# Patient Record
Sex: MALE | Race: WHITE | Employment: OTHER | URBAN - NONMETROPOLITAN AREA
[De-identification: names, ages, dates, MRNs, and addresses within clinical notes are randomized per-mention and may not be internally consistent; named-entity substitution may affect disease eponyms.]

---

## 2017-01-24 RX ORDER — MONTELUKAST SODIUM 10 MG/1
TABLET ORAL
Qty: 30 TABLET | Refills: 4 | Status: SHIPPED | OUTPATIENT
Start: 2017-01-24 | End: 2017-07-18 | Stop reason: SDUPTHER

## 2017-03-06 RX ORDER — HYDROCHLOROTHIAZIDE 12.5 MG/1
CAPSULE, GELATIN COATED ORAL
Qty: 30 CAPSULE | Refills: 6 | Status: SHIPPED | OUTPATIENT
Start: 2017-03-06 | End: 2017-08-09

## 2017-03-29 RX ORDER — ATORVASTATIN CALCIUM 20 MG/1
TABLET, FILM COATED ORAL
Qty: 30 TABLET | Refills: 6 | Status: SHIPPED | OUTPATIENT
Start: 2017-03-29 | End: 2017-10-25 | Stop reason: SDUPTHER

## 2017-06-08 ENCOUNTER — OFFICE VISIT (OUTPATIENT)
Dept: FAMILY MEDICINE CLINIC | Age: 82
End: 2017-06-08

## 2017-06-08 VITALS
SYSTOLIC BLOOD PRESSURE: 118 MMHG | WEIGHT: 190 LBS | HEIGHT: 66 IN | HEART RATE: 84 BPM | TEMPERATURE: 98.3 F | BODY MASS INDEX: 30.53 KG/M2 | DIASTOLIC BLOOD PRESSURE: 70 MMHG

## 2017-06-08 DIAGNOSIS — I48.20 CHRONIC ATRIAL FIBRILLATION (HCC): ICD-10-CM

## 2017-06-08 DIAGNOSIS — J39.8 TRACHEOBRONCHOMALACIA: ICD-10-CM

## 2017-06-08 DIAGNOSIS — R19.7 DIARRHEA, UNSPECIFIED TYPE: Primary | ICD-10-CM

## 2017-06-08 PROCEDURE — 1036F TOBACCO NON-USER: CPT | Performed by: FAMILY MEDICINE

## 2017-06-08 PROCEDURE — G8427 DOCREV CUR MEDS BY ELIG CLIN: HCPCS | Performed by: FAMILY MEDICINE

## 2017-06-08 PROCEDURE — 1123F ACP DISCUSS/DSCN MKR DOCD: CPT | Performed by: FAMILY MEDICINE

## 2017-06-08 PROCEDURE — G8417 CALC BMI ABV UP PARAM F/U: HCPCS | Performed by: FAMILY MEDICINE

## 2017-06-08 PROCEDURE — 4040F PNEUMOC VAC/ADMIN/RCVD: CPT | Performed by: FAMILY MEDICINE

## 2017-06-08 PROCEDURE — 99214 OFFICE O/P EST MOD 30 MIN: CPT | Performed by: FAMILY MEDICINE

## 2017-06-08 RX ORDER — BISOPROLOL FUMARATE 10 MG/1
10 TABLET ORAL DAILY
COMMUNITY
End: 2019-01-17 | Stop reason: SDUPTHER

## 2017-06-08 RX ORDER — FLUTICASONE FUROATE AND VILANTEROL 100; 25 UG/1; UG/1
1 POWDER RESPIRATORY (INHALATION) DAILY
Qty: 1 EACH | Refills: 3 | Status: SHIPPED | OUTPATIENT
Start: 2017-06-08 | End: 2017-11-03

## 2017-06-08 RX ORDER — AMOXICILLIN AND CLAVULANATE POTASSIUM 875; 125 MG/1; MG/1
1 TABLET, FILM COATED ORAL EVERY 12 HOURS
Qty: 20 TABLET | Refills: 0 | Status: SHIPPED | OUTPATIENT
Start: 2017-06-08 | End: 2019-05-14 | Stop reason: SDUPTHER

## 2017-06-08 RX ORDER — TAMSULOSIN HYDROCHLORIDE 0.4 MG/1
0.4 CAPSULE ORAL DAILY
Status: ON HOLD | COMMUNITY
End: 2022-08-26 | Stop reason: HOSPADM

## 2017-06-08 RX ORDER — SACCHAROMYCES BOULARDII 250 MG
250 CAPSULE ORAL 2 TIMES DAILY
Qty: 28 CAPSULE | Refills: 0 | Status: SHIPPED | OUTPATIENT
Start: 2017-06-08 | End: 2017-06-22

## 2017-06-08 ASSESSMENT — PATIENT HEALTH QUESTIONNAIRE - PHQ9
SUM OF ALL RESPONSES TO PHQ QUESTIONS 1-9: 0
1. LITTLE INTEREST OR PLEASURE IN DOING THINGS: 0
SUM OF ALL RESPONSES TO PHQ9 QUESTIONS 1 & 2: 0
2. FEELING DOWN, DEPRESSED OR HOPELESS: 0

## 2017-06-08 ASSESSMENT — ENCOUNTER SYMPTOMS
DIARRHEA: 1
ABDOMINAL PAIN: 1
VOMITING: 0
SHORTNESS OF BREATH: 0
SINUS PRESSURE: 1

## 2017-07-18 RX ORDER — MONTELUKAST SODIUM 10 MG/1
TABLET ORAL
Qty: 30 TABLET | Refills: 5 | Status: SHIPPED | OUTPATIENT
Start: 2017-07-18 | End: 2018-01-13 | Stop reason: SDUPTHER

## 2017-08-09 ENCOUNTER — OFFICE VISIT (OUTPATIENT)
Dept: FAMILY MEDICINE CLINIC | Age: 82
End: 2017-08-09

## 2017-08-09 VITALS
HEART RATE: 79 BPM | BODY MASS INDEX: 31.34 KG/M2 | SYSTOLIC BLOOD PRESSURE: 118 MMHG | OXYGEN SATURATION: 96 % | DIASTOLIC BLOOD PRESSURE: 70 MMHG | HEIGHT: 66 IN | WEIGHT: 195 LBS

## 2017-08-09 DIAGNOSIS — I48.20 CHRONIC ATRIAL FIBRILLATION (HCC): ICD-10-CM

## 2017-08-09 DIAGNOSIS — Z01.818 PREOP EXAMINATION: Primary | ICD-10-CM

## 2017-08-09 DIAGNOSIS — R55 SYNCOPE, UNSPECIFIED SYNCOPE TYPE: ICD-10-CM

## 2017-08-09 DIAGNOSIS — H26.9 CATARACT OF LEFT EYE, UNSPECIFIED CATARACT TYPE: ICD-10-CM

## 2017-08-09 DIAGNOSIS — I10 ESSENTIAL HYPERTENSION: ICD-10-CM

## 2017-08-09 PROCEDURE — 99214 OFFICE O/P EST MOD 30 MIN: CPT | Performed by: FAMILY MEDICINE

## 2017-08-09 PROCEDURE — 1036F TOBACCO NON-USER: CPT | Performed by: FAMILY MEDICINE

## 2017-08-09 PROCEDURE — 1123F ACP DISCUSS/DSCN MKR DOCD: CPT | Performed by: FAMILY MEDICINE

## 2017-08-09 PROCEDURE — G8427 DOCREV CUR MEDS BY ELIG CLIN: HCPCS | Performed by: FAMILY MEDICINE

## 2017-08-09 PROCEDURE — G8417 CALC BMI ABV UP PARAM F/U: HCPCS | Performed by: FAMILY MEDICINE

## 2017-08-09 PROCEDURE — 4040F PNEUMOC VAC/ADMIN/RCVD: CPT | Performed by: FAMILY MEDICINE

## 2017-08-09 ASSESSMENT — ENCOUNTER SYMPTOMS: SHORTNESS OF BREATH: 1

## 2017-10-25 RX ORDER — ATORVASTATIN CALCIUM 20 MG/1
TABLET, FILM COATED ORAL
Qty: 30 TABLET | Refills: 6 | Status: SHIPPED | OUTPATIENT
Start: 2017-10-25 | End: 2018-06-05 | Stop reason: SDUPTHER

## 2017-11-03 ENCOUNTER — OFFICE VISIT (OUTPATIENT)
Dept: FAMILY MEDICINE CLINIC | Age: 82
End: 2017-11-03

## 2017-11-03 VITALS
DIASTOLIC BLOOD PRESSURE: 74 MMHG | OXYGEN SATURATION: 98 % | WEIGHT: 196.2 LBS | SYSTOLIC BLOOD PRESSURE: 102 MMHG | HEART RATE: 86 BPM | BODY MASS INDEX: 31.53 KG/M2 | HEIGHT: 66 IN

## 2017-11-03 DIAGNOSIS — Z95.0 HISTORY OF CARDIAC PACEMAKER: ICD-10-CM

## 2017-11-03 DIAGNOSIS — N18.9 CHRONIC KIDNEY DISEASE, UNSPECIFIED CKD STAGE: ICD-10-CM

## 2017-11-03 DIAGNOSIS — I10 ESSENTIAL HYPERTENSION: ICD-10-CM

## 2017-11-03 DIAGNOSIS — E78.5 HYPERLIPIDEMIA, UNSPECIFIED HYPERLIPIDEMIA TYPE: Primary | ICD-10-CM

## 2017-11-03 PROCEDURE — 4040F PNEUMOC VAC/ADMIN/RCVD: CPT | Performed by: FAMILY MEDICINE

## 2017-11-03 PROCEDURE — 99214 OFFICE O/P EST MOD 30 MIN: CPT | Performed by: FAMILY MEDICINE

## 2017-11-03 PROCEDURE — 1123F ACP DISCUSS/DSCN MKR DOCD: CPT | Performed by: FAMILY MEDICINE

## 2017-11-03 PROCEDURE — 1036F TOBACCO NON-USER: CPT | Performed by: FAMILY MEDICINE

## 2017-11-03 PROCEDURE — G8427 DOCREV CUR MEDS BY ELIG CLIN: HCPCS | Performed by: FAMILY MEDICINE

## 2017-11-03 PROCEDURE — G8484 FLU IMMUNIZE NO ADMIN: HCPCS | Performed by: FAMILY MEDICINE

## 2017-11-03 PROCEDURE — G8417 CALC BMI ABV UP PARAM F/U: HCPCS | Performed by: FAMILY MEDICINE

## 2017-11-03 ASSESSMENT — ENCOUNTER SYMPTOMS
DIARRHEA: 0
CONSTIPATION: 0
SHORTNESS OF BREATH: 0
BLOOD IN STOOL: 0

## 2017-11-03 NOTE — PROGRESS NOTES
Hearing normal.   Left Ear: Hearing normal.   Eyes: Conjunctivae and EOM are normal.   Neck: No tracheal deviation present. Cardiovascular: Normal rate. An irregularly irregular rhythm present. Exam reveals no gallop and no friction rub. No murmur heard. Pulmonary/Chest: Effort normal and breath sounds normal. No respiratory distress. Abdominal: Soft. There is no tenderness. Neurological: He is alert and oriented to person, place, and time. Skin: Skin is warm and dry. No rash noted. Psychiatric: He has a normal mood and affect. /74   Pulse 86   Ht 5' 6\" (1.676 m)   Wt 196 lb 3.2 oz (89 kg)   SpO2 98%   BMI 31.67 kg/m²    Assessment/Plan   1. Hyperlipidemia, unspecified hyperlipidemia type  Rpt labs w/ next draw for nephro  - Lipid Panel; Future  - Comprehensive Metabolic Panel; Future    2. Essential hypertension  Pt to call and confirm meds. May be able to dec amlodipine dose    3. Chronic kidney disease, unspecified CKD stage  F/u w/ nephro as sched    4. History of cardiac pacemaker  Likely hematoma at site.   rec call cardio or f/u if worsening sxs or inc pain/swelling

## 2018-01-15 RX ORDER — MONTELUKAST SODIUM 10 MG/1
TABLET ORAL
Qty: 30 TABLET | Refills: 5 | Status: SHIPPED | OUTPATIENT
Start: 2018-01-15 | End: 2018-06-30 | Stop reason: SDUPTHER

## 2018-02-21 ENCOUNTER — TELEPHONE (OUTPATIENT)
Dept: FAMILY MEDICINE CLINIC | Age: 83
End: 2018-02-21

## 2018-02-22 NOTE — TELEPHONE ENCOUNTER
Dr. Madhav Brown: This patient has an upcoming appointment with you for Hyperlipidemia. In planning for that visit I have completed the following pre-visit planning:     Pre-Visit Planning Checklist:  Patient contacted: yes  Verified patient by name and date of birth: yes    Health Maintenance items reviewed:    No pre-visit planning health maintenance topics to review at this time    Labs and procedures pended:     Labs and procedures discussed with patient: yes  Reminded patient to check with their insurance company about coverage for lab tests and lab location: yes    Preliminary Medication Reconciliation: was performed. Reminded patient to bring medications to appointment: no    Reminded patient to arrive early: yes    Other notes:     Please complete the med-reconciliation and sign the appropriate labs as soon as possible.       Arian Cedeno  Pre-Services Specialist

## 2018-03-02 ENCOUNTER — OFFICE VISIT (OUTPATIENT)
Dept: FAMILY MEDICINE CLINIC | Age: 83
End: 2018-03-02

## 2018-03-02 VITALS
DIASTOLIC BLOOD PRESSURE: 78 MMHG | OXYGEN SATURATION: 94 % | BODY MASS INDEX: 31.6 KG/M2 | HEART RATE: 68 BPM | HEIGHT: 66 IN | SYSTOLIC BLOOD PRESSURE: 122 MMHG | WEIGHT: 196.6 LBS

## 2018-03-02 DIAGNOSIS — I10 ESSENTIAL HYPERTENSION: Primary | ICD-10-CM

## 2018-03-02 DIAGNOSIS — I48.20 CHRONIC ATRIAL FIBRILLATION (HCC): ICD-10-CM

## 2018-03-02 DIAGNOSIS — R06.09 DYSPNEA ON EXERTION: ICD-10-CM

## 2018-03-02 DIAGNOSIS — E78.5 HYPERLIPIDEMIA, UNSPECIFIED HYPERLIPIDEMIA TYPE: ICD-10-CM

## 2018-03-02 DIAGNOSIS — N18.9 CHRONIC KIDNEY DISEASE, UNSPECIFIED CKD STAGE: ICD-10-CM

## 2018-03-02 PROCEDURE — G8427 DOCREV CUR MEDS BY ELIG CLIN: HCPCS | Performed by: FAMILY MEDICINE

## 2018-03-02 PROCEDURE — 1123F ACP DISCUSS/DSCN MKR DOCD: CPT | Performed by: FAMILY MEDICINE

## 2018-03-02 PROCEDURE — G8417 CALC BMI ABV UP PARAM F/U: HCPCS | Performed by: FAMILY MEDICINE

## 2018-03-02 PROCEDURE — G8484 FLU IMMUNIZE NO ADMIN: HCPCS | Performed by: FAMILY MEDICINE

## 2018-03-02 PROCEDURE — 99214 OFFICE O/P EST MOD 30 MIN: CPT | Performed by: FAMILY MEDICINE

## 2018-03-02 PROCEDURE — 4040F PNEUMOC VAC/ADMIN/RCVD: CPT | Performed by: FAMILY MEDICINE

## 2018-03-02 PROCEDURE — 1036F TOBACCO NON-USER: CPT | Performed by: FAMILY MEDICINE

## 2018-03-02 RX ORDER — FUROSEMIDE 40 MG/1
40 TABLET ORAL 2 TIMES DAILY
COMMUNITY
End: 2018-06-21 | Stop reason: ALTCHOICE

## 2018-03-02 RX ORDER — OMEPRAZOLE 20 MG/1
20 CAPSULE, DELAYED RELEASE ORAL DAILY
COMMUNITY
End: 2018-06-21 | Stop reason: ALTCHOICE

## 2018-03-02 ASSESSMENT — ENCOUNTER SYMPTOMS
BLURRED VISION: 0
CONSTIPATION: 0
DIARRHEA: 0
SHORTNESS OF BREATH: 1
BLOOD IN STOOL: 0

## 2018-06-05 RX ORDER — ATORVASTATIN CALCIUM 20 MG/1
TABLET, FILM COATED ORAL
Qty: 30 TABLET | Refills: 5 | Status: SHIPPED | OUTPATIENT
Start: 2018-06-05 | End: 2018-10-31 | Stop reason: SDUPTHER

## 2018-06-19 ENCOUNTER — TELEPHONE (OUTPATIENT)
Dept: FAMILY MEDICINE CLINIC | Age: 83
End: 2018-06-19

## 2018-06-21 RX ORDER — CHOLECALCIFEROL (VITAMIN D3) 1250 MCG
1 CAPSULE ORAL WEEKLY
COMMUNITY
End: 2018-10-26

## 2018-06-25 ENCOUNTER — NURSE ONLY (OUTPATIENT)
Dept: FAMILY MEDICINE CLINIC | Age: 83
End: 2018-06-25

## 2018-06-25 DIAGNOSIS — E78.5 HYPERLIPIDEMIA, UNSPECIFIED HYPERLIPIDEMIA TYPE: ICD-10-CM

## 2018-06-25 LAB
A/G RATIO: 1.7 (ref 1.1–2.2)
ALBUMIN SERPL-MCNC: 4.3 G/DL (ref 3.4–5)
ALP BLD-CCNC: 75 U/L (ref 40–129)
ALT SERPL-CCNC: 20 U/L (ref 10–40)
ANION GAP SERPL CALCULATED.3IONS-SCNC: 17 MMOL/L (ref 3–16)
AST SERPL-CCNC: 21 U/L (ref 15–37)
BILIRUB SERPL-MCNC: 0.9 MG/DL (ref 0–1)
BUN BLDV-MCNC: 14 MG/DL (ref 7–20)
CALCIUM SERPL-MCNC: 9.3 MG/DL (ref 8.3–10.6)
CHLORIDE BLD-SCNC: 104 MMOL/L (ref 99–110)
CHOLESTEROL, TOTAL: 125 MG/DL (ref 0–199)
CO2: 26 MMOL/L (ref 21–32)
CREAT SERPL-MCNC: 1.4 MG/DL (ref 0.8–1.3)
GFR AFRICAN AMERICAN: 59
GFR NON-AFRICAN AMERICAN: 49
GLOBULIN: 2.5 G/DL
GLUCOSE BLD-MCNC: 96 MG/DL (ref 70–99)
HDLC SERPL-MCNC: 35 MG/DL (ref 40–60)
LDL CHOLESTEROL CALCULATED: 65 MG/DL
POTASSIUM SERPL-SCNC: 4.5 MMOL/L (ref 3.5–5.1)
SODIUM BLD-SCNC: 147 MMOL/L (ref 136–145)
TOTAL PROTEIN: 6.8 G/DL (ref 6.4–8.2)
TRIGL SERPL-MCNC: 126 MG/DL (ref 0–150)
VLDLC SERPL CALC-MCNC: 25 MG/DL

## 2018-06-25 PROCEDURE — 36415 COLL VENOUS BLD VENIPUNCTURE: CPT | Performed by: FAMILY MEDICINE

## 2018-07-02 ENCOUNTER — OFFICE VISIT (OUTPATIENT)
Dept: FAMILY MEDICINE CLINIC | Age: 83
End: 2018-07-02

## 2018-07-02 VITALS
HEIGHT: 66 IN | WEIGHT: 192.4 LBS | BODY MASS INDEX: 30.92 KG/M2 | SYSTOLIC BLOOD PRESSURE: 118 MMHG | HEART RATE: 74 BPM | OXYGEN SATURATION: 97 % | DIASTOLIC BLOOD PRESSURE: 68 MMHG

## 2018-07-02 DIAGNOSIS — I48.20 CHRONIC ATRIAL FIBRILLATION (HCC): ICD-10-CM

## 2018-07-02 DIAGNOSIS — R42 DIZZINESS: ICD-10-CM

## 2018-07-02 DIAGNOSIS — I10 ESSENTIAL HYPERTENSION: Primary | ICD-10-CM

## 2018-07-02 DIAGNOSIS — N18.9 CHRONIC KIDNEY DISEASE, UNSPECIFIED CKD STAGE: ICD-10-CM

## 2018-07-02 DIAGNOSIS — E78.5 HYPERLIPIDEMIA, UNSPECIFIED HYPERLIPIDEMIA TYPE: ICD-10-CM

## 2018-07-02 PROCEDURE — 99214 OFFICE O/P EST MOD 30 MIN: CPT | Performed by: FAMILY MEDICINE

## 2018-07-02 PROCEDURE — 3288F FALL RISK ASSESSMENT DOCD: CPT | Performed by: FAMILY MEDICINE

## 2018-07-02 PROCEDURE — 1036F TOBACCO NON-USER: CPT | Performed by: FAMILY MEDICINE

## 2018-07-02 PROCEDURE — 1123F ACP DISCUSS/DSCN MKR DOCD: CPT | Performed by: FAMILY MEDICINE

## 2018-07-02 PROCEDURE — G8417 CALC BMI ABV UP PARAM F/U: HCPCS | Performed by: FAMILY MEDICINE

## 2018-07-02 PROCEDURE — G8427 DOCREV CUR MEDS BY ELIG CLIN: HCPCS | Performed by: FAMILY MEDICINE

## 2018-07-02 PROCEDURE — G8510 SCR DEP NEG, NO PLAN REQD: HCPCS | Performed by: FAMILY MEDICINE

## 2018-07-02 PROCEDURE — 4040F PNEUMOC VAC/ADMIN/RCVD: CPT | Performed by: FAMILY MEDICINE

## 2018-07-02 RX ORDER — FUROSEMIDE 20 MG/1
20 TABLET ORAL 2 TIMES DAILY
COMMUNITY
End: 2018-10-26 | Stop reason: ALTCHOICE

## 2018-07-02 RX ORDER — OMEPRAZOLE 20 MG/1
20 CAPSULE, DELAYED RELEASE ORAL DAILY
COMMUNITY
End: 2018-11-19 | Stop reason: SDUPTHER

## 2018-07-02 RX ORDER — MONTELUKAST SODIUM 10 MG/1
TABLET ORAL
Qty: 30 TABLET | Refills: 5 | Status: SHIPPED | OUTPATIENT
Start: 2018-07-02 | End: 2018-10-31 | Stop reason: SDUPTHER

## 2018-07-02 ASSESSMENT — PATIENT HEALTH QUESTIONNAIRE - PHQ9
SUM OF ALL RESPONSES TO PHQ QUESTIONS 1-9: 0
2. FEELING DOWN, DEPRESSED OR HOPELESS: 0
SUM OF ALL RESPONSES TO PHQ9 QUESTIONS 1 & 2: 0
1. LITTLE INTEREST OR PLEASURE IN DOING THINGS: 0

## 2018-07-02 ASSESSMENT — ENCOUNTER SYMPTOMS
CONSTIPATION: 0
DIARRHEA: 0
BLURRED VISION: 0
CHEST TIGHTNESS: 0
SHORTNESS OF BREATH: 1
BLOOD IN STOOL: 0

## 2018-07-02 NOTE — PROGRESS NOTES
Chief Complaint   Patient presents with    Hypertension    Hyperlipidemia       HPI:  Fady Hawkins is a [de-identified] y.o. (: 1935) here today   for   Hypertension   This is a chronic problem. The current episode started more than 1 year ago. The problem has been gradually improving since onset. The problem is controlled. Associated symptoms include chest pain and shortness of breath. Pertinent negatives include no blurred vision, headaches or palpitations. Agents associated with hypertension include thyroid hormones. Risk factors for coronary artery disease include dyslipidemia and male gender. Past treatments include diuretics and beta blockers. The current treatment provides mild improvement. There are no compliance problems. Hyperlipidemia   This is a chronic problem. The current episode started more than 1 year ago. The problem is controlled. Recent lipid tests were reviewed and are normal. Associated symptoms include chest pain and shortness of breath. Current antihyperlipidemic treatment includes statins. The current treatment provides moderate improvement of lipids. There are no compliance problems. Risk factors for coronary artery disease include dyslipidemia, male sex and hypertension. Overall doing well. No complaints. Feeling well some sob and chest pain with exertion. Has cardio appt next mo. Flomax and Proscar doing well. Kidney function improved. Also taking Vitamin D for 8 weeks. Still w/ some dizziness, has been an ongoing issue. When he gets up suddenly or does anything physical.    Patient's medications, allergies, past medical, surgical, social and family histories were reviewed and updated as appropriate. ROS:  Review of Systems   Constitutional: Negative for chills and fever. Eyes: Negative for blurred vision. Respiratory: Positive for shortness of breath. Negative for chest tightness. Cardiovascular: Positive for chest pain. Negative for palpitations.

## 2018-10-17 ENCOUNTER — TELEPHONE (OUTPATIENT)
Dept: INTERNAL MEDICINE CLINIC | Age: 83
End: 2018-10-17

## 2018-10-17 NOTE — TELEPHONE ENCOUNTER
Attempted to contact patient on 10/17/2018. Result: left message on the patient's voicemail asking patient to return my call. Pre-Visit planning not completed.            Obdulio Levin  Patient Services Specialist  [phone]

## 2018-10-26 NOTE — TELEPHONE ENCOUNTER
Attempted to contact patient on 10/26/2018. Result: left message with patient's wife asking patient to return my call. Pre-Visit planning not completed.            Jody Hickman  Patient Services Specialist  [phone]

## 2018-10-31 ENCOUNTER — OFFICE VISIT (OUTPATIENT)
Dept: FAMILY MEDICINE CLINIC | Age: 83
End: 2018-10-31
Payer: MEDICARE

## 2018-10-31 VITALS
BODY MASS INDEX: 31.82 KG/M2 | SYSTOLIC BLOOD PRESSURE: 132 MMHG | DIASTOLIC BLOOD PRESSURE: 70 MMHG | HEIGHT: 66 IN | WEIGHT: 198 LBS | OXYGEN SATURATION: 97 % | HEART RATE: 79 BPM

## 2018-10-31 DIAGNOSIS — N18.30 CHRONIC KIDNEY DISEASE, STAGE III (MODERATE) (HCC): ICD-10-CM

## 2018-10-31 DIAGNOSIS — I10 ESSENTIAL HYPERTENSION: Primary | ICD-10-CM

## 2018-10-31 DIAGNOSIS — R42 DIZZINESS: ICD-10-CM

## 2018-10-31 DIAGNOSIS — E78.5 HYPERLIPIDEMIA, UNSPECIFIED HYPERLIPIDEMIA TYPE: ICD-10-CM

## 2018-10-31 DIAGNOSIS — N18.9 CHRONIC KIDNEY DISEASE, UNSPECIFIED CKD STAGE: ICD-10-CM

## 2018-10-31 DIAGNOSIS — I48.20 CHRONIC ATRIAL FIBRILLATION (HCC): ICD-10-CM

## 2018-10-31 PROCEDURE — 4040F PNEUMOC VAC/ADMIN/RCVD: CPT | Performed by: FAMILY MEDICINE

## 2018-10-31 PROCEDURE — 1101F PT FALLS ASSESS-DOCD LE1/YR: CPT | Performed by: FAMILY MEDICINE

## 2018-10-31 PROCEDURE — G8484 FLU IMMUNIZE NO ADMIN: HCPCS | Performed by: FAMILY MEDICINE

## 2018-10-31 PROCEDURE — 1123F ACP DISCUSS/DSCN MKR DOCD: CPT | Performed by: FAMILY MEDICINE

## 2018-10-31 PROCEDURE — 1036F TOBACCO NON-USER: CPT | Performed by: FAMILY MEDICINE

## 2018-10-31 PROCEDURE — 99214 OFFICE O/P EST MOD 30 MIN: CPT | Performed by: FAMILY MEDICINE

## 2018-10-31 PROCEDURE — G8427 DOCREV CUR MEDS BY ELIG CLIN: HCPCS | Performed by: FAMILY MEDICINE

## 2018-10-31 PROCEDURE — G8417 CALC BMI ABV UP PARAM F/U: HCPCS | Performed by: FAMILY MEDICINE

## 2018-10-31 RX ORDER — MONTELUKAST SODIUM 10 MG/1
TABLET ORAL
Qty: 90 TABLET | Refills: 4 | Status: SHIPPED | OUTPATIENT
Start: 2018-10-31 | End: 2019-12-11 | Stop reason: SDUPTHER

## 2018-10-31 RX ORDER — ATORVASTATIN CALCIUM 20 MG/1
TABLET, FILM COATED ORAL
Qty: 90 TABLET | Refills: 4 | Status: SHIPPED | OUTPATIENT
Start: 2018-10-31 | End: 2019-11-11 | Stop reason: SDUPTHER

## 2018-10-31 ASSESSMENT — ENCOUNTER SYMPTOMS
BLOOD IN STOOL: 0
DIARRHEA: 0
CHEST TIGHTNESS: 0
CONSTIPATION: 0
BLURRED VISION: 0
SHORTNESS OF BREATH: 1

## 2018-10-31 NOTE — PROGRESS NOTES
palpitations. Gastrointestinal: Negative for blood in stool, constipation and diarrhea. Neurological: Positive for dizziness. Negative for light-headedness and headaches. LDL Calculated (mg/dL)   Date Value   06/25/2018 65       Past Medical History:   Diagnosis Date    Atrial fibrillation (HCC)     Blind right eye     Chronic kidney disease     Hyperlipidemia     Hypertension     Pneumonia        Family History   Problem Relation Age of Onset    Heart Disease Mother     Other Mother     Heart Disease Father     Asthma Other        Social History     Social History    Marital status:      Spouse name: N/A    Number of children: N/A    Years of education: N/A     Occupational History    Not on file. Social History Main Topics    Smoking status: Former Smoker     Packs/day: 1.00     Years: 10.00     Quit date: 2/27/1975    Smokeless tobacco: Never Used      Comment: Quit 40 years ago    Alcohol use No    Drug use: No    Sexual activity: Yes     Partners: Female     Other Topics Concern    Not on file     Social History Narrative    No narrative on file       Prior to Visit Medications    Medication Sig Taking? Authorizing Provider   montelukast (SINGULAIR) 10 MG tablet TAKE 1 TABLET BY MOUTH EVERY DAY Yes Veena Mae MD   omeprazole (PRILOSEC) 20 MG delayed release capsule Take 20 mg by mouth daily Yes Historical Provider, MD   atorvastatin (LIPITOR) 20 MG tablet TAKE 1 TABLET BY MOUTH EVERY DAY Yes Veena Mae MD   bisoprolol (ZEBETA) 10 MG tablet Take 10 mg by mouth daily Yes Historical Provider, MD   tamsulosin (FLOMAX) 0.4 MG capsule Take 0.4 mg by mouth daily Yes Historical Provider, MD   apixaban (ELIQUIS) 5 MG TABS tablet Take 1 tablet by mouth 2 times daily Yes Veena Mae MD   finasteride (PROSCAR) 5 MG tablet Take 5 mg by mouth daily. Yes Historical Provider, MD   aspirin 81 MG tablet Take 81 mg by mouth daily.    Yes Historical Provider, MD

## 2018-11-05 ENCOUNTER — TELEPHONE (OUTPATIENT)
Dept: FAMILY MEDICINE CLINIC | Age: 83
End: 2018-11-05

## 2018-11-19 RX ORDER — OMEPRAZOLE 20 MG/1
20 CAPSULE, DELAYED RELEASE ORAL 2 TIMES DAILY
Qty: 60 CAPSULE | Refills: 3 | Status: SHIPPED | OUTPATIENT
Start: 2018-11-19 | End: 2019-02-27 | Stop reason: ALTCHOICE

## 2018-12-11 ENCOUNTER — OFFICE VISIT (OUTPATIENT)
Dept: FAMILY MEDICINE CLINIC | Age: 83
End: 2018-12-11
Payer: MEDICARE

## 2018-12-11 VITALS
BODY MASS INDEX: 30.67 KG/M2 | SYSTOLIC BLOOD PRESSURE: 122 MMHG | TEMPERATURE: 97.5 F | DIASTOLIC BLOOD PRESSURE: 88 MMHG | WEIGHT: 195.4 LBS | OXYGEN SATURATION: 99 % | HEIGHT: 67 IN | HEART RATE: 71 BPM

## 2018-12-11 DIAGNOSIS — Z00.00 ROUTINE GENERAL MEDICAL EXAMINATION AT A HEALTH CARE FACILITY: Primary | ICD-10-CM

## 2018-12-11 PROCEDURE — G0439 PPPS, SUBSEQ VISIT: HCPCS | Performed by: FAMILY MEDICINE

## 2018-12-11 PROCEDURE — G8484 FLU IMMUNIZE NO ADMIN: HCPCS | Performed by: FAMILY MEDICINE

## 2018-12-11 PROCEDURE — 4040F PNEUMOC VAC/ADMIN/RCVD: CPT | Performed by: FAMILY MEDICINE

## 2018-12-11 RX ORDER — FUROSEMIDE 40 MG/1
40 TABLET ORAL DAILY
COMMUNITY
End: 2019-05-30 | Stop reason: SDUPTHER

## 2018-12-11 ASSESSMENT — ANXIETY QUESTIONNAIRES: GAD7 TOTAL SCORE: 0

## 2018-12-11 ASSESSMENT — LIFESTYLE VARIABLES: HOW OFTEN DO YOU HAVE A DRINK CONTAINING ALCOHOL: 0

## 2018-12-11 ASSESSMENT — PATIENT HEALTH QUESTIONNAIRE - PHQ9
SUM OF ALL RESPONSES TO PHQ QUESTIONS 1-9: 2
SUM OF ALL RESPONSES TO PHQ QUESTIONS 1-9: 2

## 2018-12-11 NOTE — PATIENT INSTRUCTIONS
you die. Is a living will a legal document? A living will is a legal document. Each state has its own laws about living alvarado. If you move to another state, make sure that your living will is legal in the state where you now live. Or you might use a universal form that has been approved by many states. This kind of form can sometimes be completed and stored online. Your electronic copy will then be available wherever you have a connection to the Internet. In most cases, doctors will respect your wishes even if you have a form from a different state. You don't need an  to complete a living will. But legal advice can be helpful if your state's laws are unclear, your health history is complicated, or your family can't agree on what should be in your living will. You can change your living will at any time. Some people find that their wishes about end-of-life care change as their health changes. In addition to making a living will, think about completing a medical power of  form. This form lets you name the person you want to make end-of-life treatment decisions for you (your \"health care agent\") if you're not able to. Many hospitals and nursing homes will give you the forms you need to complete a living will and a medical power of . Your living will is used only if you can't make or communicate decisions for yourself anymore. If you become able to make decisions again, you can accept or refuse any treatment, no matter what you wrote in your living will. Your state may offer an online registry. This is a place where you can store your living will online so the doctors and nurses who need to treat you can find it right away. What should you think about when creating a living will? Talk about your end-of-life wishes with your family members and your doctor. Let them know what you want. That way the people making decisions for you won't be surprised by your choices.   Think about these

## 2019-01-17 RX ORDER — BISOPROLOL FUMARATE 10 MG/1
TABLET ORAL
Qty: 30 TABLET | Refills: 5 | Status: SHIPPED | OUTPATIENT
Start: 2019-01-17 | End: 2019-07-05 | Stop reason: SDUPTHER

## 2019-02-21 ENCOUNTER — TELEPHONE (OUTPATIENT)
Dept: FAMILY MEDICINE CLINIC | Age: 84
End: 2019-02-21

## 2019-02-27 ENCOUNTER — OFFICE VISIT (OUTPATIENT)
Dept: FAMILY MEDICINE CLINIC | Age: 84
End: 2019-02-27
Payer: MEDICARE

## 2019-02-27 VITALS
SYSTOLIC BLOOD PRESSURE: 128 MMHG | HEART RATE: 82 BPM | BODY MASS INDEX: 31.08 KG/M2 | OXYGEN SATURATION: 98 % | WEIGHT: 193.4 LBS | HEIGHT: 66 IN | DIASTOLIC BLOOD PRESSURE: 80 MMHG

## 2019-02-27 DIAGNOSIS — N18.30 CHRONIC KIDNEY DISEASE, STAGE III (MODERATE) (HCC): ICD-10-CM

## 2019-02-27 DIAGNOSIS — N18.9 CHRONIC KIDNEY DISEASE, UNSPECIFIED CKD STAGE: ICD-10-CM

## 2019-02-27 DIAGNOSIS — I10 ESSENTIAL HYPERTENSION: Primary | ICD-10-CM

## 2019-02-27 DIAGNOSIS — I48.20 CHRONIC ATRIAL FIBRILLATION (HCC): ICD-10-CM

## 2019-02-27 DIAGNOSIS — E78.5 HYPERLIPIDEMIA, UNSPECIFIED HYPERLIPIDEMIA TYPE: ICD-10-CM

## 2019-02-27 DIAGNOSIS — R42 DIZZINESS: ICD-10-CM

## 2019-02-27 PROCEDURE — G8417 CALC BMI ABV UP PARAM F/U: HCPCS | Performed by: FAMILY MEDICINE

## 2019-02-27 PROCEDURE — G8427 DOCREV CUR MEDS BY ELIG CLIN: HCPCS | Performed by: FAMILY MEDICINE

## 2019-02-27 PROCEDURE — G8484 FLU IMMUNIZE NO ADMIN: HCPCS | Performed by: FAMILY MEDICINE

## 2019-02-27 PROCEDURE — 1036F TOBACCO NON-USER: CPT | Performed by: FAMILY MEDICINE

## 2019-02-27 PROCEDURE — 99214 OFFICE O/P EST MOD 30 MIN: CPT | Performed by: FAMILY MEDICINE

## 2019-02-27 PROCEDURE — 1123F ACP DISCUSS/DSCN MKR DOCD: CPT | Performed by: FAMILY MEDICINE

## 2019-02-27 PROCEDURE — 1101F PT FALLS ASSESS-DOCD LE1/YR: CPT | Performed by: FAMILY MEDICINE

## 2019-02-27 PROCEDURE — 4040F PNEUMOC VAC/ADMIN/RCVD: CPT | Performed by: FAMILY MEDICINE

## 2019-02-27 RX ORDER — OMEPRAZOLE 20 MG/1
20 CAPSULE, DELAYED RELEASE ORAL DAILY
COMMUNITY
End: 2019-03-26 | Stop reason: SDUPTHER

## 2019-02-27 ASSESSMENT — PATIENT HEALTH QUESTIONNAIRE - PHQ9
SUM OF ALL RESPONSES TO PHQ QUESTIONS 1-9: 0
2. FEELING DOWN, DEPRESSED OR HOPELESS: 0
SUM OF ALL RESPONSES TO PHQ9 QUESTIONS 1 & 2: 0
SUM OF ALL RESPONSES TO PHQ QUESTIONS 1-9: 0
1. LITTLE INTEREST OR PLEASURE IN DOING THINGS: 0

## 2019-02-27 ASSESSMENT — ENCOUNTER SYMPTOMS
CHEST TIGHTNESS: 0
BLURRED VISION: 0
SHORTNESS OF BREATH: 1
DIARRHEA: 0
CONSTIPATION: 0
BLOOD IN STOOL: 0

## 2019-03-21 ENCOUNTER — HOSPITAL ENCOUNTER (OUTPATIENT)
Dept: NON INVASIVE DIAGNOSTICS | Age: 84
Discharge: HOME | End: 2019-03-21
Payer: MEDICARE

## 2019-03-21 ENCOUNTER — HOSPITAL ENCOUNTER (OUTPATIENT)
Dept: VASCULAR LAB | Age: 84
Discharge: HOME | End: 2019-03-21
Payer: MEDICARE

## 2019-03-21 DIAGNOSIS — I48.20 CHRONIC ATRIAL FIBRILLATION (HCC): ICD-10-CM

## 2019-03-21 DIAGNOSIS — R42 DIZZINESS: ICD-10-CM

## 2019-03-21 LAB
LV EF: 58 %
LVEF MODALITY: NORMAL

## 2019-03-21 PROCEDURE — 93880 EXTRACRANIAL BILAT STUDY: CPT

## 2019-03-21 PROCEDURE — 93306 TTE W/DOPPLER COMPLETE: CPT

## 2019-03-26 DIAGNOSIS — I48.20 CHRONIC ATRIAL FIBRILLATION (HCC): ICD-10-CM

## 2019-03-26 RX ORDER — OMEPRAZOLE 20 MG/1
20 CAPSULE, DELAYED RELEASE ORAL 2 TIMES DAILY
Qty: 60 CAPSULE | Refills: 5 | Status: SHIPPED | OUTPATIENT
Start: 2019-03-26 | End: 2019-09-13 | Stop reason: SDUPTHER

## 2019-03-26 NOTE — TELEPHONE ENCOUNTER
Last office visit 02/27/2019, advised to follow-up 4 months, next appointment 06/26/2019.
What Is The Reason For Today's Visit?: Full Body Skin Examination
What Is The Reason For Today's Visit? (Being Monitored For X): the development of a new lesion
How Severe Are Your Spot(S)?: moderate

## 2019-04-02 ENCOUNTER — OFFICE VISIT (OUTPATIENT)
Dept: CARDIOLOGY CLINIC | Age: 84
End: 2019-04-02
Payer: MEDICARE

## 2019-04-02 VITALS
BODY MASS INDEX: 30.53 KG/M2 | SYSTOLIC BLOOD PRESSURE: 110 MMHG | HEART RATE: 81 BPM | DIASTOLIC BLOOD PRESSURE: 82 MMHG | WEIGHT: 190 LBS | OXYGEN SATURATION: 98 % | HEIGHT: 66 IN

## 2019-04-02 DIAGNOSIS — I10 ESSENTIAL HYPERTENSION: ICD-10-CM

## 2019-04-02 DIAGNOSIS — I48.20 CHRONIC ATRIAL FIBRILLATION (HCC): ICD-10-CM

## 2019-04-02 DIAGNOSIS — R93.1 ABNORMAL ECHOCARDIOGRAM: ICD-10-CM

## 2019-04-02 DIAGNOSIS — R42 ORTHOSTATIC DIZZINESS: ICD-10-CM

## 2019-04-02 DIAGNOSIS — E78.5 HYPERLIPIDEMIA, UNSPECIFIED HYPERLIPIDEMIA TYPE: Primary | ICD-10-CM

## 2019-04-02 DIAGNOSIS — Z95.0 PACEMAKER: ICD-10-CM

## 2019-04-02 PROCEDURE — G8417 CALC BMI ABV UP PARAM F/U: HCPCS | Performed by: INTERNAL MEDICINE

## 2019-04-02 PROCEDURE — 99214 OFFICE O/P EST MOD 30 MIN: CPT | Performed by: INTERNAL MEDICINE

## 2019-04-02 PROCEDURE — G8427 DOCREV CUR MEDS BY ELIG CLIN: HCPCS | Performed by: INTERNAL MEDICINE

## 2019-04-02 NOTE — PATIENT INSTRUCTIONS
Plan:  1. Encourage you to follow up with Dr. Rosana Griffith regarding your prostate treatment. These medications are likely the cause of the symptoms of lightheadedness you experience at night. If you wish, we can refer you to a Urologist in [facility]. 2. I recommend you stop Finasteride (Proscar)   3. Neck CTA to further evaluate the patency of your carotid arteries. 4. Referral to Electrophysiology for management of your pacemaker. 5. Follow up with me in  [place] at the end of this month.

## 2019-04-02 NOTE — LETTER
[address] Cardiology - [address] [place] [address]  Phone: [phone]  Fax: [phone]    Karen Pearce MD        2019     Luidn Encinas, [address] [place] [address]    Patient: Norville Dakin  MR Number: [id number]  YOB: 1935  Date of Visit: 2019    Dear Dr. Ludin Encinas:    I recently saw our mutual patient, listed above. Below are the relevant portions of my assessment and plan of care. [address] [facility]   Cardiovascular Evaluation    PATIENT: Norville Dakin  DATE: 2019  MRN: [id number]  CSN: [id number]  : 1935    Primary Care Doctor/Referring provider: Ludin Encinas MD    Reason for evaluation/Chief complaint:   New Patient; Shortness of Breath (sitting still and on exertion); Loss of Consciousness (twice in one week); and Dizziness (comes and goes, light headed)      Subjective:    History of present illness on initial date of evaluation:   Norville Dakin is a 82 y.o. patient who presents to [facility] cardiology care. He was previously followed by a cardiologist in [place] He has a history of hypertension, hyperlipidemia and chronic atrial fibrillation for which he has been on Eliquis. His echocardiogram from 3/21/19 showed an ejection fraction of 55-60% with mild valve abnormality. His carotid doppler showed <50% bilaterally. He states that he underwent pacemaker placement roughly 2 years ago in [facility]. He also reportedly underwent a cardiac cath prior to his pacemaker placement that was apparently normal. Unfortunately these records are not available. Today he reports that he has had 2 syncopal episodes in a weeks times. They have both occurred at night when he wakes up with urinate. He suddenly feels weak and lightheaded after standing. He has been taking his medications as prescribed.          Assessment:  82 y.o. patient with:  Problem List Items Addressed This Visit Dizziness    Chronic atrial fibrillation (HCC)    Abnormal echocardiogram    Hypertension    Hyperlipidemia - Primary    Pacemaker, [employer]. placed in [address]:  1. Encourage you to follow up with Dr. Khadra Aguirre regarding your prostate treatment. These medications are likely the cause of the symptoms of lightheadedness you experience at night. If you wish, we can refer you to a Urologist in [facility]. 2. I recommend you stop Finasteride (Proscar) for now. 3. Neck CTA to further evaluate the patency of your carotid arteries. 4. Referral to Electrophysiology for management of your pacemaker. 5. Follow up with me in [facility] at the end of this month. This note was scribed in the presence of Dr. Jess Becerra MD by Sammie Anand RN.      I, Dr. Jess Becerra, personally performed the services described in this documentation, as scribed by the above signed scribe in my presence. It is both accurate and complete to my knowledge. I agree with the details independently gathered by the clinical support staff, while the remaining scribed note accurately describes my personal service to the patient. All questions and concerns were addressed to the patient/family. Alternatives to my treatment were discussed. The note was completed using EMR. Every effort was made to ensure accuracy; however, inadvertent computerized transcription errors may be present. Jess Becerra MD, [address], [facility], [place]  [phone] [facility] office  [phone] [facility]  4/2/2019  1:10 PM        If you have questions, please do not hesitate to call me. I look forward to following Leroy Begum along with you.     Sincerely,        Jess Becerra MD

## 2019-04-02 NOTE — PROGRESS NOTES
[address] [facility]   Cardiovascular Evaluation    PATIENT: Mello Cast  DATE: 2019  MRN: [id number]  CSN: [id number]  : 1935    Primary Care Doctor/Referring provider: Junior Duvall MD    Reason for evaluation/Chief complaint:   New Patient; Shortness of Breath (sitting still and on exertion); Loss of Consciousness (twice in one week); and Dizziness (comes and goes, light headed)      Subjective:    History of present illness on initial date of evaluation:   Mello Cast is a 82 y.o. patient who presents to [facility] cardiology care. He was previously followed by a cardiologist in [place] He has a history of hypertension, hyperlipidemia and chronic atrial fibrillation for which he has been on Eliquis. His echocardiogram from 3/21/19 showed an ejection fraction of 55-60% with mild valve abnormality. His carotid doppler showed <50% bilaterally. He states that he underwent pacemaker placement roughly 2 years ago in [facility]. He also reportedly underwent a cardiac cath prior to his pacemaker placement that was apparently normal. Unfortunately these records are not available. Today he reports that he has had 2 syncopal episodes in a weeks times. They have both occurred at night when he wakes up with urinate. He suddenly feels weak and lightheaded after standing. He has been taking his medications as prescribed. Patient Active Problem List   Diagnosis    Pneumonia    Abnormal chest CT    Cough syncope    Tracheobronchomalacia    Hyperlipidemia    Hypertension    Chronic kidney disease (HCC)    Chronic atrial fibrillation (HCC)    Pacemaker, [employer]. placed in [place]     Abnormal echocardiogram    Dizziness         Cardiac Testing: I have reviewed the findings below.   EKG:  ECHO:   STRESS TEST:  CATH:  BYPASS:  VASCULAR:    Past Medical History:   has a past medical history of Atrial fibrillation ([address] Blind right eye, Chronic kidney disease, Hyperlipidemia, Hypertension, and Pneumonia. Surgical History:   has a past surgical history that includes Tonsillectomy; Appendectomy; Colonoscopy; eye surgery; Upper gastrointestinal endoscopy; joint replacement (2013); hernia repair; and pacemaker placement (10/13/2017). Social History:   reports that he quit smoking about 44 years ago. He has a 10.00 pack-year smoking history. He has never used smokeless tobacco. He reports that he does not drink alcohol or use drugs. Family History:  No evidence for sudden cardiac death or premature CAD    Medications:  Reviewed and are listed in nursing record. and/or listed below  Outpatient Medications:  Prior to Admission medications    Medication Sig Start Date End Date Taking? Authorizing Provider   omeprazole (PRILOSEC) 20 MG delayed release capsule Take 1 capsule by mouth 2 times daily 3/26/19 4/25/19 Yes Ethel Hernandez MD   apixaban [place]) 5 MG TABS tablet Take 1 tablet by mouth 2 times daily 3/26/19 4/25/19 Yes Ethel Hernandez MD   vitamin D (CHOLECALCIFEROL) 1000 UNIT TABS tablet Take 1,000 Units by mouth daily   Yes Historical Provider, MD   bisoprolol (ZEBETA) 10 MG tablet TAKE 1 TABLET BY MOUTH DAILY 1/17/19  Yes GUS Montes - CNP   furosemide (LASIX) 40 MG tablet Take 40 mg by mouth daily   Yes Historical Provider, MD   montelukast (SINGULAIR) 10 MG tablet TAKE 1 TABLET BY MOUTH EVERY DAY 10/31/18  Yes Ethel Hernandez MD   atorvastatin (LIPITOR) 20 MG tablet TAKE 1 TABLET BY MOUTH EVERY DAY 10/31/18  Yes Ethel Hernandez MD   tamsulosin (FLOMAX) 0.4 MG capsule Take 0.4 mg by mouth daily   Yes Historical Provider, MD   finasteride (PROSCAR) 5 MG tablet Take 5 mg by mouth daily. 3/4/15  Yes Historical Provider, MD   aspirin 81 MG tablet Take 81 mg by mouth daily. Yes Historical Provider, MD       In-patient schedule medications:        Infusion Medications:         Allergies:  Sulfa antibiotics     Review of Systems:   All 14 point review of symptoms completed. Pertinent positives identified in the HPI, all other review of symptoms findings as below.      Review of Systems - History obtained from the patient  General ROS: negative for - chills, fever or night sweats  Psychological ROS: negative for - disorientation or hallucinations  Ophthalmic ROS: negative for - dry eyes, eye pain or loss of vision  ENT ROS: negative for - nasal discharge or sore throat  Allergy and Immunology ROS: negative for - hives or itchy/watery eyes  Hematological and Lymphatic ROS: negative for - jaundice or night sweats  Endocrine ROS: negative for - mood swings or temperature intolerance  Breast ROS: deferred  Respiratory ROS: negative for - hemoptysis or stridor  Cardiovascular ROS: negative for - chest pain, dyspnea on exertion or palpitations  Gastrointestinal ROS: no abdominal pain, change in bowel habits, or black or bloody stools  Genito-Urinary ROS: no dysuria, trouble voiding, or hematuria  Musculoskeletal ROS: negative for - gait disturbance, joint pain or joint stiffness  Neurological ROS: negative for - seizures or speech problems  Dermatological ROS: negative for - rash or skin lesion changes      Physical Examination:    /82   Pulse 81   Ht 5' 6\" (1.676 m)   Wt 190 lb (86.2 kg)   SpO2 98%   BMI 30.67 kg/m²   /82   Pulse 81   Ht 5' 6\" (1.676 m)   Wt 190 lb (86.2 kg)   SpO2 98%   BMI 30.67 kg/m²    Weight: 190 lb (86.2 kg)     Wt Readings from Last 3 Encounters:   04/02/19 190 lb (86.2 kg)   02/27/19 193 lb 6.4 oz (87.7 kg)   12/11/18 195 lb 6.4 oz (88.6 kg)     No intake or output data in the 24 hours ending 04/02/19 1310    General Appearance:  Alert, cooperative, no distress, appears stated age   Head:  Normocephalic, without obvious abnormality, atraumatic   Eyes:  PERRL, conjunctiva/corneas clear       Nose: Nares normal, no drainage or sinus tenderness   Throat: Lips, mucosa, and tongue normal   Neck: Supple, symmetrical, trachea midline, no adenopathy, thyroid: not enlarged, symmetric, no tenderness/mass/nodules, no carotid bruit or JVD       Lungs:   Clear to auscultation bilaterally, respirations unlabored   Chest Wall:  No tenderness or deformity   Heart:  Regular rhythm and normal rate; S1, S2 are normal; no murmur noted; no rub or gallop   Abdomen:   Soft, non-tender, bowel sounds active all four quadrants,  no masses, no organomegaly           Extremities: Extremities normal, atraumatic, no cyanosis or edema   Pulses: 2+ and symmetric   Skin: Skin color, texture, turgor normal, no rashes or lesions   Pysch: Normal mood and affect   Neurologic: Normal gross motor and sensory exam.         Labs  No results for input(s): WBC, HGB, HCT, MCV, PLT in the last 72 hours. No results for input(s): CREATININE, BUN, NA, K, CL, CO2 in the last 72 hours. No results for input(s): INR, PROTIME in the last 72 hours. No results for input(s): TROPONINI in the last 72 hours. Invalid input(s): PRO-BNP  No results for input(s): CHOL, HDL in the last 72 hours. Invalid input(s): LDL, TG      Imaging:  I have reviewed the below testing personally and my interpretation is below. EKG:  CXR:      Assessment:  82 y.o. patient with:  Problem List Items Addressed This Visit     Dizziness    Chronic atrial fibrillation (HCC)    Abnormal echocardiogram    Hypertension    Hyperlipidemia - Primary    Pacemaker, [employer]. placed in [facility]:  1. Encourage you to follow up with Dr. Xi Moscoso regarding your prostate treatment. These medications are likely the cause of the symptoms of lightheadedness you experience at night. If you wish, we can refer you to a Urologist in [facility]. 2. I recommend you stop Finasteride (Proscar) for now. 3. Neck CTA to further evaluate the patency of your carotid arteries. 4. Referral to Electrophysiology for management of your pacemaker. 5. Follow up with me in [place] at the end of this month.      This note was scribed in the presence of Dr. Dante Mccollum MD by Jorge L Spencer RN.      I, Dr. Dante Mccollum, personally performed the services described in this documentation, as scribed by the above signed scribe in my presence. It is both accurate and complete to my knowledge. I agree with the details independently gathered by the clinical support staff, while the remaining scribed note accurately describes my personal service to the patient. All questions and concerns were addressed to the patient/family. Alternatives to my treatment were discussed. The note was completed using EMR. Every effort was made to ensure accuracy; however, inadvertent computerized transcription errors may be present.     Dante Mccollum MD, [address], [place], [place]  [phone] [facility] office  [phone] [facility]  4/2/2019  1:10 PM

## 2019-04-03 ENCOUNTER — TELEPHONE (OUTPATIENT)
Dept: CARDIOLOGY CLINIC | Age: 84
End: 2019-04-03

## 2019-04-03 DIAGNOSIS — Z45.09 ENCOUNTER FOR LOOP RECORDER CHECK: Primary | ICD-10-CM

## 2019-04-03 NOTE — TELEPHONE ENCOUNTER
Pt was seen by Dr. Giovanni Livingston as a new patient. He reported that he had a device placed 2 years ago in [facility]. He is transferring his care here. : can you please call him and schedule him to see EP next available to establish care for device management per [facility]? [employer]: can you please get his recorders from there? Janey Warren: can you please enroll him in our device clinic once we have his device info?

## 2019-04-03 NOTE — TELEPHONE ENCOUNTER
Halie Evans Serial Number: [id number] Clinic: [facility] [address]   Contact: Kylah Jonesy Phone Number: [phone]      Pt has a loop recorder, implanted 9-1-2017. I requested transfer in from [employer]. Sofiya Benito is the NEW point person at [facility] . [phone]. If not released call Stuart Cummings.

## 2019-04-03 NOTE — TELEPHONE ENCOUNTER
Ines Alaniz Serial Number: [id number] Clinic: [facility] [address]   Contact: Noel Flaherty Phone Number: [phone]      Pt has a loop recorder, implanted 9-1-2017. I requested transfer in from [employer].

## 2019-04-10 NOTE — TELEPHONE ENCOUNTER
Number changed for [facility] to 21 . Called clinic and spoke w/  Daogberto ambrose.

## 2019-04-18 ENCOUNTER — TELEPHONE (OUTPATIENT)
Dept: CARDIOLOGY CLINIC | Age: 84
End: 2019-04-18

## 2019-04-18 DIAGNOSIS — I10 ESSENTIAL HYPERTENSION: Primary | ICD-10-CM

## 2019-04-19 ENCOUNTER — TELEPHONE (OUTPATIENT)
Dept: CARDIOLOGY CLINIC | Age: 84
End: 2019-04-19

## 2019-04-19 ENCOUNTER — HOSPITAL ENCOUNTER (OUTPATIENT)
Age: 84
Discharge: HOME | End: 2019-04-19
Payer: MEDICARE

## 2019-04-19 ENCOUNTER — HOSPITAL ENCOUNTER (OUTPATIENT)
Dept: CT IMAGING | Age: 84
Discharge: HOME | End: 2019-04-19
Payer: MEDICARE

## 2019-04-19 DIAGNOSIS — I10 ESSENTIAL HYPERTENSION: ICD-10-CM

## 2019-04-19 DIAGNOSIS — R42 ORTHOSTATIC DIZZINESS: ICD-10-CM

## 2019-04-19 DIAGNOSIS — I77.9 VERTEBRAL ARTERY DISEASE (HCC): Primary | ICD-10-CM

## 2019-04-19 LAB
BUN BLDV-MCNC: 16 MG/DL (ref 7–20)
CREAT SERPL-MCNC: 1.7 MG/DL (ref 0.8–1.3)
GFR AFRICAN AMERICAN: 47
GFR NON-AFRICAN AMERICAN: 39

## 2019-04-19 PROCEDURE — 70498 CT ANGIOGRAPHY NECK: CPT

## 2019-04-19 PROCEDURE — 82565 ASSAY OF CREATININE: CPT

## 2019-04-19 PROCEDURE — 36415 COLL VENOUS BLD VENIPUNCTURE: CPT

## 2019-04-19 PROCEDURE — 84520 ASSAY OF UREA NITROGEN: CPT

## 2019-04-19 PROCEDURE — 6360000004 HC RX CONTRAST MEDICATION: Performed by: INTERNAL MEDICINE

## 2019-04-19 RX ADMIN — IOPAMIDOL 75 ML: 755 INJECTION, SOLUTION INTRAVENOUS at 10:03

## 2019-04-19 NOTE — TELEPHONE ENCOUNTER
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

## 2019-04-24 NOTE — PROGRESS NOTES
[address] [facility]   Cardiovascular Evaluation    PATIENT: Olena Mayo  DATE: 2019  MRN: [id number]  CSN: [id number]  : 1935    Primary Care Doctor/Referring provider: Nataliia Bauman MD    Reason for evaluation/Chief complaint:   No chief complaint on file. Subjective:    History of present illness on initial date of evaluation:   Olena Mayo is a 82 y.o. patient who presents for follow up. His neck CTA from 19 showed blockage in his bilateral vertebral arteries. He was referred to Dr. Juan Alberto Fernández. Patient Active Problem List   Diagnosis    Pneumonia    Abnormal chest CT    Cough syncope    Tracheobronchomalacia    Hyperlipidemia    Hypertension    Chronic kidney disease (HCC)    Chronic atrial fibrillation (HCC)    Pacemaker, [facility] placed in [facility] Abnormal echocardiogram    Dizziness    Encounter for loop recorder check         Cardiac Testing: I have reviewed the findings below. EKG:  ECHO:   STRESS TEST:  CATH:  BYPASS:  VASCULAR:    Past Medical History:   has a past medical history of Atrial fibrillation ([address] Blind right eye, Chronic kidney disease, Hyperlipidemia, Hypertension, and Pneumonia. Surgical History:   has a past surgical history that includes Tonsillectomy; Appendectomy; Colonoscopy; eye surgery; Upper gastrointestinal endoscopy; joint replacement (); hernia repair; and pacemaker placement (10/13/2017). Social History:   reports that he quit smoking about 44 years ago. He has a 10.00 pack-year smoking history. He has never used smokeless tobacco. He reports that he does not drink alcohol or use drugs. Family History:  No evidence for sudden cardiac death or premature CAD    Medications:  Reviewed and are listed in nursing record. and/or listed below  Outpatient Medications:  Prior to Admission medications    Medication Sig Start Date End Date Taking?  Authorizing Provider   omeprazole (PRILOSEC) 20 MG delayed abdominal pain, change in bowel habits, or black or bloody stools  Genito-Urinary ROS: no dysuria, trouble voiding, or hematuria  Musculoskeletal ROS: negative for - gait disturbance, joint pain or joint stiffness  Neurological ROS: negative for - seizures or speech problems  Dermatological ROS: negative for - rash or skin lesion changes      Physical Examination:    There were no vitals taken for this visit. There were no vitals taken for this visit. Wt Readings from Last 3 Encounters:   04/02/19 190 lb (86.2 kg)   02/27/19 193 lb 6.4 oz (87.7 kg)   12/11/18 195 lb 6.4 oz (88.6 kg)     No intake or output data in the 24 hours ending 04/24/19 1520    General Appearance:  Alert, cooperative, no distress, appears stated age   Head:  Normocephalic, without obvious abnormality, atraumatic   Eyes:  PERRL, conjunctiva/corneas clear       Nose: Nares normal, no drainage or sinus tenderness   Throat: Lips, mucosa, and tongue normal   Neck: Supple, symmetrical, trachea midline, no adenopathy, thyroid: not enlarged, symmetric, no tenderness/mass/nodules, no carotid bruit or JVD       Lungs:   Clear to auscultation bilaterally, respirations unlabored   Chest Wall:  No tenderness or deformity   Heart:  Regular rhythm and normal rate; S1, S2 are normal; no murmur noted; no rub or gallop   Abdomen:   Soft, non-tender, bowel sounds active all four quadrants,  no masses, no organomegaly           Extremities: Extremities normal, atraumatic, no cyanosis or edema   Pulses: 2+ and symmetric   Skin: Skin color, texture, turgor normal, no rashes or lesions   Pysch: Normal mood and affect   Neurologic: Normal gross motor and sensory exam.         Labs  No results for input(s): WBC, HGB, HCT, MCV, PLT in the last 72 hours. No results for input(s): CREATININE, BUN, NA, K, CL, CO2 in the last 72 hours. No results for input(s): INR, PROTIME in the last 72 hours. No results for input(s): TROPONINI in the last 72 hours.   Invalid

## 2019-04-25 ENCOUNTER — OFFICE VISIT (OUTPATIENT)
Dept: CARDIOLOGY CLINIC | Age: 84
End: 2019-04-25
Payer: MEDICARE

## 2019-04-25 VITALS
DIASTOLIC BLOOD PRESSURE: 88 MMHG | WEIGHT: 194 LBS | BODY MASS INDEX: 31.18 KG/M2 | OXYGEN SATURATION: 98 % | HEART RATE: 78 BPM | HEIGHT: 66 IN | SYSTOLIC BLOOD PRESSURE: 154 MMHG

## 2019-04-25 DIAGNOSIS — I48.91 ATRIAL FIBRILLATION, UNSPECIFIED TYPE (HCC): ICD-10-CM

## 2019-04-25 DIAGNOSIS — I10 ESSENTIAL HYPERTENSION: ICD-10-CM

## 2019-04-25 DIAGNOSIS — E78.5 HYPERLIPIDEMIA, UNSPECIFIED HYPERLIPIDEMIA TYPE: ICD-10-CM

## 2019-04-25 DIAGNOSIS — R42 DIZZINESS: Primary | ICD-10-CM

## 2019-04-25 PROCEDURE — 1036F TOBACCO NON-USER: CPT | Performed by: INTERNAL MEDICINE

## 2019-04-25 PROCEDURE — G8417 CALC BMI ABV UP PARAM F/U: HCPCS | Performed by: INTERNAL MEDICINE

## 2019-04-25 PROCEDURE — 99214 OFFICE O/P EST MOD 30 MIN: CPT | Performed by: INTERNAL MEDICINE

## 2019-04-25 PROCEDURE — 4040F PNEUMOC VAC/ADMIN/RCVD: CPT | Performed by: INTERNAL MEDICINE

## 2019-04-25 PROCEDURE — 1123F ACP DISCUSS/DSCN MKR DOCD: CPT | Performed by: INTERNAL MEDICINE

## 2019-04-25 PROCEDURE — G8427 DOCREV CUR MEDS BY ELIG CLIN: HCPCS | Performed by: INTERNAL MEDICINE

## 2019-04-25 NOTE — LETTER
No evidence for sudden cardiac death or premature CAD    Medications:  Reviewed and are listed in nursing record. and/or listed below  Outpatient Medications:  Prior to Admission medications    Medication Sig Start Date End Date Taking? Authorizing Provider   omeprazole (PRILOSEC) 20 MG delayed release capsule Take 1 capsule by mouth 2 times daily 3/26/19 4/25/19 Yes Bernard Sainz MD   apixaban Franciekenn Blantonan) 5 MG TABS tablet Take 1 tablet by mouth 2 times daily 3/26/19 4/25/19 Yes Bernard Sainz MD   vitamin D (CHOLECALCIFEROL) 1000 UNIT TABS tablet Take 1,000 Units by mouth daily   Yes Historical Provider, MD   bisoprolol (ZEBETA) 10 MG tablet TAKE 1 TABLET BY MOUTH DAILY 1/17/19  Yes GUS Valle CNP   furosemide (LASIX) 40 MG tablet Take 40 mg by mouth daily   Yes Historical Provider, MD   montelukast (SINGULAIR) 10 MG tablet TAKE 1 TABLET BY MOUTH EVERY DAY 10/31/18  Yes Bernard Sainz MD   atorvastatin (LIPITOR) 20 MG tablet TAKE 1 TABLET BY MOUTH EVERY DAY 10/31/18  Yes Bernard Sainz MD   tamsulosin (FLOMAX) 0.4 MG capsule Take 0.4 mg by mouth daily   Yes Historical Provider, MD   finasteride (PROSCAR) 5 MG tablet Take 5 mg by mouth daily. 3/4/15  Yes Historical Provider, MD   aspirin 81 MG tablet Take 81 mg by mouth daily. Yes Historical Provider, MD       In-patient schedule medications:        Infusion Medications: Allergies:  Sulfa antibiotics     Review of Systems:   All 14 point review of symptoms completed. Pertinent positives identified in the HPI, all other review of symptoms findings as below.      Review of Systems - History obtained from the patient  General ROS: negative for - chills, fever or night sweats  Psychological ROS: negative for - disorientation or hallucinations  Ophthalmic ROS: negative for - dry eyes, eye pain or loss of vision  ENT ROS: negative for - nasal discharge or sore throat  Allergy and Immunology ROS: negative for - hives or itchy/watery eyes Pulses: 2+ and symmetric   Skin: Skin color, texture, turgor normal, no rashes or lesions   Pysch: Normal mood and affect   Neurologic: Normal gross motor and sensory exam.         Labs  No results for input(s): WBC, HGB, HCT, MCV, PLT in the last 72 hours. No results for input(s): CREATININE, BUN, NA, K, CL, CO2 in the last 72 hours. No results for input(s): INR, PROTIME in the last 72 hours. No results for input(s): TROPONINI in the last 72 hours. Invalid input(s): PRO-BNP  No results for input(s): CHOL, HDL in the last 72 hours. Invalid input(s): LDL, TG      Imaging:  I have reviewed the below testing personally and my interpretation is below. EKG:  CXR:      Assessment:  82 y.o. patient with:  Problem List Items Addressed This Visit     Dizziness - Primary    A-fib ([address]    Hypertension    Hyperlipidemia          Plan:  1. Please follow up with Dr. Concepción Kaplan as scheduled 5/10/19 to review abnormal neck CTA findings on verterbal arteries. Normal velocity on doppler, so likely a false positive. 2. Continue current medications for now with continued holding of his prostate medications. 3. Please follow up with me in 6 months. This note was scribed in the presence of Dr. Priya Hope MD by Veronica Guo RN.      I, Dr. Ashley Vences, personally performed the services described in this documentation, as scribed by the above signed scribe in my presence. It is both accurate and complete to my knowledge. I agree with the details independently gathered by the clinical support staff, while the remaining scribed note accurately describes my personal service to the patient. All questions and concerns were addressed to the patient/family. Alternatives to my treatment were discussed. The note was completed using EMR. Every effort was made to ensure accuracy; however, inadvertent computerized transcription errors may be present.           Ashley Vences MD, [address], [facility], [address] [facility] [phone] [facility]  4/25/2019  9:14 AM

## 2019-04-25 NOTE — PROGRESS NOTES
evidence for sudden cardiac death or premature CAD    Medications:  Reviewed and are listed in nursing record. and/or listed below  Outpatient Medications:  Prior to Admission medications    Medication Sig Start Date End Date Taking? Authorizing Provider   omeprazole (PRILOSEC) 20 MG delayed release capsule Take 1 capsule by mouth 2 times daily 3/26/19 4/25/19 Yes Brannon Dailey MD   apixaban Geraldean Vic) 5 MG TABS tablet Take 1 tablet by mouth 2 times daily 3/26/19 4/25/19 Yes Brannon Dailey MD   vitamin D (CHOLECALCIFEROL) 1000 UNIT TABS tablet Take 1,000 Units by mouth daily   Yes Historical Provider, MD   bisoprolol (ZEBETA) 10 MG tablet TAKE 1 TABLET BY MOUTH DAILY 1/17/19  Yes GUS Ferreira - CNP   furosemide (LASIX) 40 MG tablet Take 40 mg by mouth daily   Yes Historical Provider, MD   montelukast (SINGULAIR) 10 MG tablet TAKE 1 TABLET BY MOUTH EVERY DAY 10/31/18  Yes Brannon Dailey MD   atorvastatin (LIPITOR) 20 MG tablet TAKE 1 TABLET BY MOUTH EVERY DAY 10/31/18  Yes Brannon Dailey MD   tamsulosin (FLOMAX) 0.4 MG capsule Take 0.4 mg by mouth daily   Yes Historical Provider, MD   finasteride (PROSCAR) 5 MG tablet Take 5 mg by mouth daily. 3/4/15  Yes Historical Provider, MD   aspirin 81 MG tablet Take 81 mg by mouth daily. Yes Historical Provider, MD       In-patient schedule medications:        Infusion Medications: Allergies:  Sulfa antibiotics     Review of Systems:   All 14 point review of symptoms completed. Pertinent positives identified in the HPI, all other review of symptoms findings as below.      Review of Systems - History obtained from the patient  General ROS: negative for - chills, fever or night sweats  Psychological ROS: negative for - disorientation or hallucinations  Ophthalmic ROS: negative for - dry eyes, eye pain or loss of vision  ENT ROS: negative for - nasal discharge or sore throat  Allergy and Immunology ROS: negative for - hives or itchy/watery eyes  Hematological and Lymphatic ROS: negative for - jaundice or night sweats  Endocrine ROS: negative for - mood swings or temperature intolerance  Breast ROS: deferred  Respiratory ROS: negative for - hemoptysis or stridor  Cardiovascular ROS: negative for - chest pain, dyspnea on exertion or palpitations  Gastrointestinal ROS: no abdominal pain, change in bowel habits, or black or bloody stools  Genito-Urinary ROS: no dysuria, trouble voiding, or hematuria  Musculoskeletal ROS: negative for - gait disturbance, joint pain or joint stiffness  Neurological ROS: negative for - seizures or speech problems  Dermatological ROS: negative for - rash or skin lesion changes      Physical Examination:    BP (!) 154/88   Pulse 78   Ht 5' 6\" (1.676 m)   Wt 194 lb (88 kg)   SpO2 98%   BMI 31.31 kg/m²   BP (!) 154/88   Pulse 78   Ht 5' 6\" (1.676 m)   Wt 194 lb (88 kg)   SpO2 98%   BMI 31.31 kg/m²    Weight: 194 lb (88 kg)     Wt Readings from Last 3 Encounters:   04/25/19 194 lb (88 kg)   04/02/19 190 lb (86.2 kg)   02/27/19 193 lb 6.4 oz (87.7 kg)     No intake or output data in the 24 hours ending 04/25/19 0914    General Appearance:  Alert, cooperative, no distress, appears stated age   Head:  Normocephalic, without obvious abnormality, atraumatic   Eyes:  PERRL, conjunctiva/corneas clear       Nose: Nares normal, no drainage or sinus tenderness   Throat: Lips, mucosa, and tongue normal   Neck: Supple, symmetrical, trachea midline, no adenopathy, thyroid: not enlarged, symmetric, no tenderness/mass/nodules, no carotid bruit or JVD       Lungs:   Clear to auscultation bilaterally, respirations unlabored   Chest Wall:  No tenderness or deformity   Heart:  Regular rhythm and normal rate; S1, S2 are normal; no murmur noted; no rub or gallop   Abdomen:   Soft, non-tender, bowel sounds active all four quadrants,  no masses, no organomegaly           Extremities: Extremities normal, atraumatic, no cyanosis or edema Pulses: 2+ and symmetric   Skin: Skin color, texture, turgor normal, no rashes or lesions   Pysch: Normal mood and affect   Neurologic: Normal gross motor and sensory exam.         Labs  No results for input(s): WBC, HGB, HCT, MCV, PLT in the last 72 hours. No results for input(s): CREATININE, BUN, NA, K, CL, CO2 in the last 72 hours. No results for input(s): INR, PROTIME in the last 72 hours. No results for input(s): TROPONINI in the last 72 hours. Invalid input(s): PRO-BNP  No results for input(s): CHOL, HDL in the last 72 hours. Invalid input(s): LDL, TG      Imaging:  I have reviewed the below testing personally and my interpretation is below. EKG:  CXR:      Assessment:  82 y.o. patient with:  Problem List Items Addressed This Visit     Dizziness - Primary    A-fib ([address]    Hypertension    Hyperlipidemia          Plan:  1. Please follow up with Dr. Tad Malik as scheduled 5/10/19 to review abnormal neck CTA findings on verterbal arteries. Normal velocity on doppler, so likely a false positive. 2. Continue current medications for now with continued holding of his prostate medications. 3. Please follow up with me in 6 months. This note was scribed in the presence of Dr. Brando Aguilar MD by Inga Darnell RN.      I, Dr. Augustin Mattson, personally performed the services described in this documentation, as scribed by the above signed scribe in my presence. It is both accurate and complete to my knowledge. I agree with the details independently gathered by the clinical support staff, while the remaining scribed note accurately describes my personal service to the patient. All questions and concerns were addressed to the patient/family. Alternatives to my treatment were discussed. The note was completed using EMR. Every effort was made to ensure accuracy; however, inadvertent computerized transcription errors may be present.           Augustin Mattson MD, [address], [address] [facility], [address] [place] office  [phone] [facility]  4/25/2019  9:14 AM

## 2019-04-25 NOTE — PATIENT INSTRUCTIONS
Plan:  1. Please follow up with Dr. Jamaal Garcia as scheduled 5/10/19  2. Continue current medications  3. Please follow up with me in 6 months.

## 2019-04-26 ENCOUNTER — TELEPHONE (OUTPATIENT)
Dept: CARDIOLOGY CLINIC | Age: 84
End: 2019-04-26

## 2019-05-10 ENCOUNTER — OFFICE VISIT (OUTPATIENT)
Dept: VASCULAR SURGERY | Age: 84
End: 2019-05-10
Payer: MEDICARE

## 2019-05-10 VITALS
HEIGHT: 67 IN | DIASTOLIC BLOOD PRESSURE: 82 MMHG | SYSTOLIC BLOOD PRESSURE: 118 MMHG | WEIGHT: 190 LBS | BODY MASS INDEX: 29.82 KG/M2

## 2019-05-10 DIAGNOSIS — I65.03 STENOSIS OF BOTH VERTEBRAL ARTERIES: Primary | ICD-10-CM

## 2019-05-10 PROCEDURE — 99203 OFFICE O/P NEW LOW 30 MIN: CPT | Performed by: SURGERY

## 2019-05-10 PROCEDURE — G8427 DOCREV CUR MEDS BY ELIG CLIN: HCPCS | Performed by: SURGERY

## 2019-05-10 PROCEDURE — G8417 CALC BMI ABV UP PARAM F/U: HCPCS | Performed by: SURGERY

## 2019-05-11 NOTE — PROGRESS NOTES
Outpatient Consultation / H&P    Date of Consultation:  5/10/2019    PCP:  Kota Mcleod MD     Referring Provider:  Dr. Nathaly Scott     Chief Complaint:   Chief Complaint   Patient presents with    Other     patient ref by DR Markell Orosco for vertebral artery disease. pamlr        History of Present Illness: We are asked to see this patient in consultation by Dr. Nathaly Scott regarding Vertebral artery disease. Chuck Alexandra is a 82 y.o. male who reports dizziness and near syncope. Symptoms have always occurred after getting up from sitting and starting to walk. No symptoms at rest while sitting. No visual changes. No prior history of CVA. History of afib on anticoagulation. Past Medical History:  Past Medical History:   Diagnosis Date    Atrial fibrillation ([address]     Blind right eye     Chronic kidney disease     Hyperlipidemia     Hypertension     Pneumonia        Past Surgical History:  Past Surgical History:   Procedure Laterality Date    APPENDECTOMY      COLONOSCOPY      EYE SURGERY      HERNIA REPAIR      JOINT REPLACEMENT  2013    left shoulder    PACEMAKER PLACEMENT  10/13/2017    Dr Kathya Erwin at [address] [facility] Medications:   Prior to Admission medications    Medication Sig Start Date End Date Taking?  Authorizing Provider   apixaban (ELIQUIS) 5 MG TABS tablet Take by mouth 2 times daily   Yes Historical Provider, MD   vitamin D (CHOLECALCIFEROL) 1000 UNIT TABS tablet Take 1,000 Units by mouth daily   Yes Historical Provider, MD   furosemide (LASIX) 40 MG tablet Take 40 mg by mouth daily   Yes Historical Provider, MD   montelukast (SINGULAIR) 10 MG tablet TAKE 1 TABLET BY MOUTH EVERY DAY 10/31/18  Yes Kota Mcleod MD   atorvastatin (LIPITOR) 20 MG tablet TAKE 1 TABLET BY MOUTH EVERY DAY 10/31/18  Yes Kota Mcleod MD   tamsulosin (FLOMAX) 0.4 MG capsule Take 0.4 mg by mouth daily   Yes Historical Provider, MD Concern    Not on file   Social History Narrative    Not on file       Family History:        Problem Relation Age of Onset    Heart Disease Mother     Other Mother     Heart Disease Father     Asthma Other        Review of Systems:  A 14 point review of systems was completed. Pertinent positives identified in the HPI, all other review of systems negative. Physical Examination:    /82 (Site: Left Upper Arm)   Ht 5' 7\" (1.702 m)   Wt 190 lb (86.2 kg)   BMI 29.76 kg/m²     Weight: 190 lb (86.2 kg)       General appearance: NAD  Eyes: PERRLA  Neck: no JVD, no lymphadenopathy. Respiratory: effort is unlabored, no crackles, wheezes or rubs. Cardiovascular: regular, no murmur. No carotid bruits. No edema or varicosities. Pulses:    carotid brachial radial   RIGHT 2 2 2   LEFT 2 2 2   GI: abdomen soft, nondistended, no organomegaly. Musculoskeletal: strength and tone normal.  Extremities: warm and pink. Skin: no dermatitis or ulceration. Neuro/psychiatric: grossly intact. MEDICAL DECISION MAKING/TESTING        Carotid Duplex:  No signficant Carotid stenosis. Vertebral arteries patent with antegrade flow and normal velocities. CTA: Moderate vertebral artery origin disease. Assessment:      Diagnosis Orders   1. Stenosis of both vertebral arteries       Symptoms do not appear to be consistent with symptomatic vertebral artery disease. Symptoms are sporadic and positional- more likely Vertigo. My assessment of CTA is that there is mild bilateral Vertebral stenosis but no high grade bilateral disease which would be necessary for Vertebral arteries to be cause. Recommendations/Plan:  Neurology evaluation.        Mohamud Valencia MD, FACS

## 2019-05-14 ENCOUNTER — OFFICE VISIT (OUTPATIENT)
Dept: FAMILY MEDICINE CLINIC | Age: 84
End: 2019-05-14
Payer: MEDICARE

## 2019-05-14 VITALS
WEIGHT: 186 LBS | TEMPERATURE: 98.7 F | OXYGEN SATURATION: 98 % | BODY MASS INDEX: 29.13 KG/M2 | DIASTOLIC BLOOD PRESSURE: 76 MMHG | SYSTOLIC BLOOD PRESSURE: 118 MMHG | HEART RATE: 86 BPM

## 2019-05-14 DIAGNOSIS — R05.9 COUGH: ICD-10-CM

## 2019-05-14 DIAGNOSIS — J01.10 ACUTE NON-RECURRENT FRONTAL SINUSITIS: Primary | ICD-10-CM

## 2019-05-14 PROCEDURE — G8427 DOCREV CUR MEDS BY ELIG CLIN: HCPCS | Performed by: NURSE PRACTITIONER

## 2019-05-14 PROCEDURE — 4040F PNEUMOC VAC/ADMIN/RCVD: CPT | Performed by: NURSE PRACTITIONER

## 2019-05-14 PROCEDURE — G8598 ASA/ANTIPLAT THER USED: HCPCS | Performed by: NURSE PRACTITIONER

## 2019-05-14 PROCEDURE — 1036F TOBACCO NON-USER: CPT | Performed by: NURSE PRACTITIONER

## 2019-05-14 PROCEDURE — G8417 CALC BMI ABV UP PARAM F/U: HCPCS | Performed by: NURSE PRACTITIONER

## 2019-05-14 PROCEDURE — 99213 OFFICE O/P EST LOW 20 MIN: CPT | Performed by: NURSE PRACTITIONER

## 2019-05-14 PROCEDURE — 1123F ACP DISCUSS/DSCN MKR DOCD: CPT | Performed by: NURSE PRACTITIONER

## 2019-05-14 RX ORDER — AMOXICILLIN AND CLAVULANATE POTASSIUM 875; 125 MG/1; MG/1
1 TABLET, FILM COATED ORAL EVERY 12 HOURS
Qty: 20 TABLET | Refills: 0 | Status: SHIPPED | OUTPATIENT
Start: 2019-05-14 | End: 2019-05-24

## 2019-05-14 RX ORDER — BENZONATATE 100 MG/1
100 CAPSULE ORAL 3 TIMES DAILY PRN
Qty: 42 CAPSULE | Refills: 0 | Status: SHIPPED | OUTPATIENT
Start: 2019-05-14 | End: 2019-05-28

## 2019-05-14 ASSESSMENT — ENCOUNTER SYMPTOMS
EYE DISCHARGE: 0
ABDOMINAL PAIN: 0
CONSTIPATION: 0
EYE ITCHING: 0
SINUS PRESSURE: 1
VOICE CHANGE: 0
PHOTOPHOBIA: 0
SHORTNESS OF BREATH: 1
SORE THROAT: 0
NAUSEA: 0
COUGH: 1
BLOOD IN STOOL: 0
RHINORRHEA: 0
CHEST TIGHTNESS: 1
SINUS PAIN: 1
TROUBLE SWALLOWING: 0
EYE PAIN: 0
VOMITING: 0
COLOR CHANGE: 0
WHEEZING: 1
CHOKING: 0
EYE REDNESS: 0
BACK PAIN: 0
STRIDOR: 0
DIARRHEA: 0

## 2019-05-14 NOTE — PROGRESS NOTES
Chief Complaint   Patient presents with    Cough     productive cough, chest congestion, headache, sinus congestion for 4 days        /76   Pulse 86   Temp 98.7 °F (37.1 °C)   Wt 186 lb (84.4 kg)   SpO2 98%   BMI 29.13 kg/m²     HPI:  Valentino Abate is a 82 y.o. (: 1935) here today   for   Sinusitis   This is a new problem. The current episode started in the past 7 days. The problem has been gradually worsening since onset. There has been no fever. Associated symptoms include congestion, coughing, headaches, shortness of breath and sinus pressure. Pertinent negatives include no chills, diaphoresis, ear pain, neck pain, sneezing or sore throat. (Body aches) Past treatments include nothing. The treatment provided no relief. Patient's medications, allergies, past medical, surgical, social and family histories were reviewed and updated asappropriate. ROS:  Review of Systems   Constitutional: Positive for activity change, appetite change and fatigue. Negative for chills, diaphoresis, fever and unexpected weight change. HENT: Positive for congestion, sinus pressure and sinus pain. Negative for ear discharge, ear pain, hearing loss, nosebleeds, postnasal drip, rhinorrhea, sneezing, sore throat, tinnitus, trouble swallowing and voice change. Eyes: Negative for photophobia, pain, discharge, redness and itching. Respiratory: Positive for cough, chest tightness, shortness of breath and wheezing. Negative for choking and stridor. Cardiovascular: Negative for chest pain, palpitations and leg swelling. Gastrointestinal: Negative for abdominal pain, blood in stool, constipation, diarrhea, nausea and vomiting. Endocrine: Negative for cold intolerance, heat intolerance, polydipsia and polyuria. Genitourinary: Negative for difficulty urinating, dysuria, enuresis, flank pain, frequency, hematuria and urgency. Musculoskeletal: Positive for arthralgias and myalgias.  Negative for back pain, gait problem, joint swelling, neck pain and neck stiffness. Skin: Negative for color change, pallor, rash and wound. Allergic/Immunologic: Negative for environmental allergies and food allergies. Neurological: Positive for headaches. Negative for dizziness, tremors, syncope, speech difficulty, weakness, light-headedness and numbness. Hematological: Negative for adenopathy. Does not bruise/bleed easily. Psychiatric/Behavioral: Negative for agitation, behavioral problems, confusion, decreased concentration, dysphoric mood, hallucinations, self-injury, sleep disturbance and suicidal ideas. The patient is not nervous/anxious and is not hyperactive. Prior to Visit Medications    Medication Sig Taking? Authorizing Provider   amoxicillin-clavulanate (AUGMENTIN) 875-125 MG per tablet Take 1 tablet by mouth every 12 hours for 10 days Yes GUS Swain CNP   benzonatate (TESSALON) 100 MG capsule Take 1 capsule by mouth 3 times daily as needed for Cough Yes GUS Swain CNP   apixaban (ELIQUIS) 5 MG TABS tablet Take by mouth 2 times daily Yes Historical Provider, MD   vitamin D (CHOLECALCIFEROL) 1000 UNIT TABS tablet Take 1,000 Units by mouth daily Yes Historical Provider, MD   bisoprolol (ZEBETA) 10 MG tablet TAKE 1 TABLET BY MOUTH DAILY Yes GUS Swain CNP   furosemide (LASIX) 40 MG tablet Take 40 mg by mouth daily Yes Historical Provider, MD   montelukast (SINGULAIR) 10 MG tablet TAKE 1 TABLET BY MOUTH EVERY DAY Yes Keila Espinosa MD   atorvastatin (LIPITOR) 20 MG tablet TAKE 1 TABLET BY MOUTH EVERY DAY Yes Keila Espinosa MD   tamsulosin (FLOMAX) 0.4 MG capsule Take 0.4 mg by mouth daily Yes Historical Provider, MD   finasteride (PROSCAR) 5 MG tablet Take 5 mg by mouth daily. Yes Historical Provider, MD   aspirin 81 MG tablet Take 81 mg by mouth daily.    Yes Historical Provider, MD   omeprazole (PRILOSEC) 20 MG delayed release capsule Take 1 capsule by mouth 2 times daily  Mathew Weems MD       Allergies   Allergen Reactions    Sulfa Antibiotics        OBJECTIVE:      BP Readings from Last 2 Encounters:   05/14/19 118/76   05/10/19 118/82       Wt Readings from Last 3 Encounters:   05/14/19 186 lb (84.4 kg)   05/10/19 190 lb (86.2 kg)   04/25/19 194 lb (88 kg)       Physical Exam   Constitutional: He is oriented to person, place, and time. He appears well-developed and well-nourished. No distress. HENT:   Head: Normocephalic and atraumatic. Right Ear: Hearing, external ear and ear canal normal. Tympanic membrane is scarred and bulging. Left Ear: Hearing, tympanic membrane, external ear and ear canal normal.   Nose: Right sinus exhibits maxillary sinus tenderness and frontal sinus tenderness. Left sinus exhibits maxillary sinus tenderness and frontal sinus tenderness. Mouth/Throat: Uvula is midline and mucous membranes are normal. Posterior oropharyngeal erythema present. No oropharyngeal exudate. Tonsils are 0 on the right. Tonsils are 0 on the left. Eyes: Pupils are equal, round, and reactive to light. Conjunctivae are normal. Right eye exhibits no discharge. Left eye exhibits no discharge. Neck: Normal range of motion. No JVD present. No tracheal deviation present. No thyromegaly present. Cardiovascular: Normal rate, regular rhythm, normal heart sounds and intact distal pulses. Exam reveals no friction rub. No murmur heard. Pulmonary/Chest: Effort normal and breath sounds normal. No stridor. No respiratory distress. He has no decreased breath sounds. He has no wheezes. He has no rhonchi. He has no rales. Abdominal: Soft. Bowel sounds are normal. He exhibits no distension and no mass. There is no tenderness. There is no rebound and no guarding. Musculoskeletal: Normal range of motion. He exhibits no edema or tenderness. Lymphadenopathy:     He has no cervical adenopathy. Neurological: He is alert and oriented to person, place, and time.  He has normal strength. Coordination normal.   Skin: Skin is warm and dry. Capillary refill takes less than 2 seconds. No rash noted. Psychiatric: He has a normal mood and affect. His speech is normal and behavior is normal. Judgment and thought content normal. Cognition and memory are normal.         ASSESSMENT/PLAN:    1. Acute non-recurrent frontal sinusitis  - amoxicillin-clavulanate (AUGMENTIN) 875-125 MG per tablet; Take 1 tablet by mouth every 12 hours for 10 days  Dispense: 20 tablet; Refill: 0    2. Cough    - benzonatate (TESSALON) 100 MG capsule; Take 1 capsule by mouth 3 times daily as needed for Cough  Dispense: 42 capsule; Refill: 0      Follow up if symptoms do not improve or worsen. If the patient becomes short of breath go straight to the ER or call 911.

## 2019-05-30 RX ORDER — FUROSEMIDE 40 MG/1
TABLET ORAL
Qty: 30 TABLET | Refills: 5 | Status: SHIPPED | OUTPATIENT
Start: 2019-05-30 | End: 2019-12-18 | Stop reason: ALTCHOICE

## 2019-06-10 LAB
ANION GAP SERPL CALCULATED.3IONS-SCNC: 9.9 MMOL/L (ref 8–16)
BUN BLDV-MCNC: 15 MG/DL (ref 5–19)
CHLORIDE BLD-SCNC: 109 MMOL/L (ref 99–111)
CO2: 30 MMOL/L (ref 21–33)
CREAT SERPL-MCNC: 1.5 MG/DL (ref 0.6–1.3)
ERYTHROCYTE DISTRIBUTION WIDTH RBC RATIO: 15 % (ref 11.6–14.8)
GFR CALCULATED: 45
HCT VFR BLD CALC: 50 % (ref 37.4–53.8)
HEMOGLOBIN: 16 G/DL (ref 13.2–16.5)
MAGNESIUM: 1.8 MG/DL (ref 1.5–2.5)
MCH RBC QN AUTO: 28.8 PG (ref 27.7–33.3)
MCHC RBC AUTO-ENTMCNC: 32 G/DL (ref 32.8–35)
MCV RBC AUTO: 90.1 FL (ref 80.5–96.9)
PHOSPHORUS: 3.5 MG/DL (ref 2.5–4.9)
PLATELETS: 229 X1000 (ref 129–332)
POTASSIUM SERPL-SCNC: 4.4 MMOL/L (ref 3.4–5)
RBC # BLD: 5.55 X1000000 (ref 4.16–5.62)
SODIUM BLD-SCNC: 144 MMOL/L (ref 136–146)
VITAMIN D 25-HYDROXY: 31 NG/ML (ref 30–100)
WBC # BLD: 8.4 X1000 (ref 5.4–9.9)

## 2019-06-26 ENCOUNTER — OFFICE VISIT (OUTPATIENT)
Dept: FAMILY MEDICINE CLINIC | Age: 84
End: 2019-06-26
Payer: MEDICARE

## 2019-06-26 VITALS
WEIGHT: 188.4 LBS | DIASTOLIC BLOOD PRESSURE: 92 MMHG | SYSTOLIC BLOOD PRESSURE: 130 MMHG | HEIGHT: 66 IN | HEART RATE: 82 BPM | OXYGEN SATURATION: 94 % | BODY MASS INDEX: 30.28 KG/M2

## 2019-06-26 DIAGNOSIS — I10 ESSENTIAL HYPERTENSION: Primary | ICD-10-CM

## 2019-06-26 DIAGNOSIS — M54.50 CHRONIC LOW BACK PAIN WITHOUT SCIATICA, UNSPECIFIED BACK PAIN LATERALITY: ICD-10-CM

## 2019-06-26 DIAGNOSIS — G89.29 CHRONIC LOW BACK PAIN WITHOUT SCIATICA, UNSPECIFIED BACK PAIN LATERALITY: ICD-10-CM

## 2019-06-26 DIAGNOSIS — Z23 NEED FOR VACCINATION FOR PNEUMOCOCCUS: ICD-10-CM

## 2019-06-26 DIAGNOSIS — I48.91 ATRIAL FIBRILLATION, UNSPECIFIED TYPE (HCC): ICD-10-CM

## 2019-06-26 DIAGNOSIS — E78.5 HYPERLIPIDEMIA, UNSPECIFIED HYPERLIPIDEMIA TYPE: ICD-10-CM

## 2019-06-26 PROCEDURE — G8598 ASA/ANTIPLAT THER USED: HCPCS | Performed by: FAMILY MEDICINE

## 2019-06-26 PROCEDURE — 90732 PPSV23 VACC 2 YRS+ SUBQ/IM: CPT | Performed by: FAMILY MEDICINE

## 2019-06-26 PROCEDURE — G0009 ADMIN PNEUMOCOCCAL VACCINE: HCPCS | Performed by: FAMILY MEDICINE

## 2019-06-26 PROCEDURE — 4040F PNEUMOC VAC/ADMIN/RCVD: CPT | Performed by: FAMILY MEDICINE

## 2019-06-26 PROCEDURE — G8417 CALC BMI ABV UP PARAM F/U: HCPCS | Performed by: FAMILY MEDICINE

## 2019-06-26 PROCEDURE — 99214 OFFICE O/P EST MOD 30 MIN: CPT | Performed by: FAMILY MEDICINE

## 2019-06-26 PROCEDURE — G8427 DOCREV CUR MEDS BY ELIG CLIN: HCPCS | Performed by: FAMILY MEDICINE

## 2019-06-26 PROCEDURE — 1036F TOBACCO NON-USER: CPT | Performed by: FAMILY MEDICINE

## 2019-06-26 PROCEDURE — 1123F ACP DISCUSS/DSCN MKR DOCD: CPT | Performed by: FAMILY MEDICINE

## 2019-06-26 ASSESSMENT — ENCOUNTER SYMPTOMS
SHORTNESS OF BREATH: 1
BLOOD IN STOOL: 0
DIARRHEA: 0
ABDOMINAL PAIN: 0
CHEST TIGHTNESS: 0
CONSTIPATION: 0
COLOR CHANGE: 0

## 2019-06-26 NOTE — PROGRESS NOTES
Chief Complaint   Patient presents with    Hypertension    Hyperlipidemia       HPI:  Virginia Soria is a 82 y.o. (: 1935) here today   for   Hypertension   This is a chronic problem. The current episode started more than 1 year ago. Associated symptoms include shortness of breath (at times). Pertinent negatives include no chest pain, headaches or palpitations. Risk factors for coronary artery disease include dyslipidemia and male gender. Past treatments include diuretics and beta blockers. There are no compliance problems. Hyperlipidemia   This is a chronic problem. The current episode started more than 1 year ago. Associated symptoms include shortness of breath (at times). Pertinent negatives include no chest pain. Current antihyperlipidemic treatment includes statins. There are no compliance problems. Risk factors for coronary artery disease include dyslipidemia, hypertension and male sex. Pt does have shortness of breath at times. Pt has not recently followed up with Dr. Cosme Prater. Pt is having issues with low back pain. This has been going on for a while. Pt has been doing home exercises for symptomatic relief. No radiation. No incontinence or fevers. Pt did see Dr. Barbara Ya for dizziness and near syncope. He didn't feel like it was caused by vertebral stenosis. He recommended that pt see a neurologist.  No recent issues. Patient's medications, allergies, past medical, surgical, social and family histories were reviewed and updated as appropriate. ROS:  Review of Systems   Constitutional: Negative for activity change, appetite change, fatigue and unexpected weight change. HENT: Negative for congestion and hearing loss. Eyes: Negative for visual disturbance. Respiratory: Positive for shortness of breath (at times). Negative for chest tightness. Cardiovascular: Negative for chest pain and palpitations.    Gastrointestinal: Negative for abdominal pain, blood in stool, irregular rhythm present. Exam reveals no gallop and no friction rub. No murmur heard. Pulmonary/Chest: Effort normal and breath sounds normal.   Abdominal: Soft. There is no tenderness. Musculoskeletal: He exhibits no edema. Lumbar back: He exhibits tenderness. Mild tenderness near SI joints bilat   Neurological: He is alert and oriented to person, place, and time. Skin: Skin is warm and dry. Psychiatric: He has a normal mood and affect. ASSESSMENT/PLAN:    1. Essential hypertension  Mild elevation today. Monitor for now. Worry that increasing bisoprolol may aggravate mild sob w/ exertion    2. Hyperlipidemia, unspecified hyperlipidemia type  Order for repeat labs given   - Lipid Panel; Future    3. Atrial fibrillation, unspecified type ([address]  F/u w/ cardio as sched. Cont eliquis. Likely contrib to sob    4. Chronic low back pain without sciatica, unspecified back pain laterality  Mild sxs. Consider imaging if worsening. No alarm sxs. 5. Need for vaccination for pneumococcus    - PNEUMOVAX 23 subcutaneous/IM (Pneumococcal polysaccharide vaccine 23-valent >= 1yo)          Scribe attestation: MARTITA GUEVARA CMA, am scribing for and in the presence of Flavia Emery MD. Electronically signed by Erik Fisher on 6/26/2019 at 8:28 AM      Provider attestation: Arin Moraes MD, personally performed the services scribed by the user listed above in my presence, and it is both accurate and complete. I agree with the ROS and Past Histories independently gathered by the clinical support staff and the remaining scribed note accurately describes my personal service to the patient.     [facility]    6/26/2019  8:29 AM

## 2019-07-05 RX ORDER — BISOPROLOL FUMARATE 10 MG/1
TABLET ORAL
Qty: 90 TABLET | Refills: 3 | Status: SHIPPED | OUTPATIENT
Start: 2019-07-05 | End: 2019-10-24 | Stop reason: SDUPTHER

## 2019-09-13 RX ORDER — OMEPRAZOLE 20 MG/1
20 CAPSULE, DELAYED RELEASE ORAL 2 TIMES DAILY
Qty: 60 CAPSULE | Refills: 11 | Status: SHIPPED | OUTPATIENT
Start: 2019-09-13 | End: 2020-09-11

## 2019-10-24 ENCOUNTER — OFFICE VISIT (OUTPATIENT)
Dept: CARDIOLOGY CLINIC | Age: 84
End: 2019-10-24
Payer: MEDICARE

## 2019-10-24 VITALS
SYSTOLIC BLOOD PRESSURE: 183 MMHG | HEART RATE: 83 BPM | WEIGHT: 185.2 LBS | OXYGEN SATURATION: 96 % | DIASTOLIC BLOOD PRESSURE: 119 MMHG | HEIGHT: 67 IN | BODY MASS INDEX: 29.07 KG/M2

## 2019-10-24 DIAGNOSIS — I48.91 ATRIAL FIBRILLATION, UNSPECIFIED TYPE (HCC): Primary | ICD-10-CM

## 2019-10-24 DIAGNOSIS — E78.5 HYPERLIPIDEMIA, UNSPECIFIED HYPERLIPIDEMIA TYPE: ICD-10-CM

## 2019-10-24 DIAGNOSIS — I10 ESSENTIAL HYPERTENSION: ICD-10-CM

## 2019-10-24 PROCEDURE — G8427 DOCREV CUR MEDS BY ELIG CLIN: HCPCS | Performed by: INTERNAL MEDICINE

## 2019-10-24 PROCEDURE — 4040F PNEUMOC VAC/ADMIN/RCVD: CPT | Performed by: INTERNAL MEDICINE

## 2019-10-24 PROCEDURE — 1123F ACP DISCUSS/DSCN MKR DOCD: CPT | Performed by: INTERNAL MEDICINE

## 2019-10-24 PROCEDURE — 99214 OFFICE O/P EST MOD 30 MIN: CPT | Performed by: INTERNAL MEDICINE

## 2019-10-24 PROCEDURE — G8417 CALC BMI ABV UP PARAM F/U: HCPCS | Performed by: INTERNAL MEDICINE

## 2019-10-24 PROCEDURE — 1036F TOBACCO NON-USER: CPT | Performed by: INTERNAL MEDICINE

## 2019-10-24 PROCEDURE — G8598 ASA/ANTIPLAT THER USED: HCPCS | Performed by: INTERNAL MEDICINE

## 2019-10-24 PROCEDURE — G8484 FLU IMMUNIZE NO ADMIN: HCPCS | Performed by: INTERNAL MEDICINE

## 2019-10-24 RX ORDER — BISOPROLOL FUMARATE 10 MG/1
20 TABLET ORAL DAILY
Qty: 90 TABLET | Refills: 3 | Status: ON HOLD | OUTPATIENT
Start: 2019-10-24 | End: 2020-05-22 | Stop reason: HOSPADM

## 2019-10-24 RX ORDER — METOPROLOL SUCCINATE 50 MG/1
50 TABLET, EXTENDED RELEASE ORAL DAILY
Qty: 30 TABLET | Refills: 3 | Status: SHIPPED | OUTPATIENT
Start: 2019-10-24 | End: 2020-02-25

## 2019-10-24 RX ORDER — AMLODIPINE BESYLATE 5 MG/1
5 TABLET ORAL DAILY
Qty: 90 TABLET | Refills: 1 | Status: SHIPPED | OUTPATIENT
Start: 2019-10-24 | End: 2019-10-25 | Stop reason: DRUGHIGH

## 2019-10-25 ENCOUNTER — TELEPHONE (OUTPATIENT)
Dept: CARDIOLOGY CLINIC | Age: 84
End: 2019-10-25

## 2019-10-25 RX ORDER — AMLODIPINE BESYLATE 10 MG/1
10 TABLET ORAL DAILY
Qty: 90 TABLET | Refills: 1 | Status: SHIPPED | OUTPATIENT
Start: 2019-10-25 | End: 2020-05-04 | Stop reason: ALTCHOICE

## 2019-11-01 ENCOUNTER — HOSPITAL ENCOUNTER (OUTPATIENT)
Dept: VASCULAR LAB | Age: 84
Discharge: HOME | End: 2019-11-01
Payer: MEDICARE

## 2019-11-01 DIAGNOSIS — I48.91 ATRIAL FIBRILLATION, UNSPECIFIED TYPE (HCC): ICD-10-CM

## 2019-11-01 DIAGNOSIS — E78.5 HYPERLIPIDEMIA, UNSPECIFIED HYPERLIPIDEMIA TYPE: ICD-10-CM

## 2019-11-01 DIAGNOSIS — I10 ESSENTIAL HYPERTENSION: ICD-10-CM

## 2019-11-01 PROCEDURE — 93975 VASCULAR STUDY: CPT

## 2019-11-04 ENCOUNTER — TELEPHONE (OUTPATIENT)
Dept: CARDIOLOGY CLINIC | Age: 84
End: 2019-11-04

## 2019-11-11 DIAGNOSIS — E78.5 HYPERLIPIDEMIA, UNSPECIFIED HYPERLIPIDEMIA TYPE: ICD-10-CM

## 2019-11-11 RX ORDER — ATORVASTATIN CALCIUM 20 MG/1
TABLET, FILM COATED ORAL
Qty: 90 TABLET | Refills: 3 | Status: SHIPPED | OUTPATIENT
Start: 2019-11-11 | End: 2020-11-02

## 2019-12-05 LAB
ANION GAP SERPL CALCULATED.3IONS-SCNC: 12.1 MMOL/L (ref 8–16)
BUN BLDV-MCNC: 20 MG/DL (ref 5–19)
CALCIUM SERPL-MCNC: 8.9 MG/DL (ref 8.4–10.2)
CHLORIDE BLD-SCNC: 108 MMOL/L (ref 99–111)
CO2: 30 MMOL/L (ref 21–33)
CREAT SERPL-MCNC: 1.6 MG/DL (ref 0.6–1.3)
ERYTHROCYTE DISTRIBUTION WIDTH RBC RATIO: 14.7 % (ref 11.6–14.8)
GFR CALCULATED: 42
HCT VFR BLD CALC: 48 % (ref 37.4–53.8)
HEMOGLOBIN: 15.3 G/DL (ref 13.2–16.5)
MAGNESIUM: 1.7 MG/DL (ref 1.5–2.5)
MCH RBC QN AUTO: 28.5 PG (ref 27.7–33.3)
MCHC RBC AUTO-ENTMCNC: 31.9 G/DL (ref 32.8–35)
MCV RBC AUTO: 89.6 FL (ref 80.5–96.9)
PARATHYROID HORMONE INTACT: 108.5 PG/ML (ref 8.2–83.5)
PHOSPHORUS: 4 MG/DL (ref 2.5–4.9)
PLATELETS: 250 X1000 (ref 129–332)
POTASSIUM SERPL-SCNC: 4.3 MMOL/L (ref 3.4–5)
RBC # BLD: 5.36 X1000000 (ref 4.16–5.62)
SODIUM BLD-SCNC: 146 MMOL/L (ref 136–146)
VITAMIN D 25-HYDROXY: 31 NG/ML (ref 30–100)
WBC # BLD: 7.6 X1000 (ref 5.4–9.9)

## 2019-12-11 RX ORDER — MONTELUKAST SODIUM 10 MG/1
TABLET ORAL
Qty: 90 TABLET | Refills: 4 | Status: SHIPPED | OUTPATIENT
Start: 2019-12-11 | End: 2021-02-01

## 2019-12-18 ENCOUNTER — OFFICE VISIT (OUTPATIENT)
Dept: FAMILY MEDICINE CLINIC | Age: 84
End: 2019-12-18
Payer: MEDICARE

## 2019-12-18 VITALS
SYSTOLIC BLOOD PRESSURE: 128 MMHG | HEART RATE: 68 BPM | DIASTOLIC BLOOD PRESSURE: 72 MMHG | OXYGEN SATURATION: 99 % | WEIGHT: 183.8 LBS | HEIGHT: 67 IN | BODY MASS INDEX: 28.85 KG/M2

## 2019-12-18 DIAGNOSIS — R06.02 SHORT OF BREATH ON EXERTION: ICD-10-CM

## 2019-12-18 DIAGNOSIS — I48.20 CHRONIC ATRIAL FIBRILLATION (HCC): ICD-10-CM

## 2019-12-18 DIAGNOSIS — I10 ESSENTIAL HYPERTENSION: Primary | ICD-10-CM

## 2019-12-18 DIAGNOSIS — E78.5 HYPERLIPIDEMIA, UNSPECIFIED HYPERLIPIDEMIA TYPE: ICD-10-CM

## 2019-12-18 DIAGNOSIS — M79.641 RIGHT HAND PAIN: ICD-10-CM

## 2019-12-18 DIAGNOSIS — N18.30 CHRONIC KIDNEY DISEASE, STAGE III (MODERATE) (HCC): ICD-10-CM

## 2019-12-18 PROCEDURE — 99214 OFFICE O/P EST MOD 30 MIN: CPT | Performed by: FAMILY MEDICINE

## 2019-12-18 PROCEDURE — G8598 ASA/ANTIPLAT THER USED: HCPCS | Performed by: FAMILY MEDICINE

## 2019-12-18 PROCEDURE — G8417 CALC BMI ABV UP PARAM F/U: HCPCS | Performed by: FAMILY MEDICINE

## 2019-12-18 PROCEDURE — 1036F TOBACCO NON-USER: CPT | Performed by: FAMILY MEDICINE

## 2019-12-18 PROCEDURE — G8484 FLU IMMUNIZE NO ADMIN: HCPCS | Performed by: FAMILY MEDICINE

## 2019-12-18 PROCEDURE — 1123F ACP DISCUSS/DSCN MKR DOCD: CPT | Performed by: FAMILY MEDICINE

## 2019-12-18 PROCEDURE — G8427 DOCREV CUR MEDS BY ELIG CLIN: HCPCS | Performed by: FAMILY MEDICINE

## 2019-12-18 PROCEDURE — 4040F PNEUMOC VAC/ADMIN/RCVD: CPT | Performed by: FAMILY MEDICINE

## 2019-12-18 ASSESSMENT — ENCOUNTER SYMPTOMS
BLOOD IN STOOL: 0
BLURRED VISION: 0
CHEST TIGHTNESS: 0
DIARRHEA: 0
SHORTNESS OF BREATH: 1
CONSTIPATION: 0

## 2020-02-05 ENCOUNTER — OFFICE VISIT (OUTPATIENT)
Dept: FAMILY MEDICINE CLINIC | Age: 85
End: 2020-02-05
Payer: MEDICARE

## 2020-02-05 VITALS
OXYGEN SATURATION: 96 % | SYSTOLIC BLOOD PRESSURE: 100 MMHG | WEIGHT: 184 LBS | DIASTOLIC BLOOD PRESSURE: 68 MMHG | BODY MASS INDEX: 28.82 KG/M2 | HEART RATE: 77 BPM

## 2020-02-05 LAB
BILIRUBIN, POC: NORMAL
BLOOD URINE, POC: NORMAL
CLARITY, POC: NORMAL
COLOR, POC: NORMAL
GLUCOSE URINE, POC: 100
KETONES, POC: 15
LEUKOCYTE EST, POC: NORMAL
NITRITE, POC: POSITIVE
PH, POC: 9
PROTEIN, POC: 300
SPECIFIC GRAVITY, POC: 1.01
UROBILINOGEN, POC: 4

## 2020-02-05 PROCEDURE — 99213 OFFICE O/P EST LOW 20 MIN: CPT | Performed by: NURSE PRACTITIONER

## 2020-02-05 PROCEDURE — G8510 SCR DEP NEG, NO PLAN REQD: HCPCS | Performed by: NURSE PRACTITIONER

## 2020-02-05 PROCEDURE — G8427 DOCREV CUR MEDS BY ELIG CLIN: HCPCS | Performed by: NURSE PRACTITIONER

## 2020-02-05 PROCEDURE — G8484 FLU IMMUNIZE NO ADMIN: HCPCS | Performed by: NURSE PRACTITIONER

## 2020-02-05 PROCEDURE — 1123F ACP DISCUSS/DSCN MKR DOCD: CPT | Performed by: NURSE PRACTITIONER

## 2020-02-05 PROCEDURE — 1036F TOBACCO NON-USER: CPT | Performed by: NURSE PRACTITIONER

## 2020-02-05 PROCEDURE — 4040F PNEUMOC VAC/ADMIN/RCVD: CPT | Performed by: NURSE PRACTITIONER

## 2020-02-05 PROCEDURE — 81002 URINALYSIS NONAUTO W/O SCOPE: CPT | Performed by: NURSE PRACTITIONER

## 2020-02-05 PROCEDURE — G8417 CALC BMI ABV UP PARAM F/U: HCPCS | Performed by: NURSE PRACTITIONER

## 2020-02-05 RX ORDER — CIPROFLOXACIN 250 MG/1
250 TABLET, FILM COATED ORAL 2 TIMES DAILY
Qty: 20 TABLET | Refills: 0 | Status: SHIPPED | OUTPATIENT
Start: 2020-02-05 | End: 2020-02-15

## 2020-02-05 ASSESSMENT — PATIENT HEALTH QUESTIONNAIRE - PHQ9
1. LITTLE INTEREST OR PLEASURE IN DOING THINGS: 0
SUM OF ALL RESPONSES TO PHQ9 QUESTIONS 1 & 2: 0
SUM OF ALL RESPONSES TO PHQ QUESTIONS 1-9: 0
SUM OF ALL RESPONSES TO PHQ QUESTIONS 1-9: 0
2. FEELING DOWN, DEPRESSED OR HOPELESS: 0

## 2020-02-05 NOTE — PROGRESS NOTES
González Valladares is a 82 y.o. male who presents with complaints of dysuria, frequency, urgency. These symptoms have been present for 1   days. Accompanying symptoms include:  urinary frequency and hematuria. There is a history of previous UTI and the last one was unknown . The patient (unknown) sexually active. PHYSICAL EXAM:  Vitals:    02/05/20 1021   BP: 100/68   Pulse: 77   SpO2: 96%   Weight: 184 lb (83.5 kg)     General:  Alert, cooperative in no distress. CV:  Regular rate and rhythm without murmur. Lungs:  Clear to auscultation bilaterally. Back:  inspection of back is normal.  Abdomen:  Abdomen soft, non-tender. BS normal. No masses,  No organomegaly. Extremities:  No edema. Urinalysis results significant for:No results found for this visit on 02/05/20. ASSESSMENT and PLAN  1. Hematuria, unspecified type    - POCT Urinalysis no Micro  - ciprofloxacin (CIPRO) 250 MG tablet; Take 1 tablet by mouth 2 times daily for 10 days; Refill: 0    2. Acute cystitis with hematuria    - POCT Urinalysis no Micro  - ciprofloxacin (CIPRO) 250 MG tablet; Take 1 tablet by mouth 2 times daily for 10 days; Refill: 0    Patient counseled on the following today:  Maintain good hydration. Women with frequent  Or intercourse related UTI should empty bladder immediately before and after intercourse and consider postcoital antibiotic treatment. Take showers instead of tub baths. Avoid feminine hygiene sprays and scented douches. Wipe urethra from to back. Urine cup given in office. Asked him to bring back a sample to send for culture.

## 2020-02-07 LAB
ORGANISM: ABNORMAL
URINE CULTURE, ROUTINE: ABNORMAL

## 2020-02-24 NOTE — TELEPHONE ENCOUNTER
[phone] [place], [place] contacted the office and is wondering if we can sign off on the Toprol XL 50mg? Pt is currently out of medication.  The pharmacy was able to give the pt a few pills to last through the weekend, please advise, thank you

## 2020-02-25 RX ORDER — METOPROLOL SUCCINATE 50 MG/1
50 TABLET, EXTENDED RELEASE ORAL DAILY
Qty: 90 TABLET | Refills: 2 | Status: SHIPPED | OUTPATIENT
Start: 2020-02-25 | End: 2020-06-10 | Stop reason: SDUPTHER

## 2020-02-25 NOTE — TELEPHONE ENCOUNTER
10/24/2019 vsp  Plan:  1. Start taking amlodipine 10 mg daily and Toprol XL 50 mg daily In view of elevated  Blood pressure   2. Increase Zebeta to 20 mg daily   3. Will check renal artery doppler in view of newly worsening hypertension  4.  RTC in 1 month for BP check

## 2020-03-09 ENCOUNTER — TELEPHONE (OUTPATIENT)
Dept: CARDIOLOGY CLINIC | Age: 85
End: 2020-03-09

## 2020-03-09 NOTE — TELEPHONE ENCOUNTER
Pt is having Cystoscopy on Thursday at [facility] and needs to hold Eliquis and aspirin, if possible, starting today. Please send letter along with last ov note to fax# [phone].

## 2020-03-10 NOTE — TELEPHONE ENCOUNTER
The patient's cardiac condition is not prohibitory to undergo surgery. The patient's cardiac risk is moderate based upon my evaluation and testing. Stable to proceed.      Can hold meds for 48 hours

## 2020-04-30 ENCOUNTER — TELEPHONE (OUTPATIENT)
Dept: CARDIOLOGY CLINIC | Age: 85
End: 2020-04-30

## 2020-04-30 RX ORDER — CIPROFLOXACIN 250 MG/1
250 TABLET, FILM COATED ORAL 2 TIMES DAILY
COMMUNITY
End: 2020-06-10 | Stop reason: ALTCHOICE

## 2020-05-04 ENCOUNTER — OFFICE VISIT (OUTPATIENT)
Dept: CARDIOLOGY CLINIC | Age: 85
End: 2020-05-04
Payer: MEDICARE

## 2020-05-04 VITALS
HEIGHT: 67 IN | OXYGEN SATURATION: 98 % | BODY MASS INDEX: 27.31 KG/M2 | SYSTOLIC BLOOD PRESSURE: 102 MMHG | WEIGHT: 174 LBS | DIASTOLIC BLOOD PRESSURE: 62 MMHG | HEART RATE: 78 BPM

## 2020-05-04 PROBLEM — R06.02 SOB (SHORTNESS OF BREATH): Status: ACTIVE | Noted: 2020-05-04

## 2020-05-04 PROCEDURE — 99214 OFFICE O/P EST MOD 30 MIN: CPT | Performed by: INTERNAL MEDICINE

## 2020-05-04 PROCEDURE — G8417 CALC BMI ABV UP PARAM F/U: HCPCS | Performed by: INTERNAL MEDICINE

## 2020-05-04 PROCEDURE — G8427 DOCREV CUR MEDS BY ELIG CLIN: HCPCS | Performed by: INTERNAL MEDICINE

## 2020-05-04 PROCEDURE — 1036F TOBACCO NON-USER: CPT | Performed by: INTERNAL MEDICINE

## 2020-05-04 PROCEDURE — 1123F ACP DISCUSS/DSCN MKR DOCD: CPT | Performed by: INTERNAL MEDICINE

## 2020-05-04 PROCEDURE — 4040F PNEUMOC VAC/ADMIN/RCVD: CPT | Performed by: INTERNAL MEDICINE

## 2020-05-04 NOTE — PATIENT INSTRUCTIONS
Plan:  1. Recommend stopping the amlodipine (Norvasc) 10 mg   2. We will contact [facility] to get a current medication list  3. Referral to EP regarding pacemaker management and setting up pacemaker transmission  4. Lexiscan Myoview test to risk stratify   5. We will call you with the results  6.  Follow up after testing

## 2020-05-04 NOTE — PROGRESS NOTES
[address]  [facility]   Cardiovascular Evaluation    PATIENT: Yee Damon  DATE: 2020  MRN: <[id number]>  CSN: [id number]  : 1935    Primary Care Doctor/Referring provider: Earla Lesches, MD    Reason for evaluation/Chief complaint:   6 Month Follow-Up; Shortness of Breath (sitting still, and on exertion); and Other (left sided neck pain)      Subjective:    History of present illness on initial date of evaluation:   Yee Damon is a 82 y.o. patient who presents for follow up. At last office visit (10/24/19) a Renal Artery Doppler was ordered and demonstrated no evidence to suggest renal artery stenosis bilaterally. Incidental finding of a renal cyst measuring 1.3 x 1.3 involving right kidney. He had a cystoscopy at [facility] but unfortunately was unable to get the report prior to his visit. Today he reports he has good days and bad days. He states sometimes he has a pain down the left side of his neck that occurs off and on. He states some days he can be dizzy and short of breath. He states out of all these symptoms he feels the shortness of breath is the worse. He states sometimes he has to stop walking and catch his breath. He states he sometimes feels like he is going to pass out when this occurs. A CT of the abdomen/pelvis was completed on 3/13/20 which demonstrated chronic urinary outlet obstruction, umbilical hernia, hiatal hernia and moderate cardiomegaly. Patient Active Problem List   Diagnosis    Pneumonia    Abnormal chest CT    Cough syncope    Tracheobronchomalacia    Hyperlipidemia    Hypertension    Chronic kidney disease, stage III (moderate) (HCC)    A-fib ([address]    Pacemaker, [facility] placed in [facility] Abnormal echocardiogram    Dizziness    Encounter for loop recorder check    SOB (shortness of breath)         Cardiac Testing: I have reviewed the findings below.   EKG:  ECHO:   STRESS

## 2020-05-04 NOTE — LETTER
BYPASS:  VASCULAR:    Past Medical History:   has a past medical history of Atrial fibrillation ([address] Blind right eye, Chronic kidney disease, Hyperlipidemia, Hypertension, and Pneumonia. Surgical History:   has a past surgical history that includes Tonsillectomy; Appendectomy; Colonoscopy; eye surgery; Upper gastrointestinal endoscopy; joint replacement (2013); hernia repair; and pacemaker placement (10/13/2017). Social History:   reports that he quit smoking about 45 years ago. He has a 10.00 pack-year smoking history. He has never used smokeless tobacco. He reports that he does not drink alcohol or use drugs. Family History:  No evidence for sudden cardiac death or premature CAD    Medications:  Reviewed and are listed in nursing record. and/or listed below  Outpatient Medications:  Prior to Admission medications    Medication Sig Start Date End Date Taking? Authorizing Provider   ciprofloxacin (CIPRO) 250 MG tablet Take 250 mg by mouth 2 times daily   Yes Historical Provider, MD   metoprolol succinate (TOPROL XL) 50 MG extended release tablet TAKE 1 TABLET BY MOUTH DAILY 2/25/20  Yes James Trujillo MD   atorvastatin (LIPITOR) 20 MG tablet TAKE 1 TABLET BY MOUTH EVERY DAY 11/11/19  Yes Janny Pepper MD   amLODIPine (NORVASC) 10 MG tablet Take 1 tablet by mouth daily 10/25/19  Yes James Trujillo MD   bisoprolol (ZEBETA) 10 MG tablet Take 2 tablets by mouth daily 10/24/19  Yes James Trujillo MD   apixaban (ELIQUIS) 5 MG TABS tablet Take by mouth 2 times daily   Yes Historical Provider, MD   tamsulosin (FLOMAX) 0.4 MG capsule Take 0.4 mg by mouth daily   Yes Historical Provider, MD   finasteride (PROSCAR) 5 MG tablet Take 5 mg by mouth daily. 3/4/15  Yes Historical Provider, MD   aspirin 81 MG tablet Take 81 mg by mouth daily.      Yes Historical Provider, MD   montelukast (SINGULAIR) 10 MG tablet TAKE 1 TABLET BY MOUTH EVERY DAY 12/11/19   GUS Goldsmith - CNP omeprazole (PRILOSEC) 20 MG delayed release capsule TAKE 1 CAPSULE BY MOUTH 2 TIMES DAILY 9/13/19 4/30/20  Emperatriz North MD       In-patient schedule medications:        Infusion Medications: Allergies:  Sulfa antibiotics     Review of Systems:   All 14 point review of symptoms completed. Pertinent positives identified in the HPI, all other review of symptoms findings as below.      Review of Systems - History obtained from the patient  General ROS: negative for - chills, fever or night sweats  Psychological ROS: negative for - disorientation or hallucinations  Ophthalmic ROS: negative for - dry eyes, eye pain or loss of vision  ENT ROS: negative for - nasal discharge or sore throat  Allergy and Immunology ROS: negative for - hives or itchy/watery eyes  Hematological and Lymphatic ROS: negative for - jaundice or night sweats  Endocrine ROS: negative for - mood swings or temperature intolerance  Breast ROS: deferred  Respiratory ROS: negative for - hemoptysis or stridor  Cardiovascular ROS: negative for - chest pain, dyspnea on exertion or palpitations  Gastrointestinal ROS: no abdominal pain, change in bowel habits, or black or bloody stools  Genito-Urinary ROS: no dysuria, trouble voiding, or hematuria  Musculoskeletal ROS: negative for - gait disturbance, joint pain or joint stiffness  Neurological ROS: negative for - seizures or speech problems  Dermatological ROS: negative for - rash or skin lesion changes      Physical Examination:    /62   Pulse 78   Ht 5' 7\" (1.702 m)   Wt 174 lb (78.9 kg)   SpO2 98%   BMI 27.25 kg/m²    /62   Pulse 78   Ht 5' 7\" (1.702 m)   Wt 174 lb (78.9 kg)   SpO2 98%   BMI 27.25 kg/m²     Weight: 174 lb (78.9 kg)     Wt Readings from Last 3 Encounters:   05/04/20 174 lb (78.9 kg)   02/05/20 184 lb (83.5 kg)   12/18/19 183 lb 12.8 oz (83.4 kg)     No intake or output data in the 24 hours ending 05/04/20 0538 4. Jackquelyn Fruit test to risk stratify    ~SOBOE appears to be a high probability for anginal equivalent. 5. We will call you with the results  6. Follow up after testing         This note was scribed in the presence of Dr.Puri HERNANDEZ by Peggy Magaña, RN.     I, Dr. Lia Monsivais, personally performed the services described in this documentation, as scribed by the above signed scribe in my presence. It is both accurate and complete to my knowledge. I agree with the details independently gathered by the clinical support staff, while the remaining scribed note accurately describes my personal service to the patient.           Lia Monsivais MD, [address], [place], [place]  [phone] [facility]  [phone] [facility]  5/4/2020  2:36 PM

## 2020-05-05 PROBLEM — Z45.09 ENCOUNTER FOR LOOP RECORDER CHECK: Status: RESOLVED | Noted: 2019-04-03 | Resolved: 2020-05-05

## 2020-05-06 ENCOUNTER — TELEPHONE (OUTPATIENT)
Dept: CARDIOLOGY CLINIC | Age: 85
End: 2020-05-06

## 2020-05-12 ENCOUNTER — HOSPITAL ENCOUNTER (OUTPATIENT)
Dept: NUCLEAR MEDICINE | Age: 85
Discharge: HOME | End: 2020-05-12
Payer: MEDICARE

## 2020-05-12 ENCOUNTER — HOSPITAL ENCOUNTER (OUTPATIENT)
Dept: NON INVASIVE DIAGNOSTICS | Age: 85
Discharge: HOME | End: 2020-05-12
Payer: MEDICARE

## 2020-05-12 LAB
LV EF: 64 %
LVEF MODALITY: NORMAL

## 2020-05-12 PROCEDURE — 6360000002 HC RX W HCPCS: Performed by: INTERNAL MEDICINE

## 2020-05-12 PROCEDURE — 3430000000 HC RX DIAGNOSTIC RADIOPHARMACEUTICAL: Performed by: INTERNAL MEDICINE

## 2020-05-12 PROCEDURE — 78452 HT MUSCLE IMAGE SPECT MULT: CPT

## 2020-05-12 PROCEDURE — 93017 CV STRESS TEST TRACING ONLY: CPT

## 2020-05-12 PROCEDURE — A9502 TC99M TETROFOSMIN: HCPCS | Performed by: INTERNAL MEDICINE

## 2020-05-12 RX ADMIN — TETROFOSMIN 28.6 MILLICURIE: 1.38 INJECTION, POWDER, LYOPHILIZED, FOR SOLUTION INTRAVENOUS at 14:05

## 2020-05-12 RX ADMIN — TETROFOSMIN 10.7 MILLICURIE: 1.38 INJECTION, POWDER, LYOPHILIZED, FOR SOLUTION INTRAVENOUS at 12:49

## 2020-05-12 RX ADMIN — REGADENOSON 0.4 MG: 0.08 INJECTION, SOLUTION INTRAVENOUS at 14:05

## 2020-05-13 ENCOUNTER — TELEPHONE (OUTPATIENT)
Dept: FAMILY MEDICINE CLINIC | Age: 85
End: 2020-05-13

## 2020-05-13 ENCOUNTER — TELEPHONE (OUTPATIENT)
Dept: CARDIOLOGY CLINIC | Age: 85
End: 2020-05-13

## 2020-05-13 NOTE — TELEPHONE ENCOUNTER
Spoke with pt and wife, agreeable to angiogram. Please call pt to schedule. He is on Eliquis and will need to allow for time to stop this.

## 2020-05-15 NOTE — TELEPHONE ENCOUNTER
Message left for patient to call back. He can take his ASA, Toprol, Zebeta, and Prilosec the morning of the procedure. Hold Eliquis for 48 hours prior.

## 2020-05-17 LAB — SARS-COV-2: NOT DETECTED

## 2020-05-18 NOTE — RESULT ENCOUNTER NOTE
Please contact patient with their testing results: Your test for COVID-19, also known as novel coronavirus, came back negative. No virus was detected from the sample collected. Until your symptoms are fully resolved, you may still be contagious. We recommend that you remain isolated for 7 days minimum or 72 hours after your symptoms have completely resolved, whichever is longer. Continually monitor symptoms. Contact a medical provider if symptoms are worsening. If you have any additional questions, contact your PCP.     For additional information, please visit the Centers for Disease Control and Prevention ([email or web address].cy

## 2020-05-22 ENCOUNTER — HOSPITAL ENCOUNTER (OUTPATIENT)
Dept: CARDIAC CATH/INVASIVE PROCEDURES | Age: 85
Discharge: HOME | End: 2020-05-22
Attending: INTERNAL MEDICINE | Admitting: INTERNAL MEDICINE
Payer: MEDICARE

## 2020-05-22 VITALS — BODY MASS INDEX: 26.63 KG/M2 | HEIGHT: 67 IN | WEIGHT: 169.7 LBS

## 2020-05-22 PROBLEM — R94.39 ABNORMAL CARDIOVASCULAR STRESS TEST: Status: ACTIVE | Noted: 2020-05-22

## 2020-05-22 LAB
A/G RATIO: 1.3 (ref 1.1–2.2)
ALBUMIN SERPL-MCNC: 4.1 G/DL (ref 3.4–5)
ALP BLD-CCNC: 75 U/L (ref 40–129)
ALT SERPL-CCNC: 17 U/L (ref 10–40)
ANION GAP SERPL CALCULATED.3IONS-SCNC: 10 MMOL/L (ref 3–16)
AST SERPL-CCNC: 21 U/L (ref 15–37)
BILIRUB SERPL-MCNC: 1.1 MG/DL (ref 0–1)
BUN BLDV-MCNC: 21 MG/DL (ref 7–20)
CALCIUM SERPL-MCNC: 9.9 MG/DL (ref 8.3–10.6)
CHLORIDE BLD-SCNC: 104 MMOL/L (ref 99–110)
CHOLESTEROL, TOTAL: 135 MG/DL (ref 0–199)
CO2: 27 MMOL/L (ref 21–32)
CREAT SERPL-MCNC: 1.6 MG/DL (ref 0.8–1.3)
EKG ATRIAL RATE: 122 BPM
EKG DIAGNOSIS: NORMAL
EKG Q-T INTERVAL: 366 MS
EKG QRS DURATION: 92 MS
EKG QTC CALCULATION (BAZETT): 462 MS
EKG R AXIS: 100 DEGREES
EKG T AXIS: -75 DEGREES
EKG VENTRICULAR RATE: 96 BPM
GFR AFRICAN AMERICAN: 50
GFR NON-AFRICAN AMERICAN: 42
GLOBULIN: 3.2 G/DL
GLUCOSE BLD-MCNC: 101 MG/DL (ref 70–99)
HCT VFR BLD CALC: 48.3 % (ref 40.5–52.5)
HDLC SERPL-MCNC: 42 MG/DL (ref 40–60)
HEMOGLOBIN: 16.6 G/DL (ref 13.5–17.5)
INR BLD: 1.18 (ref 0.86–1.14)
LDL CHOLESTEROL CALCULATED: 73 MG/DL
LV EF: 58 %
LVEF MODALITY: NORMAL
MCH RBC QN AUTO: 29.9 PG (ref 26–34)
MCHC RBC AUTO-ENTMCNC: 34.3 G/DL (ref 31–36)
MCV RBC AUTO: 87.1 FL (ref 80–100)
PDW BLD-RTO: 16 % (ref 12.4–15.4)
PLATELET # BLD: 193 K/UL (ref 135–450)
PMV BLD AUTO: 8 FL (ref 5–10.5)
POTASSIUM SERPL-SCNC: 4.7 MMOL/L (ref 3.5–5.1)
PROTHROMBIN TIME: 13.7 SEC (ref 10–13.2)
RBC # BLD: 5.55 M/UL (ref 4.2–5.9)
SODIUM BLD-SCNC: 141 MMOL/L (ref 136–145)
TOTAL PROTEIN: 7.3 G/DL (ref 6.4–8.2)
TRIGL SERPL-MCNC: 100 MG/DL (ref 0–150)
VLDLC SERPL CALC-MCNC: 20 MG/DL
WBC # BLD: 7.5 K/UL (ref 4–11)

## 2020-05-22 PROCEDURE — 80061 LIPID PANEL: CPT

## 2020-05-22 PROCEDURE — 2720000010 HC SURG SUPPLY STERILE

## 2020-05-22 PROCEDURE — C1769 GUIDE WIRE: HCPCS

## 2020-05-22 PROCEDURE — 85027 COMPLETE CBC AUTOMATED: CPT

## 2020-05-22 PROCEDURE — 93458 L HRT ARTERY/VENTRICLE ANGIO: CPT

## 2020-05-22 PROCEDURE — C1887 CATHETER, GUIDING: HCPCS

## 2020-05-22 PROCEDURE — 99152 MOD SED SAME PHYS/QHP 5/>YRS: CPT | Performed by: INTERNAL MEDICINE

## 2020-05-22 PROCEDURE — C1894 INTRO/SHEATH, NON-LASER: HCPCS

## 2020-05-22 PROCEDURE — 6360000002 HC RX W HCPCS

## 2020-05-22 PROCEDURE — 6370000000 HC RX 637 (ALT 250 FOR IP)

## 2020-05-22 PROCEDURE — 93571 IV DOP VEL&/PRESS C FLO 1ST: CPT

## 2020-05-22 PROCEDURE — 2580000003 HC RX 258

## 2020-05-22 PROCEDURE — 2500000003 HC RX 250 WO HCPCS

## 2020-05-22 PROCEDURE — 93005 ELECTROCARDIOGRAM TRACING: CPT | Performed by: INTERNAL MEDICINE

## 2020-05-22 PROCEDURE — 93010 ELECTROCARDIOGRAM REPORT: CPT | Performed by: INTERNAL MEDICINE

## 2020-05-22 PROCEDURE — 85610 PROTHROMBIN TIME: CPT

## 2020-05-22 PROCEDURE — 93458 L HRT ARTERY/VENTRICLE ANGIO: CPT | Performed by: INTERNAL MEDICINE

## 2020-05-22 PROCEDURE — 99153 MOD SED SAME PHYS/QHP EA: CPT

## 2020-05-22 PROCEDURE — 80053 COMPREHEN METABOLIC PANEL: CPT

## 2020-05-22 PROCEDURE — 93571 IV DOP VEL&/PRESS C FLO 1ST: CPT | Performed by: INTERNAL MEDICINE

## 2020-05-22 PROCEDURE — 99152 MOD SED SAME PHYS/QHP 5/>YRS: CPT

## 2020-05-22 RX ORDER — SODIUM CHLORIDE 9 MG/ML
INJECTION, SOLUTION INTRAVENOUS CONTINUOUS
Status: CANCELLED | OUTPATIENT
Start: 2020-05-22 | End: 2020-05-28

## 2020-05-22 RX ORDER — SODIUM CHLORIDE 9 MG/ML
INJECTION, SOLUTION INTRAVENOUS ONCE
Status: COMPLETED | OUTPATIENT
Start: 2020-05-22 | End: 2020-05-22

## 2020-05-22 RX ORDER — MIDAZOLAM HYDROCHLORIDE 1 MG/ML
INJECTION INTRAMUSCULAR; INTRAVENOUS
Status: COMPLETED | OUTPATIENT
Start: 2020-05-22 | End: 2020-05-22

## 2020-05-22 RX ORDER — ISOSORBIDE MONONITRATE 30 MG/1
30 TABLET, EXTENDED RELEASE ORAL DAILY
Qty: 30 TABLET | Refills: 3 | Status: SHIPPED | OUTPATIENT
Start: 2020-05-22 | End: 2020-06-10 | Stop reason: SDUPTHER

## 2020-05-22 RX ORDER — HYDRALAZINE HYDROCHLORIDE 20 MG/ML
10 INJECTION INTRAMUSCULAR; INTRAVENOUS EVERY 10 MIN PRN
Status: CANCELLED | OUTPATIENT
Start: 2020-05-22

## 2020-05-22 RX ORDER — ASPIRIN 81 MG/1
81 TABLET, CHEWABLE ORAL ONCE
Status: COMPLETED | OUTPATIENT
Start: 2020-05-22 | End: 2020-05-22

## 2020-05-22 RX ORDER — FENTANYL CITRATE 50 UG/ML
INJECTION, SOLUTION INTRAMUSCULAR; INTRAVENOUS
Status: COMPLETED | OUTPATIENT
Start: 2020-05-22 | End: 2020-05-22

## 2020-05-22 RX ADMIN — SODIUM CHLORIDE: 9 INJECTION, SOLUTION INTRAVENOUS at 10:49

## 2020-05-22 RX ADMIN — FENTANYL CITRATE 25 MCG: 50 INJECTION, SOLUTION INTRAMUSCULAR; INTRAVENOUS at 13:15

## 2020-05-22 RX ADMIN — ASPIRIN 81 MG: 81 TABLET, CHEWABLE ORAL at 10:49

## 2020-05-22 RX ADMIN — MIDAZOLAM HYDROCHLORIDE 2 MG: 1 INJECTION INTRAMUSCULAR; INTRAVENOUS at 13:16

## 2020-05-22 NOTE — H&P
[address] [facility]   Cardiovascular Evaluation    PATIENT: Abril Batch  DATE: 2020  MRN: [id number]  CSN: [id number]  : 1935    Primary Care Doctor/Referring provider: Vera Rajan MD    Reason for evaluation/Chief complaint:   No chief complaint on file. Abnormal stress test    Subjective:    History of present illness on initial date of evaluation:   Abril Roberson is a 82 y.o. patient who presents for follow up. At last office visit (10/24/19) a Renal Artery Doppler was ordered and demonstrated no evidence to suggest renal artery stenosis bilaterally. Incidental finding of a renal cyst measuring 1.3 x 1.3 involving right kidney. He had a cystoscopy at [facility] but unfortunately was unable to get the report prior to his visit. Today he reports he has good days and bad days. He states sometimes he has a pain down the left side of his neck that occurs off and on. He states some days he can be dizzy and short of breath. He states out of all these symptoms he feels the shortness of breath is the worse. He states sometimes he has to stop walking and catch his breath. He states he sometimes feels like he is going to pass out when this occurs. A CT of the abdomen/pelvis was completed on 3/13/20 which demonstrated chronic urinary outlet obstruction, umbilical hernia, hiatal hernia and moderate cardiomegaly. Patient Active Problem List   Diagnosis    Pneumonia    Abnormal chest CT    Cough syncope    Tracheobronchomalacia    Hyperlipidemia    Hypertension    Chronic kidney disease, stage III (moderate) (HCC)    A-fib ([address]    Pacemaker, [facility] placed in [place]     Abnormal echocardiogram    Dizziness    SOB (shortness of breath)    Abnormal cardiovascular stress test         Cardiac Testing: I have reviewed the findings below.   EKG:  ECHO:   STRESS TEST:  CATH:  BYPASS:  VASCULAR:    Past Medical History:   has a past medical history of Atrial fibrillation ([address] Blind right eye, Chronic kidney disease, Hyperlipidemia, Hypertension, and Pneumonia. Surgical History:   has a past surgical history that includes Tonsillectomy; Appendectomy; Colonoscopy; eye surgery; Upper gastrointestinal endoscopy; joint replacement (2013); hernia repair; and pacemaker placement (10/13/2017). Social History:   reports that he quit smoking about 45 years ago. He has a 10.00 pack-year smoking history. He has never used smokeless tobacco. He reports that he does not drink alcohol or use drugs. Family History:  No evidence for sudden cardiac death or premature CAD    Medications:  Reviewed and are listed in nursing record. and/or listed below  Outpatient Medications:  Prior to Admission medications    Medication Sig Start Date End Date Taking? Authorizing Provider   ciprofloxacin (CIPRO) 250 MG tablet Take 250 mg by mouth 2 times daily   Yes Historical Provider, MD   metoprolol succinate (TOPROL XL) 50 MG extended release tablet TAKE 1 TABLET BY MOUTH DAILY 2/25/20  Yes Nhan Bess MD   montelukast (SINGULAIR) 10 MG tablet TAKE 1 TABLET BY MOUTH EVERY DAY 12/11/19  Yes GUS Yuan CNP   atorvastatin (LIPITOR) 20 MG tablet TAKE 1 TABLET BY MOUTH EVERY DAY 11/11/19  Yes Sarath Coates MD   bisoprolol (ZEBETA) 10 MG tablet Take 2 tablets by mouth daily 10/24/19  Yes Nhan Bess MD   tamsulosin (FLOMAX) 0.4 MG capsule Take 0.4 mg by mouth daily   Yes Historical Provider, MD   finasteride (PROSCAR) 5 MG tablet Take 5 mg by mouth daily. 3/4/15  Yes Historical Provider, MD   aspirin 81 MG tablet Take 81 mg by mouth daily.      Yes Historical Provider, MD   omeprazole (PRILOSEC) 20 MG delayed release capsule TAKE 1 CAPSULE BY MOUTH 2 TIMES DAILY 9/13/19 4/30/20  Sarath Coates MD   apixaban (ELIQUIS) 5 MG TABS tablet Take by mouth 2 times daily    Historical Provider, MD       In-patient schedule medications:        Infusion

## 2020-05-22 NOTE — PRE SEDATION
Brief Pre-Op Note/Sedation Assessment      Elisa Haines  1935  Cath Pool Rm/NONE      [id number]  1:07 PM    Planned Procedure: Cardiac Catheterization Procedure    Post Procedure Plan: Return to same level of care    Consent: I have discussed with the patient and/or the patient representative the indication, alternatives, and the possible risks and/or complications of the planned procedure and the anesthesia methods. The patient and/or patient representative appear to understand and agree to proceed. Chief Complaint: Chest Pain/Pressure      Indications for Cath Procedure: Worsening Angina and Suspected CAD  Anginal Classification within 2 weeks:  CCS III - Symptoms with everyday living activities, i.e., moderate limitation  NYHA Heart Failure Class within 2 weeks: Class III - Symptoms of HF on less-than-ordinary exertion, Newly Diagnosed? Yes, Heart Failure Type: Diastolic  Is Cath Lab Visit Valve-related?: No  Surgical Risk: N/A  Functional Type: < 4 METS    Anti- Anginal Meds within 2 weeks:   Yes: Beta Blockers, Ca Channel Blockers, Aspirin and Statin (Any)    Stress or Imaging Studies Performed:  Stress Test with SPECT Result: Positive:  apical Risk/Extent of Ischemia:  Intermediate     Vital Signs:  Ht 5' 7\" (1.702 m)   Wt 169 lb 11.2 oz (77 kg)   BMI 26.58 kg/m²     Allergies:   Allergies   Allergen Reactions    Sulfa Antibiotics        Past Medical History:  Past Medical History:   Diagnosis Date    Atrial fibrillation ([address]     Blind right eye     Chronic kidney disease     Hyperlipidemia     Hypertension     Pneumonia          Surgical History:  Past Surgical History:   Procedure Laterality Date    APPENDECTOMY      COLONOSCOPY      EYE SURGERY      HERNIA REPAIR      JOINT REPLACEMENT  2013    left shoulder    PACEMAKER PLACEMENT  10/13/2017    Dr Marika Neri at [facility] ENDOSCOPY           Medications:  No current

## 2020-05-22 NOTE — PROCEDURES
blood pressure, and direct observation. CONCLUSIONS:    1.  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 ASSESSMENT/RECOMMENDATIONS:    1. Aggressive secondary risk factor modification.       Melida Mullins MD, [place], [place] [address] Cardiology  [facility]  [phone] Main central office  [phone] [facility] office  5/22/2020  1:53 PM

## 2020-05-22 NOTE — POST SEDATION
Patient:  Candy Cardenas   :   1935    A pre-sedation re-evaluation was performed immediately at the end of the procedure. Procedure:  Cardiac cath  Medications: Procedural sedation with minimal conscious sedation  Complications: None  Estimated Blood Loss: none  Specimens: Were not obtained        [place] Medication and Procedural Reconciliation:  I agree that the documented medications and procedures performed are true. The medications were given under my order. The procedures were performed under my direct supervision.

## 2020-06-05 ENCOUNTER — NURSE ONLY (OUTPATIENT)
Dept: CARDIOLOGY CLINIC | Age: 85
End: 2020-06-05

## 2020-06-05 PROCEDURE — 93294 REM INTERROG EVL PM/LDLS PM: CPT | Performed by: INTERNAL MEDICINE

## 2020-06-05 PROCEDURE — 93296 REM INTERROG EVL PM/IDS: CPT | Performed by: INTERNAL MEDICINE

## 2020-06-08 ENCOUNTER — NURSE ONLY (OUTPATIENT)
Dept: CARDIOLOGY CLINIC | Age: 85
End: 2020-06-08
Payer: MEDICARE

## 2020-06-10 ENCOUNTER — VIRTUAL VISIT (OUTPATIENT)
Dept: CARDIOLOGY CLINIC | Age: 85
End: 2020-06-10
Payer: MEDICARE

## 2020-06-10 PROBLEM — I25.10 CORONARY ARTERY DISEASE INVOLVING NATIVE CORONARY ARTERY OF NATIVE HEART WITHOUT ANGINA PECTORIS: Status: ACTIVE | Noted: 2020-06-10

## 2020-06-10 PROCEDURE — 99213 OFFICE O/P EST LOW 20 MIN: CPT | Performed by: INTERNAL MEDICINE

## 2020-06-10 RX ORDER — METOPROLOL SUCCINATE 50 MG/1
50 TABLET, EXTENDED RELEASE ORAL DAILY
Qty: 90 TABLET | Refills: 3 | Status: SHIPPED | OUTPATIENT
Start: 2020-06-10 | End: 2020-07-09

## 2020-06-10 RX ORDER — ISOSORBIDE MONONITRATE 30 MG/1
30 TABLET, EXTENDED RELEASE ORAL DAILY
Qty: 90 TABLET | Refills: 3 | Status: SHIPPED | OUTPATIENT
Start: 2020-06-10 | End: 2021-08-11

## 2020-06-10 NOTE — PATIENT INSTRUCTIONS
PLAN:  1. I recommend that the patient continue their currently prescribed medications. Their drug modifiable risk factors appear to be well controlled. I will continue to address the need/dosing of medications in future visits. 2. The patient was seen for >25 minutes. >50% of the time was devoted to giving the patient detailed instructions instructions on addressing diet, regular exercise, weight control, smoking abstention, medication compliance, and stress minimization. The patient was provided written and verbal instructions regarding risk factor modification. 3. Follow up with me in in 6 months in the office or virtual visit.

## 2020-06-10 NOTE — PROGRESS NOTES
Former Smoker     Packs/day: 1.00     Years: 10.00     Pack years: 10.00     Last attempt to quit: 1975     Years since quittin.3    Smokeless tobacco: Never Used    Tobacco comment: Quit 40 years ago   Substance Use Topics    Alcohol use: No     Alcohol/week: 0.0 standard drinks    Drug use: No        Allergies   Allergen Reactions    Sulfa Antibiotics    ,   Past Medical History:   Diagnosis Date    Atrial fibrillation (HCC)     Blind right eye     Chronic kidney disease     Hyperlipidemia     Hypertension     Pneumonia    ,   Past Surgical History:   Procedure Laterality Date    APPENDECTOMY      COLONOSCOPY      EYE SURGERY      HERNIA REPAIR      JOINT REPLACEMENT  2013    left shoulder    PACEMAKER PLACEMENT  10/13/2017    Dr Art Brizuela at [address] [place] [address]     ,   Social History     Tobacco Use    Smoking status: Former Smoker     Packs/day: 1.00     Years: 10.00     Pack years: 10.00     Last attempt to quit: 1975     Years since quittin.3    Smokeless tobacco: Never Used    Tobacco comment: Quit 40 years ago   Substance Use Topics    Alcohol use: No     Alcohol/week: 0.0 standard drinks    Drug use: No   ,   Family History   Problem Relation Age of Onset    Heart Disease Mother     Other Mother     Heart Disease Father     Asthma Other        PHYSICAL EXAMINATION:  [ INSTRUCTIONS:  \"[x]\" Indicates a positive item  \"[]\" Indicates a negative item  -- DELETE ALL ITEMS NOT EXAMINED]  Vital Signs: (As obtained by patient/caregiver or practitioner observation)    Blood pressure-  Heart rate-    Respiratory rate-    Temperature-  Pulse oximetry-     Constitutional: [x] Appears well-developed and well-nourished [x] No apparent distress      [] Abnormal-   Mental status  [x] Alert and awake  [x] Oriented to person/place/time [x]Able to follow commands      Eyes:  EOM    [x]  Normal  [] Abnormal-  Sclera  [x] appropriate. Due to this being a TeleHealth encounter (During LQOXJ-37 public health emergency), evaluation of the following organ systems was limited: Vitals/Constitutional/EENT/Resp/CV/GI//MS/Neuro/Skin/Heme-Lymph-Imm. Pursuant to the emergency declaration under the [address] [place] [address], [address] [place] [address] and the [employer] Act, this Virtual Visit was conducted with patient's (and/or legal guardian's) consent, to reduce the patient's risk of exposure to COVID-19 and provide necessary medical care. The patient (and/or legal guardian) has also been advised to contact this office for worsening conditions or problems, and seek emergency medical treatment and/or call 911 if deemed necessary. Patient identification was verified at the start of the visit: Yes    Total time spent on this encounter: 30    This note was scribed in the presence of Dr. Rober Rivera MD by Ketty Gates RN. Services were provided through a video synchronous discussion virtually to substitute for in-person clinic visit. Patient and provider were located at their individual homes. --Anselmo Galvin RN on 6/10/2020 at 12:41 PM    An electronic signature was used to authenticate this note. I, Dr. Rober Rivera, personally performed the services described in this documentation, as scribed by the above signed scribe in my presence. It is both accurate and complete to my knowledge. I agree with the details independently gathered by the clinical support staff, while the remaining scribed note accurately describes my personal service to the patient.

## 2020-06-16 ENCOUNTER — TELEPHONE (OUTPATIENT)
Dept: CARDIOLOGY CLINIC | Age: 85
End: 2020-06-16

## 2020-06-17 NOTE — TELEPHONE ENCOUNTER
Spoke with wife. The med list WE HAVE:  Does not list amlodipine or bisoprolol. However, Dr Kamar Conley from 5/4/20 does state to stop the amlodipine. Wife states she didn't remember that, but does remember after his CATH on 5/22/20 that Dr. Felecia Clement specifically said to stop the bisoprolol because his BP was dropping too much. I reviewed the med list WE HAVE and he is also taking the metoprolol which is on his list.  They do not check BP at home. She is going to have her granddaughter call back as she is a pharmacy tech where he gets his med. They also made an appt with Asuncion Sainz on 6/22/20 to have BP checked.

## 2020-06-17 NOTE — TELEPHONE ENCOUNTER
Spoke to granddaughter Demetrius Solomon. I confirmed that pt should not be taking Amlodipine or Bisoprolol medication per [facility]. Pt also discontinued Flomax on his own due to dizziness symptoms. Pt has a f/u with [facility] on 6/22/20.  V/U

## 2020-06-19 ENCOUNTER — OFFICE VISIT (OUTPATIENT)
Dept: FAMILY MEDICINE CLINIC | Age: 85
End: 2020-06-19
Payer: MEDICARE

## 2020-06-19 VITALS
OXYGEN SATURATION: 96 % | TEMPERATURE: 97.8 F | BODY MASS INDEX: 26.87 KG/M2 | DIASTOLIC BLOOD PRESSURE: 82 MMHG | HEART RATE: 68 BPM | HEIGHT: 67 IN | WEIGHT: 171.2 LBS | SYSTOLIC BLOOD PRESSURE: 120 MMHG

## 2020-06-19 PROCEDURE — 4040F PNEUMOC VAC/ADMIN/RCVD: CPT | Performed by: FAMILY MEDICINE

## 2020-06-19 PROCEDURE — 1123F ACP DISCUSS/DSCN MKR DOCD: CPT | Performed by: FAMILY MEDICINE

## 2020-06-19 PROCEDURE — 99214 OFFICE O/P EST MOD 30 MIN: CPT | Performed by: FAMILY MEDICINE

## 2020-06-19 PROCEDURE — G8417 CALC BMI ABV UP PARAM F/U: HCPCS | Performed by: FAMILY MEDICINE

## 2020-06-19 PROCEDURE — 1036F TOBACCO NON-USER: CPT | Performed by: FAMILY MEDICINE

## 2020-06-19 PROCEDURE — G8427 DOCREV CUR MEDS BY ELIG CLIN: HCPCS | Performed by: FAMILY MEDICINE

## 2020-06-19 ASSESSMENT — ENCOUNTER SYMPTOMS: SHORTNESS OF BREATH: 0

## 2020-06-19 NOTE — PROGRESS NOTES
education: Not on file    Highest education level: Not on file   Occupational History    Not on file   Social Needs    Financial resource strain: Not on file    Food insecurity     Worry: Not on file     Inability: Not on file    Transportation needs     Medical: Not on file     Non-medical: Not on file   Tobacco Use    Smoking status: Former Smoker     Packs/day: 1.00     Years: 10.00     Pack years: 10.00     Last attempt to quit: 1975     Years since quittin.3    Smokeless tobacco: Never Used    Tobacco comment: Quit 40 years ago   Substance and Sexual Activity    Alcohol use: No     Alcohol/week: 0.0 standard drinks    Drug use: No    Sexual activity: Yes     Partners: Female   Lifestyle    Physical activity     Days per week: Not on file     Minutes per session: Not on file    Stress: Not on file   Relationships    Social connections     Talks on phone: Not on file     Gets together: Not on file     Attends Yazidism service: Not on file     Active member of club or organization: Not on file     Attends meetings of clubs or organizations: Not on file     Relationship status: Not on file    Intimate partner violence     Fear of current or ex partner: Not on file     Emotionally abused: Not on file     Physically abused: Not on file     Forced sexual activity: Not on file   Other Topics Concern    Not on file   Social History Narrative    Not on file       Prior to Visit Medications    Medication Sig Taking?  Authorizing Provider   Cholecalciferol (VITAMIN D3 PO) Take 1,000 Units by mouth daily Yes Historical Provider, MD   isosorbide mononitrate (IMDUR) 30 MG extended release tablet Take 1 tablet by mouth daily Yes Essie Tilley MD   metoprolol succinate (TOPROL XL) 50 MG extended release tablet Take 1 tablet by mouth daily Yes Essie Tilley MD   montelukast (SINGULAIR) 10 MG tablet TAKE 1 TABLET BY MOUTH EVERY DAY Yes GUS Bailon - CNP   atorvastatin (LIPITOR) 20 MG

## 2020-06-22 ENCOUNTER — OFFICE VISIT (OUTPATIENT)
Dept: CARDIOLOGY CLINIC | Age: 85
End: 2020-06-22
Payer: MEDICARE

## 2020-06-22 VITALS
SYSTOLIC BLOOD PRESSURE: 110 MMHG | HEART RATE: 78 BPM | TEMPERATURE: 98.4 F | DIASTOLIC BLOOD PRESSURE: 74 MMHG | OXYGEN SATURATION: 99 % | WEIGHT: 168 LBS | HEIGHT: 67 IN | BODY MASS INDEX: 26.37 KG/M2

## 2020-06-22 PROCEDURE — 1036F TOBACCO NON-USER: CPT | Performed by: NURSE PRACTITIONER

## 2020-06-22 PROCEDURE — G8427 DOCREV CUR MEDS BY ELIG CLIN: HCPCS | Performed by: NURSE PRACTITIONER

## 2020-06-22 PROCEDURE — 99214 OFFICE O/P EST MOD 30 MIN: CPT | Performed by: NURSE PRACTITIONER

## 2020-06-22 PROCEDURE — 4040F PNEUMOC VAC/ADMIN/RCVD: CPT | Performed by: NURSE PRACTITIONER

## 2020-06-22 PROCEDURE — G8417 CALC BMI ABV UP PARAM F/U: HCPCS | Performed by: NURSE PRACTITIONER

## 2020-06-22 PROCEDURE — 1123F ACP DISCUSS/DSCN MKR DOCD: CPT | Performed by: NURSE PRACTITIONER

## 2020-06-22 ASSESSMENT — ENCOUNTER SYMPTOMS
WHEEZING: 0
CHEST TIGHTNESS: 0
SHORTNESS OF BREATH: 0

## 2020-06-22 NOTE — PROGRESS NOTES
OBJECTIVE:    Vital signs:    /74   Pulse 78   Temp 98.4 °F (36.9 °C)   Ht 5' 7\" (1.702 m)   Wt 168 lb (76.2 kg)   SpO2 99%   BMI 26.31 kg/m²      Physical Exam:  Constitutional:  Comfortable and alert, NAD, appears stated age  Eyes: PERRL, sclera nonicteric  Neck:  Supple, no masses, no thyroidmegaly, no JVD  Skin:  Warm and dry; no rash or lesions  Heart:  Regular, normal apex, S1 and S2 normal, no M/G/R  Lungs:  Normal respiratory effort; clear; no wheezing/rhonchi/rales  Abdomen: soft, non tender, + bowel sounds  Extremities:  No edema or cyanosis; no clubbing  Neuro: alert and oriented, moves legs and arms equally, normal mood and affect      Data Reviewed:    Echo 3/21/2019 :   Normal left ventricular systolic function with an estimated ejection   fraction of 55-60%. No regional wall motion abnormalities are seen. Normal left ventricular diastolic filling pressure. The left atrium is mildly dilated. The right atrium is moderately dilated. Right ventricular systolic function is mildly reduced . The right ventricle is mildly enlarged. The ascending aorta is mildly dilated. 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 Mild mitral regurgitation. Moderate tricuspid regurgitation. Systolic pulmonary artery pressure (SPAP) estimated at 47 mmHg (RA pressure   15 mmHg), consistent with mild pulmonary hypertension. Pacemaker lead in right ventricle. Coronary angiogram 5/22/2020:  Procedures Performed:   1. Left heart catheterization  2. Selective left and right coronary angiogram  3. Left ventriculography   4. Fractional flow reserve of the left anterior descending artery at 0.87     Procedure Findings:  1. Mild to moderate diffuse epicardial coronary artery disease with approximately 50% stenosis of the mid left anterior descending artery. .  2. Normal left ventricular function with EF estimated at 55-60%  3.  Normal left heart hemodynamics

## 2020-06-22 NOTE — LETTER
Systolic pulmonary artery pressure (SPAP) estimated at 47 mmHg (RA pressure   15 mmHg), consistent with mild pulmonary hypertension. Pacemaker lead in right ventricle. Coronary angiogram 5/22/2020:  Procedures Performed:   1. Left heart catheterization  2. Selective left and right coronary angiogram  3. Left ventriculography   4. Fractional flow reserve of the left anterior descending artery at 0.87     Procedure Findings:  1. Mild to moderate diffuse epicardial coronary artery disease with approximately 50% stenosis of the mid left anterior descending artery. .  2. Normal left ventricular function with EF estimated at 55-60%  3. Normal left heart hemodynamics     Indications:       Patient Active Problem List   Diagnosis    Pneumonia    Abnormal chest CT    Cough syncope    Tracheobronchomalacia    Hyperlipidemia    Hypertension    Chronic kidney disease, stage III (moderate) (HCC)    A-fib ([address]    Pacemaker, [facility] placed in [place]     Abnormal echocardiogram    Dizziness    SOB (shortness of breath)    Abnormal cardiovascular stress test         Details:              Stacey Pepe was brought to the cardiac catheterization lab in a fasting state after informed consent was obtained. If the patient was able to provide written consent, it was obtained. The patient's vitals were monitored through out the procedure. The patient was sedated using the appropriate levels of sedation and ASA guidelines. The appropriate access site area was prepped and drapped in a sterile fashion. The area was anesthetized with 2% lidocaine. Using the modified Seldinger technique, an arterial sheath was introduced into the arterial access site using a single anterior wall puncture. The sheath was flushed and prepped in usual fashion. Catheters used during the procedure included 5 [place] TIG 4.0 catheter.  The catheters were advanced and removed over a .035\" wire, into MCV 89.3 03/13/2020    MCV 89.6 12/05/2019    RDW 16.0 05/22/2020    RDW 15.5 01/25/2016    RDW 15.4 01/24/2016     05/22/2020     03/13/2020     12/05/2019     06/10/2019     01/25/2016     01/24/2016     BMP:  Lab Results   Component Value Date     05/22/2020     03/13/2020     12/05/2019    K 4.7 05/22/2020    K 4.6 03/13/2020    K 4.3 12/05/2019     05/22/2020     03/13/2020     12/05/2019    CO2 27 05/22/2020    CO2 25 03/13/2020    CO2 30 12/05/2019    PHOS 4.0 12/05/2019    PHOS 3.5 06/10/2019    PHOS 4.0 02/25/2019    BUN 21 05/22/2020    BUN 27 03/13/2020    BUN 20 12/05/2019    CREATININE 1.6 05/22/2020    CREATININE 2.3 03/13/2020    CREATININE 1.6 12/05/2019     BNP:   Lab Results   Component Value Date    PROBNP 1,002 01/24/2016    PROBNP 107 02/27/2015     FASTING LIPID PANEL:  Lab Results   Component Value Date    HDL 42 05/22/2020    LDLDIRECT 74.6 11/06/2017    LDLCALC 73 05/22/2020    TRIG 100 05/22/2020     LIVER PROFILE:No results for input(s): AST, ALT, ALB in the last 72 hours. Reviewed all labs and imaging today    Assessment:   1. CAD: denies any chest pain   -s/p LHC- 50% stenosis LAD- Imdur added with good results 5/22/2020  2. HTN: controlled- continue current regimen  3. HLD: LDL 73 on statin   4. Atrial fibrillation: chronic controlled    [id number] Score for Atrial Fibrillation Stroke Risk 3    -on eliquis   5. PPM: 4-5 years at [address] [place] [address]- plan is to establish care with Dr. Adilene Farris:   1. No change in medication at this time  2. Monitor blood pressure at home daily, if consistently above 140/90, or it is running low and you are symptomatic please call office   3. Follow up with Dr. Sofia Mathews in 6 months VV or in office   4.  Appointment with EP to establish care, Dr. Elsa Bullock in July as scheduled       Porter Galo, [address]  [phone]      QUALITY MEASURES

## 2020-07-08 NOTE — PROGRESS NOTES
Patient presents to the device clinic today for a programming evaluation for his pacemaker. Patient has a history of AF. Takes Toprol XL and Eliquis. Last device interrogation was on 6/5. Since then, 1 HVR event recorded on 7/6 x 2 seconds which appears to be AF w/ RVR with average V rate of 199 bpm for the event.  40%   All sensing and pacing parameters are within normal range. No changes need to be made at this time. Patient education was provided about device functionality, in home monitoring, and any other patient questions and/or concerns were addressed. Patient voices understanding. Please see interrogation for more detail. Patient will see Dr. Maryland Sever today in office. Patient will follow up in 3 months in office or remotely. See Paceart report under the Cardiology tab.

## 2020-07-08 NOTE — PROGRESS NOTES
[facility]   Electrophysiology Consult Note              Date:  July 9, 2020  Patient name: Antolin Do  YOB: 1935    Reason for Consult: Follow-up (no complaints) and Atrial Fibrillation    Primary Care Physician:Jose De Jesus Pimentel MD    HISTORY OF PRESENT ILLNESS: Antolin Do is a 82 y.o. male who presents for evaluation to establish care for atrial Fibrillation/pacemaker management and transmission. He is followed by my colleague Dr. Marlo Ramachandran for interventional cardiology. On 5/4/20 he was seen by Dr Marlo Ramachandran for his shortness of breath. A Lexiscan Stress Test was ordered, which he underwent on 5/12/20 which demonstrated an EF of 64% and a small mild defect at the apex at stress that reverses at rest which is consistent with ischemia. He had a Left Heart Cath on 5/22/20 which demonstrated mild/mod diffuse epicardial CAD, 50% stenosis of mid LAD and normal LVEF of 55-60%. His EKG on 5/22/20 demonstrated Atrial Fibrillation, HR of 96 BPM. He has a PMH of [place] single chamber PPM placed on 10/13/2017 by Dr Judith Hinojosa ([facility]), CAD, HTN, HLD and chronic AFIB. His device check today 7/9/20 demonstrates predominantly AFIB with 1 event of AFIB RVR on 7/6 that lasted 2 seconds,  40%. His EKG today 7/9/20 demonstrates AFIB HR 91 BPM. Today he reports he is feeling well from a cardiac standpoint. He reports he has a PMH of  HTN, CKD, and AFIB. He denies strokes, heart stents, denies DM. 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 He reports he tolerates his activity well. He reports he does get some fatigue more than he used to. He reports he is taking his medications as prescribed. Patient denies chest pain, sob, palpitations, dizziness or syncope. Past Medical History:   has a past medical history of Atrial fibrillation ([address] Blind right eye, Chronic kidney disease, Hyperlipidemia, Hypertension, and Pneumonia.     Past Surgical History:   has a past surgical history that includes Tonsillectomy; Appendectomy; Colonoscopy; eye surgery; Upper gastrointestinal endoscopy; joint replacement (2013); hernia repair; and pacemaker placement (10/13/2017). Allergies:  Sulfa antibiotics    Social History:   reports that he quit smoking about 45 years ago. He has a 10.00 pack-year smoking history. He has never used smokeless tobacco. He reports that he does not drink alcohol or use drugs. Family History: family history includes Asthma in an other family member; Heart Disease in his father and mother; Other in his mother. Home Medications:    Prior to Admission medications    Medication Sig Start Date End Date Taking? Authorizing Provider   Cholecalciferol (VITAMIN D3 PO) Take 1,000 Units by mouth daily   Yes Historical Provider, MD   isosorbide mononitrate (IMDUR) 30 MG extended release tablet Take 1 tablet by mouth daily 6/10/20  Yes David Luciano MD   metoprolol succinate (TOPROL XL) 50 MG extended release tablet Take 1 tablet by mouth daily 6/10/20  Yes David Luciano MD   montelukast (SINGULAIR) 10 MG tablet TAKE 1 TABLET BY MOUTH EVERY DAY 12/11/19  Yes GUS Prado - CNP   atorvastatin (LIPITOR) 20 MG tablet TAKE 1 TABLET BY MOUTH EVERY DAY 11/11/19  Yes Jared Gaines MD   omeprazole (PRILOSEC) 20 MG delayed release capsule TAKE 1 CAPSULE BY MOUTH 2 TIMES DAILY 9/13/19 7/9/20 Yes Jared Gaines MD   apixaban (ELIQUIS) 5 MG TABS tablet Take by mouth 2 times daily   Yes Historical Provider, MD   tamsulosin (FLOMAX) 0.4 MG capsule Take 0.4 mg by mouth daily   Yes Historical Provider, MD   finasteride (PROSCAR) 5 MG tablet Take 5 mg by mouth daily. 3/4/15  Yes Historical Provider, MD   aspirin 81 MG tablet Take 81 mg by mouth daily. Yes Historical Provider, MD       REVIEW OF SYSTEMS:    All 14-point review of systems are completed and  pertinent positives are mentioned in the history of present illness.   Other  systems are reviewed and are negative. Physical Examination:    /84   Pulse 84   Ht 5' 7\" (1.702 m)   Wt 167 lb (75.8 kg)   SpO2 98%   BMI 26.16 kg/m²      Constitutional and General Appearance:    alert, cooperative, no distress and appears stated age  [de-identified]:    PERRLA, no cervical lymphadenopathy. No masses palpable. Normal oral  mucosa  Respiratory:  · Normal excursion and expansion without use of accessory muscles  · Resp Auscultation: Normal breath sounds without dullness or wheezing  Cardiovascular:  · The apical impulse is not displaced  · Irregular rate and rhythm without M/G/R  Abdomen:  · No masses or tenderness  · Bowel sounds present  Extremities:  ·  No Cyanosis or Clubbing  ·  Lower extremity edema: No  · Skin: Warm and dry  Neurological:  · Alert and oriented. · Moves all extremities well  · No abnormalities of mood, affect, memory, mentation, or behavior are noted    DATA:    EC20: AFIB HR 91 BPM  EC20: Atrial fibrillation, controlled ventricular rate, HR of 96 BPM,  possible Right ventricular hypertrophy, ST & T wave abnormality, consider inferior ischemia, ST & T wave abnormality, consider anterolateral ischemia, Prolonged QT  ECHO: 3/21/2019:  Summary   Normal left ventricular systolic function with an estimated ejection   fraction of 55-60%. No regional wall motion abnormalities are seen. Normal left ventricular diastolic filling pressure. The left atrium is mildly dilated. The right atrium is moderately dilated. Right ventricular systolic function is mildly reduced . The right ventricle is mildly enlarged. The ascending aorta is mildly dilated. 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 Mild mitral regurgitation. Moderate tricuspid regurgitation. Systolic pulmonary artery pressure (SPAP) estimated at 47 mmHg (RA pressure   15 mmHg), consistent with mild pulmonary hypertension. Pacemaker lead in right ventricle. IMPRESSION:    1.  Permanent atrial fibrillation. Mr. 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 Patient doing well. 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 Heart rates mostly well controlled during atrial fibrillation however still not as controlled as I'd like to see (averge up to 130 rarely). We discussed anticoagulation (NOAC and warfarin), rate control, and rhythm control, AVN + pacemaker. We reviewed risks and benefits of each approach. We discussed side effects of class IC, class II, class III, and class IV antiarrhythmics. All questions were answered. Patient considering cardioversion to see how NSR makes him feel. If makes him feel better we will consider antiarrhythmic therapy. Patient and wife will reach out with decision.  - Increase metoprolol to 50 mg BID.  - Decrease apixaban to 2.5 mg BID given renal function and age. - Follow up with me in 3 months. 2. Hypertension. Stable. 3. 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 Abbott device being paced approximately 40% of the time. No heart failure symptoms. Continue to monitor for heart failure symptoms given elevated pacing percentage. - Increase metoprolol. Pacing rate may increase so will need to monitor for heart failure symptoms and decline of ejection fraction (LVEF normal currently). RECOMMENDATIONS:  1. Discussed at length the pathology of Atrial Fibrillation (AFIB). Discussed increased risk of blood clots, stroke, heart failure. 2. Discussed options to manage AFIB. (Cardioversion to attempt to shock heart into rhythm, Pace and Ablate to stop AFIB, would be dependant upon pacemaker, Antiarrhythmics to help control AFIB) Discussed risks and benefits of all of these procedures/options.  Decided to defer Cardioversion at this time. Will call the office to schedule if he decides to proceed. 3. INCREASE Metoprolol to 50 mg two times a day to slow your heart rate down due to chronic AFIB. 4. DECREASE your dose of Eliquis to 2.5mg two times a day due to your age. 5. Information about AFIB given. 6. Follow up with me in 3 months. QUALITY MEASURES  1. Tobacco Cessation Counseling: NA  2. Retake of BP if >140/90:   NA  3. Documentation to PCP/referring for new patient:  Sent to PCP at close of office visit  4. CAD patient on anti-platelet: Yes  5. CAD patient on STATIN therapy:  Yes  6. Patient with CHF and aFib on anticoagulation:  Yes     All questions and concerns were addressed to the patient/family. Alternatives to my treatment were discussed. This note was scribed in the presence of Ezekiel Veras MD by Jose Leary. Irais Miller RN. Dr. Rebecca Agee MD  Electrophysiology  [place] [address]. [address] [place] [address]. [address]. [facility] [address]  Phone: [phone]  Fax: [phone]     NOTE: This report was transcribed using voice recognition software. Every effort was made to ensure accuracy, however, inadvertent computerized transcription errors may be present. The scribe's documentation has been prepared under my direction and personally reviewed by me in its entirety. I confirm that the note above accurately reflects all work, physical examination, the discussion of treatments and procedures, and medical decision making performed by me. Carmela Gonzales MD personally performed the services described in this documentation as scribed by nurse in my presence, and is both accurate and complete.     Electronically signed by Myrlene Riedel, MD on 7/10/2020 at 10:02 AM

## 2020-07-09 ENCOUNTER — NURSE ONLY (OUTPATIENT)
Dept: CARDIOLOGY CLINIC | Age: 85
End: 2020-07-09
Payer: MEDICARE

## 2020-07-09 ENCOUNTER — OFFICE VISIT (OUTPATIENT)
Dept: CARDIOLOGY CLINIC | Age: 85
End: 2020-07-09
Payer: MEDICARE

## 2020-07-09 VITALS
HEART RATE: 84 BPM | OXYGEN SATURATION: 98 % | SYSTOLIC BLOOD PRESSURE: 132 MMHG | HEIGHT: 67 IN | WEIGHT: 167 LBS | BODY MASS INDEX: 26.21 KG/M2 | DIASTOLIC BLOOD PRESSURE: 84 MMHG

## 2020-07-09 PROCEDURE — 99214 OFFICE O/P EST MOD 30 MIN: CPT | Performed by: INTERNAL MEDICINE

## 2020-07-09 PROCEDURE — G8427 DOCREV CUR MEDS BY ELIG CLIN: HCPCS | Performed by: INTERNAL MEDICINE

## 2020-07-09 PROCEDURE — 93000 ELECTROCARDIOGRAM COMPLETE: CPT | Performed by: INTERNAL MEDICINE

## 2020-07-09 PROCEDURE — G8417 CALC BMI ABV UP PARAM F/U: HCPCS | Performed by: INTERNAL MEDICINE

## 2020-07-09 PROCEDURE — 93279 PRGRMG DEV EVAL PM/LDLS PM: CPT | Performed by: INTERNAL MEDICINE

## 2020-07-09 RX ORDER — METOPROLOL SUCCINATE 50 MG/1
50 TABLET, EXTENDED RELEASE ORAL 2 TIMES DAILY
Qty: 180 TABLET | Refills: 3 | Status: SHIPPED | OUTPATIENT
Start: 2020-07-09 | End: 2021-08-11

## 2020-07-09 NOTE — PATIENT INSTRUCTIONS
RECOMMENDATIONS:  1. Discussed at length the pathology of Atrial Fibrillation (AFIB). Discussed increased risk of blood clots, stroke, heart failure. 2. Discussed options to manage AFIB. (Cardioversion to attempt to shock heart into rhythm, Pace and Ablate to stop AFIB, would be dependant upon pacemaker, Antiarrhythmics to help control AFIB) Discussed risks and benefits of all of these procedures/options. Decided to defer Cardioversion at this time. Will call the office to schedule if he decides to proceed. 3. INCREASE Metoprolol to 50 mg two times a day to slow your heart rate down due to chronic AFIB. 4. DECREASE your dose of Eliquis to 2.5mg two times a day due to your age. 5. Information about AFIB given. 6. Follow up with me in 3 months.

## 2020-08-13 ENCOUNTER — TELEPHONE (OUTPATIENT)
Dept: FAMILY MEDICINE CLINIC | Age: 85
End: 2020-08-13

## 2020-08-13 NOTE — TELEPHONE ENCOUNTER
Leslie from [place] came over to request that we send in an RX for Eliquis 5's, take 1/2 bid due to cost.  2 months worth to [facility].

## 2020-09-11 RX ORDER — OMEPRAZOLE 20 MG/1
20 CAPSULE, DELAYED RELEASE ORAL 2 TIMES DAILY
Qty: 60 CAPSULE | Refills: 10 | Status: SHIPPED | OUTPATIENT
Start: 2020-09-11 | End: 2021-08-03

## 2020-10-09 NOTE — PROGRESS NOTES
[facility]   Electrophysiology Consult Note              Date:  October 13, 2020  Patient name: Rafaela Bennett  YOB: 1935    Chief Complaint:  3 Month Follow-Up; Atrial Fibrillation; and Other (device manangement)    Primary Care Physician:Jose De Jesus Morales MD    HISTORY OF PRESENT ILLNESS: Rafaela Bennett is a 82 y.o. male who presents for evaluation to establish care for atrial Fibrillation/pacemaker management and transmission. He is followed by my colleague Dr. Roberta Barker for interventional cardiology. On 5/4/20 he was seen by Dr Roberta Barker for his shortness of breath. A Lexiscan Stress Test was ordered, which he underwent on 5/12/20 which demonstrated an EF of 64% and a small mild defect at the apex at stress that reverses at rest which is consistent with ischemia. He had a Left Heart Cath on 5/22/20 which demonstrated mild/mod diffuse epicardial CAD, 50% stenosis of mid LAD and normal LVEF of 55-60%. His EKG on 5/22/20 demonstrated Atrial Fibrillation, HR of 96 BPM. He has a PMH of [place]. Jas single chamber PPM placed on 10/13/2017 by Dr No Street ([facility]), CAD, HTN, HLD and chronic AF. His device check 7/9/20 demonstrated predominantly AF with 1 event of AFIB RVR on 7/6/20 that lasted 2 seconds,  40%. His EKG 7/9/20 demonstrated AF HR 91 BPM. During this office visit his metoprolol was increased to 50 mg BID and his eliquis was decreased to 2.5mg BID due to age and kidney function. Interval history since last office visit:   His device check today 10/13/20 demonstrates  43%, 2 new AT/AF events on 8/3/20 with V rates 190-201 bpm. His EKG today 10/13/20 demonstrates AF at 95 bpm. Today 10/13/20 he reports he has occasion chest pain near his device. It only lasts a few minutes. He reports it does not radiate. Denies other associated symptoms. He denies having any kind of cardiac associated symptoms on August 3rd, 2020 to correlate with fast rhythms on device check.   He reports he is active and tolerates activity well. He reports he takes his prescriptions in the evening. He denies abnormal bleeding or bruising. Patient denies current edema, sob, palpitations, dizziness or syncope. Past Medical History:   has a past medical history of Atrial fibrillation ([address] Blind right eye, Chronic kidney disease, Hyperlipidemia, Hypertension, and Pneumonia. Past Surgical History:   has a past surgical history that includes Tonsillectomy; Appendectomy; Colonoscopy; eye surgery; Upper gastrointestinal endoscopy; joint replacement (2013); hernia repair; and pacemaker placement (10/13/2017). Allergies:  Sulfa antibiotics    Social History:   reports that he quit smoking about 45 years ago. He has a 10.00 pack-year smoking history. He has never used smokeless tobacco. He reports that he does not drink alcohol or use drugs. Family History: family history includes Asthma in an other family member; Heart Disease in his father and mother; Other in his mother. Home Medications:    Prior to Admission medications    Medication Sig Start Date End Date Taking?  Authorizing Provider   apixaban (ELIQUIS) 5 MG TABS tablet Take 0.5 tablets by mouth 2 times daily 8/13/20 11/11/20 Yes Rima March MD   metoprolol succinate (TOPROL XL) 50 MG extended release tablet Take 1 tablet by mouth 2 times daily 7/9/20  Yes Nabil Grider MD   apixaban [place]osa) 2.5 MG TABS tablet Take 1 tablet by mouth 2 times daily 7/9/20  Yes Nabil Grider MD   Cholecalciferol (VITAMIN D3 PO) Take 1,000 Units by mouth daily   Yes Historical Provider, MD   isosorbide mononitrate (IMDUR) 30 MG extended release tablet Take 1 tablet by mouth daily 6/10/20  Yes Honorio Waters MD   montelukast (SINGULAIR) 10 MG tablet TAKE 1 TABLET BY MOUTH EVERY DAY 12/11/19  Yes GSU Chu CNP   atorvastatin (LIPITOR) 20 MG tablet TAKE 1 TABLET BY MOUTH EVERY DAY 11/11/19  Yes Rima March MD   tamsulosin (FLOMAX) 0.4 MG capsule Take 0.4 mg by mouth daily   Yes Historical Provider, MD   finasteride (PROSCAR) 5 MG tablet Take 5 mg by mouth daily. 3/4/15  Yes Historical Provider, MD   aspirin 81 MG tablet Take 81 mg by mouth daily. Yes Historical Provider, MD   omeprazole (PRILOSEC) 20 MG delayed release capsule TAKE 1 CAPSULE BY MOUTH 2 TIMES DAILY 9/11/20 10/11/20  Ben Hanson, APRN - CNP       REVIEW OF SYSTEMS:    All 14-point review of systems are completed and  pertinent positives are mentioned in the history of present illness. Other  systems are reviewed and are negative. Physical Examination:    /70   Pulse 80   Ht 5' 7\" (1.702 m)   Wt 171 lb 8 oz (77.8 kg)   SpO2 98%   BMI 26.86 kg/m²      Constitutional and General Appearance:    alert, cooperative, no distress and appears stated age  [de-identified]:    PERRLA, no cervical lymphadenopathy. No masses palpable. Normal oral  mucosa  Respiratory:  · Normal excursion and expansion without use of accessory muscles  · Resp Auscultation: Normal breath sounds without dullness or wheezing  Cardiovascular:  · The apical impulse is not displaced  · Irregular rate and rhythm without M/G/R  Abdomen:  · No masses or tenderness  · Bowel sounds present  Extremities:  ·  No Cyanosis or Clubbing  ·  Lower extremity edema: No  · Skin: Warm and dry  Neurological:  · Alert and oriented. · Moves all extremities well  · No abnormalities of mood, affect, memory, mentation, or behavior are noted    DATA:    ECG:  10/13/20: AF at 95 bpm    EC20: AFIB HR 91 BPM    EC20: AFIB HR 96 BPM    ECHO: 3/21/2019:  Summary   Normal left ventricular systolic function with an estimated ejection   fraction of 55-60%. No regional wall motion abnormalities are seen. Normal left ventricular diastolic filling pressure. The left atrium is mildly dilated. The right atrium is moderately dilated. Right ventricular systolic function is mildly reduced .    The right ventricle is mildly enlarged. The ascending aorta is mildly dilated. 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 Mild mitral regurgitation. Moderate tricuspid regurgitation. Systolic pulmonary artery pressure (SPAP) estimated at 47 mmHg (RA pressure   15 mmHg), consistent with mild pulmonary hypertension. Pacemaker lead in right ventricle. IMPRESSION:    1. Permanent atrial fibrillation. 07/09/2020  Mr. Lashanda Miranda is a pleasant 82year old male with a medical history significant for atrial fibrillation, tachy-bruce syndrome status post single chamber (Schwarz), and chronic renal insufficiency stage III who presents from home to establish care. Patient doing well. 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 Heart rates mostly well controlled during atrial fibrillation however still not as controlled as I'd like to see (averge up to 130 rarely). We discussed anticoagulation (NOAC and warfarin), rate control, and rhythm control, AVN + pacemaker. We reviewed risks and benefits of each approach. We discussed side effects of class IC, class II, class III, and class IV antiarrhythmics. All questions were answered. Patient considering cardioversion to see how NSR makes him feel. If makes him feel better we will consider antiarrhythmic therapy. Patient and wife will reach out with decision.  - Increase metoprolol to 50 mg BID.  - Decrease apixaban to 2.5 mg BID given renal function and age. - Follow up with me in 3 months. 10/13/20  Patient presents for follow-up. He has no symptoms with his atrial fibrillation. He is tolerating his anticoagulation. Heart rates are largely well controlled. He does have some RVR however this is very rare. His average heart rates appear to be around 80 beats a minute. He is paced more than 40% of the time. No changes recommended at this time. Plan to follow-up patient and monitor clinically for signs of heart failure.   If any heart failure will consider BiV upgrade of patient. - Continue apixaban at 2.5 mg BID.  - Continue metoprolol 50 mg BID.  - Monitor for heart failure symptoms at which case we will order echocardiogram for LVEF and consider BiV upgrade of his device. - Follow up with EP NP in 1 year unless/until procedure/discussion required or PRN. 2. Hypertension. Stable. 3. 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 Abbott device being paced approximately 40% of the time. No heart failure symptoms. Continue to monitor for heart failure symptoms given elevated pacing percentage. - Increase metoprolol. Pacing rate may increase so will need to monitor for heart failure symptoms and decline of ejection fraction (LVEF normal currently). 10/13/20  - Plan as per above. RECOMMENDATIONS:  1. If you develop increase shortness of breath, fatigue, difficulty sleeping, difficulty breathing at night, or increased swelling to your lower extremities, call the office. 2. Continue remote device checks every 3 months. 3. Continue current medications as prescribed. 4. Follow up with EP NP in 1 year. QUALITY MEASURES  1. Tobacco Cessation Counseling: NA  2. Retake of BP if >140/90:   NA  3. Documentation to PCP/referring for new patient:  Sent to PCP at close of office visit  4. CAD patient on anti-platelet: Yes  5. CAD patient on STATIN therapy:  Yes  6. Patient with CHF and aFib on anticoagulation:  Yes     All questions and concerns were addressed to the patient/family. Alternatives to my treatment were discussed. This note was scribed in the presence of Armida Rodas MD by Rico Peters. Alicia Saravia RN. Dr. Gris Pearl MD  Electrophysiology  [facility]. [address] [facility]. [address]. [place]  Phone: [phone]  Fax: [phone]     NOTE: This report was transcribed using voice recognition software.  Every effort was made to ensure accuracy, however, inadvertent computerized transcription errors may be present. The scribe's documentation has been prepared under my direction and personally reviewed by me in its entirety. I confirm that the note above accurately reflects all work, physical examination, the discussion of treatments and procedures, and medical decision making performed by me. Susan August MD personally performed the services described in this documentation as scribed by nurse in my presence, and is both accurate and complete.     Electronically signed by Yoandy De Anda MD on 10/13/2020 at 10:09 PM

## 2020-10-13 ENCOUNTER — OFFICE VISIT (OUTPATIENT)
Dept: CARDIOLOGY CLINIC | Age: 85
End: 2020-10-13
Payer: MEDICARE

## 2020-10-13 ENCOUNTER — NURSE ONLY (OUTPATIENT)
Dept: CARDIOLOGY CLINIC | Age: 85
End: 2020-10-13
Payer: MEDICARE

## 2020-10-13 VITALS
DIASTOLIC BLOOD PRESSURE: 70 MMHG | SYSTOLIC BLOOD PRESSURE: 110 MMHG | HEIGHT: 67 IN | BODY MASS INDEX: 26.92 KG/M2 | HEART RATE: 80 BPM | OXYGEN SATURATION: 98 % | WEIGHT: 171.5 LBS

## 2020-10-13 PROCEDURE — 1036F TOBACCO NON-USER: CPT | Performed by: INTERNAL MEDICINE

## 2020-10-13 PROCEDURE — 1123F ACP DISCUSS/DSCN MKR DOCD: CPT | Performed by: INTERNAL MEDICINE

## 2020-10-13 PROCEDURE — 99214 OFFICE O/P EST MOD 30 MIN: CPT | Performed by: INTERNAL MEDICINE

## 2020-10-13 PROCEDURE — G8427 DOCREV CUR MEDS BY ELIG CLIN: HCPCS | Performed by: INTERNAL MEDICINE

## 2020-10-13 PROCEDURE — 93000 ELECTROCARDIOGRAM COMPLETE: CPT | Performed by: INTERNAL MEDICINE

## 2020-10-13 PROCEDURE — 4040F PNEUMOC VAC/ADMIN/RCVD: CPT | Performed by: INTERNAL MEDICINE

## 2020-10-13 PROCEDURE — 93279 PRGRMG DEV EVAL PM/LDLS PM: CPT | Performed by: INTERNAL MEDICINE

## 2020-10-13 PROCEDURE — G8417 CALC BMI ABV UP PARAM F/U: HCPCS | Performed by: INTERNAL MEDICINE

## 2020-10-13 PROCEDURE — G8484 FLU IMMUNIZE NO ADMIN: HCPCS | Performed by: INTERNAL MEDICINE

## 2020-10-13 NOTE — LETTER
[address]   Electrophysiology Consult Note              Date:  October 13, 2020  Patient name: Georgia Garcia  YOB: 1935    Chief Complaint:  3 Month Follow-Up; Atrial Fibrillation; and Other (device manangement)    Primary Care Physician:Jose De Jesus Wagner MD    HISTORY OF PRESENT ILLNESS: Georgia Garcia is a 82 y.o. male who presents for evaluation to establish care for atrial Fibrillation/pacemaker management and transmission. He is followed by my colleague Dr. Donald Sousa for interventional cardiology. On 5/4/20 he was seen by Dr Donald Sousa for his shortness of breath. A Lexiscan Stress Test was ordered, which he underwent on 5/12/20 which demonstrated an EF of 64% and a small mild defect at the apex at stress that reverses at rest which is consistent with ischemia. He had a Left Heart Cath on 5/22/20 which demonstrated mild/mod diffuse epicardial CAD, 50% stenosis of mid LAD and normal LVEF of 55-60%. His EKG on 5/22/20 demonstrated Atrial Fibrillation, HR of 96 BPM. He has a PMH of [place] single chamber PPM placed on 10/13/2017 by Dr Noreen Turner (Kelechi Coates), CAD, HTN, HLD and chronic AF. His device check 7/9/20 demonstrated predominantly AF with 1 event of AFIB RVR on 7/6/20 that lasted 2 seconds,  40%. His EKG 7/9/20 demonstrated AF HR 91 BPM. During this office visit his metoprolol was increased to 50 mg BID and his eliquis was decreased to 2.5mg BID due to age and kidney function. Interval history since last office visit:   His device check today 10/13/20 demonstrates  43%, 2 new AT/AF events on 8/3/20 with V rates 190-201 bpm. His EKG today 10/13/20 demonstrates AF at 95 bpm. Today 10/13/20 he reports he has occasion chest pain near his device. It only lasts a few minutes. He reports it does not radiate. Denies other associated symptoms.  He denies having any kind of cardiac associated symptoms on August 3rd, 2020 to correlate with fast rhythms on device check. He reports he is active and tolerates activity well. He reports he takes his prescriptions in the evening. He denies abnormal bleeding or bruising. Patient denies current edema, sob, palpitations, dizziness or syncope. Past Medical History:   has a past medical history of Atrial fibrillation ([address] Blind right eye, Chronic kidney disease, Hyperlipidemia, Hypertension, and Pneumonia. Past Surgical History:   has a past surgical history that includes Tonsillectomy; Appendectomy; Colonoscopy; eye surgery; Upper gastrointestinal endoscopy; joint replacement (2013); hernia repair; and pacemaker placement (10/13/2017). Allergies:  Sulfa antibiotics    Social History:   reports that he quit smoking about 45 years ago. He has a 10.00 pack-year smoking history. He has never used smokeless tobacco. He reports that he does not drink alcohol or use drugs. Family History: family history includes Asthma in an other family member; Heart Disease in his father and mother; Other in his mother. Home Medications:    Prior to Admission medications    Medication Sig Start Date End Date Taking?  Authorizing Provider   apixaban (ELIQUIS) 5 MG TABS tablet Take 0.5 tablets by mouth 2 times daily 8/13/20 11/11/20 Yes Pato Person MD   metoprolol succinate (TOPROL XL) 50 MG extended release tablet Take 1 tablet by mouth 2 times daily 7/9/20  Yes Yoandy Luz MD   apixaban [facility]) 2.5 MG TABS tablet Take 1 tablet by mouth 2 times daily 7/9/20  Yes Yoandy Luz MD   Cholecalciferol (VITAMIN D3 PO) Take 1,000 Units by mouth daily   Yes Historical Provider, MD   isosorbide mononitrate (IMDUR) 30 MG extended release tablet Take 1 tablet by mouth daily 6/10/20  Yes Serina Sanders MD   montelukast (SINGULAIR) 10 MG tablet TAKE 1 TABLET BY MOUTH EVERY DAY 12/11/19  Yes GUS Haskins - CNP   atorvastatin (LIPITOR) 20 MG tablet TAKE 1 TABLET BY MOUTH EVERY DAY 19  Yes Jacob Ta MD   tamsulosin (FLOMAX) 0.4 MG capsule Take 0.4 mg by mouth daily   Yes Historical Provider, MD   finasteride (PROSCAR) 5 MG tablet Take 5 mg by mouth daily. 3/4/15  Yes Historical Provider, MD   aspirin 81 MG tablet Take 81 mg by mouth daily. Yes Historical Provider, MD   omeprazole (PRILOSEC) 20 MG delayed release capsule TAKE 1 CAPSULE BY MOUTH 2 TIMES DAILY 9/11/20 10/11/20  Michael Sky APRN - CNP       REVIEW OF SYSTEMS:    All 14-point review of systems are completed and  pertinent positives are mentioned in the history of present illness. Other  systems are reviewed and are negative. Physical Examination:    /70   Pulse 80   Ht 5' 7\" (1.702 m)   Wt 171 lb 8 oz (77.8 kg)   SpO2 98%   BMI 26.86 kg/m²      Constitutional and General Appearance:    alert, cooperative, no distress and appears stated age  [de-identified]:    PERRLA, no cervical lymphadenopathy. No masses palpable. Normal oral  mucosa  Respiratory:  · Normal excursion and expansion without use of accessory muscles  · Resp Auscultation: Normal breath sounds without dullness or wheezing  Cardiovascular:  · The apical impulse is not displaced  · Irregular rate and rhythm without M/G/R  Abdomen:  · No masses or tenderness  · Bowel sounds present  Extremities:  ·  No Cyanosis or Clubbing  ·  Lower extremity edema: No  · Skin: Warm and dry  Neurological:  · Alert and oriented. · Moves all extremities well  · No abnormalities of mood, affect, memory, mentation, or behavior are noted    DATA:    ECG:  10/13/20: AF at 95 bpm    EC20: AFIB HR 91 BPM    EC20: AFIB HR 96 BPM    ECHO: 3/21/2019:  Summary   Normal left ventricular systolic function with an estimated ejection   fraction of 55-60%. No regional wall motion abnormalities are seen. Normal left ventricular diastolic filling pressure. The left atrium is mildly dilated. The right atrium is moderately dilated. Right ventricular systolic function is mildly reduced . The right ventricle is mildly enlarged. The ascending aorta is mildly dilated. 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 Mild mitral regurgitation. Moderate tricuspid regurgitation. Systolic pulmonary artery pressure (SPAP) estimated at 47 mmHg (RA pressure   15 mmHg), consistent with mild pulmonary hypertension. Pacemaker lead in right ventricle. IMPRESSION:    1. Permanent atrial fibrillation. 07/09/2020  Mr. Veronica Reardon is a pleasant 82year old male with a medical history significant for atrial fibrillation, tachy-bruce syndrome status post single chamber ([place]), and chronic renal insufficiency stage III who presents from home to establish care. Patient doing well. 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 Heart rates mostly well controlled during atrial fibrillation however still not as controlled as I'd like to see (averge up to 130 rarely). We discussed anticoagulation (NOAC and warfarin), rate control, and rhythm control, AVN + pacemaker. We reviewed risks and benefits of each approach. We discussed side effects of class IC, class II, class III, and class IV antiarrhythmics. All questions were answered. Patient considering cardioversion to see how NSR makes him feel. If makes him feel better we will consider antiarrhythmic therapy. Patient and wife will reach out with decision.  - Increase metoprolol to 50 mg BID.  - Decrease apixaban to 2.5 mg BID given renal function and age. - Follow up with me in 3 months. 10/13/20  Patient presents for follow-up. He has no symptoms with his atrial fibrillation. He is tolerating his anticoagulation. Heart rates are largely well controlled. He does have some RVR however this is very rare. His average heart rates appear to be around 80 beats a minute. He is paced more than 40% of the time. No changes recommended at this time. NOTE: This report was transcribed using voice recognition software. Every effort was made to ensure accuracy, however, inadvertent computerized transcription errors may be present. The scribe's documentation has been prepared under my direction and personally reviewed by me in its entirety. I confirm that the note above accurately reflects all work, physical examination, the discussion of treatments and procedures, and medical decision making performed by me. Lul Stubbs MD personally performed the services described in this documentation as scribed by nurse in my presence, and is both accurate and complete.     Electronically signed by Janes Good MD on 10/13/2020 at 10:09 PM

## 2020-10-20 ENCOUNTER — TELEPHONE (OUTPATIENT)
Dept: FAMILY MEDICINE CLINIC | Age: 85
End: 2020-10-20

## 2020-11-03 ENCOUNTER — VIRTUAL VISIT (OUTPATIENT)
Dept: FAMILY MEDICINE CLINIC | Age: 85
End: 2020-11-03
Payer: MEDICARE

## 2020-11-03 VITALS
HEIGHT: 67 IN | BODY MASS INDEX: 25.74 KG/M2 | WEIGHT: 164 LBS | DIASTOLIC BLOOD PRESSURE: 70 MMHG | SYSTOLIC BLOOD PRESSURE: 110 MMHG

## 2020-11-03 PROCEDURE — 4040F PNEUMOC VAC/ADMIN/RCVD: CPT | Performed by: FAMILY MEDICINE

## 2020-11-03 PROCEDURE — G0439 PPPS, SUBSEQ VISIT: HCPCS | Performed by: FAMILY MEDICINE

## 2020-11-03 PROCEDURE — 1123F ACP DISCUSS/DSCN MKR DOCD: CPT | Performed by: FAMILY MEDICINE

## 2020-11-03 ASSESSMENT — LIFESTYLE VARIABLES: HOW OFTEN DO YOU HAVE A DRINK CONTAINING ALCOHOL: 0

## 2020-11-03 ASSESSMENT — PATIENT HEALTH QUESTIONNAIRE - PHQ9
SUM OF ALL RESPONSES TO PHQ QUESTIONS 1-9: 0
2. FEELING DOWN, DEPRESSED OR HOPELESS: 0
1. LITTLE INTEREST OR PLEASURE IN DOING THINGS: 0
SUM OF ALL RESPONSES TO PHQ QUESTIONS 1-9: 0
SUM OF ALL RESPONSES TO PHQ9 QUESTIONS 1 & 2: 0
SUM OF ALL RESPONSES TO PHQ QUESTIONS 1-9: 0

## 2020-11-03 NOTE — PROGRESS NOTES
Medicare Annual Wellness Visit  Name: Abiel Albright Date: 11/3/2020   MRN: [id number] Sex: Male   Age: 82 y.o. Ethnicity: /   : 1935 Race: Jocelynn Villasenor is here for Medicare AWV    Screenings for behavioral, psychosocial and functional/safety risks, and cognitive dysfunction are all negative except as indicated below. These results, as well as other patient data from the [address] form, are documented in Flowsheets linked to this Encounter. Allergies   Allergen Reactions    Sulfa Antibiotics        Prior to Visit Medications    Medication Sig Taking? Authorizing Provider   atorvastatin (LIPITOR) 20 MG tablet TAKE 1 TABLET BY MOUTH EVERY DAY  Ayaan Cornejo MD   omeprazole (PRILOSEC) 20 MG delayed release capsule TAKE 1 CAPSULE BY MOUTH 2 TIMES DAILY  GUS Farley CNP   apixaban (ELIQUIS) 5 MG TABS tablet Take 0.5 tablets by mouth 2 times daily  Ayaan Cornejo MD   metoprolol succinate (TOPROL XL) 50 MG extended release tablet Take 1 tablet by mouth 2 times daily  Estrellita Padilla MD   Cholecalciferol (VITAMIN D3 PO) Take 1,000 Units by mouth daily  Historical Provider, MD   isosorbide mononitrate (IMDUR) 30 MG extended release tablet Take 1 tablet by mouth daily  Amina Nugent MD   montelukast (SINGULAIR) 10 MG tablet TAKE 1 TABLET BY MOUTH EVERY DAY  GUS Farley CNP   tamsulosin (FLOMAX) 0.4 MG capsule Take 0.4 mg by mouth daily  Historical Provider, MD   finasteride (PROSCAR) 5 MG tablet Take 5 mg by mouth daily. Historical Provider, MD   aspirin 81 MG tablet Take 81 mg by mouth daily.     Historical Provider, MD       Past Medical History:   Diagnosis Date    Atrial fibrillation ([address]     Blind right eye     Chronic kidney disease     Hyperlipidemia     Hypertension     Pneumonia        Past Surgical History:   Procedure Laterality Date    APPENDECTOMY      COLONOSCOPY      EYE SURGERY      HERNIA REPAIR Habits/Nutrition:  Health Habits/Nutrition  Do you exercise for at least 20 minutes 2-3 times per week?: (!) No  Have you lost any weight without trying in the past 3 months?: No  Do you eat fewer than 2 meals per day?: No  Have you seen a dentist within the past year?: (!) No(dentures)  Body mass index: (!) 25.68  Health Habits/Nutrition Interventions:  · Inadequate physical activity:  patient is not ready to increase his/her physical activity level at this time    Hearing/Vision:  No exam data present  Hearing/Vision  Do you or your family notice any trouble with your hearing?: No  Do you have difficulty driving, watching TV, or doing any of your daily activities because of your eyesight?: (!) Yes  Have you had an eye exam within the past year?: (!) No  Hearing/Vision Interventions:  · Hearing concerns:  patient declines any further evaluation/treatment for hearing issues  · Vision concerns:  patient encouraged to make appointment with his/her eye specialist    Personalized Preventive Plan   Current Health Maintenance Status  Immunization History   Administered Date(s) Administered    Influenza A (P4U8-16) Vaccine PF IM 11/19/2009    Pneumococcal Conjugate 13-valent ([id number]) 08/25/2015    Pneumococcal Conjugate 7-valent (Prevnar7) 02/27/2013    Pneumococcal Polysaccharide (Ftshnbrfg24) 03/09/2010, 02/27/2013, 06/26/2019        Health Maintenance   Topic Date Due    DTaP/Tdap/Td vaccine (1 - Tdap) 10/19/1956    Shingles Vaccine (1 of 2) 10/19/1987    Annual Wellness Visit (AWV)  05/29/2019    Flu vaccine (1) 09/01/2020    Lipid screen  05/22/2021    Potassium monitoring  05/22/2021    Creatinine monitoring  05/22/2021    Pneumococcal 65+ years Vaccine  Completed    Hepatitis A vaccine  Aged Out    Hepatitis B vaccine  Aged Out    Hib vaccine  Aged Out    Meningococcal (ACWY) vaccine  Aged Out     Recommendations for [employer] Due: see orders and patient instructions/AVS.  .   Recommended screening schedule for the next 5-10 years is provided to the patient in written form: see Patient Instructions/AVS.    IEdith LPN, 61/5/1911, performed the documented evaluation under the direct supervision of the attending physician. Muriel Braxton is a 82 y.o. male being evaluated by a Virtual Visit (video visit) encounter to address concerns as mentioned above. A caregiver was present when appropriate. Due to this being a TeleHealth encounter (During [place] [address] public health emergency), evaluation of the following organ systems was limited: Vitals/Constitutional/EENT/Resp/CV/GI//MS/Neuro/Skin/Heme-Lymph-Imm. Pursuant to the emergency declaration under the [address] [place] [address] authority and the [employer] Act, this Virtual Visit was conducted with patient's (and/or legal guardian's) consent, to reduce the patient's risk of exposure to COVID-19 and provide necessary medical care. The patient (and/or legal guardian) has also been advised to contact this office for worsening conditions or problems, and seek emergency medical treatment and/or call 911 if deemed necessary. Patient identification was verified at the start of the visit: Yes    Total time spent for this encounter: 10 minutes    Services were provided through a video synchronous discussion virtually to substitute for in-person clinic visit. Patient and provider were located at their individual homes. --Edith Davila LPN on 29/5/8797 at 75:11 AM    An electronic signature was used to authenticate this note. This encounter was performed under Shamika alejandre MDs, direct supervision, 11/3/2020.

## 2020-11-03 NOTE — PATIENT INSTRUCTIONS
Personalized Preventive Plan for Peng Landa - 11/3/2020  Medicare offers a range of preventive health benefits. Some of the tests and screenings are paid in full while other may be subject to a deductible, co-insurance, and/or copay. Some of these benefits include a comprehensive review of your medical history including lifestyle, illnesses that may run in your family, and various assessments and screenings as appropriate. After reviewing your medical record and screening and assessments performed today your provider may have ordered immunizations, labs, imaging, and/or referrals for you. A list of these orders (if applicable) as well as your Preventive Care list are included within your After Visit Summary for your review. Other Preventive Recommendations:    · A preventive eye exam performed by an eye specialist is recommended every 1-2 years to screen for glaucoma; cataracts, macular degeneration, and other eye disorders. · A preventive dental visit is recommended every 6 months. · Try to get at least 150 minutes of exercise per week or 10,000 steps per day on a pedometer . · Order or download the FREE \"Exercise & Physical Activity: Your Everyday Guide\" from The [employer]. Call 6-992.624.3162 or search [employer] online. · You need 2309-7953 mg of calcium and 4503-1295 IU of vitamin D per day. It is possible to meet your calcium requirement with diet alone, but a vitamin D supplement is usually necessary to meet this goal.  · When exposed to the sun, use a sunscreen that protects against both UVA and UVB radiation with an SPF of 30 or greater. Reapply every 2 to 3 hours or after sweating, drying off with a towel, or swimming. · Always wear a seat belt when traveling in a car. Always wear a helmet when riding a bicycle or motorcycle.     Keep Your Memory Kayden Keel       Many factors can affect your ability to remembera hectic lifestyle, aging, stress, chronic disease, and certain medicines. But, there are steps you can take to sharpen your mind and help preserve your memory. Challenge Your Brain   Regularly challenging your mind may help keeps it in top shape. Good mental exercises include:   Crossword puzzlesUse a dictionary if you need it; you will learn more that way. Brainteasers Try some! Crafts, such as wood working and sewing   Hobbies, such as gardening and building model airplanes   SocializingVisit old friends or join groups to meet new ones. Reading   Learning a new language   Taking a class, whether it be art history or nancy chi   TravelingExperience the food, history, and culture of your destination   Learning to use a computer   Going to museums, the theater, or thought-provoking movies   Changing things in your daily life, such as reversing your pattern in the grocery store or brushing your teeth using your nondominant hand   Use Memory Aids   There is no need to remember every detail on your own. These memory aids can help:   Calendars and day planners   Electronic organizers to store all sorts of helpful informationThese devices can \"beep\" to remind you of appointments. A book of days to record birthdays, anniversaries, and other occasions that occur on the same date every year   Detailed \"to-do\" lists and strategically placed sticky notes   Quick \"study\" sessionsBefore a gathering, review who will be there so their names will be fresh in your mind. Establish routinesFor example, keep your keys, wallet, and umbrella in the same place all the time or take medicine with your 8:00 AM glass of juice   Live a Healthy Life   Many actions that will keep your body strong will do the same for your mind. For example:   Talk to Your Doctor About Herbs and Supplements    Malnutrition and vitamin deficiencies can impair your mental function. For example, vitamin B12 deficiency can cause a range of symptoms, including confusion.  But, what if your

## 2021-01-05 ENCOUNTER — OFFICE VISIT (OUTPATIENT)
Dept: FAMILY MEDICINE CLINIC | Age: 86
End: 2021-01-05
Payer: MEDICARE

## 2021-01-05 VITALS
WEIGHT: 174.2 LBS | TEMPERATURE: 97 F | DIASTOLIC BLOOD PRESSURE: 74 MMHG | HEIGHT: 67 IN | BODY MASS INDEX: 27.34 KG/M2 | OXYGEN SATURATION: 95 % | SYSTOLIC BLOOD PRESSURE: 112 MMHG | HEART RATE: 74 BPM

## 2021-01-05 DIAGNOSIS — I25.10 CORONARY ARTERY DISEASE INVOLVING NATIVE CORONARY ARTERY OF NATIVE HEART WITHOUT ANGINA PECTORIS: ICD-10-CM

## 2021-01-05 DIAGNOSIS — N18.30 STAGE 3 CHRONIC KIDNEY DISEASE, UNSPECIFIED WHETHER STAGE 3A OR 3B CKD (HCC): ICD-10-CM

## 2021-01-05 DIAGNOSIS — I48.11 LONGSTANDING PERSISTENT ATRIAL FIBRILLATION (HCC): ICD-10-CM

## 2021-01-05 DIAGNOSIS — I10 ESSENTIAL HYPERTENSION: Primary | ICD-10-CM

## 2021-01-05 DIAGNOSIS — E78.5 HYPERLIPIDEMIA, UNSPECIFIED HYPERLIPIDEMIA TYPE: ICD-10-CM

## 2021-01-05 PROCEDURE — G8427 DOCREV CUR MEDS BY ELIG CLIN: HCPCS | Performed by: FAMILY MEDICINE

## 2021-01-05 PROCEDURE — 1036F TOBACCO NON-USER: CPT | Performed by: FAMILY MEDICINE

## 2021-01-05 PROCEDURE — G8417 CALC BMI ABV UP PARAM F/U: HCPCS | Performed by: FAMILY MEDICINE

## 2021-01-05 PROCEDURE — 1123F ACP DISCUSS/DSCN MKR DOCD: CPT | Performed by: FAMILY MEDICINE

## 2021-01-05 PROCEDURE — G8510 SCR DEP NEG, NO PLAN REQD: HCPCS | Performed by: FAMILY MEDICINE

## 2021-01-05 PROCEDURE — 99214 OFFICE O/P EST MOD 30 MIN: CPT | Performed by: FAMILY MEDICINE

## 2021-01-05 PROCEDURE — G8484 FLU IMMUNIZE NO ADMIN: HCPCS | Performed by: FAMILY MEDICINE

## 2021-01-05 PROCEDURE — 4040F PNEUMOC VAC/ADMIN/RCVD: CPT | Performed by: FAMILY MEDICINE

## 2021-01-05 SDOH — ECONOMIC STABILITY: INCOME INSECURITY: HOW HARD IS IT FOR YOU TO PAY FOR THE VERY BASICS LIKE FOOD, HOUSING, MEDICAL CARE, AND HEATING?: NOT HARD AT ALL

## 2021-01-05 SDOH — ECONOMIC STABILITY: TRANSPORTATION INSECURITY
IN THE PAST 12 MONTHS, HAS LACK OF TRANSPORTATION KEPT YOU FROM MEETINGS, WORK, OR FROM GETTING THINGS NEEDED FOR DAILY LIVING?: NO

## 2021-01-05 SDOH — ECONOMIC STABILITY: TRANSPORTATION INSECURITY
IN THE PAST 12 MONTHS, HAS THE LACK OF TRANSPORTATION KEPT YOU FROM MEDICAL APPOINTMENTS OR FROM GETTING MEDICATIONS?: NO

## 2021-01-05 ASSESSMENT — ENCOUNTER SYMPTOMS: SHORTNESS OF BREATH: 0

## 2021-01-05 ASSESSMENT — PATIENT HEALTH QUESTIONNAIRE - PHQ9
SUM OF ALL RESPONSES TO PHQ QUESTIONS 1-9: 0
SUM OF ALL RESPONSES TO PHQ QUESTIONS 1-9: 0

## 2021-01-05 NOTE — PROGRESS NOTES
Chief Complaint   Patient presents with    Hypertension    Hyperlipidemia       HPI:  Betzaida Raines is a 82 y.o. (: 1935) here today   for   Hypertension  This is a chronic problem. The current episode started more than 1 year ago. Pertinent negatives include no chest pain, headaches or shortness of breath. Risk factors for coronary artery disease include dyslipidemia and male gender. Past treatments include beta blockers. There are no compliance problems. Hyperlipidemia  This is a chronic problem. The current episode started more than 1 year ago. Pertinent negatives include no chest pain or shortness of breath. Current antihyperlipidemic treatment includes statins. There are no compliance problems. Risk factors for coronary artery disease include dyslipidemia, hypertension and male sex. No recent lightheadedness or syncope. Legs feel weak at times. No give way weakness. Worse when first getting up. Worse after sitting. No sig back pain.      eliquis dose dec to 2.5mg bid at last cardio appt. Seen last on 10/13/20. Pacer check at that time. Next cardio appt later this mo. Had an angiogram .  50% blockage at that time. No cp noted. Patient's medications, allergies, past medical, surgical, social and family histories were reviewed and updated as appropriate. ROS:  Review of Systems   Respiratory: Negative for shortness of breath. Cardiovascular: Negative for chest pain. Neurological: Negative for headaches.            LDL Calculated (mg/dL)   Date Value   2020 73       Past Medical History:   Diagnosis Date    Atrial fibrillation (HCC)     Blind right eye     Chronic kidney disease     Hyperlipidemia     Hypertension     Pneumonia        Family History   Problem Relation Age of Onset    Heart Disease Mother     Other Mother     Heart Disease Father     Asthma Other        Social History     Socioeconomic History    Marital status:      Spouse name: Not on file    Number of children: Not on file    Years of education: Not on file    Highest education level: Not on file   Occupational History    Not on file   Social Needs    Financial resource strain: Not hard at all    Food insecurity     Worry: Never true     Inability: Never true   [employer] needs     Medical: No     Non-medical: No   Tobacco Use    Smoking status: Former Smoker     Packs/day: 1.00     Years: 10.00     Pack years: 10.00     Quit date: 1975     Years since quittin.8    Smokeless tobacco: Never Used    Tobacco comment: Quit 40 years ago   Substance and Sexual Activity    Alcohol use: No     Alcohol/week: 0.0 standard drinks    Drug use: No    Sexual activity: Yes     Partners: Female   Lifestyle    Physical activity     Days per week: Not on file     Minutes per session: Not on file    Stress: Not on file   Relationships    Social connections     Talks on phone: Not on file     Gets together: Not on file     Attends Gnosticist service: Not on file     Active member of club or organization: Not on file     Attends meetings of clubs or organizations: Not on file     Relationship status: Not on file    Intimate partner violence     Fear of current or ex partner: Not on file     Emotionally abused: Not on file     Physically abused: Not on file     Forced sexual activity: Not on file   Other Topics Concern    Not on file   Social History Narrative    Not on file       Prior to Visit Medications    Medication Sig Taking?  Authorizing Provider   apixaban (ELIQUIS) 5 MG TABS tablet Take 2.5 mg by mouth 2 times daily Yes Historical Provider, MD   atorvastatin (LIPITOR) 20 MG tablet TAKE 1 TABLET BY MOUTH EVERY DAY Yes Kaye Perry MD   omeprazole (PRILOSEC) 20 MG delayed release capsule TAKE 1 CAPSULE BY MOUTH 2 TIMES DAILY Yes GUS Black CNP   metoprolol succinate (TOPROL XL) 50 MG extended release tablet Take 1 tablet by mouth 2 times daily Yes Ankit Rash Lakeshia Sharp MD   Cholecalciferol (VITAMIN D3 PO) Take 1,000 Units by mouth daily Yes Historical Provider, MD   isosorbide mononitrate (IMDUR) 30 MG extended release tablet Take 1 tablet by mouth daily Yes Anna Kim MD   montelukast (SINGULAIR) 10 MG tablet TAKE 1 TABLET BY MOUTH EVERY DAY Yes GUS Barlow - CNP   tamsulosin (FLOMAX) 0.4 MG capsule Take 0.4 mg by mouth daily Yes Historical Provider, MD   finasteride (PROSCAR) 5 MG tablet Take 5 mg by mouth daily. Yes Historical Provider, MD   aspirin 81 MG tablet Take 81 mg by mouth daily. Yes Historical Provider, MD       Allergies   Allergen Reactions    Sulfa Antibiotics        OBJECTIVE:    /74   Pulse 74   Temp 97 °F (36.1 °C)   Ht 5' 7\" (1.702 m)   Wt 174 lb 3.2 oz (79 kg)   SpO2 95%   BMI 27.28 kg/m²     BP Readings from Last 2 Encounters:   01/05/21 112/74   11/03/20 110/70       Wt Readings from Last 3 Encounters:   01/05/21 174 lb 3.2 oz (79 kg)   11/03/20 164 lb (74.4 kg)   10/13/20 171 lb 8 oz (77.8 kg)       Physical Exam  Constitutional:       Appearance: He is well-developed. HENT:      Head: Normocephalic and atraumatic. Right Ear: Hearing normal.      Left Ear: Hearing normal.   Eyes:      Conjunctiva/sclera: Conjunctivae normal.   Neck:      Trachea: No tracheal deviation. Cardiovascular:      Rate and Rhythm: Normal rate. Rhythm irregularly irregular. Heart sounds: No murmur. No friction rub. No gallop. Pulmonary:      Effort: Pulmonary effort is normal.      Breath sounds: Normal breath sounds. Abdominal:      Palpations: Abdomen is soft. Tenderness: There is no abdominal tenderness. Musculoskeletal:      Right lower leg: No edema. Left lower leg: No edema. Skin:     General: Skin is warm and dry. Neurological:      Mental Status: He is alert and oriented to person, place, and time.    Psychiatric:         Mood and Affect: Mood normal.         Behavior: Behavior normal. ASSESSMENT/PLAN:    1. Essential hypertension  The current medical regimen is effective;  continue present plan and medications. 2. Hyperlipidemia, unspecified hyperlipidemia type  Rpt labs w/ next draw. 3. Stage 3 chronic kidney disease, unspecified whether stage 3a or 3b CKD  Plans on labs and f/u w/ nephro soon    4. Longstanding persistent atrial fibrillation ([facility] [address]  Reviewed note from EP. No sig changes. 5.  CAD  F/u w/ cardio as sched. Reviewed prior angiogram note.

## 2021-01-12 ENCOUNTER — NURSE ONLY (OUTPATIENT)
Dept: CARDIOLOGY CLINIC | Age: 86
End: 2021-01-12
Payer: MEDICARE

## 2021-01-12 DIAGNOSIS — I48.11 LONGSTANDING PERSISTENT ATRIAL FIBRILLATION (HCC): ICD-10-CM

## 2021-01-12 DIAGNOSIS — Z95.0 PACEMAKER: ICD-10-CM

## 2021-01-12 PROCEDURE — 93296 REM INTERROG EVL PM/IDS: CPT | Performed by: INTERNAL MEDICINE

## 2021-01-12 PROCEDURE — 93294 REM INTERROG EVL PM/LDLS PM: CPT | Performed by: INTERNAL MEDICINE

## 2021-01-12 NOTE — LETTER
[address] [facility] [phone]  [address] [phone]    Pacemaker/Defibrillator Clinic          01/12/21        [address] [place], [place] [address] [employer] [place]        Dear Sherif Oconnor    This letter is to inform you that we received the transmission from your monitor at home that checks your implanted heart device. The next date your monitor will automatically transmit will be 4/13/2021. If your report needs attention we will notify you. Your device and monitor are wireless and most transmit cellularly, but please periodically check your monitor is still plugged in to the electrical outlet. If you still use the telephone land line to send please ensure the connection to the phone uma is secure. This will help to ensure successful automatic transmissions in the future. Also, the monitor needs to be close to you while sleeping at night. Please be aware that the remote device transmission sites are periodically monitored only during regular business hours during which simultaneous in-office device clinics are being run. If your transmission requires attention, we will contact you as soon as possible. Thank you.             [facility]

## 2021-01-12 NOTE — PROGRESS NOTES
Merlin Remote transmission of pacemaker and/or ICD, or implanted heart monitor shows normal cardiac device function. Hx AF (oac, toprol xl).  48%. No new V arrhythmias. Follow up in 3 months via [address] [place] [address].

## 2021-01-22 ENCOUNTER — OFFICE VISIT (OUTPATIENT)
Dept: CARDIOLOGY CLINIC | Age: 86
End: 2021-01-22
Payer: MEDICARE

## 2021-01-22 VITALS
DIASTOLIC BLOOD PRESSURE: 90 MMHG | HEART RATE: 79 BPM | SYSTOLIC BLOOD PRESSURE: 136 MMHG | BODY MASS INDEX: 27.7 KG/M2 | OXYGEN SATURATION: 98 % | HEIGHT: 67 IN | WEIGHT: 176.5 LBS

## 2021-01-22 DIAGNOSIS — I25.10 CORONARY ARTERY DISEASE INVOLVING NATIVE CORONARY ARTERY OF NATIVE HEART WITHOUT ANGINA PECTORIS: Primary | ICD-10-CM

## 2021-01-22 DIAGNOSIS — E78.5 HYPERLIPIDEMIA, UNSPECIFIED HYPERLIPIDEMIA TYPE: ICD-10-CM

## 2021-01-22 DIAGNOSIS — I10 ESSENTIAL HYPERTENSION: ICD-10-CM

## 2021-01-22 PROCEDURE — G8484 FLU IMMUNIZE NO ADMIN: HCPCS | Performed by: INTERNAL MEDICINE

## 2021-01-22 PROCEDURE — G8427 DOCREV CUR MEDS BY ELIG CLIN: HCPCS | Performed by: INTERNAL MEDICINE

## 2021-01-22 PROCEDURE — 1123F ACP DISCUSS/DSCN MKR DOCD: CPT | Performed by: INTERNAL MEDICINE

## 2021-01-22 PROCEDURE — 99213 OFFICE O/P EST LOW 20 MIN: CPT | Performed by: INTERNAL MEDICINE

## 2021-01-22 PROCEDURE — G8417 CALC BMI ABV UP PARAM F/U: HCPCS | Performed by: INTERNAL MEDICINE

## 2021-01-22 PROCEDURE — 1036F TOBACCO NON-USER: CPT | Performed by: INTERNAL MEDICINE

## 2021-01-22 PROCEDURE — 4040F PNEUMOC VAC/ADMIN/RCVD: CPT | Performed by: INTERNAL MEDICINE

## 2021-01-22 NOTE — PROGRESS NOTES
15[address] [facility]   Cardiovascular Evaluation    PATIENT: Jordy Tran  DATE: 2021  MRN: [id number]  CSN: [id number]  : 1935    Primary Care Doctor/Referring provider: Kannan Bales MD    Reason for evaluation/Chief complaint:   6 Month Follow-Up, Chest Pain, Coronary Artery Disease, Hyperlipidemia, and Hypertension      Subjective:    History of present illness on initial date of evaluation:   Jordy Tran is a 82 y.o. patient who presents for follow up. Today he reports that he has been feeling occasional left sided chest pain. This occurs both at rest and on exertion. It has been ongoing for a few months and has not worsened in severity. Mervat Betts has been taking his medications as prescribed without known side effects. Patient Active Problem List   Diagnosis    Pneumonia    Abnormal chest CT    Cough syncope    Tracheobronchomalacia    Hyperlipidemia    Hypertension    Chronic kidney disease, stage III (moderate)    A-fib (HCC)    Pacemaker, [facility] placed in [facility] Abnormal echocardiogram    Dizziness    SOB (shortness of breath)    Abnormal cardiovascular stress test    Coronary artery disease involving native coronary artery of native heart without angina pectoris, 2020 abnormal stress test. [facility] mild LAD disease          Cardiac Testing: I have reviewed the findings below. EKG:  ECHO:   STRESS TEST:  CATH:  BYPASS:  VASCULAR:    Past Medical History:   has a past medical history of Atrial fibrillation ([address] Blind right eye, Chronic kidney disease, Hyperlipidemia, Hypertension, and Pneumonia. Surgical History:   has a past surgical history that includes Tonsillectomy; Appendectomy; Colonoscopy; eye surgery; Upper gastrointestinal endoscopy; joint replacement (); hernia repair; and pacemaker placement (10/13/2017). Social History:   reports that he quit smoking about 45 years ago. He has a 10.00 pack-year smoking history.  He has never used smokeless tobacco. He reports that he does not drink alcohol or use drugs. Family History:  No evidence for sudden cardiac death or premature CAD    Medications:  Reviewed and are listed in nursing record. and/or listed below  Outpatient Medications:  Prior to Admission medications    Medication Sig Start Date End Date Taking? Authorizing Provider   apixaban (ELIQUIS) 5 MG TABS tablet Take 2.5 mg by mouth 2 times daily   Yes Historical Provider, MD   atorvastatin (LIPITOR) 20 MG tablet TAKE 1 TABLET BY MOUTH EVERY DAY 11/2/20  Yes Wing Bianca MD   metoprolol succinate (TOPROL XL) 50 MG extended release tablet Take 1 tablet by mouth 2 times daily 7/9/20  Yes Tomi Guerrero., MD   Cholecalciferol (VITAMIN D3 PO) Take 1,000 Units by mouth daily   Yes Historical Provider, MD   isosorbide mononitrate (IMDUR) 30 MG extended release tablet Take 1 tablet by mouth daily 6/10/20  Yes Sarath Ramachandran MD   montelukast (SINGULAIR) 10 MG tablet TAKE 1 TABLET BY MOUTH EVERY DAY 12/11/19  Yes GUS Frazier CNP   tamsulosin (FLOMAX) 0.4 MG capsule Take 0.4 mg by mouth daily   Yes Historical Provider, MD   finasteride (PROSCAR) 5 MG tablet Take 5 mg by mouth daily. 3/4/15  Yes Historical Provider, MD   aspirin 81 MG tablet Take 81 mg by mouth daily. Yes Historical Provider, MD   omeprazole (PRILOSEC) 20 MG delayed release capsule TAKE 1 CAPSULE BY MOUTH 2 TIMES DAILY 9/11/20 1/5/21  GUS Frazier CNP       In-patient schedule medications:        Infusion Medications: Allergies:  Sulfa antibiotics     Review of Systems:   All 14 point review of symptoms completed. Pertinent positives identified in the HPI, all other review of symptoms findings as below.      Review of Systems - History obtained from the patient  General ROS: negative for - chills, fever or night sweats  Psychological ROS: negative for - disorientation or hallucinations  Ophthalmic ROS: negative for - dry eyes, eye pain or loss of vision  ENT ROS: negative for - nasal discharge or sore throat  Allergy and Immunology ROS: negative for - hives or itchy/watery eyes  Hematological and Lymphatic ROS: negative for - jaundice or night sweats  Endocrine ROS: negative for - mood swings or temperature intolerance  Breast ROS: deferred  Respiratory ROS: negative for - hemoptysis or stridor  Cardiovascular ROS: negative for - chest pain, dyspnea on exertion or palpitations  Gastrointestinal ROS: no abdominal pain, change in bowel habits, or black or bloody stools  Genito-Urinary ROS: no dysuria, trouble voiding, or hematuria  Musculoskeletal ROS: negative for - gait disturbance, joint pain or joint stiffness  Neurological ROS: negative for - seizures or speech problems  Dermatological ROS: negative for - rash or skin lesion changes      Physical Examination:    BP (!) 136/90   Pulse 79   Ht 5' 7\" (1.702 m)   Wt 176 lb 8 oz (80.1 kg)   SpO2 98%   BMI 27.64 kg/m²   BP (!) 136/90   Pulse 79   Ht 5' 7\" (1.702 m)   Wt 176 lb 8 oz (80.1 kg)   SpO2 98%   BMI 27.64 kg/m²    Weight: 176 lb 8 oz (80.1 kg)     Wt Readings from Last 3 Encounters:   01/22/21 176 lb 8 oz (80.1 kg)   01/05/21 174 lb 3.2 oz (79 kg)   11/03/20 164 lb (74.4 kg)     No intake or output data in the 24 hours ending 01/22/21 1127    General Appearance:  Alert, cooperative, no distress, appears stated age   Head:  Normocephalic, without obvious abnormality, atraumatic   Eyes:  PERRL, conjunctiva/corneas clear       Nose: Nares normal, no drainage or sinus tenderness   Throat: Lips, mucosa, and tongue normal   Neck: Supple, symmetrical, trachea midline, no adenopathy, thyroid: not enlarged, symmetric, no tenderness/mass/nodules, no carotid bruit or JVD       Lungs:   Clear to auscultation bilaterally, respirations unlabored   Chest Wall:  No tenderness or deformity   Heart:  Regular rhythm and normal rate; S1, S2 are normal; no murmur noted; no rub or gallop   Abdomen: Soft, non-tender, bowel sounds active all four quadrants,  no masses, no organomegaly           Extremities: Extremities normal, atraumatic, no cyanosis or edema   Pulses: 2+ and symmetric   Skin: Skin color, texture, turgor normal, no rashes or lesions   Pysch: Normal mood and affect   Neurologic: Normal gross motor and sensory exam.         Labs  No results for input(s): WBC, HGB, HCT, MCV, PLT in the last 72 hours. No results for input(s): CREATININE, BUN, NA, K, CL, CO2 in the last 72 hours. No results for input(s): INR, PROTIME in the last 72 hours. No results for input(s): TROPONINI in the last 72 hours. Invalid input(s): PRO-BNP  No results for input(s): CHOL, HDL in the last 72 hours. Invalid input(s): LDL, TG      Imaging:  I have reviewed the below testing personally and my interpretation is below. EKG:  CXR:      Assessment:  82 y.o. patient with:  Problem List Items Addressed This Visit     Hypertension    Hyperlipidemia    Coronary artery disease involving native coronary artery of native heart without angina pectoris, 5/2020 abnormal stress test. [facility] mild LAD disease  - Primary          Plan:  1. I recommend that the patient continue their currently prescribed medications. Their drug modifiable risk factors appear to be well controlled. I will continue to address the need/dosing of medications in future visits. 2. Monitor your chest pain. If this worsens before your next appointment, call our office to discuss. 3. Follow up with me in 3 months in [facility] ([facility] This note was scribed in the presence of Dr. Dorie Bernheim MD by Cooper Newton RN.     I, Dr. Dorie Bernheim, personally performed the services described in this documentation, as scribed by the above signed scribe in my presence. It is both accurate and complete to my knowledge.  I agree with the details independently gathered by the clinical support staff, while the remaining scribed note accurately describes my personal service to the patient.     Christel Abbott MD, [address], [place], [place]  [phone] [facility] office  [phone] [facility]  1/22/2021  11:27 AM

## 2021-01-22 NOTE — PATIENT INSTRUCTIONS
Plan:  1. I recommend that the patient continue their currently prescribed medications. Their drug modifiable risk factors appear to be well controlled. I will continue to address the need/dosing of medications in future visits. 2. Monitor your chest pain. If this worsens before your next appointment, call our office to discuss. 3. Follow up with me in 3 months in [place].

## 2021-02-01 RX ORDER — MONTELUKAST SODIUM 10 MG/1
TABLET ORAL
Qty: 90 TABLET | Refills: 3 | Status: SHIPPED | OUTPATIENT
Start: 2021-02-01 | End: 2021-07-09 | Stop reason: SINTOL

## 2021-03-02 RX ORDER — BISOPROLOL FUMARATE 10 MG/1
20 TABLET ORAL DAILY
Qty: 90 TABLET | Refills: 2 | Status: SHIPPED | OUTPATIENT
Start: 2021-03-02 | End: 2021-04-19

## 2021-04-13 ENCOUNTER — NURSE ONLY (OUTPATIENT)
Dept: CARDIOLOGY CLINIC | Age: 86
End: 2021-04-13
Payer: MEDICARE

## 2021-04-13 DIAGNOSIS — Z95.0 PACEMAKER: ICD-10-CM

## 2021-04-13 DIAGNOSIS — I48.11 LONGSTANDING PERSISTENT ATRIAL FIBRILLATION (HCC): ICD-10-CM

## 2021-04-13 NOTE — PROGRESS NOTES
[employer] Remote transmission of single chamber pacemaker  shows normal cardiac device function. Hx AF (oac, toprol xl).  46%. No new V arrhythmias. Follow up in 3 months via [address] [place] [address].

## 2021-04-15 PROCEDURE — 93296 REM INTERROG EVL PM/IDS: CPT | Performed by: INTERNAL MEDICINE

## 2021-04-15 PROCEDURE — 93294 REM INTERROG EVL PM/LDLS PM: CPT | Performed by: INTERNAL MEDICINE

## 2021-04-19 ENCOUNTER — OFFICE VISIT (OUTPATIENT)
Dept: FAMILY MEDICINE CLINIC | Age: 86
End: 2021-04-19
Payer: MEDICARE

## 2021-04-19 VITALS
HEIGHT: 67 IN | WEIGHT: 170 LBS | OXYGEN SATURATION: 96 % | HEART RATE: 86 BPM | DIASTOLIC BLOOD PRESSURE: 86 MMHG | BODY MASS INDEX: 26.68 KG/M2 | SYSTOLIC BLOOD PRESSURE: 138 MMHG

## 2021-04-19 DIAGNOSIS — R19.7 DIARRHEA, UNSPECIFIED TYPE: Primary | ICD-10-CM

## 2021-04-19 DIAGNOSIS — J06.9 VIRAL URI: ICD-10-CM

## 2021-04-19 PROCEDURE — 4040F PNEUMOC VAC/ADMIN/RCVD: CPT | Performed by: FAMILY MEDICINE

## 2021-04-19 PROCEDURE — 1123F ACP DISCUSS/DSCN MKR DOCD: CPT | Performed by: FAMILY MEDICINE

## 2021-04-19 PROCEDURE — 99213 OFFICE O/P EST LOW 20 MIN: CPT | Performed by: FAMILY MEDICINE

## 2021-04-19 PROCEDURE — G8427 DOCREV CUR MEDS BY ELIG CLIN: HCPCS | Performed by: FAMILY MEDICINE

## 2021-04-19 PROCEDURE — 1036F TOBACCO NON-USER: CPT | Performed by: FAMILY MEDICINE

## 2021-04-19 PROCEDURE — G8417 CALC BMI ABV UP PARAM F/U: HCPCS | Performed by: FAMILY MEDICINE

## 2021-04-19 RX ORDER — FLUTICASONE PROPIONATE 50 MCG
2 SPRAY, SUSPENSION (ML) NASAL DAILY
Qty: 1 BOTTLE | Refills: 3 | Status: SHIPPED | OUTPATIENT
Start: 2021-04-19 | End: 2022-02-01 | Stop reason: SDUPTHER

## 2021-04-19 RX ORDER — DIPHENOXYLATE HYDROCHLORIDE AND ATROPINE SULFATE 2.5; .025 MG/1; MG/1
1 TABLET ORAL 4 TIMES DAILY PRN
Qty: 20 TABLET | Refills: 0 | Status: SHIPPED | OUTPATIENT
Start: 2021-04-19 | End: 2021-04-29

## 2021-04-19 RX ORDER — DICYCLOMINE HYDROCHLORIDE 10 MG/1
10 CAPSULE ORAL
Qty: 45 CAPSULE | Refills: 0 | Status: SHIPPED | OUTPATIENT
Start: 2021-04-19 | End: 2021-07-09

## 2021-04-19 ASSESSMENT — ENCOUNTER SYMPTOMS
VOMITING: 0
DIARRHEA: 1
COUGH: 1
ABDOMINAL PAIN: 1
SINUS PAIN: 0
SINUS PRESSURE: 0

## 2021-04-19 NOTE — PROGRESS NOTES
DAY Yes Iliana Castellano, APRN - CNP   apixaban (ELIQUIS) 5 MG TABS tablet Take 2.5 mg by mouth 2 times daily Yes Historical Provider, MD   atorvastatin (LIPITOR) 20 MG tablet TAKE 1 TABLET BY MOUTH EVERY DAY Yes Juli Mcmillan MD   omeprazole (PRILOSEC) 20 MG delayed release capsule TAKE 1 CAPSULE BY MOUTH 2 TIMES DAILY Yes Iliana Castellano APRN - CNP   metoprolol succinate (TOPROL XL) 50 MG extended release tablet Take 1 tablet by mouth 2 times daily Yes Tierney Gaston MD   Cholecalciferol (VITAMIN D3 PO) Take 1,000 Units by mouth daily Yes Historical Provider, MD   isosorbide mononitrate (IMDUR) 30 MG extended release tablet Take 1 tablet by mouth daily Yes Eric Kiser MD   tamsulosin (FLOMAX) 0.4 MG capsule Take 0.4 mg by mouth daily Yes Historical Provider, MD   finasteride (PROSCAR) 5 MG tablet Take 5 mg by mouth daily. Yes Historical Provider, MD   aspirin 81 MG tablet Take 81 mg by mouth daily. Yes Historical Provider, MD       Allergies   Allergen Reactions    Sulfa Antibiotics        OBJECTIVE:    /86   Pulse 86   Ht 5' 7\" (1.702 m)   Wt 170 lb (77.1 kg)   SpO2 96%   BMI 26.63 kg/m²     BP Readings from Last 2 Encounters:   04/19/21 138/86   01/22/21 (!) 136/90       Wt Readings from Last 3 Encounters:   04/19/21 170 lb (77.1 kg)   01/22/21 176 lb 8 oz (80.1 kg)   01/05/21 174 lb 3.2 oz (79 kg)       Physical Exam  Constitutional:       Appearance: Normal appearance. HENT:      Head: Normocephalic and atraumatic. 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 Cardiovascular:      Rate and Rhythm: Normal rate. Rhythm irregular. Pulmonary:      Effort: Pulmonary effort is normal.      Breath sounds: Normal breath sounds. Abdominal:      Palpations: Abdomen is soft. Musculoskeletal:      Right lower leg: No edema. Left lower leg: No edema. Skin:     General: Skin is warm and dry. Neurological:      General: No focal deficit present.       Mental Status: He is alert and oriented to person, place, and time. Psychiatric:         Mood and Affect: Mood normal.         Behavior: Behavior normal.           ASSESSMENT/PLAN:     1. Diarrhea, unspecified type  Suspect viral.  No fevers. No n/v.  Prn meds as below. [place] diet. Probiotic. Consider labs and/or stool studies if ongoing issues. - diphenoxylate-atropine (DIPHENATOL) 2.5-0.025 MG per tablet; Take 1 tablet by mouth 4 times daily as needed for Diarrhea for up to 10 days. Dispense: 20 tablet; Refill: 0  - dicyclomine (BENTYL) 10 MG capsule; Take 1 capsule by mouth 3 times daily (before meals)  Dispense: 45 capsule; Refill: 0    2. Viral URI  Would not recommend abx given diarrhea. Trial of flonase. Symptomatic tx.   - fluticasone (FLONASE) 50 MCG/ACT nasal spray; 2 sprays by Nasal route daily  Dispense: 1 Bottle;  Refill: 3

## 2021-04-20 NOTE — PROGRESS NOTES
[address] [facility]   Cardiovascular Evaluation    PATIENT: Juliette Edward  DATE: 2021  MRN: [id number]  CSN: [id number]  : 1935    Primary Care Doctor/Referring provider: Alaina Landry MD    Reason for evaluation/Chief complaint:   Follow-up, Atrial Fibrillation, Coronary Artery Disease, Hyperlipidemia, and Hypertension      Subjective:    History of present illness on initial date of evaluation:   Juliette Edward is a 82 y.o. patient who presents for follow up. Today he reports occasional left chest pain. Pain comes on random and is unchanged since our last visit in January. He has no exertional limitations and does not affect chest pain. No other associated complaints. Patient Active Problem List   Diagnosis    Pneumonia    Abnormal chest CT    Cough syncope    Tracheobronchomalacia    Hyperlipidemia    Hypertension    Chronic kidney disease, stage III (moderate)    A-fib (HCC)    Pac[facility], [facility] in [facility] Abnormal echocardiogram    Dizziness    SOB (shortness of breath)    Abnormal cardiovascular stress test    Coronary artery disease involving native coronary artery of native heart without angina pectoris, 2020 abnormal stress test. [facility] mild LAD disease     Other chest pain         Cardiac Testing: I have reviewed the findings below. EKG:  ECHO:   STRESS TEST:  CATH:  BYPASS:  VASCULAR:    Past Medical History:   has a past medical history of Atrial fibrillation ([address] Blind right eye, Chronic kidney disease, Hyperlipidemia, Hypertension, and Pneumonia. Surgical History:   has a past surgical history that includes Tonsillectomy; Appendectomy; Colonoscopy; eye surgery; Upper gastrointestinal endoscopy; joint replacement (); hernia repair; and pacemaker placement (10/13/2017). Social History:   reports that he quit smoking about 46 years ago. He has a 10.00 pack-year smoking history.  He has never used smokeless tobacco. He reports that he does not drink alcohol or use drugs. Family History:  No evidence for sudden cardiac death or premature CAD    Medications:  Reviewed and are listed in nursing record. and/or listed below  Outpatient Medications:  Prior to Admission medications    Medication Sig Start Date End Date Taking? Authorizing Provider   fluticasone (FLONASE) 50 MCG/ACT nasal spray 2 sprays by Nasal route daily 4/19/21  Yes Mabel Staples MD   diphenoxylate-atropine (DIPHENATOL) 2.5-0.025 MG per tablet Take 1 tablet by mouth 4 times daily as needed for Diarrhea for up to 10 days. 4/19/21 4/29/21 Yes Mabel Staples MD   dicyclomine (BENTYL) 10 MG capsule Take 1 capsule by mouth 3 times daily (before meals) 4/19/21  Yes Mabel Staples MD   montelukast (SINGULAIR) 10 MG tablet TAKE 1 TABLET BY MOUTH EVERY DAY 2/1/21  Yes GUS Lane CNP   apixaban (ELIQUIS) 5 MG TABS tablet Take 2.5 mg by mouth 2 times daily   Yes Historical Provider, MD   atorvastatin (LIPITOR) 20 MG tablet TAKE 1 TABLET BY MOUTH EVERY DAY 11/2/20  Yes Mabel Staples MD   metoprolol succinate (TOPROL XL) 50 MG extended release tablet Take 1 tablet by mouth 2 times daily 7/9/20  Yes Raiza Hickman MD   Cholecalciferol (VITAMIN D3 PO) Take 1,000 Units by mouth daily   Yes Historical Provider, MD   isosorbide mononitrate (IMDUR) 30 MG extended release tablet Take 1 tablet by mouth daily 6/10/20  Yes Abdirahman Moffett MD   tamsulosin (FLOMAX) 0.4 MG capsule Take 0.4 mg by mouth daily   Yes Historical Provider, MD   finasteride (PROSCAR) 5 MG tablet Take 5 mg by mouth daily. 3/4/15  Yes Historical Provider, MD   aspirin 81 MG tablet Take 81 mg by mouth daily. Yes Historical Provider, MD   omeprazole (PRILOSEC) 20 MG delayed release capsule TAKE 1 CAPSULE BY MOUTH 2 TIMES DAILY 9/11/20 4/19/21  GUS Lane CNP       In-patient schedule medications:        Infusion Medications:         Allergies:  Sulfa antibiotics     Review of Systems:   All 14 point review of symptoms completed. Pertinent positives identified in the HPI, all other review of symptoms findings as below.      Review of Systems - History obtained from the patient  General ROS: negative for - chills, fever or night sweats  Psychological ROS: negative for - disorientation or hallucinations  Ophthalmic ROS: negative for - dry eyes, eye pain or loss of vision  ENT ROS: negative for - nasal discharge or sore throat  Allergy and Immunology ROS: negative for - hives or itchy/watery eyes  Hematological and Lymphatic ROS: negative for - jaundice or night sweats  Endocrine ROS: negative for - mood swings or temperature intolerance  Breast ROS: deferred  Respiratory ROS: negative for - hemoptysis or stridor  Cardiovascular ROS: negative for - chest pain, dyspnea on exertion or palpitations  Gastrointestinal ROS: no abdominal pain, change in bowel habits, or black or bloody stools  Genito-Urinary ROS: no dysuria, trouble voiding, or hematuria  Musculoskeletal ROS: negative for - gait disturbance, joint pain or joint stiffness  Neurological ROS: negative for - seizures or speech problems  Dermatological ROS: negative for - rash or skin lesion changes      Physical Examination:    /84   Pulse 79   Ht 5' 7\" (1.702 m)   Wt 170 lb (77.1 kg)   SpO2 97%   BMI 26.63 kg/m²   /84   Pulse 79   Ht 5' 7\" (1.702 m)   Wt 170 lb (77.1 kg)   SpO2 97%   BMI 26.63 kg/m²    Weight: 170 lb (77.1 kg)     Wt Readings from Last 3 Encounters:   04/21/21 170 lb (77.1 kg)   04/19/21 170 lb (77.1 kg)   01/22/21 176 lb 8 oz (80.1 kg)     No intake or output data in the 24 hours ending 04/21/21 1102    General Appearance:  Alert, cooperative, no distress, appears stated age   Head:  Normocephalic, without obvious abnormality, atraumatic   Eyes:  PERRL, conjunctiva/corneas clear       Nose: Nares normal, no drainage or sinus tenderness   Throat: Lips, mucosa, and tongue normal   Neck: Supple, symmetrical, trachea midline, no adenopathy, thyroid: not enlarged, symmetric, no tenderness/mass/nodules, no carotid bruit or JVD       Lungs:   Clear to auscultation bilaterally, respirations unlabored   Chest Wall:  No tenderness or deformity   Heart:   irregularly irregular ; S1, S2 are normal; no murmur noted; no rub or gallop   Abdomen:   Soft, non-tender, bowel sounds active all four quadrants,  no masses, no organomegaly           Extremities: Extremities normal, atraumatic, no cyanosis or edema   Pulses: 2+ and symmetric   Skin: Skin color, texture, turgor normal, no rashes or lesions   Pysch: Normal mood and affect   Neurologic: Normal gross motor and sensory exam.         Labs  No results for input(s): WBC, HGB, HCT, MCV, PLT in the last 72 hours. No results for input(s): CREATININE, BUN, NA, K, CL, CO2 in the last 72 hours. No results for input(s): INR, PROTIME in the last 72 hours. No results for input(s): TROPONINI in the last 72 hours. Invalid input(s): PRO-BNP  No results for input(s): CHOL, HDL in the last 72 hours. Invalid input(s): LDL, TG      Imaging:  I have reviewed the below testing personally and my interpretation is below. EKG:  CXR:        Assessment:  82 y.o. patient with:  Problem List Items Addressed This Visit     Coronary artery disease involving native coronary artery of native heart without angina pectoris, 5/2020 abnormal stress test. [facility] mild LAD disease  - Primary    Other chest pain          Plan:  1. No changes today. Continue current medications  2. CP appears atpyical and possible non-cardiac  3. Continue GDMT  4. Follow up in 1 year      This note was scribed in the presence of Dr. Rich Parsons MD by Leonora Das RN.     I, Dr. Flower Ochoa, personally performed the services described in this documentation, as scribed by the above signed scribe in my presence. It is both accurate and complete to my knowledge.  I agree with the details independently gathered by the clinical support staff, while the remaining scribed note accurately describes my personal service to the patient.             Khoa Vazquez MD, [address], [facility]  [phone] [facility]  [phone] [facility]  4/21/2021  11:02 AM

## 2021-04-21 ENCOUNTER — OFFICE VISIT (OUTPATIENT)
Dept: CARDIOLOGY CLINIC | Age: 86
End: 2021-04-21
Payer: MEDICARE

## 2021-04-21 VITALS
HEIGHT: 67 IN | OXYGEN SATURATION: 97 % | DIASTOLIC BLOOD PRESSURE: 84 MMHG | HEART RATE: 79 BPM | BODY MASS INDEX: 26.68 KG/M2 | SYSTOLIC BLOOD PRESSURE: 134 MMHG | WEIGHT: 170 LBS

## 2021-04-21 DIAGNOSIS — R07.89 OTHER CHEST PAIN: ICD-10-CM

## 2021-04-21 DIAGNOSIS — I25.10 CORONARY ARTERY DISEASE INVOLVING NATIVE CORONARY ARTERY OF NATIVE HEART WITHOUT ANGINA PECTORIS: Primary | ICD-10-CM

## 2021-04-21 PROCEDURE — 1036F TOBACCO NON-USER: CPT | Performed by: INTERNAL MEDICINE

## 2021-04-21 PROCEDURE — 1123F ACP DISCUSS/DSCN MKR DOCD: CPT | Performed by: INTERNAL MEDICINE

## 2021-04-21 PROCEDURE — G8427 DOCREV CUR MEDS BY ELIG CLIN: HCPCS | Performed by: INTERNAL MEDICINE

## 2021-04-21 PROCEDURE — 99213 OFFICE O/P EST LOW 20 MIN: CPT | Performed by: INTERNAL MEDICINE

## 2021-04-21 PROCEDURE — 4040F PNEUMOC VAC/ADMIN/RCVD: CPT | Performed by: INTERNAL MEDICINE

## 2021-04-21 PROCEDURE — G8417 CALC BMI ABV UP PARAM F/U: HCPCS | Performed by: INTERNAL MEDICINE

## 2021-04-21 NOTE — LETTER
[address] [facility]   Cardiovascular Evaluation    PATIENT: Azalea Powell  DATE: 2021  MRN: [id number]  CSN: [id number]  : 1935    Primary Care Doctor/Referring provider: Soco Garza MD    Reason for evaluation/Chief complaint:   Follow-up, Atrial Fibrillation, Coronary Artery Disease, Hyperlipidemia, and Hypertension      Subjective:    History of present illness on initial date of evaluation:   Azalea Powell is a 82 y.o. patient who presents for follow up. Today he reports occasional left chest pain. Pain comes on random and is unchanged since our last visit in January. He has no exertional limitations and does not affect chest pain. No other associated complaints. Patient Active Problem List   Diagnosis    Pneumonia    Abnormal chest CT    Cough syncope    Tracheobronchomalacia    Hyperlipidemia    Hypertension    Chronic kidney disease, stage III (moderate)    A-fib (HCC)    Pacemaker, [facility] Abnormal echocardiogram    Dizziness    SOB (shortness of breath)    Abnormal cardiovascular stress test    Coronary artery disease involving native coronary artery of native heart without angina pectoris, 2020 abnormal stress test. [facility] mild LAD disease     Other chest pain         Cardiac Testing: I have reviewed the findings below. EKG:  ECHO:   STRESS TEST:  CATH:  BYPASS:  VASCULAR:    Past Medical History:   has a past medical history of Atrial fibrillation ([address] Blind right eye, Chronic kidney disease, Hyperlipidemia, Hypertension, and Pneumonia. Surgical History:   has a past surgical history that includes Tonsillectomy; Appendectomy; Colonoscopy; eye surgery; Upper gastrointestinal endoscopy; joint replacement (); hernia repair; and pacemaker placement (10/13/2017). Social History:   reports that he quit smoking about 46 years ago. He has a 10.00 pack-year smoking history.  He has never used smokeless tobacco. He symmetrical, trachea midline, no adenopathy, thyroid: not enlarged, symmetric, no tenderness/mass/nodules, no carotid bruit or JVD       Lungs:   Clear to auscultation bilaterally, respirations unlabored   Chest Wall:  No tenderness or deformity   Heart:   irregularly irregular ; S1, S2 are normal; no murmur noted; no rub or gallop   Abdomen:   Soft, non-tender, bowel sounds active all four quadrants,  no masses, no organomegaly           Extremities: Extremities normal, atraumatic, no cyanosis or edema   Pulses: 2+ and symmetric   Skin: Skin color, texture, turgor normal, no rashes or lesions   Pysch: Normal mood and affect   Neurologic: Normal gross motor and sensory exam.         Labs  No results for input(s): WBC, HGB, HCT, MCV, PLT in the last 72 hours. No results for input(s): CREATININE, BUN, NA, K, CL, CO2 in the last 72 hours. No results for input(s): INR, PROTIME in the last 72 hours. No results for input(s): TROPONINI in the last 72 hours. Invalid input(s): PRO-BNP  No results for input(s): CHOL, HDL in the last 72 hours. Invalid input(s): LDL, TG      Imaging:  I have reviewed the below testing personally and my interpretation is below. EKG:  CXR:        Assessment:  82 y.o. patient with:  Problem List Items Addressed This Visit     Coronary artery disease involving native coronary artery of native heart without angina pectoris, 5/2020 abnormal stress test. [facility] mild LAD disease  - Primary    Other chest pain          Plan:  1. No changes today. Continue current medications  2. CP appears atpyical and possible non-cardiac  3. Continue GDMT  4. Follow up in 1 year      This note was scribed in the presence of Dr. Mikhail Cordoba MD by Noe Soler RN.     I, Dr. Eulogio Negro, personally performed the services described in this documentation, as scribed by the above signed scribe in my presence. It is both accurate and complete to my knowledge.  I agree with the details independently gathered by the clinical support staff, while the remaining scribed note accurately describes my personal service to the patient.             Luciano Swanson MD, [address], [address] [facility]  [phone] [place] office  [phone] [facility]  4/21/2021  11:02 AM

## 2021-05-12 RX ORDER — APIXABAN 5 MG/1
TABLET, FILM COATED ORAL
Qty: 90 TABLET | Refills: 1 | Status: SHIPPED | OUTPATIENT
Start: 2021-05-12 | End: 2021-11-15

## 2021-07-09 ENCOUNTER — OFFICE VISIT (OUTPATIENT)
Dept: FAMILY MEDICINE CLINIC | Age: 86
End: 2021-07-09
Payer: MEDICARE

## 2021-07-09 VITALS
BODY MASS INDEX: 26.4 KG/M2 | OXYGEN SATURATION: 96 % | WEIGHT: 168.2 LBS | HEART RATE: 82 BPM | HEIGHT: 67 IN | SYSTOLIC BLOOD PRESSURE: 136 MMHG | DIASTOLIC BLOOD PRESSURE: 82 MMHG

## 2021-07-09 DIAGNOSIS — N40.0 BENIGN PROSTATIC HYPERPLASIA WITHOUT LOWER URINARY TRACT SYMPTOMS: ICD-10-CM

## 2021-07-09 DIAGNOSIS — E78.5 HYPERLIPIDEMIA, UNSPECIFIED HYPERLIPIDEMIA TYPE: ICD-10-CM

## 2021-07-09 DIAGNOSIS — R44.3 HALLUCINATIONS: ICD-10-CM

## 2021-07-09 DIAGNOSIS — N18.30 STAGE 3 CHRONIC KIDNEY DISEASE, UNSPECIFIED WHETHER STAGE 3A OR 3B CKD (HCC): ICD-10-CM

## 2021-07-09 DIAGNOSIS — I48.11 LONGSTANDING PERSISTENT ATRIAL FIBRILLATION (HCC): ICD-10-CM

## 2021-07-09 DIAGNOSIS — I10 ESSENTIAL HYPERTENSION: Primary | ICD-10-CM

## 2021-07-09 PROCEDURE — 99214 OFFICE O/P EST MOD 30 MIN: CPT | Performed by: FAMILY MEDICINE

## 2021-07-09 PROCEDURE — G8417 CALC BMI ABV UP PARAM F/U: HCPCS | Performed by: FAMILY MEDICINE

## 2021-07-09 PROCEDURE — 1036F TOBACCO NON-USER: CPT | Performed by: FAMILY MEDICINE

## 2021-07-09 PROCEDURE — 4040F PNEUMOC VAC/ADMIN/RCVD: CPT | Performed by: FAMILY MEDICINE

## 2021-07-09 PROCEDURE — 1123F ACP DISCUSS/DSCN MKR DOCD: CPT | Performed by: FAMILY MEDICINE

## 2021-07-09 PROCEDURE — G8427 DOCREV CUR MEDS BY ELIG CLIN: HCPCS | Performed by: FAMILY MEDICINE

## 2021-07-09 ASSESSMENT — ENCOUNTER SYMPTOMS: SHORTNESS OF BREATH: 0

## 2021-07-09 NOTE — PROGRESS NOTES
Chief Complaint   Patient presents with    Hypertension    Hyperlipidemia       HPI:  Romana Gil is a 82 y.o. (: 1935) here today   for   Hypertension  This is a chronic problem. The current episode started more than 1 year ago. The problem is unchanged. The problem is controlled. Pertinent negatives include no chest pain, headaches or shortness of breath. Risk factors for coronary artery disease include dyslipidemia and male gender. Past treatments include beta blockers. There are no compliance problems. Hyperlipidemia  This is a chronic problem. The current episode started more than 1 year ago. Pertinent negatives include no chest pain or shortness of breath. Current antihyperlipidemic treatment includes statins. There are no compliance problems. Risk factors for coronary artery disease include dyslipidemia and hypertension. no recent syncope episodes. Next cardio appt next yr. Last seen in April. No changes at that time. Still taking eliquis. Has not had recent nephro appt. No new urinary sxs. Patient does have issues at night time with thinking someone is in his bedroom. Patient states this happens about every night. Only occurs at night. Does not sleep soundly. Has been going on for some time. Breathing near baseline. Patient's medications, allergies, past medical, surgical, social and family histories were reviewed and updated as appropriate. ROS:  Review of Systems   Respiratory: Negative for shortness of breath. Cardiovascular: Negative for chest pain. Neurological: Negative for headaches.            LDL Calculated (mg/dL)   Date Value   2020 73       Past Medical History:   Diagnosis Date    Atrial fibrillation (HCC)     Blind right eye     Chronic kidney disease     Hyperlipidemia     Hypertension     Pneumonia        Family History   Problem Relation Age of Onset    Heart Disease Mother     Other Mother     Heart Disease Father    Marjorie Iverson Asthma Other        Social History     Socioeconomic History    Marital status:      Spouse name: Not on file    Number of children: Not on file    Years of education: Not on file    Highest education level: Not on file   Occupational History    Not on file   Tobacco Use    Smoking status: Former Smoker     Packs/day: 1.00     Years: 10.00     Pack years: 10.00     Quit date: 1975     Years since quittin.3    Smokeless tobacco: Never Used    Tobacco comment: Quit 40 years ago   Vaping Use    Vaping Use: Never used   Substance and Sexual Activity    Alcohol use: No     Alcohol/week: 0.0 standard drinks    Drug use: No    Sexual activity: Yes     Partners: Female   Other Topics Concern    Not on file   Social History Narrative    Not on file     Social Determinants of Health     Financial Resource Strain: Low Risk     Difficulty of Paying Living Expenses: Not hard at all   Food Insecurity: No Food Insecurity    Worried About [address] in the Last Year: Never true    [facility] [employer] in the Last Year: Never true   Transportation Needs: No Transportation Needs    Lack of Transportation (Medical): No    Lack of Transportation (Non-Medical): No   Physical Activity:     Days of Exercise per Week:     Minutes of Exercise per Session:    Stress:     Feeling of Stress :    Social Connections:     Frequency of Communication with Friends and Family:     Frequency of Social Gatherings with Friends and Family:     Attends Jewish Services:     Active Member of Clubs or Organizations:     Attends Club or Organization Meetings:     Marital Status:    Intimate Partner Violence:     Fear of Current or Ex-Partner:     Emotionally Abused:     Physically Abused:     Sexually Abused:        Prior to Visit Medications    Medication Sig Taking?  Authorizing Provider   ELIQUIS 5 MG TABS tablet TAKE 1/2 TABLET BY MOUTH 2 TIMES DAILY--FILL ONLY WHEN REQUESTED!!!!!!!!!-- Yes Omero Randall Cristal Rolon MD   fluticasone [facility]) 50 MCG/ACT nasal spray 2 sprays by Nasal route daily Yes Ayaan Cornejo MD   atorvastatin (LIPITOR) 20 MG tablet TAKE 1 TABLET BY MOUTH EVERY DAY Yes Ayaan Cornejo MD   omeprazole (PRILOSEC) 20 MG delayed release capsule TAKE 1 CAPSULE BY MOUTH 2 TIMES DAILY Yes GUS Farley - CNP   metoprolol succinate (TOPROL XL) 50 MG extended release tablet Take 1 tablet by mouth 2 times daily Yes Estrellita Padilla MD   Cholecalciferol (VITAMIN D3 PO) Take 1,000 Units by mouth daily Yes Historical Provider, MD   isosorbide mononitrate (IMDUR) 30 MG extended release tablet Take 1 tablet by mouth daily Yes Amina Nugent MD   tamsulosin (FLOMAX) 0.4 MG capsule Take 0.4 mg by mouth daily Yes Historical Provider, MD   finasteride (PROSCAR) 5 MG tablet Take 5 mg by mouth daily. Yes Historical Provider, MD   aspirin 81 MG tablet Take 81 mg by mouth daily. Yes Historical Provider, MD       Allergies   Allergen Reactions    Sulfa Antibiotics        OBJECTIVE:    /82   Pulse 82   Ht 5' 7\" (1.702 m)   Wt 168 lb 3.2 oz (76.3 kg)   SpO2 96%   BMI 26.34 kg/m²     BP Readings from Last 2 Encounters:   07/09/21 136/82   04/21/21 134/84       Wt Readings from Last 3 Encounters:   07/09/21 168 lb 3.2 oz (76.3 kg)   04/21/21 170 lb (77.1 kg)   04/19/21 170 lb (77.1 kg)       Physical Exam  Constitutional:       Appearance: He is well-developed. HENT:      Head: Normocephalic and atraumatic. Right Ear: Hearing normal.      Left Ear: Hearing normal.   Eyes:      Conjunctiva/sclera: Conjunctivae normal.   Neck:      Trachea: No tracheal deviation. Cardiovascular:      Rate and Rhythm: Normal rate. Rhythm irregularly irregular. Heart sounds: No murmur heard. No friction rub. No gallop. Pulmonary:      Effort: Pulmonary effort is normal.      Breath sounds: Normal breath sounds. Abdominal:      Palpations: Abdomen is soft. Tenderness:  There is no abdominal tenderness. Musculoskeletal:      Right lower leg: No edema. Left lower leg: No edema. Skin:     General: Skin is warm and dry. Neurological:      Mental Status: He is alert and oriented to person, place, and time. Psychiatric:         Mood and Affect: Mood normal.         Behavior: Behavior normal.           ASSESSMENT/PLAN:    1. Essential hypertension  The current medical regimen is effective;  continue present plan and medications. 2. Hyperlipidemia, unspecified hyperlipidemia type  Rpt labs when has next labs nephro labs. - Lipid Panel; Future    3. Longstanding persistent atrial fibrillation (HCC)  The current medical regimen is effective;  continue present plan and medications. Cont eliquis. F/u w/ cardio as sched. 4. Stage 3 chronic kidney disease, unspecified whether stage 3a or 3b CKD ([facility] [address]  F/u w/ nephro as sched. 5. Benign prostatic hyperplasia without lower urinary tract symptoms  Check labs w/ next draw  - PSA, Prostatic Specific Antigen; Future    6. Hallucinations  Wonder if related to singulair given sxs only at night. Try stopping med.   If fails to improve, may need further labs, evaluation

## 2021-07-13 ENCOUNTER — NURSE ONLY (OUTPATIENT)
Dept: CARDIOLOGY CLINIC | Age: 86
End: 2021-07-13

## 2021-07-13 DIAGNOSIS — Z95.0 PACEMAKER: ICD-10-CM

## 2021-07-13 DIAGNOSIS — I48.11 LONGSTANDING PERSISTENT ATRIAL FIBRILLATION (HCC): ICD-10-CM

## 2021-07-13 PROCEDURE — 93294 REM INTERROG EVL PM/LDLS PM: CPT | Performed by: INTERNAL MEDICINE

## 2021-07-13 PROCEDURE — 93296 REM INTERROG EVL PM/IDS: CPT | Performed by: INTERNAL MEDICINE

## 2021-07-13 NOTE — LETTER
[address] [facility] [phone]  Lu[address] Bl[address] [facility] [phone]    Pacemaker/Defibrillator Clinic    07/13/21      [address] [facility] [address] [place]      Dear Muriel Braxton    This letter is to inform you that we received the transmission from your monitor at home that checks your implanted heart device. The next date your monitor will automatically transmit will be 10/12. If your report needs attention we will notify you. Your device and monitor are wireless and most transmit cellularly, but please periodically check your monitor is still plugged in to the electrical outlet. If you still use the telephone land line to send please ensure the connection to the phone uma is secure. This will help to ensure successful automatic transmissions in the future. Also, the monitor needs to be close to you while sleeping at night. Please be aware that the remote device transmission sites are periodically monitored only during regular business hours during which simultaneous in-office device clinics are being run. If your transmission requires attention, we will contact you as soon as possible. **PLEASE NOTE** that our [facility] policy and processes are changing to ensure a more seamless approach for all parties involved, allowing more time for our nurses to address patient issues and concerns. We will no longer be sending letters for NORMAL remote transmissions. You will be contacted by phone if your transmission requires attention (as previously done), and letters will only be sent regarding monitor disconnections or missed transmissions if you are unable to be reached by phone. Please do not be alarmed by this new process, as we will continue to contact you if your transmission report requires attention. This will be your final \"remote received\" letter.   From this point forward, the [facility] will be utilizing the no news is good news approach. As always, please feel free to contact your nurse with any questions or concerns. Thank you.     Ca

## 2021-07-13 NOTE — PROGRESS NOTES
Remote transmission of pacemaker and/or ICD, or implanted heart monitor shows normal cardiac device function. Patient's last device interrogation was on 4/13. Estimated device longevity is 10.4 years. Patient has a history of AF. Takes Eliquis and Toprol XL.  41%    Since last device interrogation, 1 HVR event recorded on 6/29 x 3 seconds. CLAUS HERNANDEZ to review. Patient is to follow up in 3 months either remotely or in clinic. Please see the Pace[employer] report under the Cardiology tab for more detail.

## 2021-07-19 LAB
CHOLESTEROL, TOTAL: 132.5 MG/DL (ref 0–200)
HDLC SERPL-MCNC: 40 MG/DL (ref 40–59)
LDL CHOLESTEROL DIRECT: 73.3 MG/DL
LDL/HDL RATIO: 1.8
PROSTATE SPECIFIC ANTIGEN: 1.15 NG/ML (ref 0–3.9)
TRIGL SERPL-MCNC: 95.8 MG/DL (ref 0–149)
VLDLC SERPL CALC-MCNC: 19.2 MG/DL (ref 10–50)

## 2021-08-03 RX ORDER — OMEPRAZOLE 20 MG/1
20 CAPSULE, DELAYED RELEASE ORAL 2 TIMES DAILY
Qty: 180 CAPSULE | Refills: 3 | Status: SHIPPED | OUTPATIENT
Start: 2021-08-03 | End: 2022-08-08

## 2021-08-09 DIAGNOSIS — I48.91 ATRIAL FIBRILLATION, UNSPECIFIED TYPE (HCC): ICD-10-CM

## 2021-08-11 RX ORDER — ISOSORBIDE MONONITRATE 30 MG/1
30 TABLET, EXTENDED RELEASE ORAL DAILY
Qty: 90 TABLET | Refills: 2 | Status: SHIPPED | OUTPATIENT
Start: 2021-08-11 | End: 2022-06-01

## 2021-08-11 RX ORDER — METOPROLOL SUCCINATE 50 MG/1
50 TABLET, EXTENDED RELEASE ORAL 2 TIMES DAILY
Qty: 180 TABLET | Refills: 2 | Status: ON HOLD | OUTPATIENT
Start: 2021-08-11 | End: 2022-01-20 | Stop reason: HOSPADM

## 2021-09-21 ENCOUNTER — TELEPHONE (OUTPATIENT)
Dept: FAMILY MEDICINE CLINIC | Age: 86
End: 2021-09-21

## 2021-09-21 NOTE — TELEPHONE ENCOUNTER
Spoke w/ wife.  has had some confusion noted. Has been sleeping in the spare room and leaving light on. Wrote incorrect amt on check at local business. Called family and seemed confused at times. singulair was to be discontinued last visit. Wife believes not taking.   rec appt for further evaluation

## 2021-10-15 NOTE — PROGRESS NOTES
Remote transmission received for patients single chamber PACEMAKER. Transmission shows normal sensing and pacing function. EP physician will review. See interrogation under the cardiology tab in the [facility] [address] field for more details. Will continue to monitor remotely. Hx AF (oac, toprol xl). No new V arrhythmias.

## 2021-10-19 ENCOUNTER — NURSE ONLY (OUTPATIENT)
Dept: CARDIOLOGY CLINIC | Age: 86
End: 2021-10-19
Payer: MEDICARE

## 2021-10-19 DIAGNOSIS — I49.5 SICK SINUS SYNDROME (HCC): ICD-10-CM

## 2021-10-19 DIAGNOSIS — Z95.0 PACEMAKER: ICD-10-CM

## 2021-10-21 ENCOUNTER — OFFICE VISIT (OUTPATIENT)
Dept: FAMILY MEDICINE CLINIC | Age: 86
End: 2021-10-21
Payer: MEDICARE

## 2021-10-21 VITALS
BODY MASS INDEX: 26.46 KG/M2 | HEIGHT: 67 IN | DIASTOLIC BLOOD PRESSURE: 86 MMHG | WEIGHT: 168.6 LBS | SYSTOLIC BLOOD PRESSURE: 138 MMHG | OXYGEN SATURATION: 97 % | HEART RATE: 86 BPM

## 2021-10-21 DIAGNOSIS — R44.3 HALLUCINATIONS: ICD-10-CM

## 2021-10-21 DIAGNOSIS — N18.30 STAGE 3 CHRONIC KIDNEY DISEASE, UNSPECIFIED WHETHER STAGE 3A OR 3B CKD (HCC): ICD-10-CM

## 2021-10-21 DIAGNOSIS — R41.3 MEMORY LOSS: ICD-10-CM

## 2021-10-21 DIAGNOSIS — I10 PRIMARY HYPERTENSION: Primary | ICD-10-CM

## 2021-10-21 PROCEDURE — 36415 COLL VENOUS BLD VENIPUNCTURE: CPT | Performed by: FAMILY MEDICINE

## 2021-10-21 PROCEDURE — G8417 CALC BMI ABV UP PARAM F/U: HCPCS | Performed by: FAMILY MEDICINE

## 2021-10-21 PROCEDURE — G8427 DOCREV CUR MEDS BY ELIG CLIN: HCPCS | Performed by: FAMILY MEDICINE

## 2021-10-21 PROCEDURE — 1123F ACP DISCUSS/DSCN MKR DOCD: CPT | Performed by: FAMILY MEDICINE

## 2021-10-21 PROCEDURE — 99214 OFFICE O/P EST MOD 30 MIN: CPT | Performed by: FAMILY MEDICINE

## 2021-10-21 PROCEDURE — G8484 FLU IMMUNIZE NO ADMIN: HCPCS | Performed by: FAMILY MEDICINE

## 2021-10-21 PROCEDURE — 4040F PNEUMOC VAC/ADMIN/RCVD: CPT | Performed by: FAMILY MEDICINE

## 2021-10-21 PROCEDURE — 1036F TOBACCO NON-USER: CPT | Performed by: FAMILY MEDICINE

## 2021-10-21 RX ORDER — CITALOPRAM 10 MG/1
10 TABLET ORAL DAILY
Qty: 30 TABLET | Refills: 3 | Status: SHIPPED | OUTPATIENT
Start: 2021-10-21 | End: 2022-03-28

## 2021-10-21 ASSESSMENT — ENCOUNTER SYMPTOMS: SHORTNESS OF BREATH: 0

## 2021-10-21 NOTE — PROGRESS NOTES
Chief Complaint   Patient presents with    Memory Loss       HPI:  Los Jones is a 82 y.o. (: 1935) here today   for issues with memory loss and seeing things at night that are not there. HPI  sxs primarily at night. Sees things at night that are not there. Sees people. Seems real.  Does not sleep well. Often sits in chair to sleep (recliner). Worse if sleeping in the bed. Seems to be people that he doesn't know. Has been going on for some time. We stopped singulair approx 3 mo ago to see if helps. No improvement. No other changes in medications noted. Has had some other issues w/ memory at times. Has \"messed the check book up\" on occas. Some dec in memory noted. bp has been well controlled. No new dizziness. Does have occas chest pains. Brief. Not new. None recently (4-5 mo ago). Plans on cardio f/u early next yr. Patient's medications, allergies, past medical, surgical, social and family histories were reviewed and updated as appropriate. ROS:  Review of Systems   Constitutional: Negative for fever. Respiratory: Negative for shortness of breath. Psychiatric/Behavioral: Positive for hallucinations and sleep disturbance. Prior to Visit Medications    Medication Sig Taking?  Authorizing Provider   isosorbide mononitrate (IMDUR) 30 MG extended release tablet TAKE 1 TABLET BY MOUTH DAILY Yes Danii Quiroz MD   metoprolol succinate (TOPROL XL) 50 MG extended release tablet TAKE 1 TABLET BY MOUTH 2 TIMES DAILY Yes Danii Quiroz MD   omeprazole (PRILOSEC) 20 MG delayed release capsule TAKE 1 CAPSULE BY MOUTH 2 TIMES DAILY Yes GUS Roca - CNP   ELIQUIS 5 MG TABS tablet TAKE 1/2 TABLET BY MOUTH 2 TIMES DAILY--FILL ONLY WHEN REQUESTED!!!!!!!!!-- Yes Ally Bland MD   fluticasone (FLONASE) 50 MCG/ACT nasal spray 2 sprays by Nasal route daily Yes Ally Bland MD   atorvastatin (LIPITOR) 20 MG tablet TAKE 1 TABLET BY MOUTH EVERY DAY Yes Olaf Ramos Darek Ray MD   tamsulosin (FLOMAX) 0.4 MG capsule Take 0.4 mg by mouth daily Yes Historical Provider, MD   finasteride (PROSCAR) 5 MG tablet Take 5 mg by mouth daily. Yes Historical Provider, MD   aspirin 81 MG tablet Take 81 mg by mouth daily. Yes Historical Provider, MD       Allergies   Allergen Reactions    Sulfa Antibiotics        OBJECTIVE:    /86   Pulse 86   Ht 5' 7\" (1.702 m)   Wt 168 lb 9.6 oz (76.5 kg)   SpO2 97%   BMI 26.41 kg/m²     BP Readings from Last 2 Encounters:   10/21/21 138/86   07/09/21 136/82       Wt Readings from Last 3 Encounters:   10/21/21 168 lb 9.6 oz (76.5 kg)   07/09/21 168 lb 3.2 oz (76.3 kg)   04/21/21 170 lb (77.1 kg)       Physical Exam  Constitutional:       Appearance: He is well-developed. HENT:      Head: Normocephalic and atraumatic. Right Ear: Hearing normal.      Left Ear: Hearing normal.   Eyes:      Conjunctiva/sclera: Conjunctivae normal.   Neck:      Trachea: No tracheal deviation. Cardiovascular:      Rate and Rhythm: Normal rate. Rhythm irregularly irregular. Heart sounds: No murmur heard. No friction rub. No gallop. Pulmonary:      Effort: Pulmonary effort is normal.      Breath sounds: Normal breath sounds. Abdominal:      Palpations: Abdomen is soft. Tenderness: There is no abdominal tenderness. Musculoskeletal:      Right lower leg: No edema. Left lower leg: No edema. Skin:     General: Skin is warm and dry. Neurological:      Mental Status: He is alert and oriented to person, place, and time. Psychiatric:         Mood and Affect: Mood normal.         Behavior: Behavior normal.           ASSESSMENT/PLAN:     1. Primary hypertension  The current medical regimen is effective;  continue present plan and medications. - Comprehensive Metabolic Panel    2. Stage 3 chronic kidney disease, unspecified whether stage 3a or 3b CKD (HCC)  Repeat labs  - CBC Auto Differential    3. Hallucinations  Check labs as below.

## 2021-10-21 NOTE — LETTER
[address]  [address] [facility]  Phone: [phone]  Fax: [phone]    Melodie Sanford MD        October 21, 2021     Patient: Samantha Beach   YOB: 1935   Date of Visit: 10/21/2021       To Whom It May Concern: It is my medical opinion that Gris cM is unable to perform jury duty at this time. If you have any questions or concerns, please don't hesitate to call.     Sincerely,        Melodie Sanford MD

## 2021-10-22 LAB
A/G RATIO: 1.9 (ref 1.1–2.2)
ALBUMIN SERPL-MCNC: 3.9 G/DL (ref 3.4–5)
ALP BLD-CCNC: 74 U/L (ref 40–129)
ALT SERPL-CCNC: 16 U/L (ref 10–40)
ANION GAP SERPL CALCULATED.3IONS-SCNC: 11 MMOL/L (ref 3–16)
AST SERPL-CCNC: 19 U/L (ref 15–37)
BASOPHILS ABSOLUTE: 0 K/UL (ref 0–0.2)
BASOPHILS RELATIVE PERCENT: 0.5 %
BILIRUB SERPL-MCNC: 0.6 MG/DL (ref 0–1)
BUN BLDV-MCNC: 18 MG/DL (ref 7–20)
CALCIUM SERPL-MCNC: 9.3 MG/DL (ref 8.3–10.6)
CHLORIDE BLD-SCNC: 108 MMOL/L (ref 99–110)
CO2: 26 MMOL/L (ref 21–32)
CREAT SERPL-MCNC: 1.3 MG/DL (ref 0.8–1.3)
EOSINOPHILS ABSOLUTE: 0.2 K/UL (ref 0–0.6)
EOSINOPHILS RELATIVE PERCENT: 2.8 %
GFR AFRICAN AMERICAN: >60
GFR NON-AFRICAN AMERICAN: 53
GLOBULIN: 2.1 G/DL
GLUCOSE BLD-MCNC: 103 MG/DL (ref 70–99)
HCT VFR BLD CALC: 45.1 % (ref 40.5–52.5)
HEMOGLOBIN: 14.8 G/DL (ref 13.5–17.5)
LYMPHOCYTES ABSOLUTE: 1.3 K/UL (ref 1–5.1)
LYMPHOCYTES RELATIVE PERCENT: 22.6 %
MCH RBC QN AUTO: 29.5 PG (ref 26–34)
MCHC RBC AUTO-ENTMCNC: 32.9 G/DL (ref 31–36)
MCV RBC AUTO: 89.6 FL (ref 80–100)
MONOCYTES ABSOLUTE: 0.6 K/UL (ref 0–1.3)
MONOCYTES RELATIVE PERCENT: 9.4 %
NEUTROPHILS ABSOLUTE: 3.8 K/UL (ref 1.7–7.7)
NEUTROPHILS RELATIVE PERCENT: 64.7 %
PDW BLD-RTO: 15.5 % (ref 12.4–15.4)
PLATELET # BLD: 183 K/UL (ref 135–450)
PMV BLD AUTO: 10.3 FL (ref 5–10.5)
POTASSIUM SERPL-SCNC: 5 MMOL/L (ref 3.5–5.1)
RBC # BLD: 5.03 M/UL (ref 4.2–5.9)
SODIUM BLD-SCNC: 145 MMOL/L (ref 136–145)
T4 FREE: 1.4 NG/DL (ref 0.9–1.8)
TOTAL PROTEIN: 6 G/DL (ref 6.4–8.2)
TSH SERPL DL<=0.05 MIU/L-ACNC: 2.45 UIU/ML (ref 0.27–4.2)
VITAMIN B-12: 259 PG/ML (ref 211–911)
WBC # BLD: 5.9 K/UL (ref 4–11)

## 2021-10-22 NOTE — RESULT ENCOUNTER NOTE
B12 is fairly low. This could contribute to memory issues. Recommend consider B12 shots given the symptoms that he has been having. Thyroid labs normal.  CMP essentially normal.  CBC normal as well.

## 2021-10-25 PROCEDURE — 93294 REM INTERROG EVL PM/LDLS PM: CPT | Performed by: INTERNAL MEDICINE

## 2021-10-25 PROCEDURE — 93296 REM INTERROG EVL PM/IDS: CPT | Performed by: INTERNAL MEDICINE

## 2021-10-26 ENCOUNTER — NURSE ONLY (OUTPATIENT)
Dept: FAMILY MEDICINE CLINIC | Age: 86
End: 2021-10-26
Payer: MEDICARE

## 2021-10-26 DIAGNOSIS — E53.8 VITAMIN B12 DEFICIENCY: Primary | ICD-10-CM

## 2021-10-26 PROCEDURE — 96372 THER/PROPH/DIAG INJ SC/IM: CPT | Performed by: FAMILY MEDICINE

## 2021-10-26 RX ORDER — CYANOCOBALAMIN 1000 UG/ML
1000 INJECTION INTRAMUSCULAR; SUBCUTANEOUS ONCE
Status: COMPLETED | OUTPATIENT
Start: 2021-10-26 | End: 2021-10-26

## 2021-10-26 RX ADMIN — CYANOCOBALAMIN 1000 MCG: 1000 INJECTION INTRAMUSCULAR; SUBCUTANEOUS at 08:05

## 2021-11-02 ENCOUNTER — NURSE ONLY (OUTPATIENT)
Dept: FAMILY MEDICINE CLINIC | Age: 86
End: 2021-11-02
Payer: MEDICARE

## 2021-11-02 DIAGNOSIS — E53.8 VITAMIN B12 DEFICIENCY: Primary | ICD-10-CM

## 2021-11-02 PROCEDURE — 96372 THER/PROPH/DIAG INJ SC/IM: CPT | Performed by: FAMILY MEDICINE

## 2021-11-02 RX ORDER — CYANOCOBALAMIN 1000 UG/ML
1000 INJECTION INTRAMUSCULAR; SUBCUTANEOUS ONCE
Status: COMPLETED | OUTPATIENT
Start: 2021-11-02 | End: 2021-11-02

## 2021-11-02 RX ADMIN — CYANOCOBALAMIN 1000 MCG: 1000 INJECTION INTRAMUSCULAR; SUBCUTANEOUS at 07:50

## 2021-11-09 ENCOUNTER — NURSE ONLY (OUTPATIENT)
Dept: FAMILY MEDICINE CLINIC | Age: 86
End: 2021-11-09
Payer: MEDICARE

## 2021-11-09 DIAGNOSIS — E53.8 VITAMIN B12 DEFICIENCY: Primary | ICD-10-CM

## 2021-11-09 PROCEDURE — 96372 THER/PROPH/DIAG INJ SC/IM: CPT

## 2021-11-09 RX ORDER — UBIDECARENONE 75 MG
100 CAPSULE ORAL DAILY
Qty: 30 TABLET | Refills: 3 | Status: SHIPPED | OUTPATIENT
Start: 2021-11-09 | End: 2021-11-09 | Stop reason: CLARIF

## 2021-11-09 RX ORDER — CYANOCOBALAMIN 1000 UG/ML
1000 INJECTION INTRAMUSCULAR; SUBCUTANEOUS ONCE
Status: COMPLETED | OUTPATIENT
Start: 2021-11-09 | End: 2021-11-09

## 2021-11-09 RX ADMIN — CYANOCOBALAMIN 1000 MCG: 1000 INJECTION INTRAMUSCULAR; SUBCUTANEOUS at 07:53

## 2021-11-15 DIAGNOSIS — E78.5 HYPERLIPIDEMIA, UNSPECIFIED HYPERLIPIDEMIA TYPE: ICD-10-CM

## 2021-11-15 RX ORDER — APIXABAN 5 MG/1
TABLET, FILM COATED ORAL
Qty: 90 TABLET | Refills: 0 | Status: SHIPPED | OUTPATIENT
Start: 2021-11-15 | End: 2022-03-18 | Stop reason: SDUPTHER

## 2021-11-15 RX ORDER — ATORVASTATIN CALCIUM 20 MG/1
TABLET, FILM COATED ORAL
Qty: 90 TABLET | Refills: 3 | Status: ON HOLD | OUTPATIENT
Start: 2021-11-15 | End: 2022-08-26 | Stop reason: SDUPTHER

## 2021-11-16 ENCOUNTER — NURSE ONLY (OUTPATIENT)
Dept: FAMILY MEDICINE CLINIC | Age: 86
End: 2021-11-16
Payer: MEDICARE

## 2021-11-16 DIAGNOSIS — E53.8 VITAMIN B12 DEFICIENCY: Primary | ICD-10-CM

## 2021-11-16 PROCEDURE — 96372 THER/PROPH/DIAG INJ SC/IM: CPT | Performed by: FAMILY MEDICINE

## 2021-11-16 RX ORDER — CYANOCOBALAMIN 1000 UG/ML
1000 INJECTION INTRAMUSCULAR; SUBCUTANEOUS ONCE
Status: COMPLETED | OUTPATIENT
Start: 2021-11-16 | End: 2021-11-16

## 2021-11-16 RX ADMIN — CYANOCOBALAMIN 1000 MCG: 1000 INJECTION INTRAMUSCULAR; SUBCUTANEOUS at 07:52

## 2021-11-30 ENCOUNTER — NURSE ONLY (OUTPATIENT)
Dept: FAMILY MEDICINE CLINIC | Age: 86
End: 2021-11-30
Payer: MEDICARE

## 2021-11-30 DIAGNOSIS — E53.8 VITAMIN B12 DEFICIENCY: Primary | ICD-10-CM

## 2021-11-30 PROCEDURE — 96372 THER/PROPH/DIAG INJ SC/IM: CPT | Performed by: FAMILY MEDICINE

## 2021-11-30 RX ORDER — CYANOCOBALAMIN 1000 UG/ML
1000 INJECTION INTRAMUSCULAR; SUBCUTANEOUS ONCE
Status: COMPLETED | OUTPATIENT
Start: 2021-11-30 | End: 2021-11-30

## 2021-11-30 RX ADMIN — CYANOCOBALAMIN 1000 MCG: 1000 INJECTION INTRAMUSCULAR; SUBCUTANEOUS at 08:05

## 2021-12-14 ENCOUNTER — NURSE ONLY (OUTPATIENT)
Dept: FAMILY MEDICINE CLINIC | Age: 86
End: 2021-12-14
Payer: MEDICARE

## 2021-12-14 DIAGNOSIS — E53.8 VITAMIN B12 DEFICIENCY: Primary | ICD-10-CM

## 2021-12-14 PROCEDURE — 96372 THER/PROPH/DIAG INJ SC/IM: CPT | Performed by: FAMILY MEDICINE

## 2021-12-14 RX ORDER — CYANOCOBALAMIN 1000 UG/ML
1000 INJECTION INTRAMUSCULAR; SUBCUTANEOUS ONCE
Status: COMPLETED | OUTPATIENT
Start: 2021-12-14 | End: 2021-12-14

## 2021-12-14 RX ADMIN — CYANOCOBALAMIN 1000 MCG: 1000 INJECTION INTRAMUSCULAR; SUBCUTANEOUS at 07:57

## 2022-01-10 ENCOUNTER — OFFICE VISIT (OUTPATIENT)
Dept: FAMILY MEDICINE CLINIC | Age: 87
End: 2022-01-10
Payer: MEDICARE

## 2022-01-10 VITALS
HEIGHT: 67 IN | DIASTOLIC BLOOD PRESSURE: 84 MMHG | HEART RATE: 86 BPM | OXYGEN SATURATION: 95 % | BODY MASS INDEX: 26.3 KG/M2 | SYSTOLIC BLOOD PRESSURE: 136 MMHG | WEIGHT: 167.6 LBS

## 2022-01-10 DIAGNOSIS — E78.5 HYPERLIPIDEMIA, UNSPECIFIED HYPERLIPIDEMIA TYPE: ICD-10-CM

## 2022-01-10 DIAGNOSIS — I48.11 LONGSTANDING PERSISTENT ATRIAL FIBRILLATION (HCC): Primary | ICD-10-CM

## 2022-01-10 DIAGNOSIS — R41.3 MEMORY LOSS: ICD-10-CM

## 2022-01-10 DIAGNOSIS — E53.8 B12 DEFICIENCY: ICD-10-CM

## 2022-01-10 DIAGNOSIS — I10 PRIMARY HYPERTENSION: ICD-10-CM

## 2022-01-10 DIAGNOSIS — N18.30 STAGE 3 CHRONIC KIDNEY DISEASE, UNSPECIFIED WHETHER STAGE 3A OR 3B CKD (HCC): ICD-10-CM

## 2022-01-10 LAB
A/G RATIO: 1.6 (ref 1.1–2.2)
ALBUMIN SERPL-MCNC: 3.8 G/DL (ref 3.4–5)
ALP BLD-CCNC: 88 U/L (ref 40–129)
ALT SERPL-CCNC: 11 U/L (ref 10–40)
ANION GAP SERPL CALCULATED.3IONS-SCNC: 11 MMOL/L (ref 3–16)
AST SERPL-CCNC: 16 U/L (ref 15–37)
BASOPHILS ABSOLUTE: 0 K/UL (ref 0–0.2)
BASOPHILS RELATIVE PERCENT: 0.7 %
BILIRUB SERPL-MCNC: 1 MG/DL (ref 0–1)
BUN BLDV-MCNC: 14 MG/DL (ref 7–20)
CALCIUM SERPL-MCNC: 9.1 MG/DL (ref 8.3–10.6)
CHLORIDE BLD-SCNC: 102 MMOL/L (ref 99–110)
CHOLESTEROL, TOTAL: 137 MG/DL (ref 0–199)
CO2: 28 MMOL/L (ref 21–32)
CREAT SERPL-MCNC: 1.2 MG/DL (ref 0.8–1.3)
EOSINOPHILS ABSOLUTE: 0.2 K/UL (ref 0–0.6)
EOSINOPHILS RELATIVE PERCENT: 3.6 %
GFR AFRICAN AMERICAN: >60
GFR NON-AFRICAN AMERICAN: 58
GLUCOSE BLD-MCNC: 86 MG/DL (ref 70–99)
HCT VFR BLD CALC: 49 % (ref 40.5–52.5)
HDLC SERPL-MCNC: 42 MG/DL (ref 40–60)
HEMOGLOBIN: 16.1 G/DL (ref 13.5–17.5)
LDL CHOLESTEROL CALCULATED: 77 MG/DL
LYMPHOCYTES ABSOLUTE: 1.4 K/UL (ref 1–5.1)
LYMPHOCYTES RELATIVE PERCENT: 23.5 %
MCH RBC QN AUTO: 28.7 PG (ref 26–34)
MCHC RBC AUTO-ENTMCNC: 32.9 G/DL (ref 31–36)
MCV RBC AUTO: 87.3 FL (ref 80–100)
MONOCYTES ABSOLUTE: 0.6 K/UL (ref 0–1.3)
MONOCYTES RELATIVE PERCENT: 10.4 %
NEUTROPHILS ABSOLUTE: 3.8 K/UL (ref 1.7–7.7)
NEUTROPHILS RELATIVE PERCENT: 61.8 %
PDW BLD-RTO: 16 % (ref 12.4–15.4)
PLATELET # BLD: 199 K/UL (ref 135–450)
PMV BLD AUTO: 9.3 FL (ref 5–10.5)
POTASSIUM SERPL-SCNC: 4.1 MMOL/L (ref 3.5–5.1)
RBC # BLD: 5.61 M/UL (ref 4.2–5.9)
SODIUM BLD-SCNC: 141 MMOL/L (ref 136–145)
TOTAL PROTEIN: 6.2 G/DL (ref 6.4–8.2)
TRIGL SERPL-MCNC: 89 MG/DL (ref 0–150)
VITAMIN B-12: 780 PG/ML (ref 211–911)
VLDLC SERPL CALC-MCNC: 18 MG/DL
WBC # BLD: 6.1 K/UL (ref 4–11)

## 2022-01-10 PROCEDURE — G8427 DOCREV CUR MEDS BY ELIG CLIN: HCPCS | Performed by: FAMILY MEDICINE

## 2022-01-10 PROCEDURE — 1036F TOBACCO NON-USER: CPT | Performed by: FAMILY MEDICINE

## 2022-01-10 PROCEDURE — 99214 OFFICE O/P EST MOD 30 MIN: CPT | Performed by: FAMILY MEDICINE

## 2022-01-10 PROCEDURE — 1123F ACP DISCUSS/DSCN MKR DOCD: CPT | Performed by: FAMILY MEDICINE

## 2022-01-10 PROCEDURE — 4040F PNEUMOC VAC/ADMIN/RCVD: CPT | Performed by: FAMILY MEDICINE

## 2022-01-10 PROCEDURE — 36415 COLL VENOUS BLD VENIPUNCTURE: CPT | Performed by: FAMILY MEDICINE

## 2022-01-10 PROCEDURE — G8484 FLU IMMUNIZE NO ADMIN: HCPCS | Performed by: FAMILY MEDICINE

## 2022-01-10 PROCEDURE — G8417 CALC BMI ABV UP PARAM F/U: HCPCS | Performed by: FAMILY MEDICINE

## 2022-01-10 PROCEDURE — 96372 THER/PROPH/DIAG INJ SC/IM: CPT | Performed by: FAMILY MEDICINE

## 2022-01-10 RX ORDER — PNV NO.95/FERROUS FUM/FOLIC AC 28MG-0.8MG
TABLET ORAL DAILY
COMMUNITY

## 2022-01-10 RX ORDER — CYANOCOBALAMIN 1000 UG/ML
1000 INJECTION INTRAMUSCULAR; SUBCUTANEOUS ONCE
Status: COMPLETED | OUTPATIENT
Start: 2022-01-10 | End: 2022-01-10

## 2022-01-10 RX ADMIN — CYANOCOBALAMIN 1000 MCG: 1000 INJECTION INTRAMUSCULAR; SUBCUTANEOUS at 08:22

## 2022-01-10 ASSESSMENT — PATIENT HEALTH QUESTIONNAIRE - PHQ9
SUM OF ALL RESPONSES TO PHQ QUESTIONS 1-9: 0
1. LITTLE INTEREST OR PLEASURE IN DOING THINGS: 0
2. FEELING DOWN, DEPRESSED OR HOPELESS: 0
SUM OF ALL RESPONSES TO PHQ9 QUESTIONS 1 & 2: 0
SUM OF ALL RESPONSES TO PHQ QUESTIONS 1-9: 0

## 2022-01-10 ASSESSMENT — ENCOUNTER SYMPTOMS: SHORTNESS OF BREATH: 0

## 2022-01-10 NOTE — PROGRESS NOTES
Chief Complaint   Patient presents with    Hypertension    Hyperlipidemia       HPI:  Lena Cherry is a 82 y.o. (: 1935) here today   for   Hypertension  This is a chronic problem. The current episode started more than 1 year ago. The problem is controlled. Associated symptoms include headaches. Pertinent negatives include no chest pain or shortness of breath. Risk factors for coronary artery disease include dyslipidemia and male gender. Past treatments include beta blockers. The current treatment provides moderate improvement. There are no compliance problems. Hyperlipidemia  This is a chronic problem. The current episode started more than 1 year ago. Pertinent negatives include no chest pain or shortness of breath. Current antihyperlipidemic treatment includes statins. There are no compliance problems. Risk factors for coronary artery disease include dyslipidemia, hypertension and male sex. Plans on cardio f/u soon. occas random chest pain. No ongoing issues. Was seen by neurology. Had CT at [facility]. To [place] for possible eeg. Has not heard results. Overall doing better. Less hallucinations. Sleeping better. Seen by nephro recently. No changes noted. Also seen by urology. No changes noted. Patient's medications, allergies, past medical, surgical, social and family histories were reviewed and updated as appropriate. ROS:  Review of Systems   Respiratory: Negative for shortness of breath. Cardiovascular: Negative for chest pain. Neurological: Positive for headaches.            LDL Calculated (mg/dL)   Date Value   2020 73       Past Medical History:   Diagnosis Date    Atrial fibrillation (HCC)     Blind right eye     Chronic kidney disease     Hyperlipidemia     Hypertension     Pneumonia        Family History   Problem Relation Age of Onset    Heart Disease Mother     Other Mother     Heart Disease Father     Asthma Other        Social History Socioeconomic History    Marital status:      Spouse name: Not on file    Number of children: Not on file    Years of education: Not on file    Highest education level: Not on file   Occupational History    Not on file   Tobacco Use    Smoking status: Former Smoker     Packs/day: 1.00     Years: 10.00     Pack years: 10.00     Quit date: 1975     Years since quittin.9    Smokeless tobacco: Never Used    Tobacco comment: Quit 40 years ago   Vaping Use    Vaping Use: Never used   Substance and Sexual Activity    Alcohol use: No     Alcohol/week: 0.0 standard drinks    Drug use: No    Sexual activity: Yes     Partners: Female   Other Topics Concern    Not on file   Social History Narrative    Not on file     Social Determinants of Health     Financial Resource Strain:     Difficulty of Paying Living Expenses: Not on file   Food Insecurity:     Worried About Running Out of Food in the Last Year: Not on file    [place] of Food in the Last Year: Not on file   Transportation Needs:     Lack of Transportation (Medical): Not on file    Lack of Transportation (Non-Medical):  Not on file   Physical Activity:     Days of Exercise per Week: Not on file    Minutes of Exercise per Session: Not on file   Stress:     Feeling of Stress : Not on file   Social Connections:     Frequency of Communication with Friends and Family: Not on file    Frequency of Social Gatherings with Friends and Family: Not on file    Attends Jainism Services: Not on file    Active Member of Clubs or Organizations: Not on file    Attends Club or Organization Meetings: Not on file    Marital Status: Not on file   Intimate Partner Violence:     Fear of Current or Ex-Partner: Not on file    Emotionally Abused: Not on file    Physically Abused: Not on file    Sexually Abused: Not on file   Housing Stability:     Unable to Pay for Housing in the Last Year: Not on file    Number of [place] in the Last Year: Not on file    Unstable Housing in the Last Year: Not on file       Prior to Visit Medications    Medication Sig Taking? Authorizing Provider   Ferrous Sulfate (IRON) 325 (65 Fe) MG TABS Take by mouth Yes Historical Provider, MD   ELIQUNATALIA 5 MG TABS tablet TAKE 1/2 TABLET BY MOUTH 2 TIMES DAILY--FILL ONLY WHEN REQUESTED!!!!!!!!!-- Yes Alfredo Fagan MD   atorvastatin (LIPITOR) 20 MG tablet TAKE 1 TABLET BY MOUTH EVERY DAY Yes Alfredo Fagan MD   citalopram (CELEXA) 10 MG tablet Take 1 tablet by mouth daily Yes Alfredo Fagan MD   isosorbide mononitrate (IMDUR) 30 MG extended release tablet TAKE 1 TABLET BY MOUTH DAILY Yes Yfn Burgess MD   metoprolol succinate (TOPROL XL) 50 MG extended release tablet TAKE 1 TABLET BY MOUTH 2 TIMES DAILY Yes Yfn Burgess MD   omeprazole (PRILOSEC) 20 MG delayed release capsule TAKE 1 CAPSULE BY MOUTH 2 TIMES DAILY Yes GUS Miranda CNP   fluticasone (FLONASE) 50 MCG/ACT nasal spray 2 sprays by Nasal route daily Yes Alfredo Fagan MD   tamsulosin (FLOMAX) 0.4 MG capsule Take 0.4 mg by mouth daily Yes Historical Provider, MD   finasteride (PROSCAR) 5 MG tablet Take 5 mg by mouth daily. Yes Historical Provider, MD   aspirin 81 MG tablet Take 81 mg by mouth daily. Yes Historical Provider, MD       Allergies   Allergen Reactions    Sulfa Antibiotics        OBJECTIVE:    /84   Pulse 86   Ht 5' 7\" (1.702 m)   Wt 167 lb 9.6 oz (76 kg)   SpO2 95%   BMI 26.25 kg/m²     BP Readings from Last 2 Encounters:   01/10/22 136/84   10/21/21 138/86       Wt Readings from Last 3 Encounters:   01/10/22 167 lb 9.6 oz (76 kg)   10/21/21 168 lb 9.6 oz (76.5 kg)   07/09/21 168 lb 3.2 oz (76.3 kg)       Physical Exam  Constitutional:       Appearance: He is well-developed. HENT:      Head: Normocephalic and atraumatic.       Right Ear: Hearing normal.      Left Ear: Hearing normal.   Eyes:      Conjunctiva/sclera: Conjunctivae normal.   Neck:      Trachea: No tracheal

## 2022-01-11 NOTE — RESULT ENCOUNTER NOTE
Lipids well controlled. No changes in chol med. Cmp ok. B12 improved. Cont supplement.  Cbc essentially normal.

## 2022-01-17 ENCOUNTER — APPOINTMENT (OUTPATIENT)
Dept: CT IMAGING | Age: 87
DRG: 640 | End: 2022-01-17
Payer: MEDICARE

## 2022-01-17 ENCOUNTER — HOSPITAL ENCOUNTER (INPATIENT)
Age: 87
LOS: 3 days | Discharge: HOME | DRG: 640 | End: 2022-01-20
Attending: STUDENT IN AN ORGANIZED HEALTH CARE EDUCATION/TRAINING PROGRAM | Admitting: INTERNAL MEDICINE
Payer: MEDICARE

## 2022-01-17 ENCOUNTER — APPOINTMENT (OUTPATIENT)
Dept: GENERAL RADIOLOGY | Age: 87
DRG: 640 | End: 2022-01-17
Payer: MEDICARE

## 2022-01-17 DIAGNOSIS — N17.9 AKI (ACUTE KIDNEY INJURY) (HCC): ICD-10-CM

## 2022-01-17 DIAGNOSIS — I48.91 ATRIAL FIBRILLATION WITH RVR (HCC): ICD-10-CM

## 2022-01-17 DIAGNOSIS — U07.1 COVID: ICD-10-CM

## 2022-01-17 DIAGNOSIS — E86.0 DEHYDRATION: ICD-10-CM

## 2022-01-17 DIAGNOSIS — R55 SYNCOPE AND COLLAPSE: Primary | ICD-10-CM

## 2022-01-17 LAB
A/G RATIO: 1.2 (ref 1.1–2.2)
ALBUMIN SERPL-MCNC: 3.8 G/DL (ref 3.4–5)
ALP BLD-CCNC: 96 U/L (ref 40–129)
ALT SERPL-CCNC: 18 U/L (ref 10–40)
ANION GAP SERPL CALCULATED.3IONS-SCNC: 14 MMOL/L (ref 3–16)
AST SERPL-CCNC: 35 U/L (ref 15–37)
BASOPHILS ABSOLUTE: 0 K/UL (ref 0–0.2)
BASOPHILS RELATIVE PERCENT: 0.5 %
BILIRUB SERPL-MCNC: 1.2 MG/DL (ref 0–1)
BUN BLDV-MCNC: 23 MG/DL (ref 7–20)
CALCIUM SERPL-MCNC: 9.7 MG/DL (ref 8.3–10.6)
CHLORIDE BLD-SCNC: 98 MMOL/L (ref 99–110)
CHP ED QC CHECK: NORMAL
CO2: 24 MMOL/L (ref 21–32)
CREAT SERPL-MCNC: 1.7 MG/DL (ref 0.8–1.3)
EOSINOPHILS ABSOLUTE: 0 K/UL (ref 0–0.6)
EOSINOPHILS RELATIVE PERCENT: 0.1 %
GFR AFRICAN AMERICAN: 47
GFR NON-AFRICAN AMERICAN: 39
GLUCOSE BLD-MCNC: 108 MG/DL (ref 70–99)
GLUCOSE BLD-MCNC: 118 MG/DL
GLUCOSE BLD-MCNC: 118 MG/DL (ref 70–99)
HCT VFR BLD CALC: 48.6 % (ref 40.5–52.5)
HEMOGLOBIN: 16.6 G/DL (ref 13.5–17.5)
LYMPHOCYTES ABSOLUTE: 0.9 K/UL (ref 1–5.1)
LYMPHOCYTES RELATIVE PERCENT: 16.8 %
MCH RBC QN AUTO: 29.8 PG (ref 26–34)
MCHC RBC AUTO-ENTMCNC: 34.1 G/DL (ref 31–36)
MCV RBC AUTO: 87.2 FL (ref 80–100)
MONOCYTES ABSOLUTE: 0.7 K/UL (ref 0–1.3)
MONOCYTES RELATIVE PERCENT: 12.4 %
NEUTROPHILS ABSOLUTE: 3.8 K/UL (ref 1.7–7.7)
NEUTROPHILS RELATIVE PERCENT: 70.2 %
PDW BLD-RTO: 15.7 % (ref 12.4–15.4)
PERFORMED ON: ABNORMAL
PLATELET # BLD: 170 K/UL (ref 135–450)
PMV BLD AUTO: 7.4 FL (ref 5–10.5)
POTASSIUM REFLEX MAGNESIUM: 4.3 MMOL/L (ref 3.5–5.1)
PRO-BNP: 4479 PG/ML (ref 0–449)
RBC # BLD: 5.58 M/UL (ref 4.2–5.9)
SARS-COV-2, NAAT: DETECTED
SODIUM BLD-SCNC: 136 MMOL/L (ref 136–145)
TOTAL PROTEIN: 7 G/DL (ref 6.4–8.2)
TROPONIN: 0.03 NG/ML
TROPONIN: <0.01 NG/ML
TROPONIN: <0.01 NG/ML
WBC # BLD: 5.5 K/UL (ref 4–11)

## 2022-01-17 PROCEDURE — 83880 ASSAY OF NATRIURETIC PEPTIDE: CPT

## 2022-01-17 PROCEDURE — 36415 COLL VENOUS BLD VENIPUNCTURE: CPT

## 2022-01-17 PROCEDURE — 93005 ELECTROCARDIOGRAM TRACING: CPT | Performed by: STUDENT IN AN ORGANIZED HEALTH CARE EDUCATION/TRAINING PROGRAM

## 2022-01-17 PROCEDURE — 6370000000 HC RX 637 (ALT 250 FOR IP): Performed by: INTERNAL MEDICINE

## 2022-01-17 PROCEDURE — 6370000000 HC RX 637 (ALT 250 FOR IP): Performed by: STUDENT IN AN ORGANIZED HEALTH CARE EDUCATION/TRAINING PROGRAM

## 2022-01-17 PROCEDURE — 87635 SARS-COV-2 COVID-19 AMP PRB: CPT

## 2022-01-17 PROCEDURE — 2060000000 HC ICU INTERMEDIATE R&B

## 2022-01-17 PROCEDURE — 70450 CT HEAD/BRAIN W/O DYE: CPT

## 2022-01-17 PROCEDURE — 96360 HYDRATION IV INFUSION INIT: CPT

## 2022-01-17 PROCEDURE — 85025 COMPLETE CBC W/AUTO DIFF WBC: CPT

## 2022-01-17 PROCEDURE — 84484 ASSAY OF TROPONIN QUANT: CPT

## 2022-01-17 PROCEDURE — 71045 X-RAY EXAM CHEST 1 VIEW: CPT

## 2022-01-17 PROCEDURE — 72125 CT NECK SPINE W/O DYE: CPT

## 2022-01-17 PROCEDURE — 2580000003 HC RX 258: Performed by: INTERNAL MEDICINE

## 2022-01-17 PROCEDURE — 84439 ASSAY OF FREE THYROXINE: CPT

## 2022-01-17 PROCEDURE — 99285 EMERGENCY DEPT VISIT HI MDM: CPT

## 2022-01-17 PROCEDURE — 84443 ASSAY THYROID STIM HORMONE: CPT

## 2022-01-17 PROCEDURE — 2580000003 HC RX 258: Performed by: STUDENT IN AN ORGANIZED HEALTH CARE EDUCATION/TRAINING PROGRAM

## 2022-01-17 PROCEDURE — 80053 COMPREHEN METABOLIC PANEL: CPT

## 2022-01-17 RX ORDER — ACETAMINOPHEN 325 MG/1
650 TABLET ORAL EVERY 6 HOURS PRN
Status: DISCONTINUED | OUTPATIENT
Start: 2022-01-17 | End: 2022-01-20 | Stop reason: HOSPADM

## 2022-01-17 RX ORDER — TAMSULOSIN HYDROCHLORIDE 0.4 MG/1
0.4 CAPSULE ORAL DAILY
Status: DISCONTINUED | OUTPATIENT
Start: 2022-01-18 | End: 2022-01-20 | Stop reason: HOSPADM

## 2022-01-17 RX ORDER — SODIUM CHLORIDE 9 MG/ML
1000 INJECTION, SOLUTION INTRAVENOUS CONTINUOUS
Status: DISCONTINUED | OUTPATIENT
Start: 2022-01-17 | End: 2022-01-20 | Stop reason: HOSPADM

## 2022-01-17 RX ORDER — ASPIRIN 81 MG/1
81 TABLET, CHEWABLE ORAL DAILY
Status: DISCONTINUED | OUTPATIENT
Start: 2022-01-18 | End: 2022-01-20 | Stop reason: HOSPADM

## 2022-01-17 RX ORDER — ATORVASTATIN CALCIUM 10 MG/1
20 TABLET, FILM COATED ORAL DAILY
Status: DISCONTINUED | OUTPATIENT
Start: 2022-01-18 | End: 2022-01-20 | Stop reason: HOSPADM

## 2022-01-17 RX ORDER — FERROUS SULFATE 325(65) MG
325 TABLET ORAL DAILY
Status: DISCONTINUED | OUTPATIENT
Start: 2022-01-18 | End: 2022-01-20 | Stop reason: HOSPADM

## 2022-01-17 RX ORDER — SODIUM CHLORIDE 0.9 % (FLUSH) 0.9 %
5-40 SYRINGE (ML) INJECTION EVERY 12 HOURS SCHEDULED
Status: DISCONTINUED | OUTPATIENT
Start: 2022-01-17 | End: 2022-01-20 | Stop reason: HOSPADM

## 2022-01-17 RX ORDER — POLYETHYLENE GLYCOL 3350 17 G/17G
17 POWDER, FOR SOLUTION ORAL DAILY PRN
Status: DISCONTINUED | OUTPATIENT
Start: 2022-01-17 | End: 2022-01-20 | Stop reason: HOSPADM

## 2022-01-17 RX ORDER — ACETAMINOPHEN 650 MG/1
650 SUPPOSITORY RECTAL EVERY 6 HOURS PRN
Status: DISCONTINUED | OUTPATIENT
Start: 2022-01-17 | End: 2022-01-20 | Stop reason: HOSPADM

## 2022-01-17 RX ORDER — ONDANSETRON 4 MG/1
4 TABLET, ORALLY DISINTEGRATING ORAL EVERY 8 HOURS PRN
Status: DISCONTINUED | OUTPATIENT
Start: 2022-01-17 | End: 2022-01-20 | Stop reason: HOSPADM

## 2022-01-17 RX ORDER — PANTOPRAZOLE SODIUM 20 MG/1
20 TABLET, DELAYED RELEASE ORAL
Status: DISCONTINUED | OUTPATIENT
Start: 2022-01-18 | End: 2022-01-20 | Stop reason: HOSPADM

## 2022-01-17 RX ORDER — CITALOPRAM 20 MG/1
10 TABLET ORAL DAILY
Status: DISCONTINUED | OUTPATIENT
Start: 2022-01-18 | End: 2022-01-20 | Stop reason: HOSPADM

## 2022-01-17 RX ORDER — ONDANSETRON 2 MG/ML
4 INJECTION INTRAMUSCULAR; INTRAVENOUS EVERY 6 HOURS PRN
Status: DISCONTINUED | OUTPATIENT
Start: 2022-01-17 | End: 2022-01-20 | Stop reason: HOSPADM

## 2022-01-17 RX ORDER — ISOSORBIDE MONONITRATE 30 MG/1
30 TABLET, EXTENDED RELEASE ORAL DAILY
Status: DISCONTINUED | OUTPATIENT
Start: 2022-01-18 | End: 2022-01-20 | Stop reason: HOSPADM

## 2022-01-17 RX ORDER — FINASTERIDE 5 MG/1
5 TABLET, FILM COATED ORAL DAILY
Status: DISCONTINUED | OUTPATIENT
Start: 2022-01-18 | End: 2022-01-20 | Stop reason: HOSPADM

## 2022-01-17 RX ORDER — METOPROLOL SUCCINATE 50 MG/1
50 TABLET, EXTENDED RELEASE ORAL 2 TIMES DAILY
Status: DISCONTINUED | OUTPATIENT
Start: 2022-01-17 | End: 2022-01-19

## 2022-01-17 RX ORDER — SODIUM CHLORIDE 9 MG/ML
25 INJECTION, SOLUTION INTRAVENOUS PRN
Status: DISCONTINUED | OUTPATIENT
Start: 2022-01-17 | End: 2022-01-20 | Stop reason: HOSPADM

## 2022-01-17 RX ORDER — SODIUM CHLORIDE 0.9 % (FLUSH) 0.9 %
5-40 SYRINGE (ML) INJECTION PRN
Status: DISCONTINUED | OUTPATIENT
Start: 2022-01-17 | End: 2022-01-20 | Stop reason: HOSPADM

## 2022-01-17 RX ORDER — METOPROLOL TARTRATE 5 MG/5ML
5 INJECTION INTRAVENOUS EVERY 6 HOURS PRN
Status: DISCONTINUED | OUTPATIENT
Start: 2022-01-17 | End: 2022-01-20 | Stop reason: HOSPADM

## 2022-01-17 RX ORDER — DILTIAZEM HYDROCHLORIDE 5 MG/ML
10 INJECTION INTRAVENOUS ONCE
Status: DISCONTINUED | OUTPATIENT
Start: 2022-01-17 | End: 2022-01-20 | Stop reason: HOSPADM

## 2022-01-17 RX ORDER — ASPIRIN 81 MG/1
324 TABLET, CHEWABLE ORAL ONCE
Status: COMPLETED | OUTPATIENT
Start: 2022-01-17 | End: 2022-01-17

## 2022-01-17 RX ORDER — 0.9 % SODIUM CHLORIDE 0.9 %
1000 INTRAVENOUS SOLUTION INTRAVENOUS ONCE
Status: COMPLETED | OUTPATIENT
Start: 2022-01-17 | End: 2022-01-17

## 2022-01-17 RX ADMIN — Medication 10 ML: at 21:52

## 2022-01-17 RX ADMIN — SODIUM CHLORIDE 1000 ML: 9 INJECTION, SOLUTION INTRAVENOUS at 20:29

## 2022-01-17 RX ADMIN — METOPROLOL SUCCINATE 50 MG: 50 TABLET, EXTENDED RELEASE ORAL at 21:50

## 2022-01-17 RX ADMIN — ASPIRIN 324 MG: 81 TABLET, CHEWABLE ORAL at 19:18

## 2022-01-17 RX ADMIN — SODIUM CHLORIDE 1000 ML: 9 INJECTION, SOLUTION INTRAVENOUS at 18:13

## 2022-01-17 ASSESSMENT — PAIN SCALES - GENERAL
PAINLEVEL_OUTOF10: 8
PAINLEVEL_OUTOF10: 0
PAINLEVEL_OUTOF10: 3

## 2022-01-17 ASSESSMENT — PAIN DESCRIPTION - PAIN TYPE: TYPE: ACUTE PAIN

## 2022-01-17 ASSESSMENT — ENCOUNTER SYMPTOMS
BACK PAIN: 0
DIARRHEA: 1
SHORTNESS OF BREATH: 0
ABDOMINAL PAIN: 0
NAUSEA: 0
VOMITING: 0
PHOTOPHOBIA: 0
SORE THROAT: 1
RHINORRHEA: 0
COUGH: 1

## 2022-01-17 ASSESSMENT — PAIN DESCRIPTION - LOCATION: LOCATION: HEAD;NECK

## 2022-01-17 NOTE — ED TRIAGE NOTES
Pt presents to the ED from home c/o syncope and collapse today and Saturday. Pt c/o head pain and neck pain x4 weeks, pt had covid exposure on Thursday, pt reports diarrhea x1 week.

## 2022-01-17 NOTE — ED PROVIDER NOTES
[address] ENCOUNTER      Pt Name: Trinidad Huitron  MRN: [id number]  [place] 1935  Date of evaluation: 1/17/2022  Provider: Eh Vyas, [address] [place] [address]       Chief Complaint   Patient presents with    Loss of Consciousness         HISTORY OF PRESENT ILLNESS   (Location/Symptom, Timing/Onset, Context/Setting, Quality, Duration, Modifying Factors, Severity)  Note limiting factors. Trinidad Huitron is a 82 y.o. male with a past medical history of CKD, A. fib on Eliquis, hypertension hyperlipidemia who presents to the emergency department complaining of syncopal event. Patient has had 2 single events since over the weekend, most recent today where he was found the ground. States that he had episode of lightheadedness and presyncopal symptoms prior to waking up on the floor. Patient is complaining of mild headache and neck pain however this predates his fall today. Is on Eliquis for A. fib history. Patient is afebrile however has cough congestion body aches, patient is COVID vaccinated however has a close COVID exposure. Family is concerned due to patient not eating in the last few days as well as having episodes of diarrhea. Patient was found to be A. fib with rapid ventricular response on arrival.        Nursing Notes were reviewed.     PAST MEDICAL HISTORY     Past Medical History:   Diagnosis Date    Atrial fibrillation ([address]     Blind right eye     Chronic kidney disease     Hyperlipidemia     Hypertension     Pneumonia          SURGICAL HISTORY       Past Surgical History:   Procedure Laterality Date    APPENDECTOMY      COLONOSCOPY      EYE SURGERY      HERNIA REPAIR      JOINT REPLACEMENT  2013    left shoulder    PACEMAKER PLACEMENT  10/13/2017    Dr Katie House at [facility] ENDOSCOPY           CURRENT MEDICATIONS       Previous Medications    ASPIRIN 81 MG TABLET    Take 81 mg by mouth daily. ATORVASTATIN (LIPITOR) 20 MG TABLET    TAKE 1 TABLET BY MOUTH EVERY DAY    CITALOPRAM (CELEXA) 10 MG TABLET    Take 1 tablet by mouth daily    ELIQUIS 5 MG TABS TABLET    TAKE 1/2 TABLET BY MOUTH 2 TIMES DAILY--FILL ONLY WHEN REQUESTED!!!!!!!!!--    FERROUS SULFATE (IRON) 325 (65 FE) MG TABS    Take by mouth    FINASTERIDE (PROSCAR) 5 MG TABLET    Take 5 mg by mouth daily.     FLUTICASONE (FLONASE) 50 MCG/ACT NASAL SPRAY    2 sprays by Nasal route daily    ISOSORBIDE MONONITRATE (IMDUR) 30 MG EXTENDED RELEASE TABLET    TAKE 1 TABLET BY MOUTH DAILY    METOPROLOL SUCCINATE (TOPROL XL) 50 MG EXTENDED RELEASE TABLET    TAKE 1 TABLET BY MOUTH 2 TIMES DAILY    OMEPRAZOLE (PRILOSEC) 20 MG DELAYED RELEASE CAPSULE    TAKE 1 CAPSULE BY MOUTH 2 TIMES DAILY    TAMSULOSIN (FLOMAX) 0.4 MG CAPSULE    Take 0.4 mg by mouth daily       ALLERGIES     Sulfa antibiotics    FAMILY HISTORY       Family History   Problem Relation Age of Onset    Heart Disease Mother     Other Mother     Heart Disease Father     Asthma Other           SOCIAL HISTORY       Social History     Socioeconomic History    Marital status:      Spouse name: None    Number of children: None    Years of education: None    Highest education level: None   Occupational History    None   Tobacco Use    Smoking status: Former Smoker     Packs/day: 1.00     Years: 10.00     Pack years: 10.00     Quit date: 1975     Years since quittin.9    Smokeless tobacco: Never Used    Tobacco comment: Quit 40 years ago   Vaping Use    Vaping Use: Never used   Substance and Sexual Activity    Alcohol use: No     Alcohol/week: 0.0 standard drinks    Drug use: No    Sexual activity: Yes     Partners: Female   Other Topics Concern    None   Social History Narrative    None     Social Determinants of Health     Financial Resource Strain:     Difficulty of Paying Living Expenses: Not on file   Food Insecurity:     Worried About Running Out of Food in the Last Year: Not on file    Ran Out of Food in the Last Year: Not on file   Transportation Needs:     Lack of Transportation (Medical): Not on file    Lack of Transportation (Non-Medical): Not on file   Physical Activity:     Days of Exercise per Week: Not on file    Minutes of Exercise per Session: Not on file   Stress:     Feeling of Stress : Not on file   Social Connections:     Frequency of Communication with Friends and Family: Not on file    Frequency of Social Gatherings with Friends and Family: Not on file    Attends Latter-day Services: Not on file    Active Member of [address] [place] [address] [employer] or Organizations: Not on file    Attends Club or Organization Meetings: Not on file    Marital Status: Not on file   Intimate Partner Violence:     Fear of Current or Ex-Partner: Not on file    Emotionally Abused: Not on file    Physically Abused: Not on file    Sexually Abused: Not on file   Housing Stability:     Unable to Pay for Housing in the Last Year: Not on file    Number of [place] in the Last Year: Not on file    Unstable Housing in the Last Year: Not on file       SCREENINGS                            REVIEW OF SYSTEMS    (2-9 systems for level 4, 10 or more for level 5)   Review of Systems   Constitutional: Positive for fatigue. Negative for chills and fever. HENT: Positive for congestion and sore throat. Negative for rhinorrhea. Eyes: Negative for photophobia and visual disturbance. Respiratory: Positive for cough. Negative for shortness of breath. Cardiovascular: Negative for chest pain and palpitations. Gastrointestinal: Positive for diarrhea. Negative for abdominal pain, nausea and vomiting. Genitourinary: Negative for decreased urine volume. Musculoskeletal: Positive for myalgias and neck pain. Negative for back pain and neck stiffness. Skin: Negative for rash. Neurological: Positive for headaches. Negative for weakness and numbness.    Hematological: Bruises/bleeds easily. Psychiatric/Behavioral: Negative for confusion. PHYSICAL EXAM    (up to 7 for level 4, 8 or more for level 5)   RECENT VITALS:     Temp: 97.7 °F (36.5 °C),  Pulse: 131, Resp: 17, BP: (!) 125/90, SpO2: 99 %    Physical Exam  Constitutional:       General: He is not in acute distress. Appearance: He is not diaphoretic. HENT:      Head: Normocephalic and atraumatic. Eyes:      Pupils: Pupils are equal, round, and reactive to light. Neck:      Trachea: No tracheal deviation. Cardiovascular:      Rate and Rhythm: Tachycardia present. Rhythm irregular. Pulmonary:      Effort: Pulmonary effort is normal. No respiratory distress. Breath sounds: No stridor. No wheezing. Abdominal:      General: There is no distension. Palpations: Abdomen is soft. Tenderness: There is no abdominal tenderness. Musculoskeletal:         General: No deformity. Normal range of motion. Cervical back: Normal range of motion and neck supple. Skin:     General: Skin is warm and dry. Neurological:      General: No focal deficit present. Mental Status: He is alert. DIAGNOSTIC RESULTS     EKG: All EKG's are interpreted by the Emergency Department Physician who either signs or Co-signs this chart in the absence of a cardiologist.      The Ekg interpreted by me shows  atrial fibrillation with a rate of 119  Axis is   Right axis deviation  QTc is  normal  Intervals and Durations are unremarkable. ST Segments: no acute change      RADIOLOGY:   Non-plain film images such as CT, Ultrasound and MRI are read by the radiologist. Plain radiographic images are visualized and preliminarily interpreted by the emergency physician. Interpretation per the Radiologist below, if available at the time of this note:    XR CHEST PORTABLE   Final Result   1. No acute cardiopulmonary disease. 2.  Large hiatal hernia.          CT CERVICAL SPINE WO CONTRAST   Final Result   No acute abnormality of the cervical spine. Severe osteopenia and degenerative changes in the cervical spine. RECOMMENDATIONS:   Unavailable         CT HEAD WO CONTRAST   Final Result   No acute intracranial abnormality. Moderate senescent changes with parenchymal volume loss and chronic small   vessel ischemic changes. RECOMMENDATIONS:   Unavailable               LABS:  Labs Reviewed   COVID-19, RAPID - Abnormal; Notable for the following components:       Result Value    SARS-CoV-2, NAAT DETECTED (*)     All other components within normal limits    Narrative:     Hua Paniagua  [facility] tel. [id number],  Microbiology results called to and read back by Marline Potts, 01/17/2022  18:22, by Juli Kong  Performed at:  [facility]  [address] [place], [place] [address]. [place]   Phone [phone]   CBC WITH AUTO DIFFERENTIAL - Abnormal; Notable for the following components:    RDW 15.7 (*)     Lymphocytes Absolute 0.9 (*)     All other components within normal limits    Narrative:     Performed at:  [facility]  [address] [place], [place] [address]. [address]   Phone [phone]   COMPREHENSIVE METABOLIC PANEL W/ REFLEX TO MG FOR LOW K - Abnormal; Notable for the following components:    Chloride 98 (*)     Glucose 108 (*)     BUN 23 (*)     CREATININE 1.7 (*)     GFR Non- 39 (*)     GFR  47 (*)     Total Bilirubin 1.2 (*)     All other components within normal limits    Narrative:     Performed at:  [address] [facility]  [address] [place], [place] [address]. [facility] [address] [place] [address]   Phone [phone] - Abnormal; Notable for the following components:    Pro-BNP 4,479 (*)     All other components within normal limits    Narrative:     Performed at:  [address] [facility]  [address] [place], [place] [address]  [employer].  [address] Phone [phone]   TROPONIN - Abnormal; Notable for the following components:    Troponin 0.03 (*)     All other components within normal limits    Narrative:     Performed at:  [facility]  [facility] [place], [place]. [place], [facility] [address]   Phone [phone]   POCT GLUCOSE - Abnormal; Notable for the following components:    POC Glucose 118 (*)     All other components within normal limits    Narrative:     Performed at:  [facility]  [address] [place], [place]. [place], [address]   Phone [phone]   POCT GLUCOSE - Normal   TROPONIN       All other labs were within normal range or not returned as of this dictation. EMERGENCY DEPARTMENT COURSE and DIFFERENTIAL DIAGNOSIS/MDM:   Scott Bermudez is a 82 y.o. male who presents to the emergency department with the complaint of arise due to multiple syncopal events over the weekend including today. Patient was found to be A. fib with rapid ventricular response, we will initially attempt to rehydrate patient he clinically looks dehydrated, has had decreased p.o. intake as well as diarrhea. If this does not adequately control patient's heart rate we will start him on Cardizem for treatment. Concern for possible COVID exposure with COVID-like symptoms including congestion cough body aches will rapid COVID test now anticipation for admission. Will check basic labs including cardiac studies. Due to syncopal event with significant comorbidities anticipate admission. Patient's troponin is mildly elevated 0.03, no chest pain, EKG does not show signs of STEMI, troponin likely elevated due to demand from A. fib with rapid ventricular response, and acute NATANAEL with a creatinine today 1.7 up from a 1.2 earlier this year. Receiving IV fluids, there is some improvement of overall heart rate however due to persistent tachycardia will give bolus dose of Cardizem now.   CT head and C-spine are negative for acute traumatic injury. Based on multiple syncopal events, NATANAEL, and A. fib with rapid ventricular spots I do feel patient warrants admission to hospital service for management of these conditions. Will trend troponin, doubt ACS, will give full dose aspirin now. Patient is on Eliquis at baseline. CRITICAL CARE TIME   Total Critical Care time was 35 minutes, excluding separately reportable procedures. There was a high probability of clinically significant/life threatening deterioration in the patient's condition which required my urgent intervention. Clinical concern A. fib with RVR  Intervention history, physical exam, chart review, medication management, lab interpretation, charting    CONSULTS:  IP CONSULT TO HOSPITALIST    PROCEDURES:  Unless otherwise noted below, none     Procedures        FINAL IMPRESSION      1. Syncope and collapse    2. Dehydration    3. NATANAEL (acute kidney injury) ([facility]    4. COVID    5. Atrial fibrillation with RVR ([facility]          DISPOSITION/PLAN   DISPOSITION Decision To Admit 01/17/2022 06:47:34 PM      PATIENT REFERRED TO:  No follow-up provider specified. DISCHARGE MEDICATIONS:  New Prescriptions    No medications on file     Controlled Substances Monitoring:     No flowsheet data found.     (Please note that portions of this note were completed with a voice recognition program.  Efforts were made to edit the dictations but occasionally words are mis-transcribed.)    Dunia Hilton DO (electronically signed)  Attending Emergency Physician            Dunia Hilton DO  01/17/22 1911

## 2022-01-18 ENCOUNTER — TELEPHONE (OUTPATIENT)
Dept: CARDIOLOGY CLINIC | Age: 87
End: 2022-01-18

## 2022-01-18 ENCOUNTER — NURSE ONLY (OUTPATIENT)
Dept: CARDIOLOGY CLINIC | Age: 87
End: 2022-01-18
Payer: MEDICARE

## 2022-01-18 DIAGNOSIS — I49.5 SICK SINUS SYNDROME (HCC): ICD-10-CM

## 2022-01-18 DIAGNOSIS — Z95.0 PACEMAKER: ICD-10-CM

## 2022-01-18 LAB
A/G RATIO: 1.1 (ref 1.1–2.2)
ALBUMIN SERPL-MCNC: 3.1 G/DL (ref 3.4–5)
ALP BLD-CCNC: 78 U/L (ref 40–129)
ALT SERPL-CCNC: 11 U/L (ref 10–40)
ANION GAP SERPL CALCULATED.3IONS-SCNC: 12 MMOL/L (ref 3–16)
AST SERPL-CCNC: 27 U/L (ref 15–37)
BASOPHILS ABSOLUTE: 0 K/UL (ref 0–0.2)
BASOPHILS RELATIVE PERCENT: 0.5 %
BILIRUB SERPL-MCNC: 0.9 MG/DL (ref 0–1)
BUN BLDV-MCNC: 26 MG/DL (ref 7–20)
CALCIUM SERPL-MCNC: 8.7 MG/DL (ref 8.3–10.6)
CHLORIDE BLD-SCNC: 104 MMOL/L (ref 99–110)
CO2: 23 MMOL/L (ref 21–32)
CREAT SERPL-MCNC: 1.5 MG/DL (ref 0.8–1.3)
EKG ATRIAL RATE: 138 BPM
EKG DIAGNOSIS: NORMAL
EKG Q-T INTERVAL: 346 MS
EKG QRS DURATION: 76 MS
EKG QTC CALCULATION (BAZETT): 486 MS
EKG R AXIS: 100 DEGREES
EKG T AXIS: -56 DEGREES
EKG VENTRICULAR RATE: 119 BPM
EOSINOPHILS ABSOLUTE: 0 K/UL (ref 0–0.6)
EOSINOPHILS RELATIVE PERCENT: 0.2 %
GFR AFRICAN AMERICAN: 54
GFR NON-AFRICAN AMERICAN: 45
GLUCOSE BLD-MCNC: 71 MG/DL (ref 70–99)
HCT VFR BLD CALC: 44.6 % (ref 40.5–52.5)
HEMOGLOBIN: 15.2 G/DL (ref 13.5–17.5)
INR BLD: 1.26 (ref 0.88–1.12)
LYMPHOCYTES ABSOLUTE: 1.6 K/UL (ref 1–5.1)
LYMPHOCYTES RELATIVE PERCENT: 34.4 %
MCH RBC QN AUTO: 29.8 PG (ref 26–34)
MCHC RBC AUTO-ENTMCNC: 34.2 G/DL (ref 31–36)
MCV RBC AUTO: 87.2 FL (ref 80–100)
MONOCYTES ABSOLUTE: 0.7 K/UL (ref 0–1.3)
MONOCYTES RELATIVE PERCENT: 14.3 %
NEUTROPHILS ABSOLUTE: 2.3 K/UL (ref 1.7–7.7)
NEUTROPHILS RELATIVE PERCENT: 50.6 %
PDW BLD-RTO: 15.5 % (ref 12.4–15.4)
PLATELET # BLD: 148 K/UL (ref 135–450)
PMV BLD AUTO: 7.4 FL (ref 5–10.5)
POTASSIUM SERPL-SCNC: 4 MMOL/L (ref 3.5–5.1)
PROTHROMBIN TIME: 14.4 SEC (ref 9.9–12.7)
RBC # BLD: 5.11 M/UL (ref 4.2–5.9)
SODIUM BLD-SCNC: 139 MMOL/L (ref 136–145)
T4 FREE: 1.6 NG/DL (ref 0.9–1.8)
TOTAL PROTEIN: 5.8 G/DL (ref 6.4–8.2)
TROPONIN: <0.01 NG/ML
TSH REFLEX: 5.15 UIU/ML (ref 0.27–4.2)
WBC # BLD: 4.6 K/UL (ref 4–11)

## 2022-01-18 PROCEDURE — 80053 COMPREHEN METABOLIC PANEL: CPT

## 2022-01-18 PROCEDURE — 2580000003 HC RX 258: Performed by: INTERNAL MEDICINE

## 2022-01-18 PROCEDURE — 99221 1ST HOSP IP/OBS SF/LOW 40: CPT | Performed by: NURSE PRACTITIONER

## 2022-01-18 PROCEDURE — 6370000000 HC RX 637 (ALT 250 FOR IP): Performed by: INTERNAL MEDICINE

## 2022-01-18 PROCEDURE — 6370000000 HC RX 637 (ALT 250 FOR IP): Performed by: NURSE PRACTITIONER

## 2022-01-18 PROCEDURE — 2060000000 HC ICU INTERMEDIATE R&B

## 2022-01-18 PROCEDURE — 85025 COMPLETE CBC W/AUTO DIFF WBC: CPT

## 2022-01-18 PROCEDURE — 85610 PROTHROMBIN TIME: CPT

## 2022-01-18 PROCEDURE — 99223 1ST HOSP IP/OBS HIGH 75: CPT | Performed by: INTERNAL MEDICINE

## 2022-01-18 PROCEDURE — 84484 ASSAY OF TROPONIN QUANT: CPT

## 2022-01-18 PROCEDURE — 93010 ELECTROCARDIOGRAM REPORT: CPT | Performed by: INTERNAL MEDICINE

## 2022-01-18 PROCEDURE — 36415 COLL VENOUS BLD VENIPUNCTURE: CPT

## 2022-01-18 RX ORDER — LOPERAMIDE HYDROCHLORIDE 2 MG/1
2 CAPSULE ORAL 4 TIMES DAILY PRN
Status: DISCONTINUED | OUTPATIENT
Start: 2022-01-18 | End: 2022-01-20 | Stop reason: HOSPADM

## 2022-01-18 RX ADMIN — ASPIRIN 81 MG: 81 TABLET, CHEWABLE ORAL at 07:48

## 2022-01-18 RX ADMIN — PANTOPRAZOLE SODIUM 20 MG: 20 TABLET, DELAYED RELEASE ORAL at 17:47

## 2022-01-18 RX ADMIN — APIXABAN 2.5 MG: 5 TABLET, FILM COATED ORAL at 07:49

## 2022-01-18 RX ADMIN — TAMSULOSIN HYDROCHLORIDE 0.4 MG: 0.4 CAPSULE ORAL at 07:49

## 2022-01-18 RX ADMIN — METOPROLOL SUCCINATE 50 MG: 50 TABLET, EXTENDED RELEASE ORAL at 20:26

## 2022-01-18 RX ADMIN — PANTOPRAZOLE SODIUM 20 MG: 20 TABLET, DELAYED RELEASE ORAL at 09:50

## 2022-01-18 RX ADMIN — CITALOPRAM HYDROBROMIDE 10 MG: 20 TABLET ORAL at 09:50

## 2022-01-18 RX ADMIN — FERROUS SULFATE TAB 325 MG (65 MG ELEMENTAL FE) 325 MG: 325 (65 FE) TAB at 09:50

## 2022-01-18 RX ADMIN — ISOSORBIDE MONONITRATE 30 MG: 30 TABLET ORAL at 09:50

## 2022-01-18 RX ADMIN — LOPERAMIDE HYDROCHLORIDE 2 MG: 2 CAPSULE ORAL at 20:26

## 2022-01-18 RX ADMIN — METOPROLOL SUCCINATE 50 MG: 50 TABLET, EXTENDED RELEASE ORAL at 07:46

## 2022-01-18 RX ADMIN — Medication 10 ML: at 09:50

## 2022-01-18 RX ADMIN — ATORVASTATIN CALCIUM 20 MG: 10 TABLET, FILM COATED ORAL at 07:47

## 2022-01-18 RX ADMIN — APIXABAN 2.5 MG: 5 TABLET, FILM COATED ORAL at 20:26

## 2022-01-18 RX ADMIN — FINASTERIDE 5 MG: 5 TABLET, FILM COATED ORAL at 09:50

## 2022-01-18 ASSESSMENT — PAIN SCALES - GENERAL
PAINLEVEL_OUTOF10: 0

## 2022-01-18 NOTE — CONSULTS
[address] [facility]  [phone]        Reason for Consultation/Chief Complaint: \"I have been having lightheadedness and feeling sick. \"  Consulted for Afib and syncope x2  Current patient of Dr. Isabel Bullock. Saw Dr. Rivas Wakefield in 10/20    History of Present Illness:  Therese Gray is a 82 y.o. patient who presented to [place] [address] ER 1/17/2022 with c/o passing out twice over weekend, diarrhea, and head and neck pain. 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  10/17, HTN, HLD, and CRI. Most recent Echo 3/21/2019 noted EF=55-60%; left atrium is mildly dilated; right atrium is mod dilated; right ventricular systolic function is mildly reduced; right ventricle is mildly enlarged;ascending aorta is mildly dilated; prolapse of the anterior and posterior MV leaflets; mod TR. Most recent Lexiscan stress test 5/12/2020 noted EF=64%; small, mild defect in the apex at stress that reverses at rest consistent with ischemia. Most recent [facility] 5/22/2020 noted mild to mod diffuse epicardial CAD with approximately 50% stenosis of the mid-LAD; negative FFR; EF=55-60%. Now presents with 2 week history of weakness, diarrhea, anorexia, and lightheadedness. Reports 2 passing out spells where he felt weak and dizzy beforehand. EKG afib with RVR 119bpm; demand PM; ST change inferolateral and anterior leads consider ischemia (no sig change 7/20). COVID positive. Kayla initially 0.03, repeat <0.01 x 3; BUN 26 Cr 1.5. He received IV fluids and tachycardia persisted and then bolus dose of Cardizem was given. C-Spine and CT head negative for acute issues; +DJD spine. Device was interrogated today:  38% and SVT-AF with RVR longest 4 minutes 18-19 events on 1/17/2022. TIMBO 10.5 years. Patient with no complaints of chest pain, SOB, palpitations, dizziness, edema, or orthopnea/PND.  I have been asked to provide consultation regarding further management and testing. Past Medical History:   has a past medical history of Atrial fibrillation ([address] Blind right eye, Chronic kidney disease, Hyperlipidemia, Hypertension, and Pneumonia. Surgical History:   has a past surgical history that includes Tonsillectomy; Appendectomy; Colonoscopy; eye surgery; Upper gastrointestinal endoscopy; joint replacement (2013); hernia repair; and pacemaker placement (10/13/2017). Social History:   reports that he quit smoking about 46 years ago. He has a 10.00 pack-year smoking history. He has never used smokeless tobacco. He reports that he does not drink alcohol and does not use drugs. Family History:  family history includes Asthma in an other family member; Heart Disease in his father and mother; Other in his mother. Home Medications:  Were reviewed and are listed in nursing record. and/or listed below  Prior to Admission medications    Medication Sig Start Date End Date Taking?  Authorizing Provider   Ferrous Sulfate (IRON) 325 (65 Fe) MG TABS Take by mouth    Historical Provider, MD   ELIQUNATALIA 5 MG TABS tablet TAKE 1/2 TABLET BY MOUTH 2 TIMES DAILY--FILL ONLY WHEN REQUESTED!!!!!!!!!-- 11/15/21   Alva Xie MD   atorvastatin (LIPITOR) 20 MG tablet TAKE 1 TABLET BY MOUTH EVERY DAY 11/15/21   Alva Xie MD   citalopram (CELEXA) 10 MG tablet Take 1 tablet by mouth daily 10/21/21   Alva Xie MD   isosorbide mononitrate (IMDUR) 30 MG extended release tablet TAKE 1 TABLET BY MOUTH DAILY 8/11/21   Costa Kaba MD   metoprolol succinate (TOPROL XL) 50 MG extended release tablet TAKE 1 TABLET BY MOUTH 2 TIMES DAILY 8/11/21   Costa Kaba MD   omeprazole (PRILOSEC) 20 MG delayed release capsule TAKE 1 CAPSULE BY MOUTH 2 TIMES DAILY 8/3/21 1/10/22  GUS Mcbride - SOLO   fluticasone Creasie Sarbjit) 50 MCG/ACT nasal spray 2 sprays by Nasal route daily 4/19/21   Alva Xie MD   tamsulosin (FLOMAX) 0.4 MG capsule Take 0.4 mg by mouth daily Historical Provider, MD   finasteride (PROSCAR) 5 MG tablet Take 5 mg by mouth daily. 3/4/15   Historical Provider, MD   aspirin 81 MG tablet Take 81 mg by mouth daily.       Historical Provider, MD        Allergies:  Sulfa antibiotics     Review of Systems:   12 point ROS negative in all areas as listed below except as in [place]  Constitutional, EENT, Cardiovascular, pulmonary, GI, , Musculoskeletal, skin, neurological, hematological, endocrine, Psychiatric    Physical Examination:    Vitals:    01/18/22 1530   BP: 120/79   Pulse: 93   Resp: 16   Temp:    SpO2: 96%    Weight: 157 lb 9 oz (71.5 kg)         General Appearance:  Alert, cooperative, no distress, appears stated age   Head:  Normocephalic, without obvious abnormality, atraumatic   Eyes:  PERRL, conjunctiva/corneas clear       Nose: Nares normal, no drainage or sinus tenderness   Throat: Lips, mucosa, and tongue normal   Neck: Supple, symmetrical, trachea midline, no adenopathy, thyroid: not enlarged, symmetric, no tenderness/mass/nodules, no carotid bruit or JVD       Lungs:   Clear to auscultation bilaterally, respirations unlabored   Chest Wall:  No tenderness or deformity   Heart:  Regular rate and rhythm, S1, S2 normal, no murmur, rub or gallop   Abdomen:   Soft, non-tender, bowel sounds active all four quadrants,  no masses, no organomegaly           Extremities: Extremities normal, atraumatic, no cyanosis or edema   Pulses: 2+ and symmetric   Skin: Skin color, texture, turgor normal, no rashes or lesions   Pysch: Normal mood and affect   Neurologic: Normal gross motor and sensory exam.         Labs  CBC:   Lab Results   Component Value Date    WBC 4.6 01/18/2022    RBC 5.11 01/18/2022    HGB 15.2 01/18/2022    HCT 44.6 01/18/2022    MCV 87.2 01/18/2022    RDW 15.5 01/18/2022     01/18/2022     CMP:    Lab Results   Component Value Date     01/18/2022    K 4.0 01/18/2022    K 4.3 01/17/2022     01/18/2022    CO2 23 01/18/2022 BUN 26 01/18/2022    CREATININE 1.5 01/18/2022    GFRAA 54 01/18/2022    GFRAA >60 01/09/2013    AGRATIO 1.1 01/18/2022    LABGLOM 45 01/18/2022    GLUCOSE 71 01/18/2022    PROT 5.8 01/18/2022    PROT 7.6 01/09/2013    CALCIUM 8.7 01/18/2022    BILITOT 0.9 01/18/2022    ALKPHOS 78 01/18/2022    AST 27 01/18/2022    ALT 11 01/18/2022     PT/INR:  No results found for: PTINR  Lab Results   Component Value Date    TROPONINI <0.01 01/18/2022       EKG:  I have reviewed EKG with the following interpretation:  Impression: See HPI    1/17/2022  Demand pacemaker; interpretation is based on intrinsic rhythmAtrial fibrillation with rapid ventricular response Rightward axis ST abnormality consider inferolateral and anterior ischemia Abnormal ECGWhen compared with ECG of 22-MAY-2020 10:04, Electronic demand pacing is now     [place] Host Stress Test 512/2020   Summary  ABNORMAL LOW RISK STRESS TEST  Normal LV size and systolic function. Left ventricular ejection fraction of  64%. Normal wall motion. There is a small, mild defect in the apex at stress  that reverses at rest consistent with ischemia. Cardiac Cath 5/22/2020  Findings:     1. Left main coronary artery was normal. It gave off the left anterior descending artery and left circumflex. 2. Left anterior descending artery has moderate disease with about 50% stenosis after the takeoff of the first diagonal branch. It was moderate in size. It gave off septal perforators and a moderate sized diagonal branch. The LAD covered the entire apex of the left ventricle. 3. Left circumflex has mild atherosclerotic disease. It was moderate in size. There was a moderate sized obtuse marginal branch. 4. Right coronary artery has mild atherosclerotic disease. It was moderate in size and was the dominant artery. 5. Left ventriculogram showed normal LVEF at 55-60%. Wall motion was normal . There was no significant mitral valve or aortic valve disease noted.  LVEDP was normal. There was no gradient noted across the aortic valve during pullback of the catheter. Echo 3/21/2019   Summary   Normal left ventricular systolic function with an estimated ejection   fraction of 55-60%. No regional wall motion abnormalities are seen. Normal left ventricular diastolic filling pressure. The left atrium is mildly dilated. The right atrium is moderately dilated. Right ventricular systolic function is mildly reduced . The right ventricle is mildly enlarged. The ascending aorta is mildly dilated. 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 Mild mitral regurgitation. Moderate tricuspid regurgitation. Systolic pulmonary artery pressure (SPAP) estimated at 47 mmHg (RA pressure   15 mmHg), consistent with mild pulmonary hypertension. Pacemaker lead in right ventricle. Head CT 1/17/2022  No acute intracranial abnormality. Moderate senescent changes with parenchymal volume loss and chronic small vessel ischemic changes. C-Spine X-Ray 1/17/2022  No acute abnormality of the cervical spine. Severe osteopenia and degenerative changes in the cervical spine. Assessment: Connie Jerome is a 82 y.o. patient who presented to [place] [address] ER 1/17/2022 with c/o passing out twice over weekend, diarrhea, and head and neck pain. 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  10/17, HTN, HLD, and CRI. Most recent Echo 3/21/2019 noted EF=55-60%; left atrium is mildly dilated; right atrium is mod dilated; right ventricular systolic function is mildly reduced; right ventricle is mildly enlarged;ascending aorta is mildly dilated; prolapse of the anterior and posterior MV leaflets; mod TR. Most recent Lexiscan stress test 5/12/2020 noted EF=64%; small, mild defect in the apex at stress that reverses at rest consistent with ischemia.   Most recent [facility] 5/22/2020 noted mild to mod diffuse epicardial CAD with approximately 50% stenosis of the mid-LAD; negative FFR; EF=55-60%. Now presents with 2 week history of weakness, diarrhea, anorexia, and lightheadedness. Reports 2 passing out spells where he felt weak and dizzy beforehand. EKG afib with RVR 119bpm; demand PM; ST change inferolateral and anterior leads consider ischemia (no sig change 7/20). COVID positive. Kayla initially 0.03, repeat <0.01 x 3; BUN 26 Cr 1.5. He received IVF and tachycardia persisted and then bolus dose of Cardizem was given. C-Spine and CT head negative for acute issues; +DJD spine. Device was interrogated today:  38% and SVT-AF with RVR longest 4 minutes 18-19 events on 1/17/2022. TIMBO 10.5 years. Diagnosis of syncope and rapid afib likely related to Covid infection and dehydration in elderly male with hx NOCAD, chronic afib, and s/p . Recs:  1. Hydrate with IVF. 2. Continue baby asa qd, eliquis 2.5mg BID, lipitor 20mg qd, imdur 30mg qd, toprol XL 50mg BID. Hold BP lowering meds if cannot tolerate. 3. Covid treatment per IM team.  4. No need for cardiac testing at this time. See how he responds clinically. 5. Device rep will need to make adjustments due to undersensing found on interrogation. Nurse to call [facility] rep to make sure this happens. Patient Active Problem List   Diagnosis    Pneumonia    Abnormal chest CT    Cough syncope    Tracheobronchomalacia    Hyperlipidemia    Hypertension    Chronic kidney disease, stage III (moderate) (HCC)    A-fib ([address]    Pacemaker, [facility]  placed in [facility] Abnormal echocardiogram    Dizziness    SOB (shortness of breath)    Abnormal cardiovascular stress test    Coronary artery disease involving native coronary artery of native heart without angina pectoris, 5/2020 abnormal stress test. [facility] mild LAD disease     Other chest pain    Atrial fibrillation with rapid ventricular response ([address]       Thank you for allowing to us to participate in the care or [place]. Further evaluation will be based upon the patient's clinical course and testing results.

## 2022-01-18 NOTE — TELEPHONE ENCOUNTER
Occasional ventricular under sensing. I would reprogram ventricular sensitivity from auto to a finite setting. This would not provide an explanation for the syncope.

## 2022-01-18 NOTE — ED NOTES
Pacer interrogated with [employer] device. Report received and placed in PT file.      Rocio Lucas, EMT-P  01/18/22 7437

## 2022-01-18 NOTE — ED NOTES
Woke pt at this time and notified him that I needed to collect repeat blood work. Pt proceeded to go back to sleep afterwards. Notified him that I would be back in the am if he is still here to get morning labs. Made sure call light is within reach and notified pt to hit red button if he needed anything.      Malu Gardner RN  01/17/22 8637

## 2022-01-18 NOTE — ED NOTES
Pt assisted to restroom at this time and placed back on monitor after returning. Daily weight completed as well.      Harpreet Catalan, ENIO  01/18/22 1498

## 2022-01-18 NOTE — PROGRESS NOTES
4 Eyes Skin Assessment     The patient is being assess for   Admission    I agree that 2 RN's have performed a thorough Head to Toe Skin Assessment on the patient. ALL assessment sites listed below have been assessed. Areas assessed for pressure by both nurses:   [x]   Head, Face, and Ears   [x]   Shoulders, Back, and Chest, Abdomen  [x]   Arms, Elbows, and Hands   [x]   Coccyx, Sacrum, and Ischium  [x]   Legs, Feet, and Heels        Skin Assessed Under all Medical Devices by both nurses:  Bandaid on L foot from where Pt cut his toenails too short. All Mepilex Borders were peeled back and area peeked at by both nurses:  No: N/A  Please list where Mepilex Borders are located:  N/a             **SHARE this note so that the co-signing nurse is able to place an eSignature**    Co-signer eSignature: Electronically signed by Richard Loco RN on 1/18/22 at 7:01 PM EST    Does the Patient have Skin Breakdown related to pressure?   No              Dionisio Prevention initiated:  NA   Wound Care Orders initiated:  NA      [facility] nurse consulted for Pressure Injury (Stage 3,4, Unstageable, DTI, NWPT, Complex wounds)and New or Established Ostomies:  NA      Primary Nurse eSignature: Electronically signed by Lucila Alvarez RN on 1/18/22 at 6:51 PM EST

## 2022-01-18 NOTE — ED NOTES
Received call from [address] mendez rep, states that PT had A-fib with RVR and undersense on 1-17-22 from 1320 - 1607. States pacer needs adjusted and will call when someone is out on the [facility] to meet with the PT.      Reji Arellano, EMT-P  01/18/22 4321

## 2022-01-18 NOTE — TELEPHONE ENCOUNTER
Spoke w/ [facility] Rep Leland Garrison. V undersensing noted-will increase sensitivity- dynamic sensitivity. Pt needs f/u OV w/ AJK (last ov 10/2020) and device check for programming changes. Call [employer] rep to assist with programming changes. 1/17/2022 - present (1 day)  [facility]      Loss of Consciousness    Chief complaint     Remote transmission received for patient's single chamber PACEMAKER. Transmission shows normal sensing and pacing function. EP physician will review. See interrogation under the cardiology tab in the [address] [place] [address] [address] field for more details. Will continue to monitor remotely. Hx AF (oac, toprol xl).  38%  HVR recordings-show SVT, AF-RVR, longest 4 min. 18/19 events occurred on 1/17/22.

## 2022-01-18 NOTE — PROGRESS NOTES
Called [place] ER to determine when Pt's device will be checked. RN at [facility] said that [employer] is aware and should be coming out later to check on the device.     Marlyn Bonilla RN

## 2022-01-18 NOTE — ED NOTES
Pt report given to [facility] transport team. Pt remains alert and oriented, no apparent distress, vitals WNL. Transport team denies further questions. Pt care transferred at this time. Report called to [facility]. Spoke to [facility]. RN denies further questions. Pt care transferred at this time.      Star Favre, RN  01/18/22 9587

## 2022-01-18 NOTE — PLAN OF CARE
82yoM presented to [facility] w/ c/o syncope and collapse x2 (Sat and today), diarrhea x1 wk, head and neck pain x 1 month, exposed to COVID on Thurs. Found to be COVID + on 1/17/21. He is not requiring O2. Pt on Eliquis, reduced his dose 2/2 age, weight and Cr. Pharmacy to monitor and increase dose when he meets normal dosing parameters. Pt not expected to arrive during this shift.       A fib w/ RVR  NATANAEL on probable CKD  Syncope and collapse x2  COVID +  Indeterminate troponin  Diarrhea  Probable dehydration

## 2022-01-18 NOTE — PROGRESS NOTES
1/17/2022 - present (1 day)  [facility]      Loss of Consciousness    Chief complaint     Remote transmission received for patient's single chamber PACEMAKER. Transmission shows normal sensing and pacing function. EP physician will review. See interrogation under the cardiology tab in the [address] [place] [address] [address] field for more details. Will continue to monitor remotely. Hx AF (oac, toprol xl).  38%  HVR recordings-show SVT, AF-RVR, longest 4 min. 18/19 events occurred on 1/17/22. Spoke w/ [facility] Rep Kori Jarquin. V undersensing noted-will increase sensitivity dynamic sensitivity. Pt needs f/u OV w/ AJK and device check for programming changes. Call [employer] rep to assist with programming changes.

## 2022-01-18 NOTE — ED NOTES
[facility] at [facility] and transport eta     Anna Yogesh  01/18/22 0319

## 2022-01-18 NOTE — ED NOTES
Pt given some of his morning medications that we had in stock at this time due to bp going up.      Martita Guzman RN  01/18/22 2161

## 2022-01-18 NOTE — ED NOTES
Pt ambulated to restroom with steady gait at this time. Assisted pt back in bed and given pillow for comfort. Adjusted bed at this time so pt will stop sliding in bed. Stated he is comfortable now. Call light within reach, notified pt to hit call light if he needs anything.      Marcia Bess RN  01/18/22 9499

## 2022-01-18 NOTE — FLOWSHEET NOTE
01/18/22 1630   Vital Signs   Temp 98.7 °F (37.1 °C)   Temp Source Axillary   Pulse 85   Heart Rate Source Monitor   Resp 18   BP (!) 158/80   BP Location Right upper arm   Level of Consciousness Alert (0)   MEWS Score 1   Oxygen Therapy   SpO2 92 %   Pulse Oximeter Device Mode Continuous   O2 Device None (Room air)   O2 Flow Rate (L/min) 0 L/min       Pt admitted to PCU at this time from [place] [address]. [place]. Oriented to room and call light system. Pt denies any other care needs at this time. No orders to be released. Pt stable. Patient is not able to demonstrated the ability to move from a reclining position to an upright position within the recliner.  however patient is alert, oriented and able to provide informed consent     Ligia Rushing RN

## 2022-01-18 NOTE — PLAN OF CARE
Problem: Airway Clearance - Ineffective  Goal: Achieve or maintain patent airway  Outcome: Ongoing     Problem: Gas Exchange - Impaired  Goal: Absence of hypoxia  Outcome: Ongoing  Goal: Promote optimal lung function  Outcome: Ongoing     Problem: Isolation Precautions - Risk of Spread of Infection  Goal: Prevent transmission of infection  Outcome: Ongoing     Problem: Nutrition Deficits  Goal: Optimize nutritional status  Outcome: Ongoing     Problem: Risk for Fluid Volume Deficit  Goal: Maintain normal heart rhythm  Outcome: Ongoing  Goal: Maintain absence of muscle cramping  Outcome: Ongoing  Goal: Maintain normal serum potassium, sodium, calcium, phosphorus, and pH  Outcome: Ongoing     Problem: Loneliness or Risk for Loneliness  Goal: Demonstrate positive use of time alone when socialization is not possible  Outcome: Ongoing     Problem: Fatigue  Goal: Verbalize increase energy and improved vitality  Outcome: Ongoing     Problem: Falls - Risk of:  Goal: Will remain free from falls  Description: Will remain free from falls  Outcome: Ongoing  Goal: Absence of physical injury  Description: Absence of physical injury  Outcome: Ongoing

## 2022-01-19 PROCEDURE — 99232 SBSQ HOSP IP/OBS MODERATE 35: CPT | Performed by: NURSE PRACTITIONER

## 2022-01-19 PROCEDURE — 2060000000 HC ICU INTERMEDIATE R&B

## 2022-01-19 PROCEDURE — 6370000000 HC RX 637 (ALT 250 FOR IP): Performed by: INTERNAL MEDICINE

## 2022-01-19 PROCEDURE — 93288 INTERROG EVL PM/LDLS PM IP: CPT | Performed by: INTERNAL MEDICINE

## 2022-01-19 PROCEDURE — 6370000000 HC RX 637 (ALT 250 FOR IP): Performed by: NURSE PRACTITIONER

## 2022-01-19 PROCEDURE — 99233 SBSQ HOSP IP/OBS HIGH 50: CPT | Performed by: INTERNAL MEDICINE

## 2022-01-19 PROCEDURE — 2580000003 HC RX 258: Performed by: INTERNAL MEDICINE

## 2022-01-19 RX ORDER — METOPROLOL SUCCINATE 25 MG/1
25 TABLET, EXTENDED RELEASE ORAL ONCE
Status: COMPLETED | OUTPATIENT
Start: 2022-01-19 | End: 2022-01-19

## 2022-01-19 RX ORDER — MECOBALAMIN 5000 MCG
5 TABLET,DISINTEGRATING ORAL NIGHTLY
Status: DISCONTINUED | OUTPATIENT
Start: 2022-01-19 | End: 2022-01-20 | Stop reason: HOSPADM

## 2022-01-19 RX ADMIN — METOPROLOL SUCCINATE 25 MG: 25 TABLET, FILM COATED, EXTENDED RELEASE ORAL at 10:51

## 2022-01-19 RX ADMIN — Medication 10 ML: at 09:06

## 2022-01-19 RX ADMIN — FINASTERIDE 5 MG: 5 TABLET, FILM COATED ORAL at 09:05

## 2022-01-19 RX ADMIN — APIXABAN 2.5 MG: 5 TABLET, FILM COATED ORAL at 21:25

## 2022-01-19 RX ADMIN — APIXABAN 2.5 MG: 5 TABLET, FILM COATED ORAL at 09:05

## 2022-01-19 RX ADMIN — Medication 10 ML: at 21:27

## 2022-01-19 RX ADMIN — TAMSULOSIN HYDROCHLORIDE 0.4 MG: 0.4 CAPSULE ORAL at 09:06

## 2022-01-19 RX ADMIN — FERROUS SULFATE TAB 325 MG (65 MG ELEMENTAL FE) 325 MG: 325 (65 FE) TAB at 09:05

## 2022-01-19 RX ADMIN — PANTOPRAZOLE SODIUM 20 MG: 20 TABLET, DELAYED RELEASE ORAL at 16:26

## 2022-01-19 RX ADMIN — METOPROLOL SUCCINATE 50 MG: 50 TABLET, EXTENDED RELEASE ORAL at 09:05

## 2022-01-19 RX ADMIN — ISOSORBIDE MONONITRATE 30 MG: 30 TABLET ORAL at 09:05

## 2022-01-19 RX ADMIN — ASPIRIN 81 MG: 81 TABLET, CHEWABLE ORAL at 09:05

## 2022-01-19 RX ADMIN — Medication 5 MG: at 21:25

## 2022-01-19 RX ADMIN — ATORVASTATIN CALCIUM 20 MG: 10 TABLET, FILM COATED ORAL at 09:05

## 2022-01-19 RX ADMIN — SODIUM CHLORIDE 1000 ML: 9 INJECTION, SOLUTION INTRAVENOUS at 03:12

## 2022-01-19 RX ADMIN — PANTOPRAZOLE SODIUM 20 MG: 20 TABLET, DELAYED RELEASE ORAL at 05:59

## 2022-01-19 RX ADMIN — CITALOPRAM HYDROBROMIDE 10 MG: 20 TABLET ORAL at 09:05

## 2022-01-19 RX ADMIN — METOPROLOL SUCCINATE 75 MG: 25 TABLET, FILM COATED, EXTENDED RELEASE ORAL at 21:25

## 2022-01-19 ASSESSMENT — PAIN SCALES - GENERAL: PAINLEVEL_OUTOF10: 0

## 2022-01-19 NOTE — PROGRESS NOTES
Bedside report and transfer of care given to [facility], [address] [place] [address]. Pt currently resting in bed with the call light within reach. Pt denies any other care needs at this time. Pt stable at this time.     Rudy Nelson RN

## 2022-01-19 NOTE — PROGRESS NOTES
Oral Daily    apixaban  2.5 mg Oral BID    finasteride  5 mg Oral Daily    ferrous sulfate  325 mg Oral Daily    isosorbide mononitrate  30 mg Oral Daily    pantoprazole  20 mg Oral BID AC    tamsulosin  0.4 mg Oral Daily    sodium chloride flush  5-40 mL IntraVENous 2 times per day       Continuous Infusions:   sodium chloride 1,000 mL (01/19/22 0312)    sodium chloride         Data:  CBC:   Recent Labs     01/17/22  1809 01/18/22  0605   WBC 5.5 4.6   RBC 5.58 5.11   HGB 16.6 15.2   HCT 48.6 44.6   MCV 87.2 87.2   RDW 15.7* 15.5*    148     BMP:   Recent Labs     01/17/22  1809 01/18/22  0605    139   K 4.3 4.0   CL 98* 104   CO2 24 23   BUN 23* 26*   CREATININE 1.7* 1.5*     BNP: No results for input(s): BNP in the last 72 hours. PT/INR:   Recent Labs     01/18/22  0605   PROTIME 14.4*   INR 1.26*     APTT: No results for input(s): APTT in the last 72 hours. CARDIAC ENZYMES:   Recent Labs     01/17/22 2037 01/17/22  2300 01/18/22  0605   TROPONINI <0.01 <0.01 <0.01     FASTING LIPID PANEL:  Lab Results   Component Value Date    CHOL 137 01/10/2022    HDL 42 01/10/2022    TRIG 89 01/10/2022     LIVER PROFILE:   Recent Labs     01/17/22  1809 01/18/22  0605   AST 35 27   ALT 18 11   BILITOT 1.2* 0.9   ALKPHOS 96 78         CULTURES    SARS-CoV-2, NAAT DETECTED          EKG:    Demand pacemaker; interpretation is based on intrinsic rhythmAtrial fibrillation with rapid ventricular responseRightward axisST abnormality consider inferolateral and anterior ischemiaAbnormal ECGWhen compared with ECG of 22-MAY-2020 10:04,Electronic demand pacing is now PresentConfirmed by Wendel Cushing MD, STEPHEN ([id number]) on 1/18/2022 7:45:20 AM             RADIOLOGY  XR CHEST PORTABLE   Final Result   1. No acute cardiopulmonary disease. 2.  Large hiatal hernia. CT CERVICAL SPINE WO CONTRAST   Final Result   No acute abnormality of the cervical spine.       Severe osteopenia and degenerative changes in the cervical spine. RECOMMENDATIONS:   Unavailable         CT HEAD WO CONTRAST   Final Result   No acute intracranial abnormality. Moderate senescent changes with parenchymal volume loss and chronic small   vessel ischemic changes. RECOMMENDATIONS:   Unavailable           Pacemaker Interrogation 1/17     Remote transmission received for patient's single chamber PACEMAKER.  Transmission shows normal sensing and pacing function.  EP physician will review.  See interrogation under the cardiology tab in the [address] [place] [address] [address] field for more details.  Will continue to monitor remotely. Hx AF (oac, toprol xl).  38%  HVR recordings-show SVT, AF-RVR, longest 4 min. 18/19 events occurred on 1/17/22.       Assessment:  Active Problems:    S/P placement of cardiac pacemaker    Atrial fibrillation with rapid ventricular response (HCC)    Syncope and collapse    COVID    NATANAEL (acute kidney injury) ([facility] [address]    Dehydration  Resolved Problems:    * No resolved hospital problems. *      Plan:     COVID +  - dx on admission  - vaccinated, received 2 vaccines, but not booster. per patient, not under vaccination tab   - on room air  - doesn't meet criteria for treatment at this time  - admit to PCU with continuous pulse ox and telemetry   -Droplet plus precautions  Continue to monitor, oxygen saturations stable tonight.      Long standing persistent Atrial Fibrillation   - initially with RVR  [employer] single chamber PPM (10/13/2017 placed)  - known history  - follows with Dr. Criss Mendoza   - device interrogation today as below  Hx AF (oac, toprol xl).     38%  HVR recordings-show SVT, AF-RVR, longest 4 min. 18/19 events occurred on 1/17/22. Device rep spoke w/ [employer] Rep Evaristo Reyes. V undersensing noted-will increase sensitivity dynamic sensitivity. Pt needs f/u OV w/ [facility] and device check for programming changes. Call [facility] rep to assist with programming changes.   - continue Eliquis, renal dosed  - cardiology consulted, given x1 dose of Cardizem in the ED, now controlled.  -Heart rate stable now. Toprol dose has been increased. Continue to monitor in telemetry.      Syncope and collapse x2  Dehydration  - CT of the head showed no acute intracranial abnormality, moderate senescent changes with parenchymal volume loss and chronic small vessel ischemic changes   - cardiology consulted  - pacer interrogated see above  - Continue IVF's  - continue aspirin, Eliquis, Atorvastatin, Imdur, Toprol-XL. Toprol dose increased for rapid A. Fib.   -Heart rate stable now  - no need for further cardiac testing at this time  - also patient reports he had an EEG at [facility]. May need to have RN try to obtain these records tomorrow. Continue IV hydration today. Continue to monitor in telemetry overnight . CAD  Elevated troponin  - on 5/22/20 [facility] showed mild LAD disease   - troponin 0.03, <0.01, <0.01, <0.01  - Denies chest pain  - continue Imdur, ASA, Toprol, and statin      CKD stage 3a  - baseline 1.3-1.9  - creatinine on admission 1.7---down to 1.5 today   - IVF ordered   Monitor BMP     Diarrhea   - ongoing x1 week prior to admission  - Imodium prn  Patient has not had any diarrhea since admission. However he has not eaten anything as he is worried that he will have diarrhea. .  Encourage p.o. intake and monitor for any recurrent diarrhea     HTN  - BP elevated: Continue Toprol-XL, Imdur     HLD  - Continue statin      GERD  - on PPI     Iron deficiency  - on Ferrous sulfate    Restlessness and agitation.   -This is mainly because patient is enclosed in droplet plus precautions.   -His exam is nonfocal.  CT head was negative this admission. Afebrile . Not hypoxic   Currently sitter at bedside. Family asked to comment. We will give him melatonin tonight     DVT Prophylaxis: Eliquis  Diet: ADULT DIET; Regular  Code Status: Full Code     Likely plans for discharge in a.m. Total time today 34 minutes.  > 50 % time dominated by coordination of care .  Plan of care discussed with patient's nurse and charge nurse         Devante Mirza MD, MD 1/19/2022 3:48 PM

## 2022-01-19 NOTE — FLOWSHEET NOTE
01/19/22 0224   Vital Signs   Temp 98.1 °F (36.7 °C)   Temp Source Oral   Pulse 68   Heart Rate Source Monitor   Resp 18   BP (!) 153/94   BP Location Right upper arm   Patient Position Semi fowlers   Level of Consciousness Alert (0)   MEWS Score 1     Pt awake in bed. VS as shown above. Pt denies any further needs at this time. Call light in reach and bed in lowest position.

## 2022-01-19 NOTE — PLAN OF CARE
cramping  1/19/2022 1329 by Americo Sigala RN  Outcome: Ongoing  1/19/2022 0537 by Johnathan Goodpasture, RN  Outcome: Ongoing  Goal: Maintain normal serum potassium, sodium, calcium, phosphorus, and pH  1/19/2022 1329 by Americo Sigala RN  Outcome: Ongoing  1/19/2022 0537 by Johnathan Goodpasture, RN  Outcome: Ongoing     Problem: Loneliness or Risk for Loneliness  Goal: Demonstrate positive use of time alone when socialization is not possible  1/19/2022 1329 by Americo Sigala RN  Outcome: Ongoing  1/19/2022 0537 by Johnathan Goodpasture, RN  Outcome: Ongoing     Problem: Fatigue  Goal: Verbalize increase energy and improved vitality  1/19/2022 1329 by Americo Sigala RN  Outcome: Ongoing  1/19/2022 0537 by Johnathan Goodpasture, RN  Outcome: Ongoing     Problem: Patient Education: Go to Patient Education Activity  Goal: Patient/Family Education  1/19/2022 1329 by Americo Sigala RN  Outcome: Ongoing  1/19/2022 0537 by Johnathan Goodpasture, RN  Outcome: Ongoing     Problem: Falls - Risk of:  Goal: Will remain free from falls  Description: Will remain free from falls  1/19/2022 1329 by Americo Sigala RN  Outcome: Ongoing  1/19/2022 0537 by Johnathan Goodpasture, RN  Outcome: Ongoing  Goal: Absence of physical injury  Description: Absence of physical injury  1/19/2022 1329 by Americo Sigala RN  Outcome: Ongoing  1/19/2022 0537 by Johnathan Goodpasture, RN  Outcome: Ongoing     Problem: Non-Violent Restraints  Goal: Removal from restraints as soon as assessed to be safe  Outcome: Ongoing  Goal: No harm/injury to patient while restraints in use  Outcome: Ongoing  Goal: Patient's dignity will be maintained  Outcome: Ongoing

## 2022-01-19 NOTE — PLAN OF CARE
Problem: Airway Clearance - Ineffective  Goal: Achieve or maintain patent airway  1/19/2022 0537 by Princess Spencer RN  Outcome: Ongoing  1/18/2022 1807 by Dana Solomon RN  Outcome: Ongoing     Problem: Gas Exchange - Impaired  Goal: Absence of hypoxia  1/19/2022 0537 by Princess Spencer RN  Outcome: Ongoing  1/18/2022 1807 by Dana Solomon RN  Outcome: Ongoing  Goal: Promote optimal lung function  1/19/2022 0537 by Princess Spencer RN  Outcome: Ongoing  1/18/2022 1807 by Dana Solomon RN  Outcome: Ongoing     Problem: Breathing Pattern - Ineffective  Goal: Ability to achieve and maintain a regular respiratory rate  1/19/2022 0537 by Princess Spencer RN  Outcome: Ongoing  1/18/2022 1807 by Dana Solomon RN  Outcome: Ongoing     Problem:  Body Temperature -  Risk of, Imbalanced  Goal: Ability to maintain a body temperature within defined limits  1/19/2022 0537 by Princess Spencer RN  Outcome: Ongoing  1/18/2022 1807 by Dana Solomon RN  Outcome: Ongoing  Goal: Will regain or maintain usual level of consciousness  1/19/2022 0537 by Princess Spencer RN  Outcome: Ongoing  1/18/2022 1807 by Dana Solomon RN  Outcome: Ongoing  Goal: Complications related to the disease process, condition or treatment will be avoided or minimized  1/19/2022 0537 by Princess Spencer RN  Outcome: Ongoing  1/18/2022 1807 by Dana Solomon RN  Outcome: Ongoing     Problem: Isolation Precautions - Risk of Spread of Infection  Goal: Prevent transmission of infection  1/19/2022 0537 by Princess Spencer RN  Outcome: Ongoing  1/18/2022 1807 by Dana Solomon RN  Outcome: Ongoing     Problem: Nutrition Deficits  Goal: Optimize nutritional status  1/19/2022 0537 by Princess Spencer RN  Outcome: Ongoing  1/18/2022 1807 by Dana Solomon RN  Outcome: Ongoing     Problem: Risk for Fluid Volume Deficit  Goal: Maintain normal heart rhythm  1/19/2022 0537 by Princess Spencer RN  Outcome: Ongoing  1/18/2022 1807 by Dana Solomon RN  Outcome: Ongoing  Goal: Maintain absence of muscle cramping  1/19/2022 0537 by Gogo Trejo RN  Outcome: Ongoing  1/18/2022 1807 by Aydee Malone RN  Outcome: Ongoing  Goal: Maintain normal serum potassium, sodium, calcium, phosphorus, and pH  1/19/2022 0537 by Gogo Trejo RN  Outcome: Ongoing  1/18/2022 1807 by Aydee Malone RN  Outcome: Ongoing     Problem: Loneliness or Risk for Loneliness  Goal: Demonstrate positive use of time alone when socialization is not possible  1/19/2022 0537 by Gogo Trejo RN  Outcome: Ongoing  1/18/2022 1807 by Aydee Malone RN  Outcome: Ongoing     Problem: Fatigue  Goal: Verbalize increase energy and improved vitality  1/19/2022 0537 by Gogo Trejo RN  Outcome: Ongoing  1/18/2022 1807 by Aydee Malone RN  Outcome: Ongoing     Problem: Patient Education: Go to Patient Education Activity  Goal: Patient/Family Education  1/19/2022 0537 by Gogo Trejo RN  Outcome: Ongoing  1/18/2022 1807 by Aydee Malone RN  Outcome: Ongoing     Problem: Falls - Risk of:  Goal: Will remain free from falls  Description: Will remain free from falls  1/19/2022 0537 by Gogo Trejo RN  Outcome: Ongoing  1/18/2022 1807 by Aydee Malone RN  Outcome: Ongoing  Goal: Absence of physical injury  Description: Absence of physical injury  1/19/2022 0537 by Gogo Trejo RN  Outcome: Ongoing  1/18/2022 1807 by Aydee Malone RN  Outcome: Ongoing

## 2022-01-19 NOTE — PROGRESS NOTES
Patient sliding down in bed attempting to get up unassisted unable to redirect at this time assist called to help settle patient down sitter placed with patient to assist in calming patient called and spoke with wife Giulia Nicely she and patients child will come and sit with him for a while

## 2022-01-19 NOTE — PROGRESS NOTES
[address]  Cardiology  Progress Note    Admission date:  2022    Reason for follow up visit: AF and syncope    HPI/CC: Sada Gaines is a 82 y.o. male who was admitted 2022 for syncope and diarrhea. COVID+. EKG showed AF RVR. Rhythm has been AF 110s, occasional 130s. Vitals:  Blood pressure (!) 161/95, pulse 103, temperature 98.2 °F (36.8 °C), temperature source Oral, resp. rate 18, height 5' 7\" (1.702 m), weight 160 lb 5 oz (72.7 kg), SpO2 94 %.   Temp  Av °F (36.7 °C)  Min: 97 °F (36.1 °C)  Max: 98.7 °F (37.1 °C)  Pulse  Av.1  Min: 68  Max: 103  BP  Min: 101/79  Max: 161/95  SpO2  Av.5 %  Min: 92 %  Max: 99 %    24 hour I/O    Intake/Output Summary (Last 24 hours) at 2022 0910  Last data filed at 2022 0316  Gross per 24 hour   Intake --   Output 150 ml   Net -150 ml     Current Facility-Administered Medications   Medication Dose Route Frequency Provider Last Rate Last Admin    loperamide (IMODIUM) capsule 2 mg  2 mg Oral 4x Daily PRN GUS Hernandez - CNP   2 mg at 22    dilTIAZem injection 10 mg  10 mg IntraVENous Once Elfredia Erma,         0.9 % sodium chloride infusion  1,000 mL IntraVENous Continuous Nimisha Jenkins  mL/hr at 22 0312 1,000 mL at 22 8055    aspirin chewable tablet 81 mg  81 mg Oral Daily Nimisha Jenkins MD   81 mg at 22 09    atorvastatin (LIPITOR) tablet 20 mg  20 mg Oral Daily Nimisha Jenkins MD   20 mg at 22 09    citalopram (CELEXA) tablet 10 mg  10 mg Oral Daily Nimisha Jenkins MD   10 mg at 22 2092    apixaban (ELIQUIS) tablet 2.5 mg  2.5 mg Oral BID Nimisha Jenkins MD   2.5 mg at 22 0668    finasteride (PROSCAR) tablet 5 mg  5 mg Oral Daily Nimisha Jenkins MD   5 mg at 22    ferrous sulfate (IRON 325) tablet 325 mg  325 mg Oral Daily Nimisha Jenkins MD   325 mg at 22    isosorbide mononitrate (IMDUR) extended release tablet 30 mg  30 mg Oral Daily Marsha Blackwell MD   30 mg at 01/19/22 0905    pantoprazole (PROTONIX) tablet 20 mg  20 mg Oral BID AC Marsha Blackwell MD   20 mg at 01/19/22 0559    tamsulosin (FLOMAX) capsule 0.4 mg  0.4 mg Oral Daily Marsha Blackwell MD   0.4 mg at 01/19/22 7656    sodium chloride flush 0.9 % injection 5-40 mL  5-40 mL IntraVENous 2 times per day Marsha Blackwell MD   10 mL at 01/19/22 0906    sodium chloride flush 0.9 % injection 5-40 mL  5-40 mL IntraVENous PRN Marsha Blackwell MD        0.9 % sodium chloride infusion  25 mL IntraVENous PRN Marsha Blackwell MD        ondansetron (ZOFRAN-ODT) disintegrating tablet 4 mg  4 mg Oral Q8H PRN Marsha Blackwell MD        Or    ondansetron [facility]) injection 4 mg  4 mg IntraVENous Q6H PRN Marsha Blackwell MD        polyethylene glycol [facility]) packet 17 g  17 g Oral Daily PRN Marsha Blackwell MD        acetaminophen (TYLENOL) tablet 650 mg  650 mg Oral Q6H PRN Marsha Blackwell MD        Or    acetaminophen (TYLENOL) suppository 650 mg  650 mg Rectal Q6H PRN Marsha Blackwell MD        metoprolol (LOPRESSOR) injection 5 mg  5 mg IntraVENous Q6H PRN Marsha Blackwell MD        metoprolol succinate (TOPROL XL) extended release tablet 50 mg  50 mg Oral BID Marsha Blackwell MD   50 mg at 01/19/22 5066     Objective:     Telemetry monitor: AF 110s-130s    Physical Exam:  Deferred due to COVID status    Data Reviewed:    Coronary angiogram 5/2020:  1. Left heart catheterization  2. Selective left and right coronary angiogram  3. Left ventriculography   4. Fractional flow reserve of the left anterior descending artery at 0.87  Procedure Findings:  1. Mild to moderate diffuse epicardial coronary artery disease with approximately 50% stenosis of the mid left anterior descending artery. .  2. Normal left ventricular function with EF estimated at 55-60%  3.  Normal left heart hemodynamics  Details:              Gatha Denver was brought to the significant mitral valve or aortic valve disease noted. LVEDP was normal. There was no gradient noted across the aortic valve during pullback of the catheter. CONCLUSIONS:  1.  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 ASSESSMENT/RECOMMENDATIONS:  1. Aggressive secondary risk factor modification. Echo 2019:  Normal left ventricular systolic function with an estimated ejection   fraction of 55-60%. No regional wall motion abnormalities are seen. Normal left ventricular diastolic filling pressure. The left atrium is mildly dilated. The right atrium is moderately dilated. Right ventricular systolic function is mildly reduced . The right ventricle is mildly enlarged. The ascending aorta is mildly dilated. 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 Mild mitral regurgitation. Moderate tricuspid regurgitation. Systolic pulmonary artery pressure (SPAP) estimated at 47 mmHg (RA pressure   15 mmHg), consistent with mild pulmonary hypertension. Pacemaker lead in right ventricle.     Lab Reviewed:     Renal Profile:  Lab Results   Component Value Date    CREATININE 1.5 01/18/2022    BUN 26 01/18/2022     01/18/2022    K 4.0 01/18/2022    K 4.3 01/17/2022     01/18/2022    CO2 23 01/18/2022     CBC:    Lab Results   Component Value Date    WBC 4.6 01/18/2022    RBC 5.11 01/18/2022    HGB 15.2 01/18/2022    HCT 44.6 01/18/2022    MCV 87.2 01/18/2022    RDW 15.5 01/18/2022     01/18/2022     BNP:    Lab Results   Component Value Date    PROBNP 4,479 01/17/2022    PROBNP 1,002 01/24/2016    PROBNP 107 02/27/2015     Fasting Lipid Panel:    Lab Results   Component Value Date    CHOL 137 01/10/2022    HDL 42 01/10/2022    TRIG 89 01/10/2022     Cardiac Enzymes:  CK/MbTroponin  Lab Results   Component Value Date    TROPONINI <0.01 01/18/2022     PT/ INR   Lab Results   Component Value Date    INR 1.26 01/18/2022    INR 1.18

## 2022-01-19 NOTE — FLOWSHEET NOTE
01/18/22 2015   Vital Signs   Temp 97 °F (36.1 °C)   Temp Source Oral   Pulse 80   Heart Rate Source Monitor   Resp 17   /78   BP Location Right upper arm   Patient Position Semi fowlers   Level of Consciousness Alert (0)   MEWS Score 1   Patient Currently in Pain Denies   Oxygen Therapy   SpO2 96 %   O2 Device None (Room air)   O2 Flow Rate (L/min) 0 L/min     Pt assessment complete; see flow sheets. Vitals completed. Meds given per MAR. Imodium given for diarrhea. Pt repositioned in bed. Bed alarm on and call light within reach. Pt denies any other care needs at this time. Pt stable. At this time.     Arin Desai RN

## 2022-01-19 NOTE — PROGRESS NOTES
Patient agitated and attempting to leave room/unit to go outside to get his son, thinks visitor in parking lot is his family patient redirected and now laying down in bed continues with IV fluids per order IV site to left forearm unremarkable offers no c/o pain/discomfort at this time

## 2022-01-19 NOTE — TELEPHONE ENCOUNTER
will watch for discharge and contact pt to schedule followup with [facility] + device ck for sometime after 2/1/22.

## 2022-01-20 VITALS
RESPIRATION RATE: 16 BRPM | HEIGHT: 67 IN | DIASTOLIC BLOOD PRESSURE: 103 MMHG | OXYGEN SATURATION: 92 % | SYSTOLIC BLOOD PRESSURE: 144 MMHG | HEART RATE: 104 BPM | WEIGHT: 162 LBS | BODY MASS INDEX: 25.43 KG/M2 | TEMPERATURE: 97.5 F

## 2022-01-20 PROCEDURE — 2580000003 HC RX 258: Performed by: INTERNAL MEDICINE

## 2022-01-20 PROCEDURE — 99232 SBSQ HOSP IP/OBS MODERATE 35: CPT | Performed by: NURSE PRACTITIONER

## 2022-01-20 PROCEDURE — 6370000000 HC RX 637 (ALT 250 FOR IP): Performed by: INTERNAL MEDICINE

## 2022-01-20 PROCEDURE — 99238 HOSP IP/OBS DSCHRG MGMT 30/<: CPT | Performed by: INTERNAL MEDICINE

## 2022-01-20 PROCEDURE — 2500000003 HC RX 250 WO HCPCS: Performed by: INTERNAL MEDICINE

## 2022-01-20 PROCEDURE — 6370000000 HC RX 637 (ALT 250 FOR IP): Performed by: NURSE PRACTITIONER

## 2022-01-20 RX ORDER — METOPROLOL SUCCINATE 25 MG/1
75 TABLET, EXTENDED RELEASE ORAL 2 TIMES DAILY
Qty: 180 TABLET | Refills: 3 | Status: ON HOLD | OUTPATIENT
Start: 2022-01-20 | End: 2022-04-23 | Stop reason: SDUPTHER

## 2022-01-20 RX ORDER — CLONIDINE HYDROCHLORIDE 0.1 MG/1
0.1 TABLET ORAL EVERY 4 HOURS PRN
Status: DISCONTINUED | OUTPATIENT
Start: 2022-01-20 | End: 2022-01-20 | Stop reason: HOSPADM

## 2022-01-20 RX ADMIN — ASPIRIN 81 MG: 81 TABLET, CHEWABLE ORAL at 09:05

## 2022-01-20 RX ADMIN — METOPROLOL TARTRATE 5 MG: 5 INJECTION INTRAVENOUS at 04:42

## 2022-01-20 RX ADMIN — APIXABAN 2.5 MG: 5 TABLET, FILM COATED ORAL at 09:04

## 2022-01-20 RX ADMIN — PANTOPRAZOLE SODIUM 20 MG: 20 TABLET, DELAYED RELEASE ORAL at 04:42

## 2022-01-20 RX ADMIN — CITALOPRAM HYDROBROMIDE 10 MG: 20 TABLET ORAL at 09:04

## 2022-01-20 RX ADMIN — ISOSORBIDE MONONITRATE 30 MG: 30 TABLET ORAL at 09:05

## 2022-01-20 RX ADMIN — FERROUS SULFATE TAB 325 MG (65 MG ELEMENTAL FE) 325 MG: 325 (65 FE) TAB at 09:05

## 2022-01-20 RX ADMIN — TAMSULOSIN HYDROCHLORIDE 0.4 MG: 0.4 CAPSULE ORAL at 09:05

## 2022-01-20 RX ADMIN — ATORVASTATIN CALCIUM 20 MG: 10 TABLET, FILM COATED ORAL at 09:04

## 2022-01-20 RX ADMIN — METOPROLOL SUCCINATE 75 MG: 25 TABLET, FILM COATED, EXTENDED RELEASE ORAL at 09:04

## 2022-01-20 RX ADMIN — SODIUM CHLORIDE 1000 ML: 9 INJECTION, SOLUTION INTRAVENOUS at 11:49

## 2022-01-20 RX ADMIN — FINASTERIDE 5 MG: 5 TABLET, FILM COATED ORAL at 09:05

## 2022-01-20 ASSESSMENT — PAIN SCALES - GENERAL: PAINLEVEL_OUTOF10: 0

## 2022-01-20 NOTE — CARE COORDINATION
Case Management Assessment  Initial Evaluation and Discharge      Patient Name: David Wan  YOB: 1935  Diagnosis: Syncope and collapse [R55]  Dehydration [E86.0]  NATANAEL (acute kidney injury) ([address] [N17.9]  Atrial fibrillation with rapid ventricular response ([address] [I48.91]  Atrial fibrillation with RVR ([address] [I48.91]  COVID [U07.1]  Date / Time: 1/17/2022  5:03 PM    Admission status/Date:1/17/2022  Chart Reviewed: Yes      Patient Interviewed: No   Family Interviewed:  Yes - Roberto Guzman (daughter)      Hospitalization in the last 30 days:  No      Health Care Decision Maker :   Primary Decision Maker: Conor Pierson 12 - [phone]    Secondary Decision Maker: Ailyn Squires Child - [phone]    (CM - must 1st enter selection under Navigator - emergency contact- [place] Relationship and pick relationship)   Who do you trust or have selected to make healthcare decisions for you      Met with: Roberto Guzman (daughter)  Elvia Klein conducted  (bedside/phone): phone    Current PCP: Carmen Fisher MD      Financial  Medicare  Precert required for SNF : Y, N          3 night stay required - Y, N    ADLS  Support Systems/Care Needs: Spouse/Significant Other,Children,Family Members  Transportation: self    Meal Preparation: self    Housing  Living Arrangements: ranch with mother  Steps: 3  Intent for return to present living arrangements: Yes  Identified Issues: [id number] [address] [facility] with Home Health Care : No Agency:(Services)  Type of Home Care Services: None  Passport/Waiver : No  :                      Phone Number:    Passport/Waiver Services: n/a          Durable Medical Equiptment   DME Provider: n/a  Equipment: n/a  Walker___Cane___RTS___ BSC___Shower Chair___Hospital Bed___W/C____Other________  02 at ____Liter(s)---wears(frequency)_______ HHN ___ CPAP___ BiPap___   N/A____      Home O2 Use :  No    If No for home O2---if presently on O2 during hospitalization:  No  if yes CM to follow for potential DC O2 need  Informed of need for care provider to bring portable home O2 tank on day of discharge for nursing to connect prior to leaving:   Not Indicated  Verbalized agreement/Understanding:   Not Indicated    Community Service Affiliation  Dialysis:  No    · Agency:  · Location:  · Dialysis Schedule:  · Phone:   · Fax: Other Community Services: (ex:PT/OT,Mental Health,Wound Clinic, Cardio/Pul [address])    DISCHARGE PLAN: Explained Case Management role/services. Reviewed chart. Role of discharge planner explained and patient's daughter, Andre Tomlin, verbalized understanding. Per Andre Tomlin, pt is from a ranch home with spouse, and plans to return. Pt is covid pos as of 1/17/2022. Covid is vaccinated x 2, but not a booster. Discharge order is noted. Pt is being d/c'd to home today. Pt's O2 sats are 92% on RA. Radha Almazan RN gave pt a pulse ox from the Pyxis d/t pt having covid. Radha Almazan RN is walking pt and if O2 sats are 88% or less with ambulation, Ingrid Brown will let CM know. CM offered HC and, per Andre Tomlin, declines HC. No further discharge needs needed or noted. Discharge timeout done with Ingrid Brown. All discharge needs and concerns addressed.

## 2022-01-20 NOTE — PROGRESS NOTES
Pt resting inbed, wife at bedside at this time, ,eds given per [facility], pt denying any needs at this time. Bed in lowest, call light within reach.  Chnace Garcia RN

## 2022-01-20 NOTE — PROGRESS NOTES
[address]  Cardiology  Progress Note    Admission date:  2022    Reason for follow up visit: AF and syncope    HPI/CC: Viraj Kiser is a 82 y.o. male who was admitted 2022 for syncope and diarrhea. COVID+. EKG showed AF RVR. Rhythm has been AF 70s. Vitals:  Blood pressure (!) 144/103, pulse 104, temperature 97.5 °F (36.4 °C), temperature source Oral, resp. rate 16, height 5' 7\" (1.702 m), weight 162 lb (73.5 kg), SpO2 92 %.   Temp  Av.8 °F (36.6 °C)  Min: 97.5 °F (36.4 °C)  Max: 98.1 °F (36.7 °C)  Pulse  Av.6  Min: 89  Max: 108  BP  Min: 142/89  Max: 170/122  SpO2  Av %  Min: 92 %  Max: 98 %    24 hour I/O    Intake/Output Summary (Last 24 hours) at 2022 1326  Last data filed at 2022 0750  Gross per 24 hour   Intake 80 ml   Output 575 ml   Net -495 ml     Current Facility-Administered Medications   Medication Dose Route Frequency Provider Last Rate Last Admin    cloNIDine (CATAPRES) tablet 0.1 mg  0.1 mg Oral Q4H PRN Aureliano Mg MD        metoprolol succinate (TOPROL XL) extended release tablet 75 mg  75 mg Oral BID Neftaligabriel López, APRN - CNP   75 mg at 22 1482    melatonin disintegrating tablet 5 mg  5 mg Oral Nightly Genie Mccrary MD   5 mg at 22    loperamide (IMODIUM) capsule 2 mg  2 mg Oral 4x Daily PRN Cardell Severance, APRN - CNP   2 mg at 22    dilTIAZem injection 10 mg  10 mg IntraVENous Once Max Wills DO        0.9 % sodium chloride infusion  1,000 mL IntraVENous Continuous Aureliano Mg  mL/hr at 22 1149 1,000 mL at 22 1149    aspirin chewable tablet 81 mg  81 mg Oral Daily Aureliano Mg MD   81 mg at 22 2399    atorvastatin (LIPITOR) tablet 20 mg  20 mg Oral Daily Aureliano Mg MD   20 mg at 22 0904    citalopram (CELEXA) tablet 10 mg  10 mg Oral Daily Aureliano Mg MD   10 mg at 22 5229    apixaban (ELIQUIS) tablet 2.5 mg  2.5 mg Oral BID Janki Palma Marco Kim MD   2.5 mg at 01/20/22 9457    finasteride (PROSCAR) tablet 5 mg  5 mg Oral Daily Juan Casey MD   5 mg at 01/20/22 0362    ferrous sulfate (IRON 325) tablet 325 mg  325 mg Oral Daily Juan Casey MD   325 mg at 01/20/22 9230    isosorbide mononitrate (IMDUR) extended release tablet 30 mg  30 mg Oral Daily Juan Casey MD   30 mg at 01/20/22 0905    pantoprazole (PROTONIX) tablet 20 mg  20 mg Oral BID AC Juan Casey MD   20 mg at 01/20/22 0442    tamsulosin (FLOMAX) capsule 0.4 mg  0.4 mg Oral Daily Juan Casey MD   0.4 mg at 01/20/22 0908    sodium chloride flush 0.9 % injection 5-40 mL  5-40 mL IntraVENous 2 times per day Juan Casey MD   10 mL at 01/19/22 2127    sodium chloride flush 0.9 % injection 5-40 mL  5-40 mL IntraVENous PRN Juan Casey MD        0.9 % sodium chloride infusion  25 mL IntraVENous PRN Juan Casey MD        ondansetron (ZOFRAN-ODT) disintegrating tablet 4 mg  4 mg Oral Q8H PRN Juan Casey MD        Or    [facility]dansetron [facility]) injection 4 mg  4 mg IntraVENous Q6H PRN Juan Casey MD        polyethylene glycol [facility]) packet 17 g  17 g Oral Daily PRN Juan Casey MD        acetaminophen (TYLENOL) tablet 650 mg  650 mg Oral Q6H PRN Juan Casey MD        Or    acetaminophen (TYLENOL) suppository 650 mg  650 mg Rectal Q6H PRN Juan Casey MD        metoprolol (LOPRESSOR) injection 5 mg  5 mg IntraVENous Q6H PRN Juan Casey MD   5 mg at 01/20/22 0576     Objective:     Telemetry monitor: AF 110s-130s    Physical Exam:  Deferred due to COVID status    Data Reviewed:    Coronary angiogram 5/2020:  1. Left heart catheterization  2. Selective left and right coronary angiogram  3. Left ventriculography   4. Fractional flow reserve of the left anterior descending artery at 0.87  Procedure Findings:  1.   Mild to moderate diffuse epicardial coronary artery disease with approximately 50% stenosis of the mid left anterior descending artery. .  2. Normal left ventricular function with EF estimated at 55-60%  3. Normal left heart hemodynamics  Details:              Jennifer Nguyen was brought to the cardiac catheterization lab in a fasting state after informed consent was obtained. If the patient was able to provide written consent, it was obtained. The patient's vitals were monitored through out the procedure. The patient was sedated using the appropriate levels of sedation and ASA guidelines. The appropriate access site area was prepped and drapped in a sterile fashion. The area was anesthetized with 2% lidocaine. Using the modified Seldinger technique, an arterial sheath was introduced into the arterial access site using a single anterior wall puncture. The sheath was flushed and prepped in usual fashion. Catheters used during the procedure included 5 Filipino TIG 4.0 catheter. The catheters were advanced and removed over a .035\" wire, into the appropriate positions. Multiple angiographic views were obtained. An LV gram was obtained. Fractional flow reserve was calculated using XB 3.0 catheter. Adjunctive arm therapy was aspirin and Angiomax. Fractional flow reserve was advanced into the distal LAD. Resting fractional flow reserve was 0.93 and after peak adenosine hyperemia at high dose the fractional flow reserve was 0.87. Findings:  1. Left main coronary artery was normal. It gave off the left anterior descending artery and left circumflex. 2. Left anterior descending artery has moderate disease with about 50% stenosis after the takeoff of the first diagonal branch. It was moderate in size. It gave off septal perforators and a moderate sized diagonal branch. The LAD covered the entire apex of the left ventricle. 3. Left circumflex has mild atherosclerotic disease. It was moderate in size. There was a moderate sized obtuse marginal branch.   4. Right coronary artery has mild atherosclerotic disease. It was moderate in size and was the dominant artery. 5. Left ventriculogram showed normal LVEF at 55-60%. Wall motion was normal . There was no significant mitral valve or aortic valve disease noted. LVEDP was normal. There was no gradient noted across the aortic valve during pullback of the catheter. CONCLUSIONS:  1.  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 ASSESSMENT/RECOMMENDATIONS:  1. Aggressive secondary risk factor modification. Echo 2019:  Normal left ventricular systolic function with an estimated ejection   fraction of 55-60%. No regional wall motion abnormalities are seen. Normal left ventricular diastolic filling pressure. The left atrium is mildly dilated. The right atrium is moderately dilated. Right ventricular systolic function is mildly reduced . The right ventricle is mildly enlarged. The ascending aorta is mildly dilated. 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 Mild mitral regurgitation. Moderate tricuspid regurgitation. Systolic pulmonary artery pressure (SPAP) estimated at 47 mmHg (RA pressure   15 mmHg), consistent with mild pulmonary hypertension. Pacemaker lead in right ventricle.     Lab Reviewed:     Renal Profile:  Lab Results   Component Value Date    CREATININE 1.5 01/18/2022    BUN 26 01/18/2022     01/18/2022    K 4.0 01/18/2022    K 4.3 01/17/2022     01/18/2022    CO2 23 01/18/2022     CBC:    Lab Results   Component Value Date    WBC 4.6 01/18/2022    RBC 5.11 01/18/2022    HGB 15.2 01/18/2022    HCT 44.6 01/18/2022    MCV 87.2 01/18/2022    RDW 15.5 01/18/2022     01/18/2022     BNP:    Lab Results   Component Value Date    PROBNP 4,479 01/17/2022    PROBNP 1,002 01/24/2016    PROBNP 107 02/27/2015     Fasting Lipid Panel:    Lab Results   Component Value Date    CHOL 137 01/10/2022    HDL 42 01/10/2022    TRIG 89 01/10/2022     Cardiac Enzymes:  CK/MbTroponin  Lab Results   Component Value Date    TROPONINI <0.01 01/18/2022     PT/ INR   Lab Results   Component Value Date    INR 1.26 01/18/2022    INR 1.18 05/22/2020    INR 1.02 01/24/2016    PROTIME 14.4 01/18/2022    PROTIME 13.7 05/22/2020    PROTIME 11.6 01/24/2016     PTT No results found for: PTT   Lab Results   Component Value Date    MG 1.6 12/28/2021      Lab Results   Component Value Date    TSH 2.45 10/21/2021     All labs and imaging reviewed today    Assessment:  Permanent atrial fibrillation: rate controlled   - [id number] score >2 on eliquis  SND: s/p single chamber ppm 10/2017  Syncope: noted prior to admission, possibly related to dehydration  CAD: moderate on angiogram 5/2020  HTN: uncontrolled  HLD  CKD  COVID+    Plan:   1. Continue toprol 75 mg BID (increased 1/19/2022 due to RVR)  2. Continue eliquis 2.5 mg BID (reduced dose due to age and crt)  3. Continue aspirin, statin, imdur  4. Ok to discharge from cardiology perspective, please call with any questions. Office arranging follow up and device check to make device adjustments.     GUS Bruce-CNP  [address]  [phone]

## 2022-01-20 NOTE — ACP (ADVANCE CARE PLANNING)
Advance Care Planning   Healthcare Decision Maker:    Primary Decision Maker: Blayne Yadi - Spouse - [phone]    Secondary Decision Maker: Natasha Catherine Child - [phone]    Click here to complete Healthcare Decision Makers including selection of the Healthcare Decision Maker Relationship (ie \"Primary\").

## 2022-01-20 NOTE — FLOWSHEET NOTE
01/20/22 0400   Vital Signs   Pulse 107   Heart Rate Source Monitor   Resp 16   BP (!) 170/122   BP Location Left lower arm   perfectserve sent at this time regarding bp

## 2022-01-20 NOTE — TELEPHONE ENCOUNTER
Patient will be discharged today and can be contacted tomorrow for appointment with Dr. Oscar Fitzgerald and device check. Thanks!

## 2022-01-20 NOTE — PLAN OF CARE
Problem: Skin Integrity:  Goal: Absence of new skin breakdown  Description: Absence of new skin breakdown  Outcome: Ongoing     Problem: Skin Integrity:  Goal: Will show no infection signs and symptoms  Description: Will show no infection signs and symptoms  Outcome: Ongoing     Problem: Non-Violent Restraints  Goal: Removal from restraints as soon as assessed to be safe  Outcome: Ongoing     Problem: Non-Violent Restraints  Goal: No harm/injury to patient while restraints in use  Outcome: Ongoing     Problem: Non-Violent Restraints  Goal: Patient's dignity will be maintained  Outcome: Ongoing     Problem: Falls - Risk of:  Goal: Will remain free from falls  Description: Will remain free from falls  Outcome: Ongoing     Problem: Loneliness or Risk for Loneliness  Goal: Demonstrate positive use of time alone when socialization is not possible  Outcome: Ongoing

## 2022-01-20 NOTE — PROGRESS NOTES
O2 Sat at rest on room air is 96%. O2 Sat with activity on room air is95%. Home pulse ox given to patients daughter. Discharge instructions reviewed with patients daughter.  She states understanding

## 2022-01-20 NOTE — PROGRESS NOTES
Patient attempted to get out of bed. Pulled patient up in bed, helped use urinal, and set up breakfast tray.      Electronically signed by Serafin Reynoso RN on 1/20/22 at 8:03 AM EST

## 2022-01-20 NOTE — FLOWSHEET NOTE
01/20/22 0742   Vital Signs   Temp 97.5 °F (36.4 °C)   Temp Source Oral   Pulse 104   Heart Rate Source Monitor   Resp 16   BP (!) 144/103   BP Location Left upper arm   Patient Position Supine   Level of Consciousness Alert (0)   MEWS Score 2   Patient Currently in Pain Denies   Pain Assessment   Pain Assessment 0-10   Pain Level 0   Oxygen Therapy   SpO2 92 %   O2 Device None (Room air)   Assessment complete and morning medications administered

## 2022-01-21 ENCOUNTER — CARE COORDINATION (OUTPATIENT)
Dept: CASE MANAGEMENT | Age: 87
End: 2022-01-21
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
## 2022-01-21 NOTE — CARE COORDINATION
Transitions of Care Call  Call within 2 business days of discharge: Yes    Patient: Elsie Finley Patient : 1935   MRN: [id number]  Reason for Admission: COVID-19 Detected on 2022, long standing persistent A Fib s/p pacer 10/2017 (on Eliquis), syncope & collapse x 2, dehydration, CKD3a, HTN, diarrhea, HLD, GERD, iron deficiency, restlessness and agitation. Discharge Date: 22 RARS: Readmission Risk Score: 14.6 ( )      Last Discharge [facility]       Complaint Diagnosis Description Type Department Provider    22 Loss of Consciousness Syncope and collapse . .. ED to Hosp-Admission (Discharged) (ADMITTED) Pierce Gutierrez MD; Cary Spatz. .. Was this an external facility discharge? No Discharge Facility: NA    Challenges to be reviewed by the provider   Additional needs identified to be addressed with provider: No             Encounter was not routed to provider for escalation. Method of communication with provider: none. Discussed COVID-19 related testing which was: available at this time. Test results were: positive. Spouse informed of results, if available? Yes. Current Symptoms: no new symptoms and no worsening symptoms    Reviewed New or Changed Meds: yes    Do you have what you need at home?  Durable Medical Equipment ordered at discharge: pulse oximeter   Home Health/Outpatient orders at discharge: none   Was patient discharged with a pulse oximeter? Yes Discussed and confirmed pulse oximeter discharge instructions and when to notify provider or seek emergency care. Patient education provided: Reviewed appropriate site of care based on symptoms and resources available to patient including: PCP and Specialist.     Follow up appointment scheduled within 7 days of discharge: no. If no appointment scheduled, scheduling offered: Spouse calling [facility] for appt.   Future Appointments   Date Time Provider Yulissa Landon   2022  1:00 PM Rossy Campbell [place] [facility] AWV LPN [facility] [facility]   2/7/2022  8:00 AM SCHEDULE, [facility] [place] [facility]   4/19/2022  7:30 AM SCHEDULE, [facility] REMOTE [facility]   7/8/2022  7:40 AM Monica Adler MD [facility]   7/19/2022  7:30 AM SCHEDULE, Savannah Trent REMOTE TRANSMISSION [place] [facility]     Interventions: Obtained and reviewed discharge summary and/or continuity of care documents  Education of patient/family/caregiver/guardian to support self-management-when to seek ED eval vs call provider to report symptoms; pulse ox education; follow up recommendations  Assessment and support for treatment adherence and medication management-metoprolol dose change and ed - he continues with all other meds as ordered - confirmed  Reviewed discharge instructions, medical action plan and red flags with spouse who verbalized understanding. Plan for follow-up call in 3-5 days based on severity of symptoms and risk factors. Plan for next call: follow up appointment-cardiology? Spoke with wife Kickit With who states he is confused but she is not concerned about that. She is concerned that his defibrillator has not been addressed. She is aware of COVID test result. States heart rate still elevated but she did not have numbers when asked as she had not checked. Reviewed that he was discharged with a pulse oximeter and one of the readings on that device is heart rate. She had not used it yet, but got it out and CTN educated her on readings and normal parameters for pulse and oxygen readings on device. She checked both during call and SaO2 89% RA and P 86. Instructed in deep breathing and SaO2 rebounded to 98% and P increased to 99. Encouraged her to have him deep breathe often and if unable to maintain >89% that he would need to be evaluated in ED for hypoxia as possible COVID symptom requiring tx. She voices understanding.  CTN reviewed chart notes that he needs f/up OV with Dr Leticia Toledo for device interrogation and programming changes. She states his cardiologist is Dr Marlee Arellano and she will contact his office. Reviewed new metoprolol dose and educated her on this med change. She states she is doing well and without COVID symptoms. She denies needs/concerns going into the weekend. Provided contact information for future needs.     Catalina Bailey RN

## 2022-01-25 ENCOUNTER — CARE COORDINATION (OUTPATIENT)
Dept: CASE MANAGEMENT | Age: 87
End: 2022-01-25

## 2022-01-25 NOTE — CARE COORDINATION
[address] Transitions Follow Up Call    2022    Patient: Lopez June  Patient : 1935   MRN: [id number]  Reason for Admission: COVID-19 Detected on 2022, long standing persistent A Fib s/p pacer 10/2017 (on Eliquis), syncope & collapse x 2, dehydration, CKD3a, HTN, diarrhea, HLD, GERD, iron deficiency, restlessness and agitation. Discharge Date: 22 RARS: Readmission Risk Score: 14.6 ( )    Unable to reach patient by phone at this time. Message left including CTN contact info for return call.      Sonny Escobar, RN  Care Transition Nurse  [phone] mobile    Future Appointments   Date Time Provider Yulissa Landon   2022  1:00 PM SCHEDULE, [facility] AWV LPN STALIN [facility]   2022  8:00 AM SCHEDULE, [facility] G[facility] GANT [facility]   3/8/2022  1:30 PM SCHEDULE, [facility]   3/8/2022  1:30 PM MARIAN Cook MD [employer] [facility]   2022  7:30 AM SCHEDULE, Severa Lips REMOTE Avonne Glow [facility]   2022  7:40 AM MD STALIN Cheney [facility]   2022  7:30 AM SCHEDULE, Severa Lips REMOTE TRANSMISSION [employer] [facility]

## 2022-01-28 ENCOUNTER — CARE COORDINATION (OUTPATIENT)
Dept: CASE MANAGEMENT | Age: 87
End: 2022-01-28

## 2022-01-28 NOTE — CARE COORDINATION
[address] Transitions Follow Up Call    2022    Patient: Gatha Denver  Patient : 1935   MRN: [id number]  Reason for Admission: COVID-19 Detected on 2022, long standing persistent A Fib s/p pacer 10/2017 (on Eliquis), syncope & collapse x 2, dehydration, CKD3a, HTN, diarrhea, HLD, GERD, iron deficiency, restlessness and agitation. Discharge Date: 22 RARS: Readmission Risk Score: 14.6 ( )      Challenges to be reviewed by the provider   Additional needs identified to be addressed with provider: No           Encounter was not routed to provider for escalation. Method of communication with provider:  none. Contacted the spouse/caregiver by telephone to follow up after hospital visit. Status: not changed  Interventions to address identified needs: [facility] follow up appointment(s):   Future Appointments   Date Time Provider Yulissa Landon   2022  1:00 PM SCHEDULE, [facility] [place] [place]  AWV LPN [facility]   2022  8:00 AM SCHEDULE, [facility] [place] [place]  [facility] [place] [facility]   3/8/2022  1:30 PM SCHEDULE, [facility]   3/8/2022  1:30 PM MARIAN Rice MD [facility]   2022  7:30 AM SCHEDULE, Jarek Morales [facility] Ethe[facility]   2022  7:40 AM MD STALIN Dangelo [facility]   2022  7:30 AM SCHEDULE, [facility] Allenman [facility]     Non-[facility] follow up appointment(s): NA   Follow up appointment completed? no.    Wife not happy about his pacer and feels it was not adjusted correctly or at all during inpatient stay. Wife states pulse ox continuously reads HR >100bpm (about 101bpm at rest - states he doesn't do any exertion). She states the cardiologist called them and scheduled soonest appointment which isn't until .  States he is taking new dose metoprolol 75mg BID but then states she also has the 50mg BID dose at home and she thinks he is taking the 75mg BID but not absolutely certain

## 2022-02-01 ENCOUNTER — OFFICE VISIT (OUTPATIENT)
Dept: FAMILY MEDICINE CLINIC | Age: 87
End: 2022-02-01
Payer: MEDICARE

## 2022-02-01 VITALS
HEIGHT: 67 IN | OXYGEN SATURATION: 98 % | SYSTOLIC BLOOD PRESSURE: 132 MMHG | WEIGHT: 162.2 LBS | DIASTOLIC BLOOD PRESSURE: 74 MMHG | HEART RATE: 76 BPM | BODY MASS INDEX: 25.46 KG/M2

## 2022-02-01 DIAGNOSIS — R41.3 MEMORY LOSS: Primary | ICD-10-CM

## 2022-02-01 DIAGNOSIS — I65.03 STENOSIS OF BOTH VERTEBRAL ARTERIES: ICD-10-CM

## 2022-02-01 DIAGNOSIS — N18.30 STAGE 3 CHRONIC KIDNEY DISEASE, UNSPECIFIED WHETHER STAGE 3A OR 3B CKD (HCC): ICD-10-CM

## 2022-02-01 DIAGNOSIS — I48.11 LONGSTANDING PERSISTENT ATRIAL FIBRILLATION (HCC): ICD-10-CM

## 2022-02-01 DIAGNOSIS — I25.10 CORONARY ARTERY DISEASE INVOLVING NATIVE CORONARY ARTERY OF NATIVE HEART WITHOUT ANGINA PECTORIS: ICD-10-CM

## 2022-02-01 DIAGNOSIS — R44.3 HALLUCINATIONS: ICD-10-CM

## 2022-02-01 DIAGNOSIS — I10 ESSENTIAL HYPERTENSION: ICD-10-CM

## 2022-02-01 DIAGNOSIS — R09.82 POST-NASAL DRAINAGE: ICD-10-CM

## 2022-02-01 DIAGNOSIS — R42 DIZZINESS: ICD-10-CM

## 2022-02-01 PROCEDURE — 1036F TOBACCO NON-USER: CPT | Performed by: FAMILY MEDICINE

## 2022-02-01 PROCEDURE — G8484 FLU IMMUNIZE NO ADMIN: HCPCS | Performed by: FAMILY MEDICINE

## 2022-02-01 PROCEDURE — 1111F DSCHRG MED/CURRENT MED MERGE: CPT | Performed by: FAMILY MEDICINE

## 2022-02-01 PROCEDURE — G8427 DOCREV CUR MEDS BY ELIG CLIN: HCPCS | Performed by: FAMILY MEDICINE

## 2022-02-01 PROCEDURE — 99214 OFFICE O/P EST MOD 30 MIN: CPT | Performed by: FAMILY MEDICINE

## 2022-02-01 PROCEDURE — G8417 CALC BMI ABV UP PARAM F/U: HCPCS | Performed by: FAMILY MEDICINE

## 2022-02-01 PROCEDURE — 4040F PNEUMOC VAC/ADMIN/RCVD: CPT | Performed by: FAMILY MEDICINE

## 2022-02-01 PROCEDURE — 1123F ACP DISCUSS/DSCN MKR DOCD: CPT | Performed by: FAMILY MEDICINE

## 2022-02-01 RX ORDER — FLUTICASONE PROPIONATE 50 MCG
2 SPRAY, SUSPENSION (ML) NASAL DAILY
Qty: 1 EACH | Refills: 3 | Status: SHIPPED | OUTPATIENT
Start: 2022-02-01

## 2022-02-01 ASSESSMENT — ENCOUNTER SYMPTOMS
COUGH: 1
SINUS PRESSURE: 1
ABDOMINAL PAIN: 0
GASTROINTESTINAL NEGATIVE: 1
SHORTNESS OF BREATH: 0
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
## 2022-02-04 ENCOUNTER — CARE COORDINATION (OUTPATIENT)
Dept: CASE MANAGEMENT | Age: 87
End: 2022-02-04

## 2022-02-04 NOTE — CARE COORDINATION
Follow Up Call    Challenges to be reviewed by the provider   Additional needs identified to be addressed with provider: No  none                 Encounter was not routed to provider for escalation. Method of communication with provider:  none. Contacted the family by telephone to follow up after hospital visit. Status: not changed  Interventions to address identified needs: Education of patient/family/caregiver/guardian to support self-management-. [facility] follow up appointment(s):   Future Appointments   Date Time Provider Yulissa Landon   2/7/2022  8:00 AM SCHEDULE, [facility]  [facility]   3/8/2022  1:30 PM SCHEDULE, [facility] Rosemarie Bueeno [facility]   3/8/2022  1:30 PM MD Rosemarie Spencer [facility]   4/19/2022  7:30 AM SCHEDULE, Rosie Das [facility]   7/8/2022  7:40 AM MD STALIN Courtney [facility]   7/19/2022  7:30 AM SCHEDULE, Rosie Wilson REMOTE [employer]SMISSION Rosemarie [employer]     Non-[facility] follow up appointment(s):    Follow up appointment completed? Yes. Provided contact information for future needs. Plan for follow-up call in 5-7 days based on severity of symptoms and risk factors. Plan for next call: Device check/Apt/Symptoms     Spoke with patient's spouse Fredrick Begum who reported patient was not doing to well and she had patient back at Dr. Braxton You office and he reported patient was still having palpitations. Yahairagemma Begum reported at discharge from [facility] she was told that someone would be contacted them to come to their home and perform a device check on patient's pacemaker however no one has been in contact. CTN encouraged spouse to reach out to [facility] cardiology [phone] to discuss and also informed spouse patient is scheduled for a device check on 3/8. CTN advised patient of use of urgent care or physicians 24 hr access line if assistance is needed after hours.         Nona MOORE, RN, [facility]  Care Transition Nurse  [phone] mobile

## 2022-02-10 ENCOUNTER — CARE COORDINATION (OUTPATIENT)
Dept: CASE MANAGEMENT | Age: 87
End: 2022-02-10

## 2022-02-10 NOTE — CARE COORDINATION
[address] Transitions Follow Up Call    2/10/2022    Patient: Emery Rios  Patient : 1935   MRN: [id number]  Reason for Admission: COVID-19 Detected on 2022, long standing persistent A Fib s/p pacer 10/2017 (on Eliquis), syncope & collapse x 2, dehydration, CKD3a, HTN, diarrhea, HLD, GERD, iron deficiency, restlessness and agitation. Discharge Date: 22 RARS: Readmission Risk Score: 14.6 ( )      CTN attempted follow-up outreach to patient, but was unable to reach. Message left including CTN contact information for return call.        Follow Up  Future Appointments   Date Time Provider Yulissa Landon   3/8/2022  1:30 PM SCHEDULE, [facility] Froilan Edwards [facility]   3/8/2022  1:30 PM MD Froilan Bearden [facility]   2022  7:30 AM SCHEDULE, [facility] REMOTE Phoenix Minus [facility]   2022  7:40 AM Savannah Martinez MD [facility] [place] [facility]   2022  7:30 AM SCHEDULE, [facility] REMOTE TRANSMISSION Froilan Edwards [facility]       Ruben Jorgensen RN

## 2022-02-16 ENCOUNTER — CARE COORDINATION (OUTPATIENT)
Dept: CASE MANAGEMENT | Age: 87
End: 2022-02-16

## 2022-02-16 NOTE — CARE COORDINATION
[address] Transitions Follow Up Call    2022    Patient: Tanja Joiner  Patient : 1935   MRN: [id number]  Reason for Admission: COVID-19 Detected on 2022, long standing persistent A Fib s/p pacer 10/2017 (on Eliquis), syncope & collapse x 2, dehydration, CKD3a, HTN, diarrhea, HLD, GERD, iron deficiency, restlessness and agitation. Discharge Date: 22 RARS: Readmission Risk Score: 14.6 ( )    Unable to reach or leave message at this time. CTN will attempt again in 1-2 days.     Follow Up  Future Appointments   Date Time Provider Yulissa Landon   3/8/2022  1:30 PM SCHEDULE, [facility] Christine Sharp [facility]   3/8/2022  1:30 PM MD Christine Cooper [facility]   2022  7:30 AM SCHEDULE, [facility] Misael Wong [facility]   2022  7:40 AM MD STALIN Cadet [facility]   2022  7:30 AM SCHEDULE, [facility] REMOTE TRANSMISSION Christine [facility]       Eren Vazquez, ENIO

## 2022-02-18 ENCOUNTER — CARE COORDINATION (OUTPATIENT)
Dept: CASE MANAGEMENT | Age: 87
End: 2022-02-18

## 2022-02-18 NOTE — CARE COORDINATION
[address] Transitions Follow Up Call    2022    Patient: Rosa M Angie  Patient : 1935   MRN: [id number]  Reason for Admission: COVID-19 Detected on 2022, long standing persistent A Fib s/p pacer 10/2017 (on Eliquis), syncope & collapse x 2, dehydration, CKD3a, HTN, diarrhea, HLD, GERD, iron deficiency, restlessness and agitation. Discharge Date: 22 RARS: Readmission Risk Score: 14.6 ( )       3rd/final CTN outreach. Message left including CTN contact number for future needs.      Follow Up  Future Appointments   Date Time Provider Yulissa Landon   3/8/2022  1:30 PM SCHEDULE, [facility]   3/8/2022  1:30 PM MARIAN Winslow MD [facility]   2022  7:30 AM SCHEDULE, [facility]   2022  7:40 AM MD STALIN Hubbard [facility]   2022  7:30 AM SCHEDULE, [facility] REMOTE TRANSMISSION [facility]       Margarito Post RN

## 2022-03-08 ENCOUNTER — OFFICE VISIT (OUTPATIENT)
Dept: CARDIOLOGY CLINIC | Age: 87
End: 2022-03-08
Payer: MEDICARE

## 2022-03-08 ENCOUNTER — NURSE ONLY (OUTPATIENT)
Dept: CARDIOLOGY CLINIC | Age: 87
End: 2022-03-08
Payer: MEDICARE

## 2022-03-08 VITALS
BODY MASS INDEX: 25.74 KG/M2 | HEIGHT: 67 IN | OXYGEN SATURATION: 96 % | HEART RATE: 80 BPM | DIASTOLIC BLOOD PRESSURE: 62 MMHG | WEIGHT: 164 LBS | SYSTOLIC BLOOD PRESSURE: 104 MMHG

## 2022-03-08 DIAGNOSIS — Z95.0 S/P PLACEMENT OF CARDIAC PACEMAKER: ICD-10-CM

## 2022-03-08 DIAGNOSIS — I48.11 LONGSTANDING PERSISTENT ATRIAL FIBRILLATION (HCC): Primary | ICD-10-CM

## 2022-03-08 DIAGNOSIS — Z95.0 PACEMAKER: ICD-10-CM

## 2022-03-08 DIAGNOSIS — R06.02 SOB (SHORTNESS OF BREATH): ICD-10-CM

## 2022-03-08 PROCEDURE — G8427 DOCREV CUR MEDS BY ELIG CLIN: HCPCS | Performed by: INTERNAL MEDICINE

## 2022-03-08 PROCEDURE — 1123F ACP DISCUSS/DSCN MKR DOCD: CPT | Performed by: INTERNAL MEDICINE

## 2022-03-08 PROCEDURE — 93279 PRGRMG DEV EVAL PM/LDLS PM: CPT | Performed by: INTERNAL MEDICINE

## 2022-03-08 PROCEDURE — 4040F PNEUMOC VAC/ADMIN/RCVD: CPT | Performed by: INTERNAL MEDICINE

## 2022-03-08 PROCEDURE — 99214 OFFICE O/P EST MOD 30 MIN: CPT | Performed by: INTERNAL MEDICINE

## 2022-03-08 PROCEDURE — 1036F TOBACCO NON-USER: CPT | Performed by: INTERNAL MEDICINE

## 2022-03-08 PROCEDURE — 93000 ELECTROCARDIOGRAM COMPLETE: CPT | Performed by: INTERNAL MEDICINE

## 2022-03-08 PROCEDURE — G8417 CALC BMI ABV UP PARAM F/U: HCPCS | Performed by: INTERNAL MEDICINE

## 2022-03-08 PROCEDURE — G8484 FLU IMMUNIZE NO ADMIN: HCPCS | Performed by: INTERNAL MEDICINE

## 2022-03-08 NOTE — PROGRESS NOTES
[address]   Electrophysiology Consult Note              Date:  March 8, 2022  Patient name: Katy Sarmiento  YOB: 1935    Chief Complaint:  Follow-up, Chest Pain (once in a while ), Shortness of Breath, Dizziness, Coronary Artery Disease, Atrial Fibrillation, and Other (device interrogation)    Primary Care Physician:Jose De Jesus Cabezas MD    HISTORY OF PRESENT ILLNESS: Katy Sarmiento is a 82 y.o. male who presents for evaluation to establish care for atrial Fibrillation/pacemaker management and transmission. He is followed by my colleague Dr. Lara Albarran for interventional cardiology. On 5/4/20 he was seen by Dr Lara Albarran for his shortness of breath. A Lexiscan Stress Test was ordered, which he underwent on 5/12/20 which demonstrated an EF of 64% and a small mild defect at the apex at stress that reverses at rest which is consistent with ischemia. He had a Left Heart Cath on 5/22/20 which demonstrated mild/mod diffuse epicardial CAD, 50% stenosis of mid LAD and normal LVEF of 55-60%. His EKG on 5/22/20 demonstrated Atrial Fibrillation, HR of 96 BPM. He has a PMH of [employer] J[employer] single chamber PPM placed on 10/13/2017 by Dr Hillary Weaver ([facility]), CAD, HTN, HLD and chronic AF. His device check 7/9/20 demonstrated predominantly AF with 1 event of AFIB RVR on 7/6/20 that lasted 2 seconds,  40%. His EKG 7/9/20 demonstrated AF HR 91 BPM. During this office visit his metoprolol was increased to 50 mg BID and his eliquis was decreased to 2.5mg BID due to age and kidney function. Interval history since last office visit: Today, patient's device interrogation demonstrates  46%, no new events, 10 years until RRT. ECG demonstrates AF 94 BPM. He reports that he has been feeling fatigued since having COVID January 2022. He reports he gets lightheaded/dizy all the time and has fell several times. He reports this occurs with both sitting and standing. He uses a cane for balance.  He reports the last time he fell was about 1-2 weeks ago. He was working outside at the time and felt dizzy and then fell. He reports he has been having intermittent mild chest pain in the left side of his chest. He reports the pain is hard to describe and lasts a few minutes at a time. He continues to have problems with memory and recently had an EEG in [place], although he has not gotten the results yet. Denies edema in abdomen or BLE. Patient's wife reports patient is not talking medications as prescribed; sometimes he takes them and sometimes he doesn't. Denies recent issues with bleeding or bruising. Patient denies current edema, palpitations, or syncope. Past Medical History:   has a past medical history of Atrial fibrillation ([facility] [address] Blind right eye, Chronic kidney disease, Hyperlipidemia, Hypertension, and Pneumonia. Past Surgical History:   has a past surgical history that includes Tonsillectomy; Appendectomy; Colonoscopy; eye surgery; Upper gastrointestinal endoscopy; joint replacement (2013); hernia repair; and pacemaker placement (10/13/2017). Allergies:  Sulfa antibiotics    Social History:   reports that he quit smoking about 47 years ago. He has a 10.00 pack-year smoking history. He has never used smokeless tobacco. He reports that he does not drink alcohol and does not use drugs. Family History: family history includes Asthma in an other family member; Heart Disease in his father and mother; Other in his mother. Home Medications:    Prior to Admission medications    Medication Sig Start Date End Date Taking?  Authorizing Provider   fluticasone (FLONASE) 50 MCG/ACT nasal spray 2 sprays by Nasal route daily 2/1/22  Yes Radha Bassett MD   metoprolol succinate (TOPROL XL) 25 MG extended release tablet Take 3 tablets by mouth 2 times daily 1/20/22  Yes Genie Mccrary MD   Ferrous Sulfate (IRON) 325 (65 Fe) MG TABS Take by mouth   Yes Historical Provider, MD DOBBS 5 MG TABS tablet TAKE 1/2 TABLET BY MOUTH 2 TIMES DAILY--FILL ONLY WHEN REQUESTED!!!!!!!!!-- 11/15/21  Yes Reyna Hall MD   atorvastatin (LIPITOR) 20 MG tablet TAKE 1 TABLET BY MOUTH EVERY DAY 11/15/21  Yes Reyna Hall MD   citalopram (CELEXA) 10 MG tablet Take 1 tablet by mouth daily 10/21/21  Yes Reyna Hall MD   isosorbide mononitrate (IMDUR) 30 MG extended release tablet TAKE 1 TABLET BY MOUTH DAILY 8/11/21  Yes Bonifacio Zuñiga MD   omeprazole (PRILOSEC) 20 MG delayed release capsule TAKE 1 CAPSULE BY MOUTH 2 TIMES DAILY 8/3/21 3/8/22 Yes GUS Kellogg - CNP   tamsulosin (FLOMAX) 0.4 MG capsule Take 0.4 mg by mouth daily   Yes Historical Provider, MD   finasteride (PROSCAR) 5 MG tablet Take 5 mg by mouth daily. 3/4/15  Yes Historical Provider, MD   aspirin 81 MG tablet Take 81 mg by mouth daily. Yes Historical Provider, MD       REVIEW OF SYSTEMS:    All 14-point review of systems are completed and  pertinent positives are mentioned in the history of present illness. Other  systems are reviewed and are negative. Physical Examination:    /62   Pulse 80   Ht 5' 7\" (1.702 m)   Wt 164 lb (74.4 kg)   SpO2 96%   BMI 25.69 kg/m²      Constitutional and General Appearance:    alert, cooperative, no distress and appears stated age  [de-identified]:    PERRLA, no cervical lymphadenopathy. No masses palpable. Normal oral  mucosa  Respiratory:  · Normal excursion and expansion without use of accessory muscles  · Resp Auscultation: Normal breath sounds without dullness or wheezing  Cardiovascular:  · The apical impulse is not displaced  · Irregular rate and rhythm without M/G/R  Abdomen:  · No masses or tenderness  · Bowel sounds present  Extremities:  ·  No Cyanosis or Clubbing  ·  Lower extremity edema: No  · Skin: Warm and dry  Neurological:  · Alert and oriented. · Moves all extremities well  · No abnormalities of mood, affect, memory, mentation, or behavior are noted    DATA:    ECG 3/8/22: Personally reviewed.      ECHO: 3/21/2019: Summary   Normal left ventricular systolic function with an estimated ejection   fraction of 55-60%. No regional wall motion abnormalities are seen. Normal left ventricular diastolic filling pressure. The left atrium is mildly dilated. The right atrium is moderately dilated. Right ventricular systolic function is mildly reduced . The right ventricle is mildly enlarged. The ascending aorta is mildly dilated. 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 Mild mitral regurgitation. Moderate tricuspid regurgitation. Systolic pulmonary artery pressure (SPAP) estimated at 47 mmHg (RA pressure   15 mmHg), consistent with mild pulmonary hypertension. Pacemaker lead in right ventricle. IMPRESSION:    1. Permanent atrial fibrillation. 07/09/2020  Mr. 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 Patient doing well. 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 Heart rates mostly well controlled during atrial fibrillation however still not as controlled as I'd like to see (averge up to 130 rarely). We discussed anticoagulation (NOAC and warfarin), rate control, and rhythm control, AVN + pacemaker. We reviewed risks and benefits of each approach. We discussed side effects of class IC, class II, class III, and class IV antiarrhythmics. All questions were answered. Patient considering cardioversion to see how NSR makes him feel. If makes him feel better we will consider antiarrhythmic therapy. Patient and wife will reach out with decision.  - Increase metoprolol to 50 mg BID.  - Decrease apixaban to 2.5 mg BID given renal function and age. - Follow up with me in 3 months. 10/13/20  Patient presents for follow-up. He has no symptoms with his atrial fibrillation.   He is tolerating his anticoagulation. Heart rates are largely well controlled. He does have some RVR however this is very rare. His average heart rates appear to be around 80 beats a minute. He is paced more than 40% of the time. No changes recommended at this time. Plan to follow-up patient and monitor clinically for signs of heart failure. If any heart failure will consider BiV upgrade of patient. - Continue apixaban at 2.5 mg BID.  - Continue metoprolol 50 mg BID.  - Monitor for heart failure symptoms at which case we will order echocardiogram for LVEF and consider BiV upgrade of his device. - Follow up with EP NP in 1 year unless/until procedure/discussion required or PRN. 03/08/2022  Patient presents for follow up. He has been having some chest pain, shortness of breath, and dizziness with falls. No significant arrhythmias on his device. We discussed Watchman given falls (recovering from COVID-19). - Consider Watchman. - Limited echocardiogram.  - Continue apixaban 2.5 mg BID.  - Continue metoprolol 50 mg BID.  - Follow up in 6 months. 2. Hypertension. Stable. 3. 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 Abbott device being paced approximately 40% of the time. No heart failure symptoms. Continue to monitor for heart failure symptoms given elevated pacing percentage. - Increase metoprolol. Pacing rate may increase so will need to monitor for heart failure symptoms and decline of ejection fraction (LVEF normal currently). 10/13/2020-3/8/2022  - Plan as per above. 4. Chest pain. Etiology unclear. - Refer to Dr. Daniel Reeves. RECOMMENDATIONS:  1. Discussed options due to falls in the presence of AF: Patient would like to think about his options at this time.    -Continue current course: increased risks of bleeding due to falls.    -Watchman device: Schedule with Watchman team due bleeding risk due to falls.    2. Discussed chest pain:    -Follow up with Dr aDniel Reeves as soon as possible. 3. Limited echo due to SOB. 4. Strongly recommend compliance with medications. This could be contributing to your dizziness. 5. Follow up with EP NP in 6 months. QUALITY MEASURES  1. Tobacco Cessation Counseling: NA  2. Retake of BP if >140/90:   NA  3. Documentation to PCP/referring for new patient:  Sent to PCP at close of office visit  4. CAD patient on anti-platelet: Yes  5. CAD patient on STATIN therapy:  Yes  6. Patient with CHF and aFib on anticoagulation:  Yes     All questions and concerns were addressed to the patient/family. Alternatives to my treatment were discussed. This note has been scribed in the presence of Tricia Mandujano MD, by Alecia Swann RN. The scribe's documentation has been prepared under my direction and personally reviewed by me in its entirety. I confirm that the note above accurately reflects all work, physical examination, the discussion of treatments and procedures, and medical decision making performed by me. Marko De Jesus MD personally performed the services described in this documentation as scribed by nurse in my presence, and is both accurate and complete. Electronically signed by Jhonathan Parra MD on 3/8/2022 at 2:14 PM     Dr. Abdullahi Martinez MD  Electrophysiology  [facility] [address]. [address] [place] [address]. [address]. [place] [address]  Phone: [phone]  Fax: [phone]     NOTE: This report was transcribed using voice recognition software. Every effort was made to ensure accuracy, however, inadvertent computerized transcription errors may be present.

## 2022-03-08 NOTE — LETTER
Marco Sun  1935    [facility]   Electrophysiology Consult Note              Date:  March 8, 2022  Patient name: Marco Sun  YOB: 1935    Chief Complaint:  Follow-up, Chest Pain (once in a while ), Shortness of Breath, Dizziness, Coronary Artery Disease, Atrial Fibrillation, and Other (device interrogation)    Primary Care Physician:Jose De Jesus Dixon MD    HISTORY OF PRESENT ILLNESS: Marco Sun is a 82 y.o. male who presents for evaluation to establish care for atrial Fibrillation/pacemaker management and transmission. He is followed by my colleague Dr. Hussein Arroyo for interventional cardiology. On 5/4/20 he was seen by Dr Hussein Arroyo for his shortness of breath. A Lexiscan Stress Test was ordered, which he underwent on 5/12/20 which demonstrated an EF of 64% and a small mild defect at the apex at stress that reverses at rest which is consistent with ischemia. He had a Left Heart Cath on 5/22/20 which demonstrated mild/mod diffuse epicardial CAD, 50% stenosis of mid LAD and normal LVEF of 55-60%. His EKG on 5/22/20 demonstrated Atrial Fibrillation, HR of 96 BPM. He has a PMH of [place] Jas single chamber PPM placed on 10/13/2017 by Dr Gilmar Fontenot ([facility]), CAD, HTN, HLD and chronic AF. His device check 7/9/20 demonstrated predominantly AF with 1 event of AFIB RVR on 7/6/20 that lasted 2 seconds,  40%. His EKG 7/9/20 demonstrated AF HR 91 BPM. During this office visit his metoprolol was increased to 50 mg BID and his eliquis was decreased to 2.5mg BID due to age and kidney function. Interval history since last office visit: Today, patient's device interrogation demonstrates  46%, no new events, 10 years until RRT. ECG demonstrates AF 94 BPM. He reports that he has been feeling fatigued since having COVID January 2022. He reports he gets lightheaded/dizy all the time and has fell several times. He reports this occurs with both sitting and standing.  He uses a cane for balance. He reports the last time he fell was about 1-2 weeks ago. He was working outside at the time and felt dizzy and then fell. He reports he has been having intermittent mild chest pain in the left side of his chest. He reports the pain is hard to describe and lasts a few minutes at a time. He continues to have problems with memory and recently had an EEG in [place], although he has not gotten the results yet. Denies edema in abdomen or BLE. Patient's wife reports patient is not talking medications as prescribed; sometimes he takes them and sometimes he doesn't. Denies recent issues with bleeding or bruising. Patient denies current edema, palpitations, or syncope. Past Medical History:   has a past medical history of Atrial fibrillation ([address] Blind right eye, Chronic kidney disease, Hyperlipidemia, Hypertension, and Pneumonia. Past Surgical History:   has a past surgical history that includes Tonsillectomy; Appendectomy; Colonoscopy; eye surgery; Upper gastrointestinal endoscopy; joint replacement (2013); hernia repair; and pacemaker placement (10/13/2017). Allergies:  Sulfa antibiotics    Social History:   reports that he quit smoking about 47 years ago. He has a 10.00 pack-year smoking history. He has never used smokeless tobacco. He reports that he does not drink alcohol and does not use drugs. Family History: family history includes Asthma in an other family member; Heart Disease in his father and mother; Other in his mother. Home Medications:    Prior to Admission medications    Medication Sig Start Date End Date Taking?  Authorizing Provider   fluticasone (FLONASE) 50 MCG/ACT nasal spray 2 sprays by Nasal route daily 2/1/22  Yes Chloe Sauer MD   metoprolol succinate (TOPROL XL) 25 MG extended release tablet Take 3 tablets by mouth 2 times daily 1/20/22  Yes Cody Prieto MD   Ferrous Sulfate (IRON) 325 (65 Fe) MG TABS Take by mouth   Yes Historical Provider, MD DOBBS 5 MG TABS tablet TAKE 1/2 TABLET BY MOUTH 2 TIMES DAILY--FILL ONLY WHEN REQUESTED!!!!!!!!!-- 11/15/21  Yes Alyson Luis MD   atorvastatin (LIPITOR) 20 MG tablet TAKE 1 TABLET BY MOUTH EVERY DAY 11/15/21  Yes Alyson Luis MD   citalopram (CELEXA) 10 MG tablet Take 1 tablet by mouth daily 10/21/21  Yes Alyson Luis MD   isosorbide mononitrate (IMDUR) 30 MG extended release tablet TAKE 1 TABLET BY MOUTH DAILY 8/11/21  Yes Yuli Crisostomo MD   omeprazole (PRILOSEC) 20 MG delayed release capsule TAKE 1 CAPSULE BY MOUTH 2 TIMES DAILY 8/3/21 3/8/22 Yes GUS Harrison CNP   tamsulosin (FLOMAX) 0.4 MG capsule Take 0.4 mg by mouth daily   Yes Historical Provider, MD   finasteride (PROSCAR) 5 MG tablet Take 5 mg by mouth daily. 3/4/15  Yes Historical Provider, MD   aspirin 81 MG tablet Take 81 mg by mouth daily. Yes Historical Provider, MD       REVIEW OF SYSTEMS:    All 14-point review of systems are completed and  pertinent positives are mentioned in the history of present illness. Other  systems are reviewed and are negative. Physical Examination:    /62   Pulse 80   Ht 5' 7\" (1.702 m)   Wt 164 lb (74.4 kg)   SpO2 96%   BMI 25.69 kg/m²      Constitutional and General Appearance:    alert, cooperative, no distress and appears stated age  [de-identified]:    PERRLA, no cervical lymphadenopathy. No masses palpable. Normal oral  mucosa  Respiratory:  · Normal excursion and expansion without use of accessory muscles  · Resp Auscultation: Normal breath sounds without dullness or wheezing  Cardiovascular:  · The apical impulse is not displaced  · Irregular rate and rhythm without M/G/R  Abdomen:  · No masses or tenderness  · Bowel sounds present  Extremities:  ·  No Cyanosis or Clubbing  ·  Lower extremity edema: No  · Skin: Warm and dry  Neurological:  · Alert and oriented.   · Moves all extremities well  · No abnormalities of mood, affect, memory, mentation, or behavior are noted    DATA:    ECG 3/8/22: Personally reviewed. ECHO: 3/21/2019:  Summary   Normal left ventricular systolic function with an estimated ejection   fraction of 55-60%. No regional wall motion abnormalities are seen. Normal left ventricular diastolic filling pressure. The left atrium is mildly dilated. The right atrium is moderately dilated. Right ventricular systolic function is mildly reduced . The right ventricle is mildly enlarged. The ascending aorta is mildly dilated. 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 Mild mitral regurgitation. Moderate tricuspid regurgitation. Systolic pulmonary artery pressure (SPAP) estimated at 47 mmHg (RA pressure   15 mmHg), consistent with mild pulmonary hypertension. Pacemaker lead in right ventricle. IMPRESSION:    1. Permanent atrial fibrillation. 07/09/2020  Mr. 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 Patient doing well. 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 Heart rates mostly well controlled during atrial fibrillation however still not as controlled as I'd like to see (averge up to 130 rarely). We discussed anticoagulation (NOAC and warfarin), rate control, and rhythm control, AVN + pacemaker. We reviewed risks and benefits of each approach. We discussed side effects of class IC, class II, class III, and class IV antiarrhythmics. All questions were answered. Patient considering cardioversion to see how NSR makes him feel. If makes him feel better we will consider antiarrhythmic therapy. Patient and wife will reach out with decision.  - Increase metoprolol to 50 mg BID.  - Decrease apixaban to 2.5 mg BID given renal function and age. - Follow up with me in 3 months. 10/13/20  Patient presents for follow-up.   He has no symptoms with his atrial fibrillation. He is tolerating his anticoagulation. Heart rates are largely well controlled. He does have some RVR however this is very rare. His average heart rates appear to be around 80 beats a minute. He is paced more than 40% of the time. No changes recommended at this time. Plan to follow-up patient and monitor clinically for signs of heart failure. If any heart failure will consider BiV upgrade of patient. - Continue apixaban at 2.5 mg BID.  - Continue metoprolol 50 mg BID.  - Monitor for heart failure symptoms at which case we will order echocardiogram for LVEF and consider BiV upgrade of his device. - Follow up with EP NP in 1 year unless/until procedure/discussion required or PRN. 03/08/2022  Patient presents for follow up. He has been having some chest pain, shortness of breath, and dizziness with falls. No significant arrhythmias on his device. We discussed Watchman given falls (recovering from COVID-19). - Consider Watchman. - Limited echocardiogram.  - Continue apixaban 2.5 mg BID.  - Continue metoprolol 50 mg BID.  - Follow up in 6 months. 2. Hypertension. Stable. 3. 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 Abbott device being paced approximately 40% of the time. No heart failure symptoms. Continue to monitor for heart failure symptoms given elevated pacing percentage. - Increase metoprolol. Pacing rate may increase so will need to monitor for heart failure symptoms and decline of ejection fraction (LVEF normal currently). 10/13/2020-3/8/2022  - Plan as per above. 4. Chest pain. Etiology unclear. - Refer to Dr. Lara Albarran. RECOMMENDATIONS:  1. Discussed options due to falls in the presence of AF: Patient would like to think about his options at this time.    -Continue current course: increased risks of bleeding due to falls.    -Watchman device: Schedule with Watchman team due bleeding risk due to falls.    2. Discussed chest pain:    -Follow up with Dr Willis Ambrosio as soon as possible. 3. Limited echo due to SOB. 4. Strongly recommend compliance with medications. This could be contributing to your dizziness. 5. Follow up with EP NP in 6 months. QUALITY MEASURES  1. Tobacco Cessation Counseling: NA  2. Retake of BP if >140/90:   NA  3. Documentation to PCP/referring for new patient:  Sent to PCP at close of office visit  4. CAD patient on anti-platelet: Yes  5. CAD patient on STATIN therapy:  Yes  6. Patient with CHF and aFib on anticoagulation:  Yes     All questions and concerns were addressed to the patient/family. Alternatives to my treatment were discussed. This note has been scribed in the presence of Kiana Bach MD, by Romayne Gaba, RN. The scribe's documentation has been prepared under my direction and personally reviewed by me in its entirety. I confirm that the note above accurately reflects all work, physical examination, the discussion of treatments and procedures, and medical decision making performed by me. Lou Catalan MD personally performed the services described in this documentation as scribed by nurse in my presence, and is both accurate and complete. Electronically signed by Troy Goss MD on 3/8/2022 at 2:14 PM     Dr. Nichole Aguilar MD  [facility] [address]. [address] [place] [address]. [address]. [facility] [address]  Phone: [phone]  Fax: [phone]     NOTE: This report was transcribed using voice recognition software. Every effort was made to ensure accuracy, however, inadvertent computerized transcription errors may be present.

## 2022-03-08 NOTE — PATIENT INSTRUCTIONS
RECOMMENDATIONS:  1. Discussed options due to falls in the presence of AF: Patient would like to think about his options at this time.    -Continue current course: increased risks of bleeding due to falls.    -Watchman device: Schedule with Watchman team due bleeding risk due to falls. 2. Discussed chest pain:    -Follow up with Dr Silvia Fontenot as soon as possible. 3. Limited echo due to SOB. 4. Strongly recommend compliance with medications. This could be contributing to your dizziness. 5. Follow up with EP NP in 6 months. Your provider has ordered testing for further evaluation. An order/prescription has been included in your paper work.  To schedule outpatient testing, contact Central Scheduling by calling [address] [place] [address] ([phone]).

## 2022-03-16 NOTE — PROGRESS NOTES
[facility]   Cardiovascular Evaluation    PATIENT: Bonifacio Gamble  DATE: 3/18/2022  MRN: [id number]  CSN: [id number]  : 1935    Primary Care Doctor/Referring provider: Angel Scott MD    Reason for evaluation/Chief complaint:   Follow-up, Hyperlipidemia, Hypertension, Atrial Fibrillation, Coronary Artery Disease, Dizziness (Constant ), Fatigue (Frequent falls ), and Other (Nightmares almost every night )      Subjective:    History of present illness on initial date of evaluation:   Bonifacio Gamble is a 82 y.o. patient who presents for follow up. Since last office visit he was hospitalized from 22-22 for syncope and collapse. On arrival to hospital he was found to have atrial fibrillation with RVR. Device interrogation performed showed SVT, AF-RVR longest episode 4 minutes. He was also noted to be COVID positive. He was stabilized and discharged home on increased dose of Toprol. His last device check on 22 showed no arrhythmias and normal pacing and sensing. Today he states he is having light-headedness and dizziness with falls daily. He is not getting much sleep and having nightmares. He is scheduled to follow up with neurology next week. Patient currently denies any weight gain, edema, palpitations, chest pain, shortness of breath. He is taking all medications as prescribed, tolerating well.      Patient Active Problem List   Diagnosis    Pneumonia    Abnormal chest CT    Cough syncope    Tracheobronchomalacia    Hyperlipidemia    Hypertension    Chronic kidney disease, stage III (moderate) ([facility])    A-fib ([facility])    S/P placement of cardiac pacemaker    Abnormal echocardiogram    Dizziness    SOB (shortness of breath)    Abnormal cardiovascular stress test    Coronary artery disease involving native coronary artery of native heart without angina pectoris, 2020 abnormal stress test. [facility] mild LAD disease     Other chest pain    Atrial fibrillation with rapid ventricular response (HCC)    Syncope and collapse    COVID    NATANAEL (acute kidney injury) ([facility]    Atrial fibrillation with RVR (HCC)         Cardiac Testing: I have reviewed the findings below. EKG:  ECHO:   STRESS TEST:  CATH:  BYPASS:  VASCULAR:    Past Medical History:   has a past medical history of Atrial fibrillation ([facility] [facility] Blind right eye, Chronic kidney disease, Hyperlipidemia, Hypertension, and Pneumonia. Surgical History:   has a past surgical history that includes Tonsillectomy; Appendectomy; Colonoscopy; eye surgery; Upper gastrointestinal endoscopy; joint replacement (2013); hernia repair; and pacemaker placement (10/13/2017). Social History:   reports that he quit smoking about 47 years ago. He has a 10.00 pack-year smoking history. He has never used smokeless tobacco. He reports that he does not drink alcohol and does not use drugs. Family History:  No evidence for sudden cardiac death or premature CAD    Medications:  Reviewed and are listed in nursing record. and/or listed below  Outpatient Medications:  Prior to Admission medications    Medication Sig Start Date End Date Taking?  Authorizing Provider   apixaban (ELIQUIS) 5 MG TABS tablet TAKE 1/2 TABLET BY MOUTH 2 TIMES DAILY 3/18/22  Yes Gregory Whitehead MD   fluticasone [facility]) 50 MCG/ACT nasal spray 2 sprays by Nasal route daily 2/1/22  Yes Basil Chapman MD   metoprolol succinate (TOPROL XL) 25 MG extended release tablet Take 3 tablets by mouth 2 times daily  Patient taking differently: Take 50 mg by mouth 2 times daily  1/20/22  Yes Jennifer Flood MD   Ferrous Sulfate (IRON) 325 (65 Fe) MG TABS Take by mouth   Yes Historical Provider, MD   atorvastatin (LIPITOR) 20 MG tablet TAKE 1 TABLET BY MOUTH EVERY DAY 11/15/21  Yes Basil Chapman MD   citalopram (CELEXA) 10 MG tablet Take 1 tablet by mouth daily 10/21/21  Yes Basil Chapman MD   isosorbide mononitrate (IMDUR) 30 MG extended release tablet TAKE 1 TABLET BY MOUTH DAILY 8/11/21  Yes Nickolas Fields MD   omeprazole (PRILOSEC) 20 MG delayed release capsule TAKE 1 CAPSULE BY MOUTH 2 TIMES DAILY 8/3/21 3/18/22 Yes GUS Arias - CNP   tamsulosin (FLOMAX) 0.4 MG capsule Take 0.4 mg by mouth daily   Yes Historical Provider, MD   finasteride (PROSCAR) 5 MG tablet Take 5 mg by mouth daily. 3/4/15  Yes Historical Provider, MD   aspirin 81 MG tablet Take 81 mg by mouth daily. Yes Historical Provider, MD   apixaban (ELIQUIS) 5 MG TABS tablet Take 0.5 tablets by mouth 2 times daily 8/13/20 3/18/23  Carmen Fisher MD       In-patient schedule medications:        Infusion Medications: Allergies:  Sulfa antibiotics     Review of Systems:   All 14 point review of symptoms completed. Pertinent positives identified in the HPI, all other review of symptoms findings as below.      Review of Systems - History obtained from the patient  General ROS: negative for - chills, fever or night sweats  Psychological ROS: negative for - disorientation or hallucinations  Ophthalmic ROS: negative for - dry eyes, eye pain or loss of vision  ENT ROS: negative for - nasal discharge or sore throat  Allergy and Immunology ROS: negative for - hives or itchy/watery eyes  Hematological and Lymphatic ROS: negative for - jaundice or night sweats  Endocrine ROS: negative for - mood swings or temperature intolerance  Breast ROS: deferred  Respiratory ROS: negative for - hemoptysis or stridor  Cardiovascular ROS: negative for - chest pain, dyspnea on exertion or palpitations  Gastrointestinal ROS: no abdominal pain, change in bowel habits, or black or bloody stools  Genito-Urinary ROS: no dysuria, trouble voiding, or hematuria  Musculoskeletal ROS: negative for - gait disturbance, joint pain or joint stiffness  Neurological ROS: negative for - seizures or speech problems  Dermatological ROS: negative for - rash or skin lesion changes      Physical Examination:    /62   Pulse 84   Ht 5' 7\" (1.702 m)   Wt 166 lb (75.3 kg)   SpO2 94%   BMI 26.00 kg/m²   /62   Pulse 84   Ht 5' 7\" (1.702 m)   Wt 166 lb (75.3 kg)   SpO2 94%   BMI 26.00 kg/m²    Weight: 166 lb (75.3 kg)     Wt Readings from Last 3 Encounters:   03/18/22 166 lb (75.3 kg)   03/08/22 164 lb (74.4 kg)   02/01/22 162 lb 3.2 oz (73.6 kg)     No intake or output data in the 24 hours ending 03/18/22 1558    General Appearance:  Alert, cooperative, no distress, appears stated age   Head:  Normocephalic, without obvious abnormality, atraumatic   Eyes:  PERRL, conjunctiva/corneas clear       Nose: Nares normal, no drainage or sinus tenderness   Throat: Lips, mucosa, and tongue normal   Neck: Supple, symmetrical, trachea midline, no adenopathy, thyroid: not enlarged, symmetric, no tenderness/mass/nodules, no carotid bruit or JVD       Lungs:   Clear to auscultation bilaterally, respirations unlabored   Chest Wall:  No tenderness or deformity   Heart:   irregularly irregular ; S1, S2 are normal; no murmur noted; no rub or gallop   Abdomen:   Soft, non-tender, bowel sounds active all four quadrants,  no masses, no organomegaly           Extremities: Extremities normal, atraumatic, no cyanosis or edema   Pulses: 2+ and symmetric   Skin: Skin color, texture, turgor normal, no rashes or lesions   Pysch: Normal mood and affect   Neurologic: Normal gross motor and sensory exam.         Labs  No results for input(s): WBC, HGB, HCT, MCV, PLT in the last 72 hours. No results for input(s): CREATININE, BUN, NA, K, CL, CO2 in the last 72 hours. No results for input(s): INR, PROTIME in the last 72 hours. No results for input(s): TROPONINI in the last 72 hours. Invalid input(s): PRO-BNP  No results for input(s): CHOL, LDL, HDL in the last 72 hours. Invalid input(s): TG      Imaging:  I have reviewed the below testing personally and my interpretation is below.   EKG:  CXR:        Assessment:  82 y.o. patient with:  Problem List Items Addressed This Visit     Dizziness    Relevant Orders    VL DUP CAROTID BILATERAL    A-fib ([address]    Relevant Medications    apixaban (ELIQUIS) 5 MG TABS tablet    Coronary artery disease involving native coronary artery of native heart without angina pectoris, 5/2020 abnormal stress test. [facility] mild LAD disease  - Primary    Relevant Medications    apixaban (ELIQUIS) 5 MG TABS tablet          Plan:  1. Order carotid doppler ~ evaluate for any carotid stenosis (narrowing) with symptoms of dizziness and falls    ~High risk for potential cerebrovascular insufficiency based on prior carotid Doppler and symptoms. We will plan for stat carotid Doppler evaluation. 2. Continue as scheduled for Limited Echocardiogram   3. I recommend that the patient continue their currently prescribed medications. Their drug modifiable risk factors appear to be well controlled. I will continue to address the need/dosing of medications in future visits. 4. Will call you with results. Scribe's attestation: This note was scribed in the presence of Ginny Jacobsen by Ulisses Harmon RN         I, Dr. Morena Cee, personally performed the services described in this documentation, as scribed by the above signed scribe in my presence. It is both accurate and complete to my knowledge. I agree with the details independently gathered by the clinical support staff, while the remaining scribed note accurately describes my personal service to the patient. Medical Decision Making: The following items were considered in medical decision making:  Independent review of images  Review / order clinical lab tests  Review / order radiology tests  Decision to obtain old records  Review and summation of old records as accessed through [facility] (a summary of my findings in these old records)      Time Based Itemization  A total of 45 minutes was spent on today's patient encounter.   If applicable, non-patient-facing activities:  (X)Preparing to see the patient and reviewing records  (X) Individual interpretation of results  ( ) Discussion or coordination of care with other health care professionals  () Ordering of unique tests, medications, or procedures  (X) Documentation within the MD Sergei, [address], [facility], [facility] office  [phone] [facility]  3/18/2022  3:58 PM

## 2022-03-18 ENCOUNTER — OFFICE VISIT (OUTPATIENT)
Dept: CARDIOLOGY CLINIC | Age: 87
End: 2022-03-18
Payer: MEDICARE

## 2022-03-18 VITALS
WEIGHT: 166 LBS | HEART RATE: 84 BPM | SYSTOLIC BLOOD PRESSURE: 100 MMHG | DIASTOLIC BLOOD PRESSURE: 62 MMHG | HEIGHT: 67 IN | OXYGEN SATURATION: 94 % | BODY MASS INDEX: 26.06 KG/M2

## 2022-03-18 DIAGNOSIS — I48.11 LONGSTANDING PERSISTENT ATRIAL FIBRILLATION (HCC): ICD-10-CM

## 2022-03-18 DIAGNOSIS — R42 DIZZINESS: ICD-10-CM

## 2022-03-18 DIAGNOSIS — I25.10 CORONARY ARTERY DISEASE INVOLVING NATIVE CORONARY ARTERY OF NATIVE HEART WITHOUT ANGINA PECTORIS: Primary | ICD-10-CM

## 2022-03-18 PROCEDURE — G8427 DOCREV CUR MEDS BY ELIG CLIN: HCPCS | Performed by: INTERNAL MEDICINE

## 2022-03-18 PROCEDURE — 4040F PNEUMOC VAC/ADMIN/RCVD: CPT | Performed by: INTERNAL MEDICINE

## 2022-03-18 PROCEDURE — 1036F TOBACCO NON-USER: CPT | Performed by: INTERNAL MEDICINE

## 2022-03-18 PROCEDURE — G8417 CALC BMI ABV UP PARAM F/U: HCPCS | Performed by: INTERNAL MEDICINE

## 2022-03-18 PROCEDURE — 1123F ACP DISCUSS/DSCN MKR DOCD: CPT | Performed by: INTERNAL MEDICINE

## 2022-03-18 PROCEDURE — 99215 OFFICE O/P EST HI 40 MIN: CPT | Performed by: INTERNAL MEDICINE

## 2022-03-18 PROCEDURE — G8484 FLU IMMUNIZE NO ADMIN: HCPCS | Performed by: INTERNAL MEDICINE

## 2022-03-18 NOTE — PATIENT INSTRUCTIONS
Your provider has ordered testing for further evaluation. An order/prescription has been included in your paper work.  To schedule outpatient testing, contact Central Scheduling by calling [address] [place], [place] [address]Y ([phone]). Plan:  1. Order carotid doppler ~ evaluate for any carotid stenosis (narrowing) with symptoms of dizziness and falls   2. Continue as scheduled for Limited Echocardiogram   3. I recommend that the patient continue their currently prescribed medications. Their drug modifiable risk factors appear to be well controlled. I will continue to address the need/dosing of medications in future visits. 4. Will call you with results.

## 2022-03-28 DIAGNOSIS — R44.3 HALLUCINATIONS: ICD-10-CM

## 2022-03-28 RX ORDER — CITALOPRAM 10 MG/1
10 TABLET ORAL DAILY
Qty: 90 TABLET | Refills: 3 | Status: SHIPPED | OUTPATIENT
Start: 2022-03-28

## 2022-03-29 ENCOUNTER — OFFICE VISIT (OUTPATIENT)
Dept: FAMILY MEDICINE CLINIC | Age: 87
End: 2022-03-29
Payer: MEDICARE

## 2022-03-29 VITALS
DIASTOLIC BLOOD PRESSURE: 110 MMHG | HEIGHT: 67 IN | SYSTOLIC BLOOD PRESSURE: 155 MMHG | OXYGEN SATURATION: 95 % | WEIGHT: 165 LBS | HEART RATE: 82 BPM | BODY MASS INDEX: 25.9 KG/M2

## 2022-03-29 DIAGNOSIS — R42 DIZZINESS: ICD-10-CM

## 2022-03-29 DIAGNOSIS — H93.8X3 EAR FULLNESS, BILATERAL: Primary | ICD-10-CM

## 2022-03-29 DIAGNOSIS — I48.11 LONGSTANDING PERSISTENT ATRIAL FIBRILLATION (HCC): ICD-10-CM

## 2022-03-29 DIAGNOSIS — R35.0 URINARY FREQUENCY: ICD-10-CM

## 2022-03-29 DIAGNOSIS — I10 ESSENTIAL HYPERTENSION: ICD-10-CM

## 2022-03-29 LAB
BILIRUBIN, POC: NEGATIVE
BLOOD URINE, POC: NORMAL
CLARITY, POC: NORMAL
COLOR, POC: YELLOW
GLUCOSE URINE, POC: NEGATIVE
KETONES, POC: NEGATIVE
LEUKOCYTE EST, POC: NEGATIVE
NITRITE, POC: NEGATIVE
PH, POC: 5.5
PROTEIN, POC: NORMAL
SPECIFIC GRAVITY, POC: 1.02
UROBILINOGEN, POC: 0.2

## 2022-03-29 PROCEDURE — 81002 URINALYSIS NONAUTO W/O SCOPE: CPT | Performed by: FAMILY MEDICINE

## 2022-03-29 PROCEDURE — 99214 OFFICE O/P EST MOD 30 MIN: CPT | Performed by: FAMILY MEDICINE

## 2022-03-29 PROCEDURE — G8427 DOCREV CUR MEDS BY ELIG CLIN: HCPCS | Performed by: FAMILY MEDICINE

## 2022-03-29 PROCEDURE — 4040F PNEUMOC VAC/ADMIN/RCVD: CPT | Performed by: FAMILY MEDICINE

## 2022-03-29 PROCEDURE — 1123F ACP DISCUSS/DSCN MKR DOCD: CPT | Performed by: FAMILY MEDICINE

## 2022-03-29 PROCEDURE — G8417 CALC BMI ABV UP PARAM F/U: HCPCS | Performed by: FAMILY MEDICINE

## 2022-03-29 PROCEDURE — G8484 FLU IMMUNIZE NO ADMIN: HCPCS | Performed by: FAMILY MEDICINE

## 2022-03-29 PROCEDURE — 1036F TOBACCO NON-USER: CPT | Performed by: FAMILY MEDICINE

## 2022-03-29 RX ORDER — OFLOXACIN 3 MG/ML
5 SOLUTION AURICULAR (OTIC) 2 TIMES DAILY
Qty: 10 ML | Refills: 0 | Status: SHIPPED | OUTPATIENT
Start: 2022-03-29 | End: 2022-06-13

## 2022-03-29 RX ORDER — QUETIAPINE FUMARATE 25 MG/1
12.5 TABLET, FILM COATED ORAL NIGHTLY
Status: ON HOLD | COMMUNITY
Start: 2022-03-24 | End: 2022-04-23 | Stop reason: SDUPTHER

## 2022-03-29 RX ORDER — DONEPEZIL HYDROCHLORIDE 10 MG/1
1 TABLET, FILM COATED ORAL DAILY
Status: ON HOLD | COMMUNITY
Start: 2022-03-24 | End: 2022-08-26 | Stop reason: HOSPADM

## 2022-03-29 RX ORDER — AMOXICILLIN 875 MG/1
875 TABLET, COATED ORAL 2 TIMES DAILY
Qty: 20 TABLET | Refills: 0 | Status: SHIPPED | OUTPATIENT
Start: 2022-03-29 | End: 2022-04-08

## 2022-03-29 ASSESSMENT — ENCOUNTER SYMPTOMS
SINUS PAIN: 0
SINUS PRESSURE: 0
ABDOMINAL PAIN: 0
CHEST TIGHTNESS: 0
RHINORRHEA: 1
ABDOMINAL DISTENTION: 0
DIARRHEA: 0
CONSTIPATION: 0

## 2022-03-29 NOTE — PROGRESS NOTES
Subjective:      Patient ID: Bonifacio Gamble is a 82 y.o. male. HPI  New sleeping pill make sleepy the next day as well. Has stopped taking it. Constantly lightheaded and dizziness. Also having headaches in the back of the head. Had covid in the past and hasn't seemed top fully recover. Constantly falling. Can't keep balance and lightheaded. Worse if standing to quickly. Also happens when he walking. Has remained conscious the whole time. 1 mo ago. Ears seemed to be getting plugged up in both ears. Having diff hearing. Nose is constantly running. Flonase help all. Sinus symptoms. Occ bloody nose. Has tried using q tips and removed no wax. Still having the hallucination and memory loss. Feels like no change since last visit. Freq urination w/ reduced volume. Color seems to be darker than normal. Feels the color changed around the time of recent medication switch 10 days ago. 15th apr. carotid US and f/u with cardio. Review of Systems   HENT: Positive for congestion, hearing loss, postnasal drip and rhinorrhea. Negative for sinus pressure and sinus pain. Respiratory: Negative for chest tightness. Cardiovascular: Negative for chest pain and leg swelling. Gastrointestinal: Negative for abdominal distention, abdominal pain, constipation and diarrhea. Genitourinary: Positive for decreased urine volume and frequency. Negative for hematuria. Neurological: Positive for dizziness and light-headedness. Psychiatric/Behavioral: Positive for confusion and hallucinations. Objective:   Physical Exam  Constitutional:       Appearance: Normal appearance. HENT:      Head: Normocephalic and atraumatic. Right Ear: Decreased hearing noted. Tympanic membrane is scarred and retracted. Left Ear: Decreased hearing noted. Tympanic membrane is retracted. Cardiovascular:      Rate and Rhythm: Rhythm irregular. Heart sounds: No murmur heard.       Pulmonary:      Effort: Pulmonary effort is normal.      Breath sounds: Normal breath sounds. No wheezing. Abdominal:      General: Abdomen is flat. There is no distension. Palpations: Abdomen is soft. Tenderness: There is no abdominal tenderness. There is no guarding. Neurological:      Mental Status: He is alert.          Assessment:            Plan:              Delacruz Avalon

## 2022-03-29 NOTE — PROGRESS NOTES
Chief Complaint   Patient presents with    Ear Problem       HPI:  Марина Boyd is a 82 y.o. (: 1935) here today   for hearing loss and fullness feeling in both ears. Runny nose, sig mucus production. occas specks of blood. Has tried to remove wax from ears. sxs worse over past mo. Did have covid approx 2 mo ago. Diff getting back to baseline. Nasal spray helps, but runny nose noted. HPI  Has had inc urinary freq w/ reduced volume. Changed around 10 days ago. No pain or blood in urine noted. Worse recently. Seen by cardio recently. Still w/ lightheadedness/dizziness. Does fall. Has been seen by cardio. Plans on echo and carotid u/s to eval further. Stopped sleeping pill, made him \"groggy. \"  More lightheaded/dizzy. Started by neuro. Felt it made him worse. Seen by neuro again last week. Patient's medications, allergies, past medical, surgical, social and family histories were reviewed and updated as appropriate. ROS:  Review of Systems   Constitutional: Negative for fever. HENT: Positive for congestion, postnasal drip and rhinorrhea. Negative for sinus pressure. Cardiovascular: Positive for palpitations. Genitourinary: Positive for frequency. Negative for dysuria. Prior to Visit Medications    Medication Sig Taking?  Authorizing Provider   amoxicillin (AMOXIL) 875 MG tablet Take 1 tablet by mouth 2 times daily for 10 days Yes Shravan Duran MD   ofloxacin (FLOXIN OTIC) 0.3 % otic solution Place 5 drops into the right ear 2 times daily for 7 days Yes Shravan Duran MD   apixaban (ELIQUIS) 5 MG TABS tablet TAKE 1/2 TABLET BY MOUTH 2 TIMES DAILY Yes Roman Preston MD   fluticasone (FLONASE) 50 MCG/ACT nasal spray 2 sprays by Nasal route daily Yes Shravan Duran MD   metoprolol succinate (TOPROL XL) 25 MG extended release tablet Take 3 tablets by mouth 2 times daily  Patient taking differently: Take 50 mg by mouth 2 times daily  Yes Kristian Woodall Rah Santacruz MD   Ferrous Sulfate (IRON) 325 (65 Fe) MG TABS Take by mouth Yes Historical Provider, MD   atorvastatin (LIPITOR) 20 MG tablet TAKE 1 TABLET BY MOUTH EVERY DAY Yes Alek Weathers MD   isosorbide mononitrate (IMDUR) 30 MG extended release tablet TAKE 1 TABLET BY MOUTH DAILY Yes Peng Mcknight MD   omeprazole (PRILOSEC) 20 MG delayed release capsule TAKE 1 CAPSULE BY MOUTH 2 TIMES DAILY Yes Imogene Pb, APRN - CNP   tamsulosin (FLOMAX) 0.4 MG capsule Take 0.4 mg by mouth daily Yes Historical Provider, MD   finasteride (PROSCAR) 5 MG tablet Take 5 mg by mouth daily. Yes Historical Provider, MD   aspirin 81 MG tablet Take 81 mg by mouth daily. Yes Historical Provider, MD   QUEtiapine (SEROQUEL) 25 MG tablet Take 25 mg by mouth nightly  Historical Provider, MD   donepezil (ARICEPT) 10 MG tablet Take 1 tablet by mouth daily  Historical Provider, MD   citalopram (CELEXA) 10 MG tablet TAKE 1 TABLET BY MOUTH DAILY  Alek Weathers MD       Allergies   Allergen Reactions    Sulfa Antibiotics        OBJECTIVE:    BP (!) 155/110 (Site: Right Upper Arm)   Pulse 82   Ht 5' 7\" (1.702 m)   Wt 165 lb (74.8 kg)   SpO2 95%   BMI 25.84 kg/m²     BP Readings from Last 2 Encounters:   03/29/22 (!) 155/110   03/18/22 100/62       Wt Readings from Last 3 Encounters:   03/29/22 165 lb (74.8 kg)   03/18/22 166 lb (75.3 kg)   03/08/22 164 lb (74.4 kg)       Physical Exam  Constitutional:       Appearance: Normal appearance. HENT:      Right Ear: Tympanic membrane is scarred and perforated. Left Ear: A middle ear effusion is present. Tympanic membrane is scarred. Eyes:      Extraocular Movements: Extraocular movements intact. Cardiovascular:      Rate and Rhythm: Tachycardia present. Rhythm irregularly irregular. Pulmonary:      Effort: Pulmonary effort is normal.      Breath sounds: Normal breath sounds. Skin:     General: Skin is warm and dry. Neurological:      General: No focal deficit present. Mental Status: He is alert. Mental status is at baseline. Psychiatric:         Mood and Affect: Mood normal.           ASSESSMENT/PLAN:     1. Ear fullness, bilateral  Significant symptoms as listed above. Trial of antibiotics as below given symptoms. Continue Flonase. If dizziness fails to improve, may need additional labs, especially given that patient is currently on anticoagulation. See below regarding other issues. Call if fails to improve  - amoxicillin (AMOXIL) 875 MG tablet; Take 1 tablet by mouth 2 times daily for 10 days  Dispense: 20 tablet; Refill: 0  - ofloxacin (FLOXIN OTIC) 0.3 % otic solution; Place 5 drops into the right ear 2 times daily for 7 days  Dispense: 10 mL; Refill: 0    2. Essential hypertension  Blood pressure is quite elevated today. It is typically been fairly well controlled. Patient denies missing any medication. Blood pressure was normal to low at his cardiology office visit about 10 days ago. Monitor for now    3. Longstanding persistent atrial fibrillation (HCC)  Ongoing significant symptoms. Also with some dizziness. He has been seen by the electrophysiologist and cardiologist.  Plans on echocardiogram and carotids soon. Likely contributing to his dizziness    4. Urinary frequency  Urinary frequency today. Urinalysis with trace blood, otherwise negative. Send urine for culture. - POCT Urinalysis no Micro  - Culture, Urine    5. Dizziness  Suspect multifactorial.  Some may be related to medicines that were added by neurology due to his memory issues. He did stop the Seroquel due to concerns of side effects. Cardiac issues almost certainly contributing as well. Upper respiratory and ear symptoms may also be contributing. If fails to improve with treating your symptoms as above, consider checking CBC and electrolytes due to his other medical issues and medications    Still with issues with sleep and had prior issues with hallucinations.   Some ongoing symptoms noted.  Will again try to obtain neurology note    This document was prepared by a combination of typing and transcription through a voice recognition software.

## 2022-03-30 LAB — URINE CULTURE, ROUTINE: NORMAL

## 2022-03-31 NOTE — RESULT ENCOUNTER NOTE
No growth on urine culture. He did have a small amount of blood on urinalysis. Recommend repeat UA in 2 weeks to make sure blood clears.

## 2022-04-14 ENCOUNTER — NURSE ONLY (OUTPATIENT)
Dept: FAMILY MEDICINE CLINIC | Age: 87
End: 2022-04-14
Payer: MEDICARE

## 2022-04-14 DIAGNOSIS — R35.0 URINARY FREQUENCY: Primary | ICD-10-CM

## 2022-04-14 LAB
BILIRUBIN, POC: NEGATIVE
BLOOD URINE, POC: NORMAL
CLARITY, POC: CLEAR
COLOR, POC: YELLOW
GLUCOSE URINE, POC: NEGATIVE
KETONES, POC: NEGATIVE
LEUKOCYTE EST, POC: NEGATIVE
NITRITE, POC: NEGATIVE
PH, POC: 6
PROTEIN, POC: 30
SPECIFIC GRAVITY, POC: 1.02
UROBILINOGEN, POC: 1

## 2022-04-14 PROCEDURE — 81002 URINALYSIS NONAUTO W/O SCOPE: CPT | Performed by: FAMILY MEDICINE

## 2022-04-15 ENCOUNTER — HOSPITAL ENCOUNTER (OUTPATIENT)
Dept: NON INVASIVE DIAGNOSTICS | Age: 87
Discharge: HOME | End: 2022-04-15
Payer: MEDICARE

## 2022-04-15 DIAGNOSIS — R06.02 SOB (SHORTNESS OF BREATH): ICD-10-CM

## 2022-04-15 PROCEDURE — 93308 TTE F-UP OR LMTD: CPT

## 2022-04-15 PROCEDURE — 6360000004 HC RX CONTRAST MEDICATION: Performed by: INTERNAL MEDICINE

## 2022-04-15 RX ADMIN — PERFLUTREN 1.3 ML: 6.52 INJECTION, SUSPENSION INTRAVENOUS at 12:13

## 2022-04-15 NOTE — PROGRESS NOTES
Definity 1.3ml dilute with 8.7ml of 0.9% NS. Total of 2.5 ml given. PIV removed post injection. Pressure drsg applied.

## 2022-04-19 ENCOUNTER — NURSE ONLY (OUTPATIENT)
Dept: CARDIOLOGY CLINIC | Age: 87
End: 2022-04-19
Payer: MEDICARE

## 2022-04-19 DIAGNOSIS — Z95.0 PACEMAKER: ICD-10-CM

## 2022-04-19 DIAGNOSIS — I49.5 SICK SINUS SYNDROME (HCC): ICD-10-CM

## 2022-04-19 PROCEDURE — 93294 REM INTERROG EVL PM/LDLS PM: CPT | Performed by: INTERNAL MEDICINE

## 2022-04-19 PROCEDURE — 93296 REM INTERROG EVL PM/IDS: CPT | Performed by: INTERNAL MEDICINE

## 2022-04-21 ENCOUNTER — APPOINTMENT (OUTPATIENT)
Dept: GENERAL RADIOLOGY | Age: 87
DRG: 057 | End: 2022-04-21
Payer: MEDICARE

## 2022-04-21 ENCOUNTER — HOSPITAL ENCOUNTER (INPATIENT)
Age: 87
LOS: 2 days | Discharge: HOME HEALTH | DRG: 057 | End: 2022-04-23
Attending: EMERGENCY MEDICINE | Admitting: INTERNAL MEDICINE
Payer: MEDICARE

## 2022-04-21 ENCOUNTER — APPOINTMENT (OUTPATIENT)
Dept: CT IMAGING | Age: 87
DRG: 057 | End: 2022-04-21
Payer: MEDICARE

## 2022-04-21 DIAGNOSIS — N30.01 ACUTE CYSTITIS WITH HEMATURIA: Primary | ICD-10-CM

## 2022-04-21 PROBLEM — N39.0 UTI (URINARY TRACT INFECTION): Status: ACTIVE | Noted: 2022-04-21

## 2022-04-21 LAB
A/G RATIO: 1.8 (ref 1.1–2.2)
ACETAMINOPHEN LEVEL: <5 UG/ML (ref 10–30)
ALBUMIN SERPL-MCNC: 4.3 G/DL (ref 3.4–5)
ALP BLD-CCNC: 84 U/L (ref 40–129)
ALT SERPL-CCNC: 11 U/L (ref 10–40)
AMPHETAMINE SCREEN, URINE: NORMAL
ANION GAP SERPL CALCULATED.3IONS-SCNC: 11 MMOL/L (ref 3–16)
AST SERPL-CCNC: 24 U/L (ref 15–37)
BACTERIA: ABNORMAL /HPF
BARBITURATE SCREEN URINE: NORMAL
BASOPHILS ABSOLUTE: 0.1 K/UL (ref 0–0.2)
BASOPHILS RELATIVE PERCENT: 0.9 %
BENZODIAZEPINE SCREEN, URINE: NORMAL
BILIRUB SERPL-MCNC: 1.3 MG/DL (ref 0–1)
BILIRUBIN URINE: ABNORMAL
BLOOD, URINE: ABNORMAL
BUN BLDV-MCNC: 24 MG/DL (ref 7–20)
CALCIUM SERPL-MCNC: 9.5 MG/DL (ref 8.3–10.6)
CANNABINOID SCREEN URINE: NORMAL
CHLORIDE BLD-SCNC: 102 MMOL/L (ref 99–110)
CLARITY: CLEAR
CO2: 27 MMOL/L (ref 21–32)
COCAINE METABOLITE SCREEN URINE: NORMAL
COLOR: YELLOW
CREAT SERPL-MCNC: 1.6 MG/DL (ref 0.8–1.3)
EKG ATRIAL RATE: 92 BPM
EKG DIAGNOSIS: NORMAL
EKG Q-T INTERVAL: 348 MS
EKG QRS DURATION: 80 MS
EKG QTC CALCULATION (BAZETT): 453 MS
EKG R AXIS: 60 DEGREES
EKG T AXIS: -63 DEGREES
EKG VENTRICULAR RATE: 102 BPM
EOSINOPHILS ABSOLUTE: 0.1 K/UL (ref 0–0.6)
EOSINOPHILS RELATIVE PERCENT: 1.6 %
EPITHELIAL CELLS, UA: ABNORMAL /HPF (ref 0–5)
ETHANOL: NORMAL MG/DL (ref 0–0.08)
GFR AFRICAN AMERICAN: 50
GFR NON-AFRICAN AMERICAN: 41
GLUCOSE BLD-MCNC: 87 MG/DL (ref 70–99)
GLUCOSE URINE: NEGATIVE MG/DL
HCT VFR BLD CALC: 45.9 % (ref 40.5–52.5)
HEMOGLOBIN: 15.3 G/DL (ref 13.5–17.5)
INFLUENZA A: NOT DETECTED
INFLUENZA B: NOT DETECTED
KETONES, URINE: ABNORMAL MG/DL
LEUKOCYTE ESTERASE, URINE: ABNORMAL
LYMPHOCYTES ABSOLUTE: 1.3 K/UL (ref 1–5.1)
LYMPHOCYTES RELATIVE PERCENT: 17.8 %
Lab: NORMAL
MCH RBC QN AUTO: 29.5 PG (ref 26–34)
MCHC RBC AUTO-ENTMCNC: 33.3 G/DL (ref 31–36)
MCV RBC AUTO: 88.7 FL (ref 80–100)
METHADONE SCREEN, URINE: NORMAL
MICROSCOPIC EXAMINATION: YES
MONOCYTES ABSOLUTE: 0.7 K/UL (ref 0–1.3)
MONOCYTES RELATIVE PERCENT: 10.2 %
MUCUS: ABNORMAL /LPF
NEUTROPHILS ABSOLUTE: 4.9 K/UL (ref 1.7–7.7)
NEUTROPHILS RELATIVE PERCENT: 69.5 %
NITRITE, URINE: NEGATIVE
OPIATE SCREEN URINE: NORMAL
OXYCODONE URINE: NORMAL
PDW BLD-RTO: 15.6 % (ref 12.4–15.4)
PH UA: 6
PH UA: 6 (ref 5–8)
PHENCYCLIDINE SCREEN URINE: NORMAL
PLATELET # BLD: 219 K/UL (ref 135–450)
PMV BLD AUTO: 7.8 FL (ref 5–10.5)
POTASSIUM REFLEX MAGNESIUM: 4.4 MMOL/L (ref 3.5–5.1)
PRO-BNP: 4264 PG/ML (ref 0–449)
PROPOXYPHENE SCREEN: NORMAL
PROTEIN UA: ABNORMAL MG/DL
RBC # BLD: 5.17 M/UL (ref 4.2–5.9)
RBC UA: ABNORMAL /HPF (ref 0–4)
SALICYLATE, SERUM: <0.3 MG/DL (ref 15–30)
SARS-COV-2 RNA, RT PCR: NOT DETECTED
SODIUM BLD-SCNC: 140 MMOL/L (ref 136–145)
SPECIFIC GRAVITY UA: >=1.03 (ref 1–1.03)
TOTAL PROTEIN: 6.7 G/DL (ref 6.4–8.2)
TROPONIN: <0.01 NG/ML
URINE REFLEX TO CULTURE: YES
URINE TYPE: ABNORMAL
UROBILINOGEN, URINE: 1 E.U./DL
WBC # BLD: 7.1 K/UL (ref 4–11)
WBC UA: ABNORMAL /HPF (ref 0–5)

## 2022-04-21 PROCEDURE — 81001 URINALYSIS AUTO W/SCOPE: CPT

## 2022-04-21 PROCEDURE — 87186 SC STD MICRODIL/AGAR DIL: CPT

## 2022-04-21 PROCEDURE — 85025 COMPLETE CBC W/AUTO DIFF WBC: CPT

## 2022-04-21 PROCEDURE — 80179 DRUG ASSAY SALICYLATE: CPT

## 2022-04-21 PROCEDURE — 99285 EMERGENCY DEPT VISIT HI MDM: CPT

## 2022-04-21 PROCEDURE — 1200000000 HC SEMI PRIVATE

## 2022-04-21 PROCEDURE — 80143 DRUG ASSAY ACETAMINOPHEN: CPT

## 2022-04-21 PROCEDURE — 93005 ELECTROCARDIOGRAM TRACING: CPT | Performed by: PHYSICIAN ASSISTANT

## 2022-04-21 PROCEDURE — 82077 ASSAY SPEC XCP UR&BREATH IA: CPT

## 2022-04-21 PROCEDURE — 71045 X-RAY EXAM CHEST 1 VIEW: CPT

## 2022-04-21 PROCEDURE — 6370000000 HC RX 637 (ALT 250 FOR IP)

## 2022-04-21 PROCEDURE — 87086 URINE CULTURE/COLONY COUNT: CPT

## 2022-04-21 PROCEDURE — 84484 ASSAY OF TROPONIN QUANT: CPT

## 2022-04-21 PROCEDURE — 83880 ASSAY OF NATRIURETIC PEPTIDE: CPT

## 2022-04-21 PROCEDURE — 96365 THER/PROPH/DIAG IV INF INIT: CPT

## 2022-04-21 PROCEDURE — 87636 SARSCOV2 & INF A&B AMP PRB: CPT

## 2022-04-21 PROCEDURE — 2580000003 HC RX 258

## 2022-04-21 PROCEDURE — 70450 CT HEAD/BRAIN W/O DYE: CPT

## 2022-04-21 PROCEDURE — 80307 DRUG TEST PRSMV CHEM ANLYZR: CPT

## 2022-04-21 PROCEDURE — 80053 COMPREHEN METABOLIC PANEL: CPT

## 2022-04-21 PROCEDURE — 93010 ELECTROCARDIOGRAM REPORT: CPT | Performed by: INTERNAL MEDICINE

## 2022-04-21 PROCEDURE — 87077 CULTURE AEROBIC IDENTIFY: CPT

## 2022-04-21 PROCEDURE — 6360000002 HC RX W HCPCS: Performed by: PHYSICIAN ASSISTANT

## 2022-04-21 PROCEDURE — 2580000003 HC RX 258: Performed by: PHYSICIAN ASSISTANT

## 2022-04-21 RX ORDER — METOPROLOL SUCCINATE 50 MG/1
50 TABLET, EXTENDED RELEASE ORAL 2 TIMES DAILY
Status: DISCONTINUED | OUTPATIENT
Start: 2022-04-21 | End: 2022-04-23 | Stop reason: HOSPADM

## 2022-04-21 RX ORDER — ASPIRIN 81 MG/1
81 TABLET, CHEWABLE ORAL DAILY
Status: DISCONTINUED | OUTPATIENT
Start: 2022-04-22 | End: 2022-04-23 | Stop reason: HOSPADM

## 2022-04-21 RX ORDER — ONDANSETRON 4 MG/1
4 TABLET, ORALLY DISINTEGRATING ORAL EVERY 8 HOURS PRN
Status: DISCONTINUED | OUTPATIENT
Start: 2022-04-21 | End: 2022-04-23 | Stop reason: HOSPADM

## 2022-04-21 RX ORDER — FERROUS SULFATE 325(65) MG
325 TABLET ORAL DAILY
Status: DISCONTINUED | OUTPATIENT
Start: 2022-04-22 | End: 2022-04-23 | Stop reason: HOSPADM

## 2022-04-21 RX ORDER — CITALOPRAM 20 MG/1
10 TABLET ORAL DAILY
Status: DISCONTINUED | OUTPATIENT
Start: 2022-04-21 | End: 2022-04-23 | Stop reason: HOSPADM

## 2022-04-21 RX ORDER — ATORVASTATIN CALCIUM 10 MG/1
20 TABLET, FILM COATED ORAL DAILY
Status: DISCONTINUED | OUTPATIENT
Start: 2022-04-21 | End: 2022-04-23 | Stop reason: HOSPADM

## 2022-04-21 RX ORDER — SODIUM CHLORIDE 9 MG/ML
INJECTION, SOLUTION INTRAVENOUS PRN
Status: DISCONTINUED | OUTPATIENT
Start: 2022-04-21 | End: 2022-04-23 | Stop reason: HOSPADM

## 2022-04-21 RX ORDER — MAGNESIUM SULFATE 1 G/100ML
1000 INJECTION INTRAVENOUS PRN
Status: DISCONTINUED | OUTPATIENT
Start: 2022-04-21 | End: 2022-04-23 | Stop reason: HOSPADM

## 2022-04-21 RX ORDER — DONEPEZIL HYDROCHLORIDE 5 MG/1
10 TABLET, FILM COATED ORAL DAILY
Status: DISCONTINUED | OUTPATIENT
Start: 2022-04-21 | End: 2022-04-23 | Stop reason: HOSPADM

## 2022-04-21 RX ORDER — QUETIAPINE FUMARATE 25 MG/1
25 TABLET, FILM COATED ORAL NIGHTLY
Status: DISCONTINUED | OUTPATIENT
Start: 2022-04-21 | End: 2022-04-23 | Stop reason: HOSPADM

## 2022-04-21 RX ORDER — POTASSIUM CHLORIDE 20 MEQ/1
40 TABLET, EXTENDED RELEASE ORAL PRN
Status: DISCONTINUED | OUTPATIENT
Start: 2022-04-21 | End: 2022-04-23 | Stop reason: HOSPADM

## 2022-04-21 RX ORDER — SODIUM CHLORIDE 0.9 % (FLUSH) 0.9 %
10 SYRINGE (ML) INJECTION PRN
Status: DISCONTINUED | OUTPATIENT
Start: 2022-04-21 | End: 2022-04-23 | Stop reason: HOSPADM

## 2022-04-21 RX ORDER — POTASSIUM CHLORIDE 7.45 MG/ML
10 INJECTION INTRAVENOUS PRN
Status: DISCONTINUED | OUTPATIENT
Start: 2022-04-21 | End: 2022-04-23 | Stop reason: HOSPADM

## 2022-04-21 RX ORDER — 0.9 % SODIUM CHLORIDE 0.9 %
1000 INTRAVENOUS SOLUTION INTRAVENOUS ONCE
Status: COMPLETED | OUTPATIENT
Start: 2022-04-21 | End: 2022-04-21

## 2022-04-21 RX ORDER — TAMSULOSIN HYDROCHLORIDE 0.4 MG/1
0.4 CAPSULE ORAL DAILY
Status: DISCONTINUED | OUTPATIENT
Start: 2022-04-21 | End: 2022-04-23 | Stop reason: HOSPADM

## 2022-04-21 RX ORDER — ACETAMINOPHEN 650 MG/1
650 SUPPOSITORY RECTAL EVERY 6 HOURS PRN
Status: DISCONTINUED | OUTPATIENT
Start: 2022-04-21 | End: 2022-04-23 | Stop reason: HOSPADM

## 2022-04-21 RX ORDER — ACETAMINOPHEN 325 MG/1
650 TABLET ORAL EVERY 6 HOURS PRN
Status: DISCONTINUED | OUTPATIENT
Start: 2022-04-21 | End: 2022-04-23 | Stop reason: HOSPADM

## 2022-04-21 RX ORDER — POLYETHYLENE GLYCOL 3350 17 G/17G
17 POWDER, FOR SOLUTION ORAL DAILY PRN
Status: DISCONTINUED | OUTPATIENT
Start: 2022-04-21 | End: 2022-04-23 | Stop reason: HOSPADM

## 2022-04-21 RX ORDER — ISOSORBIDE MONONITRATE 30 MG/1
30 TABLET, EXTENDED RELEASE ORAL DAILY
Status: DISCONTINUED | OUTPATIENT
Start: 2022-04-21 | End: 2022-04-23 | Stop reason: HOSPADM

## 2022-04-21 RX ORDER — ONDANSETRON 2 MG/ML
4 INJECTION INTRAMUSCULAR; INTRAVENOUS EVERY 6 HOURS PRN
Status: DISCONTINUED | OUTPATIENT
Start: 2022-04-21 | End: 2022-04-23 | Stop reason: HOSPADM

## 2022-04-21 RX ORDER — SODIUM CHLORIDE 0.9 % (FLUSH) 0.9 %
5-40 SYRINGE (ML) INJECTION EVERY 12 HOURS SCHEDULED
Status: DISCONTINUED | OUTPATIENT
Start: 2022-04-21 | End: 2022-04-23 | Stop reason: HOSPADM

## 2022-04-21 RX ORDER — FINASTERIDE 5 MG/1
5 TABLET, FILM COATED ORAL DAILY
Status: DISCONTINUED | OUTPATIENT
Start: 2022-04-21 | End: 2022-04-23 | Stop reason: HOSPADM

## 2022-04-21 RX ADMIN — METOPROLOL SUCCINATE 50 MG: 50 TABLET, EXTENDED RELEASE ORAL at 21:46

## 2022-04-21 RX ADMIN — QUETIAPINE FUMARATE 25 MG: 25 TABLET ORAL at 21:47

## 2022-04-21 RX ADMIN — DONEPEZIL HYDROCHLORIDE 10 MG: 5 TABLET, FILM COATED ORAL at 21:46

## 2022-04-21 RX ADMIN — SODIUM CHLORIDE 1000 ML: 9 INJECTION, SOLUTION INTRAVENOUS at 15:17

## 2022-04-21 RX ADMIN — CITALOPRAM 10 MG: 20 TABLET, FILM COATED ORAL at 21:47

## 2022-04-21 RX ADMIN — Medication 10 ML: at 21:48

## 2022-04-21 RX ADMIN — FINASTERIDE 5 MG: 5 TABLET, FILM COATED ORAL at 21:47

## 2022-04-21 RX ADMIN — TAMSULOSIN HYDROCHLORIDE 0.4 MG: 0.4 CAPSULE ORAL at 21:46

## 2022-04-21 RX ADMIN — ISOSORBIDE MONONITRATE 30 MG: 30 TABLET ORAL at 21:47

## 2022-04-21 RX ADMIN — CEFTRIAXONE SODIUM 1000 MG: 1 INJECTION, POWDER, FOR SOLUTION INTRAMUSCULAR; INTRAVENOUS at 15:17

## 2022-04-21 RX ADMIN — ATORVASTATIN CALCIUM 20 MG: 10 TABLET, FILM COATED ORAL at 21:47

## 2022-04-21 ASSESSMENT — PAIN - FUNCTIONAL ASSESSMENT: PAIN_FUNCTIONAL_ASSESSMENT: NONE - DENIES PAIN

## 2022-04-21 NOTE — ED PROVIDER NOTES
[address] ED  EMERGENCY DEPARTMENT ENCOUNTER        Pt Name: Lori Huynh  MRN: [id number]  [place] 1935  Date of evaluation: 4/21/2022  Provider: Juana Angulo PA-C  PCP: Danita Fisher MD  Note Started: 12:15 PM EDT        I have seen and evaluated this patient with my supervising physician Belle Kim MD.    [address] [place] [address]       Chief Complaint   Patient presents with    Dizziness     pt states he is here for dizziness for months, has been worked up multiple times. pt daughter states they are here because pt has dementia and its getting worse, thinks pt took multiple days worth of meds. pt was found parked on side of road today confused and did not know how to get home. HISTORY OF PRESENT ILLNESS   (Location, Timing/Onset, Context/Setting, Quality, Duration, Modifying Factors, Severity, Associated Signs and Symptoms)  Note limiting factors. Chief Complaint: dementia    Lori Huynh is a 82 y.o. male with a past medical history of hypertension, hyperlipidemia, chronic kidney disease, A. fib, history of cardiac pacemaker, CAD, A. Fib on eliquis brought in by private care daughter for concern for worsening dementia. History was obtained by daughter at bedside. Daughter states today he was found parked on the side of the road confused. Daughter states over the past several days he has been up all night keeping his wife up and also they are not sure if he took more medication than he should have because his pillbox was empty. They are not sure if he took the pills, how much or when he took these pills. He also has been hallucinating and has been telling people that there is a dog in the house that he needs to shoot. They took the guns out of the house. Patient currently denies any complaints. He states he has right ear fullness which causes him to become lightheaded and fall. He states he has been falling more at home. He does use a cane.   When he falls he states he does not hit his head he has had no loss of consciousness. His most recent fall was sometime yesterday. Family did not witness the fall. He denies chest pain or shortness of breath. Denies nausea vomiting diarrhea. Denies numbness or tingling. No aggravating symptoms. No bleeding symptoms. Otherwise denies any other complaints. Nursing Notes were all reviewed and agreed with or any disagreements were addressed in the HPI. REVIEW OF SYSTEMS    (2-9 systems for level 4, 10 or more for level 5)     Review of Systems   Unable to perform ROS: Dementia       Positives and Pertinent negatives as per HPI. Except as noted above in the ROS, all other systems were reviewed and negative. PAST MEDICAL HISTORY     Past Medical History:   Diagnosis Date    [facility] fibrillation [facility]     Blind right eye     Chronic kidney disease     Dementia ([facility]     Hyperlipidemia     Hypertension     Pneumonia          SURGICAL HISTORY     Past Surgical History:   Procedure Laterality Date    APPENDECTOMY      COLONOSCOPY      EYE SURGERY      HERNIA REPAIR      JOINT REPLACEMENT  2013    left shoulder    PACEMAKER PLACEMENT  10/13/2017    Dr [facility] Spicer at [facility]       Previous Medications    APIXABAN (ELIQUIS) 5 MG TABS TABLET    TAKE 1/2 TABLET BY MOUTH 2 TIMES DAILY    ASPIRIN 81 MG TABLET    Take 81 mg by mouth daily. ATORVASTATIN (LIPITOR) 20 MG TABLET    TAKE 1 TABLET BY MOUTH EVERY DAY    CITALOPRAM (CELEXA) 10 MG TABLET    TAKE 1 TABLET BY MOUTH DAILY    DONEPEZIL (ARICEPT) 10 MG TABLET    Take 1 tablet by mouth daily    FERROUS SULFATE (IRON) 325 (65 FE) MG TABS    Take by mouth    FINASTERIDE (PROSCAR) 5 MG TABLET    Take 5 mg by mouth daily.     FLUTICASONE (FLONASE) 50 MCG/ACT NASAL SPRAY    2 sprays by Nasal route daily    ISOSORBIDE MONONITRATE (IMDUR) 30 MG EXTENDED RELEASE TABLET TAKE 1 TABLET BY MOUTH DAILY    METOPROLOL SUCCINATE (TOPROL XL) 25 MG EXTENDED RELEASE TABLET    Take 3 tablets by mouth 2 times daily    OMEPRAZOLE (PRILOSEC) 20 MG DELAYED RELEASE CAPSULE    TAKE 1 CAPSULE BY MOUTH 2 TIMES DAILY    QUETIAPINE (SEROQUEL) 25 MG TABLET    Take 25 mg by mouth nightly    TAMSULOSIN (FLOMAX) 0.4 MG CAPSULE    Take 0.4 mg by mouth daily         ALLERGIES     Sulfa antibiotics    FAMILYHISTORY       Family History   Problem Relation Age of Onset    Heart Disease Mother     Other Mother     Heart Disease Father     Asthma Other           SOCIAL HISTORY       Social History     Tobacco Use    Smoking status: Former Smoker     Packs/day: 1.00     Years: 10.00     Pack years: 10.00     Quit date: 1975     Years since quittin.1    Smokeless tobacco: Never Used    Tobacco comment: Quit 40 years ago   Vaping Use    Vaping Use: Never used   Substance Use Topics    Alcohol use: No     Alcohol/week: 0.0 standard drinks    Drug use: No       SCREENINGS             PHYSICAL EXAM    (up to 7 for level 4, 8 or more for level 5)     ED Triage Vitals   BP Temp Temp src Pulse Resp SpO2 Height Weight   -- -- -- -- -- -- -- --       Physical Exam  Vitals and nursing note reviewed. Constitutional:       General: He is awake. He is not in acute distress. Appearance: Normal appearance. He is well-developed and overweight. He is not ill-appearing, toxic-appearing or diaphoretic. HENT:      Head: Normocephalic and atraumatic. Right Ear: External ear normal. No drainage, swelling or tenderness. A middle ear effusion is present. There is no impacted cerumen. No foreign body. No mastoid tenderness. No PE tube. No hemotympanum. Tympanic membrane is not injected, scarred, perforated, erythematous, retracted or bulging. Left Ear: Tympanic membrane and external ear normal. No drainage, swelling or tenderness. No middle ear effusion. There is no impacted cerumen.  No foreign body. No mastoid tenderness. No PE tube. No hemotympanum. Tympanic membrane is not injected, scarred, perforated, erythematous, retracted or bulging. Nose: Nose normal.   Eyes:      General: Lids are normal.         Right eye: No discharge. Left eye: No discharge. Extraocular Movements: Extraocular movements intact. Right eye: Normal extraocular motion and no nystagmus. Left eye: Normal extraocular motion and no nystagmus. Conjunctiva/sclera: Conjunctivae normal.      Pupils: Pupils are equal, round, and reactive to light. Pupils are equal.      Right eye: Pupil is round, reactive and not sluggish. Left eye: Pupil is round, reactive and not sluggish. Cardiovascular:      Rate and Rhythm: Normal rate and regular rhythm. Pulses:           Radial pulses are 2+ on the right side and 2+ on the left side. Heart sounds: Normal heart sounds. No murmur heard. No gallop. Pulmonary:      Effort: Pulmonary effort is normal. No respiratory distress. Breath sounds: Normal breath sounds. No decreased breath sounds, wheezing, rhonchi or rales. Chest:      Chest wall: No tenderness. Musculoskeletal:         General: No deformity. Normal range of motion. Cervical back: Normal range of motion and neck supple. Right lower leg: No edema. Left lower leg: No edema. Skin:     General: Skin is warm and dry. Neurological:      General: No focal deficit present. Mental Status: He is alert and oriented to person, place, and time. Mental status is at baseline. GCS: GCS eye subscore is 4. GCS verbal subscore is 5. GCS motor subscore is 6. Cranial Nerves: Cranial nerves are intact. Sensory: Sensation is intact. Motor: Motor function is intact. Coordination: Coordination is intact. Gait: Gait is intact. Psychiatric:         Behavior: Behavior normal. Behavior is cooperative.          DIAGNOSTIC RESULTS   LABS:    Labs Reviewed URINALYSIS WITH REFLEX TO CULTURE - Abnormal; Notable for the following components:       Result Value    Bilirubin Urine SMALL (*)     Ketones, Urine TRACE (*)     Blood, Urine LARGE (*)     Protein, UA TRACE (*)     Leukocyte Esterase, Urine SMALL (*)     All other components within normal limits   CBC WITH AUTO DIFFERENTIAL - Abnormal; Notable for the following components:    RDW 15.6 (*)     All other components within normal limits   COMPREHENSIVE METABOLIC PANEL W/ REFLEX TO MG FOR LOW K - Abnormal; Notable for the following components:    BUN 24 (*)     CREATININE 1.6 (*)     GFR Non- 41 (*)     GFR  50 (*)     Total Bilirubin 1.3 (*)     All other components within normal limits   BRAIN NATRIURETIC PEPTIDE - Abnormal; Notable for the following components:    Pro-BNP 4,264 (*)     All other components within normal limits   SALICYLATE LEVEL - Abnormal; Notable for the following components:    Salicylate, Serum <2.1 (*)     All other components within normal limits   ACETAMINOPHEN LEVEL - Abnormal; Notable for the following components:    Acetaminophen Level <5 (*)     All other components within normal limits   MICROSCOPIC URINALYSIS - Abnormal; Notable for the following components:    Mucus, UA Rare (*)     WBC, UA 21-50 (*)     Bacteria, UA 1+ (*)     All other components within normal limits   CULTURE, URINE   TROPONIN   ETHANOL   URINE DRUG SCREEN       When ordered only abnormal lab results are displayed. All other labs were within normal range or not returned as of this dictation. EKG: When ordered, EKG's are interpreted by the Emergency Department Physician in the absence of a cardiologist.  Please see their note for interpretation of EKG.     RADIOLOGY:   Non-plain film images such as CT, Ultrasound and MRI are read by the radiologist. Plain radiographic images are visualized and preliminarily interpreted by the ED Provider with the below findings:        Interpretation per the Radiologist below, if available at the time of this note:    CT HEAD WO CONTRAST   Preliminary Result   Atrophy and chronic changes seen within the brain with no acute intracranial   abnormality. XR CHEST PORTABLE   Final Result   No acute cardiopulmonary disease. Stable cardiomegaly without overt failure. Echo limited    Result Date: 4/15/2022  Transthoracic Echocardiography Report (TTE)  Demographics   Patient Name       Jesus BARBOSA   Date of Study      04/15/2022         Gender              Male   Patient Number     [id number]         Date of Birth       1935   Visit Number       [id number]          Age                 82 year(s)   Accession Number   [id number]         Room Number         OP   Corporate ID       [id number]           Sonographer         Ky Netac   Ordering Physician Becky Mak.                     MD                 Physician           Kamala Kaur MD, [facility]  Procedure Type of Study   TTE procedure:ECHOCARDIOGRAM LIMITED. Procedure Date Date: 04/15/2022 Start: 11:16 AM Study Location: [address] [place] [address] - Echo Lab Technical Quality: Limited visualization due to poor acoustical window. Indications:Dyspnea/SOB. Patient Status: Routine Contrast Medium: Definity. Amount - 2.5 ml Height: 67 inches Weight: 165 pounds BSA: 1.86 m2 BMI: 25.84 kg/m2 BP: 155/110 mmHg  Conclusions   Summary  Limited echo for LV function with limited doppler/color. LV function is mildly decreased with EF estimated from 45-50%. Mild global hypokinesis. Mild concentric left ventricular hypertrophy. Diastolic dysfunction grade and filling pressure are indeterminate. Severe biatrial enlargement. Aortic valve appears sclerotic but opens adequately. 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   Note no color flow doppler visualized to assess for mitral regurgitation. Mild to moderate tricuspid valve regurgitation. Systolic pulmonary artery pressure (sPAP) is normal and estimated at 26 mmHg  (RAP 3 mmHg)  Irregular heart rate throughout study. Definity® used for myocardial border enhancement. Signature   ------------------------------------------------------------------  Electronically signed by Adwoa Parekh MD, [facility]  (Interpreting physician) on 04/15/2022 at 01:59 PM  ------------------------------------------------------------------   Findings   Left Ventricle  LV function is mildly decreased with EF estimated from 45-50%. EF by Olivares's method estimated at 45%. Normal left ventricle size with mild concentric left ventricular  hypertrophy. Mild global hypokinesis. Diastolic dysfunction grade and filling pressure are indeterminate. Irregular heart rate throughout study. Definity® used for myocardial border enhancement. Mitral Valve  There is prolapse of posterior mitral valve leaflets. Note no color flow doppler visualized to assess for mitral regurgitation. Left Atrium  The left atrium is severely dilated. Aortic Valve  Aortic valve appears sclerotic but opens adequately. Aorta  The aortic root is normal in size. Right Ventricle  The right ventricle appears normal in size with preserved function. TAPSE is measured at 19 mm. S\" Prime Velocity is measured at 10.4 cm/s. Tricuspid Valve  The tricuspid valve is normal in structure  Mild to moderate tricuspid valve regurgitation. Systolic pulmonary artery pressure (sPAP) is normal and estimated at 26 mmHg  (right atrial pressure 3 mmHg)   Right Atrium  The right atrium is severely dilated. Right atrial area is 27.4 cm2. Pulmonic Valve  The pulmonic valve is normal in structure   Pericardial Effusion  No pericardial effusion noted. Pleural Effusion  No pleural effusion. Miscellaneous  No obvious masses, thrombi or vegetations are noted.   IVC is normal in size (< 2.1 cm) and collapses > 50% with respiration  consistent with normal right atrial pressure (3 mmHg). M-Mode/2D Measurements (cm)   LV Diastolic Dimension: 9.68 cm LV Systolic Dimension: 5.49 cm  LV Septum Diastolic: 6.30 cm  LV PW Diastolic: 7.33 cm        AO Root Dimension: 3.3 cm                                  LA Dimension: 4.5 cm                                  LA Area: 27.8 cm2                                  LA volume/Index: 112.25 ml /60 ml/m2  Doppler Measurements                                 MV Peak E-Wave: 59.7 cm/s   TR Velocity:239 cm/s  TR Gradient:22.85 mmHg  Estimated RAP:3 mmHg  Estimated RVSP: 26 mmHg  E' Septal Velocity: 8.58 cm/s  Aorta   Aortic Root: 3.3 cm  Ascending Aorta: 3.4 cm            PROCEDURES   Unless otherwise noted below, none     Procedures    CRITICAL CARE TIME       CONSULTS:  IP CONSULT TO HOSPITALIST      EMERGENCY DEPARTMENT COURSE and DIFFERENTIAL DIAGNOSIS/MDM:   Vitals:    Vitals:    04/21/22 1214 04/21/22 1302 04/21/22 1401   BP: (!) 146/95 (!) 135/93 (!) 130/98   Pulse: 101 86    Resp: 16 12    Temp: 97.8 °F (36.6 °C)     TempSrc: Oral     SpO2: 98%  98%   Weight: 158 lb (71.7 kg)     Height: 5' 7\" (1.702 m)         Patient was given the following medications:  Medications   cefTRIAXone (ROCEPHIN) 1000 mg IVPB in 50 mL D5W minibag (1,000 mg IntraVENous New Bag 4/21/22 1517)   0.9 % sodium chloride bolus (1,000 mLs IntraVENous New Bag 4/21/22 1517)           Patient brought in today by daughter by private vehicle with complaints of worsening dementia. On exam patient is pleasant he is afebrile breathing on room air satting at 98%. He is at his baseline with no focal neurological deficits. Old labs and records reviewed. Patient seen and evaluated by myself as well as my attending. EKG reviewed by my attending please see note. CXR shows No acute cardiopulmonary disease. Stable cardiomegaly without overt failure. CBC shows no white count. Hemoglobin of 15.3. No acute electrolyte abnormalities. BUN of 24 creatinine of 1.6. Liver function unremarkable. Troponin less than 0.01. Ethanol is negative. Salicylate and acetaminophen levels are negative. BNP of 4264. CT head shows atrophy and chronic changes with no acute intracranial abnormality. Urine drug screen is negative. Urine shows small leukocytes with 21-50 WBCs +1 bacteria. Urine drug screen is negative. Patient was given Rocephin here as well as fluids. Plan at this time will be to admit for UTI and then eventually potentially a [facility] psych admission. We will consult the hospitalist at this time to arrange for admission. FINAL IMPRESSION      1. Acute cystitis with hematuria          DISPOSITION/PLAN   DISPOSITION        PATIENT REFERRED TO:  No follow-up provider specified.     DISCHARGE MEDICATIONS:  New Prescriptions    No medications on file       DISCONTINUED MEDICATIONS:  Discontinued Medications    No medications on file              (Please note that portions of this note were completed with a voice recognition program.  Efforts were made to edit the dictations but occasionally words are mis-transcribed.)    Ese Rich PA-C (electronically signed)            Ese Rich PA-C  04/21/22 8841

## 2022-04-21 NOTE — ED NOTES
Report to César Bailey, for transfer to [address] [place] [address], ENIO  04/21/22 8977

## 2022-04-21 NOTE — ED NOTES
1533- Apollo served the hospitalist for a consult. Lorence Crews  04/21/22 [id number]- Hospitalist called back and spoke with Abena BUCHANAN.      Lorence Crews  04/21/22 3832

## 2022-04-21 NOTE — PROGRESS NOTES
Pt admitted from ED to room 209. Transported with tele monitor on via wheelchair. Wife at bedside. Pt admitted to unit in stable condition. Oriented pt and wife to room and call light. Orders released. Pt in bed in lowest position with call light in reach and bed alarm on.

## 2022-04-21 NOTE — PLAN OF CARE
81 y/o male with hx of dementia, presenting with worsening confusion and hallucinations x 1 week per family     -hx of atrial fibrillation, systolic HF, CKD, and HTN     -UA +, rest of labs unremarkable, CXR and CT head with no acute findings   -started on rocephin and given IVF boluses in ED   -urine culture pending   -[facility] psych consulted     -admitted to [facility] on tele.      Senia Matthews [place]  04/21/22  4:06 PM

## 2022-04-21 NOTE — ED PROVIDER NOTES
I independently performed a history and physical on [employer]. All diagnostic, treatment, and disposition decisions were made by myself in conjunction with the advanced practice provider/resident. For further details of [facility] emergency department encounter, please see the KELIN/resident's documentation. I personally saw the patient and performed a substantive portion of the visit including all aspects of the medical decision making. Briefly, this is a 82-year-old male with history of dementia presents for evaluation of altered mental status, bizarre behavior. He apparently has had dizziness for months and has had multiple unremarkable work-ups. According to family, he has taken multiple days worth of medications, was found parked on the side of the road today was confused. He also currently thought there is a dog in the house and went to grab his gun to shoot it. Family is concerned about his safety and the safety of others. No reported fevers, sick contacts. Patient arrives with stable vital signs, mild tachycardia, afebrile. No acute distress on exam no focal deficits. We will plan for toxic metabolic work-up, infectious work-up, likely admission for [facility] psych evaluation. EKG  The Ekg interpreted by myself in the emergency department in the absence of a cardiologist.  atrial fibrillation with a rate of 102  Axis is   Normal  QTc is  within an acceptable range  Intervals and Durations are unremarkable. No specific ST-T wave changes appreciated. No evidence of acute ischemia. No significant change from prior EKG dated 3/8/2022      Comment: Please note this report has been produced using speech recognition software and may contain errors related to that system including errors in grammar, punctuation, and spelling, as well as words and phrases that may be inappropriate.  If there are any questions or concerns please feel free to contact the dictating provider for clarification.      Dior Fleming MD  04/21/22 3898

## 2022-04-22 LAB
ANION GAP SERPL CALCULATED.3IONS-SCNC: 15 MMOL/L (ref 3–16)
BASOPHILS ABSOLUTE: 0.1 K/UL (ref 0–0.2)
BASOPHILS RELATIVE PERCENT: 0.7 %
BUN BLDV-MCNC: 22 MG/DL (ref 7–20)
CALCIUM SERPL-MCNC: 9.1 MG/DL (ref 8.3–10.6)
CHLORIDE BLD-SCNC: 105 MMOL/L (ref 99–110)
CO2: 21 MMOL/L (ref 21–32)
CREAT SERPL-MCNC: 1.3 MG/DL (ref 0.8–1.3)
EOSINOPHILS ABSOLUTE: 0.2 K/UL (ref 0–0.6)
EOSINOPHILS RELATIVE PERCENT: 2.1 %
GFR AFRICAN AMERICAN: >60
GFR NON-AFRICAN AMERICAN: 53
GLUCOSE BLD-MCNC: 50 MG/DL (ref 70–99)
HCT VFR BLD CALC: 44.5 % (ref 40.5–52.5)
HEMOGLOBIN: 14.9 G/DL (ref 13.5–17.5)
LYMPHOCYTES ABSOLUTE: 1.5 K/UL (ref 1–5.1)
LYMPHOCYTES RELATIVE PERCENT: 19.2 %
MCH RBC QN AUTO: 29.3 PG (ref 26–34)
MCHC RBC AUTO-ENTMCNC: 33.4 G/DL (ref 31–36)
MCV RBC AUTO: 87.8 FL (ref 80–100)
MONOCYTES ABSOLUTE: 0.8 K/UL (ref 0–1.3)
MONOCYTES RELATIVE PERCENT: 10.3 %
NEUTROPHILS ABSOLUTE: 5.4 K/UL (ref 1.7–7.7)
NEUTROPHILS RELATIVE PERCENT: 67.7 %
PDW BLD-RTO: 15.4 % (ref 12.4–15.4)
PLATELET # BLD: 216 K/UL (ref 135–450)
PMV BLD AUTO: 7.3 FL (ref 5–10.5)
POTASSIUM REFLEX MAGNESIUM: 4.4 MMOL/L (ref 3.5–5.1)
RBC # BLD: 5.07 M/UL (ref 4.2–5.9)
SODIUM BLD-SCNC: 141 MMOL/L (ref 136–145)
WBC # BLD: 7.9 K/UL (ref 4–11)

## 2022-04-22 PROCEDURE — 1200000000 HC SEMI PRIVATE

## 2022-04-22 PROCEDURE — 99223 1ST HOSP IP/OBS HIGH 75: CPT | Performed by: INTERNAL MEDICINE

## 2022-04-22 PROCEDURE — 6370000000 HC RX 637 (ALT 250 FOR IP)

## 2022-04-22 PROCEDURE — 99223 1ST HOSP IP/OBS HIGH 75: CPT | Performed by: PSYCHIATRY & NEUROLOGY

## 2022-04-22 PROCEDURE — 6360000002 HC RX W HCPCS

## 2022-04-22 PROCEDURE — 6370000000 HC RX 637 (ALT 250 FOR IP): Performed by: INTERNAL MEDICINE

## 2022-04-22 PROCEDURE — 2580000003 HC RX 258

## 2022-04-22 PROCEDURE — 36415 COLL VENOUS BLD VENIPUNCTURE: CPT

## 2022-04-22 PROCEDURE — 85025 COMPLETE CBC W/AUTO DIFF WBC: CPT

## 2022-04-22 PROCEDURE — 80048 BASIC METABOLIC PNL TOTAL CA: CPT

## 2022-04-22 RX ORDER — ENOXAPARIN SODIUM 100 MG/ML
40 INJECTION SUBCUTANEOUS DAILY
Status: DISCONTINUED | OUTPATIENT
Start: 2022-04-22 | End: 2022-04-22

## 2022-04-22 RX ADMIN — Medication 10 ML: at 09:06

## 2022-04-22 RX ADMIN — FERROUS SULFATE TAB 325 MG (65 MG ELEMENTAL FE) 325 MG: 325 (65 FE) TAB at 09:07

## 2022-04-22 RX ADMIN — FINASTERIDE 5 MG: 5 TABLET, FILM COATED ORAL at 09:07

## 2022-04-22 RX ADMIN — METOPROLOL SUCCINATE 50 MG: 50 TABLET, EXTENDED RELEASE ORAL at 20:35

## 2022-04-22 RX ADMIN — QUETIAPINE FUMARATE 25 MG: 25 TABLET ORAL at 20:35

## 2022-04-22 RX ADMIN — METOPROLOL SUCCINATE 50 MG: 50 TABLET, EXTENDED RELEASE ORAL at 09:07

## 2022-04-22 RX ADMIN — ISOSORBIDE MONONITRATE 30 MG: 30 TABLET ORAL at 09:07

## 2022-04-22 RX ADMIN — ASPIRIN 81 MG 81 MG: 81 TABLET ORAL at 09:08

## 2022-04-22 RX ADMIN — DONEPEZIL HYDROCHLORIDE 10 MG: 5 TABLET, FILM COATED ORAL at 09:08

## 2022-04-22 RX ADMIN — CEFTRIAXONE SODIUM 1000 MG: 1 INJECTION, POWDER, FOR SOLUTION INTRAMUSCULAR; INTRAVENOUS at 16:01

## 2022-04-22 RX ADMIN — TAMSULOSIN HYDROCHLORIDE 0.4 MG: 0.4 CAPSULE ORAL at 09:08

## 2022-04-22 RX ADMIN — APIXABAN 2.5 MG: 5 TABLET, FILM COATED ORAL at 13:44

## 2022-04-22 RX ADMIN — Medication 10 ML: at 20:35

## 2022-04-22 RX ADMIN — ATORVASTATIN CALCIUM 20 MG: 10 TABLET, FILM COATED ORAL at 20:35

## 2022-04-22 RX ADMIN — APIXABAN 2.5 MG: 5 TABLET, FILM COATED ORAL at 20:35

## 2022-04-22 RX ADMIN — CITALOPRAM 10 MG: 20 TABLET, FILM COATED ORAL at 09:08

## 2022-04-22 NOTE — CONSULTS
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 CC: \"I need all the help I can get. \"    HPI:   Per ED note:  Rodolfo Spaulding is a 82 y.o. male with a past medical history of hypertension, hyperlipidemia, chronic kidney disease, A. fib, history of cardiac pacemaker, CAD, A. Fib on eliquis brought in by private care daughter for concern for worsening dementia. History was obtained by daughter at bedside. Daughter states today he was found parked on the side of the road confused. Daughter states over the past several days he has been up all night keeping his wife up and also they are not sure if he took more medication than he should have because his pillbox was empty. They are not sure if he took the pills, how much or when he took these pills. He also has been hallucinating and has been telling people that there is a dog in the house that he needs to shoot. They took the guns out of the house. Patient currently denies any complaints. He states he has right ear fullness which causes him to become lightheaded and fall. He states he has been falling more at home. He does use a cane. When he falls he states he does not hit his head he has had no loss of consciousness. His most recent fall was sometime yesterday. Family did not witness the fall. He denies chest pain or shortness of breath. Denies nausea vomiting diarrhea. Denies numbness or tingling. No aggravating symptoms. No bleeding symptoms. Otherwise denies any other complaints. Met with patient this afternoon. He is pleasant and behaviorally stable. He reports struggling with poorer health since testing positive for COVID earlier this years.  Says he's been falling and fatigued. Also endorses memory problems and spontaneously reports symptoms of sundowning including hallucinations and increased disorientation at night. He knows they are hallucinations. Says \"sometimes I get up and walk around, one time I drove somewhere. \"  He laughs about it. He scored 14/30 on MMSE today: with impairment in orientation, attention and calculation, and recall. He does not present with, endorse, or voice symptoms suggestive of a mood or psychotic disorder. Past Psychiatric History:    None    Past Medical History:  Past Medical History:   Diagnosis Date    Atrial fibrillation ([facility] [address]     Blind right eye     Chronic kidney disease     Dementia ([facility] [address]     Hyperlipidemia     Hypertension     Pneumonia      No known TBIs or seizures     Home Medications:  Prior to Admission medications    Medication Sig Start Date End Date Taking?  Authorizing Provider   QUEtiapine (SEROQUEL) 25 MG tablet Take 12.5 mg by mouth nightly  3/24/22   Historical Provider, MD   donepezil (ARICEPT) 10 MG tablet Take 1 tablet by mouth daily 3/24/22   Historical Provider, MD   citalopram (CELEXA) 10 MG tablet TAKE 1 TABLET BY MOUTH DAILY 3/28/22   César Wooten MD   apixaban (ELIQUIS) 5 MG TABS tablet TAKE 1/2 TABLET BY MOUTH 2 TIMES DAILY 3/18/22   Nate Donato MD   fluticasone [facility]) 50 MCG/ACT nasal spray 2 sprays by Nasal route daily 2/1/22   César Wooten MD   metoprolol succinate (TOPROL XL) 25 MG extended release tablet Take 3 tablets by mouth 2 times daily  Patient taking differently: Take 50 mg by mouth 2 times daily  1/20/22   Juana Lynne MD   Ferrous Sulfate (IRON) 325 (65 Fe) MG TABS Take by mouth    Historical Provider, MD   atorvastatin (LIPITOR) 20 MG tablet TAKE 1 TABLET BY MOUTH EVERY DAY 11/15/21   César Wooten MD   isosorbide mononitrate (IMDUR) 30 MG extended release tablet TAKE 1 TABLET BY MOUTH DAILY 8/11/21   Yasmine Mittal MD   omeprazole (PRILOSEC) 20 MG delayed release capsule TAKE 1 CAPSULE BY MOUTH 2 TIMES DAILY 8/3/21 3/29/22  Cydney Hutson, APRN - CNP   tamsulosin (FLOMAX) 0.4 MG capsule Take 0.4 mg by mouth daily    Historical Provider, MD   finasteride (PROSCAR) 5 MG tablet Take 5 mg by mouth daily. 3/4/15   Historical Provider, MD   aspirin 81 MG tablet Take 81 mg by mouth daily. Historical Provider, MD     Chemical Dependency History:   none    Family Mental Health Hx:    None known    Social Hx:   [place] in [place]. Retired  Lives in [facility] with his wife. Current Medications Ordered:   apixaban  2.5 mg Oral BID    cefTRIAXone (ROCEPHIN) IV  1,000 mg IntraVENous Q24H    aspirin  81 mg Oral Daily    atorvastatin  20 mg Oral Daily    citalopram  10 mg Oral Daily    donepezil  10 mg Oral Daily    ferrous sulfate  325 mg Oral Daily    finasteride  5 mg Oral Daily    isosorbide mononitrate  30 mg Oral Daily    metoprolol succinate  50 mg Oral BID    QUEtiapine  25 mg Oral Nightly    tamsulosin  0.4 mg Oral Daily    sodium chloride flush  5-40 mL IntraVENous 2 times per day      PRN Meds: sodium chloride flush, sodium chloride, potassium chloride **OR** potassium alternative oral replacement **OR** potassium chloride, magnesium sulfate, ondansetron **OR** ondansetron, polyethylene glycol, acetaminophen **OR** acetaminophen     ROS:  does not describe or endorse recent headaches, shortness of breath, chest pain skin problems, muscle aches, GI problems, or bleeding problems, and was moving his extremities without difficulty.     PE:    /88   Pulse 76   Temp 98.5 °F (36.9 °C) (Oral)   Resp 16   Ht 5' 7\" (1.702 m)   Wt 158 lb (71.7 kg)   SpO2 99%   BMI 24.75 kg/m²       Mental Status Examination:    Appearance: in bed, awake  Behavior/Attitude toward examiner:  Pleasant, good eye contact  Speech:  spontaneous, normal rate, normal volume and well articulated   Mood:  \"fine\"  Affect:  Mood congruent   Thought processes:  Goal directed, linear, no LESLIE or gross disorganization  Thought Content: no SI, no HI, no delusions voiced, no obsessions  Perceptions: VH at night - sundowning   Attention: impaired   Abstraction: impaired  Cognition: impaired  Insight: impaired   Judgment: impaired    LAB: Reviewed all labs obtained during admission to date. Formulation:  Domiciled, , and retired 81yo with dementia, CAD, afib, CKD, HTN, and HLD who was admitted with altered mental status in the setting of recent falls and possible UTI. He scored 14/30 on MMSE today: with impairment in orientation, attention and calculation, and recall. 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 Does not require inpatient psychiatric care. 2. Given impairment, he would benefit from either in-home care or assisted living. 3. Agree with continuing Celexa and Aricept. Also agree with quetiapine nighty given sundowning. Will add Nemanda 5mg BID (moderate to severe dementia) and melatonin Qevening (can help reduce sundowning). 4. Can use low dose quetiapine PRN for agitation    Spent > 110 minutes face to face with patient of which >50% was spent counseling and providing education regarding diagnosis, treatment options, and prognosis. Thank you for the consult.      Pedro Bruno MD  Staff Psychiatrist

## 2022-04-22 NOTE — PROGRESS NOTES
Remote transmission received for patient's single chamber PACEMAKER. Transmission shows normal sensing and pacing function. EP physician will review. See interrogation under the cardiology tab in the [address] [facility] [address] field for more details. Will continue to monitor remotely. Hx AF/SVT (oac, toprol xl).  44%. 1 NSVT x 6 sec. Echo 4/2022-LV function is mildly decreased with EF estimated from 45-50%. Done-3/8/22. [facility]. V undersensing noted-will increase sensitivity- dynamic sensitivity. Pt needs f/u OV w/ [facility]K (last ov 10/2020) and device check for programming changes. Call [facility] rep to assist with programming changes. 1/17/2022 - present (1 day)  [place]  [facility] Emergency Department      Loss of Consciousness    Chief complaint

## 2022-04-22 NOTE — PLAN OF CARE
Problem: Discharge Planning  Goal: Discharge to home or other facility with appropriate resources  Outcome: Progressing     Problem: Safety - Adult  Goal: Free from fall injury  Outcome: Progressing     Problem: ABCDS Injury Assessment  Goal: Absence of physical injury  Outcome: Progressing

## 2022-04-22 NOTE — H&P
Hospital Medicine History & Physical      PCP: Consuelo Garcia MD    Date of Admission: 4/21/2022    Date of Service: Pt seen/examined on 4/22/2022 and Admitted to Inpatient    Chief Complaint:  Hallucinations. History Of Present Illness: The patient is a 82 y.o. male w progressing dementia, who presents from home with family concerned about hallucinations. Pt reports he was seeing different animals - reports he knew those were not there. What he fails to mention if that he apparently was holding real gun trying to aim at an imaginary dog and then possibly aimed it at his wife. He has no complaint today. He is awake and alert and oriented to self and place - very poor historian. Past Medical History:        Diagnosis Date    Atrial fibrillation ([facility] [address]     Blind right eye     Chronic kidney disease     Dementia ([facility] [address]     Hyperlipidemia     Hypertension     Pneumonia        Past Surgical History:        Procedure Laterality Date    APPENDECTOMY      COLONOSCOPY      EYE SURGERY      HERNIA REPAIR      JOINT REPLACEMENT  2013    left shoulder    PACEMAKER PLACEMENT  10/13/2017    Dr Radha Cain at [facility] ENDOSCOPY         Medications Prior to Admission:    Prior to Admission medications    Medication Sig Start Date End Date Taking?  Authorizing Provider   QUEtiapine (SEROQUEL) 25 MG tablet Take 12.5 mg by mouth nightly  3/24/22   Historical Provider, MD   donepezil (ARICEPT) 10 MG tablet Take 1 tablet by mouth daily 3/24/22   Historical Provider, MD   citalopram (CELEXA) 10 MG tablet TAKE 1 TABLET BY MOUTH DAILY 3/28/22   Consuelo Garcia MD   apixaban (ELIQUIS) 5 MG TABS tablet TAKE 1/2 TABLET BY MOUTH 2 TIMES DAILY 3/18/22   Tammy Montes MD   fluticasone (FLONASE) 50 MCG/ACT nasal spray 2 sprays by Nasal route daily 2/1/22   Romero Khan MD   metoprolol succinate (TOPROL XL) 25 MG extended release tablet Take 3 tablets by mouth 2 times daily  Patient taking differently: Take 50 mg by mouth 2 times daily  1/20/22   Dian Almanzar MD   Ferrous Sulfate (IRON) 325 (65 Fe) MG TABS Take by mouth    Historical Provider, MD   atorvastatin (LIPITOR) 20 MG tablet TAKE 1 TABLET BY MOUTH EVERY DAY 11/15/21   Romero Khan MD   isosorbide mononitrate (IMDUR) 30 MG extended release tablet TAKE 1 TABLET BY MOUTH DAILY 8/11/21   Clover Bowens MD   omeprazole (PRILOSEC) 20 MG delayed release capsule TAKE 1 CAPSULE BY MOUTH 2 TIMES DAILY 8/3/21 3/29/22  GUS Strange - CNP   tamsulosin (FLOMAX) 0.4 MG capsule Take 0.4 mg by mouth daily    Historical Provider, MD   finasteride (PROSCAR) 5 MG tablet Take 5 mg by mouth daily. 3/4/15   Historical Provider, MD   aspirin 81 MG tablet Take 81 mg by mouth daily. Historical Provider, MD       Allergies:  Sulfa antibiotics    Social History:  The patient currently lives at home. TOBACCO:   reports that he quit smoking about 47 years ago. He has a 10.00 pack-year smoking history. He has never used smokeless tobacco.  ETOH:   reports no history of alcohol use. Family History:  Reviewed in detail and negative for DM, Early CAD, Cancer, CVA. Positive as follows:        Problem Relation Age of Onset    Heart Disease Mother     Other Mother     Heart Disease Father     Asthma Other        REVIEW OF SYSTEMS:   As noted in the HPI. All other systems reviewed and negative. PHYSICAL EXAM:    /88   Pulse 76   Temp 98.5 °F (36.9 °C) (Oral)   Resp 16   Ht 5' 7\" (1.702 m)   Wt 158 lb (71.7 kg)   SpO2 99%   BMI 24.75 kg/m²     General appearance: No apparent distress appears stated age and cooperative. HEENT Normal cephalic, atraumatic without obvious deformity. Pupils equal, round, and reactive to light.   Extra ocular muscles intact. Conjunctivae/corneas clear. Neck: Supple, No jugular venous distention/bruits. Trachea midline without thyromegaly or adenopathy with full range of motion. Lungs: Clear to auscultation, bilaterally without Rales/Wheezes/Rhonchi with good respiratory effort. Heart: Regular rate and rhythm with Normal S1/S2 without murmurs, rubs or gallops, point of maximum impulse non-displaced  Abdomen: Soft, non-tender or non-distended without rigidity or guarding and positive bowel sounds all four quadrants. Extremities: No clubbing, cyanosis, or edema bilaterally. Full range of motion without deformity and normal gait intact. Skin: Skin color, texture, turgor normal.  No rashes or lesions. Neurologic: Alert and oriented X 3, neurovascularly intact with sensory/motor intact upper extremities/lower extremities, bilaterally. Cranial nerves: II-XII intact, grossly non-focal.  Mental status: Alert, oriented, limited insight. + memory deficits both short and long term on gross assessment. Capillary Refill: Acceptable  < 3 seconds  Peripheral Pulses: +3 Easily felt, not easily obliterated with pressure        CBC   Recent Labs     04/21/22  1240 04/22/22  0648   WBC 7.1 7.9   HGB 15.3 14.9   HCT 45.9 44.5    216      RENAL  Recent Labs     04/21/22  1240 04/22/22  0648    141   K 4.4 4.4    105   CO2 27 21   BUN 24* 22*   CREATININE 1.6* 1.3     LFT'S  Recent Labs     04/21/22  1240   AST 24   ALT 11   BILITOT 1.3*   ALKPHOS 84     COAG  No results for input(s): INR in the last 72 hours.   CARDIAC ENZYMES  Recent Labs     04/21/22  1240   TROPONINI <0.01       U/A:    Lab Results   Component Value Date    NITRITE negative 04/14/2022    COLORU Yellow 04/21/2022    WBCUA 21-50 04/21/2022    RBCUA 3-4 04/21/2022    MUCUS Rare 04/21/2022    BACTERIA 1+ 04/21/2022    CLARITYU Clear 04/21/2022    SPECGRAV >=1.030 04/21/2022    LEUKOCYTESUR SMALL 04/21/2022    BLOODU LARGE 04/21/2022    GLUCOSEU Negative 04/21/2022    AMORPHOUS NEGATIVE 03/13/2020       ABG  No results found for: HKB0HUY, BEART, P9GTTWRA, PHART, THGBART, HNH7IOK, PO2ART, VEK4AFT        Active Hospital Problems    Diagnosis Date Noted    UTI (urinary tract infection) [N39.0] 04/21/2022     Priority: Medium         PHYSICIANS CERTIFICATION:    I certify that Donna Mccartney is expected to be hospitalized for more than 2 midnights based on the following assessment and plan:      ASSESSMENT/PLAN:      UTI  Rocephin. Follow culture. HTN  Cont PTA regimen. Dementia - with hallucinations and behavioral disturbance and bizarre behaviors - would be suggestive of Lewy Body Dementia. Plan   - psychiatry to eval.   - cont nightly seroquel and supportive care. Chronic Afib. Resume eliquis. Cont BB. DVT Prophylaxis: eliquis  Diet: ADULT DIET; Regular  Code Status: Full Code      Dispo - psychiatry eval. Follow urine culture. Kayden Lovell MD    Thank you Jean Claude Meza MD for the opportunity to be involved in this patient's care. If you have any questions or concerns please feel free to contact me at [phone].

## 2022-04-22 NOTE — PLAN OF CARE
Problem: Discharge Planning  Goal: Discharge to home or other facility with appropriate resources  4/22/2022 0107 by Rosie Lopez RN  Outcome: Progressing     Problem: Safety - Adult  Goal: Free from fall injury  4/22/2022 0107 by Rosie Lopez RN  Outcome: Progressing     Problem: ABCDS Injury Assessment  Goal: Absence of physical injury  4/22/2022 0107 by Rosie Lopez RN  Outcome: Progressing

## 2022-04-22 NOTE — PROGRESS NOTES
Tele monitor placed in pt room for safety. Pt has confusion and frequent falling.   Bed alarm in place

## 2022-04-22 NOTE — PROGRESS NOTES
Patient admitted to room 0209 from ER. Patient oriented to room, call light, bed rails, phone, lights and bathroom. Patient instructed about the schedule of the day including: vital sign frequency, lab draws, possible tests, frequency of MD and staff rounds, daily weights, I &O's and prescribed diet. Bed alarm armed. Telemetry box in place, patient aware of placement and reason. Bed locked, in lowest position, side rails up 2/4, call light within reach. Recliner Assessment:     Patient is able to demonstrate the ability to move from a reclining position to an upright position within the recliner. 4 Eyes Skin Assessment     The patient is being assess for   Admission    I agree that 2 RN's have performed a thorough Head to Toe Skin Assessment on the patient. ALL assessment sites listed below have been assessed. Areas assessed for pressure by both nurses:   [x]   Head, Face, and Ears   [x]   Shoulders, Back, and Chest, Abdomen  [x]   Arms, Elbows, and Hands   [x]   Coccyx, Sacrum, and Ischium  [x]   Legs, Feet, and Heels        Skin Assessed Under all Medical Devices by both nurses:  na              All Mepilex Borders were peeled back and area peeked at by both nurses:  No: na  Please list where Mepilex Borders are located: n/a             **SHARE this note so that the co-signing nurse is able to place an eSignature**    Co-signer eSignature: Electronically signed by Yari Henry RN on 4/22/22 at 7:05 AM EDT    Does the Patient have Skin Breakdown related to pressure?   No     (Insert Photo here na)         Dionisio Prevention initiated:  Yes   Wound Care Orders initiated:  NA      [facility] nurse consulted for Pressure Injury (Stage 3,4, Unstageable, DTI, NWPT, Complex wounds)and New or Established Ostomies:  No      Primary Nurse eSignature: Electronically signed by John Paul Brown RN on 4/22/22 at 7:04 AM EDT

## 2022-04-22 NOTE — FLOWSHEET NOTE
04/22/22 0900   Vital Signs   Temp 98.5 °F (36.9 °C)   Temp Source Oral   Pulse 76   Heart Rate Source Monitor   Resp 16   /88   MAP (Calculated) 104.33   Patient Position Semi fowlers   Level of Consciousness Alert (0)   MEWS Score 1   Pain Assessment   Pain Assessment None - Denies Pain   Oxygen Therapy   SpO2 99 %   O2 Device None (Room air)     Pt assessment completed, vss, see flow sheet. Pt alert and oriented to person and place, but not situation. Pt denies any pain or needs at this time.  Zofia Paul, RN

## 2022-04-22 NOTE — CARE COORDINATION
Case Management Assessment  Initial Evaluation      Patient Name: Rodolfo Spaulding  YOB: 1935  Diagnosis: UTI (urinary tract infection) [N39.0]  Acute cystitis with hematuria [N30.01]  Date / Time: 4/21/2022 12:07 PM    Admission status/Date:4/21/2022  Chart Reviewed: Yes      Patient Interviewed: Yes   Family Interviewed:  No  LVM for spouse    Hospitalization in the last 30 days:  No      Health Care Decision Maker :   Primary Decision Maker: Conor Pierson 12 - [phone]    Secondary Decision Maker: Roland Dimas - [phone]    (CM - must 1st enter selection under Navigator - emergency contact- Health Care Decision Maker Relationship and pick relationship)   Who do you trust or have selected to make healthcare decisions for you      Met with: pt  Interview conducted  (bedside/phone):bedside    Current PCP:   Nettie Burgos MD-- pt able to confirm PCP      Financial  Medicare  Precert required for SNF : N          3 night stay required -  N-waived    ADLS  Support Systems/Care Needs:    Transportation: family    Meal Preparation: family    Housing  Living Arrangements: lives in 1 story house with spouse  Steps: 3  Intent for return to present living arrangements: Yes  Identified Issues:     Home Care Information  Active with [address] : No Agency:(Services)     Passport/Waiver : No  :                      Phone Number:    Passport/Waiver Services:           Durable Medical Equiptment   DME Provider: none  Equipment:   Walker___Cane_x__RTS___ BSC___Shower Chair___Hospital Bed___W/C____Other________  02 at ____Liter(s)---wears(frequency)_______ HHN ___ CPAP___ BiPap___   N/A____      Home O2 Use :  No    If No for home O2---if presently on O2 during hospitalization:  No      Community Service Affiliation  Dialysis:  No    · Agency:  · Location:  · Dialysis Schedule:  · Phone:   · Fax:     Other Community Services: none    DISCHARGE PLAN: Explained Case Management role/services. Chart reviewed. Met with pt at bedside. Pt able to answer questions appropriately at this time. Pt hallucinating at home and has psych consult. Pt states he lives in house with spouse and plans to return. Pt seen by psych today and cleared and will not need psych admission, meds were added today. LVM for spouse concerning dcp. 26 Met with spouse at pt bedside and she confirms that pt answers to assessment questions were correct. Spouse states she wants pt to return home at dc and does not need STR. Spouse states that pt does drive at times and she is wondering if MD will tell pt not to drive. Will follow for possible C needs.

## 2022-04-23 VITALS
WEIGHT: 158 LBS | HEIGHT: 67 IN | DIASTOLIC BLOOD PRESSURE: 93 MMHG | TEMPERATURE: 97.1 F | SYSTOLIC BLOOD PRESSURE: 140 MMHG | HEART RATE: 84 BPM | OXYGEN SATURATION: 99 % | RESPIRATION RATE: 16 BRPM | BODY MASS INDEX: 24.8 KG/M2

## 2022-04-23 LAB
ANION GAP SERPL CALCULATED.3IONS-SCNC: 13 MMOL/L (ref 3–16)
BASOPHILS ABSOLUTE: 0.1 K/UL (ref 0–0.2)
BASOPHILS RELATIVE PERCENT: 1 %
BUN BLDV-MCNC: 26 MG/DL (ref 7–20)
CALCIUM SERPL-MCNC: 8.9 MG/DL (ref 8.3–10.6)
CHLORIDE BLD-SCNC: 105 MMOL/L (ref 99–110)
CO2: 23 MMOL/L (ref 21–32)
CREAT SERPL-MCNC: 1.3 MG/DL (ref 0.8–1.3)
EOSINOPHILS ABSOLUTE: 0.5 K/UL (ref 0–0.6)
EOSINOPHILS RELATIVE PERCENT: 6.2 %
GFR AFRICAN AMERICAN: >60
GFR NON-AFRICAN AMERICAN: 53
GLUCOSE BLD-MCNC: 89 MG/DL (ref 70–99)
HCT VFR BLD CALC: 42.2 % (ref 40.5–52.5)
HEMOGLOBIN: 14.1 G/DL (ref 13.5–17.5)
LYMPHOCYTES ABSOLUTE: 2.2 K/UL (ref 1–5.1)
LYMPHOCYTES RELATIVE PERCENT: 28 %
MCH RBC QN AUTO: 29.5 PG (ref 26–34)
MCHC RBC AUTO-ENTMCNC: 33.5 G/DL (ref 31–36)
MCV RBC AUTO: 88 FL (ref 80–100)
MONOCYTES ABSOLUTE: 0.7 K/UL (ref 0–1.3)
MONOCYTES RELATIVE PERCENT: 9.3 %
NEUTROPHILS ABSOLUTE: 4.3 K/UL (ref 1.7–7.7)
NEUTROPHILS RELATIVE PERCENT: 55.5 %
ORGANISM: ABNORMAL
PDW BLD-RTO: 15.8 % (ref 12.4–15.4)
PLATELET # BLD: 237 K/UL (ref 135–450)
PMV BLD AUTO: 8.1 FL (ref 5–10.5)
POTASSIUM REFLEX MAGNESIUM: 4.3 MMOL/L (ref 3.5–5.1)
RBC # BLD: 4.8 M/UL (ref 4.2–5.9)
SODIUM BLD-SCNC: 141 MMOL/L (ref 136–145)
URINE CULTURE, ROUTINE: ABNORMAL
WBC # BLD: 7.7 K/UL (ref 4–11)

## 2022-04-23 PROCEDURE — 97165 OT EVAL LOW COMPLEX 30 MIN: CPT

## 2022-04-23 PROCEDURE — 6370000000 HC RX 637 (ALT 250 FOR IP): Performed by: INTERNAL MEDICINE

## 2022-04-23 PROCEDURE — 6370000000 HC RX 637 (ALT 250 FOR IP)

## 2022-04-23 PROCEDURE — 97530 THERAPEUTIC ACTIVITIES: CPT

## 2022-04-23 PROCEDURE — 99239 HOSP IP/OBS DSCHRG MGMT >30: CPT | Performed by: INTERNAL MEDICINE

## 2022-04-23 PROCEDURE — 97535 SELF CARE MNGMENT TRAINING: CPT

## 2022-04-23 PROCEDURE — 97116 GAIT TRAINING THERAPY: CPT

## 2022-04-23 PROCEDURE — 80048 BASIC METABOLIC PNL TOTAL CA: CPT

## 2022-04-23 PROCEDURE — 36415 COLL VENOUS BLD VENIPUNCTURE: CPT

## 2022-04-23 PROCEDURE — 97161 PT EVAL LOW COMPLEX 20 MIN: CPT

## 2022-04-23 PROCEDURE — 2580000003 HC RX 258

## 2022-04-23 PROCEDURE — 85025 COMPLETE CBC W/AUTO DIFF WBC: CPT

## 2022-04-23 RX ORDER — CEPHALEXIN 250 MG/1
250 CAPSULE ORAL 3 TIMES DAILY
Qty: 15 CAPSULE | Refills: 0 | Status: SHIPPED | OUTPATIENT
Start: 2022-04-23 | End: 2022-07-08

## 2022-04-23 RX ORDER — CEPHALEXIN 250 MG/1
250 CAPSULE ORAL EVERY 8 HOURS SCHEDULED
Status: DISCONTINUED | OUTPATIENT
Start: 2022-04-23 | End: 2022-04-23 | Stop reason: HOSPADM

## 2022-04-23 RX ORDER — QUETIAPINE FUMARATE 25 MG/1
25 TABLET, FILM COATED ORAL NIGHTLY
Qty: 30 TABLET | Refills: 0 | Status: SHIPPED | OUTPATIENT
Start: 2022-04-23 | End: 2022-06-08

## 2022-04-23 RX ORDER — MEMANTINE HYDROCHLORIDE 5 MG/1
5 TABLET ORAL 2 TIMES DAILY
Qty: 60 TABLET | Refills: 0 | Status: SHIPPED | OUTPATIENT
Start: 2022-04-23 | End: 2022-05-20 | Stop reason: SDUPTHER

## 2022-04-23 RX ORDER — METOPROLOL SUCCINATE 25 MG/1
50 TABLET, EXTENDED RELEASE ORAL 2 TIMES DAILY
Qty: 60 TABLET | Refills: 0 | Status: SHIPPED | OUTPATIENT
Start: 2022-04-23 | End: 2022-07-25

## 2022-04-23 RX ADMIN — ASPIRIN 81 MG 81 MG: 81 TABLET ORAL at 08:17

## 2022-04-23 RX ADMIN — TAMSULOSIN HYDROCHLORIDE 0.4 MG: 0.4 CAPSULE ORAL at 08:16

## 2022-04-23 RX ADMIN — Medication 10 ML: at 08:18

## 2022-04-23 RX ADMIN — CEPHALEXIN 250 MG: 250 CAPSULE ORAL at 13:52

## 2022-04-23 RX ADMIN — DONEPEZIL HYDROCHLORIDE 10 MG: 5 TABLET, FILM COATED ORAL at 08:17

## 2022-04-23 RX ADMIN — ISOSORBIDE MONONITRATE 30 MG: 30 TABLET ORAL at 08:16

## 2022-04-23 RX ADMIN — METOPROLOL SUCCINATE 50 MG: 50 TABLET, EXTENDED RELEASE ORAL at 08:16

## 2022-04-23 RX ADMIN — FINASTERIDE 5 MG: 5 TABLET, FILM COATED ORAL at 08:16

## 2022-04-23 RX ADMIN — CITALOPRAM 10 MG: 20 TABLET, FILM COATED ORAL at 08:17

## 2022-04-23 RX ADMIN — FERROUS SULFATE TAB 325 MG (65 MG ELEMENTAL FE) 325 MG: 325 (65 FE) TAB at 08:17

## 2022-04-23 RX ADMIN — APIXABAN 2.5 MG: 5 TABLET, FILM COATED ORAL at 08:17

## 2022-04-23 NOTE — DISCHARGE INSTR - COC
Continuity of Care Form    Patient Name: Kentrell Boland   :  1935  MRN:  [id number]    Admit date:  2022  Discharge date:  22    Code Status Order: Full Code   Advance Directives:      Admitting Physician:  Augusto Manrique MD  PCP: Russ Lyles MD    Discharging Nurse: [facility] Unit/Room#: 0209/0209-02  Discharging Unit Phone Number: ***    Emergency Contact:   Extended Emergency Contact Information  Primary Emergency Contact: João Gunderson  Address: [address], [address], [address] Phone: [phone]  Mobile Phone: [phone]  Relation: Spouse   needed? No  Secondary Emergency Contact: Maddy Coppola [address] Phone: [phone]  Mobile Phone: [phone]  Relation: Child   needed?  No    Past Surgical History:  Past Surgical History:   Procedure Laterality Date    APPENDECTOMY      COLONOSCOPY      [facility]      JOINT REPLACEMENT  2013    left shoulder    PACEMAKER PLACEMENT  10/13/2017    Dr [facility] at [address] [place] [address]         Immunization History:   Immunization History   Administered Date(s) Administered    Influenza A (I5M2-19) Vaccine PF IM 2009    Pneumococcal Conjugate 13-valent (Pwvflju81) 2015    Pneumococcal Conjugate 7-valent (Prevnar7) 2013    Pneumococcal Polysaccharide (Caevzhwiq18) 2010, 2013, 2019       Active Problems:  Patient Active Problem List   Diagnosis Code    Pneumonia J18.9    Abnormal chest CT R93.89    Cough syncope R05.4    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 [facility] mild LAD disease  I25.10    Other chest pain R07.89    Atrial fibrillation with rapid ventricular response (HCC) I48.91    Syncope and collapse R55    COVID U07.1    NATANAEL (acute kidney injury) ([facility] [address] N17.9    Atrial fibrillation with RVR ([facility]) I48.91    UTI (urinary tract infection) N39.0    Dementia with behavioral disturbance ([facility]) F03.91    Hallucinations R44.3       Isolation/Infection:   Isolation            No Isolation          Patient Infection Status       Infection Onset Added Last Indicated Last Indicated By Review Planned Expiration Resolved Resolved By    None active    Resolved    COVID-19 (Rule Out) 22 COVID-19 & Influenza Combo (Ordered)   22 Rule-Out Test Resulted    COVID-19 22 COVID-19, Rapid   22     COVID-19 (Rule Out) 22 COVID-19, Rapid (Ordered)   22 Rule-Out Test Resulted            Nurse Assessment:  Last Vital Signs: BP (!) 140/93   Pulse 84   Temp 97.1 °F (36.2 °C) (Oral)   Resp 16   Ht 5' 7\" (1.702 m)   Wt 158 lb (71.7 kg)   SpO2 99%   BMI 24.75 kg/m²     Last documented pain score (0-10 scale):    Last Weight:   Wt Readings from Last 1 Encounters:   22 158 lb (71.7 kg)     Mental Status:  oriented and alert    IV Access:  - None    Nursing Mobility/ADLs:  Walking   Assisted  Transfer  Assisted  Bathing  Assisted  Dressing  Assisted  Toileting  Assisted  Feeding  Independent  Med Admin  Assisted  Med Delivery   whole    Wound Care Documentation and Therapy:        Elimination:  Continence:    Bowel: Yes  Bladder: Yes  Urinary Catheter: None   Colostomy/Ileostomy/Ileal Conduit: No       Date of Last BM: 22    Intake/Output Summary (Last 24 hours) at 2022 1133  Last data filed at 2022 1010  Gross per 24 hour   Intake 250 ml   Output 300 ml   Net -50 ml     I/O last 3 completed shifts: In: 20 [I.V.:20]  Out: 750 [Urine:750]    Safety Concerns:     Sundowners [place]    Impairments/Disabilities:      None    Nutrition Therapy:  Current Nutrition Therapy:   - Oral Diet:  General    Routes of Feeding: Oral  Liquids: No Restrictions  Daily Fluid Restriction: no  Last Modified Barium Swallow with Video (Video Swallowing Test): not done    Treatments at the Time of Hospital Discharge:   Respiratory Treatments:NA  Oxygen Therapy:  is not on home oxygen therapy. Ventilator:    - No ventilator support    Rehab Therapies: Physical Therapy and Occupational Therapy  Weight Bearing Status/Restrictions: No weight bearing restrictions  Other Medical Equipment (for information only, NOT a DME order):  walker  Other Treatments:NA    Patient's personal belongings (please select all that are sent with patient):  Glasses    RN SIGNATURE:  Electronically signed by Frederick Friedman RN on 4/23/22 at 11:36 AM EDT    CASE MANAGEMENT/SOCIAL WORK SECTION    Inpatient Status Date: 04/21/22    Readmission Risk Assessment Score:  Readmission Risk              Risk of Unplanned Readmission:  18           Discharging to Facility/ Agency   Name: [facility] SN,PT/OT    / signature: Electronically signed by Ap Linda RN on 4/23/22 at 12:55 PM EDT    PHYSICIAN SECTION    Prognosis: {Prognosis:1524563323}    Condition at Discharge: [facility] Patient Condition:935471238}    Rehab Potential (if transferring to Rehab): {Prognosis:1608736962}    Recommended Labs or Other Treatments After Discharge: ***    Physician Certification: I certify the above information and transfer of Hermelinda Shaver  is necessary for the continuing treatment of the diagnosis listed and that he requires {Admit to Appropriate Level of Care:32070} for {GREATER/LESS:144780855} 30 days.      Update Admission H&P: {CHP DME Changes in EDNWE:192968023}    PHYSICIAN SIGNATURE:  Electronically signed by Thais Zendejas RN on 4/23/22 at 12:54 PM EDT

## 2022-04-23 NOTE — FLOWSHEET NOTE
04/23/22 0800   Vital Signs   Temp 97.1 °F (36.2 °C)   Temp Source Oral   Pulse 84   Heart Rate Source Monitor   Resp 16   BP (!) 140/93   BP Location Left upper arm   MAP (Calculated) 108.67   Patient Position Semi fowlers   Level of Consciousness Alert (0)   MEWS Score 1   Oxygen Therapy   SpO2 99 %   O2 Device None (Room air)     Pt assessment completed, vss, see flow sheet. Pt alert and oriented. Pt denies any pain or needs at this time.  Byron Swanson, RN

## 2022-04-23 NOTE — PROGRESS NOTES
Pt and wife given written and verbal discharge instructions with prescriptions. Pt indicated understanding and received prescription and home medication instructions. Pt packed own belongings. Pt taken down to family car via wheelchair.  Bobby Nguyen RN

## 2022-04-23 NOTE — PROGRESS NOTES
Inpatient Physical Therapy Evaluation and Treatment    Unit: [facility]  Date:  4/23/2022  Patient Name:    Lori Huynh  Admitting diagnosis:  UTI (urinary tract infection) [N39.0]  Acute cystitis with hematuria [N30.01]  Admit Date:  4/21/2022  Precautions/Restrictions/WB Status/ Lines/ Wounds/ Oxygen: Fall risk, Bed/chair alarm, Lines -IV and Telemetry    Treatment Time:  10:20-11:10  Treatment Number:  1   Timed Code Treatment Minutes: 40 minutes  Total Treatment Minutes:  50  minutes    Patient Goals for Therapy: \" to go home \"          Discharge Recommendations: Home 24 hr supervision and with home PT   DME needs for discharge: RW, provided this date       Therapy recommendation for EMS Transport: NA    Therapy recommendations for staff:   Assist of 1 with use of rolling walker (RW) and gait belt for all transfers and ambulation within room      History Of Present Illness:     per :  \"The patient is a 82 y.o. male w progressing dementia, who presents from home with family concerned about hallucinations. Pt reports he was seeing different animals - reports he knew those were not there. What he fails to mention if that he apparently was holding real gun trying to aim at an imaginary dog and then possibly aimed it at his wife. He has no complaint today. He is awake and alert and oriented to self and place - very poor historian. \"  Head CT : Atrophy and chronic changes seen within the brain with no acute intracranial abnormality  Home Health S4 Level Recommendation:  Level 2 Social  AM-PAC Mobility Score    AM-PAC Inpatient Mobility Raw Score : 19     Aroldo Anne scored a 19/24 on the AM-PAC short mobility form. Current research shows that an AM-PAC score of 18 or greater is typically associated with a discharge to the patient's home setting. If patient discharges prior to next session this note will serve as a discharge summary. Please see below for the latest assessment towards goals. Preadmission Environment:    Pt. Lives with spouse. Pt has access to  24/7 assistance by spouse. Home environment:  one story home  Steps to enter first floor:  No steps       Steps in home:             1 step into the living room   Bathroom: tub/shower combo and standard toilet  Equipment owned:  cane [facility])     Preadmission Status / PLOF:  History of falls             Yes  Pt. Able to drive          Yes  Pt Fully independent with ADL's         Yes  Pt. Required assistance from family for:  Cleaning and Laundry     Pt sleeps in flat bed. Pt. Fully independent for transfers and gait and walked with: Cane PRN     Pain   Yes  Location: throat when he eats,feels like food wants to come back up   Rating: severe when eating  Pain Medicine Status: RN notified and No request made    Cognition    A&O Person and Place, day of the week,month   Able to follow 1 step commands    Subjective  Patient lying supine in bed with no family present. Pt agreeable to this PT eval & tx. Upper Extremity ROM/Strength  Please see OT evaluation. Lower Extremity ROM / Strength   AROM WFL: Yes  ROM limitations:     WFL to complete bed mobility and transfers. Lower Extremity Sensation    NT    Lower Extremity Proprioception:   NT    Coordination and Tone  WFL    Balance  Sitting:  Normal; Independent  Comments:     Standing: Good ; SBA  Comments: using RW    Bed Mobility   Supine to Sit:    Supervision  Sit to Supine:   Not Tested,in chair  Rolling:   Not Tested  Scooting in sitting: Supervision  Scooting in supine:  Not Tested    Transfer Training     Sit to stand:   CGA from EOB to RW  Initally tried [employer] A for balance.   Stand to sit:   CGA  Bed to Chair:   CGA with use of gait belt and rolling walker (RW)    Gait gait completed as indicated below  Distance:      60 ft  Deviations (firm surface/linoleum):  decreased fred, narrow ROBIN and decreased step length bilaterally  Assistive Device Used:    gait belt and rolling walker (RW)  Level of Assist:    CGA  Comment: no LOB with turning or straight awaway    Stair Training steps not attempted at this time. Activity Tolerance:   Pt completed therapy session with No adverse symptoms noted w/activity    BP (mmHg) HR (bpm) SpO2 (%) Comments   Supine, at rest 111/67 86 96     Seated at /66         Standing 110/62 78   Wobbly, knees weak        Positioning Needs   Pt reclined in chair, alarm set, positioned in proper neutral alignment and pressure relief provided. Call light provided and all needs within reach    Exercises Initiated  Marva deferred secondary to treatment focus on functional mobility    Other  CM given paperwork for RW  Patient provided with RW/adjusted  Patient/Family Education   Pt educated on role of inpatient PT, POC, importance of continued activity, safety awareness, transfer techniques and calling for assist with mobility. Assessment  Pt seen for Physical Therapy evaluation in acute care setting. Pt demonstrated decreased Activity tolerance, Balance and Safety as well as decreased independence with Ambulation and Transfers. Patient will benefit from skilled PT to maximize functional mobility while in acute care. Recommending Home 24 hr supervision and with home PT upon discharge as patient functioning would benefit from continued therapy services    Goals : To be met in 3 visits:  1). Independent with LE Ex x 10 reps  2.) Bed to chair: Independent    To be met in 6 visits:  1). Supine to/from sit: Independent  2). Sit to/from stand: Independent  3). Bed to chair: Independent  4). Gait: Ambulate 125 ft.   with  SBA and use of LRAD (least restrictive assistive device)  5). Tolerate B LE exercises 3 sets of 10-15 reps  6).   Ascend/descend 1 steps with CGA with use of LRAD    Rehabilitation Potential: Good  Strengths for achieving goals include:   Pt motivated, PLOF, Family Support and Pt cooperative   Barriers to achieving goals include:    cognition    Plan    To be seen 3-5 x / week  while in acute care setting for therapeutic exercises, bed mobility, transfers, progressive gait training, balance training, and family/patient education. Signature: Kaylen Taylor, PT #[id number]     If patient discharges from this facility prior to next visit, this note will serve as the Discharge Summary.

## 2022-04-23 NOTE — CARE COORDINATION
DISCHARGE ORDER  Date/Time 2022 12:56 PM  Completed by: Carmel Baxter RN, Case Management    Patient Name: Katina Hernandez      : 1935  Admitting Diagnosis: UTI (urinary tract infection) [N39.0]  Acute cystitis with hematuria [N30.01]      Admit order Date and Status:inpt  (verify MD's last order for status of admission)      Noted discharge order. If applicable PT/OT recommendation at Discharge: home with 24/7 supervision, home PT/OT  DME recommendation by PT/OT:RW provided  Confirmed discharge plan  (pt's spouse,Vale):     Discharge Plan: Reviewed chart. Writer spoke with pt's spouse via TC and she will be here to transport pt home today and she states she is working on providing 24/7 assistance at home. Family chose [facility] for SN,PT/OT and referral called to [facility] with [facility] has epic access. Per Latoya with PT she provided pt with RW for home. No other d/c needs voiced or identified. Reviewed chart. Role of discharge planner explained and patient verbalized understanding. Discharge order is noted. Has Home O2 in place on admit:  No  Informed of need to bring portable home O2 tank on day of discharge for nursing to connect prior to leaving:   Not Indicated  Verbalized agreement/Understanding:   Not Indicated  Pt is being d/c'd to home with spouse today. Pt's O2 sats are 99% on RA. Discharge timeout done with ENIO Wheeler. All discharge needs and concerns addressed.

## 2022-04-23 NOTE — PROGRESS NOTES
Inpatient Occupational Therapy  Evaluation and Treatment    Unit: [facility]  Date:  4/23/2022  Patient Name:    Los Jones  Admitting diagnosis:  UTI (urinary tract infection) [N39.0]  Acute cystitis with hematuria [N30.01]  Admit Date:  4/21/2022  Precautions/Restrictions/WB Status/ Lines/ Wounds/ Oxygen: fall risk, IV, bed/chair alarm, telemetry and code status (Full)    Treatment Time:  8215-2665  Treatment Number: 1     Billable Treatment Time: 40 minutes   Total Treatment Time:   50   minutes    Patient Goals for Therapy:  \" to go home \"      Discharge Recommendations: Home with 24/7 supervision  and [address] [place] [address]  DME needs for discharge: RW - provided this date        Therapy recommendations for staff:  Assist of 1 with use of rolling walker and gait belt for all ambulation within room    History of Present Illness: H&P, 4/22/2022, Monika:   \" 82 y.o. male w progressing dementia, who presents from home with family concerned about hallucinations.      Pt reports he was seeing different animals - reports he knew those were not there.      What he fails to mention if that he apparently was holding real gun trying to aim at an imaginary dog and then possibly aimed it at his wife.      He has no complaint today. He is awake and alert and oriented to self and place - very poor historian. \"     Home Health S4 Level Recommendation:  Level 2 Social    AM-PAC Score: AM-PAC Inpatient Daily Activity Raw Score: 20  Pt scored a 20/24 on the AM-PAC ADL Inpatient form. Current research shows that an AM-PAC score of 18 or greater is associated with a discharge to the patient's home setting. Preadmission Environment:    Pt. Lives with spouse. Pt has access to  24/7 assistance by spouse.    Home environment:  one story home  Steps to enter first floor:  No steps    Steps in home:  1 step into the living room   Bathroom: tub/shower combo and standard toilet  Equipment owned:  [facility])    Preadmission Status / PLOF:  History of falls   Yes  Pt. Able to drive   Yes  Pt Fully independent with ADL's  Yes  Pt. Required assistance from family for:  Cleaning and Laundry     Pt sleeps in flat bed. Pt. Fully independent for transfers and gait and walked with: Cane PRN     Pain:  Yes  Rating:  Severe when eating  Location:  Throat - when eats, feels like food wants to come back up (acid reflux)   Pain Medicine Status:  No request made      Cognition:    A&O Person and Place, day of the week and month. Able to follow 1 step commands, consistently. Subjective:  Pt supine in bed upon therapist arrival. Pt agreeable to work with therapy this date. Upper Extremity ROM:   WFL,  pt able to perform all bed mobility, transfers, and gait without ROM limitation. Upper Extremity Strength:    BUE strength WFL, but not formally assessed w/ MMT    Upper Extremity Sensation:    WNL    Upper Extremity Proprioception:  WNL    Coordination and Tone:  WNL    Balance:  Functional Sitting Balance: WNL   Comments: Good  Functional Standing Balance:WFL   Comments: Good with RW     Bed mobility:    Supine to sit:   supervision  Sit to supine:   Not Tested  Rolling:    supervision  Scooting in sitting:  supervision  Scooting to head of bed:   Not Tested   Bridging:   Not Tested    Transfers:    Initially trailed stand with cane, Min A for balance.    Sit to stand:  CGA  Stand to sit:  CGA  Bed to chair:   CGA and with rolling walker   Standard toilet: Not Tested  Bed to [facility]:  Not Tested    Activities of Daily Living:   UB Dressing:   Not Tested  LB Dressing:    minimal assistance (25%)  UB Bathing:  Not Tested  LB Bathing:  Not Tested  Feeding:  supervision  Grooming:   supervision  Toileting:  Not Tested    Activity Tolerance:   Pt completed therapy session with No adverse symptoms noted w/activity   BP (mmHg) HR (bpm) SpO2 (%) Comments   Supine, at rest 111/67 86 96    Seated at /66      Standing 110/62 78  Wobbly, knees weak Positioning Needs:   Up in chair, call light and needs in reach. Alarm Set    Exercise / Activities Initiated:   N/A    Patient/Family Education:   Role of OT  Recommendations for DC    Assessment of Deficits: Pt seen for Occupational therapy evaluation in acute care setting. Pt demonstrated decreased Activity tolerance, ADLs, IADLs and Transfers. Pt functioning below baseline and will likely benefit from skilled occupational therapy services to maximize safety and independence. Goal(s): To be met in 3 Visits:  1). Bed to toilet: Independent and with rolling walker     To be met in 5 Visits:  1). Supine to/from Sit:  Independent  2). Upper Body Bathing:   Independent  3). Lower Body Bathing:   Independent  4). Upper Body Dressing:  Independent  5). Lower Body Dressing:  Independent  6). Pt to demonstrate UE exs x 15 reps with minimal cues    Rehabilitation Potential:  Good for goals listed above. Strengths for achieving goals include: Pt motivated, PLOF, Family Support and Pt cooperative  Barriers to achieving goals include:  Cognition     Plan: To be seen 3-5 x/wk while in acute care setting for strengthening, bed mobility, transfer training, family/patient education, ADL/IADL retraining and energy conservation.       Indy Reyes, MOT, OTR/L   [id number]           If patient discharges from this facility prior to next visit, this note will serve as the Discharge Summary

## 2022-04-23 NOTE — PLAN OF CARE
Problem: Discharge Planning  Goal: Discharge to home or other facility with appropriate resources  4/23/2022 1420 by Santos Pizarro RN  Outcome: Completed  4/23/2022 1109 by Santos Pizarro RN  Outcome: Progressing     Problem: Safety - Adult  Goal: Free from fall injury  4/23/2022 1420 by Santos Pizarro RN  Outcome: Completed  4/23/2022 1109 by Santos Pizarro RN  Outcome: Progressing     Problem: ABCDS Injury Assessment  Goal: Absence of physical injury  4/23/2022 1420 by Santos Pizarro RN  Outcome: Completed  4/23/2022 1109 by Santos Pizarro RN  Outcome: Progressing

## 2022-04-25 ENCOUNTER — TELEPHONE (OUTPATIENT)
Dept: FAMILY MEDICINE CLINIC | Age: 87
End: 2022-04-25

## 2022-04-25 ENCOUNTER — CARE COORDINATION (OUTPATIENT)
Dept: CASE MANAGEMENT | Age: 87
End: 2022-04-25

## 2022-04-25 ENCOUNTER — HOSPITAL ENCOUNTER (OUTPATIENT)
Dept: VASCULAR LAB | Age: 87
Discharge: HOME | End: 2022-04-15
Payer: MEDICARE

## 2022-04-25 ENCOUNTER — HOSPITAL ENCOUNTER (OUTPATIENT)
Dept: VASCULAR LAB | Age: 87
Discharge: HOME | End: 2022-04-25
Payer: MEDICARE

## 2022-04-25 DIAGNOSIS — R42 DIZZINESS: ICD-10-CM

## 2022-04-25 DIAGNOSIS — N18.30 STAGE 3 CHRONIC KIDNEY DISEASE, UNSPECIFIED WHETHER STAGE 3A OR 3B CKD (HCC): Primary | ICD-10-CM

## 2022-04-25 PROCEDURE — 93880 EXTRACRANIAL BILAT STUDY: CPT

## 2022-04-25 PROCEDURE — 1111F DSCHRG MED/CURRENT MED MERGE: CPT | Performed by: FAMILY MEDICINE

## 2022-04-25 NOTE — TELEPHONE ENCOUNTER
Pt d/c from [facility] and entering into [facility] with [place]. Gave them verbal for [facility], Skilled Nursing and PT/OT.    RHETT

## 2022-04-25 NOTE — CARE COORDINATION
Joo 45 Transitions Initial Follow Up Call    Call within 2 business days of discharge: Yes    Patient: Filipe Thompson Patient : 1935   MRN: [id number]  Reason for Admission: UTI, HTN, dementia with hallucinations and behavioral disturbance, chronic A Fib (on Eliquis), CKD, hx COVID infection 2022 -> home with [facility].   Discharge Date: 22 RARS: Readmission Risk Score: 13 ( )    Last Discharge [facility]       Complaint Diagnosis Description Type Department Provider    22 Dizziness Acute cystitis with hematuria ED to Hosp-Admission (Discharged) (ADMITTED) [facility] 2 Roxane Stevenson MD; Glenny Chamberlain. .. Was this an external facility discharge? No Discharge Facility: NA    Challenges to be reviewed by the provider   Additional needs identified to be addressed with provider Yes  needs TCM visit       Method of communication with provider: staff message    Advance Care Planning:   Does patient have an Advance Directive: reviewed and current. Was this a readmission? No  Patient stated reason for admission: family brought him in for mental instability   Patients top risk factors for readmission:   functional cognitive ability  medical condition-see above    Care Transition Nurse (CTN) contacted the family by telephone to perform post hospital discharge assessment. Provided introduction to self, and explanation of the CTN role. CTN reviewed discharge instructions, medical action plan and red flags with family who verbalized understanding. Family given an opportunity to ask questions and does not have any further questions or concerns at this time. Were discharge instructions available to patient? Yes. Reviewed appropriate site of care based on symptoms and resources available to patient including: PCP  Specialist  Home health. The family agrees to contact the PCP office for questions related to their healthcare.      Medication reconciliation was performed with family, who verbalizes understanding of administration of home medications. Family was given an opportunity to verbalize any questions and concerns and agrees to contact CTN or health care provider for questions related to their healthcare. Message left on home phone. Reached daughter Liana Arita on mobile (okay per hipaa). She staets he is clearing some mentally which is why they brought him to hospital. His granddaughter is a pharmacist and she manages medications. Med review completed. Hasn't heard from home care but CTN notes that liaison said they had left message and awaiting call back. States she can be primary contact and CTN will provide her info to [facility].. Denies needs. Has test later as noted below and granddaughter taking him. CTN attempted to schedule TCM visit with PCP but no hospital follow up time slots available so staff message will be sent. CTN provided contact information for future needs. Plan for follow-up call in 3-5 days based on severity of symptoms and risk factors.   Plan for next call: self management-UTI, [facility].     Care Transitions 24 Hour Call    Do you have a copy of your discharge instructions?: Yes  Do you have all of your prescriptions and are they filled?: Yes  Have you been contacted by a [address]?: No  Have you scheduled your follow up appointment?: No  Do you feel like you have everything you need to keep you well at home?: Yes  Care Transitions Interventions  No Identified Needs         Follow Up  Future Appointments   Date Time Provider Yulissa Landon   4/25/2022  4:00 PM [facility], VASCULAR ROOM [address]   7/8/2022  7:40 AM Mina Denver, MD [place] [facility]   7/19/2022  7:30 AM SCHEDULE, [facility]   9/8/2022  1:00 PM SCHEDULE, [facility]   9/8/2022  1:00 PM GUS Watson - SOLO [facility]       Kymberly Eckert RN

## 2022-04-27 ENCOUNTER — TELEPHONE (OUTPATIENT)
Dept: CARDIOLOGY CLINIC | Age: 87
End: 2022-04-27

## 2022-04-27 ENCOUNTER — OFFICE VISIT (OUTPATIENT)
Dept: FAMILY MEDICINE CLINIC | Age: 87
End: 2022-04-27
Payer: MEDICARE

## 2022-04-27 VITALS
WEIGHT: 162 LBS | OXYGEN SATURATION: 94 % | HEART RATE: 58 BPM | HEIGHT: 67 IN | BODY MASS INDEX: 25.43 KG/M2 | SYSTOLIC BLOOD PRESSURE: 136 MMHG | DIASTOLIC BLOOD PRESSURE: 82 MMHG

## 2022-04-27 DIAGNOSIS — N39.0 URINARY TRACT INFECTION WITHOUT HEMATURIA, SITE UNSPECIFIED: ICD-10-CM

## 2022-04-27 DIAGNOSIS — R42 DIZZINESS: ICD-10-CM

## 2022-04-27 DIAGNOSIS — R44.3 HALLUCINATIONS: Primary | ICD-10-CM

## 2022-04-27 DIAGNOSIS — I73.9 CLAUDICATION (HCC): Primary | ICD-10-CM

## 2022-04-27 DIAGNOSIS — I25.10 CORONARY ARTERY DISEASE INVOLVING NATIVE CORONARY ARTERY OF NATIVE HEART WITHOUT ANGINA PECTORIS: ICD-10-CM

## 2022-04-27 DIAGNOSIS — E78.5 HYPERLIPIDEMIA, UNSPECIFIED HYPERLIPIDEMIA TYPE: ICD-10-CM

## 2022-04-27 DIAGNOSIS — Z09 HOSPITAL DISCHARGE FOLLOW-UP: ICD-10-CM

## 2022-04-27 DIAGNOSIS — R41.3 MEMORY LOSS: ICD-10-CM

## 2022-04-27 PROCEDURE — 99495 TRANSJ CARE MGMT MOD F2F 14D: CPT | Performed by: FAMILY MEDICINE

## 2022-04-27 PROCEDURE — 1111F DSCHRG MED/CURRENT MED MERGE: CPT | Performed by: FAMILY MEDICINE

## 2022-04-27 ASSESSMENT — ENCOUNTER SYMPTOMS
DIARRHEA: 0
SHORTNESS OF BREATH: 0
CONSTIPATION: 0
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
## 2022-04-27 NOTE — PROGRESS NOTES
Chief Complaint   Patient presents with    Follow-Up from Hospital     Patient was in [facility] 22-22 for UTI.       HPI:  Majo Chino is a 82 y.o. (: 1935) here today   for follow up from hospital.  Patient was in [facility] 22-22 for UTI. HPI    Patient's medications, allergies, past medical, surgical, social and family histories were reviewed and updated as appropriate.     ROS:  Review of Systems        Microscopic Examination (no units)   Date Value   2022 YES     LDL Calculated (mg/dL)   Date Value   01/10/2022 77       Past Medical History:   Diagnosis Date    Atrial fibrillation (HCC)     Blind right eye     Chronic kidney disease     Dementia ([facility] [address]     Hyperlipidemia     Hypertension     Pneumonia        Family History   Problem Relation Age of Onset    Heart Disease Mother     Other Mother     Heart Disease Father     Asthma Other        Social History     Socioeconomic History    Marital status:      Spouse name: Not on file    Number of children: Not on file    Years of education: Not on file    Highest education level: Not on file   Occupational History    Not on file   Tobacco Use    Smoking status: Former Smoker     Packs/day: 1.00     Years: 10.00     Pack years: 10.00     Quit date: 1975     Years since quittin.1    Smokeless tobacco: Never Used    Tobacco comment: Quit 40 years ago   Vaping Use    Vaping Use: Never used   Substance and Sexual Activity    Alcohol use: No     Alcohol/week: 0.0 standard drinks    Drug use: No    Sexual activity: Yes     Partners: Female   Other Topics Concern    Not on file   Social History Narrative    Not on file     Social Determinants of Health     Financial Resource Strain:     Difficulty of Paying Living Expenses: Not on file   Food Insecurity:     Worried About Running Out of Food in the Last Year: Not on file    [place] of Food in the Last Year: Not on file Transportation Needs:     Lack of Transportation (Medical): Not on file    Lack of Transportation (Non-Medical): Not on file   Physical Activity:     Days of Exercise per Week: Not on file    Minutes of Exercise per Session: Not on file   Stress:     Feeling of Stress : Not on file   Social Connections:     Frequency of Communication with Friends and Family: Not on file    Frequency of Social Gatherings with Friends and Family: Not on file    Attends Jain Services: Not on file    Active Member of [address] [place] [address] or Organizations: Not on file    Attends Club or Organization Meetings: Not on file    Marital Status: Not on file   Intimate Partner Violence:     Fear of Current or Ex-Partner: Not on file    Emotionally Abused: Not on file    Physically Abused: Not on file    Sexually Abused: Not on file   Housing Stability:     Unable to Pay for Housing in the Last Year: Not on file    Number of [place] in the Last Year: Not on file    Unstable Housing in the Last Year: Not on file       Prior to Visit Medications    Medication Sig Taking?  Authorizing Provider   QUEtiapine (SEROQUEL) 25 MG tablet Take 1 tablet by mouth nightly Yes Cris Cat MD   metoprolol succinate (TOPROL XL) 25 MG extended release tablet Take 2 tablets by mouth 2 times daily Yes Cris Cat MD   cephALEXin (KEFLEX) 250 MG capsule Take 1 capsule by mouth 3 times daily Yes Cris Cat MD   memantine (NAMENDA) 5 MG tablet Take 1 tablet by mouth 2 times daily Yes Cris Cat MD   donepezil (ARICEPT) 10 MG tablet Take 1 tablet by mouth daily Yes Historical Provider, MD   citalopram (CELEXA) 10 MG tablet TAKE 1 TABLET BY MOUTH DAILY Yes Adela Martines MD   apixaban (ELIQUIS) 5 MG TABS tablet TAKE 1/2 TABLET BY MOUTH 2 TIMES DAILY Yes Shira Coelho MD   fluticasone (FLONASE) 50 MCG/ACT nasal spray 2 sprays by Nasal route daily Yes Adela Martines MD   Ferrous Sulfate (IRON) 325 (65 Fe) MG TABS Take by mouth daily  Yes Historical Provider, MD   atorvastatin (LIPITOR) 20 MG tablet TAKE 1 TABLET BY MOUTH EVERY DAY Yes Alpa Baldwin MD   isosorbide mononitrate (IMDUR) 30 MG extended release tablet TAKE 1 TABLET BY MOUTH DAILY Yes Celeste Domingo MD   tamsulosin (FLOMAX) 0.4 MG capsule Take 0.4 mg by mouth daily Yes Historical Provider, MD   finasteride (PROSCAR) 5 MG tablet Take 5 mg by mouth daily. Yes Historical Provider, MD   aspirin 81 MG tablet Take 81 mg by mouth daily. Yes Historical Provider, MD   omeprazole (PRILOSEC) 20 MG delayed release capsule TAKE 1 CAPSULE BY MOUTH 2 TIMES DAILY  Shidler Minors, APRN - CNP       Allergies   Allergen Reactions    Sulfa Antibiotics        OBJECTIVE:    /82   Ht 5' 7\" (1.702 m)   Wt 162 lb (73.5 kg)   BMI 25.37 kg/m²     BP Readings from Last 2 Encounters:   04/27/22 136/82   04/23/22 (!) 140/93       Wt Readings from Last 3 Encounters:   04/27/22 162 lb (73.5 kg)   04/21/22 158 lb (71.7 kg)   03/29/22 165 lb (74.8 kg)       Physical Exam      ASSESSMENT/PLAN:    There are no diagnoses linked to this encounter.

## 2022-04-27 NOTE — TELEPHONE ENCOUNTER
----- Message from Konstantin Carnes MD sent at 4/26/2022  2:31 PM EDT -----  Can we have him recheck in 6 months.     Study does shows some moderate disease

## 2022-04-28 ENCOUNTER — TELEPHONE (OUTPATIENT)
Dept: CARDIOLOGY CLINIC | Age: 87
End: 2022-04-28

## 2022-04-28 ENCOUNTER — CARE COORDINATION (OUTPATIENT)
Dept: CASE MANAGEMENT | Age: 87
End: 2022-04-28

## 2022-04-28 NOTE — CARE COORDINATION
Joo 45 Transitions Follow Up Call    2022    Patient: Aracely Gupta  Patient : 1935   MRN: [id number]  Reason for Admission: UTI, HTN, dementia with hallucinations and behavioral disturbance, chronic A Fib (on Eliquis), CKD, hx COVID infection 2022 -> home with [facility].   Discharge Date: 22 RARS: Readmission Risk Score: 13 ( )      Spoke with: Liana Arita (daughter)    Needs to be reviewed by the provider   Additional needs identified to be addressed with provider: No         Method of communication with provider : PURVI    Care Transition Nurse (CTN) contacted the family by telephone to follow up after recent hospital admission. Addressed changes since last contact: completed TCM visit - note reviewed prior to outreach   Discussed follow-up appointments. If no appointment was previously scheduled, appointment scheduling offered: no.   Non-[facility] follow up appointment(s): NA    Discussed appropriate site of care based on symptoms and resources available to patient including: PCP  Specialist  Home health. The family agrees to contact the PCP office for questions related to their healthcare. Patients top risk factors for readmission: functional cognitive ability  functional physical ability  medical condition-see above  Interventions to address risk factors: reports symptoms and concerns same day - reviewed resources - PCP, specialist, home health, CTN    Spoke with dtr Liana Malaveheather. Reports improvement in sleeping and clarity. Notified her Dr Dhiraj Huddleston office trying to reach them. Provided her with office number and she will contact them. Confirms [facility] started and knows how to reach them. Denies needs at this time. CTN provided contact information for future needs. Plan for follow-up call in 5-7 days based on severity of symptoms and risk factors.   Plan for next call: symptom management-UTI     Care Transitions Subsequent and Final Call    Schedule Follow Up Appointment with PCP:

## 2022-04-28 NOTE — TELEPHONE ENCOUNTER
----- Message from Danielle Perez MD sent at 4/26/2022 11:28 AM EDT -----  Please let patient know that his LVEF is worsening. He qualifies for BiV upgrade if he would like to see if improves his symptoms and cardiac function. If he would like to discuss in clinic, please help me arrange. Thanks.
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
## 2022-05-04 ENCOUNTER — CARE COORDINATION (OUTPATIENT)
Dept: CASE MANAGEMENT | Age: 87
End: 2022-05-04

## 2022-05-04 NOTE — CARE COORDINATION
[facility] Transitions Follow Up Call    2022    Patient: Majo Chino  Patient : 1935   MRN: [id number]  Reason for Admission: UTI, HTN, dementia with hallucinations and behavioral disturbance, chronic A Fib (on Eliquis), CKD, hx COVID infection 2022 -> home with [facility].   Discharge Date: 22 RARS: Readmission Risk Score: 13 ( )    CTN attempted follow-up outreach to patient. Message left including CTN contact information for return call and/or future needs.      Follow Up  Future Appointments   Date Time Provider Yulissa Landon   2022  8:20 AM MD STALIN Luna [facility]   2022  7:40 AM MD STALIN Luna [facility]   2022  7:30 AM SCHEDULE, [facility]   2022  1:00 PM SCHEDULE, [facility] Roshan Carrera [facility]   2022  1:00 PM GUS Rivera - SOLO Islas Ra [facility]       Brooks Klein RN

## 2022-05-06 ENCOUNTER — CARE COORDINATION (OUTPATIENT)
Dept: CASE MANAGEMENT | Age: 87
End: 2022-05-06

## 2022-05-06 NOTE — CARE COORDINATION
[facility] Transitions Follow Up Call    2022    Patient: Rafaela Bennett  Patient : 1935   MRN: [id number]  Reason for Admission: UTI, HTN, dementia with hallucinations and behavioral disturbance, chronic A Fib (on Eliquis), CKD, hx COVID infection 2022 -> home [facility] completed TCM visit   Discharge Date: 22 RARS: Readmission Risk Score: 13 ( )       CTN attempted follow-up outreach to patient. Message left including CTN contact information for return call and/or future needs. No further CTN outreach scheduled.     Follow Up  Future Appointments   Date Time Provider Yulissa Landon   2022  8:20 AM MD STALIN May [facility]   2022  7:40 AM MD STALIN May [facility]   2022  7:30 AM SCHEDULE, Sae Ghosh [facility] Jeremy Verdugo [facility]   2022  1:00 PM SCHEDULE, [facility] Junior Gomez [facility]   2022  1:00 PM Esmer Aguilar APRN - SOLO [facility]       Ciera Moran RN

## 2022-05-19 NOTE — TELEPHONE ENCOUNTER
Pts wife returned call. Message was given. V/U. Pt is scheduled for 07/05/2022 to discuss BiV upgrade.

## 2022-05-20 RX ORDER — MEMANTINE HYDROCHLORIDE 5 MG/1
5 TABLET ORAL 2 TIMES DAILY
Qty: 60 TABLET | Refills: 3 | Status: SHIPPED | OUTPATIENT
Start: 2022-05-20

## 2022-05-20 NOTE — PROGRESS NOTES
LOV 04/27/22  FOV 06/08/22  Pt was started on this at last Hospital visit but no refills were put on script

## 2022-05-21 PROBLEM — N39.0 UTI (URINARY TRACT INFECTION): Status: RESOLVED | Noted: 2022-04-21 | Resolved: 2022-05-21

## 2022-06-01 DIAGNOSIS — I10 HYPERTENSION, UNSPECIFIED TYPE: Primary | ICD-10-CM

## 2022-06-01 RX ORDER — ISOSORBIDE MONONITRATE 30 MG/1
30 TABLET, EXTENDED RELEASE ORAL DAILY
Qty: 90 TABLET | Refills: 1 | Status: ON HOLD | OUTPATIENT
Start: 2022-06-01 | End: 2022-08-26 | Stop reason: HOSPADM

## 2022-06-01 NOTE — TELEPHONE ENCOUNTER
Pt/pharmacy requesting isosorbide mononitrate 30 mg ER tab.  Pending script to [address] [place] [address] 3/18/22  No upcoming OV scheduled at this time

## 2022-06-06 RX ORDER — QUETIAPINE FUMARATE 25 MG/1
TABLET, FILM COATED ORAL
Qty: 30 TABLET | Refills: 0 | OUTPATIENT
Start: 2022-06-06

## 2022-06-08 RX ORDER — QUETIAPINE FUMARATE 25 MG/1
TABLET, FILM COATED ORAL
Qty: 30 TABLET | Refills: 0 | Status: SHIPPED | OUTPATIENT
Start: 2022-06-08 | End: 2022-07-05

## 2022-06-08 NOTE — TELEPHONE ENCOUNTER
Date of last refill of this med was 04/23/2022, # of pills given 30 and # of refills given 0. Their next appointment is 06/21/2022, the last date patient was seen was 04/27/2022. Does patient have medication agreement on file? No  Has drug screen been done in last 12 months if needed?  no

## 2022-06-09 LAB — PROSTATE SPECIFIC ANTIGEN: 2.1 NG/ML (ref 0–3.9)

## 2022-06-11 DIAGNOSIS — H93.8X3 EAR FULLNESS, BILATERAL: ICD-10-CM

## 2022-06-13 RX ORDER — OFLOXACIN 3 MG/ML
5 SOLUTION AURICULAR (OTIC) 2 TIMES DAILY
Qty: 10 ML | Refills: 0 | Status: SHIPPED | OUTPATIENT
Start: 2022-06-13 | End: 2022-06-20

## 2022-07-05 RX ORDER — QUETIAPINE FUMARATE 25 MG/1
TABLET, FILM COATED ORAL
Qty: 30 TABLET | Refills: 0 | Status: SHIPPED | OUTPATIENT
Start: 2022-07-05 | End: 2022-07-08

## 2022-07-08 ENCOUNTER — OFFICE VISIT (OUTPATIENT)
Dept: FAMILY MEDICINE CLINIC | Age: 87
End: 2022-07-08
Payer: MEDICARE

## 2022-07-08 VITALS
WEIGHT: 160 LBS | DIASTOLIC BLOOD PRESSURE: 86 MMHG | OXYGEN SATURATION: 97 % | BODY MASS INDEX: 25.11 KG/M2 | HEART RATE: 74 BPM | SYSTOLIC BLOOD PRESSURE: 134 MMHG | HEIGHT: 67 IN

## 2022-07-08 DIAGNOSIS — R09.89 THROAT CLEARING: ICD-10-CM

## 2022-07-08 DIAGNOSIS — R41.3 MEMORY LOSS: ICD-10-CM

## 2022-07-08 DIAGNOSIS — R44.3 HALLUCINATIONS: ICD-10-CM

## 2022-07-08 DIAGNOSIS — T17.308A CHOKING, INITIAL ENCOUNTER: ICD-10-CM

## 2022-07-08 DIAGNOSIS — R11.10 REGURGITATION OF FOOD: ICD-10-CM

## 2022-07-08 DIAGNOSIS — K21.9 GASTROESOPHAGEAL REFLUX DISEASE, UNSPECIFIED WHETHER ESOPHAGITIS PRESENT: Primary | ICD-10-CM

## 2022-07-08 PROCEDURE — 99214 OFFICE O/P EST MOD 30 MIN: CPT | Performed by: FAMILY MEDICINE

## 2022-07-08 PROCEDURE — 1123F ACP DISCUSS/DSCN MKR DOCD: CPT | Performed by: FAMILY MEDICINE

## 2022-07-08 PROCEDURE — 1036F TOBACCO NON-USER: CPT | Performed by: FAMILY MEDICINE

## 2022-07-08 PROCEDURE — G8427 DOCREV CUR MEDS BY ELIG CLIN: HCPCS | Performed by: FAMILY MEDICINE

## 2022-07-08 PROCEDURE — G8417 CALC BMI ABV UP PARAM F/U: HCPCS | Performed by: FAMILY MEDICINE

## 2022-07-08 RX ORDER — FAMOTIDINE 20 MG/1
20 TABLET, FILM COATED ORAL NIGHTLY
Qty: 30 TABLET | Refills: 3 | Status: ON HOLD | OUTPATIENT
Start: 2022-07-08 | End: 2022-08-26 | Stop reason: HOSPADM

## 2022-07-08 RX ORDER — QUETIAPINE FUMARATE 25 MG/1
50 TABLET, FILM COATED ORAL NIGHTLY
Qty: 30 TABLET | Refills: 0
Start: 2022-07-08 | End: 2022-07-23

## 2022-07-08 ASSESSMENT — ENCOUNTER SYMPTOMS: TROUBLE SWALLOWING: 1

## 2022-07-08 NOTE — PROGRESS NOTES
Chief Complaint   Patient presents with    Hypertension    Hyperlipidemia    Choking       HPI:  Truett Dubin is a 82 y.o. (: 1935) here today   for   Hypertension  This is a chronic problem. The current episode started more than 1 year ago. The problem is unchanged. The problem is controlled. Associated symptoms include malaise/fatigue. Pertinent negatives include no headaches. Risk factors for coronary artery disease include dyslipidemia and male gender. Past treatments include beta blockers. The current treatment provides moderate improvement. There are no compliance problems. Hyperlipidemia  This is a chronic problem. The current episode started more than 1 year ago. The problem is controlled. Current antihyperlipidemic treatment includes statins. There are no compliance problems. Risk factors for coronary artery disease include dyslipidemia, hypertension and male sex. has been having occas chest pain. Ongoing issues w/ lightheadedness and dizziness. No new. Inc fatigue noted. Has still been having hallucinations. seroquel had been added in hospital.  Has not had any neuro f/u appt. Has not been sleeping. Per wife, spends his night \"talking to people\" who aren't there. When not talking to others, sig throat clearing at night. Has flonase. Still seeing things. Was \"chasing camels and horses\" last night in the back field. Has been having issues w/ acid reflux. Choking w/ eating. Food not necessarily getting stuck, but will have sig reflux. Has been on omeprazole. sxs worse over past 3-4 mo. Patient's medications, allergies, past medical, surgical, social and family histories were reviewed and updated as appropriate. ROS:  Review of Systems   Constitutional: Positive for fatigue and malaise/fatigue. HENT: Positive for postnasal drip and trouble swallowing. Throat clearing   Neurological: Negative for headaches.    Psychiatric/Behavioral: Positive for hallucinations and sleep disturbance. Microscopic Examination (no units)   Date Value   2022 YES     LDL Calculated (mg/dL)   Date Value   01/10/2022 77       Past Medical History:   Diagnosis Date    Atrial fibrillation (HCC)     Blind right eye     Chronic kidney disease     Dementia ([facility] [address]     Hyperlipidemia     Hypertension     Pneumonia        Family History   Problem Relation Age of Onset    Heart Disease Mother     Other Mother     Heart Disease Father     Asthma Other        Social History     Socioeconomic History    Marital status:      Spouse name: Not on file    Number of children: Not on file    Years of education: Not on file    Highest education level: Not on file   Occupational History    Not on file   Tobacco Use    Smoking status: Former Smoker     Packs/day: 1.00     Years: 10.00     Pack years: 10.00     Quit date: 1975     Years since quittin.3    Smokeless tobacco: Never Used    Tobacco comment: Quit 40 years ago   Vaping Use    Vaping Use: Never used   Substance and Sexual Activity    Alcohol use: No     Alcohol/week: 0.0 standard drinks    Drug use: No    Sexual activity: Yes     Partners: Female   Other Topics Concern    Not on file   Social History Narrative    Not on file     Social Determinants of Health     Financial Resource Strain:     Difficulty of Paying Living Expenses: Not on file   Food Insecurity:     Worried About Running Out of Food in the Last Year: Not on file    [place] of Food in the Last Year: Not on file   Transportation Needs:     Lack of Transportation (Medical): Not on file    Lack of Transportation (Non-Medical):  Not on file   Physical Activity:     Days of Exercise per Week: Not on file    Minutes of Exercise per Session: Not on file   Stress:     Feeling of Stress : Not on file   Social Connections:     Frequency of Communication with Friends and Family: Not on file    Frequency of Social Gatherings with Friends and Family: Not on file    Attends Shinto Services: Not on file    Active Member of Clubs or Organizations: Not on file    Attends Club or Organization Meetings: Not on file    Marital Status: Not on file   Intimate Partner Violence:     Fear of Current or Ex-Partner: Not on file    Emotionally Abused: Not on file    Physically Abused: Not on file    Sexually Abused: Not on file   Housing Stability:     Unable to Pay for Housing in the Last Year: Not on file    Number of [place] in the Last Year: Not on file    Unstable Housing in the Last Year: Not on file       Prior to Visit Medications    Medication Sig Taking?  Authorizing Provider   famotidine (PEPCID) 20 MG tablet Take 1 tablet by mouth at bedtime Yes Shira Fleming MD   QUEtiapine (SEROQUEL) 25 MG tablet TAKE 1 TABLET BY MOUTH EVERY DAY Yes GUS Mitchell CNP   isosorbide mononitrate (IMDUR) 30 MG extended release tablet TAKE 1 TABLET BY MOUTH DAILY Yes Victor Manuel Gibbs MD   memantine (NAMENDA) 5 MG tablet Take 1 tablet by mouth 2 times daily Yes Shira Fleming MD   metoprolol succinate (TOPROL XL) 25 MG extended release tablet Take 2 tablets by mouth 2 times daily Yes Kaz York MD   donepezil (ARICEPT) 10 MG tablet Take 1 tablet by mouth daily Yes Historical Provider, MD   citalopram (CELEXA) 10 MG tablet TAKE 1 TABLET BY MOUTH DAILY Yes Shira Fleming MD   apixaban (ELIQUIS) 5 MG TABS tablet TAKE 1/2 TABLET BY MOUTH 2 TIMES DAILY Yes Victor Manuel Gibbs MD   Ferrous Sulfate (IRON) 325 (65 Fe) MG TABS Take by mouth daily  Yes Historical Provider, MD   atorvastatin (LIPITOR) 20 MG tablet TAKE 1 TABLET BY MOUTH EVERY DAY Yes Shira Fleming MD   omeprazole (PRILOSEC) 20 MG delayed release capsule TAKE 1 CAPSULE BY MOUTH 2 TIMES DAILY Yes GUS Ng CNP   tamsulosin (FLOMAX) 0.4 MG capsule Take 0.4 mg by mouth daily Yes Historical Provider, MD   finasteride (PROSCAR) 5 MG tablet Take 5 mg by mouth daily. Yes Historical Provider, MD   aspirin 81 MG tablet Take 81 mg by mouth daily. Yes Historical Provider, MD   fluticasone (FLONASE) 50 MCG/ACT nasal spray 2 sprays by Nasal route daily  Romero Khan MD       Allergies   Allergen Reactions    Sulfa Antibiotics        OBJECTIVE:    /86   Pulse 74   Ht 5' 7\" (1.702 m)   Wt 160 lb (72.6 kg)   SpO2 97%   BMI 25.06 kg/m²     BP Readings from Last 2 Encounters:   07/08/22 134/86   04/27/22 136/82       Wt Readings from Last 3 Encounters:   07/08/22 160 lb (72.6 kg)   04/27/22 162 lb (73.5 kg)   04/21/22 158 lb (71.7 kg)       Physical Exam  Constitutional:       Appearance: He is well-developed. HENT:      Head: Normocephalic and atraumatic. Eyes:      Conjunctiva/sclera: Conjunctivae normal.   Neck:      Trachea: No tracheal deviation. Cardiovascular:      Rate and Rhythm: Normal rate. Rhythm irregularly irregular. Heart sounds: No murmur heard. No friction rub. No gallop. Pulmonary:      Effort: Pulmonary effort is normal.      Breath sounds: Normal breath sounds. Abdominal:      Palpations: Abdomen is soft. Tenderness: There is no abdominal tenderness. Musculoskeletal:      Right lower leg: No edema. Left lower leg: No edema. Skin:     General: Skin is warm and dry. Neurological:      Mental Status: He is alert and oriented to person, place, and time. Psychiatric:         Mood and Affect: Mood normal.         Behavior: Behavior normal.           ASSESSMENT/PLAN:    1. Gastroesophageal reflux disease, unspecified whether esophagitis present  Patient with sensation of regurgitation of his food and choking. He describes what seems to be more of an issue with reflux or dysphagia, but cannot exclude the possibility of aspiration. Try adding Pepcid as below. Referral to GI for further evaluation. Could consider a swallowing evaluation as well.   Try medications as listed below first.  Patient does not really complain of significant respiratory issues associated with these episodes. - famotidine (PEPCID) 20 MG tablet; Take 1 tablet by mouth at bedtime  Dispense: 30 tablet; Refill: 3  - LUIS Onofre MD, Gastroenterology (ERCP & EUS), [facility]    2. Regurgitation of food  See above  - LUIS Onofre MD, Gastroenterology (ERCP & EUS), [facility]    3. Choking, initial encounter  See above  - LUIS Onofre MD, Gastroenterology (ERCP & EUS), [facility]    4. Memory loss  Ongoing significant issues with memory, as well as associated hallucinations. Significant issues with sleep noted. Patient is already on Aricept and Namenda. I did recommend close follow-up with neurology. See below for further details as well. 5. Hallucinations  Significant hallucinations, not only at night, but also during the day. These vary widely in what they consist of. He has been placed on Seroquel. We confirmed with the individual who sets up his medication that he had been taking the Seroquel 25 mg at night. We will try increasing the Seroquel to 50 mg for the time being. If he tolerates this well, we will plan on a new prescription. Would also prefer to get the patient into geriatric psychiatry. Unsure if we will have any success in a local area. Follow-up with neurology as scheduled as well. 6. Throat clearing  Significant throat clearing noted at night. It may be related to reflux or the choking as listed above. Could also be related to postnasal drainage. I did recommend him resuming his Flonase in the evenings. If ongoing symptoms, may need further evaluation as listed above      Family is quite frustrated with his lack of sleep and with the hallucinations. See above. We will attempt to get the patient into geriatric psychiatry for more definitive treatment.   Will likely need further medication adjustments

## 2022-07-12 ENCOUNTER — TELEPHONE (OUTPATIENT)
Dept: FAMILY MEDICINE CLINIC | Age: 87
End: 2022-07-12

## 2022-07-12 NOTE — TELEPHONE ENCOUNTER
Pt having surgery on 7/26 and he's on Eliquis. Need a note stating when he needs to stop it and restart it.    Fax to [phone]

## 2022-07-13 ENCOUNTER — TELEPHONE (OUTPATIENT)
Dept: CARDIOLOGY CLINIC | Age: 87
End: 2022-07-13

## 2022-07-13 NOTE — TELEPHONE ENCOUNTER
The patient's cardiac condition is not prohibitory to undergo surgery. The patient's cardiac risk is moderate based upon my evaluation and testing. Stable to proceed. The patient only needs to hold his Eliquis for 48 hours.

## 2022-07-13 NOTE — TELEPHONE ENCOUNTER
CARDIAC CLEARANCE REQUEST    What type of procedure are you having:  cystoscopy  Are you taking any blood thinners:  Eliquis 5mg-hold 3-4 days prior. Type on anesthesia:  sedation  When is your procedure scheduled for:  07/26/2022  What physician is performing your procedure:  Dr. cD Suazo  Phone Number:  [phone]- can call [place] with [facility] if any questions  Fax number to send the letter:    Last ov 03/18/2022 [facility].

## 2022-07-13 NOTE — LETTER
[address] Cardiology - [address] [facility] [address] [facility]  Phone: [phone]  Fax: [phone]    Jr Felix MD        July 13, 2022     [address] [facility] [facility]  [address] [facility]  1935    To whom it may concern,           The patient's cardiac condition is not prohibitory to undergo surgery. The patient's cardiac risk is moderate based upon my evaluation and testing. Stable to proceed. The patient only needs to hold his Eliquis for 48 hours    If you have any questions or concerns, please don't hesitate to call.     Sincerely,        Jr Felix MD

## 2022-07-19 ENCOUNTER — NURSE ONLY (OUTPATIENT)
Dept: CARDIOLOGY CLINIC | Age: 87
End: 2022-07-19
Payer: MEDICARE

## 2022-07-19 DIAGNOSIS — Z95.0 PACEMAKER: ICD-10-CM

## 2022-07-19 DIAGNOSIS — I49.5 SICK SINUS SYNDROME (HCC): Primary | ICD-10-CM

## 2022-07-19 PROCEDURE — 93294 REM INTERROG EVL PM/LDLS PM: CPT | Performed by: INTERNAL MEDICINE

## 2022-07-19 PROCEDURE — 93296 REM INTERROG EVL PM/IDS: CPT | Performed by: INTERNAL MEDICINE

## 2022-07-21 NOTE — PROGRESS NOTES
Remote transmission received for patient's single chamber PACEMAKER. Transmission shows normal sensing and pacing function. EP physician will review. See interrogation under the cardiology tab in the [address] [facility] [address] field for more details. Will continue to monitor remotely. Hx AF/SVT (oac, toprol xl). End of 91-day monitoring period 7/19/22.  45%. No V arrhythmias recorded. Echo 4/2022-LV function is mildly decreased with EF estimated from 45-50%. Done-3/8/22. Spoke w/ [facility] Rep Eusebia Lutz. V undersensing noted-will increase sensitivity- dynamic sensitivity. Pt needs f/u OV w/ AJK (last ov 10/2020) and device check for programming changes. Call M rep to assist with programming changes. 1/17/2022 - present (1 day)  [facility] Emergency Department     Loss of Consciousness    Chief complaint

## 2022-07-23 RX ORDER — QUETIAPINE FUMARATE 50 MG/1
50 TABLET, FILM COATED ORAL NIGHTLY
Qty: 90 TABLET | Refills: 2 | Status: ON HOLD | OUTPATIENT
Start: 2022-07-23 | End: 2022-08-26 | Stop reason: HOSPADM

## 2022-07-25 RX ORDER — METOPROLOL SUCCINATE 25 MG/1
TABLET, EXTENDED RELEASE ORAL
Qty: 180 TABLET | Refills: 3 | Status: ON HOLD | OUTPATIENT
Start: 2022-07-25 | End: 2022-08-26 | Stop reason: HOSPADM

## 2022-08-06 DIAGNOSIS — I48.11 LONGSTANDING PERSISTENT ATRIAL FIBRILLATION (HCC): ICD-10-CM

## 2022-08-08 RX ORDER — OMEPRAZOLE 20 MG/1
20 CAPSULE, DELAYED RELEASE ORAL 2 TIMES DAILY
Qty: 180 CAPSULE | Refills: 3 | Status: ON HOLD | OUTPATIENT
Start: 2022-08-08 | End: 2022-09-11

## 2022-08-09 ENCOUNTER — HOSPITAL ENCOUNTER (INPATIENT)
Age: 87
LOS: 4 days | Discharge: NURSING, REHAB OR LONG-TERM CARE | DRG: 640 | End: 2022-08-14
Attending: INTERNAL MEDICINE | Admitting: INTERNAL MEDICINE
Payer: MEDICARE

## 2022-08-09 LAB
GLUCOSE BLD-MCNC: 82 MG/DL (ref 70–99)
PERFORMED ON: NORMAL
TROPONIN: <0.01 NG/ML

## 2022-08-09 PROCEDURE — 36415 COLL VENOUS BLD VENIPUNCTURE: CPT

## 2022-08-09 PROCEDURE — 84484 ASSAY OF TROPONIN QUANT: CPT

## 2022-08-09 PROCEDURE — 94761 N-INVAS EAR/PLS OXIMETRY MLT: CPT

## 2022-08-09 PROCEDURE — 96361 HYDRATE IV INFUSION ADD-ON: CPT

## 2022-08-09 PROCEDURE — G0379 DIRECT REFER HOSPITAL OBSERV: HCPCS

## 2022-08-09 PROCEDURE — 2700000000 HC OXYGEN THERAPY PER DAY

## 2022-08-09 PROCEDURE — 6370000000 HC RX 637 (ALT 250 FOR IP): Performed by: INTERNAL MEDICINE

## 2022-08-09 PROCEDURE — 2580000003 HC RX 258: Performed by: INTERNAL MEDICINE

## 2022-08-09 PROCEDURE — G0378 HOSPITAL OBSERVATION PER HR: HCPCS

## 2022-08-09 RX ORDER — ATORVASTATIN CALCIUM 10 MG/1
20 TABLET, FILM COATED ORAL DAILY
Status: DISCONTINUED | OUTPATIENT
Start: 2022-08-10 | End: 2022-08-14 | Stop reason: HOSPADM

## 2022-08-09 RX ORDER — SODIUM CHLORIDE 0.9 % (FLUSH) 0.9 %
5-40 SYRINGE (ML) INJECTION PRN
Status: DISCONTINUED | OUTPATIENT
Start: 2022-08-09 | End: 2022-08-14 | Stop reason: HOSPADM

## 2022-08-09 RX ORDER — TROSPIUM CHLORIDE 20 MG/1
20 TABLET, FILM COATED ORAL 2 TIMES DAILY
Status: ON HOLD | COMMUNITY
End: 2022-08-26 | Stop reason: HOSPADM

## 2022-08-09 RX ORDER — ACETAMINOPHEN 650 MG/1
650 SUPPOSITORY RECTAL EVERY 6 HOURS PRN
Status: DISCONTINUED | OUTPATIENT
Start: 2022-08-09 | End: 2022-08-14 | Stop reason: HOSPADM

## 2022-08-09 RX ORDER — METOPROLOL SUCCINATE 25 MG/1
25 TABLET, EXTENDED RELEASE ORAL DAILY
Status: DISCONTINUED | OUTPATIENT
Start: 2022-08-10 | End: 2022-08-14 | Stop reason: HOSPADM

## 2022-08-09 RX ORDER — SODIUM CHLORIDE 0.9 % (FLUSH) 0.9 %
5-40 SYRINGE (ML) INJECTION EVERY 12 HOURS SCHEDULED
Status: DISCONTINUED | OUTPATIENT
Start: 2022-08-09 | End: 2022-08-14 | Stop reason: HOSPADM

## 2022-08-09 RX ORDER — FINASTERIDE 5 MG/1
5 TABLET, FILM COATED ORAL DAILY
Status: DISCONTINUED | OUTPATIENT
Start: 2022-08-10 | End: 2022-08-14 | Stop reason: HOSPADM

## 2022-08-09 RX ORDER — SODIUM CHLORIDE 9 MG/ML
INJECTION, SOLUTION INTRAVENOUS CONTINUOUS
Status: ACTIVE | OUTPATIENT
Start: 2022-08-09 | End: 2022-08-10

## 2022-08-09 RX ORDER — CITALOPRAM 20 MG/1
10 TABLET ORAL DAILY
Status: DISCONTINUED | OUTPATIENT
Start: 2022-08-10 | End: 2022-08-14 | Stop reason: HOSPADM

## 2022-08-09 RX ORDER — ONDANSETRON 2 MG/ML
4 INJECTION INTRAMUSCULAR; INTRAVENOUS EVERY 6 HOURS PRN
Status: DISCONTINUED | OUTPATIENT
Start: 2022-08-09 | End: 2022-08-14 | Stop reason: HOSPADM

## 2022-08-09 RX ORDER — ASPIRIN 81 MG/1
81 TABLET ORAL DAILY
Status: DISCONTINUED | OUTPATIENT
Start: 2022-08-10 | End: 2022-08-14 | Stop reason: HOSPADM

## 2022-08-09 RX ORDER — SODIUM CHLORIDE 9 MG/ML
INJECTION, SOLUTION INTRAVENOUS PRN
Status: DISCONTINUED | OUTPATIENT
Start: 2022-08-09 | End: 2022-08-14 | Stop reason: HOSPADM

## 2022-08-09 RX ORDER — ONDANSETRON 4 MG/1
4 TABLET, ORALLY DISINTEGRATING ORAL EVERY 8 HOURS PRN
Status: DISCONTINUED | OUTPATIENT
Start: 2022-08-09 | End: 2022-08-14 | Stop reason: HOSPADM

## 2022-08-09 RX ORDER — PANTOPRAZOLE SODIUM 40 MG/1
40 TABLET, DELAYED RELEASE ORAL
Refills: 3 | Status: DISCONTINUED | OUTPATIENT
Start: 2022-08-10 | End: 2022-08-14 | Stop reason: HOSPADM

## 2022-08-09 RX ORDER — ACETAMINOPHEN 325 MG/1
650 TABLET ORAL EVERY 6 HOURS PRN
Status: DISCONTINUED | OUTPATIENT
Start: 2022-08-09 | End: 2022-08-14 | Stop reason: HOSPADM

## 2022-08-09 RX ORDER — QUETIAPINE FUMARATE 25 MG/1
50 TABLET, FILM COATED ORAL NIGHTLY
Status: DISCONTINUED | OUTPATIENT
Start: 2022-08-09 | End: 2022-08-14 | Stop reason: HOSPADM

## 2022-08-09 RX ORDER — POLYETHYLENE GLYCOL 3350 17 G/17G
17 POWDER, FOR SOLUTION ORAL DAILY PRN
Status: DISCONTINUED | OUTPATIENT
Start: 2022-08-09 | End: 2022-08-14 | Stop reason: HOSPADM

## 2022-08-09 RX ORDER — FLUTICASONE PROPIONATE 50 MCG
2 SPRAY, SUSPENSION (ML) NASAL DAILY
Status: DISCONTINUED | OUTPATIENT
Start: 2022-08-09 | End: 2022-08-14 | Stop reason: HOSPADM

## 2022-08-09 RX ORDER — MEMANTINE HYDROCHLORIDE 5 MG/1
5 TABLET ORAL 2 TIMES DAILY
Status: DISCONTINUED | OUTPATIENT
Start: 2022-08-09 | End: 2022-08-14 | Stop reason: HOSPADM

## 2022-08-09 RX ORDER — DONEPEZIL HYDROCHLORIDE 5 MG/1
10 TABLET, FILM COATED ORAL DAILY
Status: DISCONTINUED | OUTPATIENT
Start: 2022-08-10 | End: 2022-08-14 | Stop reason: HOSPADM

## 2022-08-09 RX ORDER — FAMOTIDINE 20 MG/1
20 TABLET, FILM COATED ORAL NIGHTLY
Status: DISCONTINUED | OUTPATIENT
Start: 2022-08-09 | End: 2022-08-14 | Stop reason: HOSPADM

## 2022-08-09 RX ORDER — MINOCYCLINE HYDROCHLORIDE 100 MG/1
10 CAPSULE ORAL DAILY
Status: ON HOLD | COMMUNITY
Start: 2022-08-06 | End: 2022-08-26 | Stop reason: HOSPADM

## 2022-08-09 RX ORDER — FERROUS SULFATE 325(65) MG
325 TABLET ORAL DAILY
Status: DISCONTINUED | OUTPATIENT
Start: 2022-08-10 | End: 2022-08-14 | Stop reason: HOSPADM

## 2022-08-09 RX ORDER — ISOSORBIDE MONONITRATE 30 MG/1
30 TABLET, EXTENDED RELEASE ORAL DAILY
Status: DISCONTINUED | OUTPATIENT
Start: 2022-08-10 | End: 2022-08-12

## 2022-08-09 RX ORDER — TAMSULOSIN HYDROCHLORIDE 0.4 MG/1
0.4 CAPSULE ORAL DAILY
Status: DISCONTINUED | OUTPATIENT
Start: 2022-08-10 | End: 2022-08-14 | Stop reason: HOSPADM

## 2022-08-09 RX ADMIN — FLUTICASONE PROPIONATE 2 SPRAY: 50 SPRAY, METERED NASAL at 22:06

## 2022-08-09 RX ADMIN — FAMOTIDINE 20 MG: 20 TABLET ORAL at 21:32

## 2022-08-09 RX ADMIN — MEMANTINE 5 MG: 5 TABLET ORAL at 21:32

## 2022-08-09 RX ADMIN — SODIUM CHLORIDE: 9 INJECTION, SOLUTION INTRAVENOUS at 22:06

## 2022-08-09 RX ADMIN — SODIUM CHLORIDE, PRESERVATIVE FREE 10 ML: 5 INJECTION INTRAVENOUS at 21:32

## 2022-08-09 RX ADMIN — QUETIAPINE FUMARATE 50 MG: 25 TABLET ORAL at 21:32

## 2022-08-09 RX ADMIN — APIXABAN 2.5 MG: 2.5 TABLET, FILM COATED ORAL at 21:32

## 2022-08-09 NOTE — PROGRESS NOTES
MD notified: Direct admit arrived, vitals stable bp elevated 158/106. On 2 liters sating well. Otherwise without complaint.

## 2022-08-10 ENCOUNTER — NURSE ONLY (OUTPATIENT)
Dept: CARDIOLOGY CLINIC | Age: 87
End: 2022-08-10

## 2022-08-10 DIAGNOSIS — Z95.0 PACEMAKER: ICD-10-CM

## 2022-08-10 PROBLEM — R55 SYNCOPE, UNSPECIFIED SYNCOPE TYPE: Status: ACTIVE | Noted: 2022-08-10

## 2022-08-10 PROBLEM — I25.5 ISCHEMIC CARDIOMYOPATHY: Status: ACTIVE | Noted: 2022-08-10

## 2022-08-10 LAB
ANION GAP SERPL CALCULATED.3IONS-SCNC: 8 MMOL/L (ref 3–16)
BUN BLDV-MCNC: 30 MG/DL (ref 7–20)
CALCIUM SERPL-MCNC: 8.4 MG/DL (ref 8.3–10.6)
CHLORIDE BLD-SCNC: 106 MMOL/L (ref 99–110)
CO2: 25 MMOL/L (ref 21–32)
CREAT SERPL-MCNC: 1.3 MG/DL (ref 0.8–1.3)
GFR AFRICAN AMERICAN: >60
GFR NON-AFRICAN AMERICAN: 52
GLUCOSE BLD-MCNC: 106 MG/DL (ref 70–99)
GLUCOSE BLD-MCNC: 134 MG/DL (ref 70–99)
GLUCOSE BLD-MCNC: 60 MG/DL (ref 70–99)
GLUCOSE BLD-MCNC: 64 MG/DL (ref 70–99)
GLUCOSE BLD-MCNC: 71 MG/DL (ref 70–99)
GLUCOSE BLD-MCNC: 84 MG/DL (ref 70–99)
GLUCOSE BLD-MCNC: 93 MG/DL (ref 70–99)
MAGNESIUM: 1.7 MG/DL (ref 1.8–2.4)
PERFORMED ON: ABNORMAL
PERFORMED ON: NORMAL
POTASSIUM REFLEX MAGNESIUM: 3.9 MMOL/L (ref 3.5–5.1)
SODIUM BLD-SCNC: 139 MMOL/L (ref 136–145)
TROPONIN: <0.01 NG/ML

## 2022-08-10 PROCEDURE — 97116 GAIT TRAINING THERAPY: CPT

## 2022-08-10 PROCEDURE — 97530 THERAPEUTIC ACTIVITIES: CPT

## 2022-08-10 PROCEDURE — 36415 COLL VENOUS BLD VENIPUNCTURE: CPT

## 2022-08-10 PROCEDURE — 97162 PT EVAL MOD COMPLEX 30 MIN: CPT

## 2022-08-10 PROCEDURE — G0378 HOSPITAL OBSERVATION PER HR: HCPCS

## 2022-08-10 PROCEDURE — 1200000000 HC SEMI PRIVATE

## 2022-08-10 PROCEDURE — 83735 ASSAY OF MAGNESIUM: CPT

## 2022-08-10 PROCEDURE — 97166 OT EVAL MOD COMPLEX 45 MIN: CPT

## 2022-08-10 PROCEDURE — 80048 BASIC METABOLIC PNL TOTAL CA: CPT

## 2022-08-10 PROCEDURE — 96366 THER/PROPH/DIAG IV INF ADDON: CPT

## 2022-08-10 PROCEDURE — 96361 HYDRATE IV INFUSION ADD-ON: CPT

## 2022-08-10 PROCEDURE — 93308 TTE F-UP OR LMTD: CPT

## 2022-08-10 PROCEDURE — 84484 ASSAY OF TROPONIN QUANT: CPT

## 2022-08-10 PROCEDURE — 6360000002 HC RX W HCPCS: Performed by: NURSE PRACTITIONER

## 2022-08-10 PROCEDURE — 96365 THER/PROPH/DIAG IV INF INIT: CPT

## 2022-08-10 PROCEDURE — 6370000000 HC RX 637 (ALT 250 FOR IP): Performed by: INTERNAL MEDICINE

## 2022-08-10 PROCEDURE — 93005 ELECTROCARDIOGRAM TRACING: CPT | Performed by: NURSE PRACTITIONER

## 2022-08-10 PROCEDURE — 99223 1ST HOSP IP/OBS HIGH 75: CPT | Performed by: INTERNAL MEDICINE

## 2022-08-10 PROCEDURE — 2580000003 HC RX 258: Performed by: INTERNAL MEDICINE

## 2022-08-10 RX ORDER — DEXTROSE MONOHYDRATE 100 MG/ML
INJECTION, SOLUTION INTRAVENOUS CONTINUOUS PRN
Status: DISCONTINUED | OUTPATIENT
Start: 2022-08-10 | End: 2022-08-14 | Stop reason: HOSPADM

## 2022-08-10 RX ORDER — MAGNESIUM SULFATE IN WATER 40 MG/ML
2000 INJECTION, SOLUTION INTRAVENOUS ONCE
Status: COMPLETED | OUTPATIENT
Start: 2022-08-10 | End: 2022-08-10

## 2022-08-10 RX ADMIN — ISOSORBIDE MONONITRATE 30 MG: 30 TABLET, EXTENDED RELEASE ORAL at 10:42

## 2022-08-10 RX ADMIN — MAGNESIUM SULFATE HEPTAHYDRATE 2000 MG: 40 INJECTION, SOLUTION INTRAVENOUS at 11:40

## 2022-08-10 RX ADMIN — DONEPEZIL HYDROCHLORIDE 10 MG: 5 TABLET, FILM COATED ORAL at 10:43

## 2022-08-10 RX ADMIN — SODIUM CHLORIDE, PRESERVATIVE FREE 10 ML: 5 INJECTION INTRAVENOUS at 10:46

## 2022-08-10 RX ADMIN — MEMANTINE 5 MG: 5 TABLET ORAL at 10:43

## 2022-08-10 RX ADMIN — CITALOPRAM HYDROBROMIDE 10 MG: 20 TABLET ORAL at 10:43

## 2022-08-10 RX ADMIN — APIXABAN 2.5 MG: 2.5 TABLET, FILM COATED ORAL at 10:43

## 2022-08-10 RX ADMIN — SODIUM CHLORIDE, PRESERVATIVE FREE 10 ML: 5 INJECTION INTRAVENOUS at 21:04

## 2022-08-10 RX ADMIN — QUETIAPINE FUMARATE 50 MG: 25 TABLET ORAL at 21:03

## 2022-08-10 RX ADMIN — ATORVASTATIN CALCIUM 20 MG: 10 TABLET, FILM COATED ORAL at 10:43

## 2022-08-10 RX ADMIN — ASPIRIN 81 MG: 81 TABLET, COATED ORAL at 10:43

## 2022-08-10 RX ADMIN — MEMANTINE 5 MG: 5 TABLET ORAL at 21:03

## 2022-08-10 RX ADMIN — FAMOTIDINE 20 MG: 20 TABLET ORAL at 21:03

## 2022-08-10 RX ADMIN — TAMSULOSIN HYDROCHLORIDE 0.4 MG: 0.4 CAPSULE ORAL at 10:42

## 2022-08-10 RX ADMIN — METOPROLOL SUCCINATE 25 MG: 25 TABLET, EXTENDED RELEASE ORAL at 10:43

## 2022-08-10 RX ADMIN — PANTOPRAZOLE SODIUM 40 MG: 40 TABLET, DELAYED RELEASE ORAL at 06:16

## 2022-08-10 RX ADMIN — FERROUS SULFATE TAB 325 MG (65 MG ELEMENTAL FE) 325 MG: 325 (65 FE) TAB at 10:43

## 2022-08-10 RX ADMIN — FINASTERIDE 5 MG: 5 TABLET, FILM COATED ORAL at 10:43

## 2022-08-10 RX ADMIN — FLUTICASONE PROPIONATE 2 SPRAY: 50 SPRAY, METERED NASAL at 10:49

## 2022-08-10 NOTE — PROGRESS NOTES
Attempted to contact wife Kimberly Oscar, phone went to [employer]. Contacted daughter Kaley Jorge who said she is able to give consent. Verbal consent obtained via telephone from [facility] for Left Heart catheterization procedure with Charge Nurse Taniya Frank. as witness.

## 2022-08-10 NOTE — CONSULTS
[address] [facility]   Cardiovascular Evaluation    PATIENT: Toro Saldana  DATE: 8/10/2022  MRN: [id number]  CSN: [id number]  : 1935    Primary Care Doctor/Referring provider: Issa Dalal MD, Beltran Cordon MD     Reason for evaluation/Chief complaint:   Syncope    Subjective:    History of present illness on initial date of evaluation:   Toro Saldana is a 82 y.o. patient who presents as a referral from [facility] for the evaluation of recurrent syncope. Patient cannot recall the specifics of his initial symptom complex and presentation. Apparently, patient had a near syncopal event at home. Patient had a similar spell which resulted in a laceration of the skull requiring staples. This prompted the patient to be seen in the emergency room there. Due to his recurrent symptom complex, the patient was referred to [facility] for further cardiac evaluation. Denies any chest pain, shortness of breath, or palpitations. Unfortunately, he has developed progressive memory loss and dementia. The patient is not able to give detailed information about the events of hospitalization due to their underlying medical illness. The majority of the information is taken from the chart, medical staff, and available family. Patient did undergo screening echocardiogram which demonstrates progressive left ventricular dysfunction. Patient did undergo assessment of left ventricular function several years ago and was normal and has had serial evaluations which have now determined modest left ventricular dysfunction with anterior wall motion abnormalities. He did have an angiogram in  at which time he had modest coronary disease of the LAD which was evaluated with a fractional flow reserve. At that time FFR was within normal limits. The patient did undergo pacemaker interrogation today. It showed normal device function.     Patient Active Problem List   Diagnosis    Pneumonia    Abnormal chest CT    Cough syncope    Tracheobronchomalacia    Hyperlipidemia    Hypertension    Chronic kidney disease, stage III (moderate) ([facility])    A-fib (HCC)    S/P placement of cardiac pacemaker    Abnormal echocardiogram    Dizziness    SOB (shortness of breath)    Abnormal cardiovascular stress test    Coronary artery disease involving native coronary artery of native heart without angina pectoris, 5/2020 abnormal stress test. [facility] mild LAD disease     Other chest pain    Atrial fibrillation with rapid ventricular response (HCC)    Syncope and collapse    COVID    NATANAEL (acute kidney injury) ([address]    Atrial fibrillation with RVR ([address]    Dementia with behavioral disturbance (HCC)    Hallucinations    Syncope, unspecified syncope type    Ischemic cardiomyopathy         Cardiac Testing: I have reviewed the findings below. EKG:  ECHO:   STRESS TEST:  CATH:  BYPASS:  VASCULAR:    Past Medical History:   has a past medical history of Atrial fibrillation ([facility] Blind right eye, Chronic kidney disease, Dementia ([facility] Hyperlipidemia, Hypertension, and Pneumonia. Surgical History:   has a past surgical history that includes Tonsillectomy; Appendectomy; Colonoscopy; eye surgery; Upper gastrointestinal endoscopy; joint replacement (2013); hernia repair; and pacemaker placement (10/13/2017). Social History:   reports that he quit smoking about 47 years ago. He has a 10.00 pack-year smoking history. He has never used smokeless tobacco. He reports that he does not drink alcohol and does not use drugs. Family History:  No evidence for sudden cardiac death or premature CAD    Medications:  Reviewed and are listed in nursing record. and/or listed below  Outpatient Medications:  Prior to Admission medications    Medication Sig Start Date End Date Taking? Authorizing Provider   trospium (SANCTURA) 20 MG tablet Take 20 mg by mouth in the morning and 20 mg before bedtime.    Yes Historical Provider, MD   minocycline (MINOCIN;DYNACIN) 100 MG capsule Take 10 mg by mouth in the morning. 8/6/22   Historical Provider, MD   omeprazole (PRILOSEC) 20 MG delayed release capsule TAKE 1 CAPSULE BY MOUTH 2 TIMES DAILY 8/8/22 9/7/22  Lan Harris MD   apixaban (ELIQUIS) 5 MG TABS tablet TAKE 1/2 TABLET BY MOUTH 2 TIMES DAILY 8/8/22   Lan Harris MD   metoprolol succinate (TOPROL XL) 25 MG extended release tablet TAKE 3 TABLETS BY MOUTH TWICE DAILY 7/25/22   Maurita Denver, APRN - CNP   QUEtiapine (SEROQUEL) 50 MG tablet Take 1 tablet by mouth nightly 7/23/22   Lan Harris MD   famotidine (PEPCID) 20 MG tablet Take 1 tablet by mouth at bedtime 7/8/22   Lan Harris MD   isosorbide mononitrate (IMDUR) 30 MG extended release tablet TAKE 1 TABLET BY MOUTH DAILY 6/1/22   Adilene Whyte MD   memantine [facility]) 5 MG tablet Take 1 tablet by mouth 2 times daily 5/20/22   Lan Harris MD   donepezil (ARICEPT) 10 MG tablet Take 1 tablet by mouth daily 3/24/22   Historical Provider, MD   citalopram (CELEXA) 10 MG tablet TAKE 1 TABLET BY MOUTH DAILY 3/28/22   Lan Harris MD   fluticasone [facility]) 50 MCG/ACT nasal spray 2 sprays by Nasal route daily 2/1/22   Lan Harris MD   Ferrous Sulfate (IRON) 325 (65 Fe) MG TABS Take by mouth daily     Historical Provider, MD   atorvastatin (LIPITOR) 20 MG tablet TAKE 1 TABLET BY MOUTH EVERY DAY 11/15/21   Lan Harris MD   tamsulosin (FLOMAX) 0.4 MG capsule Take 0.4 mg by mouth daily    Historical Provider, MD   finasteride (PROSCAR) 5 MG tablet Take 5 mg by mouth daily. 3/4/15   Historical Provider, MD   aspirin 81 MG tablet Take 81 mg by mouth daily.       Historical Provider, MD       In-patient schedule medications:   [Held by provider] apixaban  2.5 mg Oral BID    aspirin  81 mg Oral Daily    atorvastatin  20 mg Oral Daily    citalopram  10 mg Oral Daily    donepezil  10 mg Oral Daily    famotidine  20 mg Oral Nightly    ferrous sulfate 325 mg Oral Daily    finasteride  5 mg Oral Daily    isosorbide mononitrate  30 mg Oral Daily    memantine  5 mg Oral BID    metoprolol succinate  25 mg Oral Daily    QUEtiapine  50 mg Oral Nightly    tamsulosin  0.4 mg Oral Daily    pantoprazole  40 mg Oral QAM AC    fluticasone  2 spray Nasal Daily    sodium chloride flush  5-40 mL IntraVENous 2 times per day         Infusion Medications:   dextrose      sodium chloride           Allergies:  Sulfa antibiotics     Review of Systems:   14 point review of systems unable to be completed due to patient condition/cooperation. All available positives mentioned in history of present illness.        Physical Examination:    [unfilled]  /87   Pulse 72   Temp 97.3 °F (36.3 °C) (Oral)   Resp 16   Ht 5' 7\" (1.702 m)   Wt 156 lb 1.6 oz (70.8 kg)   SpO2 96%   BMI 24.45 kg/m²    Weight: 156 lb 1.6 oz (70.8 kg)     Wt Readings from Last 3 Encounters:   08/10/22 156 lb 1.6 oz (70.8 kg)   07/08/22 160 lb (72.6 kg)   04/27/22 162 lb (73.5 kg)       Intake/Output Summary (Last 24 hours) at 8/10/2022 1410  Last data filed at 8/10/2022 1325  Gross per 24 hour   Intake 1343.24 ml   Output --   Net 1343.24 ml       General Appearance:  Alert, cooperative, no distress, appears stated age   Head:  Normocephalic, without obvious abnormality, atraumatic   Eyes:  PERRL, conjunctiva/corneas clear       Nose: Nares normal, no drainage or sinus tenderness   Throat: Lips, mucosa, and tongue normal   Neck: Supple, symmetrical, trachea midline, no adenopathy, thyroid: not enlarged, symmetric, no tenderness/mass/nodules, no carotid bruit or JVD       Lungs:   Clear to auscultation bilaterally, respirations unlabored   Chest Wall:  No tenderness or deformity   Heart:  Regular rhythm and normal rate; S1, S2 are normal; no murmur noted; no rub or gallop   Abdomen:   Soft, non-tender, bowel sounds active all four quadrants,  no masses, no organomegaly           Extremities: Extremities normal, atraumatic, no cyanosis or edema   Pulses: 2+ and symmetric   Skin: Skin color, texture, turgor normal, no rashes or lesions   Pysch: Normal mood and affect   Neurologic: Normal gross motor and sensory exam. Mild dementia. Labs  Recent Labs     08/09/22  1310   WBC 6.9   HGB 15.1   HCT 45.9   MCV 89.8        Recent Labs     08/09/22  1310 08/10/22  0708   CREATININE 1.6* 1.3   BUN 40* 30*    139   K 4.6 3.9    106   CO2 29 25     No results for input(s): INR, PROTIME in the last 72 hours. Recent Labs     08/09/22  1310 08/09/22  2029 08/10/22  0026   TROPONINI <0.012 <0.01 <0.01     Invalid input(s): PRO-BNP  No results for input(s): CHOL, LDL, HDL in the last 72 hours. Invalid input(s): TG      Imaging:  I have reviewed the below testing personally and my interpretation is below. EKG:  Atrial fibrillation with frequent ventricular-paced complexesRightward axisLow voltage QRSST & T wave abnormality, consider inferior ischemiaAbnormal ECGWhen compared with ECG of 21-APR-2022 12:17,Electronic ventricular pacemaker has replaced Atrial fibrillation    CXR:      Assessment:  82 y.o. patient with:    Principal Problem:    Syncope and collapse    Ischemic cardiomyopathy    Hypertension    Hyperlipidemia    S/P placement of cardiac pacemaker    Coronary artery disease involving native coronary artery of native heart without angina pectoris, 5/2020 abnormal stress test. [facility] mild LAD disease     Plan:  1.I had the opportunity to review the [place] clinical presentation and the available pertinent data. With the patient's clinical risk factors and symptoms, there is a high pretest likelihood for CAD. I have asked Los Jones to undergo cardiac angiography for further diagnostic testing. The patient is high risk due to CMP, syncope, and his risk factors. The procedure was explained in depth. All questions and alternate treatment strategies were discussed.  The patient agrees to proceed. They understand all the risks associated with the procedure, including myocardial infarction, stroke, death, vascular complications, and the possible need for emergent surgery. 2. Serial troponin levels will be ordered and reviewed  3. The patient has been given instructions on addressing diet, regular exercise, weight control, smoking abstention, medication compliance, and stress minimization. 4. The patient should be treated with these therapies unless contraindicated:   ~asa daily   ~beta-blockers   ~statin therapy   ~ACE/ARB on hold due to RF   ~repeat troponin until down trending   ~EKG as clinically needed  5. Keep NPO after midnight  6. Pre-cath orders will be placed. 7. HOLD NOAC   8. Discussed with patient's daughter and nursing. Medical Decision Making: The following items were considered in medical decision making:  Independent review of images  Review / order clinical lab tests  Review / order radiology tests  Decision to obtain old records  Review and summation of [facility] (a summary of my findings in these old records)          All questions and concerns were addressed to the patient/family. Alternatives to my treatment were discussed. The note was completed using EMR. Every effort was made to ensure accuracy; however, inadvertent computerized transcription errors may be present.     Faylene Riedel, MD, [address], [place], [place]  [phone] [facility] [place] office  [phone] [facility]  8/10/2022  2:10 PM

## 2022-08-10 NOTE — H&P
Hospital Medicine History & Physical      PCP: Fracisco Valladares MD    Date of Admission: 8/9/2022    Date of Service: Pt seen/examined on 8/9/22 and  Placed in Observation. Chief Complaint: Dizzy spells, falls      History Of Present Illness:    82 y.o. male who presented to [address] [place] [address] with syncope symptoms from [facility] ED  He has PMH of HTN, HLD, CAD, CKD, A. fib, s/p permanent pacemaker, followed by [address] [place] [address] cardiology, dementia presented to the ED at [facility] earlier today for presyncopal episode that happened earlier today when the patient was at home. It is noted that patient had a fall from a syncopal episode few days back and had a scalp laceration requiring staples. At that time he was seen at [facility] ED. He had another  episode earlier today. The family urged him to come to the hospital today. It is reported patient has had multiple episodes of falls over the past week. He reports oftentimes he tries to stand up and walk and becomes lightheaded and dizzy. Since his fall few days back patient has had 5 more falls including today. Patient denies losing any consciousness during these falls. Denies any chest pain, diaphoresis, nausea or vomiting preceding or after these episodes. Family was concerned about his safety due to these multiple falls at home so they brought him to the ED again today for further evaluation. Patient was worked up in [facility] ED and decision made to transfer to [address] [place] [address] for further work-up and management of his symptoms.     Past Medical History:          Diagnosis Date    Atrial fibrillation ([address]     Blind right eye     Chronic kidney disease     Dementia ([facility]     Hyperlipidemia     Hypertension     Pneumonia        Past Surgical History:          Procedure Laterality Date    APPENDECTOMY      COLONOSCOPY      EYE SURGERY      HERNIA REPAIR      JOINT REPLACEMENT  2013    left shoulder    PACEMAKER PLACEMENT  10/13/2017 Dr Xiomara Griffiths at [address] [place] [address]         Medications Prior to Admission:      Prior to Admission medications    Medication Sig Start Date End Date Taking? Authorizing Provider   omeprazole (PRILOSEC) 20 MG delayed release capsule TAKE 1 CAPSULE BY MOUTH 2 TIMES DAILY 8/8/22 9/7/22  Issa Dalal MD   apixaban (ELIQUIS) 5 MG TABS tablet TAKE 1/2 TABLET BY MOUTH 2 TIMES DAILY 8/8/22   Issa Dalal MD   metoprolol succinate (TOPROL XL) 25 MG extended release tablet TAKE 3 TABLETS BY MOUTH TWICE DAILY 7/25/22   GUS Magana CNP   QUEtiapine (SEROQUEL) 50 MG tablet Take 1 tablet by mouth nightly 7/23/22   Issa Dalal, MD   famotidine (PEPCID) 20 MG tablet Take 1 tablet by mouth at bedtime 7/8/22   Issa Dalal, MD   isosorbide mononitrate (IMDUR) 30 MG extended release tablet TAKE 1 TABLET BY MOUTH DAILY 6/1/22   Nico Mills MD   memantine [facility]) 5 MG tablet Take 1 tablet by mouth 2 times daily 5/20/22   Issa Dalal MD   donepezil (ARICEPT) 10 MG tablet Take 1 tablet by mouth daily 3/24/22   Historical Provider, MD   citalopram (CELEXA) 10 MG tablet TAKE 1 TABLET BY MOUTH DAILY 3/28/22   Issa Dalal MD   fluticasone [facility]) 50 MCG/ACT nasal spray 2 sprays by Nasal route daily 2/1/22   Issa Dalal MD   Ferrous Sulfate (IRON) 325 (65 Fe) MG TABS Take by mouth daily     Historical Provider, MD   atorvastatin (LIPITOR) 20 MG tablet TAKE 1 TABLET BY MOUTH EVERY DAY 11/15/21   Issa Dalal MD   tamsulosin (FLOMAX) 0.4 MG capsule Take 0.4 mg by mouth daily    Historical Provider, MD   finasteride (PROSCAR) 5 MG tablet Take 5 mg by mouth daily. 3/4/15   Historical Provider, MD   aspirin 81 MG tablet Take 81 mg by mouth daily. Historical Provider, MD       Allergies:  Sulfa antibiotics    Social History:      The patient currently lives at home    TOBACCO:   reports that he quit smoking about 47 years ago.  He has a 10.00 pack-year smoking history. He has never used smokeless tobacco.  ETOH:   reports no history of alcohol use. E-cigarette/Vaping       Questions Responses    E-cigarette/Vaping Use Never User    Start Date     Passive Exposure     Quit Date     Counseling Given     Comments               Family History:    Reviewed and negative in regards to presenting illness/complaint. Problem Relation Age of Onset    Heart Disease Mother     Other Mother     Heart Disease Father     Asthma Other        REVIEW OF SYSTEMS COMPLETED:   Pertinent positives as noted in the HPI. All other systems reviewed and negative. PHYSICAL EXAM PERFORMED:    BP (!) 149/100   Pulse 75   Temp 97.7 °F (36.5 °C)   Resp 18   SpO2 98%     General appearance:  No apparent distress, appears stated age and cooperative. HEENT:  Normal cephalic, atraumatic without obvious deformity. Pupils equal, round, and reactive to light. Extra ocular muscles intact. Conjunctivae/corneas clear. Neck: Supple, with full range of motion. No jugular venous distention. Trachea midline. Respiratory:  Normal respiratory effort. Clear to auscultation, bilaterally without Rales/Wheezes/Rhonchi. Cardiovascular:  Regular rate and rhythm with normal S1/S2 without murmurs, rubs or gallops. Abdomen: Soft, non-tender, non-distended with normal bowel sounds. Musculoskeletal:  No clubbing, cyanosis or edema bilaterally. Full range of motion without deformity. Skin: Skin color, texture, turgor normal.  No rashes or lesions. Neurologic:  Neurovascularly intact without any focal sensory/motor deficits.  Cranial nerves: II-XII intact, grossly non-focal.  Psychiatric:  Alert and oriented, thought content appropriate, normal insight  Capillary Refill: Brisk,3 seconds, normal  Peripheral Pulses: +2 palpable, equal bilaterally       Labs:     Recent Labs     08/09/22  1310   WBC 6.9   HGB 15.1   HCT 45.9        Recent Labs     08/09/22  1310      K 4.6    CO2 29   BUN 40*   CREATININE 1.6*   CALCIUM 8.9     Recent Labs     08/09/22  1310   AST 34   ALT 23   BILITOT 0.8   ALKPHOS 72     No results for input(s): INR in the last 72 hours. Recent Labs     08/09/22  1310   TROPONINI <0.012         EKG-ventricular paced rhythm    ASSESSMENT:PLAN:      Orthostatic dizziness  Seems like a chronic issue , reports this been going on since he got COVID earlier this year in January. Based on description of symptoms, it is clear he has orthostatic dizziness. Recent worsening in symptoms is likely due to dehydration/volume depletion, patient admits he does not drink as much water as he is supposed to. Also his labs revealed NATANAEL. Patient was hypotensive in [facility] with SBP in the 80s on arrival.  Cannot rule out cardiac arrhythmia.  -Rechecked orthostatic vital signs, again SBP dropped to more than 20 on standing  -Continue gentle IV fluids (got 1 L IVB in ED)  -Limited 2D echo to assess LVEF/LV function/structure  -Telemetry monitoring to rule out arrhythmias  -Will get pacemaker interrogated  -Consider discontinuing Flomax as it has a high rate of orthostatic hypotension  -Carotid Doppler reviewed from earlier this year in 4/22 showed only <50% stenosis bilaterally  -Consider fludrocortisone /midodrine/pressure stockings, abdominal bindersfor recurrent symptoms      NATANAEL (acute kidney injury) BUN/CR 40/1. 6  With prerenal azotemia. We will give 1 L normal saline [email or web address] cc/h  Recheck renal panel in a.m. Dementia with behavioral disturbance -continue Aricept and Namenda    Paroxysmal A. fib s/p PPM -continue beta-blocker, continue Eliquis    CAD -stable, no anginal symptoms at this time. S/p LHC in 2020 showed mild LAD disease . continue home medication regimen. Followed by Dr. Maude Jeffrey    HTN-currently controlled, resume home medication regimen    BPH-without LUTS, resume finasteride and tamsulosin    DVT Prophylaxis: On Eliquis  Diet: ADULT DIET;  Regular  Code Status: Full Code    PT/OT Eval Status: Ambulatory    Dispo -observation       Jessica Liang MD    Thank you Henry Soto MD for the opportunity to be involved in this patient's care. If you have any questions or concerns please feel free to contact me at [phone].
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
## 2022-08-10 NOTE — PROGRESS NOTES
4 Eyes Skin Assessment     The patient is being assess for   Admission    I agree that 2 RN's have performed a thorough Head to Toe Skin Assessment on the patient. ALL assessment sites listed below have been assessed. Areas assessed for pressure by both nurses:   [x]   Head, Face, and Ears   [x]   Shoulders, Back, and Chest, Abdomen  [x]   Arms, Elbows, and Hands   [x]   Coccyx, Sacrum, and Ischium  [x]   Legs, Feet, and Heels        Skin Assessed Under all Medical Devices by both nurses:  N/A               All Mepilex Borders were peeled back and area peeked at by both nurses:  No: N/A  Please list where Mepilex Borders are located:  N/A             **SHARE this note so that the co-signing nurse is able to place an eSignature**    Co-signer eSignature: Electronically signed by Seyd Alvarenga RN on 8/10/22 at 6:42 AM EDT    Does the Patient have Skin Breakdown related to pressure?   No     (Insert Photo hereN/A)         Dionisio Prevention initiated:  NA   Wound Care Orders initiated:  NA      WOC nurse consulted for Pressure Injury (Stage 3,4, Unstageable, DTI, NWPT, Complex wounds)and New or Established Ostomies:  NA      Primary Nurse eSignature: Electronically signed by Axel Telles RN on 8/10/22 at 6:34 AM EDT

## 2022-08-10 NOTE — CARE COORDINATION
CASE MANAGEMENT INITIAL ASSESSMENT      Reviewed chart and completed assessment with patient:bedside  Family present: none  Explained Case Management role/services. Primary contact information:Vale/wife    Health Care Decision Maker :   Primary Decision Maker: Reynold Baumgarten - Spouse - [phone]    Secondary Decision Maker: Debbie Lazcano Child - [phone]          Can this person be reached and be able to respond quickly, such as within a few minutes or hours? Yes    Admit date/status:8/9/22 inpt  Diagnosis:syncope and collapse   Is this a Readmission?:  No      Insurance:medicare A&B   Precert required for SNF: No       3 night stay required: No    Living arrangements, Adls, care needs, prior to admission:lives in a 1 story house with wife. 2 JOAQUIN. Durable Medical Equipment at home:  Walker_x_Cane__RTS__ BSC__Shower Chair__  02__ HHN__ CPAP__  BiPap__  Hospital Bed__ W/C___ Other_____    Services in the home and/or outpatient, prior to admission:none    Current PCP:Jose De Jesus Landrum MD                                Medications: Prescription coverage? Yes Will pt require financial assistance with medications No     Transportation needs: none/private vehicle         PT/OT recs:needed    Hospital Exemption Notification (HEN):needed for SNF    Barriers to discharge:none    Plan/comments:from home with wife. Per pt IPTA. CM following for dcp needs.  Libia Saunders, RN      ECOC on chart for MD signature

## 2022-08-10 NOTE — PROGRESS NOTES
Device programming evaluation for patient's [place] P[place] by company representative shows normal device function. EP physician will review. See interrogation under cardiology tab in the [address] [place] [address] [address] field for more details. Hx AF/SVT (oac, toprol xl). Please see the attached PDF from the MOD sent from the floor at [facility] last evening. The patient has a single chamber pacemaker from Dr. Teddy Hernandez. It sounded like he was admitted for syncopal episodes, for which we did not have any correlating recorded events. Please note his ventricular pacing percentage is currently at 55% and rising.  Let us know if you have any questions

## 2022-08-10 NOTE — PROGRESS NOTES
Occupational Therapy  Facility/Department: [facility]  Occupational Therapy Initial Assessment    Name: Darrion Pantoja  : 1935  MRN: [id number]  Date of Service: 8/10/2022    Discharge Recommendations:  IP Rehab          Patient Diagnosis(es): There were no encounter diagnoses. Past Medical History:  has a past medical history of Atrial fibrillation ([facility] [address] Blind right eye, Chronic kidney disease, Dementia ([facility] [address] Hyperlipidemia, Hypertension, and Pneumonia. Past Surgical History:  has a past surgical history that includes Tonsillectomy; Appendectomy; Colonoscopy; eye surgery; Upper gastrointestinal endoscopy; joint replacement (); hernia repair; and pacemaker placement (10/13/2017). Assessment   Performance deficits / Impairments: Decreased functional mobility ; Decreased safe awareness;Decreased balance;Decreased ADL status; Decreased endurance;Decreased cognition;Decreased strength;Decreased posture  Patient admitted to [facility] from home w/ dx of syncope and collapse. 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 Today patient very unsteady w/o use of RW for balance, multiple LOB. Pt did report multiple falls at home latest fall resulting in stitches to his head and R shoulder pain. ModA for LE aDLs seated today. After evaluation, pt found to be presenting with the above mentioned occupational performance deficits which are affecting participation in daily living skills. Pt would benefit from continued skilled occupational therapy to address ADLs, functional mobility, and safety while in acute care. Discharge rec to IP rehab setting as pt could tolerate the 3 hours of therapy per day by an multi-disciplinary approach as pt with decreased balance, ADLS and safety awareness.        Prognosis: Good  Decision Making: Medium Complexity  REQUIRES OT FOLLOW-UP: Yes  Activity Tolerance  Activity Tolerance: Patient Tolerated treatment well        Plan   Plan  Times per Week: 3-5x's a week while in acute care     Restrictions  Restrictions/Precautions  Restrictions/Precautions: Fall Risk, General Precautions  Position Activity Restriction  Other position/activity restrictions: Telemetry    Subjective   General  Chart Reviewed: Yes, Orders, Progress Notes, History and Physical  Patient assessed for rehabilitation services?: Yes  Family / Caregiver Present: No  Referring Practitioner: GUS Hunt CNP 8/10/22  Diagnosis: syncope and collapse  Subjective  Subjective: Pt pleasant and agreeable to OT evaluation, 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, has a glass R eye and falls \"a lot\"    Social/Functional History  Social/Functional History  Lives With: Spouse (wife)  Type of Home: House  Home Layout: One level  Home Access: Stairs to enter with rails  Entrance Stairs - Number of Steps: 2 JOAQUIN  Entrance Stairs - Rails: Both  Bathroom Shower/Tub: Walk-in shower  Bathroom Toilet: Standard  Home Equipment: Ky Trevino  Has the patient had two or more falls in the past year or any fall with injury in the past year?: Yes (15-20 falls in the past year (reports falls due to dizziness/lightheadedness), injured scalp recently and had 6 staples put on his head)  ADL Assistance: [address] [place] [address] Avenue: Independent (wife does majority of homemaking, pt does majority of yard work)  Ambulation Assistance: Independent (using 4WW \"when I want to\", occasionally uses cane)  Transfer Assistance: Independent  Active : Yes  Occupation: Retired  Type of Occupation: 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  Vitals: Supine: /76, HR 70 bpm, O2 sat 97% on room air.   Seated EOB: /74, HR 84 bpm.  Standing: /69, HR 69 bpm.  Post-amb: BP 96/69, HR 69 bpm.  Post-amb: /86, HR 68 bpm. Observation/Palpation  Posture: Fair  Safety Devices  Type of Devices: All fall risk precautions in place;Call light within reach; Chair alarm in place;Gait belt;Patient at risk for falls; Left in chair;Nurse notified    Bed Mobility Training  Bed Mobility Training: Yes  Interventions: Verbal cues; Visual cues  Rolling: Supervision  Supine to Sit: Supervision; Additional time  Scooting: Supervision; Additional time    Balance  Sitting: Intact  Standing: Impaired  Standing - Static: Fair;Occasional  Standing - Dynamic: Fair;Occasional (pt able to retrieve object from floor with CGA x 1 for safety without LOB, pt mildly unsteady during task)    Transfer Training  Transfer Training: Yes  Interventions: Verbal cues  Sit to Stand: Contact-guard assistance  Stand to Sit: Contact-guard assistance  Bed to Chair: Minimum assistance (without AD during first attempt, using RW on second attempt (balance improves slightly with use of RW))    Gait Training  Gait Training: Yes  Gait  Overall Level of Assistance: Minimum assistance;Contact-guard assistance (consistent Shawn x 1 needed when amb without device, CGA x 1 needed when using RW)  Interventions: Safety awareness training;Verbal cues; Tactile cues  Speed/Elodia: Fluctuations  Gait Abnormalities: multiple LOB with scissoring noted during functional mobility w/o device, gait improving with use of RW, however still unsteady. AROM: Generally decreased, functional (decreased R shoulder ROM d/t h/o RTS and currently in pain from falls)  Strength: Generally decreased, functional  Coordination: Generally decreased, functional  Tone: Normal  Sensation: Intact      ADLS:  ModA for LE dressing, and UE Dressing d/t R shoulder pain.     Activity Tolerance  Activity Tolerance: Patient tolerated evaluation without incident;Patient tolerated treatment well        Vision  Vision: Impaired  Vision Exceptions:  (wears glasses as needed, sees out of L eye fairly well, has R eye prosthesis (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))  Hearing  Hearing: Within functional limits    Cognition  Overall Cognitive Status: Exceptions  Arousal/Alertness: Appropriate responses to stimuli  Following Commands: Follows multistep commands with repitition; Follows one step commands with repetition  Memory: Decreased short term memory  Safety Judgement: Decreased awareness of need for assistance;Decreased awareness of need for safety  Problem Solving: Decreased awareness of errors  Insights: Decreased awareness of deficits  Initiation: Requires cues for some  Sequencing: Requires cues for some  Orientation  Overall Orientation Status: Within Functional Limits                  Education Given To: Patient  Education Provided: Role of Therapy;Plan of Care;ADL Adaptive Strategies;Transfer Training;Equipment;Precautions;IADL Safety; Fall Prevention Strategies  Education Method: Demonstration;Verbal  Barriers to Learning: Cognition;Vision  Education Outcome: Verbalized understanding;Demonstrated understanding;Continued education needed        AM-PAC Score        AM-[facility] Inpatient Daily Activity Raw Score: 17 (08/10/22 1655)  AM-PAC Inpatient ADL T-Scale Score : 37.26 (08/10/22 1655)  ADL Inpatient CMS 0-100% Score: 50.11 (08/10/22 1655)  ADL Inpatient CMS G-Code Modifier : CK (08/10/22 1655)       Goals  Short Term Goals  Time Frame for Short term goals: 1 wee 8/17  Short Term Goal 1: Pt will complete LE dressing with SBA  Short Term Goal 2: Pt will complete toilet transfers with SBA by 8/15  Short Term Goal 3: Pt will complete standing level ADLs with SBA for balance  Patient Goals   Patient goals : \"to go home\"       Therapy Time   Individual Concurrent Group Co-treatment   Time In 6785         Time Out 1605         Minutes 35         Timed Code Treatment Minutes: 25 Minutes       Edith Loza OTR/L  If pt is unable to be seen after this session, please let this note serve as discharge summary.   Please see case management note for discharge disposition. Thank you. Co-tx collaboration this date to safely meet goals and will have better occupational performance outcomes with in a co-treatment than 1:1 session.

## 2022-08-10 NOTE — PROGRESS NOTES
MD notified: Notifing per orders /108 HR 71 afib paced, pt asymptomatic. Cards on board angiogram planed for Am. RN will give meds and continue to monitor.

## 2022-08-10 NOTE — PROGRESS NOTES
Hospitalist Progress Note      PCP: Shavonne Arvizu MD    Date of Admission: 8/9/2022    Chief Complaint: Dizzy spells, falls    Hospital Course: 82 y.o. male who presented to [facility] with syncope symptoms from [facility] ED. He has PMH of HTN, HLD, CAD, CKD, A. fib, s/p permanent pacemaker, followed by [facility] cardiology, dementia presented to the ED at [facility] earlier today for presyncopal episode that happened earlier today when the patient was at home. It is noted that patient had a fall from a syncopal episode few days back and had a scalp laceration requiring staples. At that time he was seen at [facility] ED. He had another  episode earlier today. The family urged him to come to the hospital today. It is reported patient has had multiple episodes of falls over the past week. He reports oftentimes he tries to stand up and walk and becomes lightheaded and dizzy. Since his fall few days back patient has had 5 more falls including today. Patient denies losing any consciousness during these falls. Denies any chest pain, diaphoresis, nausea or vomiting preceding or after these episodes. Family was concerned about his safety due to these multiple falls at home so they brought him to the ED again today for further evaluation. Patient was worked up in [facility] ED and decision made to transfer to [facility] for further work-up and management of his symptoms.     Subjective: Resting in bed, reports       Medications:  Reviewed    Infusion Medications    sodium chloride       Scheduled Medications    magnesium sulfate  2,000 mg IntraVENous Once    apixaban  2.5 mg Oral BID    aspirin  81 mg Oral Daily    atorvastatin  20 mg Oral Daily    citalopram  10 mg Oral Daily    donepezil  10 mg Oral Daily    famotidine  20 mg Oral Nightly    ferrous sulfate  325 mg Oral Daily    finasteride  5 mg Oral Daily    isosorbide mononitrate  30 mg Oral Daily    memantine  5 mg Oral BID    metoprolol succinate  25 mg Oral Daily    QUEtiapine  50 mg Oral Nightly    tamsulosin  0.4 mg Oral Daily    pantoprazole  40 mg Oral QAM AC    fluticasone  2 spray Nasal Daily    sodium chloride flush  5-40 mL IntraVENous 2 times per day     PRN Meds: sodium chloride flush, sodium chloride, ondansetron **OR** ondansetron, polyethylene glycol, acetaminophen **OR** acetaminophen, perflutren lipid microspheres      Intake/Output Summary (Last 24 hours) at 8/10/2022 1241  Last data filed at 8/10/2022 0756  Gross per 24 hour   Intake 983.24 ml   Output --   Net 983.24 ml       Physical Exam Performed:    /87   Pulse 72   Temp 97.3 °F (36.3 °C) (Oral)   Resp 16   Ht 5' 7\" (1.702 m)   Wt 156 lb 1.6 oz (70.8 kg)   SpO2 96%   BMI 24.45 kg/m²     General appearance: No apparent distress, appears stated age and cooperative. HEENT: Pupils equal, round, and reactive to light. Conjunctivae/corneas clear. Neck: Supple, with full range of motion. No jugular venous distention. Trachea midline. Respiratory:  Normal respiratory effort. Clear to auscultation, bilaterally without Rales/Wheezes/Rhonchi. Cardiovascular: Irregular rate and rhythm with normal S1/S2 without murmurs, rubs or gallops. Abdomen: Soft, non-tender, non-distended with normal bowel sounds. Musculoskeletal: No clubbing, cyanosis or edema bilaterally. Full range of motion without deformity. Skin: Skin color, texture, turgor normal.  No rashes or lesions. Neurologic:  Neurovascularly intact without any focal sensory/motor deficits.  Cranial nerves: II-XII intact, grossly non-focal.  Psychiatric: Alert and oriented, thought content appropriate, normal insight  Capillary Refill: Brisk,3 seconds, normal   Peripheral Pulses: +2 palpable, equal bilaterally       Labs:   Recent Labs     08/09/22  1310   WBC 6.9   HGB 15.1   HCT 45.9        Recent Labs     08/09/22  1310 08/10/22  0708    139   K 4.6 3.9    106   CO2 29 25   BUN 40* 30*   CREATININE 1.6* 1.3   CALCIUM 8.9 8.4     Recent Labs     08/09/22  1310   AST 34   ALT 23   BILITOT 0.8   ALKPHOS 72     No results for input(s): INR in the last 72 hours. Recent Labs     08/09/22  1310 08/09/22  2029 08/10/22  0026   TROPONINI <0.012 <0.01 <0.01       Urinalysis:      Lab Results   Component Value Date/Time    NITRU Negative 04/21/2022 02:20 PM    [address] 04/21/2022 02:20 PM    BACTERIA 1+ 04/21/2022 02:20 PM    RBCUA 3-4 04/21/2022 02:20 PM    BLOODU LARGE 04/21/2022 02:20 PM    SPECGRAV >=1.030 04/21/2022 02:20 PM    GLUCOSEU Negative 04/21/2022 02:20 PM       Radiology:  No orders to display           Assessment/Plan:    Active Hospital Problems    Diagnosis     Dementia with behavioral disturbance ([facility] [F03.91]      Priority: Medium    Syncope and collapse [R55]     NATANAEL (acute kidney injury) ([facility] [N17.9]     Coronary artery disease involving native coronary artery of native heart without angina pectoris, 5/2020 abnormal stress test. [facility] mild LAD disease  [I25.10]     S/P placement of cardiac pacemaker [Z95.0]     A-fib ([facility] [I48.91]     Hyperlipidemia [E78.5]     Hypertension [I10]      Orthostatic dizziness  Seems like a chronic issue , reports this been going on since he got COVID earlier this year in January. Based on description of symptoms, it is clear he has orthostatic dizziness. Recent worsening in symptoms is likely due to dehydration/volume depletion, patient admits he does not drink as much water as he is supposed to. Also his labs revealed NATANAEL. Patient was hypotensive in [facility] with SBP in the 80s on arrival.   -Rechecked orthostatic vital signs, again SBP dropped to more than 20 on standing  -Continue gentle IV fluids (got 1 L IVB in ED)  -Limited 2D echo-EF 35-40%, prev 45-50% on 4/2022  -Telemetry monitoring to rule out arrhythmias  -Pacemaker interrogation,  Cannot rule out cardiac arrhythmia.  Cardiology consulted  -Consider discontinuing Flomax as it has a high rate of orthostatic hypotension  -Carotid Doppler reviewed from earlier this year in 4/22 showed only <50% stenosis bilaterally  -Consider fludrocortisone /midodrine/pressure stockings, abdominal binder for recurrent symptoms     NATANAEL (acute kidney injury) BUN/CR 40/1. 6  With prerenal azotemia. We will give 1 L normal saline Ceciliot@[email or web address] cc/h    Dementia with behavioral disturbance -continue Aricept and Namenda     Paroxysmal A. fib s/p PPM -continue beta-blocker, continue Eliquis     CAD -stable, no anginal symptoms at this time. S/p LHC in 2020 showed mild LAD disease . continue home medication regimen. Followed by Dr. Shaun Holliday     HTN-currently controlled, resume home medication regimen     BPH-without LUTS, resume finasteride and tamsulosin     DVT Prophylaxis: Eliquis  Diet: ADULT DIET;  Regular  Code Status: Full Code    PT/OT Eval Status: Consulted    Dispo - anticipate 8/10-8/11    GUS Rob - CNP

## 2022-08-11 ENCOUNTER — APPOINTMENT (OUTPATIENT)
Dept: CARDIAC CATH/INVASIVE PROCEDURES | Age: 87
DRG: 640 | End: 2022-08-11
Attending: INTERNAL MEDICINE
Payer: MEDICARE

## 2022-08-11 LAB
ANION GAP SERPL CALCULATED.3IONS-SCNC: 6 MMOL/L (ref 3–16)
BUN BLDV-MCNC: 22 MG/DL (ref 7–20)
CALCIUM SERPL-MCNC: 9 MG/DL (ref 8.3–10.6)
CHLORIDE BLD-SCNC: 108 MMOL/L (ref 99–110)
CO2: 28 MMOL/L (ref 21–32)
CREAT SERPL-MCNC: 1.2 MG/DL (ref 0.8–1.3)
EKG ATRIAL RATE: 357 BPM
EKG DIAGNOSIS: NORMAL
EKG Q-T INTERVAL: 412 MS
EKG QRS DURATION: 84 MS
EKG QTC CALCULATION (BAZETT): 447 MS
EKG R AXIS: 107 DEGREES
EKG T AXIS: 136 DEGREES
EKG VENTRICULAR RATE: 71 BPM
GFR AFRICAN AMERICAN: >60
GFR NON-AFRICAN AMERICAN: 58
GLUCOSE BLD-MCNC: 105 MG/DL (ref 70–99)
GLUCOSE BLD-MCNC: 125 MG/DL (ref 70–99)
GLUCOSE BLD-MCNC: 59 MG/DL (ref 70–99)
GLUCOSE BLD-MCNC: 62 MG/DL (ref 70–99)
GLUCOSE BLD-MCNC: 62 MG/DL (ref 70–99)
GLUCOSE BLD-MCNC: 64 MG/DL (ref 70–99)
GLUCOSE BLD-MCNC: 71 MG/DL (ref 70–99)
GLUCOSE BLD-MCNC: 75 MG/DL (ref 70–99)
GLUCOSE BLD-MCNC: 77 MG/DL (ref 70–99)
GLUCOSE BLD-MCNC: 78 MG/DL (ref 70–99)
GLUCOSE BLD-MCNC: 81 MG/DL (ref 70–99)
GLUCOSE BLD-MCNC: 88 MG/DL (ref 70–99)
GLUCOSE BLD-MCNC: 89 MG/DL (ref 70–99)
GLUCOSE BLD-MCNC: 93 MG/DL (ref 70–99)
GLUCOSE BLD-MCNC: 98 MG/DL (ref 70–99)
PERFORMED ON: ABNORMAL
PERFORMED ON: NORMAL
POTASSIUM REFLEX MAGNESIUM: 4.8 MMOL/L (ref 3.5–5.1)
PRO-BNP: 5026 PG/ML (ref 0–449)
SODIUM BLD-SCNC: 142 MMOL/L (ref 136–145)

## 2022-08-11 PROCEDURE — 6360000002 HC RX W HCPCS: Performed by: NURSE PRACTITIONER

## 2022-08-11 PROCEDURE — 93010 ELECTROCARDIOGRAM REPORT: CPT | Performed by: INTERNAL MEDICINE

## 2022-08-11 PROCEDURE — 6370000000 HC RX 637 (ALT 250 FOR IP): Performed by: NURSE PRACTITIONER

## 2022-08-11 PROCEDURE — 1200000000 HC SEMI PRIVATE

## 2022-08-11 PROCEDURE — 83880 ASSAY OF NATRIURETIC PEPTIDE: CPT

## 2022-08-11 PROCEDURE — 80048 BASIC METABOLIC PNL TOTAL CA: CPT

## 2022-08-11 PROCEDURE — 83036 HEMOGLOBIN GLYCOSYLATED A1C: CPT

## 2022-08-11 PROCEDURE — 2580000003 HC RX 258: Performed by: NURSE PRACTITIONER

## 2022-08-11 PROCEDURE — 36415 COLL VENOUS BLD VENIPUNCTURE: CPT

## 2022-08-11 PROCEDURE — 6370000000 HC RX 637 (ALT 250 FOR IP): Performed by: INTERNAL MEDICINE

## 2022-08-11 PROCEDURE — 99233 SBSQ HOSP IP/OBS HIGH 50: CPT | Performed by: INTERNAL MEDICINE

## 2022-08-11 RX ORDER — HALOPERIDOL 5 MG/ML
5 INJECTION INTRAMUSCULAR ONCE
Status: COMPLETED | OUTPATIENT
Start: 2022-08-11 | End: 2022-08-11

## 2022-08-11 RX ORDER — ZIPRASIDONE MESYLATE 20 MG/ML
20 INJECTION, POWDER, LYOPHILIZED, FOR SOLUTION INTRAMUSCULAR ONCE
Status: COMPLETED | OUTPATIENT
Start: 2022-08-11 | End: 2022-08-11

## 2022-08-11 RX ORDER — DEXTROSE AND SODIUM CHLORIDE 5; .45 G/100ML; G/100ML
INJECTION, SOLUTION INTRAVENOUS CONTINUOUS
Status: ACTIVE | OUTPATIENT
Start: 2022-08-11 | End: 2022-08-11

## 2022-08-11 RX ORDER — QUETIAPINE FUMARATE 25 MG/1
50 TABLET, FILM COATED ORAL ONCE
Status: COMPLETED | OUTPATIENT
Start: 2022-08-11 | End: 2022-08-11

## 2022-08-11 RX ADMIN — HALOPERIDOL LACTATE 5 MG: 5 INJECTION, SOLUTION INTRAMUSCULAR at 03:37

## 2022-08-11 RX ADMIN — MEMANTINE 5 MG: 5 TABLET ORAL at 22:23

## 2022-08-11 RX ADMIN — DEXTROSE MONOHYDRATE 125 ML: 100 INJECTION, SOLUTION INTRAVENOUS at 12:20

## 2022-08-11 RX ADMIN — FAMOTIDINE 20 MG: 20 TABLET ORAL at 22:23

## 2022-08-11 RX ADMIN — DEXTROSE MONOHYDRATE 125 ML: 100 INJECTION, SOLUTION INTRAVENOUS at 16:41

## 2022-08-11 RX ADMIN — APIXABAN 5 MG: 5 TABLET, FILM COATED ORAL at 22:23

## 2022-08-11 RX ADMIN — ZIPRASIDONE MESYLATE 20 MG: 20 INJECTION, POWDER, LYOPHILIZED, FOR SOLUTION INTRAMUSCULAR at 04:42

## 2022-08-11 RX ADMIN — DEXTROSE AND SODIUM CHLORIDE: 5; 450 INJECTION, SOLUTION INTRAVENOUS at 16:56

## 2022-08-11 RX ADMIN — QUETIAPINE FUMARATE 50 MG: 25 TABLET ORAL at 22:23

## 2022-08-11 RX ADMIN — DEXTROSE MONOHYDRATE 125 ML: 100 INJECTION, SOLUTION INTRAVENOUS at 14:36

## 2022-08-11 RX ADMIN — QUETIAPINE FUMARATE 50 MG: 25 TABLET ORAL at 02:30

## 2022-08-11 NOTE — CARE COORDINATION
[facility] regarding therapy recs for [facility]. Order for PMR placed.

## 2022-08-11 NOTE — PROGRESS NOTES
pacemaker interrogation today. It showed normal device function. Patient Active Problem List   Diagnosis    Pneumonia    Abnormal chest CT    Cough syncope    Tracheobronchomalacia    Hyperlipidemia    Hypertension    Chronic kidney disease, stage III (moderate) ([facility])    A-fib (HCC)    S/P placement of cardiac pacemaker    Abnormal echocardiogram    Dizziness    SOB (shortness of breath)    Abnormal cardiovascular stress test    Coronary artery disease involving native coronary artery of native heart without angina pectoris, 5/2020 abnormal stress test. [facility] mild LAD disease     Other chest pain    Atrial fibrillation with rapid ventricular response ([facility])    Syncope and collapse    COVID    NATANAEL (acute kidney injury) ([address]    Atrial fibrillation with RVR ([address]    Dementia with behavioral disturbance (HCC)    Hallucinations    Syncope, unspecified syncope type    Ischemic cardiomyopathy         Cardiac Testing: I have reviewed the findings below. EKG:  ECHO:   STRESS TEST:  CATH:  BYPASS:  VASCULAR:    Past Medical History:   has a past medical history of Atrial fibrillation ([address] Blind right eye, Chronic kidney disease, Dementia ([address] Hyperlipidemia, Hypertension, and Pneumonia. Surgical History:   has a past surgical history that includes Tonsillectomy; Appendectomy; Colonoscopy; eye surgery; Upper gastrointestinal endoscopy; joint replacement (2013); hernia repair; and pacemaker placement (10/13/2017). Social History:   reports that he quit smoking about 47 years ago. He has a 10.00 pack-year smoking history. He has never used smokeless tobacco. He reports that he does not drink alcohol and does not use drugs. Family History:  No evidence for sudden cardiac death or premature CAD    Medications:  Reviewed and are listed in nursing record. and/or listed below  Outpatient Medications:  Prior to Admission medications    Medication Sig Start Date End Date Taking?  Authorizing Provider   trospium (SANCTURA) 20 MG tablet Take 20 mg by mouth in the morning and 20 mg before bedtime. Yes Historical Provider, MD   minocycline (MINOCIN;DYNACIN) 100 MG capsule Take 10 mg by mouth in the morning. 8/6/22   Historical Provider, MD   omeprazole (PRILOSEC) 20 MG delayed release capsule TAKE 1 CAPSULE BY MOUTH 2 TIMES DAILY 8/8/22 9/7/22  Qiana Smith MD   apixaban (ELIQUIS) 5 MG TABS tablet TAKE 1/2 TABLET BY MOUTH 2 TIMES DAILY 8/8/22   Qiana Smith MD   metoprolol succinate (TOPROL XL) 25 MG extended release tablet TAKE 3 TABLETS BY MOUTH TWICE DAILY 7/25/22   GUS Samuels - CNP   QUEtiapine (SEROQUEL) 50 MG tablet Take 1 tablet by mouth nightly 7/23/22   Qiana Smith MD   famotidine (PEPCID) 20 MG tablet Take 1 tablet by mouth at bedtime 7/8/22   Qiana Smith MD   isosorbide mononitrate (IMDUR) 30 MG extended release tablet TAKE 1 TABLET BY MOUTH DAILY 6/1/22   Ema Hammonds MD   memantine [facility]) 5 MG tablet Take 1 tablet by mouth 2 times daily 5/20/22   Qiana Smith MD   donepezil (ARICEPT) 10 MG tablet Take 1 tablet by mouth daily 3/24/22   Historical Provider, MD   citalopram (CELEXA) 10 MG tablet TAKE 1 TABLET BY MOUTH DAILY 3/28/22   Qiana Smith MD   fluticasone [facility]) 50 MCG/ACT nasal spray 2 sprays by Nasal route daily 2/1/22   Qiana Smith MD   Ferrous Sulfate (IRON) 325 (65 Fe) MG TABS Take by mouth daily     Historical Provider, MD   atorvastatin (LIPITOR) 20 MG tablet TAKE 1 TABLET BY MOUTH EVERY DAY 11/15/21   Qiana Smith MD   tamsulosin (FLOMAX) 0.4 MG capsule Take 0.4 mg by mouth daily    Historical Provider, MD   finasteride (PROSCAR) 5 MG tablet Take 5 mg by mouth daily. 3/4/15   Historical Provider, MD   aspirin 81 MG tablet Take 81 mg by mouth daily.       Historical Provider, MD       In-patient schedule medications:   [Held by provider] apixaban  2.5 mg Oral BID    aspirin  81 mg Oral Daily    atorvastatin  20 mg Oral Daily    citalopram  10 mg Oral Daily    donepezil  10 mg Oral Daily    famotidine  20 mg Oral Nightly    ferrous sulfate  325 mg Oral Daily    finasteride  5 mg Oral Daily    isosorbide mononitrate  30 mg Oral Daily    memantine  5 mg Oral BID    metoprolol succinate  25 mg Oral Daily    QUEtiapine  50 mg Oral Nightly    tamsulosin  0.4 mg Oral Daily    pantoprazole  40 mg Oral QAM AC    fluticasone  2 spray Nasal Daily    sodium chloride flush  5-40 mL IntraVENous 2 times per day         Infusion Medications:   dextrose      sodium chloride           Allergies:  Sulfa antibiotics     Review of Systems:   14 point review of systems unable to be completed due to patient condition/cooperation. All available positives mentioned in history of present illness. Physical Examination:    [unfilled]  BP (!) 131/100   Pulse 67   Temp 97 °F (36.1 °C) (Oral)   Resp 16   Ht 5' 7\" (1.702 m)   Wt 156 lb 1.6 oz (70.8 kg)   SpO2 100%   BMI 24.45 kg/m²    Weight: 156 lb 1.6 oz (70.8 kg)     Wt Readings from Last 3 Encounters:   08/10/22 156 lb 1.6 oz (70.8 kg)   07/08/22 160 lb (72.6 kg)   04/27/22 162 lb (73.5 kg)       Intake/Output Summary (Last 24 hours) at 8/11/2022 1105  Last data filed at 8/10/2022 1325  Gross per 24 hour   Intake 360 ml   Output --   Net 360 ml         General Appearance:  Very somnolent and minimally responsive.   No distress, appears stated age   Head:  Normocephalic, without obvious abnormality, atraumatic   Eyes:  PERRL, conjunctiva/corneas clear       Nose: Nares normal, no drainage or sinus tenderness   Throat: Lips, mucosa, and tongue normal   Neck: Supple, symmetrical, trachea midline, no adenopathy, thyroid: not enlarged, symmetric, no tenderness/mass/nodules, no carotid bruit or JVD       Lungs:   Clear to auscultation bilaterally, respirations unlabored   Chest Wall:  No tenderness or deformity   Heart:  Regular rhythm and normal rate; S1, S2 are normal; no murmur noted; no rub or gallop   Abdomen:   Soft, non-tender, bowel sounds active all four quadrants,  no masses, no organomegaly           Extremities: Extremities normal, atraumatic, no cyanosis or edema   Pulses: 2+ and symmetric   Skin: Skin color, texture, turgor normal, no rashes or lesions   Pysch: On assessable. Neurologic: Patient is minimally responsive this morning. Only responds to deep sternal rub. Labs  Recent Labs     08/09/22  1310   WBC 6.9   HGB 15.1   HCT 45.9   MCV 89.8          Recent Labs     08/10/22  0708 08/11/22  0749   CREATININE 1.3 1.2   BUN 30* 22*    142   K 3.9 4.8    108   CO2 25 28       No results for input(s): INR, PROTIME in the last 72 hours. Recent Labs     08/09/22  1310 08/09/22  2029 08/10/22  0026   TROPONINI <0.012 <0.01 <0.01       Invalid input(s): PRO-BNP  No results for input(s): CHOL, LDL, HDL in the last 72 hours. Invalid input(s): TG      Imaging:  I have reviewed the below testing personally and my interpretation is below. EKG:  Atrial fibrillation with frequent ventricular-paced complexesRightward axisLow voltage QRSST & T wave abnormality, consider inferior ischemiaAbnormal ECGWhen compared with ECG of 21-APR-2022 12:17,Electronic ventricular pacemaker has replaced Atrial fibrillation    CXR:      Assessment:  82 y.o. patient with:    Principal Problem:    Syncope and collapse    Ischemic cardiomyopathy    Hypertension    Hyperlipidemia    S/P placement of cardiac pacemaker    Coronary artery disease involving native coronary artery of native heart without angina pectoris, 5/2020 abnormal stress test. [facility] mild LAD disease     Plan:  Due to the patient's deterioration in his mental status, we will cancel his tentative cardiac catheterization today. Wife was at bedside and communicated to her these concerns. Will defer to the medical team to coordinate stabilization of the patient's mental status and medical disease.   Cardiac catheterization is not urgent and can be completed electively in the outpatient setting if necessary. Would go ahead and resume his anticoagulation. Medical Decision Making: The following items were considered in medical decision making:  Independent review of images  Review / order clinical lab tests  Review / order radiology tests  Decision to obtain old records  Review and summation of old records as accessed through [facility] (a summary of my findings in these old records)          All questions and concerns were addressed to the patient/family. Alternatives to my treatment were discussed. The note was completed using EMR. Every effort was made to ensure accuracy; however, inadvertent computerized transcription errors may be present.     Ezio aPcker MD, [address], [facility]  [phone] [facility] office  [phone] [facility]  8/11/2022  11:05 AM

## 2022-08-11 NOTE — CONSULTS
Patient: Kentrell Boland  [id number]  Date: 8/11/2022      Chief Complaint: Orthostatic dizziness    History of Present Illness/Hospital Course:  Cornel Hairston is an 82year old male with a past medical history significant for atrial fibrillation, s/p pacer, dementia, CKD, HTN, and HLD who presented as a transfer from [facility] on 8/9/22 after a presyncopal episode. He had a similar episode several days prior that resulted in a fall and scalp laceration that required staples. He is being treated for orthostatic hypotension. Cardiology is consulted. Echo revealed progressive left ventricular function. Patient was set for further workup with cardiac catheterization today, however he had worsening mental status. Today he is seen in his room with nursing present. He is lethargic and requiring sternal rubs to wake up. Unable to obtain HPI from patient. No family present. has a past medical history of Atrial fibrillation ([address] Blind right eye, Chronic kidney disease, Dementia ([address] Hyperlipidemia, Hypertension, and Pneumonia. has a past surgical history that includes Tonsillectomy; Appendectomy; Colonoscopy; eye surgery; Upper gastrointestinal endoscopy; joint replacement (2013); hernia repair; and pacemaker placement (10/13/2017). reports that he quit smoking about 47 years ago. He has a 10.00 pack-year smoking history. He has never used smokeless tobacco. He reports that he does not drink alcohol and does not use drugs. family history includes Asthma in an other family member; Heart Disease in his father and mother; Other in his mother. REVIEW OF SYSTEMS:   Unable to reliably obtain due to mental status.      Physical Examination:  Vitals: Patient Vitals for the past 24 hrs:   BP Temp Temp src Pulse Resp SpO2   08/11/22 1200 (!) 154/104 (!) 96.7 °F (35.9 °C) Axillary 70 16 --   08/11/22 0900 (!) 131/100 -- -- 67 -- --   08/11/22 0815 (!) 147/108 97 °F (36.1 °C) Oral 68 16 -- 08/11/22 0330 (!) 147/116 97.6 °F (36.4 °C) Oral 86 16 100 %   08/10/22 2330 (!) 143/98 97.5 °F (36.4 °C) Oral 73 16 97 %   08/10/22 1945 (!) 147/108 97.6 °F (36.4 °C) Oral 71 16 97 %   08/10/22 1700 (!) 130/95 97.2 °F (36.2 °C) Oral 68 16 97 %     Mood: unable to assess  Const: NAD, somnolent in bed  ENT: Oral mucosa moist  Eyes: No discharge or injection  CV: extremities well perfused  Resp: No respiratory distress, on room air  GI: Soft, nondistended  Neuro: Somnolent, does not answer questions  Skin: No lesions or rashes noted. MSK: No joint abnormalities noted. Lab Results   Component Value Date    WBC 6.9 08/09/2022    HGB 15.1 08/09/2022    HCT 45.9 08/09/2022    MCV 89.8 08/09/2022     08/09/2022     Lab Results   Component Value Date    INR 1.26 (H) 01/18/2022    INR 1.18 (H) 05/22/2020    INR 1.02 01/24/2016    PROTIME 14.4 (H) 01/18/2022    PROTIME 13.7 (H) 05/22/2020    PROTIME 11.6 01/24/2016     Lab Results   Component Value Date    CREATININE 1.2 08/11/2022    BUN 22 (H) 08/11/2022     08/11/2022    K 4.8 08/11/2022     08/11/2022    CO2 28 08/11/2022     Lab Results   Component Value Date    ALT 23 08/09/2022    AST 34 08/09/2022    ALKPHOS 72 08/09/2022    BILITOT 0.8 08/09/2022       Most recent echocardiogram 8/10/22   Limited only f/u for LVEF. The left ventricular systolic function is moderately reduced with an   ejection fraction of 35 - 40 %. Left ventricular cavity size is normal.   Hypokinesis of the anterior, septal, and apical walls. Most recent EKG 8/10/22  Atrial fibrillation with frequent ventricular-paced complexes  Rightward axisLow voltage QRSST & T wave abnormality, consider inferior ischemia  Abnormal ECG  When compared with ECG of 21-APR-2022 12:17,  Electronic ventricular pacemaker has replaced Atrial fibrillation     IMAGING    XR Chest 8/9/22  CARDIOVASCULAR: The cardiomediastinal silhouette is not enlarged.        LUNGS: No focal airspace disease identified. No significant pleural effusion   or pneumothorax identified. OSSEOUS STRUCTURES: Bony structures are intact. TUBES/LINES/IMPLANTABLE DEVICES: Cardiac assist device noted. Assessment:  1. AMS   2. HFrEF   3. Orthostatic hypotension   4. Afib on AC  5. Dementia  6. CKD     Recommendations:  Patient with mental status change today. - Will follow along for progress with therapy. Thank you for this consult. Please contact me with any questions or concerns. Candelario Hammans Genevia Bamberg, MD 8/11/2022, 3:04 PM

## 2022-08-11 NOTE — CARE COORDINATION
Per discussion with nursing at 1100 huddle, pt LHC unable to be done today, d/t patient being \"uncooperative\". ARU team still following, but will need cardiology sign off and/or testing completed prior to being able to make determination if able to accept. CM will continue to follow.      Gio Josue RN

## 2022-08-11 NOTE — PROGRESS NOTES
Hospitalist Progress Note      PCP: Consuelo Garcia MD    Date of Admission: 8/9/2022    Chief Complaint: Dizzy spells, falls    Hospital Course: 82 y.o. male who presented to [facility] with syncope symptoms from [facility] ED. He has PMH of HTN, HLD, CAD, CKD, A. fib, s/p permanent pacemaker, followed by [address] [place] [address] cardiology, dementia presented to the ED at [facility] earlier today for presyncopal episode that happened earlier today when the patient was at home. It is noted that patient had a fall from a syncopal episode few days back and had a scalp laceration requiring staples. At that time he was seen at [facility] ED. He had another  episode earlier today. The family urged him to come to the hospital today. It is reported patient has had multiple episodes of falls over the past week. He reports oftentimes he tries to stand up and walk and becomes lightheaded and dizzy. Since his fall few days back patient has had 5 more falls including today. Patient denies losing any consciousness during these falls. Denies any chest pain, diaphoresis, nausea or vomiting preceding or after these episodes. Family was concerned about his safety due to these multiple falls at home so they brought him to the ED again today for further evaluation. Patient was worked up in [facility] ED and decision made to transfer to [facility] for further work-up and management of his symptoms. Subjective: Patient reportedly agitated overnight and received Haldol, Geodon and Seroquel. Cardiac cath cancelled due to mental status change.        Medications:  Reviewed    Infusion Medications    dextrose      sodium chloride       Scheduled Medications    apixaban  2.5 mg Oral BID    aspirin  81 mg Oral Daily    atorvastatin  20 mg Oral Daily    citalopram  10 mg Oral Daily    donepezil  10 mg Oral Daily    famotidine  20 mg Oral Nightly    ferrous sulfate  325 mg Oral Daily    finasteride  5 mg Oral Daily    isosorbide mononitrate  30 mg Oral Daily    memantine  5 mg Oral BID    metoprolol succinate  25 mg Oral Daily    QUEtiapine  50 mg Oral Nightly    tamsulosin  0.4 mg Oral Daily    pantoprazole  40 mg Oral QAM AC    fluticasone  2 spray Nasal Daily    sodium chloride flush  5-40 mL IntraVENous 2 times per day     PRN Meds: glucose, dextrose bolus **OR** dextrose bolus, glucagon (rDNA), dextrose, sodium chloride flush, sodium chloride, ondansetron **OR** ondansetron, polyethylene glycol, acetaminophen **OR** acetaminophen, perflutren lipid microspheres      Intake/Output Summary (Last 24 hours) at 8/11/2022 1217  Last data filed at 8/10/2022 1325  Gross per 24 hour   Intake 360 ml   Output --   Net 360 ml         Physical Exam Performed:    BP (!) 154/104   Pulse 70   Temp (!) 96.7 °F (35.9 °C) (Axillary)   Resp 16   Ht 5' 7\" (1.702 m)   Wt 156 lb 1.6 oz (70.8 kg)   SpO2 100%   BMI 24.45 kg/m²     General appearance: Somnolent, appears stated age and cooperative. HEENT: Pupils equal, round, and reactive to light. Conjunctivae/corneas clear. Neck: Supple, with full range of motion. No jugular venous distention. Trachea midline. Respiratory:  Normal respiratory effort. Clear to auscultation, bilaterally without Rales/Wheezes/Rhonchi. Cardiovascular: Irregular rate and rhythm with normal S1/S2 without murmurs, rubs or gallops. Abdomen: Soft, non-tender, non-distended with normal bowel sounds. Musculoskeletal: No clubbing, cyanosis or edema bilaterally. Full range of motion without deformity. Skin: Skin color, texture, turgor normal.  No rashes or lesions. Neurologic:  Neurovascularly intact without any focal sensory/motor deficits.  Cranial nerves: II-XII intact, grossly non-focal.  Psychiatric: Somnolent  Capillary Refill: Brisk,3 seconds, normal   Peripheral Pulses: +2 palpable, equal bilaterally       Labs:   Recent Labs     08/09/22  1310   WBC 6.9   HGB 15.1   HCT 45.9          Recent Labs     08/09/22  1310 08/10/22  0708 08/11/22  0749    139 142   K 4.6 3.9 4.8    106 108   CO2 29 25 28   BUN 40* 30* 22*   CREATININE 1.6* 1.3 1.2   CALCIUM 8.9 8.4 9.0       Recent Labs     08/09/22  1310   AST 34   ALT 23   BILITOT 0.8   ALKPHOS 72       No results for input(s): INR in the last 72 hours. Recent Labs     08/09/22  1310 08/09/22  2029 08/10/22  0026   TROPONINI <0.012 <0.01 <0.01         Urinalysis:      Lab Results   Component Value Date/Time    NITRU Negative 04/21/2022 02:20 PM    [address] 04/21/2022 02:20 PM    BACTERIA 1+ 04/21/2022 02:20 PM    RBCUA 3-4 04/21/2022 02:20 PM    BLOODU LARGE 04/21/2022 02:20 PM    SPECGRAV >=1.030 04/21/2022 02:20 PM    GLUCOSEU Negative 04/21/2022 02:20 PM       Radiology:  No orders to display           Assessment/Plan:    Active Hospital Problems    Diagnosis     Syncope, unspecified syncope type [R55]      Priority: Medium    Ischemic cardiomyopathy [I25.5]      Priority: Medium    Dementia with behavioral disturbance ([facility] [F03.91]      Priority: Medium    Syncope and collapse [R55]     NATANAEL (acute kidney injury) ([facility] [N17.9]     Coronary artery disease involving native coronary artery of native heart without angina pectoris, 5/2020 abnormal stress test. [facility] mild LAD disease  [I25.10]     S/P placement of cardiac pacemaker [Z95.0]     A-fib ([facility] [I48.91]     Hyperlipidemia [E78.5]     Hypertension [I10]      Orthostatic dizziness  Seems like a chronic issue , reports this been going on since he got COVID earlier this year in January. Based on description of symptoms, it is clear he has orthostatic dizziness. Recent worsening in symptoms is likely due to dehydration/volume depletion, patient admits he does not drink as much water as he is supposed to. Also his labs revealed NATANAEL.   Patient was hypotensive in [facility] with SBP in the 80s on arrival.   -Rechecked orthostatic vital signs, again SBP dropped to more than 20 on standing  -Continue gentle IV fluids (got 1 L IVB in ED)  -Limited 2D echo-EF 35-40%, prev 45-50% on 4/2022  -Telemetry monitoring to rule out arrhythmias  -Pacemaker interrogation-showed normal device function per cardiology,  Cannot rule out cardiac arrhythmia. Cardiology consulted  -Consider discontinuing Flomax as it has a high rate of orthostatic hypotension  -Carotid Doppler reviewed from earlier this year in 4/22 showed only <50% stenosis bilaterally  -Consider fludrocortisone /midodrine/pressure stockings, abdominal binder for recurrent symptoms     NATANAEL (acute kidney injury) BUN/CR 40/1.6, scr 1.2  With prerenal azotemia. We will give 1 L normal saline [employer] cc/h, now hep locked    Dementia with behavioral disturbance -continue Aricept and Namenda     Paroxysmal A. fib s/p PPM -continue beta-blocker, continue Eliquis     CAD -stable, no anginal symptoms at this time. S/p [facility] in 2020 showed mild LAD disease . continue home medication regimen. Followed by Dr. Ravi Kraft     HTN-currently controlled, resume home medication regimen     BPH-without LUTS, resume finasteride and tamsulosin     DVT Prophylaxis: Eliquis  Diet: Diet NPO  Code Status: Full Code    PT/OT Eval Status: Consulted    Dispo -Patient reportedly agitated overnight and received Haldol, Geodon and Seroquel. Cardiac cath cancelled due to mental status change.  DC pending improvement in mentation, 1-2 days    GUS Chino - CNP

## 2022-08-11 NOTE — PROGRESS NOTES
Occupational Therapy    Pt attempted to be seen this day however per RN pt back from cath lab and unresponsive/ lethargic. Pt not appropriate for therapy this day and will be attempted at a later date as medically appropriate and agreeable.     Kasey Burgess, OTR/L

## 2022-08-11 NOTE — PROGRESS NOTES
MD notified: Can we do an A1C pt has been hypoglycemic and does not have history of diabetes?     Order placed by MD

## 2022-08-11 NOTE — CARE COORDINATION
[facility] - Acute Rehab Unit   After review, this patient is felt to be:       []  Appropriate for Acute Inpatient Rehab    []  Appropriate for Acute Inpatient Rehab Pending Insurance Authorization    []  Not appropriate for Acute Inpatient Rehab    [x]  Referral received and ARU reviewing patient; Evaluation ongoing. Await cardiac workup. Will notify DCP with further updates.  Thank you for the referral. Janis Mcfarland RN

## 2022-08-12 LAB
ANION GAP SERPL CALCULATED.3IONS-SCNC: 9 MMOL/L (ref 3–16)
BUN BLDV-MCNC: 17 MG/DL (ref 7–20)
CALCIUM SERPL-MCNC: 10 MG/DL (ref 8.3–10.6)
CHLORIDE BLD-SCNC: 105 MMOL/L (ref 99–110)
CO2: 27 MMOL/L (ref 21–32)
CREAT SERPL-MCNC: 1.3 MG/DL (ref 0.8–1.3)
ESTIMATED AVERAGE GLUCOSE: 122.6 MG/DL
GFR AFRICAN AMERICAN: >60
GFR NON-AFRICAN AMERICAN: 52
GLUCOSE BLD-MCNC: 105 MG/DL (ref 70–99)
GLUCOSE BLD-MCNC: 109 MG/DL (ref 70–99)
GLUCOSE BLD-MCNC: 112 MG/DL (ref 70–99)
GLUCOSE BLD-MCNC: 120 MG/DL (ref 70–99)
GLUCOSE BLD-MCNC: 54 MG/DL (ref 70–99)
GLUCOSE BLD-MCNC: 55 MG/DL (ref 70–99)
GLUCOSE BLD-MCNC: 62 MG/DL (ref 70–99)
GLUCOSE BLD-MCNC: 63 MG/DL (ref 70–99)
GLUCOSE BLD-MCNC: 65 MG/DL (ref 70–99)
GLUCOSE BLD-MCNC: 86 MG/DL (ref 70–99)
GLUCOSE BLD-MCNC: 94 MG/DL (ref 70–99)
HBA1C MFR BLD: 5.9 %
HCT VFR BLD CALC: 51 % (ref 40.5–52.5)
HEMOGLOBIN: 16.7 G/DL (ref 13.5–17.5)
MCH RBC QN AUTO: 29.2 PG (ref 26–34)
MCHC RBC AUTO-ENTMCNC: 32.8 G/DL (ref 31–36)
MCV RBC AUTO: 88.9 FL (ref 80–100)
PDW BLD-RTO: 16.7 % (ref 12.4–15.4)
PERFORMED ON: ABNORMAL
PERFORMED ON: NORMAL
PERFORMED ON: NORMAL
PLATELET # BLD: 128 K/UL (ref 135–450)
PMV BLD AUTO: 8.5 FL (ref 5–10.5)
POTASSIUM REFLEX MAGNESIUM: 4.5 MMOL/L (ref 3.5–5.1)
RBC # BLD: 5.73 M/UL (ref 4.2–5.9)
SODIUM BLD-SCNC: 141 MMOL/L (ref 136–145)
WBC # BLD: 6.1 K/UL (ref 4–11)

## 2022-08-12 PROCEDURE — 99233 SBSQ HOSP IP/OBS HIGH 50: CPT | Performed by: NURSE PRACTITIONER

## 2022-08-12 PROCEDURE — 97530 THERAPEUTIC ACTIVITIES: CPT

## 2022-08-12 PROCEDURE — 2580000003 HC RX 258: Performed by: INTERNAL MEDICINE

## 2022-08-12 PROCEDURE — 6370000000 HC RX 637 (ALT 250 FOR IP): Performed by: NURSE PRACTITIONER

## 2022-08-12 PROCEDURE — 36415 COLL VENOUS BLD VENIPUNCTURE: CPT

## 2022-08-12 PROCEDURE — 85027 COMPLETE CBC AUTOMATED: CPT

## 2022-08-12 PROCEDURE — 97116 GAIT TRAINING THERAPY: CPT

## 2022-08-12 PROCEDURE — 6370000000 HC RX 637 (ALT 250 FOR IP): Performed by: INTERNAL MEDICINE

## 2022-08-12 PROCEDURE — 80048 BASIC METABOLIC PNL TOTAL CA: CPT

## 2022-08-12 PROCEDURE — 6370000000 HC RX 637 (ALT 250 FOR IP): Performed by: HOSPITALIST

## 2022-08-12 PROCEDURE — 1200000000 HC SEMI PRIVATE

## 2022-08-12 RX ORDER — MIDODRINE HYDROCHLORIDE 5 MG/1
2.5 TABLET ORAL
Status: DISCONTINUED | OUTPATIENT
Start: 2022-08-12 | End: 2022-08-14 | Stop reason: HOSPADM

## 2022-08-12 RX ORDER — DEXTROSE AND SODIUM CHLORIDE 5; .45 G/100ML; G/100ML
INJECTION, SOLUTION INTRAVENOUS CONTINUOUS
Status: ACTIVE | OUTPATIENT
Start: 2022-08-12 | End: 2022-08-12

## 2022-08-12 RX ORDER — MIDODRINE HYDROCHLORIDE 2.5 MG/1
2.5 TABLET ORAL
Qty: 90 TABLET | Refills: 3 | Status: ON HOLD
Start: 2022-08-12 | End: 2022-08-26 | Stop reason: SDUPTHER

## 2022-08-12 RX ADMIN — MIDODRINE HYDROCHLORIDE 2.5 MG: 5 TABLET ORAL at 14:38

## 2022-08-12 RX ADMIN — SODIUM CHLORIDE, PRESERVATIVE FREE 10 ML: 5 INJECTION INTRAVENOUS at 08:40

## 2022-08-12 RX ADMIN — ISOSORBIDE MONONITRATE 30 MG: 30 TABLET, EXTENDED RELEASE ORAL at 10:59

## 2022-08-12 RX ADMIN — QUETIAPINE FUMARATE 50 MG: 25 TABLET ORAL at 20:46

## 2022-08-12 RX ADMIN — FAMOTIDINE 20 MG: 20 TABLET ORAL at 20:46

## 2022-08-12 RX ADMIN — APIXABAN 5 MG: 5 TABLET, FILM COATED ORAL at 20:46

## 2022-08-12 RX ADMIN — PANTOPRAZOLE SODIUM 40 MG: 40 TABLET, DELAYED RELEASE ORAL at 05:59

## 2022-08-12 RX ADMIN — MEMANTINE 5 MG: 5 TABLET ORAL at 20:46

## 2022-08-12 RX ADMIN — DEXTROSE AND SODIUM CHLORIDE: 5; 450 INJECTION, SOLUTION INTRAVENOUS at 07:37

## 2022-08-12 RX ADMIN — APIXABAN 5 MG: 5 TABLET, FILM COATED ORAL at 11:00

## 2022-08-12 RX ADMIN — FERROUS SULFATE TAB 325 MG (65 MG ELEMENTAL FE) 325 MG: 325 (65 FE) TAB at 11:00

## 2022-08-12 RX ADMIN — DONEPEZIL HYDROCHLORIDE 10 MG: 5 TABLET, FILM COATED ORAL at 11:00

## 2022-08-12 RX ADMIN — FINASTERIDE 5 MG: 5 TABLET, FILM COATED ORAL at 10:59

## 2022-08-12 RX ADMIN — TAMSULOSIN HYDROCHLORIDE 0.4 MG: 0.4 CAPSULE ORAL at 11:00

## 2022-08-12 RX ADMIN — CITALOPRAM HYDROBROMIDE 10 MG: 20 TABLET ORAL at 11:00

## 2022-08-12 RX ADMIN — ATORVASTATIN CALCIUM 20 MG: 10 TABLET, FILM COATED ORAL at 10:59

## 2022-08-12 RX ADMIN — METOPROLOL SUCCINATE 25 MG: 25 TABLET, EXTENDED RELEASE ORAL at 10:59

## 2022-08-12 RX ADMIN — ASPIRIN 81 MG: 81 TABLET, COATED ORAL at 10:59

## 2022-08-12 RX ADMIN — SODIUM CHLORIDE, PRESERVATIVE FREE 10 ML: 5 INJECTION INTRAVENOUS at 20:46

## 2022-08-12 RX ADMIN — MEMANTINE 5 MG: 5 TABLET ORAL at 10:59

## 2022-08-12 RX ADMIN — MIDODRINE HYDROCHLORIDE 2.5 MG: 5 TABLET ORAL at 17:30

## 2022-08-12 NOTE — PROGRESS NOTES
Hospitalist Progress Note      PCP: Russ Lyles MD    Date of Admission: 8/9/2022        Subjective:   Feels okay with no complaints. No dizziness or lightheadedness. Medications:  Reviewed    Infusion Medications    dextrose 5 % and 0.45 % NaCl 50 mL/hr at 08/12/22 0737    dextrose      sodium chloride       Scheduled Medications    apixaban  5 mg Oral BID    aspirin  81 mg Oral Daily    atorvastatin  20 mg Oral Daily    citalopram  10 mg Oral Daily    donepezil  10 mg Oral Daily    famotidine  20 mg Oral Nightly    ferrous sulfate  325 mg Oral Daily    finasteride  5 mg Oral Daily    isosorbide mononitrate  30 mg Oral Daily    memantine  5 mg Oral BID    metoprolol succinate  25 mg Oral Daily    QUEtiapine  50 mg Oral Nightly    tamsulosin  0.4 mg Oral Daily    pantoprazole  40 mg Oral QAM AC    fluticasone  2 spray Nasal Daily    sodium chloride flush  5-40 mL IntraVENous 2 times per day     PRN Meds: glucose, dextrose bolus **OR** dextrose bolus, glucagon (rDNA), dextrose, sodium chloride flush, sodium chloride, ondansetron **OR** ondansetron, polyethylene glycol, acetaminophen **OR** acetaminophen, perflutren lipid microspheres      Intake/Output Summary (Last 24 hours) at 8/12/2022 0811  Last data filed at 8/12/2022 8711  Gross per 24 hour   Intake 240 ml   Output 175 ml   Net 65 ml       Physical Exam Performed:    /66   Pulse 73   Temp 97.5 °F (36.4 °C) (Oral)   Resp 16   Ht 5' 7\" (1.702 m)   Wt 156 lb 1.6 oz (70.8 kg)   SpO2 98%   BMI 24.45 kg/m²     General appearance: No apparent distress, appears stated age and cooperative. HEENT: Pupils equal, round, and reactive to light. Conjunctivae/corneas clear. Neck: Supple, with full range of motion. No jugular venous distention. Trachea midline. Respiratory:  Normal respiratory effort. Clear to auscultation, bilaterally without Rales/Wheezes/Rhonchi.   Cardiovascular: Regular rate and rhythm with normal S1/S2 without murmurs, rubs or gallops. Abdomen: Soft, non-tender, non-distended with normal bowel sounds. Musculoskeletal: No clubbing, cyanosis or edema bilaterally. Full range of motion without deformity. Skin: Skin color, texture, turgor normal.  No rashes or lesions. Neurologic:  Neurovascularly intact without any focal sensory/motor deficits. Cranial nerves: II-XII intact, grossly non-focal.  Psychiatric: Alert and oriented, thought content appropriate, normal insight  Capillary Refill: Brisk,3 seconds, normal   Peripheral Pulses: +2 palpable, equal bilaterally       Labs:   Recent Labs     08/09/22  1310 08/12/22  0641   WBC 6.9 6.1   HGB 15.1 16.7   HCT 45.9 51.0    128*     Recent Labs     08/10/22  0708 08/11/22  0749 08/12/22  0641    142 141   K 3.9 4.8 4.5    108 105   CO2 25 28 27   BUN 30* 22* 17   CREATININE 1.3 1.2 1.3   CALCIUM 8.4 9.0 10.0     Recent Labs     08/09/22  1310   AST 34   ALT 23   BILITOT 0.8   ALKPHOS 72     No results for input(s): INR in the last 72 hours. Recent Labs     08/09/22  1310 08/09/22  2029 08/10/22  0026   TROPONINI <0.012 <0.01 <0.01       Urinalysis:      Lab Results   Component Value Date/Time    NITRU Negative 04/21/2022 02:20 PM    [address] 04/21/2022 02:20 PM    BACTERIA 1+ 04/21/2022 02:20 PM    RBCUA 3-4 04/21/2022 02:20 PM    BLOODU LARGE 04/21/2022 02:20 PM    SPECGRAV >=1.030 04/21/2022 02:20 PM    GLUCOSEU Negative 04/21/2022 02:20 PM       Radiology:  No orders to display           Assessment/Plan:      -Orthostatic syncope--persistent despite fluids--- likely multifactorial including hypovolemia, medications, low EF. .  Imdur discontinued by cards. .I have started midodrine, compression stockings    -Chronic systolic heart failure, EF 35 to 40%--cardiology recommended outpatient evaluation    -NATANAEL on CKD stage III--- creatinine decreased from 1.6-1.2--- continue to monitor     -Dementia with behavioral disturbance -continue Aricept and Namenda    -Acute

## 2022-08-12 NOTE — CARE COORDINATION
Case discussed in huddle and rounds. Pt is medically ready for dc today to ARU. [place] B[place] with ARU notified and aware. Awaiting call back with dc time. Follow. Addendum 1320pm: DC order noted. Pt orthostatic up in chair. Assisted back to bed by RN and BP is now WNL. Family also inquiring if Pt can have Angiogram while inpatient instead of OP. Bedside RN sent message to Cardiology and hospitalist. Awaiting decisions. Addendum 1525pm: per nursing, No d.c today. MD addressing orthostatics. Vishal Gilmore in [facility] aware.

## 2022-08-12 NOTE — PROGRESS NOTES
[address]   Daily Progress Note    Admit Date:  8/9/2022  HPI:  No chief complaint on file. Interval history: Truett Dubin is being followed for syncope. Presented from [facility] for recurrent syncope. Hx of CAD s/p [facility] 2020 showed mild-moderate CAD of LAD with normal FFR; hx of SND s/p pacemaker, HTN, HLD,   Work up revealed LVEF reduced. Subjective:  Mr. Harpreet Delgado denies any chest pain. No shortness of breath. He had orthostatic hypotension today; b/p dropped into the 60's asymptomatic.    Improved after fluids     Objective:   BP 92/61   Pulse 73   Temp 97.4 °F (36.3 °C) (Oral)   Resp 16   Ht 5' 7\" (1.702 m)   Wt 156 lb 1.6 oz (70.8 kg)   SpO2 99%   BMI 24.45 kg/m²     Intake/Output Summary (Last 24 hours) at 8/12/2022 1419  Last data filed at 8/12/2022 0910  Gross per 24 hour   Intake 240 ml   Output 175 ml   Net 65 ml       Physical Exam:  General:  Awake, alert, NAD, answering questions appropriately   Skin:  Warm and dry  Neck:  JVD<8  Chest:  Clear to auscultation, no wheezes/rhonchi/rales  Telemetry: affib/paced   Cardiovascular:  RRR S1S2, no m/r/g   Abdomen:  Soft, nontender, +bowel sounds  Extremities:  trace bilateral lower extremity edema    Medications:    midodrine  2.5 mg Oral TID WC    apixaban  5 mg Oral BID    aspirin  81 mg Oral Daily    atorvastatin  20 mg Oral Daily    citalopram  10 mg Oral Daily    donepezil  10 mg Oral Daily    famotidine  20 mg Oral Nightly    ferrous sulfate  325 mg Oral Daily    finasteride  5 mg Oral Daily    isosorbide mononitrate  30 mg Oral Daily    memantine  5 mg Oral BID    metoprolol succinate  25 mg Oral Daily    QUEtiapine  50 mg Oral Nightly    tamsulosin  0.4 mg Oral Daily    pantoprazole  40 mg Oral QAM AC    fluticasone  2 spray Nasal Daily    sodium chloride flush  5-40 mL IntraVENous 2 times per day      dextrose      sodium chloride         Lab Data:  CBC:   Recent Labs     08/12/22  0641   WBC 6.1 PM

## 2022-08-12 NOTE — PROGRESS NOTES
Physical Therapy  Facility/Department: [facility] - MED SURG  Daily Treatment Note  NAME: Abril Manuel  : 1935  MRN: [id number]    Date of Service: 2022    Discharge Recommendations:  IP Rehab   PT Equipment Recommendations  Other: defer    Patient Diagnosis(es): There were no encounter diagnoses. Assessment   Assessment: Patient seen for gait training. Patient cleared by RN for therapy participation this date. Patient agreeable to therapy. Patient completed transfers with CGA and ambulates with CGA-min A and RW. Pt requires increased cueing for maintaining ROBIN within RW when turning and backing up to chairs for safety. Pt demos significant improvement in balance today, would benefit from continued therapy to improve safety. Per pt, his wife still works as a  often. P.T .will continue to follow throughout LOS. Recommending DC to IP Rehab. Activity Tolerance: Patient tolerated treatment well  Other: defer     Plan    Plan  Plan: 3-5 times per week  Specific Instructions for Next Treatment: Progress ther ex and mobility as tolerated, high-level balance & gait training  Current Treatment Recommendations: Strengthening;Balance training;Functional mobility training;Transfer training;Gait training;Stair training;Neuromuscular re-education;Home exercise program;Safety education & training;Patient/Caregiver education & training;Equipment evaluation, education, & procurement; Therapeutic activities     Restrictions  Restrictions/Precautions  Restrictions/Precautions: Fall Risk, General Precautions  Position Activity Restriction  Other position/activity restrictions: Telemetry     Subjective    Subjective  Subjective: Pt in bed, agreeable to therapy  Pain: Pt does not indicate pain. Orientation  Overall Orientation Status: Within Functional Limits  Cognition  Overall Cognitive Status: Exceptions  Arousal/Alertness: Appropriate responses to stimuli  Following Commands:  Follows multistep commands with repitition; Follows one step commands with repetition  Memory: Decreased short term memory  Safety Judgement: Decreased awareness of need for assistance;Decreased awareness of need for safety  Problem Solving: Decreased awareness of errors  Insights: Decreased awareness of deficits  Initiation: Requires cues for some  Sequencing: Requires cues for some     Objective   Vitals 98%, 100 bpm, 112/88 at rest ->139/88 following ambulation     Bed Mobility Training  Supine to Sit: Supervision; Additional time (HOB elevated)  Transfer Training  Sit to Stand: Contact-guard assistance (with RW)  Stand to Sit: Contact-guard assistance  Gait Training  Gait Training: Yes  Gait  Overall Level of Assistance: Minimum assistance;Contact-guard assistance (CGA on straight paths, min A when turning and backing up to align self with chair)  Interventions: Safety awareness training;Verbal cues; Tactile cues  Speed/Elodia: Slow  Step Length: Left shortened;Right shortened  Distance (ft): 150 Feet  Assistive Device: Walker, rolling;Gait belt     PT Exercises  Exercise Treatment: 1x 10 BLE LAQ and seated marches     Pt stood for toileting ~1 min without UE support with supervision. Pt then immediately washed hands at sink additional ~1 min prior to taking seated rest break. Safety Devices  Type of Devices: All fall risk precautions in place;Call light within reach; Chair alarm in place;Gait belt;Patient at risk for falls; Left in chair;Nurse notified; Heels elevated for pressure relief  Restraints  Restraints Initially in Place: No       Goals  Short Term Goals  Time Frame for Short term goals: 1 week, 8/17/22 (unless otherwise specified)  Short term goal 1: Pt will transfer supine <-> sit with modified(I)  Short term goal 2: Pt will transfer sit <-> stand and bed>chair using RW as needed with supervision  Short term goal 3: Pt will ambulate x 200 feet using RW as needed (or least AD) with supervision without LOB  Short term goal 4: By 8/13/22: Pt will tolerate 12-15 reps BLE exercise for strengthening, balance, and endurance  Patient Goals   Patient goals : \"To go home\"    Education  Patient Education  Education Given To: Patient  Education Provided: Role of Therapy;Plan of Care;Precautions;Transfer Training;Equipment; Fall Prevention Strategies  Education Provided Comments: Pt educated on importance of OOB mobility, safe hand placement with transfers, safe use of RW, and safety with backing up to chairs - requires reinforcement  Education Method: Demonstration;Verbal  Barriers to Learning: None  Education Outcome: Verbalized understanding;Demonstrated understanding;Continued education needed    Therapy Time   Individual Concurrent Group Co-treatment   Time In 1012         Time Out 1045         Minutes 33         Timed Code Treatment Minutes: 33 Minutes     If pt is unable to be seen after this session, please let this note serve as discharge summary. Please see case management note for discharge disposition. Thank you.     Denilson Baldwin, PT

## 2022-08-12 NOTE — CARE COORDINATION
[facility] - Acute Rehab Unit   After review, this patient is felt to be:       [x]  Appropriate for Acute Inpatient Rehab pending family in agreement to schedule cardiac cath after rehab. Attempt to call wife to confirm but no answer. Discussed with Chris Bloom CM    []  Appropriate for Acute Inpatient Rehab Pending Insurance Authorization    []  Not appropriate for Acute Inpatient Rehab    []  Referral received and ARU reviewing patient; Evaluation ongoing. Will notify DCP with further updates.  Thank you for the Nickolas Christie RN

## 2022-08-12 NOTE — DISCHARGE INSTR - COC
Continuity of Care Form    Patient Name: Júnior Valles   :  1935  MRN:  [id number]    Admit date:  2022  Discharge date:  ***    Code Status Order: Full Code   Advance Directives:     Admitting Physician:  Libby Connelly MD  PCP: Piotr Etienne MD    Discharging Nurse: Sheree Ward [facility] Unit/Room#: 0599/8903-50  Discharging Unit Phone Number: [phone]    Emergency Contact:   Extended Emergency Contact Information  Primary Emergency Contact: Ashley Douglas  Address: [address], [address], [address] Phone: [phone]  Mobile Phone: [phone]  Relation: Spouse   needed? No  Secondary Emergency Contact: Ashish Nowak [address] Phone: [phone]  Mobile Phone: [phone]  Relation: Child   needed?  No    Past Surgical History:  Past Surgical History:   Procedure Laterality Date    APPENDECTOMY      COLONOSCOPY      [address]      JOINT REPLACEMENT  2013    left shoulder    PACEMAKER PLACEMENT  10/13/2017    Dr Pulido Shock at [facility] 19. 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 [facility] mild LAD disease  I25.10    Other chest pain R07.89    Atrial fibrillation with rapid ventricular response (HCC) I48.91    Syncope and collapse R55    COVID U07.1    NATANAEL (acute kidney injury) ([facility] [address] N17.9    Atrial fibrillation with RVR (HCC) I48.91    Dementia with behavioral disturbance (HCC) F03.91    Hallucinations R44.3    Syncope, unspecified syncope type R55    Ischemic cardiomyopathy I25.5       Isolation/Infection:   Isolation            No Isolation          Patient Infection Status       Infection Onset Added Last Indicated Last Indicated By Review Planned Expiration Resolved Resolved By    None active    Resolved    COVID-19 (Rule Out) 22 COVID-19 & Influenza Combo (Ordered)   22 Rule-Out Test Resulted    COVID-19 22 COVID-19, Rapid   22     COVID-19 (Rule Out) 22 COVID-19, Rapid (Ordered)   22 Rule-Out Test Resulted            Nurse Assessment:  Last Vital Signs: BP 85/67   Pulse 73   Temp 97.4 °F (36.3 °C) (Oral)   Resp 16   Ht 5' 7\" (1.702 m)   Wt 156 lb 1.6 oz (70.8 kg)   SpO2 99%   BMI 24.45 kg/m²     Last documented pain score (0-10 scale):    Last Weight:   Wt Readings from Last 1 Encounters:   08/10/22 156 lb 1.6 oz (70.8 kg)     Mental Status:  disoriented and alert to self, and place    IV Access:  - None    Nursing Mobility/ADLs:  Walking   Assisted  Transfer  Assisted  Bathing  Assisted  Dressing  Assisted  Toileting  Assisted  Feeding  Independent  Med Admin  Independent  Med Delivery   whole    Wound Care Documentation and Therapy:        Elimination:  Continence:    Bowel: yes  Bladder: Yes  Urinary Catheter: None   Colostomy/Ileostomy/Ileal Conduit: No       Date of Last BM: 2022    Intake/Output Summary (Last 24 hours) at 8/12/2022 1240  Last data filed at 8/12/2022 0910  Gross per 24 hour   Intake 240 ml   Output 175 ml   Net 65 ml     I/O last 3 completed shifts: In: 240 [P.O.:240]  Out: 175 [Urine:175]    Safety Concerns: At Risk for Falls    Impairments/Disabilities:      Vision and Hearing    Nutrition Therapy:  Current Nutrition Therapy:   - Oral Diet:  Low Fat, Low Sodium (2gm), and High fiber    Routes of Feeding: Oral  Liquids: Thin Liquids  Daily Fluid Restriction: no  Last Modified Barium Swallow with Video (Video Swallowing Test): not done    Treatments at the Time of Hospital Discharge:   Respiratory Treatments: n/a  Oxygen Therapy:  is not on home oxygen therapy. Ventilator:    - No ventilator support    Rehab Therapies: Physical Therapy and Occupational Therapy  Weight Bearing Status/Restrictions: No weight bearing restrictions  Other Medical Equipment (for information only, NOT a DME order):  walker  Other Treatments: n/a    Patient's personal belongings (please select all that are sent with patient):  kylah Alfredo    RN SIGNATURE:  Electronically signed by Silvia Monge RN on 8/14/22 at 2:57 PM EDT    CASE MANAGEMENT/SOCIAL WORK SECTION    Inpatient Status Date: ***    Readmission Risk Assessment Score:  Readmission Risk              Risk of Unplanned Readmission:  17           Discharging to Facility/ Agency   Name:   Address:  Phone:  Fax:    Dialysis Facility (if applicable)   Name:  Address:  Dialysis Schedule:  Phone:  Fax:    / signature: {Esignature:[id number]}    PHYSICIAN SECTION    Prognosis: Fair    Condition at Discharge: Stable    Rehab Potential (if transferring to Rehab): Fair    Recommended Labs or Other Treatments After Discharge:     Physician Certification: I certify the above information and transfer of Donna Mccartney  is necessary for the continuing treatment of the diagnosis listed and that he requires Acute Rehab for less 30 days. Update Admission H&P: No change in H&P    PHYSICIAN SIGNATURE:  Electronically signed by Se Gann MD on 8/14/22 at 11.17 AM EDT

## 2022-08-13 LAB
ANION GAP SERPL CALCULATED.3IONS-SCNC: 7 MMOL/L (ref 3–16)
BUN BLDV-MCNC: 17 MG/DL (ref 7–20)
CALCIUM SERPL-MCNC: 9.1 MG/DL (ref 8.3–10.6)
CHLORIDE BLD-SCNC: 105 MMOL/L (ref 99–110)
CO2: 28 MMOL/L (ref 21–32)
CREAT SERPL-MCNC: 1.4 MG/DL (ref 0.8–1.3)
GFR AFRICAN AMERICAN: 58
GFR NON-AFRICAN AMERICAN: 48
GLUCOSE BLD-MCNC: 104 MG/DL (ref 70–99)
GLUCOSE BLD-MCNC: 119 MG/DL (ref 70–99)
GLUCOSE BLD-MCNC: 122 MG/DL (ref 70–99)
GLUCOSE BLD-MCNC: 73 MG/DL (ref 70–99)
GLUCOSE BLD-MCNC: 74 MG/DL (ref 70–99)
HCT VFR BLD CALC: 50.6 % (ref 40.5–52.5)
HEMOGLOBIN: 16.8 G/DL (ref 13.5–17.5)
MCH RBC QN AUTO: 29.3 PG (ref 26–34)
MCHC RBC AUTO-ENTMCNC: 33.1 G/DL (ref 31–36)
MCV RBC AUTO: 88.5 FL (ref 80–100)
PDW BLD-RTO: 16.9 % (ref 12.4–15.4)
PERFORMED ON: ABNORMAL
PERFORMED ON: NORMAL
PLATELET # BLD: 140 K/UL (ref 135–450)
PMV BLD AUTO: 8.4 FL (ref 5–10.5)
POTASSIUM SERPL-SCNC: 4.4 MMOL/L (ref 3.5–5.1)
RBC # BLD: 5.71 M/UL (ref 4.2–5.9)
SODIUM BLD-SCNC: 140 MMOL/L (ref 136–145)
WBC # BLD: 7.9 K/UL (ref 4–11)

## 2022-08-13 PROCEDURE — 6370000000 HC RX 637 (ALT 250 FOR IP): Performed by: NURSE PRACTITIONER

## 2022-08-13 PROCEDURE — 80048 BASIC METABOLIC PNL TOTAL CA: CPT

## 2022-08-13 PROCEDURE — 85027 COMPLETE CBC AUTOMATED: CPT

## 2022-08-13 PROCEDURE — 2580000003 HC RX 258: Performed by: INTERNAL MEDICINE

## 2022-08-13 PROCEDURE — 36415 COLL VENOUS BLD VENIPUNCTURE: CPT

## 2022-08-13 PROCEDURE — 6370000000 HC RX 637 (ALT 250 FOR IP): Performed by: HOSPITALIST

## 2022-08-13 PROCEDURE — 6370000000 HC RX 637 (ALT 250 FOR IP): Performed by: INTERNAL MEDICINE

## 2022-08-13 PROCEDURE — 1200000000 HC SEMI PRIVATE

## 2022-08-13 RX ADMIN — APIXABAN 5 MG: 5 TABLET, FILM COATED ORAL at 20:34

## 2022-08-13 RX ADMIN — ACETAMINOPHEN 650 MG: 325 TABLET ORAL at 08:29

## 2022-08-13 RX ADMIN — FAMOTIDINE 20 MG: 20 TABLET ORAL at 20:34

## 2022-08-13 RX ADMIN — METOPROLOL SUCCINATE 25 MG: 25 TABLET, EXTENDED RELEASE ORAL at 08:25

## 2022-08-13 RX ADMIN — FERROUS SULFATE TAB 325 MG (65 MG ELEMENTAL FE) 325 MG: 325 (65 FE) TAB at 08:24

## 2022-08-13 RX ADMIN — ATORVASTATIN CALCIUM 20 MG: 10 TABLET, FILM COATED ORAL at 08:24

## 2022-08-13 RX ADMIN — CITALOPRAM HYDROBROMIDE 10 MG: 20 TABLET ORAL at 08:24

## 2022-08-13 RX ADMIN — MIDODRINE HYDROCHLORIDE 2.5 MG: 5 TABLET ORAL at 17:43

## 2022-08-13 RX ADMIN — SODIUM CHLORIDE, PRESERVATIVE FREE 10 ML: 5 INJECTION INTRAVENOUS at 22:04

## 2022-08-13 RX ADMIN — DONEPEZIL HYDROCHLORIDE 10 MG: 5 TABLET, FILM COATED ORAL at 08:24

## 2022-08-13 RX ADMIN — MIDODRINE HYDROCHLORIDE 2.5 MG: 5 TABLET ORAL at 12:06

## 2022-08-13 RX ADMIN — FINASTERIDE 5 MG: 5 TABLET, FILM COATED ORAL at 08:25

## 2022-08-13 RX ADMIN — PANTOPRAZOLE SODIUM 40 MG: 40 TABLET, DELAYED RELEASE ORAL at 06:42

## 2022-08-13 RX ADMIN — MEMANTINE 5 MG: 5 TABLET ORAL at 20:34

## 2022-08-13 RX ADMIN — MEMANTINE 5 MG: 5 TABLET ORAL at 08:26

## 2022-08-13 RX ADMIN — QUETIAPINE FUMARATE 50 MG: 25 TABLET ORAL at 20:34

## 2022-08-13 RX ADMIN — TAMSULOSIN HYDROCHLORIDE 0.4 MG: 0.4 CAPSULE ORAL at 08:26

## 2022-08-13 RX ADMIN — MIDODRINE HYDROCHLORIDE 2.5 MG: 5 TABLET ORAL at 08:25

## 2022-08-13 RX ADMIN — FLUTICASONE PROPIONATE 2 SPRAY: 50 SPRAY, METERED NASAL at 08:25

## 2022-08-13 RX ADMIN — SODIUM CHLORIDE, PRESERVATIVE FREE 10 ML: 5 INJECTION INTRAVENOUS at 08:32

## 2022-08-13 RX ADMIN — APIXABAN 5 MG: 5 TABLET, FILM COATED ORAL at 08:25

## 2022-08-13 RX ADMIN — ASPIRIN 81 MG: 81 TABLET, COATED ORAL at 08:24

## 2022-08-13 ASSESSMENT — PAIN DESCRIPTION - PAIN TYPE: TYPE: ACUTE PAIN

## 2022-08-13 ASSESSMENT — PAIN DESCRIPTION - LOCATION: LOCATION: KNEE

## 2022-08-13 ASSESSMENT — PAIN DESCRIPTION - DESCRIPTORS: DESCRIPTORS: DISCOMFORT

## 2022-08-13 ASSESSMENT — PAIN SCALES - GENERAL: PAINLEVEL_OUTOF10: 7

## 2022-08-13 ASSESSMENT — PAIN DESCRIPTION - ORIENTATION: ORIENTATION: RIGHT

## 2022-08-13 NOTE — PROGRESS NOTES
Shift assessment completed. Pt A&O x1; dispriented to place/time/situation. VSS. AM medications admin whole per MAR. Pt tolerating diet per order. Denies any needs at this time. Bed locked and in lowest position. Call light within reach. Will continue to monitor.  Electronically signed by Agata Shelley RN on 8/13/2022 at 9:53 AM

## 2022-08-13 NOTE — PROGRESS NOTES
Hospitalist Progress Note      PCP: Consuelo Garcia MD    Date of Admission: 8/9/2022        Subjective:   Feels okay with no complaints. No dizziness or lightheadedness. Medications:  Reviewed    Infusion Medications    dextrose      sodium chloride       Scheduled Medications    midodrine  2.5 mg Oral TID WC    apixaban  5 mg Oral BID    aspirin  81 mg Oral Daily    atorvastatin  20 mg Oral Daily    citalopram  10 mg Oral Daily    donepezil  10 mg Oral Daily    famotidine  20 mg Oral Nightly    ferrous sulfate  325 mg Oral Daily    finasteride  5 mg Oral Daily    memantine  5 mg Oral BID    metoprolol succinate  25 mg Oral Daily    QUEtiapine  50 mg Oral Nightly    tamsulosin  0.4 mg Oral Daily    pantoprazole  40 mg Oral QAM AC    fluticasone  2 spray Nasal Daily    sodium chloride flush  5-40 mL IntraVENous 2 times per day     PRN Meds: glucose, dextrose bolus **OR** dextrose bolus, glucagon (rDNA), dextrose, sodium chloride flush, sodium chloride, ondansetron **OR** ondansetron, polyethylene glycol, acetaminophen **OR** acetaminophen, perflutren lipid microspheres      Intake/Output Summary (Last 24 hours) at 8/13/2022 1220  Last data filed at 8/12/2022 2154  Gross per 24 hour   Intake 240 ml   Output 200 ml   Net 40 ml         Physical Exam Performed:    /78   Pulse 70   Temp 97.6 °F (36.4 °C) (Oral)   Resp 16   Ht 5' 7\" (1.702 m)   Wt 156 lb 1.6 oz (70.8 kg)   SpO2 99%   BMI 24.45 kg/m²     General appearance: No apparent distress, appears stated age and cooperative. HEENT: Pupils equal, round, and reactive to light. Conjunctivae/corneas clear. Neck: Supple, with full range of motion. No jugular venous distention. Trachea midline. Respiratory:  Normal respiratory effort. Clear to auscultation, bilaterally without Rales/Wheezes/Rhonchi. Cardiovascular: Regular rate and rhythm with normal S1/S2 without murmurs, rubs or gallops.   Abdomen: Soft, non-tender, non-distended with normal bowel sounds. Musculoskeletal: No clubbing, cyanosis or edema bilaterally. Full range of motion without deformity. Skin: Skin color, texture, turgor normal.  No rashes or lesions. Neurologic:  Neurovascularly intact without any focal sensory/motor deficits. Cranial nerves: II-XII intact, grossly non-focal.  Psychiatric: Alert and oriented, thought content appropriate, normal insight  Capillary Refill: Brisk,3 seconds, normal   Peripheral Pulses: +2 palpable, equal bilaterally       Labs:   Recent Labs     08/12/22  0641 08/13/22  0651   WBC 6.1 7.9   HGB 16.7 16.8   HCT 51.0 50.6   * 140       Recent Labs     08/11/22  0749 08/12/22  0641 08/13/22  0651    141 140   K 4.8 4.5 4.4    105 105   CO2 28 27 28   BUN 22* 17 17   CREATININE 1.2 1.3 1.4*   CALCIUM 9.0 10.0 9.1       No results for input(s): AST, ALT, BILIDIR, BILITOT, ALKPHOS in the last 72 hours. No results for input(s): INR in the last 72 hours. No results for input(s): Rhae Childes in the last 72 hours. Urinalysis:      Lab Results   Component Value Date/Time    NITRU Negative 04/21/2022 02:20 PM    [address] 04/21/2022 02:20 PM    BACTERIA 1+ 04/21/2022 02:20 PM    RBCUA 3-4 04/21/2022 02:20 PM    BLOODU LARGE 04/21/2022 02:20 PM    SPECGRAV >=1.030 04/21/2022 02:20 PM    GLUCOSEU Negative 04/21/2022 02:20 PM       Radiology:  No orders to display           Assessment/Plan:      -Orthostatic hypotension /syncope--likely due to hypovolemia and medications. s/p IV fluids.   Imdur discontinued and started on midodrine 8/12, compression stockings--- improved    -Chronic systolic heart failure, EF 35 to 40%--utpatient ischemic evaluation per cardiology    -NATANAEL on CKD stage III--- creatinine decreased from 1.6-1.2--- continue to monitor     -Dementia with behavioral disturbance -continue Aricept and Namenda    -Acute metabolic encephalopathy--increased agitation 8/11-sedated with Geodon and Haldol--currently back to baseline     -Paroxysmal A. fib s/p PPM -continue beta-blocker, continue Eliquis     -CAD -stable, no anginal symptoms at this time. S/p LHC in 2020 showed mild LAD disease . continue home medication regimen. Followed by Dr. Juan Wiggins     -HTN-stable but has orthostatic hypotension--continue metoprolol, Imdur discontinued     -BPH-continue finasteride and tamsulosin    -Hypoglycemia--likely related to poor p.o. intake--improved on D5 infusion--encourage p.o. intake    DVT Prophylaxis: Eliquis  Diet: ADULT DIET;  Regular; Low Fat/Low Chol/High Fiber/2 gm Na  Code Status: Full Code  Discharge planning--- ARU plans on admitting the patient Sunday if stable    Jose Posey MD

## 2022-08-13 NOTE — PLAN OF CARE
Problem: Discharge Planning  Goal: Discharge to home or other facility with appropriate resources  Outcome: Progressing     Problem: Safety - Adult  Goal: Free from fall injury  Outcome: Progressing     Problem: Confusion  Goal: Confusion, delirium, dementia, or psychosis is improved or at baseline  Description: INTERVENTIONS:  1. Assess for possible contributors to thought disturbance, including medications, impaired vision or hearing, underlying metabolic abnormalities, dehydration, psychiatric diagnoses, and notify attending LIP  2. [place] high risk fall precautions, as indicated  3. Provide frequent short contacts to provide reality reorientation, refocusing and direction  4. Decrease environmental stimuli, including noise as appropriate  5. Monitor and intervene to maintain adequate nutrition, hydration, elimination, sleep and activity  6. If unable to ensure safety without constant attention obtain sitter and review sitter guidelines with assigned personnel  7. Initiate Psychosocial CNS and Spiritual Care consult, as indicated  Outcome: Progressing     Problem: Pain  Goal: Verbalizes/displays adequate comfort level or baseline comfort level  Outcome: Progressing     Problem: Skin/Tissue Integrity  Goal: Absence of new skin breakdown  Description: 1. Monitor for areas of redness and/or skin breakdown  2. Assess vascular access sites hourly  3. Every 4-6 hours minimum:  Change oxygen saturation probe site  4. Every 4-6 hours:  If on nasal continuous positive airway pressure, respiratory therapy assess nares and determine need for appliance change or resting period.   Outcome: Progressing

## 2022-08-14 ENCOUNTER — HOSPITAL ENCOUNTER (INPATIENT)
Age: 87
LOS: 13 days | Discharge: NURSING, REHAB OR LONG-TERM CARE | DRG: 948 | End: 2022-08-27
Attending: PHYSICAL MEDICINE & REHABILITATION | Admitting: PHYSICAL MEDICINE & REHABILITATION
Payer: MEDICARE

## 2022-08-14 VITALS
TEMPERATURE: 98.1 F | SYSTOLIC BLOOD PRESSURE: 100 MMHG | HEART RATE: 70 BPM | DIASTOLIC BLOOD PRESSURE: 65 MMHG | OXYGEN SATURATION: 97 % | BODY MASS INDEX: 24.5 KG/M2 | HEIGHT: 67 IN | RESPIRATION RATE: 18 BRPM | WEIGHT: 156.1 LBS

## 2022-08-14 DIAGNOSIS — E78.5 HYPERLIPIDEMIA, UNSPECIFIED HYPERLIPIDEMIA TYPE: ICD-10-CM

## 2022-08-14 LAB
GLUCOSE BLD-MCNC: 60 MG/DL (ref 70–99)
GLUCOSE BLD-MCNC: 65 MG/DL (ref 70–99)
GLUCOSE BLD-MCNC: 79 MG/DL (ref 70–99)
GLUCOSE BLD-MCNC: 92 MG/DL (ref 70–99)
PERFORMED ON: ABNORMAL
PERFORMED ON: ABNORMAL
PERFORMED ON: NORMAL
PERFORMED ON: NORMAL
SARS-COV-2, NAAT: NOT DETECTED

## 2022-08-14 PROCEDURE — 6370000000 HC RX 637 (ALT 250 FOR IP): Performed by: NURSE PRACTITIONER

## 2022-08-14 PROCEDURE — 6370000000 HC RX 637 (ALT 250 FOR IP): Performed by: HOSPITALIST

## 2022-08-14 PROCEDURE — 2580000003 HC RX 258: Performed by: INTERNAL MEDICINE

## 2022-08-14 PROCEDURE — 6370000000 HC RX 637 (ALT 250 FOR IP): Performed by: INTERNAL MEDICINE

## 2022-08-14 PROCEDURE — 1280000000 HC REHAB R&B

## 2022-08-14 PROCEDURE — 87635 SARS-COV-2 COVID-19 AMP PRB: CPT

## 2022-08-14 PROCEDURE — 6370000000 HC RX 637 (ALT 250 FOR IP): Performed by: PHYSICAL MEDICINE & REHABILITATION

## 2022-08-14 RX ORDER — FINASTERIDE 5 MG/1
5 TABLET, FILM COATED ORAL DAILY
Status: DISCONTINUED | OUTPATIENT
Start: 2022-08-15 | End: 2022-08-27 | Stop reason: HOSPADM

## 2022-08-14 RX ORDER — METOPROLOL SUCCINATE 25 MG/1
25 TABLET, EXTENDED RELEASE ORAL DAILY
Status: DISCONTINUED | OUTPATIENT
Start: 2022-08-15 | End: 2022-08-15

## 2022-08-14 RX ORDER — QUETIAPINE FUMARATE 25 MG/1
50 TABLET, FILM COATED ORAL NIGHTLY
Status: DISCONTINUED | OUTPATIENT
Start: 2022-08-14 | End: 2022-08-16

## 2022-08-14 RX ORDER — MIDODRINE HYDROCHLORIDE 5 MG/1
2.5 TABLET ORAL
Status: CANCELLED | OUTPATIENT
Start: 2022-08-14

## 2022-08-14 RX ORDER — TRAZODONE HYDROCHLORIDE 50 MG/1
50 TABLET ORAL NIGHTLY PRN
Status: DISCONTINUED | OUTPATIENT
Start: 2022-08-14 | End: 2022-08-27 | Stop reason: HOSPADM

## 2022-08-14 RX ORDER — SODIUM CHLORIDE 0.9 % (FLUSH) 0.9 %
5-40 SYRINGE (ML) INJECTION PRN
Status: CANCELLED | OUTPATIENT
Start: 2022-08-14

## 2022-08-14 RX ORDER — PANTOPRAZOLE SODIUM 40 MG/1
40 TABLET, DELAYED RELEASE ORAL
Status: CANCELLED | OUTPATIENT
Start: 2022-08-15

## 2022-08-14 RX ORDER — DIPHENHYDRAMINE HCL 25 MG
25 TABLET ORAL EVERY 6 HOURS PRN
Status: CANCELLED | OUTPATIENT
Start: 2022-08-14

## 2022-08-14 RX ORDER — CITALOPRAM 20 MG/1
10 TABLET ORAL DAILY
Status: CANCELLED | OUTPATIENT
Start: 2022-08-15

## 2022-08-14 RX ORDER — SODIUM CHLORIDE 0.9 % (FLUSH) 0.9 %
5-40 SYRINGE (ML) INJECTION EVERY 12 HOURS SCHEDULED
Status: DISCONTINUED | OUTPATIENT
Start: 2022-08-14 | End: 2022-08-22 | Stop reason: ALTCHOICE

## 2022-08-14 RX ORDER — TAMSULOSIN HYDROCHLORIDE 0.4 MG/1
0.4 CAPSULE ORAL DAILY
Status: CANCELLED | OUTPATIENT
Start: 2022-08-15

## 2022-08-14 RX ORDER — TRAMADOL HYDROCHLORIDE 50 MG/1
50 TABLET ORAL EVERY 6 HOURS PRN
Status: DISCONTINUED | OUTPATIENT
Start: 2022-08-14 | End: 2022-08-27 | Stop reason: HOSPADM

## 2022-08-14 RX ORDER — FLUTICASONE PROPIONATE 50 MCG
2 SPRAY, SUSPENSION (ML) NASAL DAILY
Status: DISCONTINUED | OUTPATIENT
Start: 2022-08-15 | End: 2022-08-27 | Stop reason: HOSPADM

## 2022-08-14 RX ORDER — SODIUM CHLORIDE 0.9 % (FLUSH) 0.9 %
5-40 SYRINGE (ML) INJECTION EVERY 12 HOURS SCHEDULED
Status: CANCELLED | OUTPATIENT
Start: 2022-08-14

## 2022-08-14 RX ORDER — FAMOTIDINE 20 MG/1
20 TABLET, FILM COATED ORAL NIGHTLY
Status: CANCELLED | OUTPATIENT
Start: 2022-08-14

## 2022-08-14 RX ORDER — SODIUM CHLORIDE 0.9 % (FLUSH) 0.9 %
5-40 SYRINGE (ML) INJECTION PRN
Status: DISCONTINUED | OUTPATIENT
Start: 2022-08-14 | End: 2022-08-27 | Stop reason: HOSPADM

## 2022-08-14 RX ORDER — POLYETHYLENE GLYCOL 3350 17 G/17G
17 POWDER, FOR SOLUTION ORAL DAILY PRN
Status: DISCONTINUED | OUTPATIENT
Start: 2022-08-14 | End: 2022-08-27 | Stop reason: HOSPADM

## 2022-08-14 RX ORDER — ONDANSETRON 4 MG/1
4 TABLET, ORALLY DISINTEGRATING ORAL EVERY 8 HOURS PRN
Status: CANCELLED | OUTPATIENT
Start: 2022-08-14

## 2022-08-14 RX ORDER — QUETIAPINE FUMARATE 25 MG/1
50 TABLET, FILM COATED ORAL NIGHTLY
Status: CANCELLED | OUTPATIENT
Start: 2022-08-14

## 2022-08-14 RX ORDER — DONEPEZIL HYDROCHLORIDE 5 MG/1
10 TABLET, FILM COATED ORAL DAILY
Status: CANCELLED | OUTPATIENT
Start: 2022-08-15

## 2022-08-14 RX ORDER — CITALOPRAM 20 MG/1
10 TABLET ORAL DAILY
Status: DISCONTINUED | OUTPATIENT
Start: 2022-08-15 | End: 2022-08-27 | Stop reason: HOSPADM

## 2022-08-14 RX ORDER — FERROUS SULFATE 325(65) MG
325 TABLET ORAL DAILY
Status: CANCELLED | OUTPATIENT
Start: 2022-08-15

## 2022-08-14 RX ORDER — ACETAMINOPHEN 325 MG/1
650 TABLET ORAL EVERY 4 HOURS PRN
Status: DISCONTINUED | OUTPATIENT
Start: 2022-08-14 | End: 2022-08-27 | Stop reason: HOSPADM

## 2022-08-14 RX ORDER — ASPIRIN 81 MG/1
81 TABLET ORAL DAILY
Status: DISCONTINUED | OUTPATIENT
Start: 2022-08-15 | End: 2022-08-27 | Stop reason: HOSPADM

## 2022-08-14 RX ORDER — DIPHENHYDRAMINE HCL 25 MG
25 TABLET ORAL EVERY 6 HOURS PRN
Status: DISCONTINUED | OUTPATIENT
Start: 2022-08-14 | End: 2022-08-27 | Stop reason: HOSPADM

## 2022-08-14 RX ORDER — MEMANTINE HYDROCHLORIDE 5 MG/1
5 TABLET ORAL 2 TIMES DAILY
Status: DISCONTINUED | OUTPATIENT
Start: 2022-08-14 | End: 2022-08-27 | Stop reason: HOSPADM

## 2022-08-14 RX ORDER — METOPROLOL SUCCINATE 25 MG/1
25 TABLET, EXTENDED RELEASE ORAL DAILY
Status: CANCELLED | OUTPATIENT
Start: 2022-08-15

## 2022-08-14 RX ORDER — FINASTERIDE 5 MG/1
5 TABLET, FILM COATED ORAL DAILY
Status: CANCELLED | OUTPATIENT
Start: 2022-08-15

## 2022-08-14 RX ORDER — FLUTICASONE PROPIONATE 50 MCG
2 SPRAY, SUSPENSION (ML) NASAL DAILY
Status: CANCELLED | OUTPATIENT
Start: 2022-08-15

## 2022-08-14 RX ORDER — FAMOTIDINE 20 MG/1
20 TABLET, FILM COATED ORAL NIGHTLY
Status: DISCONTINUED | OUTPATIENT
Start: 2022-08-14 | End: 2022-08-15

## 2022-08-14 RX ORDER — TRAMADOL HYDROCHLORIDE 50 MG/1
50 TABLET ORAL EVERY 6 HOURS PRN
Status: CANCELLED | OUTPATIENT
Start: 2022-08-14

## 2022-08-14 RX ORDER — MIDODRINE HYDROCHLORIDE 5 MG/1
2.5 TABLET ORAL
Status: DISCONTINUED | OUTPATIENT
Start: 2022-08-14 | End: 2022-08-15

## 2022-08-14 RX ORDER — FERROUS SULFATE 325(65) MG
325 TABLET ORAL DAILY
Status: DISCONTINUED | OUTPATIENT
Start: 2022-08-15 | End: 2022-08-27 | Stop reason: HOSPADM

## 2022-08-14 RX ORDER — TRAZODONE HYDROCHLORIDE 50 MG/1
50 TABLET ORAL NIGHTLY PRN
Status: CANCELLED | OUTPATIENT
Start: 2022-08-14

## 2022-08-14 RX ORDER — PANTOPRAZOLE SODIUM 40 MG/1
40 TABLET, DELAYED RELEASE ORAL
Status: DISCONTINUED | OUTPATIENT
Start: 2022-08-15 | End: 2022-08-27 | Stop reason: HOSPADM

## 2022-08-14 RX ORDER — POLYETHYLENE GLYCOL 3350 17 G/17G
17 POWDER, FOR SOLUTION ORAL DAILY PRN
Status: CANCELLED | OUTPATIENT
Start: 2022-08-14

## 2022-08-14 RX ORDER — ATORVASTATIN CALCIUM 10 MG/1
20 TABLET, FILM COATED ORAL DAILY
Status: CANCELLED | OUTPATIENT
Start: 2022-08-15

## 2022-08-14 RX ORDER — TAMSULOSIN HYDROCHLORIDE 0.4 MG/1
0.4 CAPSULE ORAL DAILY
Status: DISCONTINUED | OUTPATIENT
Start: 2022-08-15 | End: 2022-08-27 | Stop reason: HOSPADM

## 2022-08-14 RX ORDER — ACETAMINOPHEN 325 MG/1
650 TABLET ORAL EVERY 4 HOURS PRN
Status: CANCELLED | OUTPATIENT
Start: 2022-08-14

## 2022-08-14 RX ORDER — MEMANTINE HYDROCHLORIDE 5 MG/1
5 TABLET ORAL 2 TIMES DAILY
Status: CANCELLED | OUTPATIENT
Start: 2022-08-14

## 2022-08-14 RX ORDER — ATORVASTATIN CALCIUM 10 MG/1
20 TABLET, FILM COATED ORAL DAILY
Status: DISCONTINUED | OUTPATIENT
Start: 2022-08-15 | End: 2022-08-15

## 2022-08-14 RX ORDER — DEXTROSE MONOHYDRATE 100 MG/ML
INJECTION, SOLUTION INTRAVENOUS CONTINUOUS PRN
Status: DISCONTINUED | OUTPATIENT
Start: 2022-08-14 | End: 2022-08-27 | Stop reason: HOSPADM

## 2022-08-14 RX ORDER — ONDANSETRON 4 MG/1
4 TABLET, ORALLY DISINTEGRATING ORAL EVERY 8 HOURS PRN
Status: DISCONTINUED | OUTPATIENT
Start: 2022-08-14 | End: 2022-08-27 | Stop reason: HOSPADM

## 2022-08-14 RX ORDER — HYDRALAZINE HYDROCHLORIDE 50 MG/1
50 TABLET, FILM COATED ORAL EVERY 8 HOURS PRN
Status: CANCELLED | OUTPATIENT
Start: 2022-08-14

## 2022-08-14 RX ORDER — ASPIRIN 81 MG/1
81 TABLET ORAL DAILY
Status: CANCELLED | OUTPATIENT
Start: 2022-08-15

## 2022-08-14 RX ORDER — HYDRALAZINE HYDROCHLORIDE 25 MG/1
50 TABLET, FILM COATED ORAL EVERY 8 HOURS PRN
Status: DISCONTINUED | OUTPATIENT
Start: 2022-08-14 | End: 2022-08-27 | Stop reason: HOSPADM

## 2022-08-14 RX ORDER — DEXTROSE MONOHYDRATE 100 MG/ML
INJECTION, SOLUTION INTRAVENOUS CONTINUOUS PRN
Status: CANCELLED | OUTPATIENT
Start: 2022-08-14

## 2022-08-14 RX ORDER — DONEPEZIL HYDROCHLORIDE 5 MG/1
10 TABLET, FILM COATED ORAL DAILY
Status: DISCONTINUED | OUTPATIENT
Start: 2022-08-15 | End: 2022-08-15

## 2022-08-14 RX ADMIN — PANTOPRAZOLE SODIUM 40 MG: 40 TABLET, DELAYED RELEASE ORAL at 09:15

## 2022-08-14 RX ADMIN — SODIUM CHLORIDE, PRESERVATIVE FREE 10 ML: 5 INJECTION INTRAVENOUS at 09:11

## 2022-08-14 RX ADMIN — DONEPEZIL HYDROCHLORIDE 10 MG: 5 TABLET, FILM COATED ORAL at 09:10

## 2022-08-14 RX ADMIN — MEMANTINE 5 MG: 5 TABLET ORAL at 09:10

## 2022-08-14 RX ADMIN — ASPIRIN 81 MG: 81 TABLET, COATED ORAL at 09:10

## 2022-08-14 RX ADMIN — FERROUS SULFATE TAB 325 MG (65 MG ELEMENTAL FE) 325 MG: 325 (65 FE) TAB at 09:10

## 2022-08-14 RX ADMIN — APIXABAN 5 MG: 5 TABLET, FILM COATED ORAL at 19:52

## 2022-08-14 RX ADMIN — QUETIAPINE FUMARATE 50 MG: 25 TABLET ORAL at 19:52

## 2022-08-14 RX ADMIN — FLUTICASONE PROPIONATE 2 SPRAY: 50 SPRAY, METERED NASAL at 09:11

## 2022-08-14 RX ADMIN — FINASTERIDE 5 MG: 5 TABLET, FILM COATED ORAL at 09:10

## 2022-08-14 RX ADMIN — MIDODRINE HYDROCHLORIDE 2.5 MG: 5 TABLET ORAL at 09:09

## 2022-08-14 RX ADMIN — MEMANTINE 5 MG: 5 TABLET ORAL at 19:52

## 2022-08-14 RX ADMIN — MIDODRINE HYDROCHLORIDE 2.5 MG: 5 TABLET ORAL at 12:56

## 2022-08-14 RX ADMIN — TAMSULOSIN HYDROCHLORIDE 0.4 MG: 0.4 CAPSULE ORAL at 09:10

## 2022-08-14 RX ADMIN — FAMOTIDINE 20 MG: 20 TABLET, FILM COATED ORAL at 19:52

## 2022-08-14 RX ADMIN — METOPROLOL SUCCINATE 25 MG: 25 TABLET, EXTENDED RELEASE ORAL at 09:10

## 2022-08-14 RX ADMIN — CITALOPRAM HYDROBROMIDE 10 MG: 20 TABLET ORAL at 09:10

## 2022-08-14 RX ADMIN — APIXABAN 5 MG: 5 TABLET, FILM COATED ORAL at 09:10

## 2022-08-14 RX ADMIN — ATORVASTATIN CALCIUM 20 MG: 10 TABLET, FILM COATED ORAL at 09:10

## 2022-08-14 ASSESSMENT — PAIN SCALES - GENERAL
PAINLEVEL_OUTOF10: 0
PAINLEVEL_OUTOF10: 0

## 2022-08-14 NOTE — PROGRESS NOTES
Report called to Amie-ENIO on ARU who will be assuming care of the patient when he arrives. All questions answered.

## 2022-08-14 NOTE — PROGRESS NOTES
Discharge paperwork provided and reviewed with patient and family. All questions answered. Saline lock and heart monitor removed.

## 2022-08-14 NOTE — PROGRESS NOTES
Cardiology. Case reviewed with nursing and spouse. 26-year-old patient with significant medical issues with worsening left ventricular dysfunction likely related to underlying ischemic disease. Recommend the patient proceed with ARU admission to gain physical and mental strength. Plan to proceed with cardiovascular treatment with coronary angiogram and possible stenting after inpatient rehabilitation and outpatient clinical follow-up. Spouse agrees with plan.

## 2022-08-14 NOTE — PROGRESS NOTES
NURSING ASSESSMENT:  [address]    Patient:Aroldo Anne     Rehab Dx/Hx: Syncope and collapse [R55]   :1935  UVU:[id number]  Date of Admit: 2022  Room #: 2097/1039-99    Subjective:   Patient admitted to room 153@ from 366 via w/c. Alert and oriented x4. Oriented to room and call light system. Oriented to rehab routine and therapy schedules. Informed about care conferences and ordering of meals with PCA. Drug / Medication Review:   Medications were reviewed by RN at time of admission  [x]  No potential or actual clinically significant medication issues were noted. []  Potential or actual clinically significant medication issues were found and MD was notified. (Specified below if applicable)   []  Allergy to medication   []  Drug interactions (drug/drug, drug/food, drug/disease interactions)   []  Duplicate drug   []  Omission (drug missing from prescribed regimen)   []  Non adherence   []  Adverse reaction   []  Wrong patient, drug, dose, route, time error   []  Ineffective drug therapy    Patient Mood Interview    Symptom Presence  0. No (enter 0 in column 2)  1. Yes (enter 0-3 in column 2)  9. No response (leave column 2 blank  Symptom Frequency  0. Never or 1 day  1. 2-6 days (several days)  2. 7-11 days (half or more days)  3. 12-14 days (nearly everyday) 1. Symptom Presence 2. Symptom Frequency    Enter scores in boxes   Little interest or pleasure in doing things   1 1   Feeling down, depressed, or hopeless   1 1   If either A or B is coded 2 or 3, CONTINUE asking the questions below. If not, END the interview.      Trouble falling or staying asleep, or sleeping too much       Feeling tired or having little energy       Poor appetite or overeating       Feeling bad about yourself - or that you are a failure or have let yourself or your family down       Trouble concentrating on things, such as reading the newspaper or watching television Moving or speaking so slowly that other people could have noticed. Or the opposite- being so fidgety or restless that you have been moving around a lot more than usual.       Thoughts that you would be better off dead, or of hurting yourself in some way. Total Severity: Add scores for all frequency responses in column 2       Social isolation (from [employer])  How often do you feel lonely or isolated from those around you? [x] 0. Never  [] 1. Rarely  [] 2. Sometimes  [] 3. Often  [] 4. Always  [] 8. Patient unable to respond. 4 Eyes Skin Assessment   The patient is being assessed for: Admission     I agree that 2 RN's have performed a thorough Head to Toe Skin Assessment on the patient. ALL assessment sites listed below have been assessed. Areas assessed by both nurses:   [x]   Head, Face, and Ears   [x]   Shoulders, Back, and Chest, Abdomen  [x]   Arms, Elbows, and Hands   [x]   Coccyx, Sacrum, and Ischium  [x]   Legs, Feet, and Heel     Does the Patient have Skin Breakdown?   Yes / No         Dionisio Prevention initiated:  Yes / No  Wound Care Orders initiated:  Yes / Not Applicable      [facility] nurse consulted for Pressure Injury (Stage 3,4, Unstageable, DTI, NWPT, Complex wounds)and New or Established Ostomies:  Yes / Not Applicable    Primary Nurse eSignature: Radha Aguayo RN  Co-signer eSignature:

## 2022-08-14 NOTE — PROGRESS NOTES
Latasha Martínez  8/14/2022  [id number]    Rehab Brief:    Patient is able to tolerate 3 hours of therapy 5 days per week and is medically appropriate for rehab at the Acute Rehabilitation Unit. Approved for admission today. Orders placed for transfer. Thank you for the consultation.     Whitney Mariscal MD 8/14/2022 12:41 PM

## 2022-08-14 NOTE — PROGRESS NOTES
Patient's EF (Ejection Fraction) is less than 40%    Heart Failure Medications:  Diuretics[de-identified] None    (One of the following REQUIRED for EF </= 40%/SYSTOLIC FAILURE but MAY be used in EF% >40%/DIASTOLIC FAILURE)        ACE[de-identified] None        ARB[de-identified] None         ARNI[de-identified] None    (Beta Blockers)  NON- Evidenced Based Beta Blocker (for EF% >40%/DIASTOLIC FAILURE): None    Evidenced Based Beta Blocker::(REQUIRED for EF% <40%/SYSTOLIC FAILURE) Metoprolol SUCCinate- Toprol XL  . .................................................................................................................................................. Patient's weights and intake/output reviewed: Yes    atient's Last Weight:  70.8 kg obtained by standing scale. Difference of 0 lbs more than last documented weight. Intake/Output Summary (Last 24 hours) at 8/13/2022 2336  Last data filed at 8/13/2022 2030  Gross per 24 hour   Intake 320 ml   Output --   Net 320 ml       Comorbidities Reviewed Yes    Patient has a past medical history of Atrial fibrillation ([address] Blind right eye, Chronic kidney disease, Dementia ([address] Hyperlipidemia, Hypertension, and Pneumonia. >>For CHF and Comorbidity documentation on Education Time and Topics, please see Education Tab    Progressive Mobility Assessment:  What is this patient's Current Level of Mobility?: Ambulatory- with Assistance  How was this patient Mobilized today?: Edge of Bed, Up to Chair,  Up to Toilet/Shower, and Up in Room, ambulated 10 ft                 With Whom? Nurse, PCA, and PT                 Level of Difficulty/Assistance: 1x Assist     Pt resting in bed at this time on room air. Pt denies shortness of breath. Pt without lower extremity edema.      Patient and/or Family's stated Goal of Care this Admission: reduce shortness of breath, increase activity tolerance, better understand heart failure and disease management, be more comfortable, and reduce lower extremity edema prior to discharge        :

## 2022-08-14 NOTE — DISCHARGE SUMMARY
Hospital Discharge Summary    Patient's PCP: Gerard Brian MD  Admit Date: 8/9/2022   Discharge Date: 8/14/2022    Admitting Physician: Dr. Kalen Mendez MD  Discharge Physician: Dr. Heath Cody MD   Consults: cardiology and rehabilitation medicine    Brief HPI:     82 y.o. male who presented to [facility] with syncope symptoms from [facility] ED  He has PMH of HTN, HLD, CAD, CKD, A. fib, s/p permanent pacemaker, followed by [facility] cardiology, dementia presented to the ED at [facility] earlier today for presyncopal episode that happened earlier today when the patient was at home. It is noted that patient had a fall from a syncopal episode few days back and had a scalp laceration requiring staples. At that time he was seen at [facility] ED. He had another  episode earlier today. The family urged him to come to the hospital today. It is reported patient has had multiple episodes of falls over the past week. He reports oftentimes he tries to stand up and walk and becomes lightheaded and dizzy. Since his fall few days back patient has had 5 more falls including today. Patient denies losing any consciousness during these falls. Denies any chest pain, diaphoresis, nausea or vomiting preceding or after these episodes. Family was concerned about his safety due to these multiple falls at home so they brought him to the ED again today for further evaluation. Patient was worked up in [facility] ED and decision made to transfer to [facility] for further work-up and management of his symptoms. Brief hospital course:     -Orthostatic hypotension /syncope--likely due to hypovolemia and medications. s/p IV fluids.   Imdur discontinued and started on midodrine 8/12, compression stockings--- improved     -Chronic systolic heart failure, EF 35 to 40%--outpatient ischemic evaluation per cardiology     -NATANAEL on CKD stage III--- creatinine decreased from 1.6-1.2--- continue to monitor     -Dementia with behavioral disturbance -continue Aricept and Namenda     -Acute metabolic encephalopathy--increased agitation 8/11-sedated with Geodon and Haldol--currently back to baseline     -Paroxysmal A. fib s/p PPM -continue beta-blocker, continue Eliquis     -CAD -stable, no anginal symptoms at this time. S/p C in 2020 showed mild LAD disease . continue home medication regimen. Followed by Dr. Marta Astudillo     -HTN-stable --continue metoprolol, Imdur discontinued     -BPH-continue finasteride and tamsulosin     -Hypoglycemia--likely related to poor p.o. intake--improved on D5 infusion--encourage p.o. intake    Invasive procedures  none    Discharge Diagnoses:   Principal Problem:    Syncope and collapse  Active Problems:    Dementia with behavioral disturbance (HCC)    Syncope, unspecified syncope type    Ischemic cardiomyopathy    Hyperlipidemia    Hypertension    A-fib (HCC)    S/P placement of cardiac pacemaker    Coronary artery disease involving native coronary artery of native heart without angina pectoris, 5/2020 abnormal stress test. [facility] mild LAD disease     NATANAEL (acute kidney injury) ([facility] [address]  Resolved Problems:    * No resolved hospital problems. *      Physical Exam: /75   Pulse 68   Temp 98.1 °F (36.7 °C) (Oral)   Resp 18   Ht 5' 7\" (1.702 m)   Wt 156 lb 1.6 oz (70.8 kg)   SpO2 98%   BMI 24.45 kg/m²   Gen/overall appearance: Not in acute distress. Alert. Head: Normocephalic, atraumatic  Eyes: EOMI, good acuity  ENT:- Oral mucosa moist  Neck: No JVD, thyromegaly  CVS: Nml S1S2, no MRG, RRR  Pulm: Clear bilaterally. No crackles/wheezes  Gastrointestinal: Soft, NT/ND, +BS  Musculoskeletal: No edema. Warm  Neuro: No focal deficit. Moves extremity spontaneously. Psychiatry: Appropriate affect. Not agitated. Skin: Warm, dry with normal turgor. No rash        Significant Diagnostic Studies:    See above      Treatments: As above.       Discharge Medications:     Medication List No follow-up provider specified. Code status:  Full Code         Total time spent on discharge, finalizing medications, referrals and arranging outpatient follow up was more than 45 minutes      Thank you Dr. Shira Fleming MD for the opportunity to be involved in this patients care.

## 2022-08-14 NOTE — CARE COORDINATION
CASE MANAGEMENT DISCHARGE SUMMARY      Discharge to: [facility] p 1728    Precertification completed: [address] Notification (HENS) completed: N/A    Transportation:    In hospital transport to [facility]    Confirmed discharge plan with:     Patient: yes per RN     Family:  yes  Emiliano Fabry - Spouse - [phone]     Facility/Agency, name: [facility] BINU/AVS faxed   Phone number for report to facility: [phone]     RN, name: Denia STEEN         Note: Discharging nurse to complete BINU, reconcile AVS, and place final copy with patient's discharge packet. RN to ensure that written prescriptions for  Level II medications are sent with patient to the facility as per protocol.

## 2022-08-14 NOTE — PLAN OF CARE
Problem: Confusion  Goal: Confusion, delirium, dementia, or psychosis is improved or at baseline  Description: INTERVENTIONS:  1. Assess for possible contributors to thought disturbance, including medications, impaired vision or hearing, underlying metabolic abnormalities, dehydration, psychiatric diagnoses, and notify attending LIP  2. Korbel high risk fall precautions, as indicated  3. Provide frequent short contacts to provide reality reorientation, refocusing and direction  4. Decrease environmental stimuli, including noise as appropriate  5. Monitor and intervene to maintain adequate nutrition, hydration, elimination, sleep and activity  6. If unable to ensure safety without constant attention obtain sitter and review sitter guidelines with assigned personnel  7.  Initiate Psychosocial CNS and Spiritual Care consult, as indicated  8/13/2022 2242 by Amie Boogie RN  Outcome: Progressing  Flowsheets (Taken 8/13/2022 2242)  Effect of thought disturbance (confusion, delirium, dementia, or psychosis) are managed with adequate functional status:   Assess for contributors to thought disturbance, including medications, impaired vision or hearing, underlying metabolic abnormalities, dehydration, psychiatric diagnoses, notify Himanshu Dickens high risk fall precautions, as indicated   Provide frequent short contacts to provide reality reorientation, refocusing and direction   Decrease environmental stimuli, including noise as appropriate

## 2022-08-14 NOTE — PROGRESS NOTES
[facility] SADI Admission Assessment  [facility] Acute Rehab Unit    Patient:Aroldo Anne     Rehab Dx/Hx: Syncope and collapse [R55]   :1935  BDT:[id number]  Date of Admit: 2022     Pain Effect on Sleep:  Over the past 5 days, how much of the time has pain made it hard for you to sleep at night?  []  0. Does not apply - I have not had any pain or hurting in the past 5 days  [x]  1. Rarely or not at all  []  2. Occasionally  []  3. Frequently  []  4. Almost constantly  []  8. Unable to answer    Over the past 5 days, how often have you limited your participation in rehabilitation therapy sessions due to pain?  []  0. Does not apply - I have not received rehabilitation therapy in the past 5 days  [x]  1. Rarely or not at all  []  2. Occasionally  []  3. Frequently  []  4. Almost constantly  []  8. Unable to answer    Pain Interference with Day-to-Day Activities:  Over the past 5 days, how often have you limited your day-to-day activities (excluding rehabilitation therapy session)? [x]  1. Rarely or not at all  []  2. Occasionally  []  3. Frequently  []  4. Almost constantly  []  8. Unable to answer      High-Risk Drug Classes: Use and Indication   Is taking: Check if the pt is taking any medications by pharmacological classification  Indication noted: If column 1 is checked, check if there is an indication noted for all meds in the drug class Is taking  (check all that apply) Indication noted (check all that apply)   Antipsychotic [] []   Anticoagulant [x] [x]   Antibiotic [] []   Opioid [] []   Antiplatelet [] []   Hypoglycemic (including insulin) [] []   None of the above [] []     Special Treatments, Procedures, and Programs   Check all of the following treatments, procedures, and programs that apply on admission. On admission (check all that apply)   Cancer Treatments    A1. Chemotherapy []   A2. IV []   A3. Oral []   A10. Other []   B1. Radiation []   Respiratory Therapies    C1.  Oxygen Therapy []   C2. Continuous []   C3. Intermittent []   C4. High-concentration []   D1. Suctioning []   D2. Scheduled []   D3. As needed []   E1. Tracheostomy Care []   F1. Invasive Mechanical Ventilator (ventilator or respirator) []   G1. Non-invasive Mechanical Ventilator []   G2. BiPAP []   G3. CPAP []   Other    H1. IV Medications []   H2. Vasoactive medications []   H3. Antibiotics []   H4. Anticoagulation []   H10. Other []   I1. Transfusions []   J1. Dialysis []   J2. Hemodialysis []   J3. Peritoneal dialysis []   O1. IV access []   O2. Peripheral []   O3. Midline []   O4.  Central (PICC, tunneled, port) []   None of the above [x]     Signature: Radha Aguayo RN

## 2022-08-14 NOTE — CARE COORDINATION
Received call from Mynor Hdez, who was reviewing pt status to dc to AND ARU today. Spouse of pt requesting further discussion with cards to understand why pt cath is not happening prior to rehab entry. Advised hospitalist and RN. Remains possible pt could admit to ARU this day if this conversation occurs.

## 2022-08-14 NOTE — PLAN OF CARE
Problem: Safety - Adult  Goal: Free from fall injury  8/14/2022 1923 by Sharifa Velazco  Outcome: Progressing  8/14/2022 1843 by Sharifa Velazco  Outcome: Progressing

## 2022-08-14 NOTE — PLAN OF CARE
Problem: Discharge Planning  Goal: Discharge to home or other facility with appropriate resources  Outcome: Completed  Flowsheets (Taken 8/14/2022 0902)  Discharge to home or other facility with appropriate resources:   Identify barriers to discharge with patient and caregiver   Arrange for needed discharge resources and transportation as appropriate     Problem: Safety - Adult  Goal: Free from fall injury  Outcome: Completed  Flowsheets (Taken 8/14/2022 0908)  Free From Fall Injury: Instruct family/caregiver on patient safety     Problem: Confusion  Goal: Confusion, delirium, dementia, or psychosis is improved or at baseline  Description: INTERVENTIONS:  1. Assess for possible contributors to thought disturbance, including medications, impaired vision or hearing, underlying metabolic abnormalities, dehydration, psychiatric diagnoses, and notify attending LIP  2. Penelope high risk fall precautions, as indicated  3. Provide frequent short contacts to provide reality reorientation, refocusing and direction  4. Decrease environmental stimuli, including noise as appropriate  5. Monitor and intervene to maintain adequate nutrition, hydration, elimination, sleep and activity  6. If unable to ensure safety without constant attention obtain sitter and review sitter guidelines with assigned personnel  7. Initiate Psychosocial CNS and Spiritual Care consult, as indicated  Outcome: Completed  Flowsheets (Taken 8/14/2022 0902)  Effect of thought disturbance (confusion, delirium, dementia, or psychosis) are managed with adequate functional status: Assess for contributors to thought disturbance, including medications, impaired vision or hearing, underlying metabolic abnormalities, dehydration, psychiatric diagnoses, notify LIP     Problem: Pain  Goal: Verbalizes/displays adequate comfort level or baseline comfort level  Outcome: Completed     Problem: Skin/Tissue Integrity  Goal: Absence of new skin breakdown  Description: 1. Monitor for areas of redness and/or skin breakdown  2. Assess vascular access sites hourly  3. Every 4-6 hours minimum:  Change oxygen saturation probe site  4. Every 4-6 hours:  If on nasal continuous positive airway pressure, respiratory therapy assess nares and determine need for appliance change or resting period.   Outcome: Completed     Problem: Neurosensory - Adult  Goal: Achieves stable or improved neurological status  Outcome: Completed  Flowsheets (Taken 8/14/2022 0902)  Achieves stable or improved neurological status: Assess for and report changes in neurological status     Problem: Respiratory - Adult  Goal: Achieves optimal ventilation and oxygenation  Outcome: Completed     Problem: Cardiovascular - Adult  Goal: Maintains optimal cardiac output and hemodynamic stability  Outcome: Completed  Goal: Absence of cardiac dysrhythmias or at baseline  Outcome: Completed     Problem: Skin/Tissue Integrity - Adult  Goal: Skin integrity remains intact  Outcome: Completed  Flowsheets (Taken 8/14/2022 0902)  Skin Integrity Remains Intact: Monitor for areas of redness and/or skin breakdown     Problem: Metabolic/Fluid and Electrolytes - Adult  Goal: Electrolytes maintained within normal limits  Outcome: Completed  Flowsheets (Taken 8/14/2022 0902)  Electrolytes maintained within normal limits:   Monitor labs and assess patient for signs and symptoms of electrolyte imbalances   Administer electrolyte replacement as ordered  Goal: Hemodynamic stability and optimal renal function maintained  Outcome: Completed  Flowsheets (Taken 8/14/2022 0902)  Hemodynamic stability and optimal renal function maintained:   Monitor labs and assess for signs and symptoms of volume excess or deficit   Monitor intake, output and patient weight     Problem: Musculoskeletal - Adult  Goal: Return mobility to safest level of function  Outcome: Completed  Flowsheets (Taken 8/14/2022 0902)  Return Mobility to Safest Level of Function: Assess patient stability and activity tolerance for standing, transferring and ambulating with or without assistive devices   Assist with transfers and ambulation using safe patient handling equipment as needed     Problem: ABCDS Injury Assessment  Goal: Absence of physical injury  Outcome: Completed

## 2022-08-14 NOTE — CARE COORDINATION
Care Coordination, Acute Rehab     After review, this patient is felt to be:      [x]  Appropriate for Acute Inpatient Rehab: pending family in agreement to schedule cardiac cath after rehab. Liaison spoke to wife on the phone and she wants patient to have cardiac cath before coming to rehab. Patient not officially accepted to IPR d/t this. Discussed with OWEN Norman.    []  Appropriate for Acute Inpatient Rehab Pending Insurance Authorization    []  Not appropriate for Acute Inpatient Rehab    []  Not appropriate at this time, however evaluation ongoing      Liaison spoke with wife on the phone and she wants her /patient to have cardiac cath prior to coming to rehab. Wife was upset that patient was given \"anti psychotics\" on the night of 8/11 because cardiology could not wake him the next morning (8/12) to potentially do a cardiac cath that day. Informed case management of those concerns. Called wife to give updates, but there was no answer. Will continue to follow and please call with any questions or concerns. Electronically signed by Jareth Telles RN on 8/14/2022 at 10:41 AM      Cardiology plant to do cardiac cath after IPR. Wife is in agreement with this plan per notes. Weekend covering MD for ARU aware and accepted. Plan to admit patient after patient at/after 3pm pending covid test. OWEN aware.     Thank you for the referral. Electronically signed by Jareth Telles RN on 8/14/2022 at 11:11 AM

## 2022-08-15 ENCOUNTER — APPOINTMENT (OUTPATIENT)
Dept: GENERAL RADIOLOGY | Age: 87
DRG: 948 | End: 2022-08-15
Attending: PHYSICAL MEDICINE & REHABILITATION
Payer: MEDICARE

## 2022-08-15 LAB
ANION GAP SERPL CALCULATED.3IONS-SCNC: 9 MMOL/L (ref 3–16)
BUN BLDV-MCNC: 26 MG/DL (ref 7–20)
CALCIUM SERPL-MCNC: 9 MG/DL (ref 8.3–10.6)
CHLORIDE BLD-SCNC: 100 MMOL/L (ref 99–110)
CO2: 29 MMOL/L (ref 21–32)
CREAT SERPL-MCNC: 1.3 MG/DL (ref 0.8–1.3)
GFR AFRICAN AMERICAN: >60
GFR NON-AFRICAN AMERICAN: 52
GLUCOSE BLD-MCNC: 59 MG/DL (ref 70–99)
HCT VFR BLD CALC: 49.2 % (ref 40.5–52.5)
HEMOGLOBIN: 16.2 G/DL (ref 13.5–17.5)
MCH RBC QN AUTO: 29.3 PG (ref 26–34)
MCHC RBC AUTO-ENTMCNC: 33 G/DL (ref 31–36)
MCV RBC AUTO: 88.8 FL (ref 80–100)
PDW BLD-RTO: 16.5 % (ref 12.4–15.4)
PLATELET # BLD: 137 K/UL (ref 135–450)
PMV BLD AUTO: 9.4 FL (ref 5–10.5)
POTASSIUM SERPL-SCNC: 4.8 MMOL/L (ref 3.5–5.1)
RBC # BLD: 5.54 M/UL (ref 4.2–5.9)
SODIUM BLD-SCNC: 138 MMOL/L (ref 136–145)
WBC # BLD: 7.7 K/UL (ref 4–11)

## 2022-08-15 PROCEDURE — 84439 ASSAY OF FREE THYROXINE: CPT

## 2022-08-15 PROCEDURE — 97530 THERAPEUTIC ACTIVITIES: CPT

## 2022-08-15 PROCEDURE — 97535 SELF CARE MNGMENT TRAINING: CPT

## 2022-08-15 PROCEDURE — 97166 OT EVAL MOD COMPLEX 45 MIN: CPT

## 2022-08-15 PROCEDURE — 36415 COLL VENOUS BLD VENIPUNCTURE: CPT

## 2022-08-15 PROCEDURE — 80048 BASIC METABOLIC PNL TOTAL CA: CPT

## 2022-08-15 PROCEDURE — 92526 ORAL FUNCTION THERAPY: CPT

## 2022-08-15 PROCEDURE — 92610 EVALUATE SWALLOWING FUNCTION: CPT

## 2022-08-15 PROCEDURE — 2580000003 HC RX 258: Performed by: STUDENT IN AN ORGANIZED HEALTH CARE EDUCATION/TRAINING PROGRAM

## 2022-08-15 PROCEDURE — 97162 PT EVAL MOD COMPLEX 30 MIN: CPT

## 2022-08-15 PROCEDURE — 2580000003 HC RX 258: Performed by: PHYSICAL MEDICINE & REHABILITATION

## 2022-08-15 PROCEDURE — 6370000000 HC RX 637 (ALT 250 FOR IP): Performed by: STUDENT IN AN ORGANIZED HEALTH CARE EDUCATION/TRAINING PROGRAM

## 2022-08-15 PROCEDURE — 84443 ASSAY THYROID STIM HORMONE: CPT

## 2022-08-15 PROCEDURE — 92523 SPEECH SOUND LANG COMPREHEN: CPT

## 2022-08-15 PROCEDURE — 85027 COMPLETE CBC AUTOMATED: CPT

## 2022-08-15 PROCEDURE — 1280000000 HC REHAB R&B

## 2022-08-15 PROCEDURE — 6370000000 HC RX 637 (ALT 250 FOR IP): Performed by: PHYSICAL MEDICINE & REHABILITATION

## 2022-08-15 PROCEDURE — 71045 X-RAY EXAM CHEST 1 VIEW: CPT

## 2022-08-15 PROCEDURE — 97110 THERAPEUTIC EXERCISES: CPT

## 2022-08-15 RX ORDER — CALCIUM CARBONATE 200(500)MG
500 TABLET,CHEWABLE ORAL 3 TIMES DAILY PRN
Status: DISCONTINUED | OUTPATIENT
Start: 2022-08-15 | End: 2022-08-27 | Stop reason: HOSPADM

## 2022-08-15 RX ORDER — SODIUM CHLORIDE 9 MG/ML
INJECTION, SOLUTION INTRAVENOUS CONTINUOUS
Status: ACTIVE | OUTPATIENT
Start: 2022-08-15 | End: 2022-08-15

## 2022-08-15 RX ORDER — MIDODRINE HYDROCHLORIDE 5 MG/1
5 TABLET ORAL
Status: DISCONTINUED | OUTPATIENT
Start: 2022-08-15 | End: 2022-08-16

## 2022-08-15 RX ORDER — ATORVASTATIN CALCIUM 40 MG/1
40 TABLET, FILM COATED ORAL DAILY
Status: DISCONTINUED | OUTPATIENT
Start: 2022-08-16 | End: 2022-08-27 | Stop reason: HOSPADM

## 2022-08-15 RX ORDER — ENOXAPARIN SODIUM 100 MG/ML
40 INJECTION SUBCUTANEOUS DAILY
Status: DISCONTINUED | OUTPATIENT
Start: 2022-08-16 | End: 2022-08-27 | Stop reason: HOSPADM

## 2022-08-15 RX ADMIN — FINASTERIDE 5 MG: 5 TABLET, FILM COATED ORAL at 09:36

## 2022-08-15 RX ADMIN — ONDANSETRON 4 MG: 4 TABLET, ORALLY DISINTEGRATING ORAL at 09:52

## 2022-08-15 RX ADMIN — MIDODRINE HYDROCHLORIDE 5 MG: 5 TABLET ORAL at 16:53

## 2022-08-15 RX ADMIN — MEMANTINE 5 MG: 5 TABLET ORAL at 22:10

## 2022-08-15 RX ADMIN — ASPIRIN 81 MG: 81 TABLET, COATED ORAL at 09:37

## 2022-08-15 RX ADMIN — APIXABAN 5 MG: 5 TABLET, FILM COATED ORAL at 09:37

## 2022-08-15 RX ADMIN — DONEPEZIL HYDROCHLORIDE 10 MG: 5 TABLET, FILM COATED ORAL at 09:37

## 2022-08-15 RX ADMIN — FLUTICASONE PROPIONATE 2 SPRAY: 50 SPRAY, METERED NASAL at 09:38

## 2022-08-15 RX ADMIN — MIDODRINE HYDROCHLORIDE 2.5 MG: 5 TABLET ORAL at 11:52

## 2022-08-15 RX ADMIN — SODIUM CHLORIDE, PRESERVATIVE FREE 10 ML: 5 INJECTION INTRAVENOUS at 22:14

## 2022-08-15 RX ADMIN — ATORVASTATIN CALCIUM 20 MG: 10 TABLET, FILM COATED ORAL at 09:36

## 2022-08-15 RX ADMIN — PANTOPRAZOLE SODIUM 40 MG: 40 TABLET, DELAYED RELEASE ORAL at 06:10

## 2022-08-15 RX ADMIN — CITALOPRAM HYDROBROMIDE 10 MG: 20 TABLET ORAL at 09:37

## 2022-08-15 RX ADMIN — FERROUS SULFATE TAB 325 MG (65 MG ELEMENTAL FE) 325 MG: 325 (65 FE) TAB at 09:37

## 2022-08-15 RX ADMIN — QUETIAPINE FUMARATE 50 MG: 25 TABLET ORAL at 22:10

## 2022-08-15 RX ADMIN — MEMANTINE 5 MG: 5 TABLET ORAL at 09:49

## 2022-08-15 RX ADMIN — SODIUM CHLORIDE: 9 INJECTION, SOLUTION INTRAVENOUS at 14:27

## 2022-08-15 RX ADMIN — MIDODRINE HYDROCHLORIDE 2.5 MG: 5 TABLET ORAL at 09:36

## 2022-08-15 ASSESSMENT — PAIN SCALES - GENERAL
PAINLEVEL_OUTOF10: 0

## 2022-08-15 NOTE — CARE COORDINATION
Case Management ARU Admission [address] [place]    Patient:Aroldo Anne     Rehab Dx/Hx: Syncope and collapse [R55]   :1935  APT:[id number]  Date of Admit: 2022  Room #: 0153/0153-01       Objective:  completed initial assessment and review the role of Case Management while on the ARU. Patient educated on team conferences. Discussed family training with the patient/family on how it is encouraged on the unit. Order for \"discharge planning\" has been addressed. Family Present: No   Primary : [facility] Decision Maker:   Primary Decision Maker: Shad Ni - Spouse - [phone]    Secondary Decision Maker: Alfred Mendoza Child - [phone]   Current PCP:  Gail Hunt MD       Admit date:  2022   Insurance: P:MEDICARE PART A AND B  S:[facility]/[employer] MEDICARE SUPP   Precert required for SNF:  [x] No       []   3 Night stay required: [x] No       []   Admitting diagnosis: Syncope and collapse [R55]       Current home situation:  Independent PLOF has can and rollator. Lives with spouse in a one level home with a 2 step entry into home. DME at home: [x] Walker>rollator     [x] Cane       [] RTS        [] BSC      [] Shower Chair        [] O2       [] HHN     [] CPAP       [] BiPap      [] Hospital Bed       [] W/C        [] Other:    Medical concerns requiring training: no   Medication concerns:  Require financial assistance with meds? [] No       [] If yes, please explain:   [x] No       []       Services prior to admission: none   Preference for [facility] or  Therapy: [] Home health       [] Outpatient       [x] No preference   Patient's goal(s): Return home   Working or volunteering PTA?  retired       Transportation needs: Spouse can provide   Has lack of transportation kept you from medical appointments, meetings, work, or ADL's? [] Yes, it has kept me from medical appointments or from getting my meds  [] Yes, It has kept me from non-medical meetings, appointments, work, or getting things I need  [x] No  [] Pt unable to respond  [] Pt declines to respond     How often do you need to have someone help you when you read instructions, pamphlets, or other written material from your doctor or pharmacy? [] Never                             [] Always  [] Rarely                            [] Patient unable to respond  [] Sometimes                     [] Pt declines to respond  [x] Often   Active with: [] Senior services        [] [place] [employer] [place] on aging         [] Other:         Dialysis Facility (if applicable) Name:  Address:  Dialysis Schedule:  Phone:  Fax:       Support system at home and in the community: family   Caregiver physical ability: [x] Cue patient for safety  [] Physical lift/lower: [] Light [] Moderate [] Heavy  [] Pinky Newer / Clean  [x] Transport   Who will be the one to contact for family training: Ang Valdez (spouse)   24 hour care on discharge?: TBD   What level does the patient need to be at to return home:  PLOF   Discharge plan:  Pending therapy recommendations   Barriers to discharge: Patient progress       Ethnicity: Are you of , /a, or Malawian origin?   [x] No, not of , /a, or Malawian origin  [] Yes, [place], [address], Chicano/a  [] Yes, [address] [facility]  [] Yes, [place]  [] Yes, another , , or Malawian origin  [] Pt unable to respond  [] Pt declines to respond     Race: [x] White                                                [] [place]              [] [address] [facility]  [] [place] or [place] American                [] [place]          [] [place] or [place]  [] American [facility] or [place] Native     [] [place]             [] Mosotho  []  [facility]                                      [] [place]      [] Other [place]  [] [place]                                             [] Other        [] Pt unable to respond                      [] Pt declines to respond                  [] None of the above   Preferred Language: English     [facility] on chart for MD Signature.      Signature:  Robb Iglesias RN

## 2022-08-15 NOTE — PROGRESS NOTES
CKD, HTN, and HLD who presented as a transfer from [facility] on 8/9/22 after a presyncopal episode. He had a similar episode several days prior that resulted in a fall and scalp laceration that required staples. He is being treated for orthostatic hypotension. Cardiology is consulted. Echo revealed progressive left ventricular function. Patient was set for further workup with cardiac catheterization 8/12, however he had worsening mental status and procedure was canceled. Plan for outpatient cardiac catheterization. He was admitted to [facility] on 8/14/22 due to functional deficits below his baseline. Today he is seen in his room. He reports feeling dizzy with therapies. He also reports reflux and nausea. Nursing notes that he has been coughing up sputum. Patient's blood pressure is dropping to systolic of 58B when up with therapy. \"    Pt alert and cooperative, agreeable to eval. Pt upright in bed. Per chart review, pt with baseline dementia. Thus, questionable historian. Pt reports he is independent with med management and yard work. Pt report he and his wife share responsibility for finances. He also reports wife manages cooking, cleaning, laundry, and grocery shopping. Pt reports driving, although stated \"I'm not really supposed to. I don't take long trips. 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 The pt was administered the [address] ([address] [facility]) Examination this date to assess cognition. The pt scored a 10/30 with a score of 27 or greater considered WNL per the SLUMS. Thus, this is judged to be a severe impairment when considering prior level of independence and level of function. See pt's scores on each subtest below:    [address] [facility] Status ([facility]) Examination   Section / subtest   Score Comments   Orientation   2/3 Pt oriented to [facility], city, state, purpose, and place.     Pt not oriented to month, year, date. Short-term recall   0/5 +4 given semantic cues   Calculations   3/3 Accurate addition and subtraction      Naming   2/3 Named 10 animals in 1 min     Attention: number repetition   0/2 Correct numbers; incorrect order   Visuospatial tasks: Clock drawing and shape identification   1/6 Difficulty with ID largest shape, numbers on clock, hands on clock    Auditory comprehension 2/8     Total score:  10/30       The pt demonstrated difficulty with orientation, recall, visuospatial, executive function, thought organization,and attention. The pt's strengths included naming, word finding, following commands. PT will benefit from continued speech therapy to promote improvement in these areas and achieve safe and independent return home if safe and able. Discussed areas of deficit, goals, POC for ST with pt who is in agreement for participation in therapy. Recommendations:  Recommendations  Requires SLP Intervention: Yes  Patient Education: SLP edu pt re: role of SLP, rationale of cog eval, results of cog eval, goals, POC  Patient Education Response: Verbalizes understanding;Needs reinforcement  Duration of Treatment: 60 min; 5x/week       Plan:   Speech Therapy Prognosis  Prognosis: Fair  Prognosis Considerations: Other (Comment); Previous Level of Function  Additional Comments: baseline dementia  Individuals consulted  Consulted and agree with results and recommendations: Patient;RN  Safety Devices  Safety Devices in place: Yes  Type of devices: All fall risk precautions in place; Bed alarm in place;Call light within reach; Left in bed    Goals:  Long-term Goals  Timeframe for Long-term Goals: 14 days (08/28/22)  Goal 1: Pt will improve overall cognitive-linguistic abilities to promote safe and independent return home PLOF  Short-term Goals  Timeframe for Short-term Goals: 12 days (08/26/2022)  Goal 1: Pt will use visual aids / strategies to state orientation to time, place, situation with 80% acc given mod cues  Goal 2: Pt will complete graded recall tasks with 50% acc given mod cues  Goal 3: Pt will complete executive function tasks (e.g. meds, time, money, etc) with 70% acc given mod cues  Goal 4: Pt will complete problem solving and thought organization tasks with 70% acc given mod cues  Goal 5: Pt will complete viusal and verbal reasoning tasks with 70% acc given mod cues   Patient/family involved in developing goals and treatment plan: yes    Subjective:   Previous level of function and limitations: Lives with wife. Baseline dementia. Social/Functional History  Lives With: Spouse  Type of Home: House  ADL Assistance: Independent  Homemaking Assistance: Independent  Homemaking Responsibilities: Yes  Meal Prep Responsibility: Secondary (reports basic cooking; wife and dtr do [de-identified] of cooking)  Laundry Responsibility: No  Cleaning Responsibility: No  Bill Paying/Finance Responsibility: Secondary (share swith wife)  Shopping Responsibility: No  Health Care Management: Primary  Ambulation Assistance: Independent  Transfer Assistance: Independent  Active : Yes (\"I'm not really supposed to.  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, raises cattle  Vision  Vision: Impaired  Vision Exceptions: Wears glasses at all times (blind in right eye)  Hearing  Hearing: Exceptions to [facility]  Hearing Exceptions: Hard of hearing/hearing concerns       Objective:  Oral Motor   Labial: No impairment  Dentition: Upper dentures  Oral Hygiene: Clean  Lingual: No impairment  Velum: No Impairment  Mandible: No impairment    Motor Speech  Intelligibility: No impairment  Overall Impairment Severity: None    Auditory Comprehension  Comprehension: Within Functional Limits    Expression  Primary Mode of Expression: Verbal    Verbal Expression  Verbal Expression: Within functional limits    Written Expression  Dominant Hand: Right  Written Expression:  (did not assess)    Pragmatics/Social Functioning  Pragmatics: Within functional limits    Cognition:   Orientation  Overall Orientation Status: Impaired  Orientation Level: Oriented to person;Oriented to place;Oriented to situation  Attention  Attention: Exceptions to [facility]  Memory  Memory: Exceptions to [facility]  Short-term Memory: Severe  Working Memory: Mild  Problem Solving  Problem Solving: Exceptions to [facility]  Simple Functional Tasks: Mild  Verbal Reasoning Skills: Mild  Sequencing: Moderate  Executive Function Skills: Moderate  Managing Finances:  (to be assessed in therapy)  Managing Medications:  (to be assessed in therapy)  Numeric Reasoning  Numeric Reasoning: Exceptions to [facility]   Calculations:  (to be assessed in therapy)  Money Management:  (to be assessed in therapy)  Time:  (to be assessed in therapy)  Abstract Reasoning  Abstract Reasoning: Exceptions to [facility]  Convergent Thinking: Mild  Divergent Thinking: Mild  Safety/Judgment  Safety/Judgment: Exceptions to [facility]  Insight: Mild    Impulsive: Mild      Prognosis:  Speech Therapy Prognosis  Prognosis: Fair  Prognosis Considerations: Other (Comment); Previous Level of Function  Additional Comments: baseline dementia  Individuals consulted  Consulted and agree with results and recommendations: Patient;RN    Education:  Patient Education: SLP edu pt re: role of SLP, rationale of cog eval, results of cog eval, goals, POC  Patient Education Response: Verbalizes understanding;Needs reinforcement  Safety Devices in place: Yes  Type of devices: All fall risk precautions in place; Bed alarm in place;Call light within reach; Left in bed    Therapy Time:   Individual   Time In 0800   Time Out 0830   Minutes [address] [facility]  Speech Language Pathologist
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
## 2022-08-15 NOTE — PROGRESS NOTES
Speech Language Pathology  Clinical Bedside Swallow Assessment  Facility/Department: [facility]        Recommendations:  Diet recommendation: IDDSI 7 Regular Solids; IDDSI 0 Thin Liquids; Meds whole with thin liquids  Instrumentation: Not indicated at this time; will continue to monitor   Risk management: upright for all intake, stay upright for at least 30 mins after intake, small bites/sips, alternate bites/sips, slow rate of intake, general GERD precautions, and general aspiration precautions      NAME:Aroldo Anne  : 1935 (82 y.o.)   MRN: [id number]  ROOM: [address] [place] [address]  ADMISSION DATE: 2022  PATIENT DIAGNOSIS(ES): Syncope and collapse [R55]  No chief complaint on file.     Patient Active Problem List    Diagnosis Date Noted    Pneumonia 2015    Syncope, unspecified syncope type 08/10/2022    Ischemic cardiomyopathy 08/10/2022    Dementia with behavioral disturbance (HCC)     Hallucinations     Atrial fibrillation with RVR ([facility])     Syncope and collapse     COVID     NATANAEL (acute kidney injury) ([address]     Atrial fibrillation with rapid ventricular response ([address] 2022    Other chest pain 2021    Coronary artery disease involving native coronary artery of native heart without angina pectoris, 2020 abnormal stress test. [facility] mild LAD disease  06/10/2020    Abnormal cardiovascular stress test 2020    SOB (shortness of breath) 2020    S/P placement of cardiac pacemaker 2019    Abnormal echocardiogram 2019    Dizziness 2019    A-fib ([address] 2016    Hyperlipidemia     Hypertension     Chronic kidney disease, stage III (moderate) (HCC)     Tracheobronchomalacia     Abnormal chest CT     Cough syncope      Past Medical History:   Diagnosis Date    Atrial fibrillation ([address]     Blind right eye     Chronic kidney disease     Dementia ([address]     Hyperlipidemia     Hypertension     Pneumonia      Past Surgical History:   Procedure Laterality Date APPENDECTOMY      COLONOSCOPY      EYE SURGERY      HERNIA REPAIR      JOINT REPLACEMENT  2013    left shoulder    PACEMAKER PLACEMENT  10/13/2017    Dr Jono Cramer at [facility] 19. single chamber PPM    TONSILLECTOMY      UPPER GASTROINTESTINAL ENDOSCOPY       Allergies   Allergen Reactions    Sulfa Antibiotics        DATE ONSET: Pt admitted to [facility] 08/09/22; admitted to ARU 08/14/22    Date of Evaluation: 8/15/2022   Evaluating Therapist: Paulo Thorne SLP    Chart Reviewed: : [x] Yes [] No    Current Diet: ADULT DIET; Regular; Low Fat/Low Chol/High Fiber/2 gm Na    Recent Chest Radiography: [x] Chest XR   [] CT of Chest  Date: 08/09/22     IMPRESSION:       No radiographic evidence of acute pulmonary disease identified. Pain: Denies; reports feeling lightheaded - RN made aware     Reason for Referral  Hector Callaway was referred for a bedside swallow evaluation to assess the efficiency of their swallow function, identify signs and symptoms of aspiration and make recommendations regarding safe dietary consistencies, effective compensatory strategies, and safe eating environment. Assessment    Medical record review/interview: Dakota HERNANDEZ H&P: Emil Jamil is an 82year old male with a past medical history significant for atrial fibrillation, s/p pacer, dementia, CKD, HTN, and HLD who presented as a transfer from [facility] on 8/9/22 after a presyncopal episode. He had a similar episode several days prior that resulted in a fall and scalp laceration that required staples. He is being treated for orthostatic hypotension. Cardiology is consulted. Echo revealed progressive left ventricular function. Patient was set for further workup with cardiac catheterization 8/12, however he had worsening mental status and procedure was canceled. Plan for outpatient cardiac catheterization. He was admitted to [facility] on 8/14/22 due to functional deficits below his baseline.  Today he is seen in his room. He reports feeling dizzy with therapies. He also reports reflux and nausea. Nursing notes that he has been coughing up sputum. Patient's blood pressure is dropping to systolic of 31F when up with therapy. \"    Predisposing dysphagia risk factors: Covid-19, Cognitive Deficit, Age, Hx of PNA  Clinical signs of possible chronic dysphagia: N/A  Precipitating dysphagia risk factors: reduced physical mobility    Patient Complaints:  Odynophagia: [] Yes [x] No  Globus Sensation: [x] Yes [] No  SOB with PO intake: [] Yes [x] No  Increased WOB with PO intake: [] Yes [x] No  Reflux Sx's: [] Yes [x] No  Weight loss: [] Yes [x] No  Coughing/Choking with PO intake: [] Yes [x] No  Reduced Appetite: [] Yes [x] No    Additional Reported Symptoms/Complaints/Hospital Course:   Pt reports occ feeling like \"food gets stuck\" in his throat. Pt denies coughing/choking, pain when swallowing, difficulty masticating solids. Pt's goals: \"to get better\"    Vitals/labs:   Temp: n/a  SpO2: 94%  RR: 16  BP: 89/62 (RN made aware)  HR: 72  O2 device: none, room air     CBC:   Recent Labs     08/15/22  0624   WBC 7.7   HGB 16.2         BMP:  Recent Labs     08/13/22  0651      K 4.4      CO2 28   BUN 17   CREATININE 1.4*   GLUCOSE 73          Cranial nerve exam:   CN V (trigeminal): ophthalmic, maxillary, and mandibular facial sensation- WNL  CN VII (facial): WNL  CN IX/X (glossopharyngeal/vagus): MPT: DNT; pitch range: DNT; vocal quality: hoarse; cough: Strong-perceptually  CN XII (hypoglossal): WNL    Laryngeal function exam:   Secretions: WFL  Vocal quality: See CN exam above  MPT: See CN exam above  S/Z ratio: DNT  Pitch range: See CN exam above  Cough: See CN exam above    Oral Care Status:    [] Oral Care [facility]  [] Poor oral care status  [] Edentulous  [x] Upper Dentures  [] Lower Dentures  [] Missing/Broken Teeth  [] Evidence of dental cavities/carries    PO trials:   IDDSI 0 (thin): via straw.  Suspect timely swallow; adequate laryngeal elevation. No overt s/s of aspiration. IDDSI 4 (puree): Adequate A-P transit and oral clearance. No coughing/choking. When eating hayley cracker / applesauce, pt with \"gurgling. \" Vocal quality mildly wet; pt coughed and re-swallowed - cleared. Did not consistently happen; continue to monitor. IDDSI 7 (regular): Adequate mastication, A-P transitm and oral clearance. No coughing/choking. When eating hayley cracker / applesauce, pt with \"gurgling. \" Vocal quality mildly wet; pt coughed and re-swallowed - cleared. Did not consistently happen; continue to monitor. Impressions:  RN ok'd entry of SLP; RN had not seen pt yet, but got in report pt tolerating pills whole with water with no concerns. Pt alert and cooperative, agreeable to eval. Pt upright in bed. Pt with complaints of occ globus sensation; denies all other symptoms of dysphagia / aspiration. Pt with upper denture; no lower denture. Oral mech exam grossly WFL. Thin liquid, puree, and regular solid PO trials presented. Adequate mastication, A-P transitm and oral clearance. When eating hayley cracker / applesauce, pt with \"gurgling. \" Vocal quality mildly wet; pt coughed and re-swallowed - cleared. Did not consistently happen; continue to monitor. Pt reported food felt somewhat \"stuclk,\" benefited from cue from SLP for liquid wash. Suspect timely swallow; adequate laryngeal elevation. No overt s/s of aspiration. Edu re: safe swallow strategies - small bites/sips, slow rate, alternate liquids and solids, fully upright. Pt will benefit form ongoing edu. SLP recommends regular solids, thin liquids, meds PO. RN made aware of recommendations. SLP to continue to follow for diet tolerance monitoring. Recommendations:  Diet recommendation: IDDSI 7 Regular Solids; IDDSI 0 Thin Liquids;  Meds whole with thin liquids  Instrumentation: Not indicated at this time; will continue to monitor   Risk management: upright for all intake, stay upright for at least 30 mins after intake, small bites/sips, alternate bites/sips, slow rate of intake, general GERD precautions, and general aspiration precautions    Prognosis: Fair    Recommended Intervention:   [x] Dysphagia tx  [] Videostroboscopy                      [] NPO   [] MBS       [] Speech/Cog Eval   [] Therapeutic PO Trials     [] Ice Chips   [] Other:  [] FEES                                                 Dysphagia Therapeutic Intervention:   []  Bolus control Exercises  []  Oral Motor Exercises  []  Rollins Water Protocol  []  Thermal Stimulation  []  Oral Care    []  Vital Stim/NMES  []  Laryngeal Exercises  [x]  Patient/Family Education  []  Pharyngeal Exercises  []  Therapeutic PO trials with SLP  [x]  Diet tolerance monitoring  []  Other:     Referrals:  N/A    Goals:  Short Term Goals:  Timeframe for Short Term Goals: (10 days; 08/24/2022)  Goal 1: The patient will tolerate recommended diet with no clinical s/s of aspiration 5/5  Goal 2: The patient/caregiver will demonstrate understanding of compensatory swallow strategies, for improved swallow safety    Long Term Goals:   Timeframe for Long Term Goals: (14 days; 08/28/2022)  Goal 1: The patient will tolerate least restrictive diet with no clinical s/s of aspiration or worsening respiratory/pulmonary status    Treatment:  Skilled instruction completed with patient re: evidenced based practice regarding recommendations and POC, importance of oral care to reduce adverse affects in the event of aspiration, and instruction of recommended compensatory strategies developed based upon clinical exam. Pt able to recall/demonstrate compensatory strategies with mod cues.       Pt Education: SLP educated the patient re: Role of SLP, rationale for completion of assessment, results of assessment, recommendations, and POC  Pt Education Response: verbalized understanding and would benefit from ongoing education    Duration/Frequency of Tx: 5x/week    Individuals Consulted:   [x]  Patient     []  NP         [x]  RN   []  RD                   []  MD      []  Family Member                        []  PA    []  Other:      Safety Devices / Report:  [x]  All fall risk precautions in place []  Safety handoff completed with RN  [x]  Bed alarm in place  [x]  Left in bed     []  Chair alarm in place  []  Left in chair   [x]  Call light in reach   [x]  Other: AVASYS        Total Treatment Time / Charges       Time in Time out Total Time / units   Swallow Eval/Tx Time  0830 0900 30 min / 2 units (DE/DT)     Signature:  Ryan Stage MA [id number] [facility]  Speech Language Pathologist

## 2022-08-15 NOTE — PLAN OF CARE
SLP completed evaluation. Please refer to notes in EMR.       Emil Berry MA [id number] [facility]  Speech Language Pathologist

## 2022-08-15 NOTE — H&P
Patient: Bruno Laws  [id number]  Date: 8/15/2022      Chief Complaint: Orthostatic hypotension     History of Present Illness/Hospital Course:  Alessia Brito is an 82year old male with a past medical history significant for atrial fibrillation, s/p pacer, dementia, CKD, HTN, and HLD who presented as a transfer from [facility] on 8/9/22 after a presyncopal episode. He had a similar episode several days prior that resulted in a fall and scalp laceration that required staples. He is being treated for orthostatic hypotension. Cardiology is consulted. Echo revealed progressive left ventricular function. Patient was set for further workup with cardiac catheterization 8/12, however he had worsening mental status and procedure was canceled. Plan for outpatient cardiac catheterization. He was admitted to [facility] on 8/14/22 due to functional deficits below his baseline. Today he is seen in his room. He reports feeling dizzy with therapies. He also reports reflux and nausea. Nursing notes that he has been coughing up sputum. Patient's blood pressure is dropping to systolic of 71V when up with therapy. Prior Level of Function:  Independent in self care, stairs, functional cognition, indoor mobility    Current Level of Function:  Supervision for lying to sitting on side of bed, sit to stand  Mod assist for chair/bed transfers, walk 10 feet, walk 150 feet     has a past medical history of Atrial fibrillation ([address] Blind right eye, Chronic kidney disease, Dementia ([address] Hyperlipidemia, Hypertension, and Pneumonia. has a past surgical history that includes Tonsillectomy; Appendectomy; Colonoscopy; eye surgery; Upper gastrointestinal endoscopy; joint replacement (2013); hernia repair; and pacemaker placement (10/13/2017). reports that he quit smoking about 47 years ago. He has a 10.00 pack-year smoking history.  He has never used smokeless tobacco. He reports that he does not drink alcohol and does IntraVENous PRN Shashank Joyce MD        glucagon (rDNA) injection 1 mg  1 mg SubCUTAneous PRN Shashank Joyce MD        dextrose 10 % infusion   IntraVENous Continuous PRN Shashank Joyce MD        apixaban Rafita Grounds) tablet 5 mg  5 mg Oral BID Shashank Joyce MD   5 mg at 08/15/22 0937    midodrine (PROAMATINE) tablet 2.5 mg  2.5 mg Oral TID WC Shashank Joyce MD   2.5 mg at 08/15/22 0936    sodium chloride flush 0.9 % injection 5-40 mL  5-40 mL IntraVENous 2 times per day Shashank Joyce MD        acetaminophen (TYLENOL) tablet 650 mg  650 mg Oral Q4H PRN Shashank Joyce MD        diphenhydrAMINE (BENADRYL) tablet 25 mg  25 mg Oral Q6H PRN Shashank Joyce MD        hydrALAZINE (APRESOLINE) tablet 50 mg  50 mg Oral Q8H PRN Shashank Joyce MD        ondansetron (ZOFRAN-ODT) disintegrating tablet 4 mg  4 mg Oral Q8H PRN Shashank Joyce MD   4 mg at 08/15/22 1335    traZODone (DESYREL) tablet 50 mg  50 mg Oral Nightly PRN Shashank Joyce MD        traMADol Toña Heading) tablet 50 mg  50 mg Oral Q6H PRN Shashank Joyce MD           Allergies   Allergen Reactions    Sulfa Antibiotics        Current Facility-Administered Medications   Medication Dose Route Frequency Provider Last Rate Last Admin    aspirin EC tablet 81 mg  81 mg Oral Daily Shashank Joyce MD   81 mg at 08/15/22 0937    atorvastatin (LIPITOR) tablet 20 mg  20 mg Oral Daily Shashank Joyce MD   20 mg at 08/15/22 0936    citalopram (CELEXA) tablet 10 mg  10 mg Oral Daily Shashank Joyce MD   10 mg at 08/15/22 0937    donepezil (ARICEPT) tablet 10 mg  10 mg Oral Daily Shashank Joyce MD   10 mg at 08/15/22 0937    famotidine (PEPCID) tablet 20 mg  20 mg Oral Nightly Shashank Joyce MD   20 mg at 08/14/22 1952    ferrous sulfate (IRON 325) tablet 325 mg  325 mg Oral Daily Shashank Joyce MD   325 mg at 08/15/22 0937    finasteride (PROSCAR) tablet 5 mg  5 mg Oral Daily Shashank Joyce MD   5 mg at 08/15/22 0936    memantine (NAMENDA) tablet 5 mg  5 mg Oral BID Kim Brady MD   5 mg at 08/15/22 0949    metoprolol succinate (TOPROL XL) extended release tablet 25 mg  25 mg Oral Daily Kim Brady MD        QUEtiapine (SEROQUEL) tablet 50 mg  50 mg Oral Nightly Kim Brady MD   50 mg at 08/14/22 1952    tamsulosin (FLOMAX) capsule 0.4 mg  0.4 mg Oral Daily Kim Brady MD        pantoprazole (PROTONIX) tablet 40 mg  40 mg Oral QAM AC Kim Brady MD   40 mg at 08/15/22 0610    fluticasone (FLONASE) 50 MCG/ACT nasal spray 2 spray  2 spray Nasal Daily Kim Brady MD   2 spray at 08/15/22 8450    sodium chloride flush 0.9 % injection 5-40 mL  5-40 mL IntraVENous PRN Kim Brady MD        polyethylene glycol (GLYCOLAX) packet 17 g  17 g Oral Daily PRN Kim Brady MD        glucose chewable tablet 16 g  4 tablet Oral PRN Kim Brady MD        dextrose bolus 10% 125 mL  125 mL IntraVENous PRN Kim Brady MD        Or    dextrose bolus 10% 250 mL  250 mL IntraVENous PRN Kim Brady MD        glucagon (rDNA) injection 1 mg  1 mg SubCUTAneous PRN Kim Brady MD        dextrose 10 % infusion   IntraVENous Continuous PRN Kim Brady MD        apixaban Paolo Anaisen) tablet 5 mg  5 mg Oral BID Kim Brady MD   5 mg at 08/15/22 0937    midodrine (PROAMATINE) tablet 2.5 mg  2.5 mg Oral TID WC Kim Brady MD   2.5 mg at 08/15/22 0936    sodium chloride flush 0.9 % injection 5-40 mL  5-40 mL IntraVENous 2 times per day Kim Brady MD        acetaminophen (TYLENOL) tablet 650 mg  650 mg Oral Q4H PRN Kim Brady MD        diphenhydrAMINE (BENADRYL) tablet 25 mg  25 mg Oral Q6H PRN Kim Brady MD        hydrALAZINE (APRESOLINE) tablet 50 mg  50 mg Oral Q8H PRN Kim Brady MD        ondansetron (ZOFRAN-ODT) disintegrating tablet 4 mg  4 mg Oral Q8H PRN Kim Brady MD   4 mg at 08/15/22 7702    traZODone (DESYREL) tablet 50 mg  50 mg Oral Nightly PRN Kim Brady MD        traMADol Ledy Welsh) tablet 50 mg  50 mg Oral Q6H PRN Alexander Luciano MD             REVIEW OF SYSTEMS:   CONSTITUTIONAL: negative for fevers, chills, diaphoresis, activity change, appetite change, fatigue, night sweats and unexpected weight change. EYES: negative for blurred vision, eye discharge, visual disturbance and icterus. HEENT: negative for hearing loss, tinnitus, ear drainage, sinus pressure, nasal congestion, epistaxis and snoring. RESPIRATORY: Negative for hemoptysis, cough, sputum production. CARDIOVASCULAR: negative for chest pain, palpitations, exertional chest pressure/discomfort, edema, syncope. GASTROINTESTINAL: negative for nausea, vomiting, diarrhea, constipation, blood in stool and abdominal pain. GENITOURINARY: negative for frequency, dysuria, urinary incontinence, decreased urine volume, and hematuria. HEMATOLOGIC/LYMPHATIC: negative for easy bruising, bleeding and lymphadenopathy. ALLERGIC/IMMUNOLOGIC: negative for recurrent infections, angioedema, anaphylaxis and drug reactions. ENDOCRINE: negative for weight changes and diabetic symptoms including polyuria, polydipsia and polyphagia. MUSCULOSKELETAL: negative for pain, joint swelling, decreased range of motion and muscle weakness. NEUROLOGICAL: negative for headaches, slurred speech, unilateral weakness. PSYCHIATRIC/BEHAVIORAL: negative for hallucinations, behavioral problems, confusion and agitation. All pertinent positives are noted in the HPI.     Physical Examination:  Vitals: Patient Vitals for the past 24 hrs:   BP Temp Temp src Pulse Resp SpO2 Height Weight   08/15/22 0915 98/66 97.5 °F (36.4 °C) Oral 70 17 98 % -- --   08/14/22 1930 (!) 141/87 97.3 °F (36.3 °C) Oral 83 18 98 % -- --   08/14/22 1842 (!) 140/88 97.2 °F (36.2 °C) Oral 70 18 -- -- --   08/14/22 1738 -- -- Oral -- -- -- -- --   08/14/22 1737 -- 97.2 °F (36.2 °C) Oral -- -- -- -- --   08/14/22 1731 -- -- -- -- -- 98 % 5' 7\" (1.702 m) 155 lb 4 oz (70.4 kg) 08/14/22 1713 (!) 140/88 -- -- 70 -- -- -- --     Psych: Stable mood, normal judgement, normal affect   Const: No distress  Eyes: Conjunctiva noninjected, no icterus noted; pupils equal, round, and reactive to light. HENT: Atraumatic, normocephalic; Oral mucosa moist  Neck: Trachea midline, neck supple. No thyromegaly noted. CV: Regular rate and rhythm, no murmur rub or gallop noted  Resp: Lungs clear to auscultation bilaterally, no rales wheezes or ronchi, no retractions. Respirations unlabored. GI: Soft, nontender, nondistended. Normal bowel sounds. No palpable masses. Neuro: Alert, oriented, appropriate. No cranial nerve deficits appreciated. Sensation intact to light touch. Motor examination reveals normal strength in all four limbs diffusely. Skin: Normal temperature and turgor  MSK: No joint abnormalities noted. Ext: No significant edema appreciated. No varicosities. Lab Results   Component Value Date    WBC 7.7 08/15/2022    HGB 16.2 08/15/2022    HCT 49.2 08/15/2022    MCV 88.8 08/15/2022     08/15/2022     Lab Results   Component Value Date    INR 1.26 (H) 01/18/2022    INR 1.18 (H) 05/22/2020    INR 1.02 01/24/2016    PROTIME 14.4 (H) 01/18/2022    PROTIME 13.7 (H) 05/22/2020    PROTIME 11.6 01/24/2016     Lab Results   Component Value Date    CREATININE 1.3 08/15/2022    BUN 26 (H) 08/15/2022     08/15/2022    K 4.8 08/15/2022     08/15/2022    CO2 29 08/15/2022     Lab Results   Component Value Date    ALT 23 08/09/2022    AST 34 08/09/2022    ALKPHOS 72 08/09/2022    BILITOT 0.8 08/09/2022     Most recent echocardiogram 8/10/22   Limited only f/u for LVEF. The left ventricular systolic function is moderately reduced with an   ejection fraction of 35 - 40 %. Left ventricular cavity size is normal.   Hypokinesis of the anterior, septal, and apical walls.      Most recent EKG 8/10/22  Atrial fibrillation with frequent ventricular-paced complexes  Rightward axisLow voltage QRSST & T wave abnormality, consider inferior ischemia  Abnormal ECG  When compared with ECG of 21-APR-2022 12:17,  Electronic ventricular pacemaker has replaced Atrial fibrillation      IMAGING     XR Chest 8/9/22  CARDIOVASCULAR: The cardiomediastinal silhouette is not enlarged. LUNGS: No focal airspace disease identified. No significant pleural effusion   or pneumothorax identified. OSSEOUS STRUCTURES: Bony structures are intact. TUBES/LINES/IMPLANTABLE DEVICES: Cardiac assist device noted. The above laboratory data have been reviewed. The above imaging data have been reviewed. The above medical testing have been reviewed. Body mass index is 24.32 kg/m². Barriers to Discharge: orthostatic hypotension, endurance, comorbidities    POST ADMISSION PHYSICIAN EVALUATION  The patient has agreed to being admitted to our comprehensive inpatient rehabilitation facility consisting of at least 180 minutes of therapy a day, 5 out of 7 days a week. The patient/family has a good understanding of our discharge process. The patient has potential to make improvement and is in need of at least two of the following multidisciplinary therapies including but not limited to physical, occupational, respiratory, and speech, nutritional services, wound care, and prosthetics and orthotics. Given the patients complex condition and risk of further medical complications, rehabilitation services cannot be safely provided at a lower level of care such as a skilled nursing facility. I have compared the patients medical and functional status at the time of the preadmission screening and the same on this date, and there are no significant changes.      By signing this document, I acknowledge that I have personally performed a full physical examination on this patient within 24 hours of admission to this inpatient rehabilitation facility and have determined the patient to be able to tolerate the above course of treatment at an intensive level for a reasonable period of time. I will be completing a detailed individualized Plan of Care for this patient by day four of the patients stay based upon the Preadmission Screen, this Post-Admission Evaluation, and the therapy evaluations. Rehabilitation Diagnosis:  16.0, Debility (non-cardiac, non-pulmonary    Assessment and Plan:  Rodney Nascimento is an 82year old male with a past medical history significant for atrial fibrillation, s/p pacer, dementia, CKD, HTN, and HLD who presented as a transfer from [facility] on 8/9/22 after a presyncopal episode, treated for orthostatic hypotension. He was admitted to [facility] on 8/14/22 due to functional deficits below his baseline. Debility   - due to below  - PT, OT, ST    Orthostatic hypotension  - SBP dipping to 70s-80s when up with therapy  - give 500 cc NS, monitor respiratory status due to HF  - increase midodrine to 5 mg   - hold flomax  - discontinue toprol XL and start metoprolol BID to be able to give lower dose with hold parametrs  - consult Medicine    Productive Cough  - obtain XR chest    Paroxysmal Atrial Fibrillation  - low dose BB  - Eliquis    HFrEF  - daily weights, I/Os  - OP ischemic eval per Cardiology    CKD  - baseline Cr ~1.2  - NATANAEL during acute stay  - monitor    Dementia  - aricept, namenda    CAD  - statin  - follow up with Cards OP    BPH  - finasteride  - hold tamsulosin due to BP    Resolved hospital problems    AMS  - suspected to be due to medications     Bowels: Schedule stool softener. Follow bowel movements. Enema or suppository if needed. Bladder: Check PVR x 3. [address] if PVR > 200ml or if any volume is > 500 ml. Sleep: Trazodone provided prn. Follow up appointments: PCP, Cardiology    Krysten Valdez.  Judith Long MD 8/15/2022, 10:03 AM

## 2022-08-15 NOTE — PROGRESS NOTES
Occupational Therapy  Facility/Department: [facility]  Rehabilitation Occupational Therapy Evaluation       Date: 8/15/22  Patient Name: Lori Huynh       Room: 7093/6230-56  MRN: [id number]  Account: [de-identified]   : 1935  (82 y.o.) Gender: male     Referring Practitioner: Ora Schulz MD  Diagnosis: syncope and collapse  Additional Pertinent Hx: \"82 y.o. male who presented to [facility] with syncope symptoms from [facility] ED  He has PMH of HTN, HLD, CAD, CKD, A. fib, s/p permanent pacemaker, followed by [id number] [place] [address] cardiology, dementia presented to the ED at [facility] earlier today for presyncopal episode that happened earlier today when the patient was at home. It is noted that patient had a fall from a syncopal episode few days back and had a scalp laceration requiring staples. At that time he was seen at [facility] ED. He had another  episode earlier today. The family urged him to come to the hospital today. It is reported patient has had multiple episodes of falls over the past week. He reports oftentimes he tries to stand up and walk and becomes lightheaded and dizzy. Since his fall few days back patient has had 5 more falls including today. Patient denies losing any consciousness during these falls. \"    Restrictions  Restrictions/Precautions: Fall Risk;General Precautions  Other position/activity restrictions: pacemaker; staples in R side of head; orthostatic (low BP). 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 Vitals  Temp: 97.5 °F (36.4 °C)  Heart Rate: 65  Resp: 17  BP: 100/70  Oxygen Therapy  SpO2: 96 %  Pulse Oximetry Type:  Intermittent  Pulse Oximeter Device Mode: Intermittent  Pulse Oximeter Device Location: Left;Finger  O2 Device: None (Room air)  Blood Pressure Lyin/58  Pulse Lyin PER MINUTE  Blood Pressure Sitting: 70/42  Pulse Sittin PER MINUTE  Blood Pressure Standin/44  Pulse Standin PER MINUTE  Level of Consciousness: Alert (0)    Social/Functional History  Lives With: Spouse  Type of Home: House  Home Layout: One level  Home Access: Stairs to enter with rails  Entrance Stairs - Number of Steps: 2 JOAQUIN  Entrance Stairs - Rails: Both  Bathroom Shower/Tub: Walk-in shower  Bathroom Toilet: Standard  Bathroom Equipment: Grab bars in shower  Home Equipment: Cane;Rollator (dont use them all the time; but has them)  Has the patient had two or more falls in the past year or any fall with injury in the past year?: Yes (15-20 falls in the past year (reports falls due to dizziness/lightheadedness), injured scalp recently and had 6 staples put on his head. \"just go down, don't even know its happening\")  ADL Assistance: [address]: Needs assistance  Homemaking Responsibilities: Yes (responbile for outside work; granddaughter/wife does medication management; wife does billing.)  Meal Prep Responsibility: No  Laundry Responsibility: No  Cleaning Responsibility: No  Bill Paying/Finance Responsibility: No  Shopping Responsibility: No  Health Care Management: No  Ambulation Assistance: Independent  Transfer Assistance: Independent  Active : Yes (2 miles short distances)  Occupation: Retired  Type of Occupation: Worked for a [employer]: Working outside, [facility] [employer], raises cattle, 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 (oldest son helps with)  Additional Comments: pt reports he feels like he normally falls to the right    Objective     Cognition  Overall Cognitive Status: Exceptions  Arousal/Alertness: Appropriate responses to stimuli  Following Commands: Follows multistep commands with repitition; Follows one step commands with increased time; Follows one step commands consistently  Attention Span: Appears intact  Memory: Decreased short term memory;Decreased recall of precautions;Decreased recall of recent events  Safety Judgement: Decreased awareness of need for safety;Decreased awareness of need for assistance  Problem Solving: Decreased awareness of errors;Assistance required to identify errors made;Assistance required to generate solutions  Insights: Decreased awareness of deficits  Initiation: Requires cues for some  Sequencing: Requires cues for some  Cognition Comment: pt reports at home he \"would get mixed up and not know where I'm at, be gone for an hour at time and not know what I was doing. \"  Orientation  Overall Orientation Status: Impaired  Orientation Level: Disoriented to time;Oriented to person;Oriented to situation;Oriented to place (unable to recall year \"1998\")          Functional Mobility  Balance  Sitting Balance: Stand by assistance  Standing Balance: Minimal assistance  Standing Balance  Time: x2 minutes, decraesed activity tolerance and standing endurance. increased knee flexion/loss of posture with dynamic UE movement. Transfers  Sit to stand: Contact guard assistance  Stand to sit: Contact guard assistance  Toilet Transfers  Toilet - Technique: Ambulating  Equipment Used: Grab bars  Toilet Transfer: Contact guard assistance  Shower Transfers  Shower - Transfer From: Dhaval Taylor - Transfer Type: To and From  Shower - Transfer To: Transfer tub bench  Shower - Technique: Ambulating  Shower Transfers: Contact Guard  Shower Transfers Comments: secondary to BP, completed sponge bath EOB, but completed transfer. ADL  Feeding: Setup  Grooming: Contact guard assistance  Grooming Skilled Clinical Factors: oral hygiene standing at sink [id number]  UE Bathing: Setup;Stand by assistance  UE Bathing Skilled Clinical Factors: secondary to BP and safety, completed sponge bath EOB  LE Bathing: Minimal assistance  LE Bathing Skilled Clinical Factors: for standing balance safety for keiry-hygiene.  pt alternated standing and sitting postures, secondary to BP and safety, completed sponge bath EOB  UE Dressing: Setup  LE Dressing: Minimal assistance  LE Dressing Skilled Clinical Factors: to thread RLE through pants/brief and standing assist during pants management. Toileting: Minimal assistance  Toileting Skilled Clinical Factors: for standing assist during keiry-care. Goals  Patient Goals   Patient goals : \"to go home\"  Short Term Goals  Time Frame for Short term goals: 1 week (by 8/21 /22)  Short Term Goal 1: Pt will complete LBD with SPV  Short Term Goal 2: Pt will tolerate x 5 mintues of standing for improved activity azeem  Short Term Goal 3: Pt will complete toileting with SPV  Short Term Goal 4: Pt will complete in room mobility and transfers with LRAD with SPV  Long Term Goals  Time Frame for Long term goals : 14 days by 8/28/22  Long Term Goal 1: Pt will complete total body dressing with mod I  Long Term Goal 2: Pt will complete total body showering with mod I  Long Term Goal 3: Pt will complete dynamic balance task reachign to high/low levels with SBA and LRAD. Assessment  Performance deficits / Impairments: Decreased functional mobility ; Decreased safe awareness;Decreased balance;Decreased ADL status; Decreased endurance;Decreased cognition;Decreased strength;Decreased posture;Decreased fine motor control  Assessment: 81 y/o male with past medical hx of HTN, HLD, CAD, CKD, A. fib, permanent pacemaker, and dementia presenting with low BP and episodes of syncope and collapse. PLOF ind in ADLs living at home with wife in 1 story home. Pt does assist with farming and outside landscaping; [place], wife and family help with all other IADLs (cooking, cleaning, med management, and finances). Pt reports multiples falls and LOB over past year and reports he does not use AD to wlak at home but does have a cane and 4wrw. Currently pt requiring CGA for ambulation and transfers with RW, min A for showering and dressing.  Pt demo's fair activity azeem standing x 2 minutes with decreasing posture quality with UE activity. Pt BP low throughout session with sitting and standing activities, RN and  MD notified. requrested completing sponge bath 2/2 safety. Pt would benefit from skilled OT to address deficits listed above. Recommend d/c to home with [facility] and DME: shower chair. Treatment Diagnosis: decreased ADLs  Prognosis: Good  Decision Making: Medium Complexity  Discharge Recommendations: Home with Home health OT;24 hour supervision or assist;Home with assist PRN  Plan  Times per Week: 5 of 7 days  Current Treatment Recommendations: Strengthening;Balance training;Functional mobility training; Endurance training;Patient/Caregiver education & training;Equipment evaluation, education, & procurement;Self-Care / ADL; Safety education & training       Therapy Time   Individual Concurrent Group Co-treatment   Time In 1000         Time Out 1125         Minutes 85         Timed Code Treatment Minutes: 70 Minutes (15 minute evaluation)       Becca Baltazar, OT

## 2022-08-15 NOTE — CONSULTS
Hospital Medicine History & Physical      PCP: Adriano Sanchez MD    Date of Admission: 8/14/2022    Date of Service: Pt seen/examined on 08/15/22 and Admitted to Inpatient with expected LOS greater than two midnights due to medical therapy. Chief Complaint:  orthostasis    History Of Present Illness: The patient is a pleasant 82 Y M with a h/o HTN, HLD, non-obstructive CAD, PAF, SSS s/p pacer, CKD3, and dementia. Records show that he weighed 211 lbs in 2016. Since then he has gradually and unintentionally lost 56 lbs, and 13 of these pounds were lost since his COVID hospitalization in 1/2022. The patient says he hasn't had much of an appetite. He says that he has felt lightheaded upon standing ever since about 2/2022. Sometimes he gets so lightheaded that he falls and/or passes out. This has been occurring with increasing frequency and severity over the last few months. Finally he fell 5x in one week, and on one of those occassions he required stapling of a scalp laceration in the [facility] ED. His family sent him back to the [facility] ED after another such spell of orthostatic presyncope, and [facility] decided that he needed to be transferred here on 8/9. He was given IVFs and his medications were adjusted in an attempt to minimize his orthostasis, with variable success. An echo showed a worsened EF, and there were new RWMAs in the LAD distribution. Since in 2020 he had a LHC which showed 50% stenosis of the mid LAD, cardiology considered performing another LHC on this admission, but they ultimately decided that he was too weak and confused to proceed with invasive testing. Cardiology is planning to reconsider as outpatient after his rehab stay. The patient was transferred to the ARU on 8/14. Here he has continued to be orthostatic, which prompted a hospital medicine consult. With PT/OT he dropped from 100/70 to 76/46. With nursing he dropped 91/55 to 70/42, with HR increasing from 70 to 90. PMR gave him 500 ml of IVFs, and currently he is feeling much better, sitting up and eating dinner, SBP in the 130's. He has no complaints, is fully oriented, and has insight into his condition. Past Medical History:          Diagnosis Date    Atrial fibrillation ([facility]     Blind right eye     Chronic kidney disease     Dementia ([facility] [address]     Hyperlipidemia     Hypertension     Pneumonia        Past Surgical History:          Procedure Laterality Date    APPENDECTOMY      COLONOSCOPY      [address]      JOINT REPLACEMENT  2013    left shoulder    PACEMAKER PLACEMENT  10/13/2017    Dr Cem Nixon at [facility] 19. single chamber PPM    TONSILLECTOMY      UPPER GASTROINTESTINAL ENDOSCOPY         Medications Prior to Admission:      Prior to Admission medications    Medication Sig Start Date End Date Taking? Authorizing Provider   midodrine (PROAMATINE) 2.5 MG tablet Take 1 tablet by mouth in the morning and 1 tablet at noon and 1 tablet in the evening. Take with meals. 8/12/22   Mian Carpio MD   minocycline (MINOCIN;DYNACIN) 100 MG capsule Take 10 mg by mouth in the morning. 8/6/22   Historical Provider, MD   trospium (SANCTURA) 20 MG tablet Take 20 mg by mouth in the morning and 20 mg before bedtime.     Historical Provider, MD   omeprazole (PRILOSEC) 20 MG delayed release capsule TAKE 1 CAPSULE BY MOUTH 2 TIMES DAILY 8/8/22 9/7/22  Gail Hunt MD   apixaban (ELIQUIS) 5 MG TABS tablet TAKE 1/2 TABLET BY MOUTH 2 TIMES DAILY 8/8/22   Gail Hunt MD   metoprolol succinate (TOPROL XL) 25 MG extended release tablet TAKE 3 TABLETS BY MOUTH TWICE DAILY 7/25/22   GUS Harris - CNP   QUEtiapine (SEROQUEL) 50 MG tablet Take 1 tablet by mouth nightly 7/23/22   Gail Hunt MD   famotidine (PEPCID) 20 MG tablet Take 1 tablet by mouth at bedtime 7/8/22   Gail Hunt MD   isosorbide mononitrate (IMDUR) 30 MG extended release tablet TAKE 1 TABLET BY MOUTH DAILY 6/1/22   Liseth Kelly MD   memantine (NAMENDA) 5 MG tablet Take 1 tablet by mouth 2 times daily 5/20/22   Danita Fisher MD   donepezil (ARICEPT) 10 MG tablet Take 1 tablet by mouth daily 3/24/22   Historical Provider, MD   citalopram (CELEXA) 10 MG tablet TAKE 1 TABLET BY MOUTH DAILY 3/28/22   Danita Fisher MD   fluticasone Lossie Snowman) 50 MCG/ACT nasal spray 2 sprays by Nasal route daily 2/1/22   Danita Fisher MD   Ferrous Sulfate (IRON) 325 (65 Fe) MG TABS Take by mouth daily     Historical Provider, MD   atorvastatin (LIPITOR) 20 MG tablet TAKE 1 TABLET BY MOUTH EVERY DAY 11/15/21   Danita Fisher MD   tamsulosin (FLOMAX) 0.4 MG capsule Take 0.4 mg by mouth daily    Historical Provider, MD   finasteride (PROSCAR) 5 MG tablet Take 5 mg by mouth daily. 3/4/15   Historical Provider, MD   aspirin 81 MG tablet Take 81 mg by mouth daily. Historical Provider, MD       Allergies:  Sulfa antibiotics    Social History:      The patient currently lives at home    TOBACCO:   reports that he quit smoking about 47 years ago. He has a 10.00 pack-year smoking history. He has never used smokeless tobacco.  ETOH:   reports no history of alcohol use. E-cigarette/Vaping       Questions Responses    E-cigarette/Vaping Use Never User    Start Date     Passive Exposure     Quit Date     Counseling Given     Comments               Family History:      Reviewed and negative in regards to presenting illness/complaint. Problem Relation Age of Onset    Heart Disease Mother     Other Mother     Heart Disease Father     Asthma Other        REVIEW OF SYSTEMS COMPLETED:   Pertinent positives as noted in the HPI. All other systems reviewed and negative. PHYSICAL EXAM PERFORMED:    /70   Pulse 65   Temp 97.5 °F (36.4 °C) (Oral)   Resp 17   Ht 5' 7\" (1.702 m)   Wt 155 lb 4 oz (70.4 kg)   SpO2 96%   BMI 24.32 kg/m²     General appearance:  No apparent distress, appears stated age and cooperative.   HEENT: Normal cephalic, atraumatic without obvious deformity. Pupil round, and reactive to light. R eye prosthesis. Extra ocular muscles intact. Conjunctiva/cornea clear. Neck: Supple, with full range of motion. No jugular venous distention. Trachea midline. Respiratory:  Normal respiratory effort. Clear to auscultation, bilaterally without Rales/Wheezes/Rhonchi. Cardiovascular:  Regular rate and rhythm with normal S1/S2 without murmurs, rubs or gallops. Abdomen: Soft, non-tender, non-distended with normal bowel sounds. Musculoskeletal:  No clubbing, cyanosis or edema bilaterally. Full range of motion without deformity. Skin: Skin color, texture, turgor normal.  Staples on occiput, laceration healing well. Neurologic:  Neurovascularly intact without any focal sensory/motor deficits. Cranial nerves: II-XII intact, grossly non-focal.  Psychiatric:  Alert and fully oriented, thought content appropriate, normal insight  Capillary Refill: Brisk,3 seconds, normal  Peripheral Pulses: +2 palpable, equal bilaterally       Labs:     Recent Labs     08/13/22  0651 08/15/22  0624   WBC 7.9 7.7   HGB 16.8 16.2   HCT 50.6 49.2    137     Recent Labs     08/13/22  0651 08/15/22  0625    138   K 4.4 4.8    100   CO2 28 29   BUN 17 26*   CREATININE 1.4* 1.3   CALCIUM 9.1 9.0     No results for input(s): AST, ALT, BILIDIR, BILITOT, ALKPHOS in the last 72 hours. No results for input(s): INR in the last 72 hours. No results for input(s): Rhae Childes in the last 72 hours. Urinalysis:      Lab Results   Component Value Date/Time    NITRU Negative 04/21/2022 02:20 PM    [address] 04/21/2022 02:20 PM    BACTERIA 1+ 04/21/2022 02:20 PM    RBCUA 3-4 04/21/2022 02:20 PM    BLOODU LARGE 04/21/2022 02:20 PM    SPECGRAV >=1.030 04/21/2022 02:20 PM    GLUCOSEU Negative 04/21/2022 02:20 PM       Radiology:       XR CHEST 1 VIEW   Final Result   No acute abnormality identified. Large hiatal hernia. Consults:    IP CONSULT TO DIETITIAN  IP CONSULT TO SOCIAL WORK  IP CONSULT TO INTERNAL MEDICINE    ASSESSMENT:    Active Hospital Problems    Diagnosis Date Noted    Syncope and collapse [R55]          PLAN:      Accelerating rate of unintentional weight loss over the last few years  - likely due to poor appetite of advanced age. Encourage better PO intake, started supplements with each meal.    - not a good candidate for mirtazapine since this med is associated with orthostatic hypotension. Not a great candidate for megestrol since he is at increased risk of emboli and I am taking him off his apixaban. He is high risk for cognitive side effects of dronabinol.    - f/u TSH. Consider outpatient colonoscopy referral via PCP. Cardiomyopathy, probably ischemic, with known CAD. Aspirin. Increased statin. F/u with cardiology as outpatient. Chronic orthostatic hypotension, with frequent falls and/or syncope. Due to above issues, especially the weight loss. Better PO intake will be key. - agree with course of IVFs, stopping tamsulosin, and increasing midodrine. Also stopped metoprolol and added ADITYA hose. F/u UA. Stand up slowly. Will need to permit supine HTN in order to avoid upright hypotension. PAF. Risk of apixaban outweighs benefit. Stopped this med. Monitor rates without metoprolol. Dementia. Stopped donepezil to avoid polypharmacy, can contribute to urinary retention. Ok to continue memantine. He is on quetiapine qhs, try to taper off in the future if he is not agitated overnight. Chronic obstructive uropathy. Finasteride. Nursing can remove the scalp staples on 8/16. DVT Prophylaxis: I ordered enoxaparin, otherwise defer to primary team  Diet: ADULT DIET; Regular; Low Fat/Low Chol/High Fiber/2 gm Na  Code Status: Full Code    PT/OT Eval Status: ongoing    Dispo - I see no barriers to discharge once his orthostasis is about as good as it is going to get.   I will

## 2022-08-15 NOTE — PLAN OF CARE
Due to patient's cognitive status the call light is not always used appropriately. Staff continues to provide frequent reminders and education regarding falls and safety. Call light and belongings are in reach. Fall precautions including an Avasys monitor for increased visualization remain in place.

## 2022-08-16 LAB
BILIRUBIN URINE: NEGATIVE
BLOOD, URINE: NEGATIVE
CLARITY: CLEAR
COLOR: YELLOW
GLUCOSE URINE: NEGATIVE MG/DL
KETONES, URINE: NEGATIVE MG/DL
LEUKOCYTE ESTERASE, URINE: NEGATIVE
MICROSCOPIC EXAMINATION: ABNORMAL
NITRITE, URINE: NEGATIVE
PH UA: 6.5 (ref 5–8)
PROTEIN UA: NEGATIVE MG/DL
SPECIFIC GRAVITY UA: 1.01 (ref 1–1.03)
T4 FREE: 1 NG/DL (ref 0.9–1.8)
TSH REFLEX: 4.28 UIU/ML (ref 0.27–4.2)
URINE REFLEX TO CULTURE: ABNORMAL
URINE TYPE: ABNORMAL
UROBILINOGEN, URINE: 2 E.U./DL

## 2022-08-16 PROCEDURE — 1280000000 HC REHAB R&B

## 2022-08-16 PROCEDURE — 2580000003 HC RX 258: Performed by: INTERNAL MEDICINE

## 2022-08-16 PROCEDURE — 97110 THERAPEUTIC EXERCISES: CPT

## 2022-08-16 PROCEDURE — 97530 THERAPEUTIC ACTIVITIES: CPT

## 2022-08-16 PROCEDURE — 6370000000 HC RX 637 (ALT 250 FOR IP): Performed by: PHYSICAL MEDICINE & REHABILITATION

## 2022-08-16 PROCEDURE — 6360000002 HC RX W HCPCS: Performed by: INTERNAL MEDICINE

## 2022-08-16 PROCEDURE — 97129 THER IVNTJ 1ST 15 MIN: CPT

## 2022-08-16 PROCEDURE — 97535 SELF CARE MNGMENT TRAINING: CPT

## 2022-08-16 PROCEDURE — 97130 THER IVNTJ EA ADDL 15 MIN: CPT

## 2022-08-16 PROCEDURE — 2580000003 HC RX 258: Performed by: PHYSICAL MEDICINE & REHABILITATION

## 2022-08-16 PROCEDURE — 6370000000 HC RX 637 (ALT 250 FOR IP): Performed by: STUDENT IN AN ORGANIZED HEALTH CARE EDUCATION/TRAINING PROGRAM

## 2022-08-16 PROCEDURE — 81003 URINALYSIS AUTO W/O SCOPE: CPT

## 2022-08-16 PROCEDURE — 6370000000 HC RX 637 (ALT 250 FOR IP): Performed by: INTERNAL MEDICINE

## 2022-08-16 RX ORDER — MIDODRINE HYDROCHLORIDE 5 MG/1
10 TABLET ORAL
Status: DISCONTINUED | OUTPATIENT
Start: 2022-08-16 | End: 2022-08-27 | Stop reason: HOSPADM

## 2022-08-16 RX ORDER — QUETIAPINE FUMARATE 25 MG/1
25 TABLET, FILM COATED ORAL NIGHTLY
Status: DISCONTINUED | OUTPATIENT
Start: 2022-08-16 | End: 2022-08-25

## 2022-08-16 RX ORDER — 0.9 % SODIUM CHLORIDE 0.9 %
500 INTRAVENOUS SOLUTION INTRAVENOUS ONCE
Status: COMPLETED | OUTPATIENT
Start: 2022-08-16 | End: 2022-08-16

## 2022-08-16 RX ORDER — MIDODRINE HYDROCHLORIDE 5 MG/1
5 TABLET ORAL ONCE
Status: COMPLETED | OUTPATIENT
Start: 2022-08-16 | End: 2022-08-16

## 2022-08-16 RX ADMIN — MEMANTINE 5 MG: 5 TABLET ORAL at 20:46

## 2022-08-16 RX ADMIN — MEMANTINE 5 MG: 5 TABLET ORAL at 08:51

## 2022-08-16 RX ADMIN — SODIUM CHLORIDE, PRESERVATIVE FREE 10 ML: 5 INJECTION INTRAVENOUS at 20:46

## 2022-08-16 RX ADMIN — TRAMADOL HYDROCHLORIDE 50 MG: 50 TABLET ORAL at 20:45

## 2022-08-16 RX ADMIN — QUETIAPINE FUMARATE 25 MG: 25 TABLET ORAL at 20:46

## 2022-08-16 RX ADMIN — ENOXAPARIN SODIUM 40 MG: 100 INJECTION SUBCUTANEOUS at 08:51

## 2022-08-16 RX ADMIN — MIDODRINE HYDROCHLORIDE 10 MG: 5 TABLET ORAL at 17:11

## 2022-08-16 RX ADMIN — SODIUM CHLORIDE 500 ML: 9 INJECTION, SOLUTION INTRAVENOUS at 13:20

## 2022-08-16 RX ADMIN — ASPIRIN 81 MG: 81 TABLET, COATED ORAL at 08:51

## 2022-08-16 RX ADMIN — CITALOPRAM HYDROBROMIDE 10 MG: 20 TABLET ORAL at 08:51

## 2022-08-16 RX ADMIN — SODIUM CHLORIDE, PRESERVATIVE FREE 5 ML: 5 INJECTION INTRAVENOUS at 09:49

## 2022-08-16 RX ADMIN — FERROUS SULFATE TAB 325 MG (65 MG ELEMENTAL FE) 325 MG: 325 (65 FE) TAB at 08:51

## 2022-08-16 RX ADMIN — MIDODRINE HYDROCHLORIDE 5 MG: 5 TABLET ORAL at 13:20

## 2022-08-16 RX ADMIN — PANTOPRAZOLE SODIUM 40 MG: 40 TABLET, DELAYED RELEASE ORAL at 06:33

## 2022-08-16 RX ADMIN — FLUTICASONE PROPIONATE 2 SPRAY: 50 SPRAY, METERED NASAL at 08:52

## 2022-08-16 RX ADMIN — FINASTERIDE 5 MG: 5 TABLET, FILM COATED ORAL at 08:51

## 2022-08-16 RX ADMIN — MIDODRINE HYDROCHLORIDE 5 MG: 5 TABLET ORAL at 12:12

## 2022-08-16 RX ADMIN — MIDODRINE HYDROCHLORIDE 5 MG: 5 TABLET ORAL at 08:51

## 2022-08-16 RX ADMIN — ATORVASTATIN CALCIUM 40 MG: 40 TABLET, FILM COATED ORAL at 08:51

## 2022-08-16 ASSESSMENT — PAIN SCALES - GENERAL
PAINLEVEL_OUTOF10: 7
PAINLEVEL_OUTOF10: 0

## 2022-08-16 NOTE — PROGRESS NOTES
[facility]: ACUTE REHAB UNIT  SPEECH-LANGUAGE PATHOLOGY      [x] Daily  [] Weekly Care Conference Note  [] Discharge    Patient:Aroldo Patel      :1935  [facility]:[id number]  Rehab Dx/Hx: Syncope and collapse [R55]    Precautions: falls  Home situation: Lives with wife. Questionable historian - reports independence with med management (granddaughter assists?) and yard work. Pt reports he and his wife share responsibility for finances. He also reports wife manages cooking, cleaning, laundry, and grocery shopping. ST Dx: [] Aphasia  [] Dysarthria  [] Apraxia   [x] Oropharyngeal dysphagia [x] Cognitive Impairment  [] Other:   Date of Admit: 2022  Room #: 0153/0153-01    Current functional status (updated daily):         Pt being seen for : [] Speech/Language Treatment  [x] Dysphagia Treatment [x] Cognitive Treatment  [] Other:  Communication: [x]WFL  [] Aphasia  [] Dysarthria  [] Apraxia  [] Pragmatic Impairment [] Non-verbal  [] Hearing Loss  [] Other:   Cognition: [] WFL  [] Mild  [] Moderate  [x] Severe [] Unable to Assess  [] Other:  Memory: [] WFL  [] Mild  [] Moderate  [x] Severe [] Unable to Assess  [] Other:  Behavior: [x] Alert  [x] Cooperative  [x]  Pleasant  [] Confused  [] Agitated  [] Uncooperative  [] Distractible [] Motivated  [] Self-Limiting [] Anxious  [] Other:  Endurance:  [] Adequate for participation in SLP sessions  [x] Reduced overall  [] Lethargic  [] Other:  Safety: [] No concerns at this time  [x] Reduced insight into deficits  [x]  Reduced safety awareness [] Not following call light procedures  [] Unable to Assess  [] Other:    Current Diet Order:ADULT DIET;  Regular; Low Fat/Low Chol/High Fiber/2 gm Na  ADULT ORAL NUTRITION SUPPLEMENT; Breakfast, Lunch, Dinner; Standard High Calorie/High Protein Oral Supplement  Swallowing Precautions: upright for all intake, stay upright for at least 30 mins after intake, small bites/sips, alternate bites/sips, slow rate of intake, general GERD precautions, and general aspiration precautions        Date: 8/16/2022      Tx session 1  0900 - 1000 Tx session 2  All tx needs met in session 1   Total Timed Code Min 60 0   Total Treatment Minutes 60 0   Individual Treatment Minutes 60 0   Group Treatment Minutes 0 0   Co-Treat Minutes 0 0   Variance/Reason:  N/a    Pain Denies     Pain Intervention [] RN notified  [] Repositioned  [] Intervention offered and patient declined  [x] N/A  [] Other: [] RN notified  [] Repositioned  [] Intervention offered and patient declined  [] N/A  [] Other:   Subjective     Pt alert and cooperative, agreeable to tx. Pt upright in bed for session. Objective:  Goals     Dysphagia Goals:  Short-term Goals  Timeframe for Short-term Goals: 10 days; 08/24/2022    Goal 1: The patient will tolerate recommended diet with no clinical s/s of aspiration 5/5    Goal not directly targeted - pt denies difficulty with breakfast meal. Breakfast tray present; pt ate ~75% of meal.     Goal 2: The patient/caregiver will demonstrate understanding of compensatory swallow strategies, for improved swallow safety     Goal not directly targeted. Cognitive-linguistic Goals:  Short-term Goals  Timeframe for Short-term Goals: 12 days (08/26/2022)    Goal 1: Pt will use visual aids / strategies to state orientation to time, place, situation with 80% acc given mod cues   Month: pt indep used calendar in room    Year: pt attempted to use calendar; stated \"2002. \" Pt reported difficulty seeing calendar. When SLP pointed ot year, pt then able to state \"2022. Date and ADITI: min cues for use of calendar    Place: pt is aware he is in hospital; stated incorrect name.  Visual aid with name of this hospital provided in pt's room      Goal 2: Pt will complete graded recall tasks with 50% acc given mod cues   Mental Manipulation: Word Progression  -pt given 3 items; asked to rank in the order they occur  -e.g. Sep, May, Nov --> May, Sep, Nov  -pt used recall abilities to complete task with 73% acc given min cues, improved to 80% acc given mod-max cues      Goal 3: Pt will complete executive function tasks (e.g. meds, time, money, etc) with 70% acc given mod cues   Money General Questions  -e.g. are 9 nickels equal to 2 quarters?  -pt completed task with 70% acc given min cues, improved to 90% acc given mod cues    Med Management  -SLP asked pt again what he does for med management  -during eval, pt had reported he is indep with med management  -today, pt reports his granddaughter comes over and assists daily  -SLP to continue to assess / gather info from family due to pt being a questionable historian       Goal 4: Pt will complete problem solving and thought organization tasks with 70% acc given mod cues   Mental Manipulation: Word Progression  -pt given 3 items; asked to rank in the order they occur  -e.g. Sep, May, Nov --> May, Sep, Nov  -pt used thought org abilities to complete task with 63% acc given min cues, improved to 80% acc given mod-max cues     Meal Planning Task   -pt provided appropriate items to complete a meal (e.g. entree, side, drink, etc) with 50% acc given MAX cues       Goal 5: Pt will complete viusal and verbal reasoning tasks with 70% acc given mod cues   Word Deduction Task   -pt given 3 clues that describe an item; asked to name the item being described  -e.g. time, hands, wristband = watch   -pt completed task with 27% acc indep; improved to 80% acc given MAX cues       Other areas targeted: N/a    Education:   SLP edu pt re: role of SLP, rationale of tx tasks, orientation concepts, recall strategies     Safety Devices: [x] Call light within reach  [] Chair alarm activated  [x] Bed alarm activated  [x] Other: AVASYS [] Call light within reach  [] Chair alarm activated  [] Bed alarm activated  [] Other:    Assessment: Pt alert and cooperative, agreeable to tx.  Pt continues to be a questionable historian - yesterday, pt stated he was indep with med management. Today, he stated his granddaughter helps - SLP to continue to assess / discuss with family. Pt overall benefited from mod cues to answer simple money questions. Pt benefited from use of visual aids for orientation concepts; pt did indep attempt to utilize, but benefited from min-mod cues to read accurately. Visual aid provided for name of place. Pt benefited from mod-max cues to increase acc using recall and thought org abilities with a mental manipulation; word progression task. Pt overall with slow thought processing. Max cues required for meal planning task, word deduction. Suspect pt will need 24 hr supervision at d/c. Pt fatiguing towards end of session. Continue goals above. Plan: Continue as per plan of care. Additional Information:     Barriers toward progress: Cognitive deficit, Limited safety awareness, and Limited insight into deficits  Discharge recommendations:  [] Home independently  [] Home with assistance [x]  24 hour supervision  [] ECF [x] Other: See PT/OT recs  Continued Tx Upon Discharge: ? [] Yes [] No [x] TBD based on progress while on ARU [] Vital Stim indicated [] Other:   Estimated discharge date: TBD    Interventions used this date:  [] Speech/Language Treatment  [] Instruction in HEP [] Group [] Dysphagia Treatment [] Cognitive Treatment   [] Other:       Total Time Breakdown / Charges    Time in Time out Total Time / units   Cognitive Tx 0900 1000 60 min / 4 units    Speech Tx -- -- --   Dysphagia Tx          Electronically Signed by     Kim Villarreal MA CCC-SLP #[id number]  Speech Language Pathologist

## 2022-08-16 NOTE — PROGRESS NOTES
Azael Doss  8/16/2022  [id number]    Chief Complaint: Syncope and collapse    Subjective:   No acute events overnight. Today Surekha Coats is seen in his room working with therapy. This morning his blood pressures were dropping in therapy and he was having significant dizziness. He is now seated on the edge of the bed and denies current dizziness or feeling light headed. He denies other acute complaints at this time. ROS: denies f/c, n/v, cp, so  Objective:  Patient Vitals for the past 24 hrs:   BP Temp Temp src Pulse Resp SpO2   08/16/22 1352 (!) 77/48 -- -- 86 -- --   08/16/22 1300 (!) 76/51 -- -- -- -- --   08/16/22 1200 (!) 75/45 -- -- -- -- --   08/16/22 0855 -- -- -- 65 -- --   08/16/22 0845 101/75 97.4 °F (36.3 °C) Oral 65 18 98 %   08/15/22 2200 (!) 150/84 98 °F (36.7 °C) Oral 77 18 98 %     Gen: No distress, pleasant. HEENT: Normocephalic, atraumatic. CV: extremities well perfused  Resp: No respiratory distress. Abd: Soft, nontender   Ext: No edema. Neuro: Alert, oriented, appropriately interactive. Remains seated on edge of bed without LOB    Wt Readings from Last 3 Encounters:   08/14/22 155 lb 4 oz (70.4 kg)   08/10/22 156 lb 1.6 oz (70.8 kg)   07/08/22 160 lb (72.6 kg)       Laboratory data:   Lab Results   Component Value Date    WBC 7.7 08/15/2022    HGB 16.2 08/15/2022    HCT 49.2 08/15/2022    MCV 88.8 08/15/2022     08/15/2022       Lab Results   Component Value Date/Time     08/15/2022 06:25 AM    K 4.8 08/15/2022 06:25 AM    K 4.5 08/12/2022 06:41 AM     08/15/2022 06:25 AM    CO2 29 08/15/2022 06:25 AM    BUN 26 08/15/2022 06:25 AM    CREATININE 1.3 08/15/2022 06:25 AM    GLUCOSE 59 08/15/2022 06:25 AM    CALCIUM 9.0 08/15/2022 06:25 AM        Therapy progress:  PT  Position Activity Restriction  Other position/activity restrictions: pacemaker; staples in R side of head; orthostatic (low BP).  compression stockings for OOB activity  Objective     Sit to Stand: Minimal Assistance  Stand to sit: Minimal Assistance  Bed to Chair: Unable to assess     OT     Toilet - Technique: Ambulating  Equipment Used: Grab bars  Assessment        SLP  Current Diet : Regular             Body mass index is 24.32 kg/m². Assessment and Plan:  Alexandra North is an 82year old male with a past medical history significant for atrial fibrillation, s/p pacer, dementia, CKD, HTN, and HLD who presented as a transfer from [facility] on 8/9/22 after a presyncopal episode, treated for orthostatic hypotension. He was admitted to [facility] on 8/14/22 due to functional deficits below his baseline. Debility  - due to below  - PT, OT, ST     Orthostatic hypotension  - SBP dipping to 70s-80s when up with therapy  - Medicine consulted, appreciate assistance  - discontinued flomax, metoprolol  - s/p IVF  - ADITYA hose  - midodrine    Productive Cough  - XR chest 8/15 negative for acute process     Paroxysmal Atrial Fibrillation  - monitor HR off of BB  - Eliquis discontinued per Medicine due to high fall risk     HFrEF  - daily weights, I/Os  - OP ischemic eval per Cardiology     CKD  - baseline Cr ~1.2  - NATANAEL during acute stay  - monitor intermittently      Dementia  - aricept discontinued due to concerns for polypharmacy  - namenda  - wean quietiapine as able     CAD  - statin  - follow up with Cards OP     BPH  - finasteride  - hold tamsulosin due to BP     Resolved hospital problems     AMS  - suspected to be due to medications      Bowels: Schedule stool softener. Follow bowel movements. Enema or suppository if needed. Bacilio Franklin MD 8/16/2022, 4:58 PM

## 2022-08-16 NOTE — PATIENT CARE CONFERENCE
[facility]  Inpatient Rehabilitation  Weekly Team Conference Note    Date: 2022  Patient Name: Felipe Holt        MRN: [id number]    : 1935  (82 y.o.)  Gender: male   Referring Practitioner: Romana Lewis  Diagnosis: syncope      Interventions to be utilized toward barriers to discharge, per discipline:  [address] observed barriers to dc: Confusion, Cognitive deficit, Decreased endurance, and Impaired vision  Nursing interventions:teaching medications and safety  Family Education: spouse  Fall Risk:  Yes      PHYSICAL THERAPY  Physical therapy observed barriers to dc:    Baseline: indep mobility with frequent falls    Current level: hypotensive and dizzy worse upright improved supine   Barriers to DC: unable to tolerate upright    Needs in order to achieve dc home/next level of care: indep in mobility       Physical therapy interventions:   Current Treatment Recommendations: Strengthening, Balance training, Functional mobility training, Transfer training, Gait training, Stair training, Neuromuscular re-education, Home exercise program, Safety education & training, Patient/Caregiver education & training, Equipment evaluation, education, & procurement, Therapeutic activities    PT Goals:            Short Term Goals  Time Frame for Short term goals: 22  Short term goal 1: Pt will complete bed mobility mod I. Short term goal 2: Pt will complete transfers with supervision and LRAD. Short term goal 3: Pt will ambulate 50 ft with LRAD and CGA. Short term goal 4: Pt will climb 2 steps with BHR and CGA.             Long Term Goals  Time Frame for Long term goals : 22  Long term goal 1: Pt will complete transfers with LRAD mod I.  Long term goal 2: Pt will ambulate 150 ft with LRAD and mod I.  Long term goal 3: Pt will climb 12 stairs with handrails mod I.  Long term goal 4: Pt will complete car transfer with LRAD mod I.    PT Assessment:  Recommendation: currently unable to progress from supine due to hypotension    Assessment  Assessment: Patient reporte dizziness at baseline with lower BP noted in supine. Pt with orthostatic vitals in sitting and with return to supine vitals improved but remained low over session with supine and poor tolerance for LE therex as BP dropped furhter in supine with LE therex with decreased SpO2 noted. Pt able to complete ankle pumps, heel slides and hip abduction supine but over 1 hour period and varied position pt required  HOB -3 degrees trengelenberg for BP 85/57, 97% SpO2 and HR 80 bpm with patient endorsing dizzines throughout session. Poor tolerance for activity and upright sitting or HOBE due to orthostatic hypotension with little fluctuation in HR. Notified nursing of vitals, position throughout and at end of the session to nursing Nayely Mcgrath communicated vitals from session to physician.        OCCUPATIONAL THERAPY  Occupational therapy observed barriers to dc:    Baseline: mod I all ADLs and mobility but has history of multiple falls, including recent hospitalization with head laceration from fall   Current level: min A LB ADLs, CGA/SBA UB ADLs, CGA transfers for short distances with poor activity tolerance due to orthostatic hypotension   Barriers to DC: orthostatic hypotension, weakness, decreased insight   Needs in order to achieve dc home/next level of care: mod I/SPV all ADLs and transfers to return home with family    Occupational Therapy interventions:  Current Treatment Recommendations: Strengthening, Balance training, Functional mobility training, Endurance training, Patient/Caregiver education & training, Equipment evaluation, education, & procurement, Self-Care / ADL, Safety education & training    OT Goals:  Patient Goals   Patient goals : \"to go home\"  Short Term Goals  Time Frame for Short term goals: 1 week (by 8/21 /22)  Short Term Goal 1: Pt will complete LBD with SPV  Short Term Goal 2: Pt will tolerate x 5 mintues of standing for improved activity azeem  Short Term Goal 3: Pt will complete toileting with SPV  Short Term Goal 4: Pt will complete in room mobility and transfers with LRAD with SPV  Long Term Goals  Time Frame for Long term goals : 14 days by 8/28/22  Long Term Goal 1: Pt will complete total body dressing with mod I  Long Term Goal 2: Pt will complete total body showering with mod I  Long Term Goal 3: Pt will complete dynamic balance task reachign to high/low levels with SBA and LRAD. OT Assessment:  Assessment  Assessment: Pt agreeable to OT session. Pt BP low on arrival (87/55), but better than RN stated is has been (systolic in the 00V). Pt completed BUE ther ex in bed. BP remained steady at 84/54. Pt sat at EOB and reported no dizziness or light headedness, BP dropping to 77/48, remaining at 79/48 after 1-2 minutes. BUE ther ex completed at EOB, including ankles pumps, BP improved to 90/55. Pt completed grooming at EOB with SBA and cues to locate items on R side and assist to not knock over items. After 10 minutes of sitting EOB BP reached 92/62. Pt stood for ~20 seconds to use urinal with BP 86/60 at EOB after stand and minimal c/o dizziness while standing. Pt completed stand pivot bed<>w/c with CGA and minimal c/o dizziness. BP 80/57. Pt returned to supine and HOB elevated to ~30 degrees. Pt left in bed at [facility] 30 degrees and BP 95/54, HR 82. Continue POC. Activity Tolerance: Patient tolerated treatment well;Treatment limited secondary to medical complications  Discharge Recommendations: Home with Home health OT;24 hour supervision or assist;Home with assist PRN  OT Equipment Recommendations  Equipment Needed: Yes  Mobility Devices: ADL Assistive Devices  ADL Assistive Devices: Shower Chair with back  [address] in place: Yes  Type of devices: Gait belt;Bed alarm in place; Left in bed;Call light within reach;Nurse notified    SPEECH THERAPY   Speech therapy observed barriers to dc:    Baseline: Lives with wife. Questionable historian - reports independence with med management (granddaughter assists?) and yard work. Pt reports he and his wife share responsibility for finances. He also reports wife manages cooking, cleaning, laundry, and grocery shopping. Current level: Severe cognitive-linguistic deficits, mild oropharyngeal dysphagia    Barriers to DC: reduced insight into deficits, poor safety awareness, reduced endurance, 24 hr supervision    Needs in order to achieve dc home/next level of care: 24 hr supervision, carryover of compensatory strategies, tolerance of LRD    Speech Therapy interventions:  Dysphagia:  Diet tolerance monitoring, safe swallow strategies   Speech/Language/Cognition: Compensatory strategy training and carryover, recall/STM, problem solving, reasoning, exec function, thought organization, attention.     Dysphagia Goals:  Timeframe for Long-term Goals: 14 days; 08/28/2022  Goal 1: The patient will tolerate least restrictive diet with no clinical s/s of aspiration or worsening respiratory/pulmonary status - 08/16 - ongoing      Short-term Goals  Timeframe for Short-term Goals: 10 days; 08/24/2022  Goal 1: The patient will tolerate recommended diet with no clinical s/s of aspiration 5/5 - 08/16 - ongoing   Goal 2: The patient/caregiver will demonstrate understanding of compensatory swallow strategies, for improved swallow safety - 08/16 - ongoing        Speech/Language/Cog Goals:  Timeframe for Long-term Goals: 14 days (08/28/22)  Goal 1: Pt will improve overall cognitive-linguistic abilities to promote safe and independent return home PLOF    Short-term Goals  Timeframe for Short-term Goals: 12 days (08/26/2022)  Goal 1: Pt will use visual aids / strategies to state orientation to time, place, situation with 80% acc given mod cues - 08/16 - ongoing   Goal 2: Pt will complete graded recall tasks with 50% acc given mod cues- 08/16 - ongoing   Goal 3: Pt will complete executive function tasks (e.g. meds, time, money, etc) with 70% acc given mod cues - 08/16 - ongoing   Goal 4: Pt will complete problem solving and thought organization tasks with 70% acc given mod cues - 08/16 - ongoing   Goal 5: Pt will complete viusal and verbal reasoning tasks with 70% acc given mod cues - 08/16 - ongoing   Timeframe for Short-term Goals: 12 days (08/26/2022)    ST Assessment:  Pt alert and cooperative, agreeable to tx. Pt continues to be a questionable historian - yesterday, pt stated he was indep with med management. Today, he stated his granddaughter helps - SLP to continue to assess / discuss with family. Pt overall benefited from mod cues to answer simple money questions. Pt benefited from use of visual aids for orientation concepts; pt did indep attempt to utilize, but benefited from min-mod cues to read accurately. Visual aid provided for name of place. Pt benefited from mod-max cues to increase acc using recall and thought org abilities with a mental manipulation; word progression task. Pt overall with slow thought processing. Max cues required for meal planning task, word deduction. Suspect pt will need 24 hr supervision at d/c. Pt fatiguing towards end of session. Continue goals above. NUTRITION  Weight: 155 lb 4 oz (70.4 kg) / Body mass index is 24.32 kg/m². Diet Order: ADULT ORAL NUTRITION SUPPLEMENT; Breakfast, Lunch, Dinner; Standard High Calorie/High Protein Oral Supplement  ADULT DIET; Regular  PO Meals Eaten (%): 76 - 100%  Education: Declined, no recommendation at this time       CASE MANAGEMENT  Assessment: 82 yr old male. Dx:Syncope and collapse. Independent PLOF has can and rollator. Lives with spouse in a one level home with a 2 step entry into home. Therapy recommendations are Home with Home health OT;24 hour supervision or assist vs Home with assist PRN. DME:Shower chair with back.         Interdisciplinary Goals:   1.) Pt will verbalize orientation concepts x4 with use of visual aids given mod cues   2.) Pt able to maintain BP sitting  30 minutes within ranges not indicating  notify physician per orders  3.) Pt will complete toileting with SBA. [x]  Family Training discussed at conference and to be scheduled. Discharge Plan   Estimated discharge date: 8/26/2022  Destination: home health  Pass:No  Services at Discharge: [address], Occupational Therapy, Speech Therapy, and Nursing  Equipment at Discharge: Shower chair, possible RW vs wheelchair. Team Members Present at Conference:  : Ceferino Galvin RN    Occupational Therapist: Kash Nuno, OTR/L  Physical Therapist: Hernán Braden, CHRISTI  Speech Therapist: Uli Greene, [address]  Nurse: Kristi Chahal RN  Dietician: Ludivina Vegas RDN, LD  : Lisha Gomez, OTR/L  Psychiatry: N/A    Family members present at conference: No      I led this team conference and I approve the established interdisciplinary plan of care as documented within the medical record of Sydnie Hassan MD: Electronically signed by Bridger Bates MD on 8/17/2022 at 3:57 PM

## 2022-08-16 NOTE — PROGRESS NOTES
Impaired  Orientation Level: Disoriented to time;Oriented to person;Oriented to situation;Oriented to place         ADL  Grooming/Oral Hygiene  Assistance Level: Supervision  Skilled Clinical Factors: to brush teeth while seated at EOB  Putting On/Taking Off Footwear  Assistance Level: Supervision  Skilled Clinical Factors: to doff/don gripper socks  Toileting  Assistance Level: Contact guard assist  Skilled Clinical Factors: standing to use urinal          Bed Mobility  Overall Assistance Level: Supervision  Additional Factors: Head of bed raised; Head of bed flat; With handrails  Sit to Supine  Assistance Level: Supervision  Supine to Sit  Assistance Level: Supervision  Scooting  Assistance Level: Supervision  Transfers  Surface: Wheelchair; To bed;From bed  Additional Factors: Verbal cues; With handrails;Hand placement cues; Increased time to complete  Sit to Stand  Assistance Level: Stand by assist  Stand to Sit  Assistance Level: Stand by assist  Bed To/From Chair  Technique: Stand pivot  Assistance Level: Contact guard assist  Stand Pivot  Assistance Level: Contact guard assist   OT Exercises  Resistive Exercises: 2# weight for elbow flexion, wrist flexion, wrist extension, and supination/pronation for 20 reps BUE; 1# weight shoulder flexion for 15 reps     Assessment  Assessment  Assessment: Pt agreeable to OT session. Pt BP low on arrival (87/55), but better than RN stated is has been (systolic in the 09L). Pt completed BUE ther ex in bed. BP remained steady at 84/54. Pt sat at EOB and reported no dizziness or light headedness, BP dropping to 77/48, remaining at 79/48 after 1-2 minutes. BUE ther ex completed at EOB, including ankles pumps, BP improved to 90/55. Pt completed grooming at EOB with SBA and cues to locate items on R side and assist to not knock over items. After 10 minutes of sitting EOB BP reached 92/62.  Pt stood for ~20 seconds to use urinal with BP 86/60 at EOB after stand and minimal c/o dizziness while standing. Pt completed stand pivot bed<>w/c with CGA and minimal c/o dizziness. BP 80/57. Pt returned to supine and HOB elevated to ~30 degrees. Pt left in bed at [facility] 30 degrees and BP 95/54, HR 82. Continue POC. Activity Tolerance: Patient tolerated treatment well;Treatment limited secondary to medical complications  Discharge Recommendations: Home with Home health OT;24 hour supervision or assist;Home with assist PRN  OT Equipment Recommendations  Equipment Needed: Yes  Mobility Devices: ADL Assistive Devices  ADL Assistive Devices: Shower Chair with back  [address] [facility] in place: Yes  Type of devices: Gait belt;Bed alarm in place; Left in bed;Call light within reach;Nurse notified    Patient Education  Education  Education Given To: Patient  Education Provided: Role of Therapy; ADL Function;Plan of Care;DME/Home Modifications;Transfer Training;Precautions; Safety; Fall Prevention Strategies  Education Provided Comments: role of OT, need for checking BP, purpose of AD[employer]A [employer]DIAMOND ther ex, safety with transfers  Education Method: Demonstration;Verbal  Barriers to Learning: Cognition  Education Outcome: Verbalized understanding;Continued education needed    Plan  Plan  Times per Week: 5 of 7 days  Current Treatment Recommendations: Strengthening;Balance training;Functional mobility training; Endurance training;Patient/Caregiver education & training;Equipment evaluation, education, & procurement;Self-Care / ADL; Safety education & training    Goals  Short Term Goals  Time Frame for Short term goals: 1 week (by 8/21 /22)  Short Term Goal 1: Pt will complete LBD with SPV  Short Term Goal 2: Pt will tolerate x 5 mintues of standing for improved activity azeem  Short Term Goal 3: Pt will complete toileting with SPV  Short Term Goal 4: Pt will complete in room mobility and transfers with LRAD with SPV  Long Term Goals  Time Frame for Long term goals : 14 days by 8/28/22  Long Term Goal 1: Pt will complete total body dressing with mod I  Long Term Goal 2: Pt will complete total body showering with mod I  Long Term Goal 3: Pt will complete dynamic balance task reachign to high/low levels with SBA and LRAD.     Therapy Time   Individual Concurrent Group Co-treatment   Time In 1330         Time Out 1430         Minutes 60         Timed Code Treatment Minutes: 16 Jonas Fernández

## 2022-08-16 NOTE — CONSULTS
Comprehensive Nutrition Assessment    Type and Reason for Visit:  Initial, Consult    Nutrition Recommendations/Plan:   Recommend liberalize diet to regular to promote po intakes  Encourage po intakes  Continue Ensure ONS  Monitor po intakes, nutrition adequacy, weights, pertinent labs, BMs     Malnutrition Assessment:  Malnutrition Status: At risk for malnutrition (Comment) (08/16/22 1513)      Nutrition Assessment:    Consult for general nutrition mangement. Pt initally admitted after a presyncopal episode, treated for orthostatic hypotension. Pt currently on a cardiac diet. PO intakes % per EMR. Pt reports that his appetite is okay currently, but it has been decreased PTA \"for a while\". Pt endorses having some weight loss over the years. Pt favorable to ONS, will continue. Encouraged po intakes. Pt had no nurtition questions or need for education. Nutrition Related Findings:    BM x1 on 8/16. BG 59-79 mg/dL x 24 hr Wound Type:  (Traumatic head wound)       Current Nutrition Intake & Therapies:    Average Meal Intake: %  Average Supplements Intake: Unable to assess  ADULT DIET; Regular; Low Fat/Low Chol/High Fiber/2 gm Na  ADULT ORAL NUTRITION SUPPLEMENT; Breakfast, Lunch, Dinner; Standard High Calorie/High Protein Oral Supplement    Anthropometric Measures:  Height: 5' 7\" (170.2 cm)  Ideal Body Weight (IBW): 148 lbs (67 kg)       Current Body Weight: 155 lb 4 oz (70.4 kg),   IBW.  Weight Source: Not Specified  Current BMI (kg/m2): 24.3                          BMI Categories: Normal Weight (BMI 22.0 to 24.9) age over 72    Estimated Daily Nutrient Needs:  Energy Requirements Based On: Kcal/kg  Weight Used for Energy Requirements: Current  Energy (kcal/day): 4202-6059  Weight Used for Protein Requirements: Current  Protein (g/day): 70-84  Method Used for Fluid Requirements: 1 ml/kcal  Fluid (ml/day): 0931-0413    Nutrition Diagnosis:   Predicted inadequate energy intake related to inadequate protein-energy intake as evidenced by poor intake prior to admission, weight loss    Nutrition Interventions:   Food and/or Nutrient Delivery: Continue Current Diet, Continue Oral Nutrition Supplement  Nutrition Education/Counseling: No recommendation at this time  Coordination of Nutrition Care: Continue to monitor while inpatient  Plan of Care discussed with: Patient    Goals:     Goals: PO intake 50% or greater, prior to discharge       Nutrition Monitoring and Evaluation:   Behavioral-Environmental Outcomes: None Identified  Food/Nutrient Intake Outcomes: Food and Nutrient Intake, Supplement Intake  Physical Signs/Symptoms Outcomes: Biochemical Data, Weight    Discharge Planning:    Continue current diet, Continue Oral Nutrition Supplement     Radu Singletary RD, LD  Contact: [phone]

## 2022-08-16 NOTE — PROGRESS NOTES
Physical Therapy  [facility]  [facility] Physical Therapy Treatment Note    NAME: Jaycee Geller  : 1935 (89 y.o.)  MRN: [id number]  CODE STATUS: Full Code    Date of Service: 22       Restrictions:  Restrictions/Precautions: Fall Risk;General Precautions  Position Activity Restriction  Other position/activity restrictions: pacemaker; staples in R side of head; orthostatic (low BP). compression stockings for OOB activity     SUBJECTIVE Patient states year is  at [facility] and birthday  is 1935 at onset of tx and at end of tx and oriented to [employer] at end of sesison            Post Treatment Pain Screening none.           OBJECTIVE    Progression supine>sit>supine HOBE>trendelenberg  Vital Signs with BLE knee high compression garments donned:   SUPINE: dizziness 7/10 BP RUE [id number] HR 79 bpm, SpO2 96% automatic RUE;   SEATED:dizziness  9/10 BPRUE automatic 67/46 97% O2HR 67,  SEATED automatic after 90 seconds BP 64/43, HR 70 SpO2 96%:    Return to SUPINE 7/10 BP RUE 91/59 SpO2 95% HR 81bpm  SUPINE HOBE 30 degrees dizziness 7-8/10 BP RUE 84/51 SpO2 99% HR 80bpm  SUPINE HOBE 30 degrees  after 10 minutes dizziness completing LE therex dizziness 7-8/10 BP RUE 83/54 SpO2 93% HR 75bpm  SUPINE HOBE 25 degrees  dizziness 7-8/10 BP RUE 82/55 SpO2 97% HR 81bpm  SUPINE HOBE 15 degrees  dizziness 7-8/10 BP RUE 84/53 SpO2 95% HR 78bpm  SUPINE HOBE 15 degrees with LE elevated   dizziness 7-8/10 BP RUE 85/51 SpO2 93% HR 81bpm  SUPINE HOB 0 degrees and LE elevated dizziness 7-8/10 BP RUE 86/52 SpO2 95% HR 76bpm  SUPINE HOB 0 degrees and LE elevated  BP RUE 87/51 SpO2 94% HR 75bpm  SUPINE HOB 1 degree trendelenberg  and LE elevated  BP RUE 90/51SpO2 94% HR 75bpm  SUPINE HOB 1 degree trendelenberg  and LE elevated  after 15 reps LLE hip abd BP RUE 82/51SpO2 96% HR 78bpm  SUPINE HOB 1 degree trendelenberg  and LE elevated  at rest BP RUE 86/54SpO2 95% HR 77bpm  SUPINE HOB 3 degree trendelenberg  and LE elevated  at rest BP RUE 85/57SpO2 97% HR 80bpm  Notified nursing at end of session vitals and position with bed alarm engaged. Tray table and call light in reach. Sup > sit min assist  Sit > sup min assist  Scooting seated min to mod assist  Therex: Ankle pumps x30 BLE   Heel slides BLE   ASSESSMENT:   Patient reporte dizziness at baseline with lower BP noted in supine. Pt with orthostatic vitals in sitting and with return to supine vitals improved but remained low over session with supine and poor tolerance for LE therex as BP dropped furhter in supine with LE therex with decreased SpO2 noted. Pt able to complete ankle pumps, heel slides and hip abduction supine but over 1 hour period and varied position pt required  HOB -3 degrees trengelenberg for BP 85/57, 97% SpO2 and HR 80 bpm with patient endorsing dizzines throughout session. Poor tolerance for activity and upright sitting or HOBE due to orthostatic hypotension with little fluctuation in HR. Notified nursing of vitals, position throughout and at end of the session to nursing Gigi Villalobos communicated vitals from session to physician. Goals  Patient Goals   Patient goals : \"Be able to do a day's work\"  Short Term Goals  Time Frame for Short term goals: 8/19/22  Short term goal 1: Pt will complete bed mobility mod I. Short term goal 2: Pt will complete transfers with supervision and LRAD. Short term goal 3: Pt will ambulate 50 ft with LRAD and CGA. Short term goal 4: Pt will climb 2 steps with BHR and CGA. Long Term Goals  Time Frame for Long term goals : 8/27/22  Long term goal 1: Pt will complete transfers with LRAD mod I.  Long term goal 2: Pt will ambulate 150 ft with LRAD and mod I.  Long term goal 3: Pt will climb 12 stairs with handrails mod I.  Long term goal 4: Pt will complete car transfer with LRAD mod I.     PLAN OF CARE/SAFETY 5/7 days/week       EDUCATION   Educated patient in moving slowly, attending to dizziness sx if stable or worsening and reporting during session. Pt very cooperative but appears fatigued closing eyes frequently during session.             Therapy Time   Individual Concurrent Group Co-treatment   Time In 4379         Time Out 1145         Minutes 60           Timed Code Treatment Minutes: [address] [facility] María Elena Vance, 08/16/22 at 11:14 AM

## 2022-08-16 NOTE — PROGRESS NOTES
Hospitalist Progress Note      PCP: Danita Fisher MD    Date of Admission: 8/14/2022    Chief Complaint: orthostasis       Subjective:  He is feeling fine. Resting comfortably. Denies complaints. Supine SBPs yesterday 130's - 150's, perfect. Ordered orthostats again today to see how low he drops. Medications:  Reviewed    Infusion Medications    dextrose       Scheduled Medications    midodrine  5 mg Oral TID WC    atorvastatin  40 mg Oral Daily    enoxaparin  40 mg SubCUTAneous Daily    aspirin  81 mg Oral Daily    citalopram  10 mg Oral Daily    ferrous sulfate  325 mg Oral Daily    finasteride  5 mg Oral Daily    memantine  5 mg Oral BID    QUEtiapine  50 mg Oral Nightly    [Held by provider] tamsulosin  0.4 mg Oral Daily    pantoprazole  40 mg Oral QAM AC    fluticasone  2 spray Nasal Daily    sodium chloride flush  5-40 mL IntraVENous 2 times per day     PRN Meds: calcium carbonate, sodium chloride flush, polyethylene glycol, glucose, dextrose bolus **OR** dextrose bolus, glucagon (rDNA), dextrose, acetaminophen, diphenhydrAMINE, hydrALAZINE, ondansetron, traZODone, traMADol      Intake/Output Summary (Last 24 hours) at 8/16/2022 0720  Last data filed at 8/16/2022 0636  Gross per 24 hour   Intake 540 ml   Output 650 ml   Net -110 ml       Physical Exam Performed:    BP (!) 150/84   Pulse 77   Temp 98 °F (36.7 °C) (Oral)   Resp 18   Ht 5' 7\" (1.702 m)   Wt 155 lb 4 oz (70.4 kg)   SpO2 98%   BMI 24.32 kg/m²       General appearance:  No apparent distress, appears stated age and cooperative. HEENT:  Normal cephalic, atraumatic without obvious deformity. Pupil round, and reactive to light. R eye prosthesis. Extra ocular muscles intact. Conjunctiva/cornea clear. Neck: Supple, with full range of motion. No jugular venous distention. Trachea midline. Respiratory:  Normal respiratory effort. Clear to auscultation, bilaterally without Rales/Wheezes/Rhonchi.   Cardiovascular:  Regular rate and rhythm with normal S1/S2 without murmurs, rubs or gallops. Abdomen: Soft, non-tender, non-distended with normal bowel sounds. Musculoskeletal:  No clubbing, cyanosis or edema bilaterally. Full range of motion without deformity. Skin: Skin color, texture, turgor normal.  Staples on occiput, laceration healing well. Neurologic:  Neurovascularly intact without any focal sensory/motor deficits. Cranial nerves: II-XII intact, grossly non-focal.  Psychiatric:  Alert and fully oriented, thought content appropriate, normal insight  Capillary Refill: Brisk,3 seconds, normal  Peripheral Pulses: +2 palpable, equal bilaterally      Labs:   Recent Labs     08/15/22  0624   WBC 7.7   HGB 16.2   HCT 49.2        Recent Labs     08/15/22  0625      K 4.8      CO2 29   BUN 26*   CREATININE 1.3   CALCIUM 9.0     No results for input(s): AST, ALT, BILIDIR, BILITOT, ALKPHOS in the last 72 hours. No results for input(s): INR in the last 72 hours. No results for input(s): Ardelle Chalk in the last 72 hours. Urinalysis:      Lab Results   Component Value Date/Time    NITRU Negative 04/21/2022 02:20 PM    [address]-50 04/21/2022 02:20 PM    BACTERIA 1+ 04/21/2022 02:20 PM    RBCUA 3-4 04/21/2022 02:20 PM    BLOODU LARGE 04/21/2022 02:20 PM    SPECGRAV >=1.030 04/21/2022 02:20 PM    GLUCOSEU Negative 04/21/2022 02:20 PM       Radiology:  XR CHEST 1 VIEW   Final Result   No acute abnormality identified. Large hiatal hernia. Assessment/Plan:    Active Hospital Problems    Diagnosis     Syncope and collapse [R55]        The patient is a pleasant 82 Y M with a h/o HTN, HLD, non-obstructive CAD, PAF, SSS s/p pacer, CKD3, and dementia. Records show that he weighed 211 lbs in 2016. Since then he has gradually and unintentionally lost 56 lbs, and 13 of these pounds were lost since his COVID hospitalization in 1/2022. The patient says he hasn't had much of an appetite.   He says that he has felt lightheaded upon standing ever since about 2/2022. Sometimes he gets so lightheaded that he falls and/or passes out. This has been occurring with increasing frequency and severity over the last few months. Finally he fell 5x in one week, and on one of those occassions he required stapling of a scalp laceration in the [facility] ED. His family sent him back to the [facility] ED after another such spell of orthostatic presyncope, and [facility] decided that he needed to be transferred here on 8/9. He was given IVFs and his medications were adjusted in an attempt to minimize his orthostasis, with variable success. An echo showed a worsened EF, and there were new RWMAs in the LAD distribution. Since in 2020 he had a LHC which showed 50% stenosis of the mid LAD, cardiology considered performing another LHC on this admission, but they ultimately decided that he was too weak and confused to proceed with invasive testing. Cardiology is planning to reconsider as outpatient after his rehab stay. The patient was transferred to the ARU on 8/14. Here he has continued to be orthostatic, which prompted a hospital medicine consult. With PT/OT he dropped from 100/70 to 76/46. With nursing he dropped 91/55 to 70/42, with HR increasing from 70 to 90. PMR gave him 500 ml of IVFs, and currently he is feeling much better, sitting up and eating dinner, SBP in the 130's. He has no complaints, is fully oriented, and has insight into his condition. Accelerating rate of unintentional weight loss over the last few years  - likely due to poor appetite of advanced age. Encourage better PO intake, started supplements with each meal.    - not a good candidate for mirtazapine since this med is associated with orthostatic hypotension. Not a great candidate for megestrol since he is at increased risk of emboli and I am taking him off his apixaban. He is high risk for cognitive side effects of dronabinol.    - f/u TSH. Consider outpatient colonoscopy referral via PCP. Cardiomyopathy, probably ischemic, with known CAD. Aspirin. Increased statin. F/u with cardiology as outpatient. Chronic orthostatic hypotension, with frequent falls and/or syncope. Due to above issues, especially the weight loss. Better PO intake will be key. - agree with short course of IVFs (now stopped), stopping tamsulosin, and increasing midodrine. Also stopped metoprolol and added ADITYA hose. F/u UA (reordered). Stand up slowly. Will need to permit supine HTN in order to avoid upright hypotension. PAF. Risk of apixaban outweighs benefit. Stopped this med. Monitor rates without metoprolol. Dementia. Stopped donepezil to avoid polypharmacy, can contribute to urinary retention and now we are trying to go without tamsulosin. Ok to continue memantine. He is on quetiapine qhs, reduced dose, try to taper off in the near future if he is not agitated overnight. Chronic obstructive uropathy. Finasteride. Nursing can remove the scalp staples on 8/16. DVT Prophylaxis: I ordered enoxaparin now that he is off the apixaban, otherwise defer to primary team  Diet: ADULT DIET; Regular; Low Fat/Low Chol/High Fiber/2 gm Na  ADULT ORAL NUTRITION SUPPLEMENT; Breakfast, Lunch, Dinner; Standard High Calorie/High Protein Oral Supplement  Code Status: Full Code    PT/OT Eval Status: ongoing    Dispo - I see no barriers to discharge once his orthostasis seems about as good as it is going to get.         Chris Christianson MD

## 2022-08-17 LAB
ANION GAP SERPL CALCULATED.3IONS-SCNC: 5 MMOL/L (ref 3–16)
BUN BLDV-MCNC: 37 MG/DL (ref 7–20)
CALCIUM SERPL-MCNC: 9.1 MG/DL (ref 8.3–10.6)
CHLORIDE BLD-SCNC: 99 MMOL/L (ref 99–110)
CO2: 32 MMOL/L (ref 21–32)
CREAT SERPL-MCNC: 1.5 MG/DL (ref 0.8–1.3)
GFR AFRICAN AMERICAN: 54
GFR NON-AFRICAN AMERICAN: 44
GLUCOSE BLD-MCNC: 67 MG/DL (ref 70–99)
HCT VFR BLD CALC: 46 % (ref 40.5–52.5)
HEMOGLOBIN: 15 G/DL (ref 13.5–17.5)
MCH RBC QN AUTO: 29.2 PG (ref 26–34)
MCHC RBC AUTO-ENTMCNC: 32.7 G/DL (ref 31–36)
MCV RBC AUTO: 89.4 FL (ref 80–100)
PDW BLD-RTO: 16.4 % (ref 12.4–15.4)
PLATELET # BLD: 169 K/UL (ref 135–450)
PMV BLD AUTO: 8.4 FL (ref 5–10.5)
POTASSIUM SERPL-SCNC: 5 MMOL/L (ref 3.5–5.1)
RBC # BLD: 5.14 M/UL (ref 4.2–5.9)
SODIUM BLD-SCNC: 136 MMOL/L (ref 136–145)
WBC # BLD: 7.9 K/UL (ref 4–11)

## 2022-08-17 PROCEDURE — 97130 THER IVNTJ EA ADDL 15 MIN: CPT

## 2022-08-17 PROCEDURE — 2580000003 HC RX 258: Performed by: STUDENT IN AN ORGANIZED HEALTH CARE EDUCATION/TRAINING PROGRAM

## 2022-08-17 PROCEDURE — 97110 THERAPEUTIC EXERCISES: CPT

## 2022-08-17 PROCEDURE — 36415 COLL VENOUS BLD VENIPUNCTURE: CPT

## 2022-08-17 PROCEDURE — 6370000000 HC RX 637 (ALT 250 FOR IP): Performed by: INTERNAL MEDICINE

## 2022-08-17 PROCEDURE — 2580000003 HC RX 258: Performed by: PHYSICAL MEDICINE & REHABILITATION

## 2022-08-17 PROCEDURE — 1280000000 HC REHAB R&B

## 2022-08-17 PROCEDURE — 6370000000 HC RX 637 (ALT 250 FOR IP): Performed by: PHYSICAL MEDICINE & REHABILITATION

## 2022-08-17 PROCEDURE — 97535 SELF CARE MNGMENT TRAINING: CPT

## 2022-08-17 PROCEDURE — 97530 THERAPEUTIC ACTIVITIES: CPT

## 2022-08-17 PROCEDURE — 6360000002 HC RX W HCPCS: Performed by: INTERNAL MEDICINE

## 2022-08-17 PROCEDURE — 97129 THER IVNTJ 1ST 15 MIN: CPT

## 2022-08-17 PROCEDURE — 85027 COMPLETE CBC AUTOMATED: CPT

## 2022-08-17 PROCEDURE — 80048 BASIC METABOLIC PNL TOTAL CA: CPT

## 2022-08-17 RX ORDER — 0.9 % SODIUM CHLORIDE 0.9 %
500 INTRAVENOUS SOLUTION INTRAVENOUS ONCE
Status: COMPLETED | OUTPATIENT
Start: 2022-08-17 | End: 2022-08-17

## 2022-08-17 RX ADMIN — CITALOPRAM HYDROBROMIDE 10 MG: 20 TABLET ORAL at 09:32

## 2022-08-17 RX ADMIN — QUETIAPINE FUMARATE 25 MG: 25 TABLET ORAL at 20:10

## 2022-08-17 RX ADMIN — ATORVASTATIN CALCIUM 40 MG: 40 TABLET, FILM COATED ORAL at 09:32

## 2022-08-17 RX ADMIN — MEMANTINE 5 MG: 5 TABLET ORAL at 20:10

## 2022-08-17 RX ADMIN — ENOXAPARIN SODIUM 40 MG: 100 INJECTION SUBCUTANEOUS at 09:31

## 2022-08-17 RX ADMIN — ASPIRIN 81 MG: 81 TABLET, COATED ORAL at 09:32

## 2022-08-17 RX ADMIN — SODIUM CHLORIDE, PRESERVATIVE FREE 10 ML: 5 INJECTION INTRAVENOUS at 20:10

## 2022-08-17 RX ADMIN — MEMANTINE 5 MG: 5 TABLET ORAL at 09:33

## 2022-08-17 RX ADMIN — SODIUM CHLORIDE, PRESERVATIVE FREE 5 ML: 5 INJECTION INTRAVENOUS at 19:06

## 2022-08-17 RX ADMIN — FERROUS SULFATE TAB 325 MG (65 MG ELEMENTAL FE) 325 MG: 325 (65 FE) TAB at 09:32

## 2022-08-17 RX ADMIN — MIDODRINE HYDROCHLORIDE 10 MG: 5 TABLET ORAL at 13:38

## 2022-08-17 RX ADMIN — FLUTICASONE PROPIONATE 2 SPRAY: 50 SPRAY, METERED NASAL at 09:34

## 2022-08-17 RX ADMIN — SODIUM CHLORIDE 500 ML: 9 INJECTION, SOLUTION INTRAVENOUS at 17:00

## 2022-08-17 RX ADMIN — MIDODRINE HYDROCHLORIDE 10 MG: 5 TABLET ORAL at 09:37

## 2022-08-17 RX ADMIN — MIDODRINE HYDROCHLORIDE 10 MG: 5 TABLET ORAL at 17:02

## 2022-08-17 RX ADMIN — FINASTERIDE 5 MG: 5 TABLET, FILM COATED ORAL at 09:32

## 2022-08-17 RX ADMIN — PANTOPRAZOLE SODIUM 40 MG: 40 TABLET, DELAYED RELEASE ORAL at 05:36

## 2022-08-17 ASSESSMENT — PAIN SCALES - GENERAL
PAINLEVEL_OUTOF10: 0

## 2022-08-17 NOTE — PROGRESS NOTES
6 staples removed from right side back of head. Pt. tolerated well, skin dry, clean and well approximated. Bedside table and call light within pt. reach.

## 2022-08-17 NOTE — PROGRESS NOTES
[facility]: ACUTE REHAB UNIT  SPEECH-LANGUAGE PATHOLOGY      [x] Daily  [] Weekly Care Conference Note  [] Discharge    Patient:Aroldo Rincon      :1935  KENISHA:[id number]  Rehab Dx/Hx: Syncope and collapse [R55]    Precautions: falls  Home situation: Lives with wife. Questionable historian - reports independence with med management (granddaughter assists?) and yard work. Pt reports he and his wife share responsibility for finances. He also reports wife manages cooking, cleaning, laundry, and grocery shopping. ST Dx: [] Aphasia  [] Dysarthria  [] Apraxia   [x] Oropharyngeal dysphagia [x] Cognitive Impairment  [] Other:   Date of Admit: 2022  Room #: 0153/0153-01    Current functional status (updated daily):         Pt being seen for : [] Speech/Language Treatment  [x] Dysphagia Treatment [x] Cognitive Treatment  [] Other:  Communication: [x]WFL  [] Aphasia  [] Dysarthria  [] Apraxia  [] Pragmatic Impairment [] Non-verbal  [] Hearing Loss  [] Other:   Cognition: [] WFL  [] Mild  [] Moderate  [x] Severe [] Unable to Assess  [] Other:  Memory: [] WFL  [] Mild  [] Moderate  [x] Severe [] Unable to Assess  [] Other:  Behavior: [x] Alert  [x] Cooperative  [x]  Pleasant  [] Confused  [] Agitated  [] Uncooperative  [] Distractible [] Motivated  [] Self-Limiting [] Anxious  [] Other:  Endurance:  [] Adequate for participation in SLP sessions  [x] Reduced overall  [] Lethargic  [] Other:  Safety: [] No concerns at this time  [x] Reduced insight into deficits  [x]  Reduced safety awareness [] Not following call light procedures  [] Unable to Assess  [] Other:    Current Diet Order:ADULT ORAL NUTRITION SUPPLEMENT; Breakfast, Lunch, Dinner; Standard High Calorie/High Protein Oral Supplement  ADULT DIET;  Regular  Swallowing Precautions: upright for all intake, stay upright for at least 30 mins after intake, small bites/sips, alternate bites/sips, slow rate of intake, general GERD precautions, and general aspiration precautions        Date: 8/17/2022      Tx session 1  [id number] Tx session 2  All tx needs met in session 1   Total Timed Code Min 60    Total Treatment Minutes 60    Individual Treatment Minutes 60    Group Treatment Minutes 0    Co-Treat Minutes 0    Variance/Reason:  N/a    Pain Denies     Pain Intervention [] RN notified  [] Repositioned  [] Intervention offered and patient declined  [x] N/A  [] Other: [] RN notified  [] Repositioned  [] Intervention offered and patient declined  [] N/A  [] Other:   Subjective     Pt alert and oriented, cooperative and agreeable to participate in therapy. Pt seen sitting upright in bedside chair. Objective:  Goals     Dysphagia Goals:  Short-term Goals  Timeframe for Short-term Goals: 10 days; 08/24/2022    Goal 1: The patient will tolerate recommended diet with no clinical s/s of aspiration 5/5    Did not target. Goal 2: The patient/caregiver will demonstrate understanding of compensatory swallow strategies, for improved swallow safety     Did not target.      Cognitive-linguistic Goals:  Short-term Goals  Timeframe for Short-term Goals: 12 days (08/26/2022)    Goal 1: Pt will use visual aids / strategies to state orientation to time, place, situation with 80% acc given mod cues   Orientation concepts with use of visual calendar in room:  -month: indep  -date: indep  -ashley: indep  -year: \"2011, 2021\" mod cues  -place: min cues     Goal 2: Pt will complete graded recall tasks with 50% acc given mod cues   Attempted word progression/functional recall task:  -pt given three words and asked to repeat them in order that they occur  -ex: Sept, May, Nov= May, Sept, Nov  -unable to comprehend to complete despite max cues provided    Word list retention/function recall task\"  -recall by attribute  -pt given four words and asked a questions re the word set  -ex: sand, leaves, stone, water (which is the heaviest?)  -33% acc indep improving to 50% acc given max cues   -pt benefited from increased repetition       Goal 3: Pt will complete executive function tasks (e.g. meds, time, money, etc) with 70% acc given mod cues   Discussed medication management. Pt reported his wife and granddaughter manage his medications at home. However, per pt report on evaluation, pt stated he manages his own meds. Goal 4: Pt will complete problem solving and thought organization tasks with 70% acc given mod cues   Thought organization/categorization task:  -ex: name a color that starts with B, F, S, T  -41% acc given max cues       Goal 5: Pt will complete viusal and verbal reasoning tasks with 70% acc given mod cues   Odd one out picture card task:  -pt given a picture card with four items  -pt asked to determine which item didn't belong and why  -63% acc indep with determining which item didn't belong, improving to 100% acc given mod cues  -25% acc with providing logical reason as to why the item didn't belong      Other areas targeted: N/a    Education:   Edu provided re: rationale for tx tasks provided, compensatory memory strategies      Safety Devices: [x] Call light within reach  [x] Chair alarm activated  [] Bed alarm activated  [x] Other: AVASYS [] Call light within reach  [] Chair alarm activated  [] Bed alarm activated  [] Other:    Assessment: Pt pleasant and cooperative, agreeable to participate. Pt Platinum requiring increased repetition of stimuli for improved comprehension. Pt continues to present with severe cognitive linguistic deficits secondary to severity of recall skills. Pt required max cues for recall tasks today, benefiting from reinforcement for use of repetition and visualization. Pt also presents with reduced thought organization, attention, and reasoning skills negatively impacting accuracy. Pt benefits from visual aids for orientation concepts. Pt is a poor historian when asked questions related to medication management at home.  Pt will require 24 hour assist d/t overall cognitive deficits negatively impacting safety and independence. Continue goals above. Plan: Continue as per plan of care. Additional Information:     Barriers toward progress: Cognitive deficit, Limited safety awareness, and Limited insight into deficits  Discharge recommendations:  [] Home independently  [] Home with assistance [x]  24 hour supervision  [] ECF [] Other:   Continued Tx Upon Discharge: ? [] Yes [] No [x] TBD based on progress while on ARU [] Vital Stim indicated [] Other:   Estimated discharge date: TBD    Interventions used this date:  [] Speech/Language Treatment  [] Instruction in HEP [] Group [] Dysphagia Treatment [x] Cognitive Treatment   [] Other: Total Time Breakdown / Charges    Time in Time out Total Time / units   Cognitive Tx 1000 1100 60 min/ 4 units    Speech Tx -- -- --   Dysphagia Tx -- -- --       Electronically Signed by     Ghassan Witt. A [facility]-SLP  Speech-Language Pathologist  [id number]

## 2022-08-17 NOTE — PROGRESS NOTES
Hospitalist Progress Note      PCP: Danita Fisher MD    Date of Admission: 8/14/2022    Chief Complaint: syncope and collapse    Hospital Course: H&P reviewed    Subjective: No new complaints      Medications:  Reviewed    Infusion Medications    dextrose       Scheduled Medications    QUEtiapine  25 mg Oral Nightly    midodrine  10 mg Oral TID WC    atorvastatin  40 mg Oral Daily    enoxaparin  40 mg SubCUTAneous Daily    aspirin  81 mg Oral Daily    citalopram  10 mg Oral Daily    ferrous sulfate  325 mg Oral Daily    finasteride  5 mg Oral Daily    memantine  5 mg Oral BID    [Held by provider] tamsulosin  0.4 mg Oral Daily    pantoprazole  40 mg Oral QAM AC    fluticasone  2 spray Nasal Daily    sodium chloride flush  5-40 mL IntraVENous 2 times per day     PRN Meds: calcium carbonate, sodium chloride flush, polyethylene glycol, glucose, dextrose bolus **OR** dextrose bolus, glucagon (rDNA), dextrose, acetaminophen, diphenhydrAMINE, hydrALAZINE, ondansetron, traZODone, traMADol      Intake/Output Summary (Last 24 hours) at 8/17/2022 1332  Last data filed at 8/17/2022 1226  Gross per 24 hour   Intake 1420 ml   Output 575 ml   Net 845 ml       Physical Exam Performed:    /71   Pulse 65   Temp 97.1 °F (36.2 °C) (Oral)   Resp 16   Ht 5' 7\" (1.702 m)   Wt 155 lb 4 oz (70.4 kg)   SpO2 96%   BMI 24.32 kg/m²     General appearance: No apparent distress, appears stated age and cooperative. HEENT: R eye prosthesis. Conjunctivae/corneas clear. Neck: Supple, with full range of motion. No jugular venous distention. Trachea midline. Respiratory:  Normal respiratory effort. Clear to auscultation, bilaterally without Rales/Wheezes/Rhonchi. Cardiovascular: Regular rate and rhythm with normal S1/S2 without murmurs, rubs or gallops. Abdomen: Soft, non-tender, non-distended with normal bowel sounds. Musculoskeletal: No clubbing, cyanosis or edema bilaterally.   Full range of motion without deformity. Skin: Skin color, texture, turgor normal.  No rashes or lesions. Neurologic:  Neurovascularly intact without any focal sensory/motor deficits. Cranial nerves: II-XII intact, grossly non-focal.  Psychiatric: Alert and oriented, thought content appropriate, normal insight  Capillary Refill: Brisk,3 seconds, normal   Peripheral Pulses: +2 palpable, equal bilaterally       Labs:   Recent Labs     08/15/22  0624 08/17/22  0701   WBC 7.7 7.9   HGB 16.2 15.0   HCT 49.2 46.0    169     Recent Labs     08/15/22  0625 08/17/22  0701    136   K 4.8 5.0    99   CO2 29 32   BUN 26* 37*   CREATININE 1.3 1.5*   CALCIUM 9.0 9.1     No results for input(s): AST, ALT, BILIDIR, BILITOT, ALKPHOS in the last 72 hours. No results for input(s): INR in the last 72 hours. No results for input(s): [place] in the last 72 hours. Urinalysis:      Lab Results   Component Value Date/Time    NITRU Negative 08/16/2022 08:46 AM    WBCUA 21-50 04/21/2022 02:20 PM    BACTERIA 1+ 04/21/2022 02:20 PM    RBCUA 3-4 04/21/2022 02:20 PM    BLOODU Negative 08/16/2022 08:46 AM    SPECGRAV 1.010 08/16/2022 08:46 AM    GLUCOSEU Negative 08/16/2022 08:46 AM       Radiology:  XR CHEST 1 VIEW   Final Result   No acute abnormality identified. Large hiatal hernia. Assessment/Plan:    Active Hospital Problems    Diagnosis     Syncope and collapse [R55]      Accelerating rate of unintentional weight loss over the last few years  - likely due to poor appetite of advanced age. Encourage better PO intake, started supplements with each meal.    - not a good candidate for mirtazapine since this med is associated with orthostatic hypotension. Not a great candidate for megestrol since he is at increased risk of emboli-since off his apixaban. He is high risk for cognitive side effects of dronabinol.    - f/u TSH. Consider outpatient colonoscopy referral via PCP.      Cardiomyopathy, probably ischemic, with known CAD. Aspirin. Increased statin. F/u with cardiology as outpatient. Chronic orthostatic hypotension, with frequent falls and/or syncope. Due to above issues, especially the weight loss. Better PO intake will be key. - agree with short course of IVFs (now stopped), stopping tamsulosin, and increasing midodrine. Also stopped metoprolol and added ADITYA hose. F/u UA (reordered). Stand up slowly. Will need to permit supine HTN in order to avoid upright hypotension. PAF. Risk of apixaban outweighs benefit. Stopped this med. Monitor rates without metoprolol. Dementia. Stopped donepezil to avoid polypharmacy, can contribute to urinary retention and now we are trying to go without tamsulosin. Ok to continue memantine. He is on quetiapine qhs, reduced dose, try to taper off in the near future if he is not agitated overnight. Chronic obstructive uropathy. Finasteride. Nursing can remove the scalp staples on 8/16. DVT Prophylaxis: Lovenox  Diet: ADULT ORAL NUTRITION SUPPLEMENT; Breakfast, Lunch, Dinner; Standard High Calorie/High Protein Oral Supplement  ADULT DIET;  Regular  Code Status: Full Code    PT/OT Eval Status: ongoing    Dispo - pending clinical course, therapy    GUS Westbrook - CNP

## 2022-08-17 NOTE — PROGRESS NOTES
Occupational Therapy  Facility/Department: [facility]  Rehabilitation Occupational Therapy Daily Treatment Note    Date: 22  Patient Name: Donna Mccartney       Room: 6716/2724-30  MRN: [id number]  Account: [de-identified]   : 1935  (82 y.o.) Gender: male                    Past Medical History:  has a past medical history of Atrial fibrillation ([facility] [address] Blind right eye, Chronic kidney disease, Dementia ([facility] [address] Hyperlipidemia, Hypertension, and Pneumonia. Past Surgical History:   has a past surgical history that includes Tonsillectomy; Appendectomy; Colonoscopy; eye surgery; Upper gastrointestinal endoscopy; joint replacement (); hernia repair; and pacemaker placement (10/13/2017). Restrictions  Restrictions/Precautions: Fall Risk;General Precautions  Implants present? : Pacemaker  Other position/activity restrictions: LUE IV, orthostatic (low BP). compression stockings for OOB activity    Subjective  Subjective: Pt in recliner, pleasant, states feeling well with no dizziness or pain, BP 91/61 in recliner with feet up, , O2 sats 98% on RA. Restrictions/Precautions: Fall Risk;General Precautions             Objective     Cognition  Overall Cognitive Status: Exceptions  Arousal/Alertness: Appropriate responses to stimuli  Following Commands: Follows multistep commands with repitition; Follows one step commands with increased time; Follows one step commands consistently  Attention Span: Attends with cues to redirect  Memory: Decreased short term memory;Decreased recall of precautions;Decreased recall of recent events  Safety Judgement: Decreased awareness of need for safety;Decreased awareness of need for assistance  Problem Solving: Decreased awareness of errors;Assistance required to identify errors made;Assistance required to generate solutions  Insights: Decreased awareness of deficits  Initiation: Requires cues for some  Sequencing: Requires cues for some  Orientation  Overall Orientation Status: Impaired  Orientation Level: Oriented to person;Disoriented to place; Disoriented to time;Oriented to situation         ADL  Grooming/Oral Hygiene  Assistance Level: Stand by assist  Skilled Clinical Factors: standing at sink to brush teeth and wash hands  Toileting  Assistance Level: Contact guard assist  Skilled Clinical Factors: sitting for bowel hygiene, CGA when standing to pull pants over hips  Toilet Transfers  Technique: Stand step  Equipment: Standard toilet  Additional Factors: Verbal cues; With handrails; Increased time to complete  Assistance Level: Contact guard assist          Functional Mobility  Device: Rolling walker  Activity: To/From bathroom; To/From therapy gym  Assistance Level: Contact guard assist  Skilled Clinical Factors: CGA/SBA with RW  Bed Mobility  Overall Assistance Level: Supervision  Sit to Supine  Assistance Level: Supervision  Transfers  Surface: Wheelchair;Standard toilet;From chair with arms; To bed  Additional Factors: Verbal cues; With handrails;Hand placement cues; Increased time to complete  Device: Walker  Sit to Stand  Assistance Level: Contact guard assist  Skilled Clinical Factors: cues for hand placement  Stand to Sit  Assistance Level: Contact guard assist  Skilled Clinical Factors: cues for hand placement  Bed To/From Chair  Technique: Stand step  Assistance Level: Contact guard assist  Stand Pivot  Assistance Level: Contact guard assist   OT Exercises  Resistive Exercises: 2# elbow flexion x25     Assessment  Assessment  Assessment: Pt agreeable to OT session. Pt's activity tolerance and BP much better this date, improving to 121/68 after standing at sink but still dropping to 84/54 during prolonged stand of more than 20 minutes. Pt reports zero to mild dizziness throughout session. Pt required CGA for sit>stands and transfers due to mild instability and cues for hand placement during all sit<>stands. Pt completed toileting with CGA and grooming in stance with SBA. Pt demonstrated good tolerance to ambulate to gym with CGA and use of RW and then stand for 23 minutes at table top for [address] task with good coordination and good to fair balance with SBA/CGA and mild knee flexion increasing as pt fatigued. Pt's BP improving from 84/54 after 20 minutes in stance to 92/64 once sitting down. Pt returned to bed with HOB elevated to ~30 degrees and /80 in bed. Continue POC. Activity Tolerance: Patient tolerated treatment well;Patient limited by endurance  Discharge Recommendations: Home with Home health OT;24 hour supervision or assist;S Level 1  OT Equipment Recommendations  Equipment Needed: Yes  Mobility Devices: ADL Assistive Devices  ADL Assistive Devices: Shower Chair with back  [address] in place: Yes  Type of devices: Gait belt;Bed alarm in place; Left in bed;Call light within reach;Nurse notified    Patient Education  Education  Education Given To: Patient  Education Provided: Role of Therapy; ADL Function;Plan of Care;DME/Home Modifications;Transfer Training;Precautions; Safety; Fall Prevention Strategies; Mobility Training;Equipment  Education Provided Comments: role of OT, need for checking BP, purpose of ADITYA hoseDIAMOND ther ex, safety with transfers, hand placement for sit>stands  Education Method: Demonstration;Verbal  Barriers to Learning: Cognition  Education Outcome: Verbalized understanding;Continued education needed    Plan  Plan  Times per Week: 5 of 7 days  Current Treatment Recommendations: Strengthening;Balance training;Functional mobility training; Endurance training;Patient/Caregiver education & training;Equipment evaluation, education, & procurement;Self-Care / ADL; Safety education & training    Goals  Short Term Goals  Time Frame for Short term goals: 1 week (by 8/21 /22)  Short Term Goal 1: Pt will complete LBD with SPV  Short Term Goal 2: Pt will tolerate x 5 minutes of standing for improved activity azeem.  GOAL MET 8/17/22 Pt tolerated more than 5 minutes of standing activity. Short Term Goal 3: Pt will complete toileting with SPV  Short Term Goal 4: Pt will complete in room mobility and transfers with LRAD with SPV  Long Term Goals  Time Frame for Long term goals : 14 days by 8/28/22  Long Term Goal 1: Pt will complete total body dressing with mod I  Long Term Goal 2: Pt will complete total body showering with mod I  Long Term Goal 3: Pt will complete dynamic balance task reaching to high/low levels with SBA and LRAD.     Therapy Time   Individual Concurrent Group Co-treatment   Time In 1502         Time Out 1335         Minutes 60         Timed Code Treatment Minutes: [address]

## 2022-08-17 NOTE — PROGRESS NOTES
Sheree Vieira  8/17/2022  [id number]    Chief Complaint: Syncope and collapse    Subjective:   No acute events overnight. Today Carole Bae is seen in the gym. He has been standing for 10 minutes and denies any current light headedness or dizziness. He denies other acute complaints at this time. ROS: denies f/c, n/v, cp, sob    Objective:  Patient Vitals for the past 24 hrs:   BP Temp Temp src Pulse Resp SpO2   08/17/22 0836 100/71 -- -- 65 -- 96 %   08/17/22 0741 123/76 97.1 °F (36.2 °C) Oral 72 16 93 %   08/16/22 2030 110/74 97.7 °F (36.5 °C) Oral 86 17 97 %   08/16/22 1711 127/85 -- -- 66 -- --     Gen: No distress, pleasant. HEENT: Normocephalic, atraumatic. CV: irregularly irregular rhythm  Resp: No respiratory distress. Lungs CTAB  Abd: Soft, nontender   Ext: No edema. Neuro: Alert, oriented, appropriately interactive. Remains standing during encounter without LOB    Wt Readings from Last 3 Encounters:   08/14/22 155 lb 4 oz (70.4 kg)   08/10/22 156 lb 1.6 oz (70.8 kg)   07/08/22 160 lb (72.6 kg)       Laboratory data:   Lab Results   Component Value Date    WBC 7.9 08/17/2022    HGB 15.0 08/17/2022    HCT 46.0 08/17/2022    MCV 89.4 08/17/2022     08/17/2022       Lab Results   Component Value Date/Time     08/17/2022 07:01 AM    K 5.0 08/17/2022 07:01 AM    K 4.5 08/12/2022 06:41 AM    CL 99 08/17/2022 07:01 AM    CO2 32 08/17/2022 07:01 AM    BUN 37 08/17/2022 07:01 AM    CREATININE 1.5 08/17/2022 07:01 AM    GLUCOSE 67 08/17/2022 07:01 AM    CALCIUM 9.1 08/17/2022 07:01 AM        Therapy progress:  PT  Position Activity Restriction  Other position/activity restrictions: LUE IV, orthostatic (low BP).  compression stockings for OOB activity  Objective     Sit to Stand: Minimal Assistance  Stand to sit: Minimal Assistance  Bed to Chair: Unable to assess     OT  PT Equipment Recommendations  Other: TBD pending progress and increased functional mobility assessment-- anticipate RW and possible manual w/c  Toilet - Technique: Ambulating  Equipment Used: Grab bars  Assessment        SLP  Current Diet : Regular             Body mass index is 24.32 kg/m². Assessment and Plan:  Rocky French is an 82year old male with a past medical history significant for atrial fibrillation, s/p pacer, dementia, CKD, HTN, and HLD who presented as a transfer from [facility] on 8/9/22 after a presyncopal episode, treated for orthostatic hypotension. He was admitted to [facility] on 8/14/22 due to functional deficits below his baseline. Debility  - due to below  - PT, OT, ST     Orthostatic hypotension  - SBP dipping to 70s-80s when up with therapy, now improving   - Medicine following, appreciate assistance  - discontinued flomax, metoprolol  - s/p IVF  - ADITYA hose  - midodrine    Productive Cough  - XR chest 8/15 negative for acute process     Paroxysmal Atrial Fibrillation  - monitor HR off of BB  - Eliquis discontinued per Medicine due to high fall risk     HFrEF  - daily weights, I/Os  - OP ischemic eval per Cardiology     NATANAEL on CKD  - baseline Cr ~1.2  - Cr trending up, concern for dehydration. Also need to make sure he is not retaining since flomax was stopped. - give 500 cc   - obtain PVRs x3, nursing order placed  - repeat lab tomorrow     Dementia  - aricept discontinued due to concerns for polypharmacy  - namenda  - wean quietiapine as able     CAD  - statin  - follow up with Cards OP     BPH  - finasteride  - hold tamsulosin due to BP     Resolved hospital problems     AMS  - suspected to be due to medications      Bowels: Schedule stool softener. Follow bowel movements. Enema or suppository if needed. Services: home health PT, OT, ST, nursing  EDOD: 8/26/22    Interdisciplinary team conference was held today with entire rehab treatment team including PT, OT, SLP, Dietician, RN, and SW. Discussion focused on progress toward rehab goals and discharge planning.  Barriers: orthostatic hypotension, endurance, comorbidities. Total treatment time >35 min with greater than 50% spent in care coordination. [facility]  Eugene Perry MD 8/17/2022, 3:08 PM

## 2022-08-17 NOTE — PROGRESS NOTES
Physical Therapy  Facility/Department: [facility]  Rehabilitation Physical Therapy Treatment Note    NAME: Lori Huynh  : 1935 (11 y.o.)  MRN: [id number]  CODE STATUS: Full Code    Date of Service: 22       Restrictions:  Restrictions/Precautions: Fall Risk;General Precautions  Position Activity Restriction  Other position/activity restrictions: pacemaker; staples in R side of head; orthostatic (low BP). compression stockings for OOB activity     SUBJECTIVE  Subjective  Subjective: Pt in elevated supine in bed upon arrival and agreeable to PT session. Denies dizziness at start of session  Pain: Pt denies pain this morning       OBJECTIVE  Orientation  Overall Orientation Status: Impaired  Orientation Level: Oriented to person;Disoriented to place; Disoriented to time    Functional Mobility  Supine to Sit  Assistance Level: Stand by assist  Skilled Clinical Factors: with HOB elevated, use of rail  Transfers  Surface: From bed; Wheelchair  Additional Factors: Verbal cues; Hand placement cues; Increased time to complete  Device: Walker (RW)  Sit to Stand  Assistance Level: Contact guard assist  Skilled Clinical Factors: up to RW with increased time and cues for hand placement; pt denies dizziness or lightheadedness with transfers  Stand to Sit  Assistance Level: Contact guard assist  Skilled Clinical Factors: with cues for hand placement  Stand Pivot  Assistance Level: Contact guard assist  Skilled Clinical Factors: EOB>w/c without AD; pt with min posterior lean. Pt denies dizziness or lightheadedness with transfers      Environmental Mobility  Ambulation  Surface: Level surface  Device: Rolling walker  Distance: 5ft w/c>recliner chair in room  Activity: Within Room  Additional Factors: Verbal cues; Hand placement cues; Increased time to complete  Assistance Level: Minimal assistance; Moderate assistance  Gait Deviations: Slow fred;Decreased step length bilateral;Wide base of support; Unsteady gait;Decreased weight shift bilateral  Skilled Clinical Factors: Pt ambulates from w/c>recliner chair, ~5ft total, with min A for straight path and mod A to turn to chair using RW. Pt ambulates with decreased fred and B step length, decreased B foot clearance, widened ROBIN, min posterior lean requiring up to mod A when turning and backing up to recliner chair. Pt denies dizziness or lightheadedness throughout. Wheelchair  Surface: Level surface  Device: Standard wheelchair  Additional Factors: Verbal cues; Hand placement cues; Increased time to complete  Assistance Required to Manage Parts: All wheelchair parts  Assistance Level for Propulsion: Minimal assistance; Moderate assistance  Propulsion Method: Bilateral upper extremities  Propulsion Quality: Slow velocity; Short strokes; Decreased fluidity  Propulsion Distance: 50ft  Skilled Clinical Factors: decreased fluidity and B stroke lengths, cues for symmetrical propulsion and to increase BUE stroke lengths, hand over hand assist for hand placement and technique. Distance limited by fatigue. Consistent min-mod A to steer w/c and maintain straight path in hallway             PT Exercises  Exercise Treatment:   Seated BLE exercises: ankle pumps x 20, LAQ x 15, seated marching x 15, glute sets x 15. Standing marching at RW x 30sec with min A; pt denies dizziness or lightheadedness.   Static Standing Balance Exercises: Static standing at RW x 1 min with min A and BUE support on RW; pt denies dizziness or lightheadedness      Vitals  In elevated supine at start of session: /71, HE 65, O2 sats 96% on room air  Seated EOB: BP 88/59  Seated EOB ~3 mins: BP 89/63  Standing at EOB after pulling up pants: BP 79/54  Seated in w/c after stand pivot transfer: BP 82/57  Seated in w/c after 5 mins and LE exercises: BP 94/60  Seated in w/c after 10 mins: BP 82/51  Seated in w/c after 20 mins: BP 91/52  Standing at RW after standing marching: BP 83/61  Seated in recliner chair at end of session: BP 83/55  *Pt denies dizziness or lightheadedness throughout session regardless of position (supine, seated, or standing). RN notified and aware of pt's BPs throughout session and oks pt to sit up in recliner chair at end of session. ASSESSMENT/PROGRESS TOWARDS GOALS  Vital Signs  Heart Rate: 65  Heart Rate Source: Monitor  BP: 100/71  BP Location: Left upper arm  MAP (Calculated): 80.67  SpO2: 96 %  O2 Device: None (Room air)    Assessment  Assessment: Pt leslie's improved activity tolerance and was able to tolerate increased functional mobility assessment this date compared to previous session. Pt with consistently low BP throughout session (see vitals section), however pt denies dizziness and lightheadedness throughout session both at rest and with positional changes. Pt able to complete bed mobility with SBA, functional transfers with CGA, and ambulate ~5ft with min-mod A using RW. Pt limited by low BP (although asymptomatic), cognition, balance deficits, deconditioning, and generalized weakness. Pt with decreased awareness of deficits and safety; repeated education provided to pt on importance of monitoring BP and symptoms and taking seated rest breaks with activity. Will continue to progress functional mobility as appropriate and as pt tolerates. Activity Tolerance: Patient tolerated treatment well;Patient limited by endurance;Treatment limited secondary to medical complications  Discharge Recommendations: 24 hour supervision or assist;Home with Home health PT  PT Equipment Recommendations  Other: TBD pending progress and increased functional mobility assessment-- anticipate RW and possible manual w/c    Goals  Patient Goals   Patient goals : \"Be able to do a day's work\"  Short Term Goals  Time Frame for Short term goals: 8/19/22  Short term goal 1: Pt will complete bed mobility mod I. Short term goal 2: Pt will complete transfers with supervision and LRAD.   Short term goal 3: Pt will

## 2022-08-17 NOTE — CARE COORDINATION
CM spoke with Fabio Harrell (daughter) via telephone. CM discussed team conference discharge date and therapy recommendations. CM discussed family training. Jennifer Wilder stated that she will speak with her mother and decide when to schedule family training. CM acknowledged and gave daughter writers contact information.  Mir Mack RN

## 2022-08-17 NOTE — PLAN OF CARE
Problem: Skin/Tissue Integrity  Goal: Absence of new skin breakdown  Description: 1. Monitor for areas of redness and/or skin breakdown  2. Assess vascular access sites hourly  3. Every 4-6 hours minimum:  Change oxygen saturation probe site  4. Every 4-6 hours:  If on nasal continuous positive airway pressure, respiratory therapy assess nares and determine need for appliance change or resting period.   8/16/2022 2340 by Kwadwo Young RN  Outcome: Progressing  8/16/2022 1747 by Elva Matute, RN  Outcome: Progressing

## 2022-08-18 LAB
ANION GAP SERPL CALCULATED.3IONS-SCNC: 6 MMOL/L (ref 3–16)
BUN BLDV-MCNC: 34 MG/DL (ref 7–20)
CALCIUM SERPL-MCNC: 9 MG/DL (ref 8.3–10.6)
CHLORIDE BLD-SCNC: 99 MMOL/L (ref 99–110)
CO2: 33 MMOL/L (ref 21–32)
CREAT SERPL-MCNC: 1.3 MG/DL (ref 0.8–1.3)
GFR AFRICAN AMERICAN: >60
GFR NON-AFRICAN AMERICAN: 52
GLUCOSE BLD-MCNC: 74 MG/DL (ref 70–99)
POTASSIUM SERPL-SCNC: 4.6 MMOL/L (ref 3.5–5.1)
SODIUM BLD-SCNC: 138 MMOL/L (ref 136–145)

## 2022-08-18 PROCEDURE — 2580000003 HC RX 258: Performed by: PHYSICAL MEDICINE & REHABILITATION

## 2022-08-18 PROCEDURE — 97535 SELF CARE MNGMENT TRAINING: CPT

## 2022-08-18 PROCEDURE — 36415 COLL VENOUS BLD VENIPUNCTURE: CPT

## 2022-08-18 PROCEDURE — 97129 THER IVNTJ 1ST 15 MIN: CPT

## 2022-08-18 PROCEDURE — 6360000002 HC RX W HCPCS: Performed by: INTERNAL MEDICINE

## 2022-08-18 PROCEDURE — 80048 BASIC METABOLIC PNL TOTAL CA: CPT

## 2022-08-18 PROCEDURE — 6370000000 HC RX 637 (ALT 250 FOR IP): Performed by: PHYSICAL MEDICINE & REHABILITATION

## 2022-08-18 PROCEDURE — 97130 THER IVNTJ EA ADDL 15 MIN: CPT

## 2022-08-18 PROCEDURE — 97110 THERAPEUTIC EXERCISES: CPT

## 2022-08-18 PROCEDURE — 6370000000 HC RX 637 (ALT 250 FOR IP): Performed by: INTERNAL MEDICINE

## 2022-08-18 PROCEDURE — 1280000000 HC REHAB R&B

## 2022-08-18 RX ADMIN — ATORVASTATIN CALCIUM 40 MG: 40 TABLET, FILM COATED ORAL at 07:38

## 2022-08-18 RX ADMIN — ACETAMINOPHEN 650 MG: 325 TABLET ORAL at 16:18

## 2022-08-18 RX ADMIN — MEMANTINE 5 MG: 5 TABLET ORAL at 22:08

## 2022-08-18 RX ADMIN — QUETIAPINE FUMARATE 25 MG: 25 TABLET ORAL at 22:08

## 2022-08-18 RX ADMIN — MIDODRINE HYDROCHLORIDE 10 MG: 5 TABLET ORAL at 11:38

## 2022-08-18 RX ADMIN — FINASTERIDE 5 MG: 5 TABLET, FILM COATED ORAL at 07:38

## 2022-08-18 RX ADMIN — ASPIRIN 81 MG: 81 TABLET, COATED ORAL at 07:37

## 2022-08-18 RX ADMIN — MIDODRINE HYDROCHLORIDE 10 MG: 5 TABLET ORAL at 16:18

## 2022-08-18 RX ADMIN — MIDODRINE HYDROCHLORIDE 10 MG: 5 TABLET ORAL at 07:38

## 2022-08-18 RX ADMIN — SODIUM CHLORIDE, PRESERVATIVE FREE 10 ML: 5 INJECTION INTRAVENOUS at 16:18

## 2022-08-18 RX ADMIN — FLUTICASONE PROPIONATE 2 SPRAY: 50 SPRAY, METERED NASAL at 07:38

## 2022-08-18 RX ADMIN — FERROUS SULFATE TAB 325 MG (65 MG ELEMENTAL FE) 325 MG: 325 (65 FE) TAB at 07:37

## 2022-08-18 RX ADMIN — MEMANTINE 5 MG: 5 TABLET ORAL at 07:37

## 2022-08-18 RX ADMIN — ENOXAPARIN SODIUM 40 MG: 100 INJECTION SUBCUTANEOUS at 07:37

## 2022-08-18 RX ADMIN — SODIUM CHLORIDE, PRESERVATIVE FREE 5 ML: 5 INJECTION INTRAVENOUS at 22:08

## 2022-08-18 RX ADMIN — PANTOPRAZOLE SODIUM 40 MG: 40 TABLET, DELAYED RELEASE ORAL at 06:07

## 2022-08-18 RX ADMIN — CITALOPRAM HYDROBROMIDE 10 MG: 20 TABLET ORAL at 07:37

## 2022-08-18 ASSESSMENT — PAIN DESCRIPTION - DESCRIPTORS: DESCRIPTORS: ACHING;SHARP

## 2022-08-18 ASSESSMENT — PAIN - FUNCTIONAL ASSESSMENT: PAIN_FUNCTIONAL_ASSESSMENT: ACTIVITIES ARE NOT PREVENTED

## 2022-08-18 ASSESSMENT — PAIN SCALES - GENERAL
PAINLEVEL_OUTOF10: 6
PAINLEVEL_OUTOF10: 0
PAINLEVEL_OUTOF10: 0

## 2022-08-18 ASSESSMENT — PAIN DESCRIPTION - ORIENTATION: ORIENTATION: RIGHT

## 2022-08-18 ASSESSMENT — PAIN DESCRIPTION - LOCATION: LOCATION: FOOT

## 2022-08-18 NOTE — PROGRESS NOTES
[facility]: ACUTE REHAB UNIT  SPEECH-LANGUAGE PATHOLOGY      [x] Daily  [] Weekly Care Conference Note  [] Discharge    Patient:Aroldo Carpenter      :1935  WYD:[id number]  Rehab Dx/Hx: Syncope and collapse [R55]    Precautions: falls  Home situation: Lives with wife. Questionable historian - reports independence with med management (granddaughter assists?) and yard work. Pt reports he and his wife share responsibility for finances. He also reports wife manages cooking, cleaning, laundry, and grocery shopping and that wife is still working full time as a teacher. ST Dx: [] Aphasia  [] Dysarthria  [] Apraxia   [x] Oropharyngeal dysphagia [x] Cognitive Impairment  [] Other:   Date of Admit: 2022  Room #: 0153/0153-01    Current functional status (updated daily):         Pt being seen for : [] Speech/Language Treatment  [] Dysphagia Treatment [x] Cognitive Treatment  [] Other:  Communication: [x]WFL  [] Aphasia  [] Dysarthria  [] Apraxia  [] Pragmatic Impairment [] Non-verbal  [] Hearing Loss  [] Other:   Cognition: [] WFL  [] Mild  [] Moderate  [x] Severe [] Unable to Assess  [] Other:  Memory: [] WFL  [] Mild  [] Moderate  [x] Severe [] Unable to Assess  [] Other:  Behavior: [x] Alert  [x] Cooperative  [x]  Pleasant  [] Confused  [] Agitated  [] Uncooperative  [] Distractible [] Motivated  [] Self-Limiting [] Anxious  [] Other:  Endurance:  [] Adequate for participation in SLP sessions  [x] Reduced overall  [] Lethargic  [] Other:  Safety: [] No concerns at this time  [x] Reduced insight into deficits  [x]  Reduced safety awareness [] Not following call light procedures  [] Unable to Assess  [] Other:    Current Diet Order:ADULT ORAL NUTRITION SUPPLEMENT; Breakfast, Lunch, Dinner; Standard High Calorie/High Protein Oral Supplement  ADULT DIET;  Regular  Swallowing Precautions: upright for all intake, stay upright for at least 30 mins after intake, small bites/sips, alternate bites/sips, slow rate of intake, general GERD precautions, and general aspiration precautions        Date: 8/18/2022      Tx session 1  5910-7071 Tx session 2  All tx needs met in session 1   Total Timed Code Min 60    Total Treatment Minutes 60    Individual Treatment Minutes 60    Group Treatment Minutes 0    Co-Treat Minutes 0    Variance/Reason:  N/a    Pain Denies     Pain Intervention [] RN notified  [] Repositioned  [] Intervention offered and patient declined  [x] N/A  [] Other: [] RN notified  [] Repositioned  [] Intervention offered and patient declined  [] N/A  [] Other:   Subjective     Pt alert and oriented, cooperative and agreeable to participate in therapy. Pt seen sitting upright in bed. Objective:  Goals     Dysphagia Goals:  Short-term Goals  Timeframe for Short-term Goals: 10 days; 08/24/2022    Goal 1: The patient will tolerate recommended diet with no clinical s/s of aspiration 5/5    Did not target. Pt declined difficulties during meals. Goal 2: The patient/caregiver will demonstrate understanding of compensatory swallow strategies, for improved swallow safety     Did not target. Pt declined difficulties during meals. Cognitive-linguistic Goals:  Short-term Goals  Timeframe for Short-term Goals: 12 days (08/26/2022)    Goal 1: Pt will use visual aids / strategies to state orientation to time, place, situation with 80% acc given mod cues   Orientation concepts with use of visual calendar in room:  -month: indep  -date: indep  -ashley: min cues  -year:  \"2002\" mod cues     Goal 2: Pt will complete graded recall tasks with 50% acc given mod cues   Did not target.      Goal 3: Pt will complete executive function tasks (e.g. meds, time, money, etc) with 70% acc given mod cues   Answering questions about a prescription label:  -71% acc given mod cues     Goal 4: Pt will complete problem solving and thought organization tasks with 70% acc given mod cues   What's wrong picture cards?  -pt given a picture card and asked to determine what was wrong  -75% acc indep improving to 100% acc given mod cues       Goal 5: Pt will complete viusal and verbal reasoning tasks with 70% acc given mod cues   4 step picture sequencing cards:  -50% acc given mod cues improving to 80% acc given max cues     Other areas targeted: N/a    Education:   Edu provided re: attention/thought org strategies, rationale for tx tasks provided      Safety Devices: [x] Call light within reach  [x] Chair alarm activated  [] Bed alarm activated  [x] Other: AVASYS [] Call light within reach  [] Chair alarm activated  [] Bed alarm activated  [] Other:    Assessment: Pt pleasant and cooperative, agreeable to participate. Pt continues to present with mod-severe cognitive linguistic deficits, however has early onset dementia per chart review. Pt able to answer questions regarding a prescription label with 71% ac given mod cues, difficulty with differentiating between dosage/frequency amount, special instructions/side effects. Recommending assist with medication management at home. Pt did well with what's wrong picture cards, being able to identify the problem with 75% acc indep. Increased difficulty noted with four step picture sequencing cards d/t inattention to details and reduced thought organization. Pt completed four step sequencing with 50% acc given mod cues. Pt will require 24 hour assist at d/c due to overall severity of cognitive deficits and reduced insight negatively impacting safety and independence. Pt on a regular solid thin liquids diet (did not directly target) but pt reported no difficulties/concerns during meals. Continue goals above. Plan: Continue as per plan of care.       Additional Information:     Barriers toward progress: Cognitive deficit, Limited safety awareness, and Limited insight into deficits  Discharge recommendations:  [] Home independently  [] Home with assistance [x]  24 hour supervision  [] ECF [] Other:   Continued Tx Upon Discharge: ? [x] Yes [] No [] TBD based on progress while on ARU [] Vital Stim indicated [] Other:   Estimated discharge date: 08/26/22    Interventions used this date:  [] Speech/Language Treatment  [] Instruction in HEP [] Group [] Dysphagia Treatment [x] Cognitive Treatment   [] Other: Total Time Breakdown / Charges    Time in Time out Total Time / units   Cognitive Tx 1300 1400 60 min/ 4 units    Speech Tx -- -- --   Dysphagia Tx -- -- --       Electronically Signed by     Valentín Finley. A CCC-SLP  Speech-Language Pathologist  [phone]

## 2022-08-18 NOTE — PROGRESS NOTES
Occupational Therapy  Facility/Department: [facility]  Rehabilitation Occupational Therapy Daily Treatment Note    Date: 22  Patient Name: Camila Ly       Room: 92/2674-63  MRN: [id number]  Account: [de-identified]   : 1935  (82 y.o.) Gender: male                Past Medical History:  has a past medical history of Atrial fibrillation ([facility] [address] Blind right eye, Chronic kidney disease, Dementia ([facility] [address] Hyperlipidemia, Hypertension, and Pneumonia. Past Surgical History:   has a past surgical history that includes Tonsillectomy; Appendectomy; Colonoscopy; eye surgery; Upper gastrointestinal endoscopy; joint replacement (); hernia repair; and pacemaker placement (10/13/2017). Restrictions  Restrictions/Precautions: Fall Risk;General Precautions  Implants present? : Pacemaker  Other position/activity restrictions: LUE IV, orthostatic (low BP). compression stockings for OOB activity    Subjective  Subjective: Pt in recliner, pleasant, states feeling well with no dizziness or pain,  Restrictions/Precautions: Fall Risk;General Precautions   seated in chair: 97%, HR  with no change in activity, /85. After toileting and doffing clothing sitting on TTB: 105/56BP. After showering, 100/75 BP. After dressing and seated in chair, 100/68     Objective     Orientation  Overall Orientation Status: Impaired  Orientation Level: Oriented to person;Oriented to situation;Oriented to place (pt able to recall month and date, unable to recall year \"its \")       ADL  Feeding  Assistance Level: Independent  Upper Extremity Bathing  Assistance Level: Set-up; Stand by assist;Increased time to complete  Skilled Clinical Factors: seated on TTB in shower  Lower Extremity Bathing  Assistance Level: Contact guard assist;Set-up  Skilled Clinical Factors: for standing balance for perihygiene  Upper Extremity Dressing  Assistance Level: Minimal assistance  Skilled Clinical Factors: to don button shirt  Lower Extremity Dressing  Assistance Level: Contact guard assist  Skilled Clinical Factors: for standing balance for pants management up  Putting On/Taking Off Footwear  Assistance Level: Supervision  Skilled Clinical Factors: to doff/don gripper socks  Toileting  Assistance Level: Contact guard assist  Skilled Clinical Factors: in stance to use urinal     Functional Mobility  Device: Rolling walker  Activity: To/From bathroom; To/From therapy gym  Assistance Level: Contact guard assist  Skilled Clinical Factors: CGA/SBA with RW   OT Exercises  Static Sitting Balance Exercises: pt required posterior trunk assist in shower by TTB, with posterior leaning when acheiving figure 4 position for BLE. Static Standing Balance Exercises: poor static standing balance in shower with initial 1-2 LOB posteriorly upon initial  shower, requiring VC for use of grab bars and CGA for consistent balance. Assessment  Assessment  Assessment: Pt agreeable to OT this date reporting no dizziness or nausea. Pt completed showering and dressing grossly with SBA/CGA/set-up. Pt requring assistance for standing balance with all LB tasks due to poor static and dynamic standing balance. Pt reqiured increased time for all tasks and min vc for sequencing and safety. Pt grossly CGA for in room mobility and transfers. Continue POC. Activity Tolerance: Patient tolerated treatment well;Patient limited by endurance  Discharge Recommendations: Home with Home health OT;24 hour supervision or assist;S Level 1  OT Equipment Recommendations  Equipment Needed: Yes  ADL Assistive Devices: Shower Chair with back  Other: home with 24h/a fading to PRN. Safety Devices  Safety Devices in place: Yes  Type of devices: Gait belt;Call light within reach;Nurse notified; Patient at risk for falls; Chair alarm in place; Left in chair  Restraints  Initially in place: No    Patient Education  Education  Education Given To: Patient  Education Provided: Role of Therapy; ADL Function;Plan of Care;DME/Home Modifications;Transfer Training;Precautions; Safety; Fall Prevention Strategies; Mobility Training;Equipment  Education Provided Comments: role of OT, need for checking BP, purpose of ADITYA hose, BUE ther ex, safety with transfers, hand placement for sit>stands  Education Method: Demonstration;Verbal  Barriers to Learning: Cognition  Education Outcome: Verbalized understanding;Continued education needed    Plan  Plan  Times per Week: 5 of 7 days  Current Treatment Recommendations: Strengthening;Balance training;Functional mobility training; Endurance training;Patient/Caregiver education & training;Equipment evaluation, education, & procurement;Self-Care / ADL; Safety education & training    Goals  Patient Goals   Patient goals : \"to go home\"  Short Term Goals  Time Frame for Short term goals: 1 week (by 8/21 /22)  Short Term Goal 1: Pt will complete LBD with SPV  Short Term Goal 2: Pt will tolerate x 5 minutes of standing for improved activity azeem. GOAL MET 8/17/22 Pt tolerated more than 5 minutes of standing activity.   Short Term Goal 3: Pt will complete toileting with SPV  Short Term Goal 4: Pt will complete in room mobility and transfers with LRAD with SPV    Therapy Time   Individual Concurrent Group Co-treatment   Time In 0800         Time Out 0900         Minutes 60         Timed Code Treatment Minutes: 61 Minutes       BRENNEN Acharya

## 2022-08-18 NOTE — PROGRESS NOTES
Bruno Laws  8/18/2022  [id number]    Chief Complaint: Syncope and collapse    Subjective:   No acute events overnight. Today Danielle Holland is seen in his room, working with therapy. He is having worse dizziness today. He reports feeling dizzy while supine in bed and worse when up with therapies. He is encouraged to drink more fluids. ROS: denies f/c, n/v, cp, sob    Objective:  Patient Vitals for the past 24 hrs:   BP Temp Temp src Pulse Resp SpO2   08/18/22 0945 (!) 85/55 -- -- 77 -- 94 %   08/18/22 0745 124/81 -- -- 87 -- --   08/18/22 0730 125/82 97.9 °F (36.6 °C) Oral 88 16 97 %   08/17/22 2000 137/76 98.6 °F (37 °C) Oral 84 18 97 %   08/17/22 1700 111/66 -- -- 74 -- --   08/17/22 1330 (!) 84/54 -- -- (!) 117 -- --   08/17/22 1245 121/68 -- -- (!) 104 -- --     Gen: No distress, pleasant. Supine in bed  HEENT: Normocephalic, atraumatic. CV: extremities well perfused  Resp: No respiratory distress. Abd: Soft, nontender   Ext: No edema. Neuro: Alert, oriented, appropriately interactive. Wt Readings from Last 3 Encounters:   08/14/22 155 lb 4 oz (70.4 kg)   08/10/22 156 lb 1.6 oz (70.8 kg)   07/08/22 160 lb (72.6 kg)       Laboratory data:   Lab Results   Component Value Date    WBC 7.9 08/17/2022    HGB 15.0 08/17/2022    HCT 46.0 08/17/2022    MCV 89.4 08/17/2022     08/17/2022       Lab Results   Component Value Date/Time     08/18/2022 06:45 AM    K 4.6 08/18/2022 06:45 AM    K 4.5 08/12/2022 06:41 AM    CL 99 08/18/2022 06:45 AM    CO2 33 08/18/2022 06:45 AM    BUN 34 08/18/2022 06:45 AM    CREATININE 1.3 08/18/2022 06:45 AM    GLUCOSE 74 08/18/2022 06:45 AM    CALCIUM 9.0 08/18/2022 06:45 AM        Therapy progress:  PT  Position Activity Restriction  Other position/activity restrictions: LUE IV, orthostatic (low BP).  compression stockings for OOB activity  Objective     Sit to Stand: Minimal Assistance  Stand to sit: Minimal Assistance  Bed to Chair: Unable to assess     OT  PT Equipment Recommendations  Other: TBD pending progress and increased functional mobility assessment-- anticipate RW and possible manual w/c  Toilet - Technique: Ambulating  Equipment Used: Grab bars  Assessment        SLP  Current Diet : Regular             Body mass index is 24.32 kg/m². Assessment and Plan:  Karen Grossman is an 82year old male with a past medical history significant for atrial fibrillation, s/p pacer, dementia, CKD, HTN, and HLD who presented as a transfer from [facility] on 8/9/22 after a presyncopal episode, treated for orthostatic hypotension. He was admitted to [facility] on 8/14/22 due to functional deficits below his baseline. Debility  - due to below  - PT, OT, ST     Orthostatic hypotension  - SBP dipping to 70s-80s when up with therapy, now improving   - Medicine following, appreciate assistance  - discontinued flomax, metoprolol  - s/p 500 mL bolus on 8/16 and 8/17  - ADITYA hose  - midodrine  - encourage fluids    Productive Cough  - XR chest 8/15 negative for acute process     Paroxysmal Atrial Fibrillation  - monitor HR off of BB  - Eliquis discontinued per Medicine due to high fall risk     HFrEF  - daily weights, I/Os  - OP ischemic eval per Cardiology     NATANAEL on CKD  - baseline Cr ~1.2  - Cr elevated above baseline, concern for dehydration. Also need to make sure he is not retaining since flomax was stopped. - encourage patient to drink fluids  - obtain PVRs x3, nursing order placed  - monitor     Dementia  - aricept discontinued due to concerns for polypharmacy  - namenda  - wean quietiapine as able     CAD  - statin  - follow up with Cards OP     BPH  - finasteride  - hold tamsulosin due to BP     Resolved hospital problems     AMS  - suspected to be due to medications      Bowels: Schedule stool softener. Follow bowel movements. Enema or suppository if needed. Services: home health PT, OT, [address] [place] [address] , nursing  EDOD: 8/26/22    Candelario Hammans.  Milad Tsai MD 8/18/2022, 11:20 AM

## 2022-08-18 NOTE — PROGRESS NOTES
Physical Therapy  Facility/Department: [facility]  Rehabilitation Physical Therapy Treatment Note    NAME: Azael Doss  : 1935 (48 y.o.)  MRN: [id number]  CODE STATUS: Full Code    Date of Service: 22       Restrictions:  Restrictions/Precautions: Fall Risk;General Precautions  Position Activity Restriction  Other position/activity restrictions: LUE IV, orthostatic (low BP). compression stockings for OOB activity     SUBJECTIVE  Subjective  Subjective: Patient up in chair on arrival and noted he was, \"just about asleep. \"  Pt denies pain reports dizziness sitting in chair but endorses fatigue. Pt notes \"Today dizziness worse today when I stand up   Usually it ain't\"  Pain: Pt denies pain this morning        Post Treatment Pain Screening Denies         OBJECTIVE  Orientation  Overall Orientation Status: Impaired  Orientation Level: Oriented to person;Oriented to situation;Oriented to place (pt able to recall month and date, unable to recall year \"its \")    Functional Mobility  Sit to Supine  Assistance Level: Independent  Supine to Sit  Assistance Level: Stand by assist  Skilled Clinical Factors: cues for slowly sitting up due to orthostatic hypotension    Multipel trials of sup<>sit and sit<>stand   Environmental Mobility  Ambulation  Surface: Level surface  Device:  (HHA)  Distance: 3 feet  Activity: Within Room  Additional Factors: Verbal cues; Hand placement cues; Increased time to complete  Assistance Level: Minimal assistance (HHA)  Gait Deviations: Slow fred; Unsteady gait; Wide base of support  Skilled Clinical Factors: slow gait from chair to bed with flexion in hips/knees and wide gideon and unsteadiness with pt endorsing dizziness worse in standing. PT Exercises  Exercise Treatment: Supine x40 SLR BLE , bridges x15 see vitals section for other LE therex.        ASSESSMENT/PROGRESS TOWARDS GOALS  Vital Signs  Heart Rate: 77  BP:  seated 85/55 at rest  (standing   BP 76/53 HR 80 bpm returned to supine with HOBE 35 degrees wtih BP RUE   113/70 HR 72 bpm with dizziness  decreased in supine)  BP Location: Right upper arm  MAP (Calculated): 65  SpO2: 94 % (ADITYA hose donned.)  O2 Device: None (Room air)  Orhtostatic  hypotnesion with position changes:   seated  in chair 85/55 at rest  endorses dizziness and fatigue  Standing  BP 76/53 HR 80 bpm endorses dizziness   Supine /70 with HOBE 35 degrees. dizziness  decreased in supine  After completing LE therex supine BP /65 HR 75bpm dizziness  decreased in supine  Sitting with cues to slowly sup to sit with SBA RUE BP 93/60 60 HR 97 bpm   Sitting completing hip flexion x30 BLE and LAQ x30 BLE , ankle pumps x30 BLE   Standing 2 minutes unable to get BP with automatic cuff and pt needed to sit due to report of weakness and dizziness increasing BPE RUE taken with immediate sitting unable to register automatic BP, patient returned to supine /68 HR 73bpm   REturn to sitting   slowly with SBA  /65 HR 77 bpm  After 5 slow sit<>stand with SBA and 5 BLE heel raises in standing with HHA min in sitting BP 85/51  bpm   After 15 BLE 5# LAQ seated EOB BP unable to obtain with autocuff error reading with return to supine  BP RUE  110/64 HR 88bpm   With return to sitting slowly sup to sit with SBA BP /70. Pt returned to supine with bed alarm engaged and call light next to patient at end of session. Assessment  Assessment: Pt seen for LE therex for increased endurance and LE strength with ADITYA hose donned throughout tx. Pt up in chair on arrival with  hypotension noted with hypotension and dizziness worse in standing and improved to 113/70 supine with HOBE 35 degrees. After completing LE therex supine BP /65 HR 75bpm .   With return to sitting slowly (ADITYA hose donned)   BP 93/60 HR 97 bpm.  Pt with continued orthostatic hypotension in standing and variable response hypotension in sitting as noted.   Encouraged fluid intake, Dr. Rivers Spotted in to see patient during tx with discussion of patient's vitals during first part of the session. Patient with poor tolerance due to hypotension with standing and fair with sitting as noted. Goals  Patient Goals   Patient goals : \"Be able to do a day's work\"  Short Term Goals  Time Frame for Short term goals: 8/19/22  Short term goal 1: Pt will complete bed mobility mod I. Short term goal 2: Pt will complete transfers with supervision and LRAD. Short term goal 3: Pt will ambulate 50 ft with LRAD and CGA. Short term goal 4: Pt will climb 2 steps with BHR and CGA. Long Term Goals  Time Frame for Long term goals : 8/27/22  Long term goal 1: Pt will complete transfers with LRAD mod I.  Long term goal 2: Pt will ambulate 150 ft with LRAD and mod I.  Long term goal 3: Pt will climb 12 stairs with handrails mod I. PLAN OF CARE/SAFETY 5/7 days week progression with mobility therex, theract and gait to progress towards plod. EDUCATION Educated patient in changin positions slowly, monitored vitals and dizziness with position changes provided vladislav up and cues for LE therex with patient engaged in all session activities and encouraged patient to drink water during session with explanation to patient as to how increasing volume may improve positional hypotension.             Therapy Time   Individual Concurrent Group Co-treatment   Time In 4007         Time Out 1042         Minutes 60           Timed Code Treatment Minutes: [address] [place] [address] , [address] [facility], 08/18/22 at 11:21 AM

## 2022-08-18 NOTE — PROGRESS NOTES
Hospitalist Progress Note      PCP: César Wooten MD    Date of Admission: 8/14/2022    Chief Complaint: syncope and collapse    Hospital Course: H&P reviewed    Subjective: Episode of dizziness with hypotension      Medications:  Reviewed    Infusion Medications    dextrose       Scheduled Medications    QUEtiapine  25 mg Oral Nightly    midodrine  10 mg Oral TID WC    atorvastatin  40 mg Oral Daily    enoxaparin  40 mg SubCUTAneous Daily    aspirin  81 mg Oral Daily    citalopram  10 mg Oral Daily    ferrous sulfate  325 mg Oral Daily    finasteride  5 mg Oral Daily    memantine  5 mg Oral BID    [Held by provider] tamsulosin  0.4 mg Oral Daily    pantoprazole  40 mg Oral QAM AC    fluticasone  2 spray Nasal Daily    sodium chloride flush  5-40 mL IntraVENous 2 times per day     PRN Meds: calcium carbonate, sodium chloride flush, polyethylene glycol, glucose, dextrose bolus **OR** dextrose bolus, glucagon (rDNA), dextrose, acetaminophen, diphenhydrAMINE, hydrALAZINE, ondansetron, traZODone, traMADol      Intake/Output Summary (Last 24 hours) at 8/18/2022 1531  Last data filed at 8/18/2022 1304  Gross per 24 hour   Intake 545 ml   Output 750 ml   Net -205 ml         Physical Exam Performed:    BP (!) 85/55   Pulse 77   Temp 97.9 °F (36.6 °C) (Oral)   Resp 16   Ht 5' 7\" (1.702 m)   Wt 155 lb 4 oz (70.4 kg)   SpO2 94% Comment: ADITYA hose donshona. BMI 24.32 kg/m²     General appearance: No apparent distress, appears stated age and cooperative. HEENT: R eye prosthesis. Conjunctivae/corneas clear. Neck: Supple, with full range of motion. No jugular venous distention. Trachea midline. Respiratory:  Normal respiratory effort. Clear to auscultation, bilaterally without Rales/Wheezes/Rhonchi. Cardiovascular: Regular rate and rhythm with normal S1/S2 without murmurs, rubs or gallops. Abdomen: Soft, non-tender, non-distended with normal bowel sounds.   Musculoskeletal: No clubbing, cyanosis or edema bilaterally. Full range of motion without deformity. Skin: Skin color, texture, turgor normal.  No rashes or lesions. Neurologic:  Neurovascularly intact without any focal sensory/motor deficits. Cranial nerves: II-XII intact, grossly non-focal.  Psychiatric: Alert and oriented, thought content appropriate, normal insight  Capillary Refill: Brisk,3 seconds, normal   Peripheral Pulses: +2 palpable, equal bilaterally       Labs:   Recent Labs     08/17/22  0701   WBC 7.9   HGB 15.0   HCT 46.0          Recent Labs     08/17/22  0701 08/18/22  0645    138   K 5.0 4.6   CL 99 99   CO2 32 33*   BUN 37* 34*   CREATININE 1.5* 1.3   CALCIUM 9.1 9.0       No results for input(s): AST, ALT, BILIDIR, BILITOT, ALKPHOS in the last 72 hours. No results for input(s): INR in the last 72 hours. No results for input(s): Charline Gazella in the last 72 hours. Urinalysis:      Lab Results   Component Value Date/Time    NITRU Negative 08/16/2022 08:46 AM    WBCUA 21-50 04/21/2022 02:20 PM    BACTERIA 1+ 04/21/2022 02:20 PM    RBCUA 3-4 04/21/2022 02:20 PM    BLOODU Negative 08/16/2022 08:46 AM    SPECGRAV 1.010 08/16/2022 08:46 AM    GLUCOSEU Negative 08/16/2022 08:46 AM       Radiology:  XR CHEST 1 VIEW   Final Result   No acute abnormality identified. Large hiatal hernia. Assessment/Plan:    Active Hospital Problems    Diagnosis     Syncope and collapse [R55]      Accelerating rate of unintentional weight loss over the last few years  - likely due to poor appetite of advanced age. Encourage better PO intake, started supplements with each meal.    - not a good candidate for mirtazapine since this med is associated with orthostatic hypotension. Not a great candidate for megestrol since he is at increased risk of emboli-since off his apixaban. He is high risk for cognitive side effects of dronabinol.    - f/u TSH. Consider outpatient colonoscopy referral via PCP.      Cardiomyopathy, probably

## 2022-08-19 LAB
ANION GAP SERPL CALCULATED.3IONS-SCNC: 8 MMOL/L (ref 3–16)
BUN BLDV-MCNC: 36 MG/DL (ref 7–20)
CALCIUM SERPL-MCNC: 10.2 MG/DL (ref 8.3–10.6)
CHLORIDE BLD-SCNC: 98 MMOL/L (ref 99–110)
CO2: 31 MMOL/L (ref 21–32)
CREAT SERPL-MCNC: 1.3 MG/DL (ref 0.8–1.3)
GFR AFRICAN AMERICAN: >60
GFR NON-AFRICAN AMERICAN: 52
GLUCOSE BLD-MCNC: 80 MG/DL (ref 70–99)
HCT VFR BLD CALC: 45.7 % (ref 40.5–52.5)
HEMOGLOBIN: 14.9 G/DL (ref 13.5–17.5)
MCH RBC QN AUTO: 29 PG (ref 26–34)
MCHC RBC AUTO-ENTMCNC: 32.5 G/DL (ref 31–36)
MCV RBC AUTO: 89.2 FL (ref 80–100)
PDW BLD-RTO: 16.4 % (ref 12.4–15.4)
PLATELET # BLD: 190 K/UL (ref 135–450)
PMV BLD AUTO: 8.1 FL (ref 5–10.5)
POTASSIUM SERPL-SCNC: 4.7 MMOL/L (ref 3.5–5.1)
RBC # BLD: 5.13 M/UL (ref 4.2–5.9)
SODIUM BLD-SCNC: 137 MMOL/L (ref 136–145)
WBC # BLD: 6.9 K/UL (ref 4–11)

## 2022-08-19 PROCEDURE — 97129 THER IVNTJ 1ST 15 MIN: CPT

## 2022-08-19 PROCEDURE — 80048 BASIC METABOLIC PNL TOTAL CA: CPT

## 2022-08-19 PROCEDURE — 36415 COLL VENOUS BLD VENIPUNCTURE: CPT

## 2022-08-19 PROCEDURE — 6370000000 HC RX 637 (ALT 250 FOR IP): Performed by: PHYSICAL MEDICINE & REHABILITATION

## 2022-08-19 PROCEDURE — 1280000000 HC REHAB R&B

## 2022-08-19 PROCEDURE — 97130 THER IVNTJ EA ADDL 15 MIN: CPT

## 2022-08-19 PROCEDURE — 2580000003 HC RX 258: Performed by: PHYSICAL MEDICINE & REHABILITATION

## 2022-08-19 PROCEDURE — 97530 THERAPEUTIC ACTIVITIES: CPT

## 2022-08-19 PROCEDURE — 97116 GAIT TRAINING THERAPY: CPT

## 2022-08-19 PROCEDURE — 6360000002 HC RX W HCPCS: Performed by: INTERNAL MEDICINE

## 2022-08-19 PROCEDURE — 6370000000 HC RX 637 (ALT 250 FOR IP): Performed by: INTERNAL MEDICINE

## 2022-08-19 PROCEDURE — 97535 SELF CARE MNGMENT TRAINING: CPT

## 2022-08-19 PROCEDURE — 85027 COMPLETE CBC AUTOMATED: CPT

## 2022-08-19 RX ADMIN — PANTOPRAZOLE SODIUM 40 MG: 40 TABLET, DELAYED RELEASE ORAL at 06:09

## 2022-08-19 RX ADMIN — MEMANTINE 5 MG: 5 TABLET ORAL at 07:43

## 2022-08-19 RX ADMIN — MIDODRINE HYDROCHLORIDE 10 MG: 5 TABLET ORAL at 07:43

## 2022-08-19 RX ADMIN — FLUTICASONE PROPIONATE 2 SPRAY: 50 SPRAY, METERED NASAL at 07:44

## 2022-08-19 RX ADMIN — FINASTERIDE 5 MG: 5 TABLET, FILM COATED ORAL at 07:43

## 2022-08-19 RX ADMIN — MIDODRINE HYDROCHLORIDE 10 MG: 5 TABLET ORAL at 16:09

## 2022-08-19 RX ADMIN — ASPIRIN 81 MG: 81 TABLET, COATED ORAL at 07:44

## 2022-08-19 RX ADMIN — MIDODRINE HYDROCHLORIDE 10 MG: 5 TABLET ORAL at 11:56

## 2022-08-19 RX ADMIN — ATORVASTATIN CALCIUM 40 MG: 40 TABLET, FILM COATED ORAL at 07:44

## 2022-08-19 RX ADMIN — MEMANTINE 5 MG: 5 TABLET ORAL at 20:01

## 2022-08-19 RX ADMIN — CITALOPRAM HYDROBROMIDE 10 MG: 20 TABLET ORAL at 07:43

## 2022-08-19 RX ADMIN — SODIUM CHLORIDE, PRESERVATIVE FREE 5 ML: 5 INJECTION INTRAVENOUS at 20:02

## 2022-08-19 RX ADMIN — QUETIAPINE FUMARATE 25 MG: 25 TABLET ORAL at 20:01

## 2022-08-19 RX ADMIN — SODIUM CHLORIDE, PRESERVATIVE FREE 10 ML: 5 INJECTION INTRAVENOUS at 07:47

## 2022-08-19 RX ADMIN — FERROUS SULFATE TAB 325 MG (65 MG ELEMENTAL FE) 325 MG: 325 (65 FE) TAB at 07:44

## 2022-08-19 RX ADMIN — ENOXAPARIN SODIUM 40 MG: 100 INJECTION SUBCUTANEOUS at 07:43

## 2022-08-19 ASSESSMENT — PAIN SCALES - GENERAL
PAINLEVEL_OUTOF10: 0
PAINLEVEL_OUTOF10: 0

## 2022-08-19 NOTE — PROGRESS NOTES
Occupational Therapy  Facility/Department: [facility]  Rehabilitation Occupational Therapy Daily Treatment Note    Date: 22  Patient Name: Latasha Martínez       Room: 3009/8318-86  MRN: [id number]  Account: [de-identified]   : 1935  (82 y.o.) Gender: male      Past Medical History:  has a past medical history of Atrial fibrillation ([facility] [address] Blind right eye, Chronic kidney disease, Dementia ([facility] [address] Hyperlipidemia, Hypertension, and Pneumonia. Past Surgical History:   has a past surgical history that includes Tonsillectomy; Appendectomy; Colonoscopy; eye surgery; Upper gastrointestinal endoscopy; joint replacement (); hernia repair; and pacemaker placement (10/13/2017). Restrictions  Restrictions/Precautions: Fall Risk;General Precautions  Implants present? : Pacemaker  Other position/activity restrictions: LUE IV, orthostatic (low BP). compression stockings for OOB activity    Subjective  Subjective: Pt steaed in recliner, pleasant and agreeable to participate. Pt reported \"feeling sleepy\"  Restrictions/Precautions: Fall Risk;General Precautions    Objective  Vitals  HR 75  BP 93/57  SpO2 96%  Pain: pt states he is not in pain    Cognition  Overall Cognitive Status: Exceptions  Arousal/Alertness: Appropriate responses to stimuli  Following Commands: Follows multistep commands with repitition; Follows one step commands with increased time; Follows one step commands consistently  Attention Span: Attends with cues to redirect  Memory: Decreased short term memory;Decreased recall of precautions;Decreased recall of recent events  Safety Judgement: Decreased awareness of need for safety;Decreased awareness of need for assistance  Problem Solving: Decreased awareness of errors;Assistance required to identify errors made;Assistance required to generate solutions  Insights: Decreased awareness of deficits  Initiation: Requires cues for some  Sequencing: Requires cues for some  Orientation  Overall Orientation Status: Impaired  Orientation Level: Oriented to person;Oriented to situation;Disoriented to place;Oriented to time (states location is [facility])       ADL  Feeding  Assistance Level: Independent  Grooming/Oral Hygiene  Assistance Level: Stand by assist (in stance at sink)  Skilled Clinical Factors: wash hands  Lower Extremity Dressing  Assistance Level: Contact guard assist  Skilled Clinical Factors: for standing balance for pants management up  Toileting  Assistance Level: Minimal assistance (at toilet)  Skilled Clinical Factors: complete keiry care following bowel movement  Toilet Transfers  Technique:  (ambulating)  Equipment: Standard toilet  Additional Factors: Verbal cues; With handrails; Increased time to complete  Assistance Level: Contact guard assist     Functional Mobility  Device: Rolling walker  Activity: To/From bathroom; To/From therapy gym  Assistance Level: Contact guard assist  Skilled Clinical Factors: CGA/SBA with RW  Bed Mobility  Overall Assistance Level:  (pt seated in chair upon arrival)  Transfers  Surface: Standard toilet;From chair with arms; To bed; To chair with arms  Additional Factors: Verbal cues; With handrails;Hand placement cues; Increased time to complete  Device: Walker  Sit to Stand  Assistance Level: Contact guard assist  Skilled Clinical Factors: cues for hand placement  Stand to Sit  Assistance Level: Contact guard assist  Skilled Clinical Factors: cues for hand placement   OT Exercises  Dynamic Sitting Balance Exercises: Pt participated in UE ROM/strengthening activity using 4# weight on towel gliding across table x15 reps in stance. 2nd task: pt glided weight to 3 specific targets in pattern x4 reps. 3rd task: pt glided weight to 3 specific targets following OTs direction. Required assistance to problem solve during activity.  Pt w/ limited evidence or c/o fatigue throughout     Assessment  Assessment  Assessment: Pt seated in chair upon arrival and agreeable to OT session this date w/ improved activity tolerance and endurance throughout. Pt completed toileting w/ Shawn for keiry care and balance following bowel movement. Pt ambulated to therapy gym w/ CGA for balance and safety and completed dynamic reaching and strengthening activity in stance for ~15 min. Pt demo'd improved standing balance and activity tolerance throughout reuqiring min rest breaks w/ no c/o of dizzines or SOB. Pt also required fewer VCs for safety and RW management, but continues to benefit. Cont OT POC. Activity Tolerance: Patient tolerated treatment well;Patient limited by endurance  Discharge Recommendations: Home with Home health OT;24 hour supervision or assist;S Level 1  OT Equipment Recommendations  Equipment Needed: Yes  Mobility Devices: ADL Assistive Devices  ADL Assistive Devices: Shower Chair with back  Other: home with 24h/a fading to PRN. Safety Devices  Safety Devices in place: Yes  Type of devices: Gait belt;Call light within reach;Nurse notified; Patient at risk for falls; Chair alarm in place; Left in chair  Restraints  Initially in place: No    Patient Education  Education  Education Given To: Patient  Education Provided: Role of Therapy; ADL Function;Plan of Care;DME/Home Modifications;Transfer Training;Precautions; Safety; Fall Prevention Strategies; Mobility Training;Equipment  Education Provided Comments: role of OT, need for checking BP, purpose of ADITYA hose, BULUZ ther ex, safety with transfers, hand placement for sit>stands  Education Method: Demonstration;Verbal  Barriers to Learning: Cognition  Education Outcome: Verbalized understanding;Continued education needed    Plan  Plan  Times per Week: 5 of 7 days  Current Treatment Recommendations: Strengthening;Balance training;Functional mobility training; Endurance training;Patient/Caregiver education & training;Equipment evaluation, education, & procurement;Self-Care / ADL; Safety education & training    Goals  Patient Goals   Patient goals : Reyna cardona home\"  Short Term Goals  Time Frame for Short term goals: 1 week (by 8/21 /22)  Short Term Goal 1: Pt will complete LBD with SPV  Short Term Goal 2: Pt will tolerate x 5 minutes of standing for improved activity azeem. GOAL MET 8/17/22 Pt tolerated more than 5 minutes of standing activity. Short Term Goal 3: Pt will complete toileting with SPV  Short Term Goal 4: Pt will complete in room mobility and transfers with LRAD with SPV  Long Term Goals  Time Frame for Long term goals : 14 days by 8/28/22  Long Term Goal 1: Pt will complete total body dressing with mod I  Long Term Goal 2: Pt will complete total body showering with mod I  Long Term Goal 3: Pt will complete dynamic balance task reaching to high/low levels with SBA and LRAD.     Therapy Time   Individual Concurrent Group Co-treatment   Time In 1030         Time Out 1130         Minutes 60         Timed Code Treatment Minutes: 60 Minutes     Martínez Dunlap OTR/L

## 2022-08-19 NOTE — PROGRESS NOTES
Sydnie Lanza  8/19/2022  [id number]    Chief Complaint: Syncope and collapse    Subjective:   No acute events overnight. Today Roby Astudillo is seen in his room, working with therapy. He reports feeling light headed while seated up in the chair. He notes pain in his dorsal right foot. He denies prior gout. Area is mildly tender to palpation. He denies worse pain with bearing weight. ROS: denies f/c, n/v, cp, sob    Objective:  Patient Vitals for the past 24 hrs:   BP Temp Temp src Pulse Resp SpO2   08/19/22 1203 (!) 93/57 -- -- 75 -- 96 %   08/19/22 1130 100/61 -- -- -- -- --   08/19/22 0832 120/74 -- -- 81 -- 95 %   08/19/22 0730 -- 98 °F (36.7 °C) Oral 83 16 96 %   08/18/22 2206 -- 98 °F (36.7 °C) Oral 88 18 96 %     Gen: No distress, pleasant. Seated up in chair  HEENT: Normocephalic, atraumatic. CV: extremities well perfused  Resp: No respiratory distress. On room  Abd: Soft, nontender   Ext: No edema. Neuro: Alert, oriented, appropriately interactive. Wt Readings from Last 3 Encounters:   08/14/22 155 lb 4 oz (70.4 kg)   08/10/22 156 lb 1.6 oz (70.8 kg)   07/08/22 160 lb (72.6 kg)       Laboratory data:   Lab Results   Component Value Date    WBC 6.9 08/19/2022    HGB 14.9 08/19/2022    HCT 45.7 08/19/2022    MCV 89.2 08/19/2022     08/19/2022       Lab Results   Component Value Date/Time     08/19/2022 06:58 AM    K 4.7 08/19/2022 06:58 AM    K 4.5 08/12/2022 06:41 AM    CL 98 08/19/2022 06:58 AM    CO2 31 08/19/2022 06:58 AM    BUN 36 08/19/2022 06:58 AM    CREATININE 1.3 08/19/2022 06:58 AM    GLUCOSE 80 08/19/2022 06:58 AM    CALCIUM 10.2 08/19/2022 06:58 AM        Therapy progress:  PT  Position Activity Restriction  Other position/activity restrictions: LUE IV, orthostatic (low BP).  compression stockings for OOB activity  Objective     Sit to Stand: Minimal Assistance  Stand to sit: Minimal Assistance  Bed to Chair: Unable to assess     OT  PT Equipment Recommendations  Other: TBD pending progress and increased functional mobility assessment-- anticipate RW and possible manual w/c  Toilet - Technique: Ambulating  Equipment Used: Grab bars  Assessment        SLP  Current Diet : Regular             Body mass index is 24.32 kg/m². Assessment and Plan:  Giulia Richards is an 82year old male with a past medical history significant for atrial fibrillation, s/p pacer, dementia, CKD, HTN, and HLD who presented as a transfer from [facility] on 8/9/22 after a presyncopal episode, treated for orthostatic hypotension. He was admitted to [facility] on 8/14/22 due to functional deficits below his baseline. Debility  - due to below  - PT, OT, ST     Orthostatic hypotension  - SBP dipping to 70s-80s when up with therapy, now improving   - Medicine following, appreciate assistance  - discontinued flomax, metoprolol  - s/p 500 mL bolus on 8/16 and 8/17  - ADITYA hose  - midodrine  - encourage fluids    Productive Cough  - XR chest 8/15 negative for acute process     Paroxysmal Atrial Fibrillation  - monitor HR off of BB  - Eliquis discontinued per Medicine due to high fall risk     HFrEF  - daily weights, I/Os  - OP ischemic eval per Cardiology     NATANAEL on CKD  - baseline Cr ~1.2  - Cr elevated above baseline, concern for dehydration. Also need to make sure he is not retaining since flomax was stopped. - encourage patient to drink fluids  - obtain PVRs x3, nursing order placed  - monitor     Dementia  - aricept discontinued due to concerns for polypharmacy  - namenda  - wean quietiapine as able     CAD  - statin  - follow up with Cards OP     BPH  - finasteride  - hold tamsulosin due to BP     Resolved hospital problems     AMS  - suspected to be due to medications      Bowels: Schedule stool softener. Follow bowel movements. Enema or suppository if needed. Services: home health PT, OT, [address] [place] [address] , nursing  EDOD: 8/26/22    Luciano Peterson.  Cata Patel MD 8/19/2022, 1:07 PM

## 2022-08-19 NOTE — PROGRESS NOTES
Hospitalist Progress Note      PCP: Jeancarlos Mclaughlin MD    Date of Admission: 8/14/2022    Chief Complaint: syncope and collapse    Hospital Course: H&P reviewed    Subjective: No reported       Medications:  Reviewed    Infusion Medications    dextrose       Scheduled Medications    QUEtiapine  25 mg Oral Nightly    midodrine  10 mg Oral TID WC    atorvastatin  40 mg Oral Daily    enoxaparin  40 mg SubCUTAneous Daily    aspirin  81 mg Oral Daily    citalopram  10 mg Oral Daily    ferrous sulfate  325 mg Oral Daily    finasteride  5 mg Oral Daily    memantine  5 mg Oral BID    [Held by provider] tamsulosin  0.4 mg Oral Daily    pantoprazole  40 mg Oral QAM AC    fluticasone  2 spray Nasal Daily    sodium chloride flush  5-40 mL IntraVENous 2 times per day     PRN Meds: calcium carbonate, sodium chloride flush, polyethylene glycol, glucose, dextrose bolus **OR** dextrose bolus, glucagon (rDNA), dextrose, acetaminophen, diphenhydrAMINE, hydrALAZINE, ondansetron, traZODone, traMADol      Intake/Output Summary (Last 24 hours) at 8/19/2022 1040  Last data filed at 8/19/2022 0938  Gross per 24 hour   Intake 545 ml   Output 1650 ml   Net -1105 ml         Physical Exam Performed:    /74   Pulse 81   Temp 98 °F (36.7 °C) (Oral)   Resp 16   Ht 5' 7\" (1.702 m)   Wt 155 lb 4 oz (70.4 kg)   SpO2 95%   BMI 24.32 kg/m²     General appearance: No apparent distress, appears stated age and cooperative. HEENT: R eye prosthesis. Conjunctivae/corneas clear. Neck: Supple, with full range of motion. No jugular venous distention. Trachea midline. Respiratory:  Normal respiratory effort. Clear to auscultation, bilaterally without Rales/Wheezes/Rhonchi. Cardiovascular: Regular rate and rhythm with normal S1/S2 without murmurs, rubs or gallops. Abdomen: Soft, non-tender, non-distended with normal bowel sounds. Musculoskeletal: No clubbing, cyanosis or edema bilaterally. Full range of motion without deformity.   Skin: Skin color, texture, turgor normal.  No rashes or lesions. Neurologic:  Neurovascularly intact without any focal sensory/motor deficits. Cranial nerves: II-XII intact, grossly non-focal.  Psychiatric: Alert and oriented, thought content appropriate, normal insight  Capillary Refill: Brisk,3 seconds, normal   Peripheral Pulses: +2 palpable, equal bilaterally       Labs:   Recent Labs     08/17/22  0701 08/19/22  0658   WBC 7.9 6.9   HGB 15.0 14.9   HCT 46.0 45.7    190       Recent Labs     08/17/22  0701 08/18/22  0645 08/19/22  0658    138 137   K 5.0 4.6 4.7   CL 99 99 98*   CO2 32 33* 31   BUN 37* 34* 36*   CREATININE 1.5* 1.3 1.3   CALCIUM 9.1 9.0 10.2       No results for input(s): AST, ALT, BILIDIR, BILITOT, ALKPHOS in the last 72 hours. No results for input(s): INR in the last 72 hours. No results for input(s): Shelbie Kras in the last 72 hours. Urinalysis:      Lab Results   Component Value Date/Time    NITRU Negative 08/16/2022 08:46 AM    WBCUA 21-50 04/21/2022 02:20 PM    BACTERIA 1+ 04/21/2022 02:20 PM    RBCUA 3-4 04/21/2022 02:20 PM    BLOODU Negative 08/16/2022 08:46 AM    SPECGRAV 1.010 08/16/2022 08:46 AM    GLUCOSEU Negative 08/16/2022 08:46 AM       Radiology:  XR CHEST 1 VIEW   Final Result   No acute abnormality identified. Large hiatal hernia. Assessment/Plan:    Active Hospital Problems    Diagnosis     Syncope and collapse [R55]      Accelerating rate of unintentional weight loss over the last few years  - likely due to poor appetite of advanced age. Encourage better PO intake, started supplements with each meal.    - not a good candidate for mirtazapine since this med is associated with orthostatic hypotension. Not a great candidate for megestrol since he is at increased risk of emboli-since off his apixaban. He is high risk for cognitive side effects of dronabinol.    - f/u TSH. Consider outpatient colonoscopy referral via PCP. Cardiomyopathy, probably ischemic, with known CAD. Aspirin. Increased statin. F/u with cardiology as outpatient. Chronic orthostatic hypotension, with frequent falls and/or syncope. Due to above issues, especially the weight loss. Better PO intake will be key. - agree with short course of IVFs (now stopped), stopping tamsulosin, and increasing midodrine. Also stopped metoprolol and added ADITYA hose. F/u UA (reordered). Stand up slowly. Will need to permit supine HTN in order to avoid upright hypotension. PAF. Risk of apixaban outweighs benefit. Stopped this med. Monitor rates without metoprolol. Dementia. Stopped donepezil to avoid polypharmacy, can contribute to urinary retention and now we are trying to go without tamsulosin. Ok to continue memantine. He is on quetiapine qhs, reduced dose, try to taper off in the near future if he is not agitated overnight. Chronic obstructive uropathy. Finasteride. Nursing can remove the scalp staples on 8/16. DVT Prophylaxis: Lovenox  Diet: ADULT ORAL NUTRITION SUPPLEMENT; Breakfast, Lunch, Dinner; Standard High Calorie/High Protein Oral Supplement  ADULT DIET;  Regular  Code Status: Full Code    PT/OT Eval Status: ongoing    Dispo - pending clinical course, therapy recs    GUS Lovelace - CNP

## 2022-08-19 NOTE — PROGRESS NOTES
[facility]: ACUTE REHAB UNIT  SPEECH-LANGUAGE PATHOLOGY      [x] Daily  [] Weekly Care Conference Note  [] Discharge    Patient:Aroldo Ulrich      :1935  PJE:[id number]  Rehab Dx/Hx: Syncope and collapse [R55]    Precautions: falls  Home situation: Lives with wife. Questionable historian - reports independence with med management (granddaughter assists?) and yard work. Pt reports he and his wife share responsibility for finances. He also reports wife manages cooking, cleaning, laundry, and grocery shopping and that wife is still working full time as a teacher. ST Dx: [] Aphasia  [] Dysarthria  [] Apraxia   [x] Oropharyngeal dysphagia [x] Cognitive Impairment  [] Other:   Date of Admit: 2022  Room #: 0153/0153-01    Current functional status (updated daily):         Pt being seen for : [] Speech/Language Treatment  [] Dysphagia Treatment [x] Cognitive Treatment  [] Other:  Communication: [x]WFL  [] Aphasia  [] Dysarthria  [] Apraxia  [] Pragmatic Impairment [] Non-verbal  [] Hearing Loss  [] Other:   Cognition: [] WFL  [] Mild  [] Moderate  [x] Severe [] Unable to Assess  [] Other:  Memory: [] WFL  [] Mild  [] Moderate  [x] Severe [] Unable to Assess  [] Other:  Behavior: [x] Alert  [x] Cooperative  [x]  Pleasant  [] Confused  [] Agitated  [] Uncooperative  [] Distractible [] Motivated  [] Self-Limiting [] Anxious  [] Other:  Endurance:  [] Adequate for participation in SLP sessions  [x] Reduced overall  [] Lethargic  [] Other:  Safety: [] No concerns at this time  [x] Reduced insight into deficits  [x]  Reduced safety awareness [] Not following call light procedures  [] Unable to Assess  [] Other:    Current Diet Order:ADULT ORAL NUTRITION SUPPLEMENT; Breakfast, Lunch, Dinner; Standard High Calorie/High Protein Oral Supplement  ADULT DIET;  Regular  Swallowing Precautions: upright for all intake, stay upright for at least 30 mins after intake, small bites/sips, alternate bites/sips, slow rate of intake, general GERD precautions, and general aspiration precautions        Date: 8/19/2022      Tx session 1  1230 - 1330 Tx session 2  All tx needs met in session 1   Total Timed Code Min 60    Total Treatment Minutes 60    Individual Treatment Minutes 60    Group Treatment Minutes 0    Co-Treat Minutes 0    Variance/Reason:  N/a    Pain Denies     Pain Intervention [] RN notified  [] Repositioned  [] Intervention offered and patient declined  [x] N/A  [] Other: [] RN notified  [] Repositioned  [] Intervention offered and patient declined  [] N/A  [] Other:   Subjective     Pt seen sitting upright in bedside chair. Pt awake, but appears very fatigued. Limited eye contact made with SLP. Pt is agreeable to tx. Objective:  Goals     Dysphagia Goals:  Short-term Goals  Timeframe for Short-term Goals: 10 days; 08/24/2022    Goal 1: The patient will tolerate recommended diet with no clinical s/s of aspiration 5/5    Goal not directly targeted - pt reports no difficulty with lunch. Declined PO trials since he just completed lunch. Goal 2: The patient/caregiver will demonstrate understanding of compensatory swallow strategies, for improved swallow safety     Goal not directly targeted.      Cognitive-linguistic Goals:  Short-term Goals  Timeframe for Short-term Goals: 12 days (08/26/2022)    Goal 1: Pt will use visual aids / strategies to state orientation to time, place, situation with 80% acc given mod cues   Orientation concepts with use of visual calendar in room:  -month: min cues  -date: no attempts; SLP provided pt with date  -ashley: mod cues  -year: min cues  -place: pt stated hospital with incorrect name; mod cues to use visual aid on wall for name of this hospital      Goal 2: Pt will complete graded recall tasks with 50% acc given mod cues   4 Item Grocery List Recall Task  -SLP gave pt 4 grocery list items; edu re: recall strategies - repetition, visualization, association  -immediate recall trial 1: 4/4 items indep  -immediate recall trial 2: 3/4 items indep  -30 second delay: 3/4 items indep (cues not beneficial to increase acc)  -60 sec delay: 4/4 items  -90 sec delay: 0/4 items; +2 given semantic cues and +1 given MC cue      Goal 3: Pt will complete executive function tasks (e.g. meds, time, money, etc) with 70% acc given mod cues   Yes/No Questions re: Quantity  -e.g. will 25 pennies equal 2 quarters?  -pt completed task with 100% acc given min cues      Goal 4: Pt will complete problem solving and thought organization tasks with 70% acc given mod cues   Category Members: [place]  -pt given a category and a letter; asked to name an item from the category that begins with the letter  -e.g. name a vegetable that begins with the letter B  -pt completed task with 53% acc indep, improved to 67% acc given MAX cues       Goal 5: Pt will complete viusal and verbal reasoning tasks with 70% acc given mod cues   Comparison Task   -pt given 2 items; instructed to name a similarity and a difference between 2 items  -pt named a similarity with 70% acc indep, improved to 80% acc given mod cues  -pt named a difference with 50% acc indep, improved to 60% acc given mod cues    Other areas targeted: N/a    Education:   SLP edu pt re: orientation concepts, recall strategies     Safety Devices: [x] Call light within reach  [x] Chair alarm activated  [] Bed alarm activated  [x] Other: AVASYS [] Call light within reach  [] Chair alarm activated  [] Bed alarm activated  [] Other:    Assessment: Pt reported feeling fatigued this date, but cooperative and agreeable to participate. Pt not making eye contact with SLP throughout session. Due to fatigue, pt required frequent breaks.  Pt required frequent repetition of questions - not always beneficial. In regards to orientation concepts with use of visual aid, pt benefited from min cues for month and year; mod cues for ADITI and name of hospital. SLP provided pt with a 4 item grocery list to recall; edu re: strategies to improve recall. Pt able to recall 4/4 following a 60 sec delay, pt then recalled 0/4 indep following a 90 sec delay. Pt did well with next task re: yes/no quantity questions, but then with significant difficulty with a thought org task - required MAX cues to participate and increase acc. With a reasoning / comparison task, pt able to provide a similarity between 2 items more easily than a difference - mod cues; not always beneficial. Due to cognitive deficits, pt will require 24 hr supervision at d/c. Continue goals above. Plan: Continue as per plan of care. Additional Information:     Barriers toward progress: Cognitive deficit, Limited safety awareness, and Limited insight into deficits  Discharge recommendations:  [] Home independently  [] Home with assistance [x]  24 hour supervision  [] ECF [] Other:   Continued Tx Upon Discharge: ? [x] Yes [] No [] TBD based on progress while on ARU [] Vital Stim indicated [] Other:   Estimated discharge date: 08/26/22    Interventions used this date:  [] Speech/Language Treatment  [] Instruction in HEP [] Group [] Dysphagia Treatment [x] Cognitive Treatment   [] Other:       Total Time Breakdown / Charges    Time in Time out Total Time / units   Cognitive Tx 1230 1330 60 min / 4 units    Speech Tx -- -- --   Dysphagia Tx -- -- --       Electronically Signed by     Anna Feliciano MA CCC-SLP #[id number]  Speech Language Pathologist

## 2022-08-19 NOTE — PROGRESS NOTES
Physical Therapy  Facility/Department: [facility]  Rehabilitation Physical Therapy Treatment Note    NAME: Lan Kang  : 1935 (53 y.o.)  MRN: [id number]  CODE STATUS: Full Code    Date of Service: 22       Restrictions:  Restrictions/Precautions: Fall Risk;General Precautions  Position Activity Restriction  Other position/activity restrictions: LUE IV, orthostatic (low BP). compression stockings for OOB activity     SUBJECTIVE  Subjective  Subjective: Pt in elevated supine in bed upon arrival and agreeable to PT session; denies dizziness at rest  Pain: Pt denies pain this morning            OBJECTIVE  Orientation  Overall Orientation Status: Impaired  Orientation Level: Oriented to person;Oriented to situation;Disoriented to place; Disoriented to time (pt states location is \"[facility]\" and date is \"Oct, 1998\")    Functional Mobility  Supine to Sit  Assistance Level: Stand by assist  Skilled Clinical Factors: with HOB elevated, use of rail  Transfers  Surface: From bed;From chair with arms  Additional Factors: Verbal cues; Hand placement cues; Increased time to complete  Device: Walker (RW)  Sit to Stand  Assistance Level: Contact guard assist  Skilled Clinical Factors: up to RW with increased time and cues for hand placement; pt denies dizziness or lightheadedness with transfers  Stand to Sit  Assistance Level: Contact guard assist  Skilled Clinical Factors: with cues for hand placement      Environmental Mobility  Ambulation  Surface: Level surface  Device: Rolling walker  Distance: 10ft + 10ft (in/out of bathroom) + 60ft + 60ft  Activity: Within Room; Within Unit  Additional Factors: Verbal cues; Hand placement cues; Increased time to complete  Assistance Level: Contact guard assist;Minimal assistance  Gait Deviations: Slow fred; Unsteady gait; Wide base of support;Decreased step length bilateral;Decreased weight shift bilateral  Skilled Clinical Factors: pt ambulates with decreased fred and B step length, kyphotic posture, decreased B foot clearance, widened ROBIN. Mild unsteadiness however no overt LOB. Pt denies dizziness or lightheadedness throughout amb. Distances limited by fatigue and deconditioning. Curb  Curb Height: 4''  Device: Rolling walker  Number of Curbs: 5  Additional Factors: Verbal cues; Hand placement cues; Increased time to complete  Assistance Level: Minimal assistance  Skilled Clinical Factors: pt ascends/descends 5 (4\") curb steps with RW, with grossly min A, with min posterior lean and cues to place entire foot on ground (pt tends to place weight through heels and lift toes off ground)             PT Exercises  Dynamic Standing Balance Exercises: Standing at sink x 10 mins while brushing teeth and washing face per pt request, with grossly CGA for balance with unilateral UE support on sink. Pt denies dizziness or lightheadedness throughout standing. /80 immediately after sitting. Vitals during session  Supine at start of session: /74  Seated EOB: BP 99/59  Unable to obtain BP reading in standing despite multiple trials however /65 sitting immediately after standing to pull up pants  Seated after standing 10 mins at sink: /80  Seated after ambulating to gym: BP 93/61  Seated after curb step completion: BP 74/56  Seated after 5 mins post-curb step: BP 83/54  Sated in chair at end of session: BP 92/61  Throughout session and with all positions and position changes, pt denies dizziness or lightheadedness      ASSESSMENT/PROGRESS TOWARDS GOALS  Vital Signs  Heart Rate: 81  Heart Rate Source: Monitor  BP: 120/74  BP Location: Right upper arm  MAP (Calculated): 89.33  SpO2: 95 %  O2 Device: None (Room air)    Assessment  Assessment: Pt denies dizziness and lightheadedness throughout session, both at rest and with positional changes.  Pt able to complete bed mobility with SBA, functional transfers with CGA, ambulate up to 60ft with CGA-min A using RW, and participated in 4\" curb step assessment with min A using RW. BP does decrease with ambulation and curb step completion however pt continues to deny dizziness or lightheadeness (remains asymptomatic). Pt demo's overall improved activity tolerance and participation this session. Pt limited by cognition, balance deficits, deconditioning, and low BP (although pt asymptomatic with decreases in BP this date). Will continue to progress functional mobility as appropriate. Activity Tolerance: Patient tolerated treatment well;Patient limited by endurance  Discharge Recommendations: 24 hour supervision or assist;Home with Home health PT  PT Equipment Recommendations  Other: TBD pending progress and increased functional mobility assessment-- anticipate RW and possible manual w/c    Goals  Patient Goals   Patient goals : \"Be able to do a day's work\"  Short Term Goals  Time Frame for Short term goals: 8/19/22  Short term goal 1: Pt will complete bed mobility mod I-- GOAL not met, 8/19 pt requires up to SBA for bed mobility  Short term goal 2: Pt will complete transfers with supervision and LRAD-- GOAL not met, 8/19 pt requires up to CGA for functional transfers using RW  Short term goal 3: Pt will ambulate 50 ft with LRAD and CGA-- GOAL partially met, 8/19 pt ambulates up to 60ft with CGA-min A using RW  Short term goal 4: Pt will climb 2 steps with BHR and CGA-- GOAL partially met, 8/19 pt completes 5 (4\") curb steps with min A using RW  Long Term Goals  Time Frame for Long term goals : 8/27/22  Long term goal 1: Pt will complete transfers with LRAD mod I.  Long term goal 2: Pt will ambulate 150 ft with LRAD and mod I.  Long term goal 3: Pt will climb 12 stairs with handrails mod I.  Long term goal 4: Pt will complete car transfer with LRAD mod I.     PLAN OF CARE/SAFETY  Plan  Plan: 5-7 times per week  Current Treatment Recommendations: Strengthening;Balance training;Functional mobility training;Transfer training;Gait training;Stair training;Neuromuscular re-education;Home exercise program;Safety education & training;Patient/Caregiver education & training;Equipment evaluation, education, & procurement; Therapeutic activities  Safety Devices  Type of Devices: All fall risk precautions in place;Call light within reach; Chair alarm in place; Patient at risk for falls; Left in chair;Nurse notified;Telesitter in use    EDUCATION  Education  Education Given To: Patient  Education Provided: Role of Therapy;Plan of Care;Precautions;Transfer Training; Safety; Fall Prevention Strategies  Education Provided Comments: Pt educated on role of PT, need for safety, vitals response with activity, progression of activity, bed mobility, transfers, initiation of gait training  Education Method: Verbal;Demonstration  Barriers to Learning: Cognition  Education Outcome: Verbalized understanding;Demonstrated understanding;Continued education needed        Therapy Time   Individual Concurrent Group Co-treatment   Time In 0800         Time Out 0900         Minutes 60           Timed Code Treatment Minutes: [facility] Ainsley Hansen PT, DPT 08/19/22 at 8:56 AM

## 2022-08-20 LAB
ANION GAP SERPL CALCULATED.3IONS-SCNC: 7 MMOL/L (ref 3–16)
BILIRUBIN URINE: NEGATIVE
BLOOD, URINE: NEGATIVE
BUN BLDV-MCNC: 39 MG/DL (ref 7–20)
CALCIUM SERPL-MCNC: 9.2 MG/DL (ref 8.3–10.6)
CHLORIDE BLD-SCNC: 102 MMOL/L (ref 99–110)
CLARITY: CLEAR
CO2: 30 MMOL/L (ref 21–32)
COLOR: YELLOW
CREAT SERPL-MCNC: 1.2 MG/DL (ref 0.8–1.3)
GFR AFRICAN AMERICAN: >60
GFR NON-AFRICAN AMERICAN: 58
GLUCOSE BLD-MCNC: 77 MG/DL (ref 70–99)
GLUCOSE URINE: NEGATIVE MG/DL
KETONES, URINE: ABNORMAL MG/DL
LEUKOCYTE ESTERASE, URINE: NEGATIVE
MICROSCOPIC EXAMINATION: ABNORMAL
NITRITE, URINE: NEGATIVE
PH UA: 6 (ref 5–8)
POTASSIUM SERPL-SCNC: 4.2 MMOL/L (ref 3.5–5.1)
PROTEIN UA: NEGATIVE MG/DL
SODIUM BLD-SCNC: 139 MMOL/L (ref 136–145)
SPECIFIC GRAVITY UA: 1.02 (ref 1–1.03)
URINE REFLEX TO CULTURE: ABNORMAL
URINE TYPE: ABNORMAL
UROBILINOGEN, URINE: 0.2 E.U./DL

## 2022-08-20 PROCEDURE — 6360000002 HC RX W HCPCS: Performed by: INTERNAL MEDICINE

## 2022-08-20 PROCEDURE — 36415 COLL VENOUS BLD VENIPUNCTURE: CPT

## 2022-08-20 PROCEDURE — 6370000000 HC RX 637 (ALT 250 FOR IP): Performed by: INTERNAL MEDICINE

## 2022-08-20 PROCEDURE — 81003 URINALYSIS AUTO W/O SCOPE: CPT

## 2022-08-20 PROCEDURE — 1280000000 HC REHAB R&B

## 2022-08-20 PROCEDURE — 6370000000 HC RX 637 (ALT 250 FOR IP): Performed by: PHYSICAL MEDICINE & REHABILITATION

## 2022-08-20 PROCEDURE — 80048 BASIC METABOLIC PNL TOTAL CA: CPT

## 2022-08-20 RX ADMIN — ASPIRIN 81 MG: 81 TABLET, COATED ORAL at 07:55

## 2022-08-20 RX ADMIN — MIDODRINE HYDROCHLORIDE 10 MG: 5 TABLET ORAL at 07:55

## 2022-08-20 RX ADMIN — MIDODRINE HYDROCHLORIDE 10 MG: 5 TABLET ORAL at 16:50

## 2022-08-20 RX ADMIN — PANTOPRAZOLE SODIUM 40 MG: 40 TABLET, DELAYED RELEASE ORAL at 05:08

## 2022-08-20 RX ADMIN — FLUTICASONE PROPIONATE 2 SPRAY: 50 SPRAY, METERED NASAL at 07:54

## 2022-08-20 RX ADMIN — CITALOPRAM HYDROBROMIDE 10 MG: 20 TABLET ORAL at 07:55

## 2022-08-20 RX ADMIN — ENOXAPARIN SODIUM 40 MG: 100 INJECTION SUBCUTANEOUS at 07:54

## 2022-08-20 RX ADMIN — MEMANTINE 5 MG: 5 TABLET ORAL at 07:54

## 2022-08-20 RX ADMIN — FERROUS SULFATE TAB 325 MG (65 MG ELEMENTAL FE) 325 MG: 325 (65 FE) TAB at 07:55

## 2022-08-20 RX ADMIN — MIDODRINE HYDROCHLORIDE 10 MG: 5 TABLET ORAL at 11:35

## 2022-08-20 RX ADMIN — FINASTERIDE 5 MG: 5 TABLET, FILM COATED ORAL at 07:55

## 2022-08-20 RX ADMIN — MEMANTINE 5 MG: 5 TABLET ORAL at 19:49

## 2022-08-20 RX ADMIN — QUETIAPINE FUMARATE 25 MG: 25 TABLET ORAL at 19:49

## 2022-08-20 RX ADMIN — ATORVASTATIN CALCIUM 40 MG: 40 TABLET, FILM COATED ORAL at 07:55

## 2022-08-20 ASSESSMENT — PAIN SCALES - GENERAL
PAINLEVEL_OUTOF10: 0

## 2022-08-20 NOTE — PROGRESS NOTES
Hospitalist Progress Note      PCP: Melodie Sanford MD    Date of Admission: 8/14/2022    Chief Complaint: syncope and collapse    Hospital Course: H&P reviewed    Subjective: Sitting in chair, no complaints or events over night. Medications:  Reviewed    Infusion Medications    dextrose       Scheduled Medications    QUEtiapine  25 mg Oral Nightly    midodrine  10 mg Oral TID WC    atorvastatin  40 mg Oral Daily    enoxaparin  40 mg SubCUTAneous Daily    aspirin  81 mg Oral Daily    citalopram  10 mg Oral Daily    ferrous sulfate  325 mg Oral Daily    finasteride  5 mg Oral Daily    memantine  5 mg Oral BID    [Held by provider] tamsulosin  0.4 mg Oral Daily    pantoprazole  40 mg Oral QAM AC    fluticasone  2 spray Nasal Daily    sodium chloride flush  5-40 mL IntraVENous 2 times per day     PRN Meds: calcium carbonate, sodium chloride flush, polyethylene glycol, glucose, dextrose bolus **OR** dextrose bolus, glucagon (rDNA), dextrose, acetaminophen, diphenhydrAMINE, hydrALAZINE, ondansetron, traZODone, traMADol      Intake/Output Summary (Last 24 hours) at 8/20/2022 1222  Last data filed at 8/20/2022 1152  Gross per 24 hour   Intake 600 ml   Output 650 ml   Net -50 ml         Physical Exam Performed:    /87   Pulse 91   Temp 98 °F (36.7 °C) (Oral)   Resp 18   Ht 5' 7\" (1.702 m)   Wt 155 lb 4 oz (70.4 kg)   SpO2 99%   BMI 24.32 kg/m²     General appearance: No apparent distress, appears stated age and cooperative. HEENT: R eye prosthesis. Conjunctivae/corneas clear. Neck: Supple, with full range of motion. No jugular venous distention. Trachea midline. Respiratory:  Normal respiratory effort. Clear to auscultation, bilaterally without Rales/Wheezes/Rhonchi. Cardiovascular: Regular rate and rhythm with normal S1/S2 without murmurs, rubs or gallops. Abdomen: Soft, non-tender, non-distended with normal bowel sounds. Musculoskeletal: No clubbing, cyanosis or edema bilaterally.   Full range of motion without deformity. Skin: Skin color, texture, turgor normal.  No rashes or lesions. Neurologic:  Neurovascularly intact without any focal sensory/motor deficits. Cranial nerves: II-XII intact, grossly non-focal.  Psychiatric: Alert and oriented, thought content appropriate, normal insight  Capillary Refill: Brisk,3 seconds, normal   Peripheral Pulses: +2 palpable, equal bilaterally       Labs:   Recent Labs     08/19/22  0658   WBC 6.9   HGB 14.9   HCT 45.7          Recent Labs     08/18/22  0645 08/19/22  0658 08/20/22  0619    137 139   K 4.6 4.7 4.2   CL 99 98* 102   CO2 33* 31 30   BUN 34* 36* 39*   CREATININE 1.3 1.3 1.2   CALCIUM 9.0 10.2 9.2       No results for input(s): AST, ALT, BILIDIR, BILITOT, ALKPHOS in the last 72 hours. No results for input(s): INR in the last 72 hours. No results for input(s): Brent Seth in the last 72 hours. Urinalysis:      Lab Results   Component Value Date/Time    NITRU Negative 08/16/2022 08:46 AM    WBCUA 21-50 04/21/2022 02:20 PM    BACTERIA 1+ 04/21/2022 02:20 PM    RBCUA 3-4 04/21/2022 02:20 PM    BLOODU Negative 08/16/2022 08:46 AM    SPECGRAV 1.010 08/16/2022 08:46 AM    GLUCOSEU Negative 08/16/2022 08:46 AM       Radiology:  XR CHEST 1 VIEW   Final Result   No acute abnormality identified. Large hiatal hernia. Assessment/Plan:    Active Hospital Problems    Diagnosis     Syncope and collapse [R55]      Accelerating rate of unintentional weight loss over the last few years  - likely due to poor appetite of advanced age. Encourage better PO intake, started supplements with each meal.    - not a good candidate for mirtazapine since this med is associated with orthostatic hypotension. Not a great candidate for megestrol since he is at increased risk of emboli-since off his apixaban. He is high risk for cognitive side effects of dronabinol.    - f/u TSH. Consider outpatient colonoscopy referral via PCP. Cardiomyopathy, probably ischemic, with known CAD. Aspirin. Increased statin. F/u with cardiology as outpatient. Chronic orthostatic hypotension, with frequent falls and/or syncope. Due to above issues, especially the weight loss. Better PO intake will be key. - agree with short course of IVFs (now stopped), stopping tamsulosin, and increasing midodrine. Also stopped metoprolol and added ADITYA hose. F/u UA (reordered). Stand up slowly. Will need to permit supine HTN in order to avoid upright hypotension. PAF. Risk of apixaban outweighs benefit. Stopped this med. Monitor rates without metoprolol. Dementia. Stopped donepezil to avoid polypharmacy, can contribute to urinary retention and now we are trying to go without tamsulosin. Ok to continue memantine. He is on quetiapine qhs, reduced dose, try to taper off in the near future if he is not agitated overnight. Chronic obstructive uropathy. Finasteride. Nursing can remove the scalp staples on 8/16. DVT Prophylaxis: Lovenox  Diet: ADULT ORAL NUTRITION SUPPLEMENT; Breakfast, Lunch, Dinner; Standard High Calorie/High Protein Oral Supplement  ADULT DIET;  Regular  Code Status: Full Code    PT/OT Eval Status: ongoing    Dispo - pending clinical course, therapy recs    GUS Odom - CNP

## 2022-08-20 NOTE — PROGRESS NOTES
Sydnie Lanza  8/20/2022  [id number]    Chief Complaint: Syncope and collapse    Subjective:   No acute events overnight. Today Roby Astudillo is seen in his room, resting in the chair. He denies any current dizziness or pain. He appears to be hallucinating and understands that he is seeing something that is not there. Will monitor. He denies other acute complaints at this time. ROS: denies f/c, n/v, cp, sob    Objective:  Patient Vitals for the past 24 hrs:   BP Temp Temp src Pulse Resp SpO2   08/20/22 0745 133/87 98 °F (36.7 °C) Oral 91 18 99 %   08/19/22 2000 117/75 98 °F (36.7 °C) Oral 81 18 98 %   08/19/22 1600 101/62 -- -- 70 -- --   08/19/22 1345 98/62 97.6 °F (36.4 °C) -- 80 18 --   08/19/22 1203 (!) 93/57 -- -- 75 -- 96 %     Gen: No distress, pleasant. Seated up in chair  HEENT: Normocephalic, atraumatic. CV: extremities well perfused  Resp: No respiratory distress. On room  Abd: Soft, nontender   Ext: No edema. Neuro: Alert, oriented, appropriately interactive. Psych: having hallucinations     Wt Readings from Last 3 Encounters:   08/14/22 155 lb 4 oz (70.4 kg)   08/10/22 156 lb 1.6 oz (70.8 kg)   07/08/22 160 lb (72.6 kg)       Laboratory data:   Lab Results   Component Value Date    WBC 6.9 08/19/2022    HGB 14.9 08/19/2022    HCT 45.7 08/19/2022    MCV 89.2 08/19/2022     08/19/2022       Lab Results   Component Value Date/Time     08/20/2022 06:19 AM    K 4.2 08/20/2022 06:19 AM    K 4.5 08/12/2022 06:41 AM     08/20/2022 06:19 AM    CO2 30 08/20/2022 06:19 AM    BUN 39 08/20/2022 06:19 AM    CREATININE 1.2 08/20/2022 06:19 AM    GLUCOSE 77 08/20/2022 06:19 AM    CALCIUM 9.2 08/20/2022 06:19 AM        Therapy progress:  PT  Position Activity Restriction  Other position/activity restrictions: LUE IV, orthostatic (low BP).  compression stockings for OOB activity  Objective     Sit to Stand: Minimal Assistance  Stand to sit: Minimal Assistance  Bed to Chair: Unable to assess OT  PT Equipment Recommendations  Other: TBD pending progress and increased functional mobility assessment-- anticipate RW and possible manual w/c  Toilet - Technique: Ambulating  Equipment Used: Grab bars  Assessment        SLP  Current Diet : Regular             Body mass index is 24.32 kg/m². Assessment and Plan:  Severiano Hamming is an 82year old male with a past medical history significant for atrial fibrillation, s/p pacer, dementia, CKD, HTN, and HLD who presented as a transfer from [facility] on 8/9/22 after a presyncopal episode, treated for orthostatic hypotension. He was admitted to [facility] on 8/14/22 due to functional deficits below his baseline. Debility  - due to below  - PT, OT, ST     Orthostatic hypotension  - SBP dipping to 70s-80s when up with therapy, now improving   - Medicine following, appreciate assistance  - discontinued flomax, metoprolol  - s/p 500 mL bolus on 8/16 and 8/17  - ADITYA hose  - midodrine  - encourage fluids    AMS  - patient hallucinating during encounter today. Does have dementia. Episode of AMS during acute stay attributed to medications  - obtain UA    Productive Cough  - XR chest 8/15 negative for acute process     Paroxysmal Atrial Fibrillation  - monitor HR off of BB  - Eliquis discontinued per Medicine due to high fall risk     HFrEF  - daily weights, I/Os  - OP ischemic eval per Cardiology     CKD  - baseline Cr ~1.2  - NATANAEL resolved, Cr has trended down back to baseline  - encourage patient to drink fluids  - monitor     Dementia  - aricept discontinued due to concerns for polypharmacy  - namenda  - wean quietiapine as able     CAD  - statin  - follow up with Cards OP     BPH  - finasteride  - hold tamsulosin due to BP     Bowels: Schedule stool softener. Follow bowel movements. Enema or suppository if needed. Services: home health PT, OT, [address] [place] [address] , nursing  EDOD: 8/26/22    Sania Owens.  Fred Montes MD 8/20/2022, 11:59 AM

## 2022-08-21 PROCEDURE — 6370000000 HC RX 637 (ALT 250 FOR IP): Performed by: INTERNAL MEDICINE

## 2022-08-21 PROCEDURE — 6370000000 HC RX 637 (ALT 250 FOR IP): Performed by: PHYSICAL MEDICINE & REHABILITATION

## 2022-08-21 PROCEDURE — 6360000002 HC RX W HCPCS: Performed by: INTERNAL MEDICINE

## 2022-08-21 PROCEDURE — 1280000000 HC REHAB R&B

## 2022-08-21 RX ADMIN — FINASTERIDE 5 MG: 5 TABLET, FILM COATED ORAL at 08:02

## 2022-08-21 RX ADMIN — ENOXAPARIN SODIUM 40 MG: 100 INJECTION SUBCUTANEOUS at 08:03

## 2022-08-21 RX ADMIN — FERROUS SULFATE TAB 325 MG (65 MG ELEMENTAL FE) 325 MG: 325 (65 FE) TAB at 08:01

## 2022-08-21 RX ADMIN — MIDODRINE HYDROCHLORIDE 10 MG: 5 TABLET ORAL at 16:49

## 2022-08-21 RX ADMIN — CITALOPRAM HYDROBROMIDE 10 MG: 20 TABLET ORAL at 08:02

## 2022-08-21 RX ADMIN — PANTOPRAZOLE SODIUM 40 MG: 40 TABLET, DELAYED RELEASE ORAL at 05:50

## 2022-08-21 RX ADMIN — MEMANTINE 5 MG: 5 TABLET ORAL at 19:47

## 2022-08-21 RX ADMIN — ATORVASTATIN CALCIUM 40 MG: 40 TABLET, FILM COATED ORAL at 08:02

## 2022-08-21 RX ADMIN — FLUTICASONE PROPIONATE 2 SPRAY: 50 SPRAY, METERED NASAL at 08:03

## 2022-08-21 RX ADMIN — MEMANTINE 5 MG: 5 TABLET ORAL at 08:02

## 2022-08-21 RX ADMIN — MIDODRINE HYDROCHLORIDE 10 MG: 5 TABLET ORAL at 11:58

## 2022-08-21 RX ADMIN — MIDODRINE HYDROCHLORIDE 10 MG: 5 TABLET ORAL at 08:02

## 2022-08-21 RX ADMIN — QUETIAPINE FUMARATE 25 MG: 25 TABLET ORAL at 19:47

## 2022-08-21 RX ADMIN — ASPIRIN 81 MG: 81 TABLET, COATED ORAL at 08:02

## 2022-08-21 ASSESSMENT — PAIN SCALES - GENERAL
PAINLEVEL_OUTOF10: 0

## 2022-08-21 NOTE — PROGRESS NOTES
Hospitalist Progress Note      PCP: Jasmine De La Paz MD    Date of Admission: 8/14/2022    Chief Complaint: syncope and collapse    Hospital Course: H&P reviewed    Subjective: Sitting in chair, c/o feeling dizzy today. Patient reported hallucinations yesterday (visualizing water dripping on wall)-no reported hallucinations today. Medications:  Reviewed    Infusion Medications    dextrose       Scheduled Medications    QUEtiapine  25 mg Oral Nightly    midodrine  10 mg Oral TID WC    atorvastatin  40 mg Oral Daily    enoxaparin  40 mg SubCUTAneous Daily    aspirin  81 mg Oral Daily    citalopram  10 mg Oral Daily    ferrous sulfate  325 mg Oral Daily    finasteride  5 mg Oral Daily    memantine  5 mg Oral BID    [Held by provider] tamsulosin  0.4 mg Oral Daily    pantoprazole  40 mg Oral QAM AC    fluticasone  2 spray Nasal Daily    sodium chloride flush  5-40 mL IntraVENous 2 times per day     PRN Meds: calcium carbonate, sodium chloride flush, polyethylene glycol, glucose, dextrose bolus **OR** dextrose bolus, glucagon (rDNA), dextrose, acetaminophen, diphenhydrAMINE, hydrALAZINE, ondansetron, traZODone, traMADol      Intake/Output Summary (Last 24 hours) at 8/21/2022 1146  Last data filed at 8/21/2022 0807  Gross per 24 hour   Intake 960 ml   Output 650 ml   Net 310 ml         Physical Exam Performed:    /78   Pulse 73   Temp 98 °F (36.7 °C) (Oral)   Resp 18   Ht 5' 7\" (1.702 m)   Wt 154 lb 8 oz (70.1 kg)   SpO2 95%   BMI 24.20 kg/m²     General appearance: No apparent distress, appears stated age and cooperative. HEENT: R eye prosthesis. Conjunctivae/corneas clear. Neck: Supple, with full range of motion. No jugular venous distention. Trachea midline. Respiratory:  Normal respiratory effort. Clear to auscultation, bilaterally without Rales/Wheezes/Rhonchi. Cardiovascular: Regular rate and rhythm with normal S1/S2 without murmurs, rubs or gallops.   Abdomen: Soft, non-tender, non-distended with normal bowel sounds. Musculoskeletal: No clubbing, cyanosis or edema bilaterally. Full range of motion without deformity. Skin: Skin color, texture, turgor normal.  No rashes or lesions. Neurologic:  Neurovascularly intact without any focal sensory/motor deficits. Cranial nerves: II-XII intact, grossly non-focal.  Psychiatric: Alert and oriented, thought content appropriate, normal insight  Capillary Refill: Brisk,3 seconds, normal   Peripheral Pulses: +2 palpable, equal bilaterally       Labs:   Recent Labs     08/19/22  0658   WBC 6.9   HGB 14.9   HCT 45.7          Recent Labs     08/19/22  0658 08/20/22  0619    139   K 4.7 4.2   CL 98* 102   CO2 31 30   BUN 36* 39*   CREATININE 1.3 1.2   CALCIUM 10.2 9.2       No results for input(s): AST, ALT, BILIDIR, BILITOT, ALKPHOS in the last 72 hours. No results for input(s): INR in the last 72 hours. No results for input(s): Mayte Dross in the last 72 hours. Urinalysis:      Lab Results   Component Value Date/Time    NITRU Negative 08/20/2022 03:45 PM    WBCUA 21-50 04/21/2022 02:20 PM    BACTERIA 1+ 04/21/2022 02:20 PM    RBCUA 3-4 04/21/2022 02:20 PM    BLOODU Negative 08/20/2022 03:45 PM    SPECGRAV 1.020 08/20/2022 03:45 PM    GLUCOSEU Negative 08/20/2022 03:45 PM       Radiology:  XR CHEST 1 VIEW   Final Result   No acute abnormality identified. Large hiatal hernia. Assessment/Plan:    Active Hospital Problems    Diagnosis     Syncope and collapse [R55]      Accelerating rate of unintentional weight loss over the last few years  - likely due to poor appetite of advanced age. Encourage better PO intake, started supplements with each meal.    - not a good candidate for mirtazapine since this med is associated with orthostatic hypotension. Not a great candidate for megestrol since he is at increased risk of emboli-since off his apixaban. He is high risk for cognitive side effects of dronabinol.    - f/u TSH. Consider outpatient colonoscopy referral via PCP. Cardiomyopathy, probably ischemic, with known CAD. Aspirin. Increased statin. F/u with cardiology as outpatient. Chronic orthostatic hypotension, with frequent falls and/or syncope. Due to above issues, especially the weight loss. Better PO intake will be key. - agree with short course of IVFs (now stopped), stopping tamsulosin, and increasing midodrine. Also stopped metoprolol and added ADITYA hose. F/u UA (reordered). Stand up slowly. Will need to permit supine HTN in order to avoid upright hypotension. PAF. Risk of apixaban outweighs benefit. Stopped this med. Monitor rates without metoprolol. Dementia. Stopped donepezil to avoid polypharmacy, can contribute to urinary retention and now we are trying to go without tamsulosin. Ok to continue memantine. He is on quetiapine qhs, reduced dose, try to taper off in the near future if he is not agitated overnight. Chronic obstructive uropathy. Finasteride. Nursing can remove the scalp staples on 8/16. DVT Prophylaxis: Lovenox  Diet: ADULT ORAL NUTRITION SUPPLEMENT; Breakfast, Lunch, Dinner; Standard High Calorie/High Protein Oral Supplement  ADULT DIET;  Regular  Code Status: Full Code    PT/OT Eval Status: ongoing    Dispo - pending clinical course, therapy recs    GUS Crowell - SOLO

## 2022-08-21 NOTE — PLAN OF CARE
Problem: Safety - Adult  Goal: Free from fall injury  8/21/2022 0824 by Lola Suarez RN  Outcome: Progressing  8/20/2022 2145 by Licha Chan RN  Outcome: Progressing

## 2022-08-21 NOTE — PROGRESS NOTES
Rosales Forbes  8/21/2022  [id number]    Chief Complaint: Syncope and collapse    Subjective:   No acute events. Today Gino Pepper is seen in his room, resting in bed. He reports feeling light headed currently and is supine. His blood pressure was lower earlier and nursing reports that he was not light headed at that time. He denies feeling like the room is spinning. He denies hallucinations. ROS: denies f/c, n/v, cp, sob    Objective:  Patient Vitals for the past 24 hrs:   BP Temp Temp src Pulse Resp SpO2 Weight   08/21/22 1145 120/84 -- -- 83 -- -- --   08/21/22 1030 118/78 -- -- -- -- -- --   08/21/22 0930 91/62 -- -- 73 -- -- --   08/21/22 0745 89/63 98 °F (36.7 °C) Oral 94 18 95 % --   08/21/22 0545 -- -- -- -- -- -- 154 lb 8 oz (70.1 kg)   08/20/22 1945 127/89 97.6 °F (36.4 °C) Oral 84 17 97 % --   08/20/22 1645 109/74 -- -- (!) 105 -- -- --     Gen: No distress, pleasant. Supine in bed  HEENT: Normocephalic, atraumatic. CV: RRR  Resp: No respiratory distress. Lungs CTAB  Abd: Soft, nontender   Ext: No edema. Neuro: Alert, oriented, appropriately interactive. Psych: appropriate mood and affect    Wt Readings from Last 3 Encounters:   08/21/22 154 lb 8 oz (70.1 kg)   08/10/22 156 lb 1.6 oz (70.8 kg)   07/08/22 160 lb (72.6 kg)       Laboratory data:   Lab Results   Component Value Date    WBC 6.9 08/19/2022    HGB 14.9 08/19/2022    HCT 45.7 08/19/2022    MCV 89.2 08/19/2022     08/19/2022       Lab Results   Component Value Date/Time     08/20/2022 06:19 AM    K 4.2 08/20/2022 06:19 AM    K 4.5 08/12/2022 06:41 AM     08/20/2022 06:19 AM    CO2 30 08/20/2022 06:19 AM    BUN 39 08/20/2022 06:19 AM    CREATININE 1.2 08/20/2022 06:19 AM    GLUCOSE 77 08/20/2022 06:19 AM    CALCIUM 9.2 08/20/2022 06:19 AM        Therapy progress:  PT  Position Activity Restriction  Other position/activity restrictions: LUE IV, orthostatic (low BP).  compression stockings for OOB activity  Objective     Sit to Stand: Minimal Assistance  Stand to sit: Minimal Assistance  Bed to Chair: Unable to assess     OT  PT Equipment Recommendations  Other: TBD pending progress and increased functional mobility assessment-- anticipate RW and possible manual w/c  Toilet - Technique: Ambulating  Equipment Used: Grab bars  Assessment        SLP  Current Diet : Regular             Body mass index is 24.2 kg/m². Assessment and Plan:  Ciara Molina is an 82year old male with a past medical history significant for atrial fibrillation, s/p pacer, dementia, CKD, HTN, and HLD who presented as a transfer from [facility] on 8/9/22 after a presyncopal episode, treated for orthostatic hypotension. He was admitted to [facility] on 8/14/22 due to functional deficits below his baseline. Debility  - due to below  - PT, OT, ST     Orthostatic hypotension  - SBP dipping to 70s-80s when up with therapy, now improving   - Medicine following, appreciate assistance  - discontinued flomax, metoprolol  - s/p 500 mL bolus on 8/16 and 8/17  - ADITYA hose  - midodrine  - encourage fluids    AMS, resolved  - hallucinations of water dripping on 8/20, resolved. Does have dementia. Episode of AMS during acute stay attributed to medications  - UA unremarkable  - continue to monitor    Productive Cough  - XR chest 8/15 negative for acute process     Paroxysmal Atrial Fibrillation  - monitor HR off of BB  - Eliquis discontinued per Medicine due to high fall risk     HFrEF  - daily weights, I/Os  - OP ischemic eval per Cardiology     CKD  - baseline Cr ~1.2  - NATANAEL resolved, Cr has trended down back to baseline  - encourage patient to drink fluids  - monitor     Dementia  - aricept discontinued due to concerns for polypharmacy  - namenda  - wean quietiapine as able     CAD  - statin  - follow up with Cards OP     BPH  - finasteride  - hold tamsulosin due to BP     Bowels: Schedule stool softener. Follow bowel movements.  Enema or suppository if needed. Services: home health PT, OT, [address] [place] [address] , nursing  EDOD: 8/26/22    Alfie Tanner.  Tiff Bales MD 8/21/2022, 12:05 PM

## 2022-08-21 NOTE — PLAN OF CARE
Problem: Safety - Adult  Goal: Free from fall injury  8/20/2022 2145 by Stas Leach RN  Outcome: Progressing  8/20/2022 0845 by Catherine Quinteros RN  Outcome: Progressing

## 2022-08-22 LAB
ANION GAP SERPL CALCULATED.3IONS-SCNC: 9 MMOL/L (ref 3–16)
BUN BLDV-MCNC: 35 MG/DL (ref 7–20)
CALCIUM SERPL-MCNC: 9.1 MG/DL (ref 8.3–10.6)
CHLORIDE BLD-SCNC: 102 MMOL/L (ref 99–110)
CO2: 29 MMOL/L (ref 21–32)
CREAT SERPL-MCNC: 1.2 MG/DL (ref 0.8–1.3)
GFR AFRICAN AMERICAN: >60
GFR NON-AFRICAN AMERICAN: 58
GLUCOSE BLD-MCNC: 68 MG/DL (ref 70–99)
HCT VFR BLD CALC: 44.7 % (ref 40.5–52.5)
HEMOGLOBIN: 14.6 G/DL (ref 13.5–17.5)
MCH RBC QN AUTO: 29.1 PG (ref 26–34)
MCHC RBC AUTO-ENTMCNC: 32.6 G/DL (ref 31–36)
MCV RBC AUTO: 89.4 FL (ref 80–100)
PDW BLD-RTO: 16.5 % (ref 12.4–15.4)
PLATELET # BLD: 213 K/UL (ref 135–450)
PMV BLD AUTO: 8.7 FL (ref 5–10.5)
POTASSIUM REFLEX MAGNESIUM: 4.8 MMOL/L (ref 3.5–5.1)
RBC # BLD: 5 M/UL (ref 4.2–5.9)
SODIUM BLD-SCNC: 140 MMOL/L (ref 136–145)
WBC # BLD: 8.7 K/UL (ref 4–11)

## 2022-08-22 PROCEDURE — 85027 COMPLETE CBC AUTOMATED: CPT

## 2022-08-22 PROCEDURE — 97530 THERAPEUTIC ACTIVITIES: CPT

## 2022-08-22 PROCEDURE — 97110 THERAPEUTIC EXERCISES: CPT

## 2022-08-22 PROCEDURE — 6370000000 HC RX 637 (ALT 250 FOR IP): Performed by: PHYSICAL MEDICINE & REHABILITATION

## 2022-08-22 PROCEDURE — 6360000002 HC RX W HCPCS: Performed by: INTERNAL MEDICINE

## 2022-08-22 PROCEDURE — 1280000000 HC REHAB R&B

## 2022-08-22 PROCEDURE — 97116 GAIT TRAINING THERAPY: CPT

## 2022-08-22 PROCEDURE — 80048 BASIC METABOLIC PNL TOTAL CA: CPT

## 2022-08-22 PROCEDURE — 36415 COLL VENOUS BLD VENIPUNCTURE: CPT

## 2022-08-22 PROCEDURE — 6370000000 HC RX 637 (ALT 250 FOR IP): Performed by: INTERNAL MEDICINE

## 2022-08-22 PROCEDURE — 97130 THER IVNTJ EA ADDL 15 MIN: CPT

## 2022-08-22 PROCEDURE — 97535 SELF CARE MNGMENT TRAINING: CPT

## 2022-08-22 PROCEDURE — 97129 THER IVNTJ 1ST 15 MIN: CPT

## 2022-08-22 RX ADMIN — CITALOPRAM HYDROBROMIDE 10 MG: 20 TABLET ORAL at 08:08

## 2022-08-22 RX ADMIN — ENOXAPARIN SODIUM 40 MG: 100 INJECTION SUBCUTANEOUS at 08:08

## 2022-08-22 RX ADMIN — MIDODRINE HYDROCHLORIDE 10 MG: 5 TABLET ORAL at 08:08

## 2022-08-22 RX ADMIN — FLUTICASONE PROPIONATE 2 SPRAY: 50 SPRAY, METERED NASAL at 08:11

## 2022-08-22 RX ADMIN — MIDODRINE HYDROCHLORIDE 10 MG: 5 TABLET ORAL at 17:01

## 2022-08-22 RX ADMIN — PANTOPRAZOLE SODIUM 40 MG: 40 TABLET, DELAYED RELEASE ORAL at 05:48

## 2022-08-22 RX ADMIN — ATORVASTATIN CALCIUM 40 MG: 40 TABLET, FILM COATED ORAL at 08:08

## 2022-08-22 RX ADMIN — ASPIRIN 81 MG: 81 TABLET, COATED ORAL at 08:08

## 2022-08-22 RX ADMIN — MEMANTINE 5 MG: 5 TABLET ORAL at 08:09

## 2022-08-22 RX ADMIN — QUETIAPINE FUMARATE 25 MG: 25 TABLET ORAL at 20:05

## 2022-08-22 RX ADMIN — MIDODRINE HYDROCHLORIDE 10 MG: 5 TABLET ORAL at 12:25

## 2022-08-22 RX ADMIN — FERROUS SULFATE TAB 325 MG (65 MG ELEMENTAL FE) 325 MG: 325 (65 FE) TAB at 08:08

## 2022-08-22 RX ADMIN — MEMANTINE 5 MG: 5 TABLET ORAL at 20:05

## 2022-08-22 RX ADMIN — FINASTERIDE 5 MG: 5 TABLET, FILM COATED ORAL at 08:09

## 2022-08-22 ASSESSMENT — PAIN SCALES - GENERAL
PAINLEVEL_OUTOF10: 0

## 2022-08-22 NOTE — PROGRESS NOTES
Physical Therapy  Facility/Department: [facility]  Rehabilitation Physical Therapy Treatment Note    NAME: Lori Huynh  : 1935 (35 y.o.)  MRN: [id number]  CODE STATUS: Full Code    Date of Service: 22       Restrictions:  Restrictions/Precautions: Fall Risk;General Precautions  Position Activity Restriction  Other position/activity restrictions: LUE IV, orthostatic (low BP). compression stockings for OOB activity     SUBJECTIVE  Denies pain, reports dizziness only with initial sitting after sup to sit which clears with seated rest.            Post Treatment Pain Screening Denies pain         OBJECTIVE  Orientation  Orientation Level: Oriented to person;Oriented to situation;Oriented to time;Oriented to place; Disoriented to place (knows it is a hospital but thought he was at [facility].  Reoriented to Tanya Foley)    Functional Mobility  Roll Left  Assistance Level: Independent  Roll Right  Assistance Level: Independent  Sit to Supine  Assistance Level: Independent  Supine to Sit  Assistance Level: Stand by assist  Skilled Clinical Factors: with HOB elevated, use of rail  Sit to Stand  Assistance Level: Contact guard assist;Stand by assist  Skilled Clinical Factors: needed assist for forward wegiht shift for pre-extension in sit to stand. Patient has greater difficulty sit>stand from wc, toilet and car transfer simulater. From recliner wtih arms and bed SBA  Stand to Sit  Assistance Level: Contact guard assist;Stand by assist  Skilled Clinical Factors: needed CGA to guide patient to chair as patient tends to sit too far to right on chair when turning to sit and is close to missing seat on right across trials  Car Transfer  Assistance Level: Contact guard assist  Skilled Clinical Factors: assist for forward weight shift sit to stand and CGA due to poor eccentric control stand to sit. PT states, \" My knees are weak. \"      Environmental Mobility  Ambulation  Surface: Level surface  Device: Standard walker;Cane  Distance: 550 feet wtih left/right turns and 20 feet carpet wtih FWW and 225 feet wtih st cane with 10 feet of turf capret with L/R turns . Activity: Within Room; Within Unit  Additional Factors: Verbal cues; Increased time to complete  Assistance Level: Contact guard assist  Gait Deviations: Slow fred; Unsteady gait; Wide base of support;Decreased step length bilateral;Decreased weight shift bilateral  Skilled Clinical Factors: patient needed frequent cues to keep elbows bent and stay safe distance to walker and with st cane pt reaching for environmental hand holds and with noted path deviation on turns with both FWW and st cane. Patient denies dizziness with walking this date. after first walking  /76  bpm, denies dizziness after second walk unable 3/3 trials over 3 minutes to get automatic BP (pt denies dizziness)  able to get manual  BP 85/62 seated SpO2 91% HR 89 bpm, after 5 minutes post walk seated BP manual 89/50 SPO2 98%, HR 90bpm and after 7 minutes post walk seated pt with /72 SpO2 97%HR 87bpm. Patient  Wheelchair  Surface: Level surface  Device: Standard wheelchair  Assistance Required to Manage Parts: Brakes  Assistance Level for Propulsion: Modified independent  Propulsion Method: Bilateral upper extremities; Bilateral lower extremities  Propulsion Quality: Short strokes  Propulsion Distance: 150 feet with L/r turns indep      Neuromuscular Education  Neuromuscular Comments: patient fperformed dynamic balance task of manging belt and pants pulling up and down and threading  belt through loops with SBA and no AD in standing as well as washing face and hands in standing. Pt took extra time to remove undergarment and don new underwear and pants supine with pt completing pants buttoning in sitting. Pt needed to move slowly initiallly due to dizziness.   After 5 minutes of standing activity in bathroom pt denies dizziness and seated /86 HR 96 bpm SpO2 98%. PT Exercises  Exercise Treatment: Supine  x20 SLR, x30 ankle pumps and x20 sidelying hip abduction and 15 bridges. Cues for LE therex. ASSESSMENT/PROGRESS TOWARDS GOALS  Vital Signs  Heart Rate: 88  Heart Rate Source: Monitor  BP: 101/62  BP Location: Left upper arm  MAP (Calculated): 75  SpO2: 96 %  O2 Device: None (Room air)    Assessment  Assessment: Pt seen for gait training with  feet and with st cane 225 feet with assist of 1 and WC follow. Pt engaged in dynamic standing balance challenge acitivty and transfer training from bed, chair, toilet, car and WC. PT with vc performed LE therex including WC propulsion. Pt engaged in session activities througout with increased activity tolerance but continued orthostatic hypotension and decreased spO2 with acitivty as noted. Lowest BP during session post acitivity manual 85/62 and highest 138/86. Patient pleasant and cooperative throughout the session. Pt up to chair call light in reach and chair alarm engaged at end of session. Activity Tolerance: Patient limited by endurance; Other (comment) (orhtostatic hypotension)  Discharge Recommendations: 24 hour supervision or assist;Home with Home health PT  PT Equipment Recommendations  Other: TBD pending progress and increased functional mobility assessment-- anticipate RW and possible manual w/c. Pt states he has walker and cane at home,.     Goals  Short Term Goals  Short term goal 2: Pt will complete transfers with supervision and LRAD-- GOAL not met, 8/19 pt requires up to CGA for functional transfers using RW  Short term goal 3: Pt will ambulate 50 ft with LRAD and CGA-- GOAL partially met, 8/19 pt ambulates up to 60ft with CGA-min A using RW  Short term goal 4: Pt will climb 2 steps with BHR and CGA-- GOAL partially met, 8/19 pt completes 5 (4\") curb steps with min A using RW  Long Term Goals  Long term goal 1: Pt will complete transfers with LRAD mod I.  Long term goal 2: Pt will ambulate 150 ft with LRAD and mod I.  Long term goal 3: Pt will climb 12 stairs with handrails mod I.  Long term goal 4: Pt will complete car transfer with LRAD mod I. PLAN OF CARE/SAFETY  Plan  Plan: 5-7 times per week  Specific Instructions for Next Treatment: Progress ther ex and mobility as tolerated, high-level balance & gait training  Safety Devices  Type of Devices: All fall risk precautions in place;Call light within reach; Chair alarm in place; Left in chair;Patient at risk for falls;Nurse notified;Gait belt (notified nursing of BP during session)    EDUCATION  Education  Education Given To: Patient  Education Provided: Home Exercise Program;Mobility Training  Education Provided Comments: Patient cued for safety wtih gait with FWW, facilitated balance with up to CGA for gait wtih St cane with WC follow with all walking and monitored vitals with activity with pt noted to have post activity hypotension at end of the session,  Pt drank 1 cup of water and 1 cup of ensure during session. Pt needed vc and CGA for safety with transfers and vc for LE therex. Pt engaged in session activities througout with increased activity tolerance but continued orthostatic hypotension and decresaed spO2 with acitivty as noted.         Therapy Time   Individual Concurrent Group Co-treatment   Time In 6781         Time Out 0950         Minutes 76           Timed Code Treatment Minutes: [address], [place], 08/22/22 at 10:34 AM

## 2022-08-22 NOTE — PROGRESS NOTES
Comprehensive Nutrition Assessment    Type and Reason for Visit:  Reassess    Nutrition Recommendations/Plan:   Continue regular diet  Ensure ONS BID  Encourage po intakes  Monitor po intakes, nutrition adequacy, weights, pertinent labs, BMs     Malnutrition Assessment:  Malnutrition Status: At risk for malnutrition (Comment) (08/16/22 1513)      Nutrition Assessment:    Follow up: Pt improving from a nutrition standpoint AEB pt report of good appetite. Pt currently on a regular diet with po intakes % generally, with po intakes of 26-50% ocassionally. Pt reports that he is feeling tired after therapy today and did not feel like eating as much. Pt states that he has been drinking 2 Ensure per day. Will decrease ONS frequency to BID. Encouraged po intakes. Will monitor. Nutrition Related Findings:    BM x1 on 8/21. Wound Type:  (Traumatic head wound)       Current Nutrition Intake & Therapies:    Average Meal Intake: 51-75%, %, 26-50%  Average Supplements Intake: 51-75%, %  ADULT DIET; Regular  ADULT ORAL NUTRITION SUPPLEMENT; Breakfast, Dinner; Standard High Calorie/High Protein Oral Supplement    Anthropometric Measures:  Height: 5' 7\" (170.2 cm)  Ideal Body Weight (IBW): 148 lbs (67 kg)       Current Body Weight: 154 lb 8 oz (70.1 kg),   IBW.  Weight Source: Standing Scale  Current BMI (kg/m2): 24.2                          BMI Categories: Normal Weight (BMI 22.0 to 24.9) age over 72    Estimated Daily Nutrient Needs:  Energy Requirements Based On: Kcal/kg  Weight Used for Energy Requirements: Current  Energy (kcal/day): 3101-8387  Weight Used for Protein Requirements: Current  Protein (g/day): 70-84  Method Used for Fluid Requirements: 1 ml/kcal  Fluid (ml/day): 8259-8534    Nutrition Diagnosis:   Predicted inadequate energy intake related to inadequate protein-energy intake as evidenced by poor intake prior to admission, weight loss    Nutrition Interventions:   Food and/or Nutrient Delivery: Continue Current Diet, Continue Oral Nutrition Supplement  Nutrition Education/Counseling: No recommendation at this time  Coordination of Nutrition Care: Continue to monitor while inpatient  Plan of Care discussed with: Patient    Goals:  Previous Goal Met: Progressing toward Goal(s)  Goals: PO intake 50% or greater, prior to discharge       Nutrition Monitoring and Evaluation:   Behavioral-Environmental Outcomes: None Identified  Food/Nutrient Intake Outcomes: Food and Nutrient Intake, Supplement Intake  Physical Signs/Symptoms Outcomes: Biochemical Data, Weight    Discharge Planning:    Continue current diet, Continue Oral Nutrition Supplement     Celena Mendez RD, LD  Contact: [phone]

## 2022-08-22 NOTE — PROGRESS NOTES
Occupational Therapy  Facility/Department: [facility]  Rehabilitation Occupational Therapy Daily Treatment Note    Date: 22  Patient Name: Dennise May       Room: 6303/3028-12  MRN: [id number]  Account: [de-identified]   : 1935  (82 y.o.) Gender: male                    Past Medical History:  has a past medical history of Atrial fibrillation ([facility] [address] Blind right eye, Chronic kidney disease, Dementia ([facility] [address] Hyperlipidemia, Hypertension, and Pneumonia. Past Surgical History:   has a past surgical history that includes Tonsillectomy; Appendectomy; Colonoscopy; eye surgery; Upper gastrointestinal endoscopy; joint replacement (); hernia repair; and pacemaker placement (10/13/2017). Restrictions  Restrictions/Precautions: Fall Risk;General Precautions  Implants present? : Pacemaker  Other position/activity restrictions: LUE IV, orthostatic (low BP). compression stockings for OOB activity    Subjective  Subjective: Pt in recliner, pleasant, no pain, agreeable to OT session, /62, O2 sats 96% on RA, HR 90  Restrictions/Precautions: Fall Risk;General Precautions             Objective     Cognition  Overall Cognitive Status: Exceptions  Arousal/Alertness: Appropriate responses to stimuli  Following Commands: Follows multistep commands with repitition; Follows one step commands with increased time; Follows one step commands consistently  Attention Span: Attends with cues to redirect  Memory: Decreased short term memory;Decreased recall of precautions;Decreased recall of recent events  Safety Judgement: Decreased awareness of need for safety;Decreased awareness of need for assistance  Problem Solving: Decreased awareness of errors;Assistance required to identify errors made;Assistance required to generate solutions  Insights: Decreased awareness of deficits  Initiation: Does not require cues  Sequencing: Requires cues for some  Orientation  Overall Orientation Status: Impaired  Orientation Level: Oriented to person;Oriented to situation;Oriented to time;Disoriented to place         ADL  Grooming/Oral Hygiene  Assistance Level: Stand by assist  Skilled Clinical Factors: standing at sink to brush teeth and wash hands  Toileting  Assistance Level: Minimal assistance  Skilled Clinical Factors: assist to unbutton pants only  Toilet Transfers  Technique: Stand step  Equipment: Standard toilet  Additional Factors: Verbal cues; With handrails; Increased time to complete  Assistance Level: Contact guard assist          Functional Mobility  Device: Rolling walker  Activity: To/From bathroom; To/From therapy gym  Assistance Level: Contact guard assist  Skilled Clinical Factors: CGA/SBA with RW  Transfers  Surface: Standard toilet;From chair with arms; To bed; To chair with arms  Additional Factors: Verbal cues; With handrails;Hand placement cues; Increased time to complete  Device: Walker  Sit to Stand  Assistance Level: Stand by assist  Stand to Sit  Assistance Level: Stand by assist  Bed To/From Chair  Technique: Stand step  Assistance Level: Contact guard assist  Stand Pivot  Assistance Level: Stand by assist   OT Exercises  Resistive Exercises: 2# elbow flexion, wrist flexion, wrist extension, supination/pronation BUE x25, chest press LUE only x20     Assessment  Assessment  Assessment: Pt agreeable to OT session. Pt vitals WFL throughout session today with BP staying above 100/60 throughout session. Pt demonstrated fair balance with SBA/CGA for all mobility with RW and min VCs for staying close to RW with ambulation and mod/max VCs for hand placement during sit>stands. Pt completed toileting with min A to unfasten pants and CGA for steadiness. Pt stood for up to 12 minutes during collection of letters with mod/max VCs for naming animals of certain letter and sequencing alphabet. Pt completed BUE ther ex with 3# weight and fair strength but pain in R shoulder, limiting any shoulder exercises on RUE.  Pt demonstrated improved tolerance but still requires CGA/SBA and multiple cues for safety with RW and sit<>stands. Continue POC. Activity Tolerance: Patient limited by endurance; Patient limited by fatigue;Patient tolerated treatment well  Discharge Recommendations: Home with Home health OT;24 hour supervision or assist;S Level 1  OT Equipment Recommendations  Equipment Needed: Yes  Mobility Devices: ADL Assistive Devices  ADL Assistive Devices: Shower Chair with back  [address] in place: Yes  Type of devices: Gait belt;Call light within reach;Nurse notified; Patient at risk for falls; Chair alarm in place; Left in chair    Patient Education  Education  Education Given To: Patient  Education Provided: Role of Therapy; ADL Function;Plan of Care;DME/Home Modifications;Transfer Training;Precautions; Safety; Fall Prevention Strategies; Mobility Training;Equipment  Education Provided Comments: role of OT, need for checking BP, purpose of ADITYA hose, BUE ther ex, safety with transfers, hand placement for sit>stands  Education Method: Demonstration;Verbal  Barriers to Learning: Cognition  Education Outcome: Verbalized understanding;Continued education needed    Plan  Plan  Times per Week: 5 of 7 days  Current Treatment Recommendations: Strengthening;Balance training;Functional mobility training; Endurance training;Patient/Caregiver education & training;Equipment evaluation, education, & procurement;Self-Care / ADL; Safety education & training    Goals  Short Term Goals  Time Frame for Short term goals: 1 week (by 8/21 /22)  Short Term Goal 1: Pt will complete LBD with SPV, progressing. Pt continues to require SBA/CGA for LB dressing. Short Term Goal 2: Pt will tolerate x 5 minutes of standing for improved activity azeem. GOAL MET 8/17/22 Pt tolerated more than 5 minutes of standing activity. Short Term Goal 3: Pt will complete toileting with SPV. progressing. Pt continues to require CGA/min A for toileting.   Short Term Goal 4: Pt will complete in room mobility and transfers with LRAD with SPV. progressing. Pt continues to require SBA/CGA for mobility in room and functional transfers. Long Term Goals  Time Frame for Long term goals : 14 days by 8/28/22  Long Term Goal 1: Pt will complete total body dressing with mod I  Long Term Goal 2: Pt will complete total body showering with mod I  Long Term Goal 3: Pt will complete dynamic balance task reaching to high/low levels with SBA and LRAD.     Therapy Time   Individual Concurrent Group Co-treatment   Time In 1000         Time Out 1100         Minutes 60         Timed Code Treatment Minutes: [address], [place]

## 2022-08-22 NOTE — PROGRESS NOTES
[facility]: ACUTE REHAB UNIT  SPEECH-LANGUAGE PATHOLOGY      [x] Daily  [] Weekly Care Conference Note  [] Discharge    Patient:Aroldo Lovell      :1935  ENP:[id number]  Rehab Dx/Hx: Syncope and collapse [R55]    Precautions: falls  Home situation: Lives with wife. Questionable historian - reports independence with med management (granddaughter assists?) and yard work. Pt reports he and his wife share responsibility for finances. He also reports wife manages cooking, cleaning, laundry, and grocery shopping and that wife is still working full time as a teacher. ST Dx: [] Aphasia  [] Dysarthria  [] Apraxia   [x] Oropharyngeal dysphagia [x] Cognitive Impairment  [] Other:   Date of Admit: 2022  Room #: 0153/0153-01    Current functional status (updated daily):         Pt being seen for : [] Speech/Language Treatment  [] Dysphagia Treatment [x] Cognitive Treatment  [] Other:  Communication: [x]WFL  [] Aphasia  [] Dysarthria  [] Apraxia  [] Pragmatic Impairment [] Non-verbal  [] Hearing Loss  [] Other:   Cognition: [] WFL  [] Mild  [] Moderate  [x] Severe [] Unable to Assess  [] Other:  Memory: [] WFL  [] Mild  [] Moderate  [x] Severe [] Unable to Assess  [] Other:  Behavior: [x] Alert  [x] Cooperative  [x]  Pleasant  [] Confused  [] Agitated  [] Uncooperative  [] Distractible [] Motivated  [] Self-Limiting [] Anxious  [] Other:  Endurance:  [] Adequate for participation in SLP sessions  [x] Reduced overall  [] Lethargic  [] Other:  Safety: [] No concerns at this time  [x] Reduced insight into deficits  [x]  Reduced safety awareness [] Not following call light procedures  [] Unable to Assess  [] Other:    Current Diet Order:ADULT ORAL NUTRITION SUPPLEMENT; Breakfast, Lunch, Dinner; Standard High Calorie/High Protein Oral Supplement  ADULT DIET;  Regular  Swallowing Precautions: upright for all intake, stay upright for at least 30 mins after intake, small bites/sips, alternate bites/sips, slow rate of intake, general GERD precautions, and general aspiration precautions        Date: 8/22/2022      Tx session 1  [id number] Tx session 2  All tx needs met in session 1   Total Timed Code Min 60    Total Treatment Minutes 60    Individual Treatment Minutes 60    Group Treatment Minutes 0    Co-Treat Minutes 0    Variance/Reason:  N/a    Pain Denies     Pain Intervention [] RN notified  [] Repositioned  [] Intervention offered and patient declined  [x] N/A  [] Other: [] RN notified  [] Repositioned  [] Intervention offered and patient declined  [] N/A  [] Other:   Subjective     Pt alert and oriented, cooperative and agreeable to participate in therapy. Pt seen sitting upright in bedside chair. Objective:  Goals     Dysphagia Goals:  Short-term Goals  Timeframe for Short-term Goals: 10 days; 08/24/2022    Goal 1: The patient will tolerate recommended diet with no clinical s/s of aspiration 5/5    Did not directly target. Pt intermittently sipping on TL via cup throughout tx session, tolerating without any overt s/s of aspiration. Pt declined any difficulties with breakfast or lunch meals today. Goal 2: The patient/caregiver will demonstrate understanding of compensatory swallow strategies, for improved swallow safety     Did not target. Cognitive-linguistic Goals:  Short-term Goals  Timeframe for Short-term Goals: 12 days (08/26/2022)    Goal 1: Pt will use visual aids / strategies to state orientation to time, place, situation with 80% acc given mod cues   Orientation concepts with use of visual calendar and board in room:  -month: indep  -date: min cues  -ashley: min cues  -year: indep  -place: min cues     Goal 2: Pt will complete graded recall tasks with 50% acc given mod cues   Recall indirectly targeted during portions of the FADI, pt benefiting from repetition of stimuli for improved recall/comprehension.        Goal 3: Pt will complete executive function tasks (e.g. meds, time, money, etc) with 70% acc given mod cues   Portions of the FADI (assessment of language related functional activities) completed:    -telling time: 30% acc indep improving to 100% acc given max cues    -counting money: 40% acc indep improving to 100% acc given max cues     -solving daily math problems: 25% acc indep improving to 50% acc given max cues (pt only completed 4 word problems stating he couldn't finish the rest)    -using a calendar: 60% acc indep improving to 100% acc given mod cues       Goal 4: Pt will complete problem solving and thought organization tasks with 70% acc given mod cues   Overall reduced thought organization noted during completion of the FADI, see goal 3 above. Significantly delayed processing and increased time required for responding today. Goal 5: Pt will complete viusal and verbal reasoning tasks with 70% acc given mod cues   Did not target. Other areas targeted: N/a    Education:   Edu provided re: rationale for tasks provided, attention strategies       Safety Devices: [x] Call light within reach  [x] Chair alarm activated  [] Bed alarm activated  [] Other:  [] Call light within reach  [] Chair alarm activated  [] Bed alarm activated  [] Other:    Assessment: Pt pleasant and cooperative, agreeable to participate. Pt lethargic, occasional redirection required for attention to tasks. Pt completed portions of the FADI (telling time 30% acc, counting money 40% acc, math problems 25% acc, using a calendar 60% acc). Pt required mod-max cues to achieve 100% acc on these functional tasks. Pt benefited from repetition of stimuli for improved recall/comprehension. Pt also requested self directed rest breaks secondary to reduced LESLIE. Pt declined any difficulties with current diet recommendations. Continuing to recommend 24 hour assist at d/c due to overall severity of cognitive deficits and reduced insight negatively impacting safety and independence. Continue goals above.     Plan: Continue as per plan of care. Additional Information:     Barriers toward progress: Cognitive deficit, Limited safety awareness, and Limited insight into deficits  Discharge recommendations:  [] Home independently  [] Home with assistance [x]  24 hour supervision  [] ECF [] Other:   Continued Tx Upon Discharge: ? [x] Yes [] No [] TBD based on progress while on ARU [] Vital Stim indicated [] Other:   Estimated discharge date: 08/26/22    Interventions used this date:  [] Speech/Language Treatment  [] Instruction in HEP [] Group [] Dysphagia Treatment [x] Cognitive Treatment   [] Other: Total Time Breakdown / Charges    Time in Time out Total Time / units   Cognitive Tx 1230 1330 60 min / 4 units    Speech Tx -- -- --   Dysphagia Tx -- -- --       Electronically Signed by     Bert Martin. A CCC-SLP  Speech-Language Pathologist  [id number]

## 2022-08-22 NOTE — PLAN OF CARE
Problem: Safety - Adult  Goal: Free from fall injury  8/21/2022 2009 by Kenzie Holliday RN  Outcome: Progressing  8/21/2022 0824 by Candido Blankenship RN  Outcome: Progressing

## 2022-08-22 NOTE — PROGRESS NOTES
Hospitalist Progress Note      PCP: Jasmine De La Paz MD    Date of Admission: 8/14/2022    Chief Complaint:   Dizziness    Hospital Course:   Evangelina Clemons is an 82year old male with a past medical history significant for atrial fibrillation, s/p pacer, dementia, CKD, HTN, and HLD who presented as a transfer from [facility] on 8/9/22 after a presyncopal episode. He had a similar episode several days prior that resulted in a fall and scalp laceration that required staples. He is being treated for orthostatic hypotension. Cardiology is consulted. Echo revealed progressive left ventricular function. Patient was set for further workup with cardiac catheterization 8/12, however he had worsening mental status and procedure was canceled. Plan for outpatient cardiac catheterization. He was admitted to [facility] on 8/14/22 due to functional deficits below his baseline. Today he is seen in his room. He reports feeling dizzy with therapies. He also reports reflux and nausea. Nursing notes that he has been coughing up sputum. Patient's blood pressure is dropping to systolic of 16T when up with therapy. Subjective:     Patient states the dizziness is improving. He was able to ambulate with PT without dizziness. BP decreased to SBP 80 but minimal symptoms. Pulse ox decreased with exertion but now low 90's. No fever or chills. No chest pain. No hallucinations.     Medications:  Reviewed    Infusion Medications    dextrose       Scheduled Medications    QUEtiapine  25 mg Oral Nightly    midodrine  10 mg Oral TID WC    atorvastatin  40 mg Oral Daily    enoxaparin  40 mg SubCUTAneous Daily    aspirin  81 mg Oral Daily    citalopram  10 mg Oral Daily    ferrous sulfate  325 mg Oral Daily    finasteride  5 mg Oral Daily    memantine  5 mg Oral BID    [Held by provider] tamsulosin  0.4 mg Oral Daily    pantoprazole  40 mg Oral QAM AC    fluticasone  2 spray Nasal Daily     PRN Meds: calcium carbonate, sodium chloride flush, polyethylene glycol, glucose, dextrose bolus **OR** dextrose bolus, glucagon (rDNA), dextrose, acetaminophen, diphenhydrAMINE, hydrALAZINE, ondansetron, traZODone, traMADol      Intake/Output Summary (Last 24 hours) at 8/22/2022 1238  Last data filed at 8/22/2022 1233  Gross per 24 hour   Intake 600 ml   Output 300 ml   Net 300 ml       Physical Exam Performed:    BP 97/62   Pulse 64   Temp 97.5 °F (36.4 °C) (Oral)   Resp 16   Ht 5' 7\" (1.702 m)   Wt 154 lb 8 oz (70.1 kg)   SpO2 96%   BMI 24.20 kg/m²     General appearance: No apparent distress, appears stated age and cooperative. HEENT: Blind right eye. Neck: Supple, with full range of motion. No jugular venous distention. Trachea midline. Respiratory:  Normal respiratory effort. Clear to auscultation, bilaterally without Rales/Wheezes/Rhonchi. Cardiovascular: Regular rate and rhythm with normal S1/S2 without murmurs, rubs or gallops. Abdomen: Soft, non-tender, non-distended with normal bowel sounds. Musculoskeletal: No clubbing, cyanosis or edema bilaterally. Full range of motion without deformity. Skin: Skin color, texture, turgor normal.  No rashes or lesions. Neurologic:  Neurovascularly intact without any focal sensory/motor deficits. Cranial nerves: II-XII intact, grossly non-focal.  Psychiatric: Alert and oriented, thought content appropriate, normal insight  Capillary Refill: Brisk,3 seconds, normal   Peripheral Pulses: +2 palpable, equal bilaterally       Labs:   Recent Labs     08/22/22  0628   WBC 8.7   HGB 14.6   HCT 44.7        Recent Labs     08/20/22  0619 08/22/22  0628    140   K 4.2 4.8    102   CO2 30 29   BUN 39* 35*   CREATININE 1.2 1.2   CALCIUM 9.2 9.1     No results for input(s): AST, ALT, BILIDIR, BILITOT, ALKPHOS in the last 72 hours. No results for input(s): INR in the last 72 hours. No results for input(s): Patrisha Meigs in the last 72 hours.     Urinalysis:      Lab Results Component Value Date/Time    NITRU Negative 08/20/2022 03:45 PM    [address] 04/21/2022 02:20 PM    BACTERIA 1+ 04/21/2022 02:20 PM    RBCUA 3-4 04/21/2022 02:20 PM    BLOODU Negative 08/20/2022 03:45 PM    SPECGRAV 1.020 08/20/2022 03:45 PM    GLUCOSEU Negative 08/20/2022 03:45 PM       Radiology:  XR CHEST 1 VIEW   Final Result   No acute abnormality identified. Large hiatal hernia. Assessment/Plan:    Active Hospital Problems    Diagnosis     Syncope and collapse [R55]      Orthostatic hypotension. Continue midodrine. Continue PT/OT. Improving. Monitor for hypertension at rest.   Obstructive uropathy. Continue finasteride. Avoid Flomax due to orthostatic hypotension. Paroxysmal atrial fibrillation. Eliquis stopped. Monitor rates. May need to reevaluate [facility] after discharge. Cardiomyopathy. Continue ASA and statin. DVT Prophylaxis: Lovenox  Diet: ADULT ORAL NUTRITION SUPPLEMENT; Breakfast, Lunch, Dinner; Standard High Calorie/High Protein Oral Supplement  ADULT DIET; Regular  Code Status: Full Code    PT/OT Eval Status:  Following    Dispo - Per Guanako Evans MD

## 2022-08-22 NOTE — PROGRESS NOTES
Mable Low  8/22/2022  [id number]    Chief Complaint: Syncope and collapse    Subjective:   No acute events overnight. Today Katerina Jaime is seen in his room, seated up in the chair. He reports that he is currently feeling light headed. He denies any other acute complaints at this time. ROS: denies f/c, n/v, cp, sob    Objective:  Patient Vitals for the past 24 hrs:   BP Temp Temp src Pulse Resp SpO2   08/22/22 1215 97/62 -- -- 64 16 --   08/22/22 1120 -- -- -- 84 -- --   08/22/22 0837 101/62 -- -- 88 -- 96 %   08/22/22 0800 102/69 97.5 °F (36.4 °C) Oral 84 17 96 %   08/21/22 1930 133/82 97.5 °F (36.4 °C) Oral 90 18 97 %   08/21/22 1645 127/83 -- -- 75 -- --     Gen: No distress, pleasant. Seated up in chair  HEENT: Normocephalic, atraumatic. CV: extremities well perfused  Resp: No respiratory distress. On room air  Abd: Soft, nontender   Ext: No edema. Neuro: Alert, oriented, appropriately interactive. Psych: appropriate mood and affect    Wt Readings from Last 3 Encounters:   08/21/22 154 lb 8 oz (70.1 kg)   08/10/22 156 lb 1.6 oz (70.8 kg)   07/08/22 160 lb (72.6 kg)       Laboratory data:   Lab Results   Component Value Date    WBC 8.7 08/22/2022    HGB 14.6 08/22/2022    HCT 44.7 08/22/2022    MCV 89.4 08/22/2022     08/22/2022       Lab Results   Component Value Date/Time     08/22/2022 06:28 AM    K 4.8 08/22/2022 06:28 AM     08/22/2022 06:28 AM    CO2 29 08/22/2022 06:28 AM    BUN 35 08/22/2022 06:28 AM    CREATININE 1.2 08/22/2022 06:28 AM    GLUCOSE 68 08/22/2022 06:28 AM    CALCIUM 9.1 08/22/2022 06:28 AM        Therapy progress:  PT  Position Activity Restriction  Other position/activity restrictions: LUE IV, orthostatic (low BP).  compression stockings for OOB activity  Objective     Sit to Stand: Minimal Assistance  Stand to sit: Minimal Assistance  Bed to Chair: Unable to assess     OT  PT Equipment Recommendations  Other: TBD pending progress and increased functional mobility assessment-- anticipate RW and possible manual w/c. Pt states he has walker and cane at home,. Toilet - Technique: Ambulating  Equipment Used: Grab bars  Assessment        SLP  Current Diet : Regular             Body mass index is 24.2 kg/m². Assessment and Plan:  Lelia King is an 82year old male with a past medical history significant for atrial fibrillation, s/p pacer, dementia, CKD, HTN, and HLD who presented as a transfer from [facility] on 8/9/22 after a presyncopal episode, treated for orthostatic hypotension. He was admitted to [facility] on 8/14/22 due to functional deficits below his baseline. Debility  - due to below  - PT, OT, ST     Orthostatic hypotension  - SBP dipping to 70s-80s when up with therapy, now improving   - Medicine following, appreciate assistance  - discontinued flomax, metoprolol  - s/p 500 mL bolus on 8/16 and 8/17  - ADITYA hose  - midodrine  - encourage fluids    AMS, resolved  - hallucinations of water dripping on 8/20, resolved. Does have dementia. Episode of AMS during acute stay attributed to medications  - UA unremarkable  - continue to monitor    Productive Cough  - XR chest 8/15 negative for acute process     Paroxysmal Atrial Fibrillation  - monitor HR off of BB  - Eliquis discontinued per Medicine due to high fall risk     HFrEF  - daily weights, I/Os  - OP ischemic eval per Cardiology     CKD  - baseline Cr ~1.2  - NATANAEL resolved, Cr has trended down back to baseline  - encourage patient to drink fluids  - monitor     Dementia  - aricept discontinued due to concerns for polypharmacy  - namenda  - wean quietiapine as able     CAD  - statin  - follow up with Cards OP     BPH  - finasteride  - hold tamsulosin due to BP     Bowels: Schedule stool softener. Follow bowel movements. Enema or suppository if needed. Services: home health PT, OT, [address] [place] [address] , nursing  EDOD: 8/26/22    Kaykay Trejo.  Eugene Perry MD 8/22/2022, 4:11 PM

## 2022-08-23 PROCEDURE — 6370000000 HC RX 637 (ALT 250 FOR IP): Performed by: INTERNAL MEDICINE

## 2022-08-23 PROCEDURE — 97535 SELF CARE MNGMENT TRAINING: CPT

## 2022-08-23 PROCEDURE — 97112 NEUROMUSCULAR REEDUCATION: CPT

## 2022-08-23 PROCEDURE — 97129 THER IVNTJ 1ST 15 MIN: CPT

## 2022-08-23 PROCEDURE — 97116 GAIT TRAINING THERAPY: CPT

## 2022-08-23 PROCEDURE — 97110 THERAPEUTIC EXERCISES: CPT

## 2022-08-23 PROCEDURE — 6370000000 HC RX 637 (ALT 250 FOR IP): Performed by: PHYSICAL MEDICINE & REHABILITATION

## 2022-08-23 PROCEDURE — 97130 THER IVNTJ EA ADDL 15 MIN: CPT

## 2022-08-23 PROCEDURE — 6360000002 HC RX W HCPCS: Performed by: INTERNAL MEDICINE

## 2022-08-23 PROCEDURE — 1280000000 HC REHAB R&B

## 2022-08-23 RX ORDER — FLUDROCORTISONE ACETATE 0.1 MG/1
0.1 TABLET ORAL DAILY
Status: DISCONTINUED | OUTPATIENT
Start: 2022-08-23 | End: 2022-08-24

## 2022-08-23 RX ADMIN — MIDODRINE HYDROCHLORIDE 10 MG: 5 TABLET ORAL at 07:29

## 2022-08-23 RX ADMIN — MIDODRINE HYDROCHLORIDE 10 MG: 5 TABLET ORAL at 15:50

## 2022-08-23 RX ADMIN — MEMANTINE 5 MG: 5 TABLET ORAL at 19:58

## 2022-08-23 RX ADMIN — TRAZODONE HYDROCHLORIDE 50 MG: 50 TABLET ORAL at 21:51

## 2022-08-23 RX ADMIN — FLUDROCORTISONE ACETATE 0.1 MG: 0.1 TABLET ORAL at 14:00

## 2022-08-23 RX ADMIN — FINASTERIDE 5 MG: 5 TABLET, FILM COATED ORAL at 07:29

## 2022-08-23 RX ADMIN — FERROUS SULFATE TAB 325 MG (65 MG ELEMENTAL FE) 325 MG: 325 (65 FE) TAB at 07:29

## 2022-08-23 RX ADMIN — ASPIRIN 81 MG: 81 TABLET, COATED ORAL at 07:29

## 2022-08-23 RX ADMIN — PANTOPRAZOLE SODIUM 40 MG: 40 TABLET, DELAYED RELEASE ORAL at 05:55

## 2022-08-23 RX ADMIN — QUETIAPINE FUMARATE 25 MG: 25 TABLET ORAL at 19:58

## 2022-08-23 RX ADMIN — CITALOPRAM HYDROBROMIDE 10 MG: 20 TABLET ORAL at 07:29

## 2022-08-23 RX ADMIN — FLUTICASONE PROPIONATE 2 SPRAY: 50 SPRAY, METERED NASAL at 07:29

## 2022-08-23 RX ADMIN — MIDODRINE HYDROCHLORIDE 10 MG: 5 TABLET ORAL at 11:52

## 2022-08-23 RX ADMIN — MEMANTINE 5 MG: 5 TABLET ORAL at 07:29

## 2022-08-23 RX ADMIN — ENOXAPARIN SODIUM 40 MG: 100 INJECTION SUBCUTANEOUS at 07:28

## 2022-08-23 RX ADMIN — ATORVASTATIN CALCIUM 40 MG: 40 TABLET, FILM COATED ORAL at 07:28

## 2022-08-23 ASSESSMENT — PAIN SCALES - GENERAL: PAINLEVEL_OUTOF10: 0

## 2022-08-23 NOTE — PLAN OF CARE
Problem: Skin/Tissue Integrity  Goal: Absence of new skin breakdown  Outcome: Progressing     Problem: Safety - Adult  Goal: Free from fall injury  8/23/2022 0834 by Elmira Cortes RN  Outcome: Progressing

## 2022-08-23 NOTE — PATIENT CARE CONFERENCE
[facility]  Inpatient Rehabilitation  Weekly Team Conference Note    Date: 2022  Patient Name: Latasha Martínez        MRN: [id number]    : 1935  (82 y.o.)  Gender: male   Referring Practitioner: Cain Isaac  Diagnosis: syncope      Interventions to be utilized toward barriers to discharge, per discipline:  [address] observed barriers to dc: Confusion, Cognitive deficit, Impulsivity, Limited safety awareness, Communication deficit, Decreased endurance, Lower extremity weakness, Impaired vision, Incontinence of bowel, and Incontinence of bladder  Nursing interventions: Will verbalize and demonstrate understanding of safety awareness and medications. Family Education: yes  Fall Risk:  Yes      PHYSICAL THERAPY  Physical therapy observed barriers to dc:    Baseline: cane or FWW   Current level:  ihypotension limits indep with mobility    Barriers to DC:  not indep with gait frequent hypotnesion limited mobility in the presence h/o frequent falls as recebt plof, dementia   Needs in order to achieve dc home/next level of care:  indep giat and transfers to supervision if 24 hour supervision avaiable.        Physical therapy interventions:   Current Treatment Recommendations: Strengthening, Balance training, Functional mobility training, Transfer training, Gait training, Stair training, Neuromuscular re-education, Home exercise program, Safety education & training, Patient/Caregiver education & training, Equipment evaluation, education, & procurement, Therapeutic activities    PT Goals:            Short Term Goals  Time Frame for Short term goals: 22  Short term goal 1: Pt will complete bed mobility mod I-- GOAL not met,  pt requires up to SBA for bed mobility  Short term goal 2: Pt will complete transfers with supervision and LRAD-- GOAL not met,  pt requires up to CGA for functional transfers using RW  Short term goal 3: Pt will ambulate 50 ft with LRAD and CGA-- GOAL partially met, 8/19 pt ambulates up to 60ft with CGA-min A using RW  Short term goal 4: Pt will climb 2 steps with BHR and CGA-- GOAL partially met, 8/19 pt completes 5 (4\") curb steps with min A using RW            Long Term Goals  Time Frame for Long term goals : 8/27/22  Long term goal 1: Pt will complete transfers with LRAD mod I.  Long term goal 2: Pt will ambulate 150 ft with LRAD and mod I.  Long term goal 3: Pt will climb 12 stairs with handrails mod I.  Long term goal 4: Pt will complete car transfer with LRAD mod I. PT:  Recommendation:   PT Equipment Recommendations  Other: TBD pending progress and increased functional mobility assessment-- anticipate RW and possible manual w/c. Pt states he has walker and cane at home,. Assessment  Assessment: Pt seen for neuro-re-ed , therex, gait  with monitoring of vitals for hypotension. Pt required cues for walker orientation and CGA for balance with patient needing to stop walking due to dizziness and hypotension. Pt did well at start of session with resisted sit to stand to improve anterior weight shift and strength with transfers with external cue. However, after toileting, transfers and walking pt with decreased (hypotensive) BP  with poor tolerance for progression to sitting with patient in bed supine with HEB elevated and LE on bed to maintain BP. Haroon Choudhury Notified nursing staff at end of session. When BP in nomrative range post walking will trial steps.         OCCUPATIONAL THERAPY  Occupational therapy observed barriers to dc:    Baseline: mod I all ADLs and mobility but has history of multiple falls, including recent hospitalization with head laceration from fall              Current level: CGA LB ADLs, SBA UB ADLs, CGA transfers, orthostatic hypotension but not consistent with all activities              Barriers to DC: orthostatic hypotension, decreased insight              Needs in order to achieve dc home/next level of care: mod I/SPV all ADLs and transfers to return home with family    Occupational Therapy interventions:  Current Treatment Recommendations: Strengthening, Balance training, Functional mobility training, Endurance training, Patient/Caregiver education & training, Equipment evaluation, education, & procurement, Self-Care / ADL, Safety education & training    OT Goals:  Patient Goals   Patient goals : \"to go home\"  Short Term Goals  Time Frame for Short term goals: 1 week (by 8/21 /22)  Short Term Goal 1: Pt will complete LBD with SPV, progressing. Pt continues to require SBA/CGA for LB dressing. Short Term Goal 2: Pt will tolerate x 5 minutes of standing for improved activity azeem. GOAL MET 8/17/22 Pt tolerated more than 5 minutes of standing activity. Short Term Goal 3: Pt will complete toileting with SPV. progressing. Pt continues to require CGA/min A for toileting. Short Term Goal 4: Pt will complete in room mobility and transfers with LRAD with SPV. progressing. Pt continues to require SBA/CGA for mobility in room and functional transfers. Long Term Goals  Time Frame for Long term goals : 14 days by 8/28/22  Long Term Goal 1: Pt will complete total body dressing with mod I  Long Term Goal 2: Pt will complete total body showering with mod I  Long Term Goal 3: Pt will complete dynamic balance task reaching to high/low levels with SBA and LRAD. OT Assessment:  Assessment  Assessment: Pt agreeable to OT session. Pt performed functional transfers with CGA/SBA with RW and cues for hand placement during all sit<>stands. Pt reported minimal dizziness toward end of session but no dizziness reported for majority of session. BP 90s/~60 for majority of session, dropping to 81/48 after exiting bathroom after shower with ADITYA hose on, improving to 101/66 after seated rest break and ankle pumps for 1-2 minutes. Pt required mod VCs for safety with shower and with ADLs to sit when able and to minimize standing without ADITYA hose on.  Pt completed BUE ther ex with good strength. BP 93/63 in recliner at end of session with lunch in front of patient and all needs in reach. RN made aware of BP. Continue POC. Activity Tolerance: Patient limited by endurance; Patient limited by fatigue;Patient tolerated treatment well  Discharge Recommendations: Home with Home health OT;24 hour supervision or assist;S Level 1  OT Equipment Recommendations  Equipment Needed: Yes  Mobility Devices: ADL Assistive Devices  ADL Assistive Devices: Shower Chair with back       SPEECH THERAPY  Speech therapy observed barriers to dc:       Baseline: Lives with wife. Questionable historian - reports independence with med management (granddaughter assists?) and yard work. Pt reports he and his wife share responsibility for finances. He also reports wife manages cooking, cleaning, laundry, and grocery shopping. Current level: Severe cognitive-linguistic deficits, mild oropharyngeal dysphagia               Barriers to DC: reduced insight into deficits, poor safety awareness, reduced endurance, 24 hr supervision               Needs in order to achieve dc home/next level of care: 24 hr supervision, carryover of compensatory strategies, tolerance of LRD     Speech Therapy interventions:  Dysphagia:  Diet tolerance monitoring, safe swallow strategies   Speech/Language/Cognition: Compensatory strategy training and carryover, recall/STM, problem solving, reasoning, exec function, thought organization, attention. Dysphagia Goals:  Timeframe for Long-term Goals: 14 days; 08/28/2022  Goal 1: The patient will tolerate least restrictive diet with no clinical s/s of aspiration or worsening respiratory/pulmonary status.  08/23: progressing, ongoing   Short-term Goals  Timeframe for Short-term Goals: 10 days; 08/24/2022  Goal 1: The patient will tolerate recommended diet with no clinical s/s of aspiration 5/5. 08/23: progressing, ongoing   Goal 2: The patient/caregiver will demonstrate understanding inconsistent carryover, and reduced insight into deficits. Continue ST services and goals above. NUTRITION  Weight: 155 lb 6.4 oz (70.5 kg) / Body mass index is 24.34 kg/m². Diet Order: ADULT DIET; Regular  ADULT ORAL NUTRITION SUPPLEMENT; Breakfast, Dinner; Standard High Calorie/High Protein Oral Supplement  PO Meals Eaten (%): 76 - 100%  Education: No recommendation at this time       CASE MANAGEMENT  Assessment: 82 yr old male. Dx:Syncope and collapse. Independent PLOF has can and rollator. Lives with spouse in a one level home with a 2 step entry into home. Therapy recommendations are Home with Home health OT;24 hour supervision or assist vs Home with assist PRN>[place] following for services. DME:Shower chair with back>not covered by insurance. Interdisciplinary Goals:   1.) Pt will refer to calendar and board in room for consistent carryover and recall of daily orientation concepts across all disciplines given min cues   2.) Pt will complete toileting with supervision. 3.) Pt demo supervision bed<>chahir and gait in rooms. [x]  Family Training discussed at conference and to be scheduled. Discharge Plan   Estimated discharge date: 8/26/2022  Destination: Home health vs SNF  Pass:No  Services at Discharge: [facility] vs SNF with continued Physical Therapy, Occupational Therapy, Speech Therapy, and Nursing  Equipment at Discharge: Shower chair, possible RW vs wheelchair.      Team Members Present at Conference:  : Chaparrita Castillo RN    Occupational Therapist: Elieser Pope, OTR/L  Physical Therapist:Khadra Sidhu [id number]  Speech Therapist: Corrinne Richard, [facility] SLP   Nurse: Elaine Astorga RN  Dietician: Nikki Iraheta RDN, LD  : Andrea Newman, OTR/L  Psychiatry: N/A    Family members present at conference: No      I led this team conference and I approve the established interdisciplinary plan of care as documented within the medical record of Mable Low.     MD: Electronically signed by Aston Awad MD on 8/24/2022 at 1:53 PM

## 2022-08-23 NOTE — CARE COORDINATION
OWEN sent referral to Tracy with [facility], she accepted referral. Samantha Woods RN

## 2022-08-23 NOTE — PROGRESS NOTES
Physical Therapy  Facility/Department: [facility]  Rehabilitation Physical Therapy Treatment Note    NAME: Darrion Pantoja  : 1935 (51 y.o.)  MRN: [id number]  CODE STATUS: Full Code    Date of Service: 22       Restrictions:  Restrictions/Precautions: Fall Risk;General Precautions  Position Activity Restriction  Other position/activity restrictions: LUE IV, orthostatic (low BP). compression stockings for OOB activity     SUBJECTIVE  Subjective  Pain: Pt denies pain this morning        Post Treatment Pain Screening: denies, dizziness resolved in supine. Pt on arrival only with soiled adult undergarments donned. Pt assisted to dress with set up only and SBA to CGA in standing to don underwear, pants, belt, shirt. Therapist donned knee high compression garments and socks. OBJECTIVE  Patient in bed with  adult incont  Cognition  Overall Cognitive Status: Exceptions  Arousal/Alertness: Appropriate responses to stimuli  Following Commands: Follows multistep commands with repitition; Follows one step commands with increased time  Attention Span: Attends with cues to redirect  Memory: Decreased short term memory;Decreased recall of precautions;Decreased recall of recent events  Safety Judgement: Decreased awareness of need for safety;Decreased awareness of need for assistance  Problem Solving: Decreased awareness of errors;Assistance required to identify errors made;Assistance required to generate solutions;Assistance required to correct errors made  Insights: Decreased awareness of deficits  Initiation: Does not require cues  Sequencing: Requires cues for some  Cognition Comment: Patient in bathroom attempted to walk sideways in walker with side of walker in front of him, needed cues to correctly put in front of him.   Orientation  Overall Orientation Status: Impaired  Orientation Level: Disoriented to time;Oriented to situation;Oriented to person;Oriented to place  Pt completed toileting, washing hands at sink in standing and managing clothing and pericare with SBA to CGA walking from bedroom to bathroom with CGA and FWW with cues needed to turn walker leaving bathroom as pt attempting to waling with side of walker forward. Functional Mobility  Bed Mobility  Overall Assistance Level: Independent  Roll Left  Assistance Level: Independent  Roll Right  Assistance Level: Independent  Sit to Supine  Assistance Level: Independent  Supine to Sit  Assistance Level: Independent  Skilled Clinical Factors: no dizziness during bed mobility  Scooting  Assistance Level: Independent  Balance  Sitting Balance: Independent  Transfers bed<>chair x2 no AD SBA  Sit<>stand resisted see neuro re ed section. supine LLE after supt to sit multiple trials wtih supine rest 1 minute BP returns to 103/63 96% SpO2 RA, HR 88bpm.  with return to sitting after 1 minute BP 89/50 SpO2 95% HR 75bpm, after transfer to chair from sitting bedside  4 minutes total sitting time BP95/66 HR 79bpm with patient stating he feels a little dizzy but not bad right now. \"   With 2 more minutes sitting BP LUE  85/59  HR 88bpm   and pt returned to bed  with supervision  and supine with HOBE   Notified nursing of Pt's BP response with activity and pt up to bed HOBE 25 degrees  BP 94/73 HR 91bpm.   Denies dizziness. Environmental Mobility  Ambulation  Device: [employer]: 225 feet with L/r turn  Additional Factors: Verbal cues; Increased time to complete  Assistance Level: Contact guard assist  Gait Deviations: Slow fred; Unsteady gait; Wide base of support;Decreased step length bilateral;Decreased weight shift bilateral  Skilled Clinical Factors: cues for increased step length as starts wtih shuffling gait. With cues patient with increased step length but IC with foot flat vs heel strike. Pt reported dizziness 7-8/10 limited walking distance with /71  immediate post walk.   with standing BP unable to register on autocuff manually LUE in mobility  Short term goal 2: Pt will complete transfers with supervision and LRAD-- GOAL not met, 8/19 pt requires up to CGA for functional transfers using RW  Short term goal 3: Pt will ambulate 50 ft with LRAD and CGA-- GOAL partially met, 8/19 pt ambulates up to 60ft with CGA-min A using RW  Short term goal 4: Pt will climb 2 steps with BHR and CGA-- GOAL partially met, 8/19 pt completes 5 (4\") curb steps with min A using RW  Long Term Goals  Time Frame for Long term goals : 8/27/22  Long term goal 1: Pt will complete transfers with LRAD mod I.  Long term goal 2: Pt will ambulate 150 ft with LRAD and mod I.  Long term goal 3: Pt will climb 12 stairs with handrails mod I.  Long term goal 4: Pt will complete car transfer with LRAD mod I. PLAN OF CARE/SAFETY 5/7 days week per initial POC        EDUCATION:  Pt educated on getting up from chair and bed slowly due to orthostatic hypotension with patient employing slow position changes during session. Monitored patient for report of dizziness and hypotension throughout the session with patient encouraged to stop and sit if dizziness present,.             Therapy Time   Individual Concurrent Group Co-treatment   Time In 0830         Time Out 0946         Minutes 76           Timed Code Treatment Minutes: [address] [place], [place], 08/23/22 at 4:06 PM

## 2022-08-23 NOTE — PROGRESS NOTES
Occupational Therapy  Facility/Department: [facility]  Rehabilitation Occupational Therapy Daily Treatment Note    Date: 22  Patient Name: [facility]       Room: [facility]  MRN: [id number]  Account: [de-identified]   : 1935  (82 y.o.) Gender: male                    Past Medical History:  has a past medical history of Atrial fibrillation ([facility] [address] Blind right eye, Chronic kidney disease, Dementia ([facility] [address] Hyperlipidemia, Hypertension, and Pneumonia. Past Surgical History:   has a past surgical history that includes Tonsillectomy; Appendectomy; Colonoscopy; eye surgery; Upper gastrointestinal endoscopy; joint replacement (); hernia repair; and pacemaker placement (10/13/2017). Restrictions  Restrictions/Precautions: Fall Risk;General Precautions  Implants present? : Pacemaker  Other position/activity restrictions: LUE IV, orthostatic (low BP). compression stockings for OOB activity    Subjective  Subjective: Pt in bed, pleasant, wondering if her has ruptured ear drum that is causing his dizziness, educated to talk to MD but likely not related to BP dropping, no pain, /81, HR 73, O2 sats 99% on RA on arrival in [facility]. Restrictions/Precautions: Fall Risk;General Precautions             Objective     Cognition  Overall Cognitive Status: Exceptions  Arousal/Alertness: Appropriate responses to stimuli  Following Commands: Follows multistep commands with repitition; Follows one step commands with increased time  Attention Span: Attends with cues to redirect  Memory: Decreased short term memory;Decreased recall of precautions;Decreased recall of recent events  Safety Judgement: Decreased awareness of need for safety;Decreased awareness of need for assistance  Problem Solving: Decreased awareness of errors;Assistance required to identify errors made;Assistance required to generate solutions;Assistance required to correct errors made  Insights: Decreased awareness of deficits  Initiation: Does not require cues  Sequencing: Requires cues for some  Orientation  Overall Orientation Status: Impaired  Orientation Level: Disoriented to time;Oriented to situation;Oriented to person;Oriented to place         ADL  Grooming/Oral Hygiene  Assistance Level: Set-up  Skilled Clinical Factors: combing hair while seated  Upper Extremity Bathing  Assistance Level: Supervision;Verbal cues  Skilled Clinical Factors: seated on TTB in shower, cues to stay seated  Lower Extremity Bathing  Assistance Level: Contact guard assist  Skilled Clinical Factors: mod VCs for sitting down for majority of task, CGA in stance to briefly bathe buttocks and groin  Upper Extremity Dressing  Assistance Level: Minimal assistance  Skilled Clinical Factors: to pull shirt down in back  Lower Extremity Dressing  Assistance Level: Contact guard assist;Set-up  Skilled Clinical Factors: for standing balance for pants management up  Putting On/Taking Off Footwear  Assistance Level: Set-up; Stand by assist  Skilled Clinical Factors: to doff/don ADITYA hose and gripper socks  Toileting  Assistance Level: Contact guard assist  Skilled Clinical Factors: standing to urinate  Tub/Shower Transfers  Type: Shower  Transfer From: Albino Hawks  Transfer To: Tub transfer bench  Additional Factors: Verbal cues; With handrails; Increased time to complete  Assistance Level: Contact guard assist          Functional Mobility  Device: Rolling walker  Activity: To/From bathroom  Assistance Level: Contact guard assist  Skilled Clinical Factors: CGA/SBA with RW  Bed Mobility  Overall Assistance Level: Supervision  Additional Factors: Head of bed raised; With handrails  Supine to Sit  Assistance Level: Supervision  Scooting  Assistance Level: Supervision  Transfers  Surface: Standard toilet; To chair with arms;From bed;From chair with arms  Additional Factors: Verbal cues; With handrails;Hand placement cues; Increased time to complete  Device: Walker  Sit to Stand  Assistance Level: Contact guard assist  Skilled Clinical Factors: cues for hand placement  Stand to Sit  Assistance Level: Contact guard assist  Skilled Clinical Factors: cues for hand placement  Bed To/From Chair  Technique: Stand step  Assistance Level: Contact guard assist   OT Exercises  Resistive Exercises: 3# elbow flexion, wrist flexion, wrist extension, supination/pronation BUE x20, chest press LUE only x20     Assessment  Assessment  Assessment: Pt agreeable to OT session. Pt performed functional transfers with CGA/SBA with RW and cues for hand placement during all sit<>stands. Pt reported minimal dizziness toward end of session but no dizziness reported for majority of session. BP 90s/~60 for majority of session, dropping to 81/48 after exiting bathroom after shower with ADITYA hose on, improving to 101/66 after seated rest break and ankle pumps for 1-2 minutes. Pt required mod VCs for safety with shower and with ADLs to sit when able and to minimize standing without ADITYA hose on. Pt completed BUE ther ex with good strength. BP 93/63 in recliner at end of session with lunch in front of patient and all needs in reach. RN made aware of BP. Continue POC. Activity Tolerance: Patient limited by endurance; Patient limited by fatigue;Patient tolerated treatment well  Discharge Recommendations: Home with Home health OT;24 hour supervision or assist;S Level 1  OT Equipment Recommendations  Equipment Needed: Yes  Mobility Devices: ADL Assistive Devices  ADL Assistive Devices: Shower Chair with back  [address] in place: Yes  Type of devices: Gait belt;Call light within reach;Nurse notified; Patient at risk for falls; Chair alarm in place; Left in chair    Patient Education  Education  Education Given To: Patient  Education Provided: Role of Therapy; ADL Function;Plan of Care;DME/Home Modifications;Transfer Training;Precautions; Safety; Fall Prevention Strategies; Mobility Training;Equipment  Education Provided Comments: role of OT, need for checking BP, purpose of ADITYA hose, BUE ther ex, safety with transfers, hand placement for sit>stands, safety with ADLs  Education Method: Demonstration;Verbal  Barriers to Learning: Cognition  Education Outcome: Verbalized understanding;Continued education needed    Plan  Plan  Times per Week: 5 of 7 days  Current Treatment Recommendations: Strengthening;Balance training;Functional mobility training; Endurance training;Patient/Caregiver education & training;Equipment evaluation, education, & procurement;Self-Care / ADL; Safety education & training    Goals  Short Term Goals  Time Frame for Short term goals: 1 week (by 8/21 /22)  Short Term Goal 1: Pt will complete LBD with SPV, progressing. Pt continues to require SBA/CGA for LB dressing. Short Term Goal 2: Pt will tolerate x 5 minutes of standing for improved activity azeem. GOAL MET 8/17/22 Pt tolerated more than 5 minutes of standing activity. Short Term Goal 3: Pt will complete toileting with SPV. progressing. Pt continues to require CGA/min A for toileting. Short Term Goal 4: Pt will complete in room mobility and transfers with LRAD with SPV. progressing. Pt continues to require SBA/CGA for mobility in room and functional transfers. Long Term Goals  Time Frame for Long term goals : 14 days by 8/28/22  Long Term Goal 1: Pt will complete total body dressing with mod I  Long Term Goal 2: Pt will complete total body showering with mod I  Long Term Goal 3: Pt will complete dynamic balance task reaching to high/low levels with SBA and LRAD.     Therapy Time   Individual Concurrent Group Co-treatment   Time In 1030         Time Out 1130         Minutes 60         Timed Code Treatment Minutes: [address] [place]

## 2022-08-23 NOTE — DISCHARGE INSTR - COC
Continuity of Care Form    Patient Name: Toro Saldana   :  1935  MRN:  [id number]    Admit date:  2022  Discharge date: Anticipating DC on 2022    Code Status Order: Full Code   Advance Directives:     Admitting Physician:  Genoveva Trinidad MD  PCP: Issa Dalal MD    Discharging Nurse: Checo Spicer RN [facility] Unit/Room#: 4453/3146-06  Discharging Unit Phone Number: [phone]    Emergency Contact:   Extended Emergency Contact Information  Primary Emergency Contact: Leah Chand  Address: [address], [address], [address] Phone: [phone]  Mobile Phone: [phone]  Relation: Spouse   needed? No  Secondary Emergency Contact: [address] Phone: [phone]  Mobile Phone: [phone]  Relation: Child   needed?  No    Past Surgical History:  Past Surgical History:   Procedure Laterality Date    APPENDECTOMY      COLONOSCOPY      [address]      JOINT REPLACEMENT  2013    left shoulder    PACEMAKER PLACEMENT  10/13/2017    Dr Xiomara Griffiths at [facility]. 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 [facility] mild LAD disease  I25.10    Other chest pain R07.89    Atrial fibrillation with rapid ventricular response (HCC) I48.91    Syncope and collapse R55    COVID U07.1    NATANAEL (acute kidney injury) ([facility] N17.9    Atrial fibrillation with RVR (HCC) I48.91    Dementia with behavioral disturbance ([facility]) F03.91    Hallucinations R44.3    Syncope, unspecified syncope type R55    Ischemic cardiomyopathy I25.5       Isolation/Infection:   Isolation            No Isolation          Patient Infection Status       Infection Onset Added Last Indicated Last Indicated By Review Planned Expiration Resolved Resolved By    None active    Resolved    COVID-19 (Rule Out) 22 COVID-19 & Influenza Combo (Ordered)   22 Rule-Out Test Resulted    COVID-19 22 COVID-19, Rapid   22     COVID-19 (Rule Out) 22 COVID-19, Rapid (Ordered)   22 Rule-Out Test Resulted            Nurse Assessment:  Last Vital Signs: /72   Pulse (!) 111   Temp 98 °F (36.7 °C) (Oral)   Resp 16   Ht 5' 7\" (1.702 m)   Wt 155 lb 6.4 oz (70.5 kg)   SpO2 94%   BMI 24.34 kg/m²     Last documented pain score (0-10 scale): Pain Level: 0  Last Weight:   Wt Readings from Last 1 Encounters:   22 155 lb 6.4 oz (70.5 kg)     Mental Status:  alert    IV Access:  - None    Nursing Mobility/ADLs:  Walking   Assisted  Transfer  Assisted  Bathing  Assisted  Dressing  Assisted  Toileting  Assisted  Feeding  Independent  Med Admin  Assisted  Med Delivery   whole    Wound Care Documentation and Therapy:  Wound 22 Head Upper;Posterior;Right (Active)   Wound Etiology Traumatic 22 0715   Dressing/Treatment Open to air 22 0715   Wound Assessment Dry 08/23/22 0715   Drainage Amount None 08/23/22 0715   Juanita-wound Assessment Intact 08/23/22 0715   Number of days: 8        Elimination:  Continence: Bowel: Yes  Bladder: No  Urinary Catheter: None   Colostomy/Ileostomy/Ileal Conduit: No       Date of Last BM: ***    Intake/Output Summary (Last 24 hours) at 8/23/2022 0920  Last data filed at 8/23/2022 0753  Gross per 24 hour   Intake 840 ml   Output 450 ml   Net 390 ml     I/O last 3 completed shifts: In: 600 [P.O.:600]  Out: 750 [Urine:750]    Safety Concerns: At Risk for Falls    Impairments/Disabilities:      Vision and Hearing    Nutrition Therapy:  Current Nutrition Therapy:   - Oral Diet:  General    Routes of Feeding: Oral  Liquids: Thin Liquids  Daily Fluid Restriction: no  Last Modified Barium Swallow with Video (Video Swallowing Test): not done    Treatments at the Time of Hospital Discharge:   Respiratory Treatments: n/a  Oxygen Therapy:  is not on home oxygen therapy.   Ventilator:    - No ventilator support    Rehab Therapies: Physical Therapy, Occupational Therapy, and Speech/Language Therapy  Weight Bearing Status/Restrictions: No weight bearing restrictions  Other Medical Equipment (for information only, NOT a DME order):  wheelchair and walker  Other Treatments: n/a    Patient's personal belongings (please select all that are sent with patient):  Glasses    RN SIGNATURE:  Electronically signed by Rebeca Diaz RN on 8/27/22 at 11:01 AM EDT    CASE MANAGEMENT/SOCIAL WORK SECTION    Inpatient Status Date: 08/14/2022    Readmission Risk Assessment Score:  Readmission Risk              Risk of Unplanned Readmission:  22       Discharging to Facility/ [address]  [facility]   Skilled Nursing   [address] [facility] [address]     [address] [facility]   [phone]     / signature: Electronically signed by Gail Sanches RN on 8/26/22 at 11:12 AM EDT    PHYSICIAN SECTION    Prognosis: Good    Condition at Discharge: Stable    Rehab Potential (if transferring to Rehab): Good    Recommended Labs or Other Treatments After Discharge:   - Continue to drink plenty of fluids  - wear ADITYA hose  - get up slowly    Physician Certification: I certify the above information and transfer of Darrion Pantoja  is necessary for the continuing treatment of the diagnosis listed and that he requires [facility] for less 30 days.      Update Admission H&P: No change in H&P    PHYSICIAN SIGNATURE:  Electronically signed by Carmela Francis MD on 8/26/22 at 4:03 PM EDT

## 2022-08-23 NOTE — PROGRESS NOTES
Hospitalist Progress Note      PCP: Shavonne Arvizu MD    Date of Admission: 8/14/2022    Chief Complaint:   Dizziness    Hospital Course:   Sj Alas is an 82year old male with a past medical history significant for atrial fibrillation, s/p pacer, dementia, CKD, HTN, and HLD who presented as a transfer from [facility] on 8/9/22 after a presyncopal episode. He had a similar episode several days prior that resulted in a fall and scalp laceration that required staples. He is being treated for orthostatic hypotension. Cardiology is consulted. Echo revealed progressive left ventricular function. Patient was set for further workup with cardiac catheterization 8/12, however he had worsening mental status and procedure was canceled. Plan for outpatient cardiac catheterization. He was admitted to [facility] on 8/14/22 due to functional deficits below his baseline. Today he is seen in his room. He reports feeling dizzy with therapies. He also reports reflux and nausea. Nursing notes that he has been coughing up sputum. Patient's blood pressure is dropping to systolic of 58D when up with therapy. Subjective:     Patient doing better. He still has episodes of orthostatic hypotension after being up for a while. O2 sats ok. Dizziness associated with hypotension.      Medications:  Reviewed    Infusion Medications    dextrose       Scheduled Medications    fludrocortisone  0.1 mg Oral Daily    QUEtiapine  25 mg Oral Nightly    midodrine  10 mg Oral TID WC    atorvastatin  40 mg Oral Daily    enoxaparin  40 mg SubCUTAneous Daily    aspirin  81 mg Oral Daily    citalopram  10 mg Oral Daily    ferrous sulfate  325 mg Oral Daily    finasteride  5 mg Oral Daily    memantine  5 mg Oral BID    [Held by provider] tamsulosin  0.4 mg Oral Daily    pantoprazole  40 mg Oral QAM AC    fluticasone  2 spray Nasal Daily     PRN Meds: calcium carbonate, sodium chloride flush, polyethylene glycol, glucose, dextrose bolus **OR** dextrose bolus, glucagon (rDNA), dextrose, acetaminophen, diphenhydrAMINE, hydrALAZINE, ondansetron, traZODone, traMADol      Intake/Output Summary (Last 24 hours) at 8/23/2022 1243  Last data filed at 8/23/2022 0753  Gross per 24 hour   Intake 720 ml   Output 450 ml   Net 270 ml         Physical Exam Performed:    BP 84/60   Pulse 57   Temp 98 °F (36.7 °C) (Oral)   Resp 16   Ht 5' 7\" (1.702 m)   Wt 155 lb 6.4 oz (70.5 kg)   SpO2 94%   BMI 24.34 kg/m²     General appearance: No apparent distress, appears stated age and cooperative. HEENT: Blind right eye. Neck: Supple, with full range of motion. No jugular venous distention. Trachea midline. Respiratory:  Normal respiratory effort. Clear to auscultation, bilaterally without Rales/Wheezes/Rhonchi. Cardiovascular: Regular rate and rhythm with normal S1/S2 without murmurs, rubs or gallops. Abdomen: Soft, non-tender, non-distended with normal bowel sounds. Musculoskeletal: No clubbing, cyanosis or edema bilaterally. Full range of motion without deformity. Skin: Skin color, texture, turgor normal.  No rashes or lesions. Neurologic:  Neurovascularly intact without any focal sensory/motor deficits. Cranial nerves: II-XII intact, grossly non-focal.  Psychiatric: Alert and oriented, thought content appropriate, normal insight  Capillary Refill: Brisk,3 seconds, normal   Peripheral Pulses: +2 palpable, equal bilaterally       Labs:   Recent Labs     08/22/22  0628   WBC 8.7   HGB 14.6   HCT 44.7          Recent Labs     08/22/22  0628      K 4.8      CO2 29   BUN 35*   CREATININE 1.2   CALCIUM 9.1       No results for input(s): AST, ALT, BILIDIR, BILITOT, ALKPHOS in the last 72 hours. No results for input(s): INR in the last 72 hours. No results for input(s): Michell Kaska in the last 72 hours.     Urinalysis:      Lab Results   Component Value Date/Time    NITRU Negative 08/20/2022 03:45 PM    WBCUA 21-50 04/21/2022 02:20 PM

## 2022-08-23 NOTE — PROGRESS NOTES
Camila Colemaximilian  8/23/2022  [id number]    Chief Complaint: Syncope and collapse    Subjective:   No acute events overnight. Today Jose Enrique De La Cruz is seen in his room with therapy present. He reports that he is not currently feeling light headed. He denies any hallucinations or other acute complaints at this time. ROS: denies f/c, n/v, cp, sob    Objective:  Patient Vitals for the past 24 hrs:   BP Temp Temp src Pulse Resp SpO2 Weight   08/23/22 0848 133/72 -- -- (!) 111 -- 94 % --   08/23/22 0715 133/72 98 °F (36.7 °C) Oral 54 16 97 % --   08/23/22 0545 -- -- -- -- -- -- 155 lb 6.4 oz (70.5 kg)   08/22/22 2000 (!) 143/87 97.5 °F (36.4 °C) Oral 69 18 97 % --   08/22/22 1700 123/87 -- -- 100 17 -- --   08/22/22 1215 97/62 -- -- 64 16 -- --   08/22/22 1120 -- -- -- 84 -- -- --     Gen: No distress, pleasant. Seated up in chair  HEENT: Normocephalic, atraumatic. CV: extremities well perfused  Resp: No respiratory distress. On room air  Abd: Soft, nontender   Ext: No edema. Neuro: Alert, oriented, appropriately interactive. Speech fluent without aphasia  Psych: appropriate mood and affect    Wt Readings from Last 3 Encounters:   08/23/22 155 lb 6.4 oz (70.5 kg)   08/10/22 156 lb 1.6 oz (70.8 kg)   07/08/22 160 lb (72.6 kg)       Laboratory data:   Lab Results   Component Value Date    WBC 8.7 08/22/2022    HGB 14.6 08/22/2022    HCT 44.7 08/22/2022    MCV 89.4 08/22/2022     08/22/2022       Lab Results   Component Value Date/Time     08/22/2022 06:28 AM    K 4.8 08/22/2022 06:28 AM     08/22/2022 06:28 AM    CO2 29 08/22/2022 06:28 AM    BUN 35 08/22/2022 06:28 AM    CREATININE 1.2 08/22/2022 06:28 AM    GLUCOSE 68 08/22/2022 06:28 AM    CALCIUM 9.1 08/22/2022 06:28 AM        Therapy progress:  PT  Position Activity Restriction  Other position/activity restrictions: LUE IV, orthostatic (low BP).  compression stockings for OOB activity  Objective     Sit to Stand: Minimal Assistance  Stand to sit: Minimal Assistance  Bed to Chair: Unable to assess     OT  PT Equipment Recommendations  Other: TBD pending progress and increased functional mobility assessment-- anticipate RW and possible manual w/c. Pt states he has walker and cane at home,. Toilet - Technique: Ambulating  Equipment Used: Grab bars  Assessment        SLP  Current Diet : Regular             Body mass index is 24.34 kg/m². Assessment and Plan:  Jaida Streeter is an 82year old male with a past medical history significant for atrial fibrillation, s/p pacer, dementia, CKD, HTN, and HLD who presented as a transfer from [facility] on 8/9/22 after a presyncopal episode, treated for orthostatic hypotension. He was admitted to [facility] on 8/14/22 due to functional deficits below his baseline. Debility  - due to below  - PT, OT, ST     Orthostatic hypotension  - SBP dipping to 70s-80s when up with therapy, now improving   - Medicine following, appreciate assistance  - discontinued flomax, metoprolol  - s/p 500 mL bolus on 8/16 and 8/17  - ADITYA hose  - midodrine  - encourage fluids    AMS, resolved  - hallucinations of water dripping on 8/20, resolved. Does have dementia. Episode of AMS during acute stay attributed to medications  - UA unremarkable  - continue to monitor    Productive Cough, improved  - XR chest 8/15 negative for acute process     Paroxysmal Atrial Fibrillation  - monitor HR off of BB  - Eliquis discontinued per Medicine due to high fall risk     HFrEF  - daily weights, I/Os  - OP ischemic eval per Cardiology     CKD  - baseline Cr ~1.2  - NATANAEL resolved, Cr has trended down back to baseline  - encourage patient to drink fluids  - monitor     Dementia  - aricept discontinued due to concerns for polypharmacy  - namenda  - wean quietiapine as able     CAD  - statin  - follow up with Cards OP     BPH  - finasteride  - hold tamsulosin due to BP     Bowels: Schedule stool softener. Follow bowel movements.  Enema or suppository if needed. Services: home health PT, OT, [address] [place] [address] , nursing  EDOD: 8/26/22    Latasha Cui.  Sawyer Benoit MD 8/23/2022, 9:26 AM

## 2022-08-23 NOTE — PROGRESS NOTES
[facility]: ACUTE REHAB UNIT  SPEECH-LANGUAGE PATHOLOGY      [x] Daily  [] Weekly Care Conference Note  [] Discharge    Patient:Aroldo Chow      :1935  XGP:[id number]  Rehab Dx/Hx: Syncope and collapse [R55]    Precautions: falls  Home situation: Lives with wife. Questionable historian - reports independence with med management (granddaughter assists?) and yard work. Pt reports he and his wife share responsibility for finances. He also reports wife manages cooking, cleaning, laundry, and grocery shopping and that wife is still working full time as a teacher. ST Dx: [] Aphasia  [] Dysarthria  [] Apraxia   [x] Oropharyngeal dysphagia [x] Cognitive Impairment  [] Other:   Date of Admit: 2022  Room #: 0153/0153-01    Current functional status (updated daily):         Pt being seen for : [] Speech/Language Treatment  [] Dysphagia Treatment [x] Cognitive Treatment  [] Other:  Communication: [x]WFL  [] Aphasia  [] Dysarthria  [] Apraxia  [] Pragmatic Impairment [] Non-verbal  [] Hearing Loss  [] Other:   Cognition: [] WFL  [] Mild  [] Moderate  [x] Severe [] Unable to Assess  [] Other:  Memory: [] WFL  [] Mild  [] Moderate  [x] Severe [] Unable to Assess  [] Other:  Behavior: [x] Alert  [x] Cooperative  [x]  Pleasant  [] Confused  [] Agitated  [] Uncooperative  [] Distractible [] Motivated  [] Self-Limiting [] Anxious  [] Other:  Endurance:  [] Adequate for participation in SLP sessions  [x] Reduced overall  [] Lethargic  [] Other:  Safety: [] No concerns at this time  [x] Reduced insight into deficits  [x]  Reduced safety awareness [] Not following call light procedures  [] Unable to Assess  [] Other:    Current Diet Order:ADULT DIET;  Regular  ADULT ORAL NUTRITION SUPPLEMENT; Breakfast, Dinner; Standard High Calorie/High Protein Oral Supplement  Swallowing Precautions: upright for all intake, stay upright for at least 30 mins after intake, small bites/sips, alternate bites/sips, slow rate of intake, general GERD precautions, and general aspiration precautions        Date: 8/23/2022      Tx session 1  [id number] Tx session 2  All tx needs met in session 1   Total Timed Code Min 60    Total Treatment Minutes 60    Individual Treatment Minutes 60    Group Treatment Minutes 0    Co-Treat Minutes 0    Variance/Reason:  N/a    Pain Denies     Pain Intervention [] RN notified  [] Repositioned  [] Intervention offered and patient declined  [x] N/A  [] Other: [] RN notified  [] Repositioned  [] Intervention offered and patient declined  [] N/A  [] Other:   Subjective     Pt alert and oriented, cooperative and agreeable to participate in therapy. Pt seen sitting upright in bedside chair. Objective:  Goals     Dysphagia Goals:  Short-term Goals  Timeframe for Short-term Goals: 10 days; 08/24/2022    Goal 1: The patient will tolerate recommended diet with no clinical s/s of aspiration 5/5    Did not directly target. Pt intermittently observed sipping on TL via cup throughout tx session tolerating without any overt s/s of aspiration. Goal 2: The patient/caregiver will demonstrate understanding of compensatory swallow strategies, for improved swallow safety     Did not target. Cognitive-linguistic Goals:  Short-term Goals  Timeframe for Short-term Goals: 12 days (08/26/2022)    Goal 1: Pt will use visual aids / strategies to state orientation to time, place, situation with 80% acc given mod cues   Orientation concepts without use of calendar:    -month: March, Feb, Dec, max cues  -date: mod cues  -ashley: min cues  -year: indep  -place: [facility], max cues      Goal 2: Pt will complete graded recall tasks with 50% acc given mod cues   Did not target.      Goal 3: Pt will complete executive function tasks (e.g. meds, time, money, etc) with 70% acc given mod cues   General money questions:  -ex: are 10 dimes equal to 4 quarters?  -65% acc indep improving to 100% acc given mod cues    Time:  -writing the time shown on clock: 50% acc indep improving to 100% acc given mod cues  - positions on clock to match the times: 8% acc (1/2 correct) indep, improving to 50% acc given max cues       Goal 4: Pt will complete problem solving and thought organization tasks with 70% acc given mod cues   Overall significantly reduced thought organization noted during time tasks. Goal 5: Pt will complete viusal and verbal reasoning tasks with 70% acc given mod cues   Visual reasoning task:  -pt given two pictures and asked to identify at least 10 differences  -100% acc indep  -increased time required to complete this task      Other areas targeted: N/a    Education:   Edu provided re: orientation concepts, rationale for money/time tasks      Safety Devices: [x] Call light within reach  [x] Chair alarm activated  [] Bed alarm activated  [x] Other: AVASYS [] Call light within reach  [] Chair alarm activated  [] Bed alarm activated  [] Other:    Assessment: Pt pleasant and cooperative, agreeable to participate. Pt with improved LESLIE today compared to yesterday. Pt demonstrated increased difficulty with orientation concepts (stating it was March, Feb, Dec, max cues required) without acknowledging board and calendar in room for assist. Pt answered basic general money questions with 65% acc indep. Pt demonstrated increased difficulty with writing the time shown on a given clock, completing with 50% acc. Pt demonstrated with significant difficulty  positions on a clock given a specific time, completing with 8% acc. Reduced insight and thought organization noted during time tasks. Pt completed visual reasoning task (identifying 10 differences between two pictures) with 100% acc. Pt will continue to require 24 hour assist at d/c due to overall severity of cognitive deficits, inconsistent carryover, and reduced insight into deficits. Continue ST services and goals above. Plan: Continue as per plan of care.       Additional Information:     Barriers toward progress: Cognitive deficit, Limited safety awareness, and Limited insight into deficits  Discharge recommendations:  [] Home independently  [] Home with assistance [x]  24 hour supervision  [] ECF [] Other:   Continued Tx Upon Discharge: ? [x] Yes [] No [] TBD based on progress while on ARU [] Vital Stim indicated [] Other:   Estimated discharge date: 08/26/22    Interventions used this date:  [] Speech/Language Treatment  [] Instruction in HEP [] Group [] Dysphagia Treatment [x] Cognitive Treatment   [] Other: Total Time Breakdown / Charges    Time in Time out Total Time / units   Cognitive Tx 1330 1430 60 min/ 4 units    Speech Tx -- -- --   Dysphagia Tx -- -- --       Electronically Signed by     Jose Manuel BARBOSA CCC-SLP  Speech-Language Pathologist  [phone]

## 2022-08-23 NOTE — PLAN OF CARE
Pt free from falls at this time. Fall precautions in place at all times. Maintain bed in lowest position, wheels locked, personal items and call light  within reach. Encourage acceptance with assistance. Pt able and agreeable to contact staff for assistance/safety appropriately but displayed episode of getting up unattended. Avasys noted to place patient on list when camera becomes available.      Problem: Safety - Adult  Goal: Free from fall injury  8/22/2022 4918 by Tavo Mejia RN  Outcome: Not Progressing  8/22/2022 1419 by Carloz Lopez RN  Outcome: Progressing
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
## 2022-08-24 LAB
ANION GAP SERPL CALCULATED.3IONS-SCNC: 9 MMOL/L (ref 3–16)
BILIRUBIN URINE: NEGATIVE
BLOOD, URINE: NEGATIVE
BUN BLDV-MCNC: 26 MG/DL (ref 7–20)
CALCIUM SERPL-MCNC: 9.5 MG/DL (ref 8.3–10.6)
CHLORIDE BLD-SCNC: 100 MMOL/L (ref 99–110)
CLARITY: CLEAR
CO2: 30 MMOL/L (ref 21–32)
COLOR: YELLOW
CREAT SERPL-MCNC: 1.2 MG/DL (ref 0.8–1.3)
GFR AFRICAN AMERICAN: >60
GFR NON-AFRICAN AMERICAN: 58
GLUCOSE BLD-MCNC: 81 MG/DL (ref 70–99)
GLUCOSE URINE: NEGATIVE MG/DL
HCT VFR BLD CALC: 48.7 % (ref 40.5–52.5)
HEMOGLOBIN: 15.6 G/DL (ref 13.5–17.5)
KETONES, URINE: NEGATIVE MG/DL
LEUKOCYTE ESTERASE, URINE: NEGATIVE
MCH RBC QN AUTO: 29.2 PG (ref 26–34)
MCHC RBC AUTO-ENTMCNC: 32.1 G/DL (ref 31–36)
MCV RBC AUTO: 91 FL (ref 80–100)
MICROSCOPIC EXAMINATION: NORMAL
NITRITE, URINE: NEGATIVE
PDW BLD-RTO: 17 % (ref 12.4–15.4)
PH UA: 6 (ref 5–8)
PLATELET # BLD: 208 K/UL (ref 135–450)
PMV BLD AUTO: 7.6 FL (ref 5–10.5)
POTASSIUM REFLEX MAGNESIUM: 4.3 MMOL/L (ref 3.5–5.1)
PROTEIN UA: NEGATIVE MG/DL
RBC # BLD: 5.35 M/UL (ref 4.2–5.9)
SODIUM BLD-SCNC: 139 MMOL/L (ref 136–145)
SPECIFIC GRAVITY UA: 1.02 (ref 1–1.03)
URINE REFLEX TO CULTURE: NORMAL
URINE TYPE: NORMAL
UROBILINOGEN, URINE: 0.2 E.U./DL
WBC # BLD: 7.9 K/UL (ref 4–11)

## 2022-08-24 PROCEDURE — 97129 THER IVNTJ 1ST 15 MIN: CPT

## 2022-08-24 PROCEDURE — 80048 BASIC METABOLIC PNL TOTAL CA: CPT

## 2022-08-24 PROCEDURE — 36415 COLL VENOUS BLD VENIPUNCTURE: CPT

## 2022-08-24 PROCEDURE — 81003 URINALYSIS AUTO W/O SCOPE: CPT

## 2022-08-24 PROCEDURE — 85027 COMPLETE CBC AUTOMATED: CPT

## 2022-08-24 PROCEDURE — 92526 ORAL FUNCTION THERAPY: CPT

## 2022-08-24 PROCEDURE — 99222 1ST HOSP IP/OBS MODERATE 55: CPT | Performed by: INTERNAL MEDICINE

## 2022-08-24 PROCEDURE — 1280000000 HC REHAB R&B

## 2022-08-24 PROCEDURE — 97130 THER IVNTJ EA ADDL 15 MIN: CPT

## 2022-08-24 PROCEDURE — 6370000000 HC RX 637 (ALT 250 FOR IP): Performed by: INTERNAL MEDICINE

## 2022-08-24 PROCEDURE — 97535 SELF CARE MNGMENT TRAINING: CPT

## 2022-08-24 PROCEDURE — 6360000002 HC RX W HCPCS: Performed by: INTERNAL MEDICINE

## 2022-08-24 PROCEDURE — 97110 THERAPEUTIC EXERCISES: CPT

## 2022-08-24 PROCEDURE — 6370000000 HC RX 637 (ALT 250 FOR IP): Performed by: PHYSICAL MEDICINE & REHABILITATION

## 2022-08-24 RX ORDER — FLUDROCORTISONE ACETATE 0.1 MG/1
0.2 TABLET ORAL DAILY
Status: DISCONTINUED | OUTPATIENT
Start: 2022-08-25 | End: 2022-08-27 | Stop reason: HOSPADM

## 2022-08-24 RX ADMIN — FLUTICASONE PROPIONATE 2 SPRAY: 50 SPRAY, METERED NASAL at 08:37

## 2022-08-24 RX ADMIN — MIDODRINE HYDROCHLORIDE 10 MG: 5 TABLET ORAL at 17:57

## 2022-08-24 RX ADMIN — QUETIAPINE FUMARATE 25 MG: 25 TABLET ORAL at 20:25

## 2022-08-24 RX ADMIN — MIDODRINE HYDROCHLORIDE 10 MG: 5 TABLET ORAL at 12:48

## 2022-08-24 RX ADMIN — ACETAMINOPHEN 650 MG: 325 TABLET ORAL at 08:23

## 2022-08-24 RX ADMIN — FLUDROCORTISONE ACETATE 0.1 MG: 0.1 TABLET ORAL at 08:41

## 2022-08-24 RX ADMIN — TRAZODONE HYDROCHLORIDE 50 MG: 50 TABLET ORAL at 20:25

## 2022-08-24 RX ADMIN — ASPIRIN 81 MG: 81 TABLET, COATED ORAL at 08:25

## 2022-08-24 RX ADMIN — PANTOPRAZOLE SODIUM 40 MG: 40 TABLET, DELAYED RELEASE ORAL at 05:30

## 2022-08-24 RX ADMIN — ENOXAPARIN SODIUM 40 MG: 100 INJECTION SUBCUTANEOUS at 08:26

## 2022-08-24 RX ADMIN — FERROUS SULFATE TAB 325 MG (65 MG ELEMENTAL FE) 325 MG: 325 (65 FE) TAB at 08:24

## 2022-08-24 RX ADMIN — CITALOPRAM HYDROBROMIDE 10 MG: 20 TABLET ORAL at 08:25

## 2022-08-24 RX ADMIN — ATORVASTATIN CALCIUM 40 MG: 40 TABLET, FILM COATED ORAL at 08:24

## 2022-08-24 RX ADMIN — MIDODRINE HYDROCHLORIDE 10 MG: 5 TABLET ORAL at 08:25

## 2022-08-24 RX ADMIN — FINASTERIDE 5 MG: 5 TABLET, FILM COATED ORAL at 08:36

## 2022-08-24 RX ADMIN — MEMANTINE 5 MG: 5 TABLET ORAL at 20:25

## 2022-08-24 RX ADMIN — MEMANTINE 5 MG: 5 TABLET ORAL at 08:26

## 2022-08-24 ASSESSMENT — PAIN DESCRIPTION - LOCATION
LOCATION: NECK;HEAD
LOCATION: NECK;BACK

## 2022-08-24 ASSESSMENT — PAIN SCALES - GENERAL
PAINLEVEL_OUTOF10: 6
PAINLEVEL_OUTOF10: 6
PAINLEVEL_OUTOF10: 0

## 2022-08-24 ASSESSMENT — PAIN - FUNCTIONAL ASSESSMENT: PAIN_FUNCTIONAL_ASSESSMENT: PREVENTS OR INTERFERES SOME ACTIVE ACTIVITIES AND ADLS

## 2022-08-24 NOTE — PROGRESS NOTES
For patient safety, Visual Surveillance is in place. More frequent attempts to self ambulate this shift than prior. Confusion noted, redirectable at times. Patient noted not responding to Avasys alert to call for assistance. Patient moved to room closer to nurses station to allow for quicker response to alarms.
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
## 2022-08-24 NOTE — PROGRESS NOTES
Cardiology consult for CHF, persistent hypotension/ orthostatic BP despite midodrine and florinef  08/24/22 1315 Consult called to Dr Sara Schmidt

## 2022-08-24 NOTE — CARE COORDINATION
CM sent referral to [facility] with Jayden Kelsey per family preference. [facility] will review clinicals from [employer] and update writer with decision. Brannon Marcos RN    08/25 1000 CM spoke to [facility] with Jayden Kelsey. [facility] has accepted patient.  Brannon Marcos RN

## 2022-08-24 NOTE — PROGRESS NOTES
Hospitalist Progress Note      PCP: Consuelo Garcia MD    Date of Admission: 8/14/2022    Chief Complaint:   Dizziness    Hospital Course:   Severiano Hamming is an 82year old male with a past medical history significant for atrial fibrillation, s/p pacer, dementia, CKD, HTN, and HLD who presented as a transfer from [facility] on 8/9/22 after a presyncopal episode. He had a similar episode several days prior that resulted in a fall and scalp laceration that required staples. He is being treated for orthostatic hypotension. Cardiology is consulted. Echo revealed progressive left ventricular function. Patient was set for further workup with cardiac catheterization 8/12, however he had worsening mental status and procedure was canceled. Plan for outpatient cardiac catheterization. He was admitted to [facility] on 8/14/22 due to functional deficits below his baseline. Today he is seen in his room. He reports feeling dizzy with therapies. He also reports reflux and nausea. Nursing notes that he has been coughing up sputum. Patient's blood pressure is dropping to systolic of 00M when up with therapy. Subjective:     Patient sitting up. No new complaints. No fever or chills. He still has dizziness with walking.  BP low this AM.    Medications:  Reviewed    Infusion Medications    dextrose       Scheduled Medications    [START ON 8/25/2022] fludrocortisone  0.2 mg Oral Daily    QUEtiapine  25 mg Oral Nightly    midodrine  10 mg Oral TID WC    atorvastatin  40 mg Oral Daily    enoxaparin  40 mg SubCUTAneous Daily    aspirin  81 mg Oral Daily    citalopram  10 mg Oral Daily    ferrous sulfate  325 mg Oral Daily    finasteride  5 mg Oral Daily    memantine  5 mg Oral BID    [Held by provider] tamsulosin  0.4 mg Oral Daily    pantoprazole  40 mg Oral QAM AC    fluticasone  2 spray Nasal Daily     PRN Meds: calcium carbonate, sodium chloride flush, polyethylene glycol, glucose, dextrose bolus **OR** dextrose bolus, glucagon (rDNA), dextrose, acetaminophen, diphenhydrAMINE, hydrALAZINE, ondansetron, traZODone, traMADol      Intake/Output Summary (Last 24 hours) at 8/24/2022 1234  Last data filed at 8/23/2022 1700  Gross per 24 hour   Intake 240 ml   Output --   Net 240 ml         Physical Exam Performed:    /76   Pulse 90   Temp 98.1 °F (36.7 °C) (Oral)   Resp 16   Ht 5' 7\" (1.702 m)   Wt 155 lb 6.4 oz (70.5 kg)   SpO2 99%   BMI 24.34 kg/m²     General appearance: No apparent distress, appears stated age and cooperative. HEENT: Blind right eye. Neck: Supple, with full range of motion. No jugular venous distention. Trachea midline. Respiratory:  Normal respiratory effort. Clear to auscultation, bilaterally without Rales/Wheezes/Rhonchi. Cardiovascular: Regular rate and rhythm with normal S1/S2 without murmurs, rubs or gallops. Abdomen: Soft, non-tender, non-distended with normal bowel sounds. Musculoskeletal: No clubbing, cyanosis or edema bilaterally. Full range of motion without deformity. Skin: Skin color, texture, turgor normal.  No rashes or lesions. Neurologic:  Neurovascularly intact without any focal sensory/motor deficits. Cranial nerves: II-XII intact, grossly non-focal.  Psychiatric: Alert and oriented, thought content appropriate, normal insight  Capillary Refill: Brisk,3 seconds, normal   Peripheral Pulses: +2 palpable, equal bilaterally       Labs:   Recent Labs     08/22/22  0628 08/24/22  0643   WBC 8.7 7.9   HGB 14.6 15.6   HCT 44.7 48.7    208       Recent Labs     08/22/22  0628 08/24/22  0643    139   K 4.8 4.3    100   CO2 29 30   BUN 35* 26*   CREATININE 1.2 1.2   CALCIUM 9.1 9.5       No results for input(s): AST, ALT, BILIDIR, BILITOT, ALKPHOS in the last 72 hours. No results for input(s): INR in the last 72 hours. No results for input(s): Shelbie Kras in the last 72 hours.     Urinalysis:      Lab Results   Component Value Date/Time    NITRU Negative 08/20/2022 03:45 PM    WBCUA 21-50 04/21/2022 02:20 PM    BACTERIA 1+ 04/21/2022 02:20 PM    RBCUA 3-4 04/21/2022 02:20 PM    BLOODU Negative 08/20/2022 03:45 PM    SPECGRAV 1.020 08/20/2022 03:45 PM    GLUCOSEU Negative 08/20/2022 03:45 PM       Radiology:  XR CHEST 1 VIEW   Final Result   No acute abnormality identified. Large hiatal hernia. Assessment/Plan:    Active Hospital Problems    Diagnosis     Syncope and collapse [R55]      Orthostatic hypotension. Continue midodrine. Continue PT/OT. Improving. Monitor for hypertension at rest. Increased florinef. Monitor electrolytes. Monitor for worsening fluid overload with history of CHF. May need cardio consult for persistent orthostatic hypotension. Obstructive uropathy. Continue finasteride. Avoid Flomax due to orthostatic hypotension. Paroxysmal atrial fibrillation. Eliquis stopped. Monitor rates. May need to reevaluate [facility] after discharge. Cardiomyopathy. Continue ASA and statin. Last EF 35-40%. DVT Prophylaxis: Lovenox  Diet: ADULT DIET; Regular  ADULT ORAL NUTRITION SUPPLEMENT; Breakfast, Dinner; Standard High Calorie/High Protein Oral Supplement  Code Status: Full Code    PT/OT Eval Status:  Following    Dispo - Per Eduardo Lyons MD

## 2022-08-24 NOTE — CONSULTS
[address] [facility]   Cardiovascular Evaluation    PATIENT: Dennise May  DATE: 2022  MRN: [id number]  CSN: [id number]  : 1935    Primary Care Doctor/Referring provider: Lorna Donald MD, Heber Johnston MD     Reason for evaluation/Chief complaint:   Syncope    Subjective:    History of present illness on initial date of evaluation:   Dennise May is a 82 y.o. patient who presents as a referral from inpatient rehab for the evaluation fo recurrent syncope and orthostatic hypotension. The patient was actually seen by our service during this current admission for a similar complaint. At that time the patient was initially considered for possible repeat ischemic evaluation but found to be quite debilitated and have overall confusion. This was put on hold. The patient was subsequently transferred to the inpatient rehab unit. The patient has progressed well with increasing strength and functionality. Unfortunately, the patient has had episodes of orthostatic hypotension limiting his rehabilitation sessions. Patient has had blood pressure drops from normal to 38-71 systolic. This is associated with some overall weakness and lightheadedness. The patient had originally been seen in the emergency room at [facility] for the evaluation of recurrent syncope. Patient cannot recall the specifics of his initial symptom complex and presentation. Apparently, patient had a near syncopal event at home. Patient had a similar spell which resulted in a laceration of the skull requiring staples. This prompted the patient to be seen in the emergency room there. Due to his recurrent symptom complex, the patient was referred to [facility] for further cardiac evaluation. Denies any chest pain, shortness of breath, or palpitations. Unfortunately, he has developed progressive memory loss and dementia.  The patient is not able to give detailed information about the events of hospitalization due to their underlying medical illness. The majority of the information is taken from the chart, medical staff, and available family. Patient did undergo screening echocardiogram which demonstrates progressive left ventricular dysfunction. Patient did undergo assessment of left ventricular function several years ago and was normal and has had serial evaluations which have now determined modest left ventricular dysfunction with anterior wall motion abnormalities. He did have an angiogram in 2020 at which time he had modest coronary disease of the LAD which was evaluated with a fractional flow reserve. At that time FFR was within normal limits. Patient Active Problem List   Diagnosis    Pneumonia    Abnormal chest CT    Cough syncope    Tracheobronchomalacia    Hyperlipidemia    Hypertension    Chronic kidney disease, stage III (moderate) ([facility])    A-fib ([facility])    S/P placement of cardiac pacemaker    Abnormal echocardiogram    Dizziness    SOB (shortness of breath)    Abnormal cardiovascular stress test    Coronary artery disease involving native coronary artery of native heart without angina pectoris, 5/2020 abnormal stress test. [facility] mild LAD disease     Other chest pain    Atrial fibrillation with rapid ventricular response (HCC)    Syncope and collapse    COVID    NATANAEL (acute kidney injury) ([address]    Atrial fibrillation with RVR ([address]    Dementia with behavioral disturbance (HCC)    Hallucinations    Syncope, unspecified syncope type    Ischemic cardiomyopathy         Cardiac Testing: I have reviewed the findings below. EKG:  ECHO:   STRESS TEST:  CATH:  BYPASS:  VASCULAR:    Past Medical History:   has a past medical history of Atrial fibrillation ([address] Blind right eye, Chronic kidney disease, Dementia ([address] Hyperlipidemia, Hypertension, and Pneumonia.     Surgical History:   has a past surgical history that includes Tonsillectomy; Appendectomy; Colonoscopy; eye surgery; Upper gastrointestinal endoscopy; joint replacement (2013); hernia repair; and pacemaker placement (10/13/2017). Social History:   reports that he quit smoking about 47 years ago. He has a 10.00 pack-year smoking history. He has never used smokeless tobacco. He reports that he does not drink alcohol and does not use drugs. Family History:  No evidence for sudden cardiac death or premature CAD    Medications:  Reviewed and are listed in nursing record. and/or listed below  Outpatient Medications:  Prior to Admission medications    Medication Sig Start Date End Date Taking? Authorizing Provider   midodrine (PROAMATINE) 2.5 MG tablet Take 1 tablet by mouth in the morning and 1 tablet at noon and 1 tablet in the evening. Take with meals. Patient not taking: Reported on 8/17/2022 8/12/22   Leroy Dasilva MD   minocycline (MINOCIN;DYNACIN) 100 MG capsule Take 10 mg by mouth in the morning. Patient not taking: Reported on 8/17/2022 8/6/22   Historical Provider, MD   trospium (SANCTURA) 20 MG tablet Take 20 mg by mouth in the morning and 20 mg before bedtime.     Historical Provider, MD   omeprazole (PRILOSEC) 20 MG delayed release capsule TAKE 1 CAPSULE BY MOUTH 2 TIMES DAILY 8/8/22 9/7/22  Russ Lyles MD   apixaban (ELIQUIS) 5 MG TABS tablet TAKE 1/2 TABLET BY MOUTH 2 TIMES DAILY 8/8/22   Russ Lyles MD   metoprolol succinate (TOPROL XL) 25 MG extended release tablet TAKE 3 TABLETS BY MOUTH TWICE DAILY  Patient taking differently: Take 25 mg by mouth in the morning and 25 mg in the evening. 7/25/22   Presley Coleman APRN - CNP   QUEtiapine (SEROQUEL) 50 MG tablet Take 1 tablet by mouth nightly 7/23/22   Russ Lyles MD   famotidine (PEPCID) 20 MG tablet Take 1 tablet by mouth at bedtime 7/8/22   Russ Lyles MD   isosorbide mononitrate (IMDUR) 30 MG extended release tablet TAKE 1 TABLET BY MOUTH DAILY 6/1/22   Joanna Sousa MD memantine (NAMENDA) 5 MG tablet Take 1 tablet by mouth 2 times daily 5/20/22   Fracisco Valladares MD   donepezil (ARICEPT) 10 MG tablet Take 1 tablet by mouth daily 3/24/22   Historical Provider, MD   citalopram (CELEXA) 10 MG tablet TAKE 1 TABLET BY MOUTH DAILY 3/28/22   Fracisco Valladares MD   fluticasone Lydia Contras) 50 MCG/ACT nasal spray 2 sprays by Nasal route daily 2/1/22   Fracisco Valladares MD   Ferrous Sulfate (IRON) 325 (65 Fe) MG TABS Take by mouth daily     Historical Provider, MD   atorvastatin (LIPITOR) 20 MG tablet TAKE 1 TABLET BY MOUTH EVERY DAY 11/15/21   Fracisco Valladares MD   tamsulosin (FLOMAX) 0.4 MG capsule Take 0.4 mg by mouth daily    Historical Provider, MD   finasteride (PROSCAR) 5 MG tablet Take 5 mg by mouth daily. 3/4/15   Historical Provider, MD   aspirin 81 MG tablet Take 81 mg by mouth daily. Historical Provider, MD       In-patient schedule medications:   [START ON 8/25/2022] fludrocortisone  0.2 mg Oral Daily    QUEtiapine  25 mg Oral Nightly    midodrine  10 mg Oral TID WC    atorvastatin  40 mg Oral Daily    enoxaparin  40 mg SubCUTAneous Daily    aspirin  81 mg Oral Daily    citalopram  10 mg Oral Daily    ferrous sulfate  325 mg Oral Daily    finasteride  5 mg Oral Daily    memantine  5 mg Oral BID    [Held by provider] tamsulosin  0.4 mg Oral Daily    pantoprazole  40 mg Oral QAM AC    fluticasone  2 spray Nasal Daily         Infusion Medications:   dextrose           Allergies:  Sulfa antibiotics     Review of Systems:   14 point review of systems unable to be completed due to patient condition/cooperation. All available positives mentioned in history of present illness.        Physical Examination:    [unfilled]  /76   Pulse 90   Temp 98.1 °F (36.7 °C) (Oral)   Resp 16   Ht 5' 7\" (1.702 m)   Wt 155 lb 6.4 oz (70.5 kg)   SpO2 99%   BMI 24.34 kg/m²    Weight: 155 lb 6.4 oz (70.5 kg)     Wt Readings from Last 3 Encounters:   08/23/22 155 lb 6.4 oz (70.5 kg)   08/10/22 156 lb 1.6 oz (70.8 kg)   07/08/22 160 lb (72.6 kg)       Intake/Output Summary (Last 24 hours) at 8/24/2022 1341  Last data filed at 8/23/2022 1700  Gross per 24 hour   Intake 240 ml   Output --   Net 240 ml       General Appearance:  Alert, cooperative, no distress, appears stated age   Head:  Normocephalic, without obvious abnormality, atraumatic   Eyes:  PERRL, conjunctiva/corneas clear       Nose: Nares normal, no drainage or sinus tenderness   Throat: Lips, mucosa, and tongue normal   Neck: Supple, symmetrical, trachea midline, no adenopathy, thyroid: not enlarged, symmetric, no tenderness/mass/nodules, no carotid bruit or JVD       Lungs:   Clear to auscultation bilaterally, respirations unlabored   Chest Wall:  No tenderness or deformity   Heart:  irregular rhythm and normal rate; S1, S2 are normal; no murmur noted; no rub or gallop   Abdomen:   Soft, non-tender, bowel sounds active all four quadrants,  no masses, no organomegaly           Extremities: Extremities normal, atraumatic, no cyanosis or edema   Pulses: 2+ and symmetric   Skin: Skin color, texture, turgor normal, no rashes or lesions   Pysch: Normal mood and affect   Neurologic: Normal gross motor and sensory exam. Mild dementia. Labs  Recent Labs     08/22/22  0628 08/24/22  0643   WBC 8.7 7.9   HGB 14.6 15.6   HCT 44.7 48.7   MCV 89.4 91.0    208     Recent Labs     08/22/22  0628 08/24/22  0643   CREATININE 1.2 1.2   BUN 35* 26*    139   K 4.8 4.3    100   CO2 29 30     No results for input(s): INR, PROTIME in the last 72 hours. No results for input(s): TROPONINI in the last 72 hours. Invalid input(s): PRO-BNP  No results for input(s): CHOL, LDL, HDL in the last 72 hours. Invalid input(s): TG      Imaging:  I have reviewed the below testing personally and my interpretation is below.   EKG:  Atrial fibrillation with frequent ventricular-paced complexesRightward axisLow voltage QRSST & T wave abnormality, consider inferior ischemiaAbnormal ECGWhen compared with ECG of 21-APR-2022 12:17,Electronic ventricular pacemaker has replaced Atrial fibrillation    CXR:      Assessment:  82 y.o. patient with:    Principal Problem:    Syncope and collapse    Ischemic cardiomyopathy    Coronary artery disease involving native coronary artery of native heart without angina pectoris, 5/2020 abnormal stress test. [facility] mild LAD disease     Plan:   It appears the patient's clinical condition is consistent with orthostatic hypotension. Recommendations as below:   ~recommend increasing fluid intake   ~recommend liberalizing the patient's salt intake   ~compression stockings that are above the knee   ~modify daily activity with caution in rising from seated position, valsalva-like maneuvers, and heat exposure.   ~engage in an exercise program - cardiac rehab would be helpful as OP  Florinef and midodrine is already been started by the medical team      Medical Decision Making: The following items were considered in medical decision making:  Independent review of images  Review / order clinical lab tests  Review / order radiology tests  Decision to obtain old records  Review and summation of old records as accessed through [place] (a summary of my findings in these old records)          All questions and concerns were addressed to the patient/family. Alternatives to my treatment were discussed. The note was completed using EMR. Every effort was made to ensure accuracy; however, inadvertent computerized transcription errors may be present.     Whit Carney MD, [address], [place], [place]  [phone] [facility]  [phone] [facility]  8/24/2022  1:41 PM

## 2022-08-24 NOTE — PROGRESS NOTES
[facility]: ACUTE REHAB UNIT  SPEECH-LANGUAGE PATHOLOGY      [x] Daily  [] Weekly Care Conference Note  [] Discharge    Patient:Aroldo Schuster      :1935  M:[id number]  Rehab Dx/Hx: Syncope and collapse [R55]    Precautions: falls  Home situation: Lives with wife. Questionable historian - reports independence with med management (granddaughter assists?) and yard work. Pt reports he and his wife share responsibility for finances. He also reports wife manages cooking, cleaning, laundry, and grocery shopping and that wife is still working full time as a teacher. ST Dx: [] Aphasia  [] Dysarthria  [] Apraxia   [x] Oropharyngeal dysphagia [x] Cognitive Impairment  [] Other:   Date of Admit: 2022  Room #: 0156/0156-01    Current functional status (updated daily):         Pt being seen for : [] Speech/Language Treatment  [] Dysphagia Treatment [x] Cognitive Treatment  [] Other:  Communication: [x]WFL  [] Aphasia  [] Dysarthria  [] Apraxia  [] Pragmatic Impairment [] Non-verbal  [] Hearing Loss  [] Other:   Cognition: [] WFL  [] Mild  [] Moderate  [x] Severe [] Unable to Assess  [] Other:  Memory: [] WFL  [] Mild  [] Moderate  [x] Severe [] Unable to Assess  [] Other:  Behavior: [x] Alert  [x] Cooperative  [x]  Pleasant  [] Confused  [] Agitated  [] Uncooperative  [] Distractible [] Motivated  [] Self-Limiting [] Anxious  [] Other:  Endurance:  [] Adequate for participation in SLP sessions  [x] Reduced overall  [] Lethargic  [] Other:  Safety: [] No concerns at this time  [x] Reduced insight into deficits  [x]  Reduced safety awareness [] Not following call light procedures  [] Unable to Assess  [] Other:    Current Diet Order:ADULT DIET;  Regular  ADULT ORAL NUTRITION SUPPLEMENT; Breakfast, Dinner; Standard High Calorie/High Protein Oral Supplement  Swallowing Precautions: upright for all intake, stay upright for at least 30 mins after intake, small bites/sips, alternate bites/sips, slow rate of intake, general GERD precautions, and general aspiration precautions        Date: 8/24/2022      Tx session 1  1453-5816 Tx session 2  All tx needs met in session 1   Total Timed Code Min 10    Total Treatment Minutes 60    Individual Treatment Minutes 60    Group Treatment Minutes 0    Co-Treat Minutes 0    Variance/Reason:  N/a    Pain Denies     Pain Intervention [] RN notified  [] Repositioned  [] Intervention offered and patient declined  [x] N/A  [] Other: [] RN notified  [] Repositioned  [] Intervention offered and patient declined  [] N/A  [] Other:   Subjective     Pt alert and oriented, cooperative and agreeable to participate in therapy. Pt seen sitting upright in bedside chair. Objective:  Goals     Dysphagia Goals:  Short-term Goals  Timeframe for Short-term Goals: 10 days; 08/24/2022    Goal 1: The patient will tolerate recommended diet with no clinical s/s of aspiration 5/5    TL via cup:  -no overt s/s of aspiration  - no coughing, wet vocal quality, or throat clearing observed    Jimmie Kind crackers:  -adequate mastication, A-P transit, minimal residue in oral cavity that pt was able to clear with liquid wash  -no s/s of aspiration    Goal met 08/24/22. Pt tolerating IDDSI level 7 regular solids with thin liquids meds whole with water as LRD. Goal 2: The patient/caregiver will demonstrate understanding of compensatory swallow strategies, for improved swallow safety     Pt able to demonstrate appropriate use and understanding of safe swallow strategies during all PO trials today. Goal met 08/24/22.        Cognitive-linguistic Goals:  Short-term Goals  Timeframe for Short-term Goals: 12 days (08/26/2022)    Goal 1: Pt will use visual aids / strategies to state orientation to time, place, situation with 80% acc given mod cues   Orientation:  -despite max cues provided to view board in room and calendar, pt continued to be disoriented throughout entire duration of tx session  -at first pt stating he was at [employer] in [place], then he stated he was in a school. When SLP attempted to redirect pt that he was in a hospital, he became increasingly frustrated. -month: June, mod cues  -date: mod cues  -ashley: min cues  -year: 2002, max cues  -place: max cues     Pt attempted several times to get up and \"go home\" during tx session      Goal 2: Pt will complete graded recall tasks with 50% acc given mod cues   Functional recall/mental manipulation task:  -50% acc given mod cues improving to 100% acc given max cues       Goal 3: Pt will complete executive function tasks (e.g. meds, time, money, etc) with 70% acc given mod cues   Did not target. Goal 4: Pt will complete problem solving and thought organization tasks with 70% acc given mod cues   Thought organization task associated with recall (see goal 2 above)  -pt given three words and asked to rank them in order that they occur  -ex: Sept, May, Nov= May, Sept, Nov  -50% acc given mod cues improving to 100% acc given max cues      Goal 5: Pt will complete viusal and verbal reasoning tasks with 70% acc given mod cues   Indirectly targeted. Pt with difficulty reasoning and rationalizing with orientation concepts, why he was in the hospital, etc.       Other areas targeted: N/a    Education:   Edu provided re: importance of orientation concepts, compensatory strategies, safe swallow strategies      Safety Devices: [x] Call light within reach  [x] Chair alarm activated  [] Bed alarm activated  [x] Other: AVASYS [] Call light within reach  [] Chair alarm activated  [] Bed alarm activated  [] Other:    Assessment: Pt initially pleasant and cooperative, agreeable to participate. Pt disoriented throughout entire duration of tx session, and when SLP attempted to redirect pt that he was in the hospital, pt became increasingly frustrated. Pt required max cues for orientation to place and year, mod cues for month and date.  Pt difficulty with reasoning and rationalizing with SLP today. Pt completed functional recall/thought organization task with 50% acc given mod cues improving to 100% acc given max cues. Pt with inattention to task, benefiting from frequent redirection. Pt stated he slept 2 hours last night. Pt will require 24 hour assist at d/c due to overall severity and inconsistency of cognitive deficits and reduced insight negatively impacting safety and independence. Pt has met all dysphagia goals- tolerating IDDSI Level 7 regular solids with thin liquids meds whole with water as LRD. Continue goals above. Plan: Continue as per plan of care. Additional Information:     Barriers toward progress: Cognitive deficit, Limited safety awareness, and Limited insight into deficits  Discharge recommendations:  [] Home independently  [] Home with assistance [x]  24 hour supervision  [] ECF [] Other:   Continued Tx Upon Discharge: ? [x] Yes [] No [] TBD based on progress while on ARU [] Vital Stim indicated [] Other:   Estimated discharge date: 08/26/22    Interventions used this date:  [] Speech/Language Treatment  [] Instruction in HEP [] Group [x] Dysphagia Treatment [x] Cognitive Treatment   [] Other: Total Time Breakdown / Charges    Time in Time out Total Time / units   Cognitive Tx 1000 1050 50 min/ 3 units    Speech Tx -- -- --   Dysphagia Tx 1050 1100 10 min/ 1 unit        Electronically Signed by     Severa Rex. A CCC-SLP  Speech-Language Pathologist  [id number]

## 2022-08-24 NOTE — PROGRESS NOTES
Occupational Therapy  Facility/Department: [facility]  Rehabilitation Occupational Therapy Daily Treatment Note    Date: 22  Patient Name: Estela Lara       Room: 8433/2221-82  MRN: [id number]  Account: [de-identified]   : 1935  (82 y.o.) Gender: male                    Past Medical History:  has a past medical history of Atrial fibrillation ([facility] [address] Blind right eye, Chronic kidney disease, Dementia ([facility] [address] Hyperlipidemia, Hypertension, and Pneumonia. Past Surgical History:   has a past surgical history that includes Tonsillectomy; Appendectomy; Colonoscopy; eye surgery; Upper gastrointestinal endoscopy; joint replacement (); hernia repair; and pacemaker placement (10/13/2017). Restrictions  Restrictions/Precautions: Fall Risk;General Precautions  Implants present? : Pacemaker  Other position/activity restrictions: LUE IV, orthostatic (low BP). compression stockings for OOB activity    Subjective  Subjective: Pt in recliner, spouse present for family training, pleasant but confused, \"How much are they paying them? They have to live somewhere while they are working here? \" no pain. /92, , O2 sats 96% on RA. Restrictions/Precautions: Fall Risk;General Precautions             Objective     Cognition  Overall Cognitive Status: Exceptions  Arousal/Alertness: Appropriate responses to stimuli  Following Commands: Follows one step commands with increased time; Follows one step commands with repetition  Attention Span: Attends with cues to redirect; Difficulty attending to directions; Difficulty dividing attention  Memory: Decreased short term memory;Decreased recall of precautions;Decreased recall of recent events;Decreased long term memory  Safety Judgement: Decreased awareness of need for safety;Decreased awareness of need for assistance  Problem Solving: Decreased awareness of errors;Assistance required to identify errors made;Assistance required to generate solutions;Assistance required to correct errors made  Insights: Decreased awareness of deficits  Initiation: Requires cues for some  Sequencing: Requires cues for all  Orientation  Overall Orientation Status: Impaired  Orientation Level: Disoriented to time;Oriented to person;Oriented to place; Disoriented to situation         ADL  Grooming/Oral Hygiene  Assistance Level: Verbal cues; Set-up; Moderate assistance  Skilled Clinical Factors: for balance while standing at sink to wash hands, mod VCs for sequencing through task  Upper Extremity Dressing  Assistance Level: Maximum assistance  Skilled Clinical Factors: to don abdominal binder, poor understanding  Lower Extremity Dressing  Assistance Level: Moderate assistance  Skilled Clinical Factors: increased time to don brief/pants, max VCs, min A for balance in stance while pulling pants up due to posterior lean  Putting On/Taking Off Footwear  Assistance Level: Moderate assistance  Skilled Clinical Factors: assist to fully don thigh high ADITYA hose; mod VCs  Toileting  Assistance Level: Moderate assistance  Skilled Clinical Factors: for balance during clothing management  Toilet Transfers  Technique: Stand step  Equipment: Standard toilet  Additional Factors: Verbal cues; With handrails; Increased time to complete  Assistance Level: Moderate assistance          Functional Mobility  Device: Rolling walker  Activity: To/From bathroom  Assistance Level: Moderate assistance  Skilled Clinical Factors: with RW  Transfers  Surface: To chair with arms;From chair with arms;Standard toilet; Wheelchair  Additional Factors: Verbal cues; With handrails;Hand placement cues; Increased time to complete  Device: Walker  Sit to Stand  Assistance Level: Moderate assistance  Skilled Clinical Factors: cues for hand placement  Stand to Sit  Assistance Level: Maximum assistance  Skilled Clinical Factors: cues for hand placement  Bed To/From Chair  Technique: Stand step  Assistance Level:  Moderate assistance Assessment  Assessment  Assessment: Pt agreeable to OT session with cues and reorientation. Pt and spouse educated on use of thigh high ADITYA hose and abdominal binder with pt requiring increased time and cues for ADITYA hose and assist to fully don over heel and max A for abdominal binder. Pt's spouse educated and encouraged to assist with donning ADITYA hose but unable to pull ADITYA hose around heel. Pt's spouse educated on hand placement for sit<>stands and safety with transfers. Pt required mod/max A for turning and sit<>stands this date due to posterior lean and poor right reactions. Pt with much increased confusion this date and poor command following, attempting to walk into furniture instead of turning to face sink. Pt tachy and irregular HR at end of session with /62, HR ~130, and O2 sats at 99%. RN in room on departure and PT taking over for family training. Pt's spouse states he cannot come home as they do not have 24HR assistance at home. Continue POC. Activity Tolerance: Treatment limited secondary to medical complications; Patient limited by endurance;Treatment limited secondary to decreased cognition;Patient limited by fatigue  Discharge Recommendations: Subacute/Skilled Nursing Facility  OT Equipment Recommendations  Equipment Needed: No  Other: defer to next level of care  Safety Devices  Safety Devices in place: Yes  Type of devices: Gait belt;Nurse notified; Patient at risk for falls; Left in chair (left with PT)    Patient Education  Education  Education Given To: Family; Patient  Education Provided: Role of Therapy; ADL Function;Plan of Care;DME/Home Modifications;Transfer Training;Precautions; Safety; Fall Prevention Strategies; Mobility Training;Equipment; Family Education;Cognition  Education Provided Comments: role of OT, need for checking BP, purpose of ADITYA hose/abdominal binder, BUE ther ex, safety with transfers, hand placement for sit>stands, safety with ADLs,  Education Method: Demonstration;Verbal  Barriers to Learning: Cognition  Education Outcome: Continued education needed; Unable to verbalize; Unable to demonstrate understanding  Skilled Clinical Factors: Family verbalized understanding but not willing to assist physically and stated unable to provide 24HR assist at home    Plan  Plan  Times per Week: 5 of 7 days  Current Treatment Recommendations: Strengthening;Balance training;Functional mobility training; Endurance training;Patient/Caregiver education & training;Equipment evaluation, education, & procurement;Self-Care / ADL; Safety education & training    Goals  Short Term Goals  Time Frame for Short term goals: 1 week (by 8/21 /22)  Short Term Goal 1: Pt will complete LBD with SPV, progressing. Pt continues to require SBA/CGA for LB dressing. Short Term Goal 2: Pt will tolerate x 5 minutes of standing for improved activity azeem. GOAL MET 8/17/22 Pt tolerated more than 5 minutes of standing activity. Short Term Goal 3: Pt will complete toileting with SPV. progressing. Pt continues to require CGA/min A for toileting. Short Term Goal 4: Pt will complete in room mobility and transfers with LRAD with SPV. progressing. Pt continues to require SBA/CGA for mobility in room and functional transfers. Long Term Goals  Time Frame for Long term goals : 14 days by 8/28/22  Long Term Goal 1: Pt will complete total body dressing with mod I  Long Term Goal 2: Pt will complete total body showering with mod I  Long Term Goal 3: Pt will complete dynamic balance task reaching to high/low levels with SBA and LRAD.     Therapy Time   Individual Concurrent Group Co-treatment   Time In 1330         Time Out 1430         Minutes 60         Timed Code Treatment Minutes: 16 [facility] [place], [place]

## 2022-08-24 NOTE — PROGRESS NOTES
Snow Little  8/24/2022  [id number]    Chief Complaint: Syncope and collapse    Subjective:   No acute events overnight. Today Cassidy eRyes is seen in his room, working with therapy. He continues to feel light headed when up with therapies despite increasing medications. Medicine is following and has increased his fluorinef. Cardiology consulted as well. Concerns about patient discharging home if his symptoms are not improved and if he does not have 24 hour support. Nursing and therapy also note worsening confusion. Obtaining UA. ROS: denies f/c, n/v, cp, sob    Objective:  Patient Vitals for the past 24 hrs:   BP Temp Temp src Pulse Resp SpO2   08/24/22 1053 114/76 -- -- -- -- --   08/24/22 0919 (!) 88/55 -- -- 90 -- 99 %   08/24/22 0815 122/76 98.1 °F (36.7 °C) Oral 86 16 96 %   08/23/22 1957 (!) 133/90 98 °F (36.7 °C) Oral 84 16 94 %   08/23/22 1545 127/76 -- -- 91 -- --     Gen: No distress, pleasant. Seated on edge of bed  HEENT: Normocephalic, atraumatic. CV: extremities well perfused  Resp: No respiratory distress. On room air  Abd: Soft, nontender   Ext: No edema. Neuro: Alert, oriented, appropriately interactive. Delayed processing  Psych: appropriate mood and affect    Wt Readings from Last 3 Encounters:   08/23/22 155 lb 6.4 oz (70.5 kg)   08/10/22 156 lb 1.6 oz (70.8 kg)   07/08/22 160 lb (72.6 kg)       Laboratory data:   Lab Results   Component Value Date    WBC 7.9 08/24/2022    HGB 15.6 08/24/2022    HCT 48.7 08/24/2022    MCV 91.0 08/24/2022     08/24/2022       Lab Results   Component Value Date/Time     08/24/2022 06:43 AM    K 4.3 08/24/2022 06:43 AM     08/24/2022 06:43 AM    CO2 30 08/24/2022 06:43 AM    BUN 26 08/24/2022 06:43 AM    CREATININE 1.2 08/24/2022 06:43 AM    GLUCOSE 81 08/24/2022 06:43 AM    CALCIUM 9.5 08/24/2022 06:43 AM        Therapy progress:  PT  Position Activity Restriction  Other position/activity restrictions: LUE IV, orthostatic (low BP). compression stockings for OOB activity  Objective     Sit to Stand: Minimal Assistance  Stand to sit: Minimal Assistance  Bed to Chair: Unable to assess     OT  PT Equipment Recommendations  Other: TBD pending progress and increased functional mobility assessment-- anticipate RW and possible manual w/c. Pt states he has walker and cane at home,. Toilet - Technique: Ambulating  Equipment Used: Grab bars  Assessment        SLP  Current Diet : Regular             Body mass index is 24.34 kg/m². Assessment and Plan:  Maral Vail is an 82year old male with a past medical history significant for atrial fibrillation, s/p pacer, dementia, CKD, HTN, and HLD who presented as a transfer from [facility] on 8/9/22 after a presyncopal episode, treated for orthostatic hypotension. He was admitted to [facility] on 8/14/22 due to functional deficits below his baseline. Debility  - due to below  - PT, OT, ST     Orthostatic hypotension  - SBP dipping to 70s-80s when up with therapy, now improving   - Medicine following, appreciate assistance  - discontinued flomax, metoprolol  - s/p 500 mL bolus on 8/16 and 8/17  - ADITYA hose  - midodrine  - fluorinef increased per Medicine  - encourage fluids, monitor with history of HF  - Cardiology consulted, recommend continued medical management    AMS  - hallucinations of water dripping on 8/20, resolved. Does have dementia.  Episode of AMS during acute stay attributed to medications  - now with worsening confusion  - no leukocytosis  - obtain UA    Productive Cough, improved  - XR chest 8/15 negative for acute process     Paroxysmal Atrial Fibrillation  - monitor HR off of BB  - Eliquis discontinued per Medicine due to high fall risk     HFrEF  - daily weights, I/Os  - OP ischemic eval per Cardiology     CKD  - baseline Cr ~1.2  - NATANAEL resolved, Cr has trended down back to baseline  - encourage patient to drink fluids  - monitor     Dementia  - aricept discontinued due to concerns for polypharmacy  - namenda  - wean quietiapine as able     CAD  - statin  - follow up with Cards OP     BPH  - finasteride  - hold tamsulosin due to BP     Bowels: Schedule stool softener. Follow bowel movements. Enema or suppository if needed. Services: home health versus SNF  EDOD: 8/26/22 pending medical condition    Interdisciplinary team conference was held today with entire rehab treatment team including PT, OT, SLP, Dietician, RN, and SW. Discussion focused on progress toward rehab goals and discharge planning. Barriers: orthostatic hypotension, dizziness, caregiver availability. Total treatment time >35 min with greater than 50% spent in care coordination. [facility]  Kristin Hinton MD 8/24/2022, 2:10 PM

## 2022-08-25 ENCOUNTER — APPOINTMENT (OUTPATIENT)
Dept: GENERAL RADIOLOGY | Age: 87
DRG: 948 | End: 2022-08-25
Attending: PHYSICAL MEDICINE & REHABILITATION
Payer: MEDICARE

## 2022-08-25 ENCOUNTER — APPOINTMENT (OUTPATIENT)
Dept: CT IMAGING | Age: 87
DRG: 948 | End: 2022-08-25
Attending: PHYSICAL MEDICINE & REHABILITATION
Payer: MEDICARE

## 2022-08-25 LAB
AMMONIA: 22 UMOL/L (ref 16–60)
ANION GAP SERPL CALCULATED.3IONS-SCNC: 8 MMOL/L (ref 3–16)
BASE EXCESS VENOUS: 3.1 MMOL/L (ref -3–3)
BASOPHILS ABSOLUTE: 0.1 K/UL (ref 0–0.2)
BASOPHILS RELATIVE PERCENT: 0.7 %
BILIRUBIN URINE: NEGATIVE
BLOOD, URINE: NEGATIVE
BUN BLDV-MCNC: 23 MG/DL (ref 7–20)
CALCIUM SERPL-MCNC: 10.2 MG/DL (ref 8.3–10.6)
CARBOXYHEMOGLOBIN: 2.6 % (ref 0–1.5)
CHLORIDE BLD-SCNC: 98 MMOL/L (ref 99–110)
CLARITY: CLEAR
CO2: 29 MMOL/L (ref 21–32)
COLOR: YELLOW
CORTISOL - AM: 17.8 UG/DL (ref 4.3–22.4)
CREAT SERPL-MCNC: 1.4 MG/DL (ref 0.8–1.3)
EKG ATRIAL RATE: 197 BPM
EKG DIAGNOSIS: NORMAL
EKG Q-T INTERVAL: 384 MS
EKG QRS DURATION: 84 MS
EKG QTC CALCULATION (BAZETT): 474 MS
EKG R AXIS: 81 DEGREES
EKG T AXIS: -51 DEGREES
EKG VENTRICULAR RATE: 92 BPM
EOSINOPHILS ABSOLUTE: 0.1 K/UL (ref 0–0.6)
EOSINOPHILS RELATIVE PERCENT: 0.6 %
GFR AFRICAN AMERICAN: 58
GFR NON-AFRICAN AMERICAN: 48
GLUCOSE BLD-MCNC: 93 MG/DL (ref 70–99)
GLUCOSE BLD-MCNC: 94 MG/DL (ref 70–99)
GLUCOSE URINE: NEGATIVE MG/DL
HCO3 VENOUS: 27.9 MMOL/L (ref 23–29)
HCT VFR BLD CALC: 44.9 % (ref 40.5–52.5)
HEMOGLOBIN: 15 G/DL (ref 13.5–17.5)
KETONES, URINE: NEGATIVE MG/DL
LEUKOCYTE ESTERASE, URINE: NEGATIVE
LYMPHOCYTES ABSOLUTE: 0.9 K/UL (ref 1–5.1)
LYMPHOCYTES RELATIVE PERCENT: 11.6 %
MAGNESIUM: 2.1 MG/DL (ref 1.8–2.4)
MCH RBC QN AUTO: 30.1 PG (ref 26–34)
MCHC RBC AUTO-ENTMCNC: 33.3 G/DL (ref 31–36)
MCV RBC AUTO: 90.2 FL (ref 80–100)
METHEMOGLOBIN VENOUS: 0.3 %
MICROSCOPIC EXAMINATION: NORMAL
MONOCYTES ABSOLUTE: 0.9 K/UL (ref 0–1.3)
MONOCYTES RELATIVE PERCENT: 10.9 %
NEUTROPHILS ABSOLUTE: 6.2 K/UL (ref 1.7–7.7)
NEUTROPHILS RELATIVE PERCENT: 76.2 %
NITRITE, URINE: NEGATIVE
O2 SAT, VEN: 74 %
O2 THERAPY: ABNORMAL
PCO2, VEN: 43.1 MMHG (ref 40–50)
PDW BLD-RTO: 16.7 % (ref 12.4–15.4)
PERFORMED ON: NORMAL
PH UA: 7 (ref 5–8)
PH VENOUS: 7.43 (ref 7.35–7.45)
PLATELET # BLD: 208 K/UL (ref 135–450)
PMV BLD AUTO: 7.4 FL (ref 5–10.5)
PO2, VEN: 39.8 MMHG (ref 25–40)
POTASSIUM REFLEX MAGNESIUM: 4.3 MMOL/L (ref 3.5–5.1)
PROCALCITONIN: 0.14 NG/ML (ref 0–0.15)
PROTEIN UA: NEGATIVE MG/DL
RBC # BLD: 4.98 M/UL (ref 4.2–5.9)
SODIUM BLD-SCNC: 135 MMOL/L (ref 136–145)
SPECIFIC GRAVITY UA: 1.01 (ref 1–1.03)
TCO2 CALC VENOUS: 29 MMOL/L
TROPONIN: <0.01 NG/ML
TSH REFLEX FT4: 2.05 UIU/ML (ref 0.27–4.2)
URINE REFLEX TO CULTURE: NORMAL
URINE TYPE: NORMAL
UROBILINOGEN, URINE: 0.2 E.U./DL
WBC # BLD: 8.1 K/UL (ref 4–11)

## 2022-08-25 PROCEDURE — 97116 GAIT TRAINING THERAPY: CPT

## 2022-08-25 PROCEDURE — 80048 BASIC METABOLIC PNL TOTAL CA: CPT

## 2022-08-25 PROCEDURE — 83735 ASSAY OF MAGNESIUM: CPT

## 2022-08-25 PROCEDURE — 82533 TOTAL CORTISOL: CPT

## 2022-08-25 PROCEDURE — 70450 CT HEAD/BRAIN W/O DYE: CPT

## 2022-08-25 PROCEDURE — 2580000003 HC RX 258: Performed by: INTERNAL MEDICINE

## 2022-08-25 PROCEDURE — 6360000002 HC RX W HCPCS: Performed by: INTERNAL MEDICINE

## 2022-08-25 PROCEDURE — 71045 X-RAY EXAM CHEST 1 VIEW: CPT

## 2022-08-25 PROCEDURE — 85025 COMPLETE CBC W/AUTO DIFF WBC: CPT

## 2022-08-25 PROCEDURE — 6370000000 HC RX 637 (ALT 250 FOR IP): Performed by: INTERNAL MEDICINE

## 2022-08-25 PROCEDURE — 6370000000 HC RX 637 (ALT 250 FOR IP): Performed by: PHYSICAL MEDICINE & REHABILITATION

## 2022-08-25 PROCEDURE — 93005 ELECTROCARDIOGRAM TRACING: CPT | Performed by: STUDENT IN AN ORGANIZED HEALTH CARE EDUCATION/TRAINING PROGRAM

## 2022-08-25 PROCEDURE — 1280000000 HC REHAB R&B

## 2022-08-25 PROCEDURE — 81003 URINALYSIS AUTO W/O SCOPE: CPT

## 2022-08-25 PROCEDURE — 82803 BLOOD GASES ANY COMBINATION: CPT

## 2022-08-25 PROCEDURE — 84145 PROCALCITONIN (PCT): CPT

## 2022-08-25 PROCEDURE — 97530 THERAPEUTIC ACTIVITIES: CPT

## 2022-08-25 PROCEDURE — 84484 ASSAY OF TROPONIN QUANT: CPT

## 2022-08-25 PROCEDURE — 82140 ASSAY OF AMMONIA: CPT

## 2022-08-25 PROCEDURE — 84443 ASSAY THYROID STIM HORMONE: CPT

## 2022-08-25 PROCEDURE — 36415 COLL VENOUS BLD VENIPUNCTURE: CPT

## 2022-08-25 PROCEDURE — 93010 ELECTROCARDIOGRAM REPORT: CPT | Performed by: INTERNAL MEDICINE

## 2022-08-25 RX ORDER — SODIUM CHLORIDE 9 MG/ML
INJECTION, SOLUTION INTRAVENOUS ONCE
Status: COMPLETED | OUTPATIENT
Start: 2022-08-25 | End: 2022-08-25

## 2022-08-25 RX ADMIN — FINASTERIDE 5 MG: 5 TABLET, FILM COATED ORAL at 09:04

## 2022-08-25 RX ADMIN — FLUDROCORTISONE ACETATE 0.2 MG: 0.1 TABLET ORAL at 09:05

## 2022-08-25 RX ADMIN — MIDODRINE HYDROCHLORIDE 10 MG: 5 TABLET ORAL at 09:04

## 2022-08-25 RX ADMIN — ATORVASTATIN CALCIUM 40 MG: 40 TABLET, FILM COATED ORAL at 09:05

## 2022-08-25 RX ADMIN — ACETAMINOPHEN 650 MG: 325 TABLET ORAL at 09:03

## 2022-08-25 RX ADMIN — FERROUS SULFATE TAB 325 MG (65 MG ELEMENTAL FE) 325 MG: 325 (65 FE) TAB at 09:04

## 2022-08-25 RX ADMIN — PANTOPRAZOLE SODIUM 40 MG: 40 TABLET, DELAYED RELEASE ORAL at 09:03

## 2022-08-25 RX ADMIN — CITALOPRAM HYDROBROMIDE 10 MG: 20 TABLET ORAL at 09:04

## 2022-08-25 RX ADMIN — MEMANTINE 5 MG: 5 TABLET ORAL at 09:03

## 2022-08-25 RX ADMIN — SODIUM CHLORIDE: 9 INJECTION, SOLUTION INTRAVENOUS at 16:24

## 2022-08-25 RX ADMIN — MIDODRINE HYDROCHLORIDE 10 MG: 5 TABLET ORAL at 17:15

## 2022-08-25 RX ADMIN — MEMANTINE 5 MG: 5 TABLET ORAL at 20:48

## 2022-08-25 RX ADMIN — FLUTICASONE PROPIONATE 2 SPRAY: 50 SPRAY, METERED NASAL at 09:10

## 2022-08-25 RX ADMIN — ENOXAPARIN SODIUM 40 MG: 100 INJECTION SUBCUTANEOUS at 09:02

## 2022-08-25 RX ADMIN — ASPIRIN 81 MG: 81 TABLET, COATED ORAL at 09:04

## 2022-08-25 ASSESSMENT — PAIN SCALES - GENERAL
PAINLEVEL_OUTOF10: 1
PAINLEVEL_OUTOF10: 3
PAINLEVEL_OUTOF10: 4
PAINLEVEL_OUTOF10: 0
PAINLEVEL_OUTOF10: 3
PAINLEVEL_OUTOF10: 0
PAINLEVEL_OUTOF10: 0

## 2022-08-25 ASSESSMENT — PAIN DESCRIPTION - LOCATION
LOCATION: BACK;NECK
LOCATION: HEAD;BACK

## 2022-08-25 ASSESSMENT — PAIN DESCRIPTION - FREQUENCY: FREQUENCY: INTERMITTENT

## 2022-08-25 ASSESSMENT — PAIN - FUNCTIONAL ASSESSMENT
PAIN_FUNCTIONAL_ASSESSMENT: PREVENTS OR INTERFERES SOME ACTIVE ACTIVITIES AND ADLS
PAIN_FUNCTIONAL_ASSESSMENT: PREVENTS OR INTERFERES SOME ACTIVE ACTIVITIES AND ADLS

## 2022-08-25 ASSESSMENT — PAIN DESCRIPTION - PAIN TYPE: TYPE: CHRONIC PAIN

## 2022-08-25 ASSESSMENT — PAIN DESCRIPTION - DESCRIPTORS
DESCRIPTORS: ACHING;SHARP
DESCRIPTORS: ACHING;SHOOTING

## 2022-08-25 NOTE — PROGRESS NOTES
Mable Low  8/25/2022  [id number]    Chief Complaint: Syncope and collapse    Subjective:   No acute events overnight. Today Stanley was less interactive with therapy. Nursing noted that he reported \"not feeling well\". Today he is seen in his room without family present. He is asleep and wakes to his name, but is difficult to arouse. He does not answer questions. He does squeeze hands when asked. Medicine is closely monitoring. Infectious, metabolic, and intracranial workup in process. ROS: unable to reliably obtain due to patient's mental status. Objective:  Patient Vitals for the past 24 hrs:   BP Temp Temp src Pulse Resp SpO2   08/25/22 0912 114/71 -- -- (!) 135 -- 91 %   08/25/22 0815 (!) 143/97 98.3 °F (36.8 °C) Oral (!) 118 16 --   08/24/22 1945 124/86 98.2 °F (36.8 °C) Oral 91 18 98 %   08/24/22 1430 104/62 97.9 °F (36.6 °C) Oral (!) 112 18 --     Gen: Somnolent, in bed  HEENT: Normocephalic, atraumatic. CV: irregularly irregular rhythm  Resp: No respiratory distress. Lungs CTAB  Abd: Soft, nontender, nondistended, bowel sounds active  Ext: No edema. Neuro: somnolent, opens eye to voice, does not answer questions, follows commands intermittently    Wt Readings from Last 3 Encounters:   08/23/22 155 lb 6.4 oz (70.5 kg)   08/10/22 156 lb 1.6 oz (70.8 kg)   07/08/22 160 lb (72.6 kg)       Laboratory data:   Lab Results   Component Value Date    WBC 8.1 08/25/2022    HGB 15.0 08/25/2022    HCT 44.9 08/25/2022    MCV 90.2 08/25/2022     08/25/2022       Lab Results   Component Value Date/Time     08/25/2022 11:56 AM    K 4.3 08/25/2022 11:56 AM    CL 98 08/25/2022 11:56 AM    CO2 29 08/25/2022 11:56 AM    BUN 23 08/25/2022 11:56 AM    CREATININE 1.4 08/25/2022 11:56 AM    GLUCOSE 93 08/25/2022 11:56 AM    CALCIUM 10.2 08/25/2022 11:56 AM        Therapy progress:  PT  Position Activity Restriction  Other position/activity restrictions: LUE IV, orthostatic (low BP).  compression stockings for OOB activity  Objective     Sit to Stand: Minimal Assistance  Stand to sit: Minimal Assistance  Bed to Chair: Unable to assess     OT  PT Equipment Recommendations  Other: TBD pending progress and increased functional mobility assessment-- anticipate RW and possible manual w/c. Pt states he has walker and cane at home,. Toilet - Technique: Ambulating  Equipment Used: Grab bars  Assessment        SLP  Current Diet : Regular             Body mass index is 24.34 kg/m². Assessment and Plan:  Iris Leach is an 82year old male with a past medical history significant for atrial fibrillation, s/p pacer, dementia, CKD, HTN, and HLD who presented as a transfer from [facility] on 8/9/22 after a presyncopal episode, treated for orthostatic hypotension. He was admitted to [facility] on 8/14/22 due to functional deficits below his baseline. Debility  - due to below  - PT, OT, ST    AMS  - etiology unclear  - vitals stable, some tachycardia noted earlier. EKG with atrial fibrillation, no RVR  - Infectious etiology less likely as patient is afebrile, without leukocytosis. UA yesterday negative, pending repeat.  XR chest with possible pneumonia?  - VBG without CO2 retention  - CT head negative for acute process  - Medicine following, appreciate assistance     NATANAEL on CKD  - baseline Cr 1.2  - IVF per medicine     Orthostatic hypotension  - SBP dipping to 70s-80s when up with therapy, now improving   - Medicine following, appreciate assistance  - discontinued flomax, metoprolol  - s/p 500 mL bolus on 8/16 and 8/17  - ADITYA hose  - midodrine  - fluorinef increased per Medicine  - encourage fluids, monitor with history of HF  - Cardiology consulted, recommend continued medical management    Productive Cough, improved  - XR chest 8/15 negative for acute process     Paroxysmal Atrial Fibrillation  - monitor HR off of BB  - Eliquis discontinued per Medicine due to high fall risk     HFrEF  - daily weights, I/Os  - OP ischemic eval per Cardiology     Dementia  - aricept discontinued due to concerns for polypharmacy  - namenda  - discontinued seroquel      CAD  - statin  - follow up with Cards OP     BPH  - finasteride  - hold tamsulosin due to BP     Bowels: Schedule stool softener. Follow bowel movements. Enema or suppository if needed. Services: SNF  EDOD: TBD, pending medical status    [facility].  Della Owusu MD 8/25/2022, 2:01 PM

## 2022-08-25 NOTE — PROGRESS NOTES
Physical Therapy  Facility/Department: [facility]  Rehabilitation Physical Therapy Treatment Note    NAME: Camila Ly  : 1935 (63 y.o.)  MRN: [id number]  CODE STATUS: Full Code    Date of Service: 22       Restrictions:  Restrictions/Precautions: Fall Risk;General Precautions  Position Activity Restriction  Other position/activity restrictions: LUE IV, orthostatic (low BP). compression stockings for OOB activity     SUBJECTIVE  Subjective  Pain: Pt reports dizziness this am and 8/10 HA this morning. States pain is, \"in the back of my head. \"        Post Treatment Pain Screening  8/10 HA pain (nursing reports they medicated patient for tylenol just before session per nursing)         OBJECTIVE  Cognition  Overall Cognitive Status: Exceptions  Arousal/Alertness: Appropriate responses to stimuli  Following Commands: Follows one step commands with increased time; Follows one step commands with repetition  Attention Span: Attends with cues to redirect; Difficulty attending to directions; Difficulty dividing attention  Memory: Decreased short term memory;Decreased recall of precautions;Decreased recall of recent events;Decreased long term memory  Safety Judgement: Decreased awareness of need for safety;Decreased awareness of need for assistance  Problem Solving: Decreased awareness of errors;Assistance required to identify errors made;Assistance required to generate solutions;Assistance required to correct errors made  Insights: Decreased awareness of deficits  Initiation: Requires cues for some  Sequencing: Requires cues for all  Cognition Comment: patient needed frequent cues to push up from chair and lean forward and for walker navigation with pt noted to have posterior lob with sit to stand, difficulty aligning to chair and need for cues to complete safely.   Orientation  Overall Orientation Status: Impaired  Orientation Level: Disoriented to time;Oriented to person;Disoriented to place  VITALS:     at rest sitting in chair /71,  bpm after 1 min quiet rest HR 109bpm, SpO2 91%  After walking standing   /72  manual and with autocuff  1 minute post walking /74  bpm  After car transfer /72   After steps   /80  bpm    Return to supine pt with  /64 Hr 120 irregular and SpOe 90% with nursing notified. Call light in reach telesitter in room and bed alarm engaged. Functional Mobility  Bridging  Assistance Level: Independent  Roll Left  Assistance Level: Independent  Roll Right  Assistance Level: Independent  Sit to Supine  Assistance Level: Independent  Supine to Sit  Assistance Level: Contact guard assist  Skilled Clinical Factors: pt needed cues to lean forward getting oob with lob posterior requiring CGA  Scooting  Assistance Level: Independent  Balance  Sitting Balance: Independent (for static unsupported and within gideon dynamic supported, needs supervision for unsupported due to impaired safety awareness)  Standing Balance: Contact guard assistance (with FWW has frequent post lob with turns and fatigue)  Transfers  Surface: From chair with arms; To bed; To chair with arms;From bed  Additional Factors: Verbal cues; Hand placement cues; Increased time to complete  Device: Walker  Sit to Stand  Assistance Level: Minimal assistance;Contact guard assist;Moderate assistance  Skilled Clinical Factors: pt acorss multiple trials post lob with need for cues to push up from chair as pt falling posteriorly and unable to problem solve to lean forward and push up from chair resulting in need for up to mod assist  Stand to Sit  Assistance Level: Contact guard assist;Stand by assist  Skilled Clinical Factors: needed CGA to guide patient to chair as patient tends to sit too far to right on chair when turning to sit and is close to missing seat on right across trials  Bed To/From Chair  Technique: Stand step (FWW)  Assistance Level: Minimal assistance; Moderate assistance  Car Transfer  Assistance Level: Minimal assistance  Skilled Clinical Factors: cues to push up from car seat      Environmental Mobility  Ambulation  Surface: Level surface  Device: Rolling walker  Distance: 110 feet  and 10 feet with L/R turns on turf carpet with patient's gait limited due to LE fatigue and need to sit suddenly with WC follow provided with up to min assist with gait and assist for balance and turning walker  Activity: Within Room; Within Unit  Additional Factors: Verbal cues; Increased time to complete  Gait Deviations: Slow fred; Unsteady gait; Wide base of support;Decreased step length bilateral;Decreased weight shift bilateral  Skilled Clinical Factors: steadying assist needed with gait and facilitation/assist for walker navigation and turns  Stairs  Stair Height: 6''  Device: Rolling walker (walker at bottom of steps and bilateral hand rails,)  Number of Stairs: 2  Additional Factors: Verbal cues; Hand placement cues; Increased time to complete;Non-reciprocal going up;Non-reciprocal going down  Assistance Level: Requires x 2 assistance; Moderate assistance (mod assist of 1 to 2 wtih steps due to unsteadiness going down steps wtih patient needing assist and cues to advance UE down rails.)  Wheelchair  Surface: Level surface  Device: Standard wheelchair  Additional Factors: Verbal cues; Hand placement cues; Increased time to complete  Assistance Required to Manage Parts: Brakes  Assistance Level for Propulsion: Minimal assistance  Propulsion Method: Bilateral upper extremities; Bilateral lower extremities  Propulsion Quality: Short strokes  Propulsion Distance: 150 feet with L/r turns indep  Skilled Clinical Factors: decreased fluidity and B stroke lengths, cues for symmetrical propulsion and to increase BUE stroke lengths, hand over hand assist for hand placement and technique. Distance limited by fatigue.  Consistent Shawn to steer w/c and maintain straight path in hallway ASSESSMENT/PROGRESS TOWARDS GOALS  Vital Signs  Heart Rate: (!) 135 (decreased to 109 after 1 minute quiet rest seated)  BP: 114/71  BP Location: Right upper arm  MAP (Calculated): 85.33  SpO2: 91 %    Assessment  Assessment: Pt seen for 60 minute session with  Vitals as noted below with pt noted to have irregular heart rate and  tachycardia throughout session and pt c/o fatigue and not sleeping well. Notified nursing of pt's irregular HR and overall fatigue. PT noted to have /72 or greater during session. Pt tolerated walking up to 110 feet with FWW with assist of 1 and up and down 2 steps wtih bilateral rails and FWW at bottom of steps with assist of 2. PT's gait training limited due to  pt c/o fatigue and LE weakness. PT complteed thereact for transfers with pt with thigh high pallavi hose donned throughout sessions. Pt wtih theract noted to have frequent lob and motor apraxia for sit to stand with difficulty with forward weight shift and problem solving to push up from arm rests. Pt returned to bed at end of session call light in reach. Goals  Patient Goals   Patient goals : \"Be able to do a day's work\"  Short Term Goals  Time Frame for Short term goals: 8/19/22  Short term goal 1: Pt will complete bed mobility mod I-- GOAL not met, 8/19 pt requires up to SBA for bed mobility  Short term goal 2: Pt will complete transfers with supervision and LRAD-- GOAL not met, 8/19 pt requires up to [facility] for functional transfers using RW.  8/25 Patient demo up to mod assist for transfers  due to post lob. Short term goal 3: Pt will ambulate 50 ft with LRAD and CGA-- GOAL partially met, 8/19 pt ambulates up to 60ft with CGA-min A using RW. Pt demo 110 feet with up to mod assist of 1. Short term goal 4: Pt will climb 2 steps with BHR and CGA-- GOAL partially met, 8/19 pt completes 5 (4\") curb steps with min A using RW   8/25/2022 pt up and down 2 steps with b rails , FWW and mod assist of 2.   Long Term Goals  Long term goal 1: Pt will complete transfers with LRAD mod I.  8/25/2022 pt demo transfers FWW bed/arjun with up to mod assist .  Long term goal 2: Pt will ambulate 150 ft with LRAD and mod I.  8/25/2022Patient with up to mod assist with gait with FWW for up to 110 feet wtih LE fatigue and weakness limiting distances. Long term goal 3: Pt will climb 12 stairs with handrails mod I.  8/25/2022 pt up and down 2 steps with mod assist of 2 FWW and bilateral rails. Long term goal 4: Pt will complete car transfer with LRAD mod I.  8/25/2022 pt demo min assist with FWW for car transfers. PLAN OF CARE/SAFETY  Safety Devices  Type of Devices: All fall risk precautions in place; Bed alarm in place;Call light within reach; Left in bed;Telesitter in use  Restraints  Restraints Initially in Place: No    EDUCATION  Education  Education Given To: Patient  Education Provided: Mobility Training;Transfer Training; Safety;Cognition  Education Provided Comments: Pt required cues to navigate WC and walker, cues to push up form chair after repeated trials aparna jesusita walker needed to cue to push up from chair due to multiple post lob, cues and physicial assist with steps. Pt with decreased insight to safety today and reports dizziness and LE weakness throughout session. Pt notes he , \"doesn't feel well today and wants to go back to bed. \"  Pt states he did not sleep well last night.   Pt given 2 blankets and returned to supine with call light in reach at end of session  Education Method: Verbal;Demonstration  Barriers to Learning: Cognition  Education Outcome: Continued education needed  Skilled Clinical Factors: needs continued cues for safety, patient agreable during session and engaged in therapeutic tasks        Therapy Time   Individual Concurrent Group Co-treatment   Time In 0910         Time Out 1010         Minutes 60           Timed Code Treatment Minutes: [facility] , PT, 08/25/22 at 5:30 PM

## 2022-08-25 NOTE — PROGRESS NOTES
Occupational Therapy  Facility/Department: [facility]  Rehabilitation Occupational Therapy Daily Treatment Note    Date: 22  Patient Name: Kentrell Boland       Room: 9176/6497-39  MRN: [id number]  Account: [de-identified]   : 1935  (82 y.o.) Gender: male                    Past Medical History:  has a past medical history of Atrial fibrillation ([facility] [address] Blind right eye, Chronic kidney disease, Dementia ([facility] [address] Hyperlipidemia, Hypertension, and Pneumonia. Past Surgical History:   has a past surgical history that includes Tonsillectomy; Appendectomy; Colonoscopy; eye surgery; Upper gastrointestinal endoscopy; joint replacement (); hernia repair; and pacemaker placement (10/13/2017). Restrictions  Restrictions/Precautions: Fall Risk;General Precautions  Implants present? : Pacemaker  Other position/activity restrictions: LUE IV, orthostatic (low BP). compression stockings for OOB activity    Subjective  Subjective: Pt in bed, not responding consistently, lethargic, opening eye for 1-2 seconds but falling back asleep and only verbalizing 1-2 words at a time, no pain, /71, HR 80, O2 sats 94% on RA. Restrictions/Precautions: Fall Risk;General Precautions             Objective     Cognition  Overall Cognitive Status: Exceptions  Arousal/Alertness: Appropriate responses to stimuli  Following Commands: Follows one step commands with increased time; Follows one step commands with repetition  Attention Span: Attends with cues to redirect; Difficulty attending to directions; Difficulty dividing attention  Memory: Decreased short term memory;Decreased recall of precautions;Decreased recall of recent events;Decreased long term memory  Safety Judgement: Decreased awareness of need for safety;Decreased awareness of need for assistance  Problem Solving: Decreased awareness of errors;Assistance required to identify errors made;Assistance required to generate solutions;Assistance required to correct errors continues to require SBA/CGA for LB dressing. Short Term Goal 2: Pt will tolerate x 5 minutes of standing for improved activity azeem. GOAL MET 8/17/22 Pt tolerated more than 5 minutes of standing activity. Short Term Goal 3: Pt will complete toileting with SPV. progressing. Pt continues to require CGA/min A for toileting. Short Term Goal 4: Pt will complete in room mobility and transfers with LRAD with SPV. progressing. Pt continues to require SBA/CGA for mobility in room and functional transfers. Long Term Goals  Time Frame for Long term goals : 14 days by 8/28/22  Long Term Goal 1: Pt will complete total body dressing with mod I  Long Term Goal 2: Pt will complete total body showering with mod I  Long Term Goal 3: Pt will complete dynamic balance task reaching to high/low levels with SBA and LRAD.     Therapy Time   Individual Concurrent Group Co-treatment   Time In 1030         Time Out 1100         Minutes 30         Timed Code Treatment Minutes: 30 Minutes  Variance: 30  Reason: Med hold    Wendi Champion, OT

## 2022-08-25 NOTE — FLOWSHEET NOTE
08/25/22 1153   Treatment Team Notification   Reason for Communication Change in status  (pt lethargic and general ill looking)   Team Member Name Dr Tenisha Prajapati Provider   Method of Communication Secure Message   Response En route   Notification Time 60 [phone]

## 2022-08-25 NOTE — PROGRESS NOTES
pantoprazole  40 mg Oral QAM AC    fluticasone  2 spray Nasal Daily     PRN Meds: calcium carbonate, sodium chloride flush, polyethylene glycol, glucose, dextrose bolus **OR** dextrose bolus, glucagon (rDNA), dextrose, acetaminophen, diphenhydrAMINE, hydrALAZINE, ondansetron, traZODone, traMADol      Intake/Output Summary (Last 24 hours) at 8/25/2022 1344  Last data filed at 8/25/2022 1034  Gross per 24 hour   Intake 240 ml   Output --   Net 240 ml         Physical Exam Performed:    /71   Pulse (!) 135 Comment: decreased to 109 after 1 minute quiet rest seated  Temp 98.3 °F (36.8 °C) (Oral)   Resp 16   Ht 5' 7\" (1.702 m)   Wt 155 lb 6.4 oz (70.5 kg)   SpO2 91%   BMI 24.34 kg/m²     General appearance: Patient more somnolent and lethargic. HEENT: Blind right eye. Neck: Supple, with full range of motion. No jugular venous distention. Trachea midline. Respiratory:  Normal respiratory effort. Clear to auscultation, bilaterally without Rales/Wheezes/Rhonchi. Cardiovascular: Regular rate and rhythm with normal S1/S2 without murmurs, rubs or gallops. Abdomen: Soft, non-tender, non-distended with normal bowel sounds. Musculoskeletal: No clubbing, cyanosis or edema bilaterally. Full range of motion without deformity. Skin: Skin color, texture, turgor normal.  No rashes or lesions. Neurologic: Patient with slightly decreased  strength in the left hand when compared to the right. He does have some jerking noted. Psychiatric: Alert to person.    Capillary Refill: Brisk,3 seconds, normal   Peripheral Pulses: +2 palpable, equal bilaterally       Labs:   Recent Labs     08/24/22  0643 08/25/22  1156   WBC 7.9 8.1   HGB 15.6 15.0   HCT 48.7 44.9    208       Recent Labs     08/24/22  0643 08/25/22  1156    135*   K 4.3 4.3    98*   CO2 30 29   BUN 26* 23*   CREATININE 1.2 1.4*   CALCIUM 9.5 10.2       No results for input(s): AST, ALT, BILIDIR, BILITOT, ALKPHOS in the last 72 hours. No results for input(s): INR in the last 72 hours. Recent Labs     08/25/22  1156   TROPONINI <0.01       Urinalysis:      Lab Results   Component Value Date/Time    NITRU Negative 08/24/2022 06:20 PM    45 Jacinda Rosario 21-50 04/21/2022 02:20 PM    BACTERIA 1+ 04/21/2022 02:20 PM    RBCUA 3-4 04/21/2022 02:20 PM    BLOODU Negative 08/24/2022 06:20 PM    SPECGRAV 1.020 08/24/2022 06:20 PM    GLUCOSEU Negative 08/24/2022 06:20 PM       Radiology:  CT HEAD WO CONTRAST   Final Result   Age-related changes and chronic microvascular disease noted as above. No   acute intracerebral hemorrhage or midline shift. Chronic findings as above. XR CHEST PORTABLE   Final Result   Mild cardiomegaly. Mild bibasilar infiltrates. Cardiac pacemaker leads in stable/satisfactory position with no evidence of   pneumothorax. XR CHEST 1 VIEW   Final Result   No acute abnormality identified. Large hiatal hernia. Assessment/Plan:    Active Hospital Problems    Diagnosis     Syncope and collapse [R55]      Orthostatic hypotension. Continue midodrine. Continue PT/OT. Improving. Monitor for hypertension at rest. Increased florinef. Monitor electrolytes. Monitor for worsening fluid overload with history of CHF. Appreciate cardiology input. Patient may be hypovolemic. We will start IV fluids at a low rate. Obstructive uropathy. Continue finasteride. Avoid Flomax due to orthostatic hypotension. Patient's renal function slightly worse. Will check bladder scan to rule out obstruction. UA yesterday not consistent with UTI. Paroxysmal atrial fibrillation. Eliquis stopped. Monitor rates. May need to reevaluate [facility] [place] after discharge. Cardiomyopathy. Continue ASA and statin. Last EF 35-40%. Will monitor volume status closely with IV fluid utilization. Encephalopathy with underlying dementia. Patient may be sundowning. Will monitor sleep cycle. Avoid excessive sedation during the day.   VBG did not

## 2022-08-25 NOTE — PROGRESS NOTES
Speech Language Pathology  Attempt Note    Pt scheduled for ST session this date from 12-0. Pt is a medical hold per MD for remainder of today. Variance: 60 mins. Uli BARBOSA CCC-SLP  Speech-Language Pathologist  [id number]

## 2022-08-26 LAB
ANION GAP SERPL CALCULATED.3IONS-SCNC: 11 MMOL/L (ref 3–16)
BASOPHILS ABSOLUTE: 0 K/UL (ref 0–0.2)
BASOPHILS RELATIVE PERCENT: 0.4 %
BUN BLDV-MCNC: 26 MG/DL (ref 7–20)
CALCIUM SERPL-MCNC: 8.8 MG/DL (ref 8.3–10.6)
CHLORIDE BLD-SCNC: 101 MMOL/L (ref 99–110)
CO2: 25 MMOL/L (ref 21–32)
CREAT SERPL-MCNC: 1.4 MG/DL (ref 0.8–1.3)
EOSINOPHILS ABSOLUTE: 0.1 K/UL (ref 0–0.6)
EOSINOPHILS RELATIVE PERCENT: 1.2 %
GFR AFRICAN AMERICAN: 58
GFR NON-AFRICAN AMERICAN: 48
GLUCOSE BLD-MCNC: 144 MG/DL (ref 70–99)
GLUCOSE BLD-MCNC: 167 MG/DL (ref 70–99)
HCT VFR BLD CALC: 43.8 % (ref 40.5–52.5)
HEMOGLOBIN: 14.5 G/DL (ref 13.5–17.5)
LYMPHOCYTES ABSOLUTE: 0.9 K/UL (ref 1–5.1)
LYMPHOCYTES RELATIVE PERCENT: 12.6 %
MCH RBC QN AUTO: 30 PG (ref 26–34)
MCHC RBC AUTO-ENTMCNC: 33.2 G/DL (ref 31–36)
MCV RBC AUTO: 90.3 FL (ref 80–100)
MONOCYTES ABSOLUTE: 0.8 K/UL (ref 0–1.3)
MONOCYTES RELATIVE PERCENT: 11.7 %
NEUTROPHILS ABSOLUTE: 5.3 K/UL (ref 1.7–7.7)
NEUTROPHILS RELATIVE PERCENT: 74.1 %
PDW BLD-RTO: 16.7 % (ref 12.4–15.4)
PERFORMED ON: ABNORMAL
PLATELET # BLD: 197 K/UL (ref 135–450)
PMV BLD AUTO: 7.7 FL (ref 5–10.5)
POTASSIUM REFLEX MAGNESIUM: 3.9 MMOL/L (ref 3.5–5.1)
RBC # BLD: 4.85 M/UL (ref 4.2–5.9)
SODIUM BLD-SCNC: 137 MMOL/L (ref 136–145)
WBC # BLD: 7.2 K/UL (ref 4–11)

## 2022-08-26 PROCEDURE — 97530 THERAPEUTIC ACTIVITIES: CPT

## 2022-08-26 PROCEDURE — 6370000000 HC RX 637 (ALT 250 FOR IP): Performed by: PHYSICAL MEDICINE & REHABILITATION

## 2022-08-26 PROCEDURE — 36415 COLL VENOUS BLD VENIPUNCTURE: CPT

## 2022-08-26 PROCEDURE — 6370000000 HC RX 637 (ALT 250 FOR IP): Performed by: INTERNAL MEDICINE

## 2022-08-26 PROCEDURE — 6360000002 HC RX W HCPCS: Performed by: INTERNAL MEDICINE

## 2022-08-26 PROCEDURE — 97116 GAIT TRAINING THERAPY: CPT

## 2022-08-26 PROCEDURE — 85025 COMPLETE CBC W/AUTO DIFF WBC: CPT

## 2022-08-26 PROCEDURE — 97129 THER IVNTJ 1ST 15 MIN: CPT

## 2022-08-26 PROCEDURE — 97110 THERAPEUTIC EXERCISES: CPT

## 2022-08-26 PROCEDURE — 1280000000 HC REHAB R&B

## 2022-08-26 PROCEDURE — 80048 BASIC METABOLIC PNL TOTAL CA: CPT

## 2022-08-26 PROCEDURE — 2580000003 HC RX 258: Performed by: STUDENT IN AN ORGANIZED HEALTH CARE EDUCATION/TRAINING PROGRAM

## 2022-08-26 PROCEDURE — 97130 THER IVNTJ EA ADDL 15 MIN: CPT

## 2022-08-26 PROCEDURE — 97535 SELF CARE MNGMENT TRAINING: CPT

## 2022-08-26 RX ORDER — ATORVASTATIN CALCIUM 40 MG/1
40 TABLET, FILM COATED ORAL DAILY
Qty: 30 TABLET | Refills: 0
Start: 2022-08-26 | End: 2022-10-17 | Stop reason: SDUPTHER

## 2022-08-26 RX ORDER — MIDODRINE HYDROCHLORIDE 2.5 MG/1
10 TABLET ORAL 3 TIMES DAILY
Qty: 90 TABLET | Refills: 3 | Status: ON HOLD
Start: 2022-08-26 | End: 2022-09-18 | Stop reason: SDUPTHER

## 2022-08-26 RX ORDER — FLUDROCORTISONE ACETATE 0.1 MG/1
0.2 TABLET ORAL DAILY
Qty: 60 TABLET | Refills: 3
Start: 2022-08-27 | End: 2022-09-26

## 2022-08-26 RX ORDER — SODIUM CHLORIDE 9 MG/ML
INJECTION, SOLUTION INTRAVENOUS ONCE
Status: COMPLETED | OUTPATIENT
Start: 2022-08-26 | End: 2022-08-26

## 2022-08-26 RX ADMIN — CITALOPRAM HYDROBROMIDE 10 MG: 20 TABLET ORAL at 10:19

## 2022-08-26 RX ADMIN — SODIUM CHLORIDE: 9 INJECTION, SOLUTION INTRAVENOUS at 17:36

## 2022-08-26 RX ADMIN — MIDODRINE HYDROCHLORIDE 10 MG: 5 TABLET ORAL at 17:40

## 2022-08-26 RX ADMIN — MIDODRINE HYDROCHLORIDE 10 MG: 5 TABLET ORAL at 11:36

## 2022-08-26 RX ADMIN — ASPIRIN 81 MG: 81 TABLET, COATED ORAL at 10:19

## 2022-08-26 RX ADMIN — TRAZODONE HYDROCHLORIDE 50 MG: 50 TABLET ORAL at 23:28

## 2022-08-26 RX ADMIN — PANTOPRAZOLE SODIUM 40 MG: 40 TABLET, DELAYED RELEASE ORAL at 05:55

## 2022-08-26 RX ADMIN — ATORVASTATIN CALCIUM 40 MG: 40 TABLET, FILM COATED ORAL at 10:19

## 2022-08-26 RX ADMIN — MEMANTINE 5 MG: 5 TABLET ORAL at 23:27

## 2022-08-26 RX ADMIN — FINASTERIDE 5 MG: 5 TABLET, FILM COATED ORAL at 10:19

## 2022-08-26 RX ADMIN — FLUTICASONE PROPIONATE 2 SPRAY: 50 SPRAY, METERED NASAL at 10:23

## 2022-08-26 RX ADMIN — ENOXAPARIN SODIUM 40 MG: 100 INJECTION SUBCUTANEOUS at 10:18

## 2022-08-26 RX ADMIN — FERROUS SULFATE TAB 325 MG (65 MG ELEMENTAL FE) 325 MG: 325 (65 FE) TAB at 10:19

## 2022-08-26 RX ADMIN — MEMANTINE 5 MG: 5 TABLET ORAL at 10:20

## 2022-08-26 RX ADMIN — FLUDROCORTISONE ACETATE 0.2 MG: 0.1 TABLET ORAL at 11:36

## 2022-08-26 RX ADMIN — MIDODRINE HYDROCHLORIDE 10 MG: 5 TABLET ORAL at 10:20

## 2022-08-26 ASSESSMENT — PAIN SCALES - GENERAL
PAINLEVEL_OUTOF10: 0

## 2022-08-26 NOTE — PROGRESS NOTES
Kentrell   8/26/2022  [id number]    Chief Complaint: Syncope and collapse    Subjective:   No acute events overnight. Today Koki Arthur is seen in his room. He is back to his baseline mental status. Workup yesterday was unremarkable except for NATANAEL. Still with NATANAEL today. Will give additional unit of fluids. He denies any acute complaints at this time. ROS: denies f/c, n/v, cp, sob    Objective:  Patient Vitals for the past 24 hrs:   BP Temp Temp src Pulse Resp SpO2 Weight   08/26/22 0913 118/84 -- -- (!) 105 -- 98 % --   08/26/22 0825 (!) 108/58 -- -- (!) 135 -- 94 % --   08/26/22 0801 (!) 86/54 -- -- -- -- -- --   08/26/22 0731 125/88 -- -- (!) 104 -- 98 % --   08/26/22 0612 -- -- -- -- -- -- 154 lb 9.6 oz (70.1 kg)   08/25/22 2045 (!) 155/92 97.8 °F (36.6 °C) Oral 83 16 96 % --     Gen: Somnolent, in bed  HEENT: Normocephalic, atraumatic. CV: irregularly irregular rhythm  Resp: No respiratory distress. Lungs CTAB  Abd: Soft, nontender, nondistended, bowel sounds active  Ext: No edema. Neuro: Alert, oriented, appropriately interactive    Wt Readings from Last 3 Encounters:   08/26/22 154 lb 9.6 oz (70.1 kg)   08/10/22 156 lb 1.6 oz (70.8 kg)   07/08/22 160 lb (72.6 kg)       Laboratory data:   Lab Results   Component Value Date    WBC 7.2 08/26/2022    HGB 14.5 08/26/2022    HCT 43.8 08/26/2022    MCV 90.3 08/26/2022     08/26/2022       Lab Results   Component Value Date/Time     08/26/2022 09:14 AM    K 3.9 08/26/2022 09:14 AM     08/26/2022 09:14 AM    CO2 25 08/26/2022 09:14 AM    BUN 26 08/26/2022 09:14 AM    CREATININE 1.4 08/26/2022 09:14 AM    GLUCOSE 167 08/26/2022 09:14 AM    CALCIUM 8.8 08/26/2022 09:14 AM        Therapy progress:  PT  Position Activity Restriction  Other position/activity restrictions: LUE IV, orthostatic (low BP).  abdominal binder and compression stockings for OOB activity  Objective     Sit to Stand: Minimal Assistance  Stand to sit: Minimal Assistance  Bed to Chair: Unable to assess     OT  PT Equipment Recommendations  Other: TBD pending progress and increased functional mobility assessment-- anticipate RW and possible manual w/c. Pt states he has walker and cane at home,. Toilet - Technique: Ambulating  Equipment Used: Grab bars  Assessment        SLP  Current Diet : Regular             Body mass index is 24.21 kg/m². Assessment and Plan:  Tricia Rosales is an 82year old male with a past medical history significant for atrial fibrillation, s/p pacer, dementia, CKD, HTN, and HLD who presented as a transfer from [facility] on 8/9/22 after a presyncopal episode, treated for orthostatic hypotension. He was admitted to [facility] on 8/14/22 due to functional deficits below his baseline. Debility  - due to below  - PT, OT, ST    AMS, resolved  - etiology unclear, possibly due to dementia  - vitals stable, some tachycardia noted earlier. EKG with atrial fibrillation, no RVR  - Infectious etiology less likely as patient is afebrile, without leukocytosis. UA yesterday negative, pending repeat.   - VBG without CO2 retention  - CT head negative for acute process  - Medicine following, appreciate assistance     NATANAEL on CKD  - baseline Cr 1.2  - given 1 L yesterday, give additional liter today  - repeat BMP     Orthostatic hypotension  - SBP dipping to 70s-80s when up with therapy, now improving   - Medicine following, appreciate assistance  - discontinued flomax, metoprolol  - s/p 500 mL bolus on 8/16 and 8/17  - ADITYA hose  - midodrine  - fluorinef increased per Medicine  - encourage fluids, monitor with history of HF  - Cardiology consulted, recommend continued medical management    Productive Cough, improved  - XR chest 8/15 negative for acute process     Paroxysmal Atrial Fibrillation  - monitor HR off of BB  - Eliquis discontinued per Medicine due to high fall risk     HFrEF  - daily weights, I/Os  - OP ischemic eval per Cardiology     Dementia  - aricept discontinued due to concerns for polypharmacy  - namenda  - discontinued seroquel      CAD  - statin  - follow up with Cards OP     BPH  - finasteride  - hold tamsulosin due to BP     Bowels: Schedule stool softener. Follow bowel movements. Enema or suppository if needed. Services: SNF  EDOD: 8/27/22    Jessi Vance.  Pako Rice MD 8/26/2022, 3:58 PM

## 2022-08-26 NOTE — PROGRESS NOTES
Lissette Wilson term goal 2: Pt will ambulate 150 ft with LRAD and mod I.  8/25/2022Patient with up to mod assist with gait with FWW for up to 110 feet wtih LE fatigue and weakness limiting distances. 8/26/2022GOal partially met pt demo with  feet walking with L/r turns turf CGA. Long term goal 3: Pt will climb 12 stairs with handrails mod I.  8/25/2022 pt up and down 2 steps with mod assist of 2 FWW and bilateral rails. 8/26/2022 GOAL partially met patient demonstrates up and down 4 steps with min assist and rails. Long term goal 4: Pt will complete car transfer with LRAD mod I.  8/25/2022 pt demo min assist with FWW for car transfers. 8/26/2022 GOAL partially met pt demonstrates car transfers with FWW CGA. Pt. Met 3/4 short term goals and 0/4 long term goals. With goals partially met ( not met) due to  gait/ standing balance instability and fatigue related to postural hypotension, impaired balance, fatigue with functional activity, impaired cognition/safety awareness  with expectation  that with improvement in mental status and orthostatic hypotension patient will contiue to appreciate gains in indep with household mobility. Pt. Currently ambulates 165 feet with FWW and CGA level and 10 feet turf carpet  with l/r  turns  Up/down 4 steps with rails bilateral min assist  Stoop to recover object from floot with FWW SBA. Sit to/from stand with CGA FWW. Bed mobility with indep  Recommend furhter therapy in order to progress with functional indep with mobility   Pt.  With planned DC to SNF  See Progress note 8/26/2022 for further assessment  Electronically signed by Darrion Bello, PT on 8/26/2022 at 3:55 PM

## 2022-08-26 NOTE — CARE COORDINATION
CM left voice message with Dimitry Redmond (spouse) via telephone with DC confirmed tomorrow 8/27 and transport p/u @ 1600.  Dolly Dixon RN [Use of Plain Language] : use of plain language [Adequate] : adequate [None] : none

## 2022-08-26 NOTE — CARE COORDINATION
CM spoke with Shad Young (Spouse) [phone] via telephone. CM updated spouse on discharge date tentatively for 8/27 to [facility] with a p/u time of 1600. CM stated that if it changes the weekend  will call.  Nini Emanuel, RN

## 2022-08-26 NOTE — PROGRESS NOTES
Hospitalist Progress Note      PCP: Joaquin Iniguez MD    Date of Admission: 8/14/2022    Chief Complaint:   Confusion    Hospital Course:   Jaida Streeter is an 82year old male with a past medical history significant for atrial fibrillation, s/p pacer, dementia, CKD, HTN, and HLD who presented as a transfer from [facility] on 8/9/22 after a presyncopal episode. He had a similar episode several days prior that resulted in a fall and scalp laceration that required staples. He is being treated for orthostatic hypotension. Cardiology is consulted. Echo revealed progressive left ventricular function. Patient was set for further workup with cardiac catheterization 8/12, however he had worsening mental status and procedure was canceled. Plan for outpatient cardiac catheterization. He was admitted to [facility] on 8/14/22 due to functional deficits below his baseline. Today he is seen in his room. He reports feeling dizzy with therapies. He also reports reflux and nausea. Nursing notes that he has been coughing up sputum. Patient's blood pressure is dropping to systolic of 17A when up with therapy. Subjective:     Patient improved. AO x 2. No nausea/vomiting. Denies shortness of breath. No fever or chills. No chest pain. Lightheaded when getting in chair. No palpitations.     Medications:  Reviewed    Infusion Medications    dextrose       Scheduled Medications    fludrocortisone  0.2 mg Oral Daily    midodrine  10 mg Oral TID WC    atorvastatin  40 mg Oral Daily    enoxaparin  40 mg SubCUTAneous Daily    aspirin  81 mg Oral Daily    citalopram  10 mg Oral Daily    ferrous sulfate  325 mg Oral Daily    finasteride  5 mg Oral Daily    memantine  5 mg Oral BID    [Held by provider] tamsulosin  0.4 mg Oral Daily    pantoprazole  40 mg Oral QAM AC    fluticasone  2 spray Nasal Daily     PRN Meds: calcium carbonate, sodium chloride flush, polyethylene glycol, glucose, dextrose bolus **OR** dextrose bolus, glucagon (rDNA), dextrose, acetaminophen, diphenhydrAMINE, hydrALAZINE, ondansetron, traZODone, traMADol      Intake/Output Summary (Last 24 hours) at 8/26/2022 1311  Last data filed at 8/26/2022 1145  Gross per 24 hour   Intake 720 ml   Output 300 ml   Net 420 ml         Physical Exam Performed:    /84   Pulse (!) 105   Temp 97.8 °F (36.6 °C) (Oral)   Resp 16   Ht 5' 7\" (1.702 m)   Wt 154 lb 9.6 oz (70.1 kg)   SpO2 98%   BMI 24.21 kg/m²     General appearance: More alert. Sitting up in chair. HEENT: Blind right eye. Neck: Supple, with full range of motion. No jugular venous distention. Trachea midline. Respiratory:  Normal respiratory effort. Clear to auscultation, bilaterally without Rales/Wheezes/Rhonchi. Cardiovascular: Regular rate and rhythm with normal S1/S2 without murmurs, rubs or gallops. Abdomen: Soft, non-tender, non-distended with normal bowel sounds. Musculoskeletal: No clubbing, cyanosis or edema bilaterally. Full range of motion without deformity. Skin: Skin color, texture, turgor normal.  No rashes or lesions. Neurologic: Patient GALEANO x 4. Follows commands. Appropriate to questions. Psychiatric: Alert to person and place. Capillary Refill: Brisk,3 seconds, normal   Peripheral Pulses: +2 palpable, equal bilaterally       Labs:   Recent Labs     08/24/22  0643 08/25/22  1156 08/26/22  0914   WBC 7.9 8.1 7.2   HGB 15.6 15.0 14.5   HCT 48.7 44.9 43.8    208 197       Recent Labs     08/24/22  0643 08/25/22  1156 08/26/22  0914    135* 137   K 4.3 4.3 3.9    98* 101   CO2 30 29 25   BUN 26* 23* 26*   CREATININE 1.2 1.4* 1.4*   CALCIUM 9.5 10.2 8.8       No results for input(s): AST, ALT, BILIDIR, BILITOT, ALKPHOS in the last 72 hours. No results for input(s): INR in the last 72 hours.   Recent Labs     08/25/22  1156   TROPONINI <0.01         Urinalysis:      Lab Results   Component Value Date/Time    NITRU Negative 08/25/2022 03:45 PM    WBCUA 21-50 04/21/2022 02:20 PM    BACTERIA 1+ 04/21/2022 02:20 PM    RBCUA 3-4 04/21/2022 02:20 PM    BLOODU Negative 08/25/2022 03:45 PM    SPECGRAV 1.010 08/25/2022 03:45 PM    GLUCOSEU Negative 08/25/2022 03:45 PM       Radiology:  CT HEAD WO CONTRAST   Final Result   Age-related changes and chronic microvascular disease noted as above. No   acute intracerebral hemorrhage or midline shift. Chronic findings as above. XR CHEST PORTABLE   Final Result   Mild cardiomegaly. Mild bibasilar infiltrates. Cardiac pacemaker leads in stable/satisfactory position with no evidence of   pneumothorax. XR CHEST 1 VIEW   Final Result   No acute abnormality identified. Large hiatal hernia. Assessment/Plan:    Active Hospital Problems    Diagnosis     Syncope and collapse [R55]      Orthostatic hypotension. Continue midodrine. Continue PT/OT. Improving. Monitor for hypertension at rest. Increased florinef. Monitor electrolytes. Monitor for worsening fluid overload with history of CHF. Appreciate cardiology input. Improved. Completed IVF. Obstructive uropathy. Continue finasteride. Avoid Flomax due to orthostatic hypotension. Patient's renal function slightly worse. Will check bladder scan to rule out obstruction. UA yesterday not consistent with UTI. Paroxysmal atrial fibrillation. Eliquis stopped. Monitor rates. May need to reevaluate [facility] after discharge. Cardiomyopathy. Continue ASA and statin. Last EF 35-40%. Will monitor volume status closely. Encephalopathy with underlying dementia. Avoid excessive sedation during the day. Secondary to sundowning? Monitor sleep cycles. DVT Prophylaxis: Lovenox  Diet: ADULT DIET; Regular  ADULT ORAL NUTRITION SUPPLEMENT; Breakfast, Dinner; Standard High Calorie/High Protein Oral Supplement  Code Status: Full Code    PT/OT Eval Status:  Following    Dispo - Per Leland Kothari MD

## 2022-08-26 NOTE — PROGRESS NOTES
Occupational Therapy  Discharge Summary     Name:Aroldo Anne  HYD:[id number]  :1935     Diagnosis: syncope    Restrictions/Precautions  Restrictions/Precautions: Fall Risk, General Precautions  Implants present? : Pacemaker           Position Activity Restriction  Other position/activity restrictions: LUE IV, orthostatic (low BP). abdominal binder and compression stockings for OOB activity     Goals:   Short Term Goals  Time Frame for Short term goals: 1 week (by )  Short Term Goal 1: Pt will complete LBD with SPV, Goal Not Met. Pt continues to require SBA/CGA for LB dressing. Short Term Goal 2: Pt will tolerate x 5 minutes of standing for improved activity azeem. GOAL MET 22 Pt tolerated more than 5 minutes of standing activity. Short Term Goal 3: Pt will complete toileting with SPV. Goal Not Met. Pt continues to require CGA/min A for toileting. Short Term Goal 4: Pt will complete in room mobility and transfers with LRAD with SPV. Goal Not Met. Pt continues to require SBA/CGA for mobility in room and functional transfers. Long Term Goals  Time Frame for Long term goals : 14 days by 22  Long Term Goal 1: Pt will complete total body dressing with mod I. Goal Not Met. Pt continues to require CGA/min A for dressing. Long Term Goal 2: Pt will complete total body showering with mod I. Goal Not Met. Pt continues to require CGA for bathing. Long Term Goal 3: Pt will complete dynamic balance task reaching to high/low levels with SBA and LRAD. Goal Not Met. Pt continues to require CGA for mobility and balance. Pt. Met 1/4 short term goals and 0/3 long term goals. ADL:  Feeding  Assistance Level:  Independent  Grooming/Oral Hygiene  Assistance Level: Modified independent  Skilled Clinical Factors: seated in at sink to brush teeth  Upper Extremity Bathing  Assistance Level: Supervision  Skilled Clinical Factors: seated on TTB in shower, cues to stay seated  Lower Extremity Bathing  Assistance Level: Contact guard assist  Skilled Clinical Factors: min VCs for sitting down for majority of task, CGA in stance to briefly bathe buttocks and groin  Upper Extremity Dressing  Assistance Level: Minimal assistance  Skilled Clinical Factors: to pull shirt down in back fully, assist to don abdominal binder  Lower Extremity Dressing  Assistance Level: Minimal assistance  Skilled Clinical Factors: for balance while pulling brief/pants up, assist to thread belt around back through belt loops  Putting On/Taking Off Footwear  Assistance Level: Minimal assistance  Skilled Clinical Factors: assist to fully don thigh high ADITYA hose; mod VCs, setup for socks  Toileting  Assistance Level: Contact guard assist  Skilled Clinical Factors: for balance during clothing management  Toilet Transfers  Technique: Stand step  Equipment: Standard toilet  Additional Factors: Verbal cues; With handrails; Increased time to complete  Assistance Level: Contact guard assist  Tub/Shower Transfers  Type: Shower  Transfer From: [address] [place] [address]  Transfer To: Tub transfer bench  Additional Factors: Verbal cues; With handrails; Increased time to complete  Assistance Level: Contact guard assist          Functional Mobility  Device: Rolling walker  Activity: To/From bathroom  Assistance Level: Contact guard assist  Skilled Clinical Factors: with RW  Transfers  Surface: To chair with arms;From chair with arms;Standard toilet; Wheelchair  Additional Factors: Verbal cues; With handrails;Hand placement cues; Increased time to complete  Device: Walker  Sit to Stand  Assistance Level: Contact guard assist  Skilled Clinical Factors: cues for hand placement  Stand to Sit  Assistance Level: Contact guard assist  Skilled Clinical Factors: cues for hand placement  Bed To/From Chair  Technique: Stand step  Assistance Level: Contact guard assist  Stand Pivot  Assistance Level: Contact guard assist           Assessment:   Assessment  Assessment: Pt agreeable to OT session. Pt performed functional transfers with CGA and use of RW with improved balance from previous day but still with posterior lean at times. Pt required CGA for bathing and toileting due to mild unsteadiness. Pt exhibited increasing dizziness after toileting and transfer into shower. BP machine unable to read on 3 attempts. Pt dizziness improved after sitting on TTB for ~5 minutes. Pt completed UB dressing with min A and LB dressing with min A and difficulty problem solving for donning belt and ADITYA hose. RN took manual BP after shower, 86/54 and irregular HR. Pt completed grooming seated at sink with mod I. Pt feeling minimally light headed after donning socks seated. RN took manual BP, 108/58, HR reading on monitor 120-135 with irregular HR. Pt returned to recliner with feet elevated. No c/o dizziness on departure. Continue POC. Activity Tolerance: Treatment limited secondary to medical complications; Patient limited by endurance; Patient limited by fatigue  Discharge Recommendations: Subacute/Skilled Nursing Facility  OT Equipment Recommendations  Other: defer to next level of care  Safety Devices  Safety Devices in place: Yes  Type of devices: Gait belt;Nurse notified; Patient at risk for falls;Call light within reach; Chair alarm in place; Left in chair    Equipment Recommendations:  Defer to next level of care       Signs and Symptoms of Delirium (from CAM)  A. Acute Onset Mental Status Change  Is there evidence of an acute change in mental status from the patient's baseline?  [] 0. No [x] 1. Yes    B. Inattention: Did the patient have difficulty focusing attention, for example being easily distractible or having difficulty keeping track of what was being said?  [] 0. Behavior not present    [] 1. Behavior continuously present, does not fluctuate   [x] 2. Behavior present, fluctuates (comes and goes, changes in severity)    C.  Disorganized thinking: Was the patient's thinking disorganized or incoherent (rambling or irrelevant conversation, unclear or illogical flow of ideas, or unpredictable switching from subject to subject)? [] 0. Behavior not present    [] 1. Behavior continuously present, does not fluctuate   [x] 2. Behavior present, fluctuates (comes and goes, changes in severity)    D. Altered level of consciousness: Did the patient have altered level of consciousness as indicated by any of the following criteria? Vigilant: startled easily to any sound or touch  Lethargic: repeatedly dozed off when being asked questions, but responded to voice or touch  Stuporous: very difficult to arouse and keep aroused for the interview  Comatose: could not be aroused  [] 0. Behavior not present    [] 1. Behavior continuously present, does not fluctuate   [x] 2.  Behavior present, fluctuates (comes and goes, changes in severity)      Electronically signed by Wendi Champion OT on 8/26/2022 at 2:58 PM

## 2022-08-26 NOTE — PLAN OF CARE
Problem: Safety - Adult  Goal: Free from fall injury  8/26/2022 1049 by LAURY DURHAM  Outcome: Progressing  8/26/2022 0056 by Johanne Birch RN  Outcome: Progressing

## 2022-08-26 NOTE — PROGRESS NOTES
[facility]: ACUTE REHAB UNIT  SPEECH-LANGUAGE PATHOLOGY      [x] Daily  [] Weekly Care Conference Note  [x] Discharge    Patient:Aroldo Smith      :1935  YCX:[id number]  Rehab Dx/Hx: Syncope and collapse [R55]    Precautions: falls  Home situation: Lives with wife. Questionable historian - reports independence with med management (granddaughter assists?) and yard work. Pt reports he and his wife share responsibility for finances. He also reports wife manages cooking, cleaning, laundry, and grocery shopping and that wife is still working full time as a teacher. ST Dx: [] Aphasia  [] Dysarthria  [] Apraxia   [x] Oropharyngeal dysphagia [x] Cognitive Impairment  [] Other:   Date of Admit: 2022  Room #: 0156/0156-01    Current functional status (updated daily):         Pt being seen for : [] Speech/Language Treatment  [] Dysphagia Treatment [x] Cognitive Treatment  [] Other:  Communication: [x]WFL  [] Aphasia  [] Dysarthria  [] Apraxia  [] Pragmatic Impairment [] Non-verbal  [] Hearing Loss  [] Other:   Cognition: [] WFL  [] Mild  [] Moderate  [x] Severe [] Unable to Assess  [] Other:  Memory: [] WFL  [] Mild  [] Moderate  [x] Severe [] Unable to Assess  [] Other:  Behavior: [x] Alert  [x] Cooperative  [x]  Pleasant  [] Confused  [] Agitated  [] Uncooperative  [] Distractible [] Motivated  [] Self-Limiting [] Anxious  [] Other:  Endurance:  [] Adequate for participation in SLP sessions  [x] Reduced overall  [] Lethargic  [] Other:  Safety: [] No concerns at this time  [x] Reduced insight into deficits  [x]  Reduced safety awareness [] Not following call light procedures  [] Unable to Assess  [] Other:    Current Diet Order:ADULT DIET;  Regular  ADULT ORAL NUTRITION SUPPLEMENT; Breakfast, Dinner; Standard High Calorie/High Protein Oral Supplement  Swallowing Precautions: upright for all intake, stay upright for at least 30 mins after intake, small bites/sips, alternate bites/sips, slow rate of intake, overall severity of recall deficits. Goal 3: Pt will complete executive function tasks (e.g. meds, time, money, etc) with 70% acc given mod cues   SLUMS:  -money calculations: 0% acc indep improving to 50% acc given max cues. -clock drawing: inaccurate (visual deficits)    Goal not met. Pt demonstrates significant difficulty with time, math, and money concepts. Recommending ongoing assist with medication/finance management. Goal 4: Pt will complete problem solving and thought organization tasks with 70% acc given mod cues   SLUMS:  -auditory comprehension of a paragraph: 0% acc   -repetition of numbers in backwards order: 0% acc  -divergent naming: pt able to name 10 items within 1 min  Goal not met. Pt continues to present with significantly reduced thought organization negatively impacting overall problem solving and safety. Pt will require 24 hour assist d/t severity of problem solving and thought organization deficits. Goal 5: Pt will complete viusal and verbal reasoning tasks with 70% acc given mod cues   Category exclusions:  -pt given four words and asked to determine which word didn't belong  -40% acc given max cues    Goal not met. Significantly reduced reasoning skills, negatively impacting safety and independence. Other areas targeted: SLUMS:  -repeat SLUMS administered today  -pt scored a 5/30 compared to initial evaluation score of 10/30      Education:   Edu provided re: ongoing use of compensatory strategies, assist with meds/finances, orientation concepts, and ongoing ST services     Safety Devices: [x] Call light within reach  [x] Chair alarm activated  [] Bed alarm activated  [x] Other: AVASYS [] Call light within reach  [] Chair alarm activated  [] Bed alarm activated  [] Other:    Assessment: Tx session: Pt pleasant and cooperative, agreeable to participate. Pt completed repeat SLUMS scoring a 5/30 compared to initial evaluation score of 10/30.  Pt's cognitive skills have declined over the last few days with increased difficulty redirecting, re-orienting, and improving accuracy. Pt is also very [place] which presents as a negative barrier. Discussed ongoing use of strategies, assist with meds/finances, and ongoing ST services to improve overall cognitive linguistic skills. Discharge Summary: Pt tolerating IDDSI Level 7 regular solids with thin liquids, meds whole with water as LRD. Pt has met all dysphagia goals within POC. Pt did not meet any cognitive goals, overall cognitive skills fluctuated throughout his admission on rehab. However, cognitive skills declined over the last 2-3 days. Pt became increasingly more disoriented, difficult to redirection and reorient. Pt requires mod-max cues to achieve around 50% acc on most cognitive tasks. Pt demonstrates with significant difficulty of recall, thought organization, attention, and problem solving negatively impacting safety and independence. Pt will require 24 assist, assist with meds/finances, and ongoing ST services at discharge. Plan: Continue as per plan of care. Additional Information:     Barriers toward progress: Cognitive deficit, Limited safety awareness, and Limited insight into deficits  Discharge recommendations:  [] Home independently  [] Home with assistance [x]  24 hour supervision  [] ECF [] Other:   Continued Tx Upon Discharge: ? [x] Yes [] No [] TBD based on progress while on ARU [] Vital Stim indicated [] Other:   Estimated discharge date: 08/27/22    Interventions used this date:  [] Speech/Language Treatment  [] Instruction in HEP [] Group [] Dysphagia Treatment [x] Cognitive Treatment   [] Other: Total Time Breakdown / Charges    Time in Time out Total Time / units   Cognitive Tx 1030 1130 60 min/ 4 units   Speech Tx -- -- --   Dysphagia Tx -- -- --       Electronically Signed by     Abiola Busby. A CCC-SLP  Speech-Language Pathologist  [id number]

## 2022-08-26 NOTE — PROGRESS NOTES
Physical Therapy  Facility/Department: [facility]  Rehabilitation Physical Therapy Treatment Note    NAME: Snow Little  : 1935 (10 y.o.)  MRN: [id number]  CODE STATUS: Full Code    Date of Service: 22       Restrictions:  Restrictions/Precautions: Fall Risk;General Precautions  Position Activity Restriction  Other position/activity restrictions: LUE IV, orthostatic (low BP). abdominal binder and compression stockings for OOB activity     SUBJECTIVE  Subjective  Pain: Pt denies pain        Disposition: Post Treatment Pain Screening denies pain up to recliner at end of session with call light in reach, chair alarm and telesitter         OBJECTIVE  Cognition  Overall Cognitive Status: Exceptions  Arousal/Alertness: Appropriate responses to stimuli  Following Commands: Follows one step commands with increased time; Follows one step commands with repetition  Attention Span: Attends with cues to redirect  Memory: Decreased short term memory;Decreased recall of precautions;Decreased recall of recent events;Decreased long term memory  Safety Judgement: Decreased awareness of need for safety;Decreased awareness of need for assistance  Problem Solving: Decreased awareness of errors;Assistance required to identify errors made;Assistance required to generate solutions;Assistance required to correct errors made  Insights: Decreased awareness of deficits  Initiation: Requires cues for some  Sequencing: Requires cues for some  Orientation  Overall Orientation Status: Within Normal Limits  Orientation Level: Disoriented to time;Disoriented to situation;Disoriented to place;Oriented to person    Functional Mobility  Bed Mobility  Overall Assistance Level: Independent  Bridging  Assistance Level: Independent  Roll Left  Assistance Level: Independent  Sit to Supine  Assistance Level: Independent  Supine to Sit  Assistance Level: Independent  Scooting  Assistance Level:  Independent  Balance  Sitting Balance: Independent (static unsupported but supervision for dynamic due to decreased safety awareness)  Standing Balance: Contact guard assistance  Standing Balance  Comments: increased postural sway with posterior lob for stand to sit  Sit to Stand  Assistance Level: Stand by assist  Skilled Clinical Factors: needs cues for pushing up from chair otherwise post lob  Stand to Sit  Assistance Level: Contact guard assist  Skilled Clinical Factors: needed CGA to guide patient to chair as patient tends to sit too far to right on chair when turning to sit and is close to missing seat on right across trials, poor eccentric control with stand to sit. Bed To/From Chair  Technique: Stand step  Assistance Level: Contact guard assist  Skilled Clinical Factors: FWW  Car Transfer  Assistance Level: Contact guard assist  Skilled Clinical Factors: fww      Environmental Mobility  Ambulation  Surface: Level surface  Device: Rolling walker  Distance: 165 FEET WITH l/r TURNS ON LEVEL AND 10 FEET OF TURF CARPET  Activity: Within Room; Within Unit  Additional Factors: Verbal cues; Increased time to complete  Assistance Level: Contact guard assist  Gait Deviations: Path deviations  Skilled Clinical Factors: ATAXIA WITH CUES NEEDED TO WALK INSIDE WALKER WITH PATIENT AT ONE POINT MOVING WALKER SUDDENLY TO THE LEFT OUT OF ROBIN. POST WAL   /61   bpm  Stairs  Device: Bilateral handrails  Number of Stairs: 4  Additional Factors: Verbal cues; Hand placement cues; Increased time to complete;Non-reciprocal going up;Non-reciprocal going down  Assistance Level: Minimal assistance  Skilled Clinical Factors: needed cues for non alt pattern and trunk balance support due to post lean going up and  cues and assist to advance UE on rails  non alt down alt going up   educarnce carolyn steps to 4 withpost step seated rest   BP RUE ( CGA sit<>stand from WC)   126/81  bpm.  Wheelchair  Surface: Level surface  Device: Standard wheelchair  Additional Factors: Verbal discussed in patient care conference. Recommend continued PT for progression with balance, strength, mobility as  orthostatic sx allow. Goals  Short Term Goals  Time Frame for Short term goals: 8/19/22  Short term goal 1: Pt will complete bed mobility mod I-- GOAL not met, 8/19 pt requires up to SBA for bed mobility  8/26/2022 GOAL MET patient demonstrates indep in bed mobility. Short term goal 2: Pt will complete transfers with supervision and LRAD-- GOAL not met, 8/19 pt requires up to [facility] for functional transfers using RW.  8/25 Patient demo up to mod assist for transfers  due to post lob. 8/26/2022 GOAL MET patient demonstrates transfers with cues, FWW CGA. Short term goal 3: Pt will ambulate 50 ft with LRAD and CGA-- GOAL partially met, 8/19 pt ambulates up to 60ft with CGA-min A using RW. Pt demo 110 feet with up to mod assist of 1.  8/26/2022 GOAL MET patient demonstrates  feet wtih CGA and cues for safety. Short term goal 4: Pt will climb 2 steps with BHR and CGA-- GOAL partially met, 8/19 pt completes 5 (4\") curb steps with min A using RW   8/25/2022 pt up and down 2 steps with b rails , FWW and mod assist of 2. 8/26/2022 GOALpartially  MET patient demonstrates up and down 4 steps with min assist.  Long Term Goals  Long term goal 1: Pt will complete transfers with LRAD mod I.  8/25/2022 pt demo transfers FWW bed/arjun with up to mod assist .  Long term goal 3: Pt will climb 12 stairs with handrails mod I.  8/25/2022 pt up and down 2 steps with mod assist of 2 FWW and bilateral rails. 8/26/2022 patient demonstrates    PLAN OF CARE/SAFETY  Safety Devices  Type of Devices: Gait belt;Patient at risk for falls; Left in chair;Telesitter in use; Chair alarm in place  Restraints  Restraints Initially in Place: Felip[facility] Mcneil to SNF with continued therapy [facility]. EDUCATION  Education  Education Given To: Patient  Education Provided: Safety; Mobility Training;Transfer Training;Home Exercise Program  Education Provided Comments: educate dpt in LE therex to maintain LE strength/endruance ( Ankle pumps, bridges, SLR, hip abd s/l) with pt needing cues and direction to perform, educated/cued and assisted wtih transfers, steps and gait wtih FWW with pt noted to have posterior lob and need for cues for safety with mobility in addition to assist for all oob activity.         Therapy Time   Individual Concurrent Group Co-treatment   Time In 6905         Time Out 1003         Minutes 82 María Elena Kaufman, 08/26/22 at 3:54 PM

## 2022-08-26 NOTE — PLAN OF CARE
Problem: Safety - Adult  Goal: Free from fall injury  8/26/2022 0056 by Eduardo Tracey RN  Outcome: Progressing  8/25/2022 1906 by Christine Gordon  Outcome: Progressing

## 2022-08-26 NOTE — CARE COORDINATION
Case Management Discharge Summary  [facility] - Acute Rehab Unit    Patient:Aroldo Anne     Rehab Dx/Hx: Syncope and collapse [R55]       Today's Date: 8/26/2022    Discharge to: Anticipating DC on 8/27 to [address] [place] completed:  [x] No       [] Yes     [] [address] Exemption Notification (HENS) completed:  [] No       [x] Yes     [] N/A    Document ID : [id number]   IMM given:  08/26/2022       New Durable Medical Equipment ordered/agency:  Defer to SNF   Transportation:  Medical Transport explained to pt/family. Pt/family voice no agency preference. [facility][phone][facility]   time: 1600  Ambulance form completed: [] No       [x] Yes   [] N/A       Confirmed discharge plan with:  Patient: [] No       [] Yes  Family:  [] No       [x] Yes      Name:Vale Anne (Spouse)   Contact number: [phone]    Facility/Agency, name:    [address]   [phone]     [address] [place] [address] (to follow after SNF if needed)  [address] [place] [address]   [facility]   [phone]   BINU/AVS faxed    Phone number for report to facility: [phone] ask for [address] nurse  RN, name: W/E nurse       Has lack of transportation kept you from medical appointments, meetings, work, or ADL's? [] Yes, it has kept me from medical appointments or from getting my meds  [] Yes, It has kept me from non-medical meetings, appointments, work, or getting things I need  [x] No  [] Pt unable to respond  [] Pt declines to respond     How often do you need to have someone help you when you read instructions, pamphlets, or other written material from your doctor or pharmacy?  [] Never                             [] Always  [] Rarely                            [] Patient declines to respond  [] Sometimes                    [] Patient unable to respond  [x] Often Note: Discharging nurse to complete BINU, reconcile AVS, and place final copy with patient's discharge packet. RN to ensure that written prescriptions for  Level II medications are sent with patient to the facility as per protocol.           Signature:  Tracie Zendejas RN

## 2022-08-27 VITALS
SYSTOLIC BLOOD PRESSURE: 125 MMHG | RESPIRATION RATE: 16 BRPM | BODY MASS INDEX: 24.27 KG/M2 | DIASTOLIC BLOOD PRESSURE: 75 MMHG | OXYGEN SATURATION: 96 % | HEART RATE: 85 BPM | TEMPERATURE: 98 F | HEIGHT: 67 IN | WEIGHT: 154.6 LBS

## 2022-08-27 LAB
ANION GAP SERPL CALCULATED.3IONS-SCNC: 9 MMOL/L (ref 3–16)
BUN BLDV-MCNC: 27 MG/DL (ref 7–20)
CALCIUM SERPL-MCNC: 8.4 MG/DL (ref 8.3–10.6)
CHLORIDE BLD-SCNC: 104 MMOL/L (ref 99–110)
CO2: 27 MMOL/L (ref 21–32)
CREAT SERPL-MCNC: 1.2 MG/DL (ref 0.8–1.3)
GFR AFRICAN AMERICAN: >60
GFR NON-AFRICAN AMERICAN: 58
GLUCOSE BLD-MCNC: 85 MG/DL (ref 70–99)
POTASSIUM REFLEX MAGNESIUM: 3.9 MMOL/L (ref 3.5–5.1)
SODIUM BLD-SCNC: 140 MMOL/L (ref 136–145)

## 2022-08-27 PROCEDURE — 6370000000 HC RX 637 (ALT 250 FOR IP): Performed by: INTERNAL MEDICINE

## 2022-08-27 PROCEDURE — 80048 BASIC METABOLIC PNL TOTAL CA: CPT

## 2022-08-27 PROCEDURE — 6370000000 HC RX 637 (ALT 250 FOR IP): Performed by: PHYSICAL MEDICINE & REHABILITATION

## 2022-08-27 PROCEDURE — 6360000002 HC RX W HCPCS: Performed by: INTERNAL MEDICINE

## 2022-08-27 PROCEDURE — 36415 COLL VENOUS BLD VENIPUNCTURE: CPT

## 2022-08-27 RX ADMIN — FLUDROCORTISONE ACETATE 0.2 MG: 0.1 TABLET ORAL at 10:25

## 2022-08-27 RX ADMIN — ENOXAPARIN SODIUM 40 MG: 100 INJECTION SUBCUTANEOUS at 10:25

## 2022-08-27 RX ADMIN — PANTOPRAZOLE SODIUM 40 MG: 40 TABLET, DELAYED RELEASE ORAL at 06:00

## 2022-08-27 RX ADMIN — MEMANTINE 5 MG: 5 TABLET ORAL at 10:26

## 2022-08-27 RX ADMIN — MIDODRINE HYDROCHLORIDE 10 MG: 5 TABLET ORAL at 10:26

## 2022-08-27 RX ADMIN — FINASTERIDE 5 MG: 5 TABLET, FILM COATED ORAL at 10:26

## 2022-08-27 RX ADMIN — ATORVASTATIN CALCIUM 40 MG: 40 TABLET, FILM COATED ORAL at 10:26

## 2022-08-27 RX ADMIN — FLUTICASONE PROPIONATE 2 SPRAY: 50 SPRAY, METERED NASAL at 10:26

## 2022-08-27 RX ADMIN — MIDODRINE HYDROCHLORIDE 10 MG: 5 TABLET ORAL at 14:42

## 2022-08-27 RX ADMIN — FERROUS SULFATE TAB 325 MG (65 MG ELEMENTAL FE) 325 MG: 325 (65 FE) TAB at 10:26

## 2022-08-27 RX ADMIN — ASPIRIN 81 MG: 81 TABLET, COATED ORAL at 10:26

## 2022-08-27 RX ADMIN — CITALOPRAM HYDROBROMIDE 10 MG: 20 TABLET ORAL at 10:26

## 2022-08-27 NOTE — PLAN OF CARE
Problem: Safety - Adult  Goal: Free from fall injury  8/27/2022 1055 by Marino Parkinson RN  Outcome: Progressing  8/27/2022 0107 by Daphne Silva RN  Outcome: Progressing  Note: Pt. Free from falls or self injury at this time. Falls risk protocol maintained. Call light within reach.

## 2022-08-27 NOTE — PROGRESS NOTES
Patient discharged to [facility] facility, report called to facility RN, patient discharged via transport.

## 2022-08-27 NOTE — PROGRESS NOTES
Hospitalist Progress Note      PCP: Lori Ferro MD    Date of Admission: 8/14/2022    Chief Complaint:   Confusion    Hospital Course:   Ruben Lane is an 82year old male with a past medical history significant for atrial fibrillation, s/p pacer, dementia, CKD, HTN, and HLD who presented as a transfer from [facility] on 8/9/22 after a presyncopal episode. He had a similar episode several days prior that resulted in a fall and scalp laceration that required staples. He is being treated for orthostatic hypotension. Cardiology is consulted. Echo revealed progressive left ventricular function. Patient was set for further workup with cardiac catheterization 8/12, however he had worsening mental status and procedure was canceled. Plan for outpatient cardiac catheterization. He was admitted to [facility] on 8/14/22 due to functional deficits below his baseline. Today he is seen in his room. He reports feeling dizzy with therapies. He also reports reflux and nausea. Nursing notes that he has been coughing up sputum. Patient's blood pressure is dropping to systolic of 31H when up with therapy. Subjective:     Patient feeling tired. No chest pain. No dizziness in bed.      Medications:  Reviewed    Infusion Medications    dextrose       Scheduled Medications    fludrocortisone  0.2 mg Oral Daily    midodrine  10 mg Oral TID WC    atorvastatin  40 mg Oral Daily    enoxaparin  40 mg SubCUTAneous Daily    aspirin  81 mg Oral Daily    citalopram  10 mg Oral Daily    ferrous sulfate  325 mg Oral Daily    finasteride  5 mg Oral Daily    memantine  5 mg Oral BID    [Held by provider] tamsulosin  0.4 mg Oral Daily    pantoprazole  40 mg Oral QAM AC    fluticasone  2 spray Nasal Daily     PRN Meds: calcium carbonate, sodium chloride flush, polyethylene glycol, glucose, dextrose bolus **OR** dextrose bolus, glucagon (rDNA), dextrose, acetaminophen, diphenhydrAMINE, hydrALAZINE, ondansetron, traZODone, traMADol      Intake/Output Summary (Last 24 hours) at 8/27/2022 1056  Last data filed at 8/27/2022 1039  Gross per 24 hour   Intake 600 ml   Output 400 ml   Net 200 ml         Physical Exam Performed:    /75   Pulse 85   Temp 98 °F (36.7 °C) (Oral)   Resp 16   Ht 5' 7\" (1.702 m)   Wt 154 lb 9.6 oz (70.1 kg)   SpO2 96%   BMI 24.21 kg/m²     General appearance: Alert. Laying in bed. HEENT: Blind right eye. Neck: Supple, with full range of motion. No jugular venous distention. Trachea midline. Respiratory:  Normal respiratory effort. Clear to auscultation, bilaterally without Rales/Wheezes/Rhonchi. Cardiovascular: Regular rate and rhythm with normal S1/S2 without murmurs, rubs or gallops. Abdomen: Soft, non-tender, non-distended with normal bowel sounds. Musculoskeletal: No clubbing, cyanosis or edema bilaterally. Full range of motion without deformity. Skin: Skin color, texture, turgor normal.  No rashes or lesions. Neurologic: Patient GALEANO x 4. Follows commands. Psychiatric: Alert to person and place. Capillary Refill: Brisk,3 seconds, normal   Peripheral Pulses: +2 palpable, equal bilaterally       Labs:   Recent Labs     08/25/22  1156 08/26/22  0914   WBC 8.1 7.2   HGB 15.0 14.5   HCT 44.9 43.8    197       Recent Labs     08/25/22  1156 08/26/22  0914 08/27/22  0643   * 137 140   K 4.3 3.9 3.9   CL 98* 101 104   CO2 29 25 27   BUN 23* 26* 27*   CREATININE 1.4* 1.4* 1.2   CALCIUM 10.2 8.8 8.4       No results for input(s): AST, ALT, BILIDIR, BILITOT, ALKPHOS in the last 72 hours. No results for input(s): INR in the last 72 hours.   Recent Labs     08/25/22  1156   TROPONINI <0.01         Urinalysis:      Lab Results   Component Value Date/Time    NITRU Negative 08/25/2022 03:45 PM    WBCUA 21-50 04/21/2022 02:20 PM    BACTERIA 1+ 04/21/2022 02:20 PM    RBCUA 3-4 04/21/2022 02:20 PM    BLOODU Negative 08/25/2022 03:45 PM    SPECGRAV 1.010 08/25/2022 03:45 PM    GLUCOSEU Negative 08/25/2022 03:45 PM       Radiology:  CT HEAD WO CONTRAST   Final Result   Age-related changes and chronic microvascular disease noted as above. No   acute intracerebral hemorrhage or midline shift. Chronic findings as above. XR CHEST PORTABLE   Final Result   Mild cardiomegaly. Mild bibasilar infiltrates. Cardiac pacemaker leads in stable/satisfactory position with no evidence of   pneumothorax. XR CHEST 1 VIEW   Final Result   No acute abnormality identified. Large hiatal hernia. Assessment/Plan:    Active Hospital Problems    Diagnosis     Syncope and collapse [R55]      Orthostatic hypotension. Continue midodrine. Continue PT/OT. Improving. Monitor for hypertension at rest. Increased florinef. Monitor electrolytes. Monitor for worsening fluid overload with history of CHF. Appreciate cardiology input. Completed IVF. Obstructive uropathy. Continue finasteride. Avoid Flomax due to orthostatic hypotension. Patient's renal function slightly worse. Will check bladder scan to rule out obstruction. UA yesterday not consistent with UTI. Paroxysmal atrial fibrillation. Eliquis stopped. Monitor rates. May need to reevaluate [facility] after discharge. Cardiomyopathy. Continue ASA and statin. Last EF 35-40%. Will monitor volume status closely. Cardiology evaluated patient. Encephalopathy with underlying dementia. Avoid excessive sedation during the day. Secondary to sundowning? Monitor sleep cycles. Medically stable. DVT Prophylaxis: Lovenox  Diet: ADULT DIET; Regular  ADULT ORAL NUTRITION SUPPLEMENT; Breakfast, Dinner; Standard High Calorie/High Protein Oral Supplement  Code Status: Full Code    PT/OT Eval Status: Following    Dispo - Per PMR.     Chely Kearney MD

## 2022-08-30 NOTE — DISCHARGE SUMMARY
Physical Medicine & Rehabilitation  Discharge Summary     Patient Identification:  Lan Kang  : 1935  Admit date: 2022  Discharge date: 2022   Attending provider: Sania Owens. Arjun Hernandez MD       Primary care provider: Consuelo Garcia MD     Discharge Diagnoses:   Patient Active Problem List   Diagnosis    Pneumonia    Abnormal chest CT    Cough syncope    Tracheobronchomalacia    Hyperlipidemia    Hypertension    Chronic kidney disease, stage III (moderate) (HCC)    A-fib (HCC)    S/P placement of cardiac pacemaker    Abnormal echocardiogram    Dizziness    SOB (shortness of breath)    Abnormal cardiovascular stress test    Coronary artery disease involving native coronary artery of native heart without angina pectoris, 2020 abnormal stress test. [facility] mild LAD disease     Other chest pain    Atrial fibrillation with rapid ventricular response (HCC)    Syncope and collapse    COVID    NATANAEL (acute kidney injury) ([facility]    Atrial fibrillation with RVR ([facility]    Dementia with behavioral disturbance (HCC)    Hallucinations    Syncope, unspecified syncope type    Ischemic cardiomyopathy       History of Present Illness/Acute Hospital Course:  Severiano Hamming is an 82year old male with a past medical history significant for atrial fibrillation, s/p pacer, dementia, CKD, HTN, and HLD who presented as a transfer from [facility] on 22 after a presyncopal episode. He had a similar episode several days prior that resulted in a fall and scalp laceration that required staples. He is being treated for orthostatic hypotension. Cardiology is consulted. Echo revealed progressive left ventricular function. Patient was set for further workup with cardiac catheterization , however he had worsening mental status and procedure was canceled. Plan for outpatient cardiac catheterization. He was admitted to [facility] on 22 due to functional deficits below his baseline.     Inpatient Rehabilitation Course:   Felipe Holt is a 82 y.o. male admitted to inpatient rehabilitation on 8/14/2022 with Syncope and collapse. The patient participated in an aggressive multidisciplinary inpatient rehabilitation program involving 3 hours of therapy per day, at least 5 days per week. He made good progress with regard to ADLs, transfers, ambulation, and speech, but continues to require additional assistance. He is discharging to SNF for continued therapies. Impairments: impaired mobility and ADLs, impaired gait and balance, impaired cognition     Medical Management:    Debility  - due to below  - PT, OT, ST     AMS, resolved  - etiology unclear, possibly due to dementia  - vitals stable, some tachycardia noted earlier. EKG with atrial fibrillation, no RVR  - Infectious etiology less likely as patient is afebrile, without leukocytosis. UA yesterday negative, pending repeat.   - VBG without CO2 retention  - CT head negative for acute process  - Medicine following, appreciate assistance      NATANAEL on CKD  - baseline Cr 1.2  - given 1 L 8/25 and 8/26 with improvement of kidney function     Orthostatic hypotension  - SBP dipping to 70s-80s when up with therapy, now improving   - Medicine following, appreciate assistance  - discontinued flomax, metoprolol  - s/p 500 mL bolus on 8/16 and 8/17  - ADITYA hose  - midodrine  - fluorinef increased per Medicine  - encourage fluids, monitor with history of HF  - Cardiology consulted, recommend continued medical management     Productive Cough, improved  - XR chest 8/15 negative for acute process     Paroxysmal Atrial Fibrillation  - monitor HR off of BB  - Eliquis discontinued per Medicine due to high fall risk     HFrEF  - daily weights, I/Os  - OP ischemic eval per Cardiology     Dementia  - aricept discontinued due to concerns for polypharmacy  - namenda  - discontinued seroquel      CAD  - statin  - follow up with Cards OP     BPH  - finasteride  - hold tamsulosin due to BP    Discharge Functional Status:    Physical therapy:  Bed Mobility:  Overall Assistance Level: Independent  Sit>supine:  Assistance Level: Independent  Supine>sit:  Assistance Level: Independent  Skilled Clinical Factors: pt needed cues to lean forward getting oob with lob posterior requiring CGA  Transfers:  Surface: From chair with arms, To bed, To chair with arms, From bed  Additional Factors: Verbal cues, Hand placement cues, Increased time to complete  Device: Walker  Sit>stand:  Assistance Level: Stand by assist  Skilled Clinical Factors: needs cues for pushing up from chair otherwise post lob  Stand>sit:  Assistance Level: Contact guard assist  Skilled Clinical Factors: needed CGA to guide patient to chair as patient tends to sit too far to right on chair when turning to sit and is close to missing seat on right across trials, poor eccentric control with stand to sit. Bed<>chair  Technique: Stand step  Assistance Level: Contact guard assist  Skilled Clinical Factors: FWW  Stand Pivot:  Assistance Level: Contact guard assist  Skilled Clinical Factors: EOB>w/c without AD; pt with min posterior lean. Pt denies dizziness or lightheadedness with transfers  Lateral transfer:     Car transfer:  Assistance Level: Contact guard assist  Skilled Clinical Factors: fww  Ambulation:  Surface: Level surface  Device: Rolling walker  Distance: 165 FEET WITH l/r TURNS ON LEVEL AND 10 FEET OF TURF CARPET  Activity: Within Room, Within Unit  Additional Factors: Verbal cues, Increased time to complete  Assistance Level: Contact guard assist  Gait Deviations: Path deviations  Skilled Clinical Factors: ATAXIA WITH CUES NEEDED TO WALK INSIDE WALKER WITH PATIENT  [facility], [address] SUDDENLY TO THE LEFT OUT OF ROBIN.  POST WAL   /61   bpm  Stairs:  Stair Height: 6''  Device: Bilateral handrails  Number of Stairs: 4  Additional Factors: Verbal cues, Hand placement cues, Increased time to complete, Non-reciprocal going up, Non-reciprocal going down  Assistance Level: Minimal assistance  Skilled Clinical Factors: needed cues for non alt pattern and trunk balance support due to post lean going up and  cues and assist to advance UE on rails  non alt down alt going up   educarnce limtis steps to 4 withpost step seated rest   BP RUE ( CGA sit<>stand from WC)   126/81  bpm.  Curb:  Curb Height: 4''  Device: Rolling walker  Number of Curbs: 5  Additional Factors: Verbal cues, Hand placement cues, Increased time to complete  Assistance Level: Minimal assistance  Skilled Clinical Factors: pt ascends/descends 5 (4\") curb steps with RW, with grossly min A, with min posterior lean and cues to place entire foot on ground (pt tends to place weight through heels and lift toes off ground)  Wheelchair:  Surface: Level surface  Device: Standard wheelchair  Additional Factors: Verbal cues, Hand placement cues, Increased time to complete  Assistance Required to Manage Parts: Brakes  Assistance Level for Propulsion: Minimal assistance  Propulsion Method: Bilateral upper extremities, Bilateral lower extremities  Propulsion Quality: Short strokes  Propulsion Distance: 165 feet wtih cues for turns and where push rims are and to use BUE and BLe  Skilled Clinical Factors: decreased fluidity and B stroke lengths, cues for symmetrical propulsion and to increase BUE stroke lengths, hand over hand assist for hand placement and technique. Distance limited by fatigue. Consistent Shawn to steer w/c and maintain straight path in hallway  Assessment:  Assessment: Pt seen for 60 minute session with pt needing cues for alignment to transfer surface stand to sit and CGA due to poor ecentric control with post lob. Patient with FWW  gait training 165 feet level and turf carpet with L/R turns. Patient needed CGA with FWW level for safety and with steps able to manage 4 steps wtih min assist and propel walker 165 feet with CGA.   Pt requried cues for exercies angles with LE bed therex. Pt noted to have continued orthostatic hypotension with mobility and disorientation but with better recovery with seated rest with pt ntoed to have abdominal binder and ADITYA hose donned during session. Pt at end of session up to chair with call light in reach, LE elevated, chair alarm engaged and telesitter in room. Pt has made progress towards goals with fatigue, dizziness and orthostatic hypotension limiting standing activities. Expect pateint will continue to progress wtih goals and balance with improvement in orthostatic hypotension. Pt with continued disorientation,decreased safety awareness and impaired balance that continue to impaire safety with mobility and necessitate 24 hour supervision and assist. DC acute care PT wtih expectaiton pt to DC to SNF and further cardiology workup up as an outpatient as discussed in patient care conference. Recommend continued PT for progression with balance, strength, mobility as  orthostatic sx allow. Activity Tolerance: Treatment limited secondary to medical complications, Patient limited by endurance, Patient limited by fatigue  Discharge Recommendations: 24 hour supervision or assist, Home with Home health PT      Occupational therapy:   Feeding  Assistance Level:  Independent  Grooming/Oral Hygiene  Assistance Level: Modified independent  Skilled Clinical Factors: seated in at sink to brush teeth  UE Bathing  Assistance Level: Supervision  Skilled Clinical Factors: seated on TTB in shower, cues to stay seated  LE Bathing  Assistance Level: Contact guard assist  Skilled Clinical Factors: min VCs for sitting down for majority of task, CGA in stance to briefly bathe buttocks and groin  UE Dressing  Assistance Level: Minimal assistance  Skilled Clinical Factors: to pull shirt down in back fully, assist to don abdominal binder  LE Dressing  Assistance Level: Minimal assistance  Skilled Clinical Factors: for balance while pulling brief/pants up, assist to thread belt around back through belt loops  Putting On/Taking Off Footwear  Assistance Level: Minimal assistance  Skilled Clinical Factors: assist to fully don thigh high ADITYA hose; mod VCs, setup for socks  Toileting  Assistance Level: Contact guard assist  Skilled Clinical Factors: for balance during clothing management  Transfers: Toilet Transfers  Technique: Stand step  Equipment: Standard toilet  Additional Factors: Verbal cues, With handrails, Increased time to complete  Assistance Level: Contact guard assist  Tub/Shower Transfers  Type: Shower  Transfer From: [address]  Transfer To: Tub transfer bench  Additional Factors: Verbal cues, With handrails, Increased time to complete  Assistance Level: Contact guard assist  IADLs:  Meal Prep     Money Management     [place] Management     Assessment:  Assessment: Pt agreeable to OT session. Pt performed functional transfers with CGA and use of RW with improved balance from previous day but still with posterior lean at times. Pt required CGA for bathing and toileting due to mild unsteadiness. Pt exhibited increasing dizziness after toileting and transfer into shower. BP machine unable to read on 3 attempts. Pt dizziness improved after sitting on TTB for ~5 minutes. Pt completed UB dressing with min A and LB dressing with min A and difficulty problem solving for donning belt and ADITYA hose. RN took manual BP after shower, 86/54 and irregular HR. Pt completed grooming seated at sink with mod I. Pt feeling minimally light headed after donning socks seated. RN took manual BP, 108/58, HR reading on monitor 120-135 with irregular HR. Pt returned to recliner with feet elevated. No c/o dizziness on departure. Continue POC.   Activity Tolerance: Treatment limited secondary to medical complications, Patient limited by endurance, Patient limited by fatigue  Discharge Recommendations: isosorbide mononitrate 30 MG extended release tablet  Commonly known as: IMDUR     metoprolol succinate 25 MG extended release tablet  Commonly known as: TOPROL XL     minocycline 100 MG capsule  Commonly known as: MINOCIN;DYNACIN     QUEtiapine 50 MG tablet  Commonly known as: SEROQUEL     tamsulosin 0.4 MG capsule  Commonly known as: FLOMAX     trospium 20 MG tablet  Commonly known as: [address]               Where to Get Your Medications        Information about where to get these medications is not yet available    Ask your nurse or doctor about these medications  atorvastatin 40 MG tablet  fludrocortisone 0.1 MG tablet  midodrine 2.5 MG tablet           I spent over 35 minutes on this discharge encounter between counseling, coordination of care, and medication reconciliation. To comply with [facility] bylaw R.II.4.1:   Discharge order placed in advance to facilitate patients discharge needs.       Michael Qiu MD

## 2022-09-01 ENCOUNTER — TELEPHONE (OUTPATIENT)
Dept: FAMILY MEDICINE CLINIC | Age: 87
End: 2022-09-01

## 2022-09-01 NOTE — TELEPHONE ENCOUNTER
[facility] Transitions Initial Follow Up Call    Outreach made within 2 business days of discharge: Yes    Patient: Felipe Holt Patient : 1935   MRN: [id number]  Reason for Admission: There are no discharge diagnoses documented for the most recent discharge. Discharge Date: 22       Spoke with: Santosh Shoemaker at [facility]    Discharge department/facility: [facility] Interactive Patient Contact:  Was patient able to fill all prescriptions: Yes  Was patient instructed to bring all medications to the follow-up visit: Yes  Is patient taking all medications as directed in the discharge summary?  Yes  Does patient understand their discharge instructions: No: not discharged until Saturday  Does patient have questions or concerns that need addressed prior to 7-14 day follow up office visit: no    Scheduled appointment with PCP within 7-14 days    Follow Up  Future Appointments   Date Time Provider Yulissa Landon   2022  1:00 PM SCHEDULE, [facility]   2022  1:00 PM GUS Mazariegos - SOLO [facility]   2022  2:00 PM Gail Hunt MD [address]   10/18/2022  8:05 AM SCHEDULE, [address] KEISHA Pelletier

## 2022-09-06 ENCOUNTER — CARE COORDINATION (OUTPATIENT)
Dept: CASE MANAGEMENT | Age: 87
End: 2022-09-06

## 2022-09-06 NOTE — CARE COORDINATION
[facility] Discharge Call    2022    Patient: Katina Hernandez Patient : 1935   MRN: [id number]  Reason for Admission: orthostatic hypotension  Discharge Date: 22 RARS: Readmission Risk Score: 17.7         Discharge Facility: [address] [place] [address] Course:   Pt to [facility] from  to . She presented to [facility] from [address] ED after multiple syncopal episodes. Imdur was stopped and midodrine and compression stockings started. Believed to be r/t hypovolemia and meds. Pt then to [facility] for 7 days with a d/c on 9/3 to home with Yulissa Gonzalez made:   cardio with Jazmin Pruitt    Sig Hx:   HTN, HLD, CAD, CKD, afib, dementia, pacemaker, CHF, BPH    DME:     Conversation:   Left HIPPA compliant message regarding the nature of the call and a request for a return call with my contact information      OSCAR Dominique, RN [phone] / [facility] 45 Transition Nurse         Follow up plan: Will re-attempt             Care Transitions Post Acute Facility Transition      Do you have all of your prescriptions and are they filled?: Yes         Care Transitions Interventions         Future Appointments   Date Time Provider Yulissa Landon   2022  1:00 PM SCHEDULE, [facility] Pamela Vyas [facility]   2022  1:00 PM Jazmin Pruitt, APRN - CNP Pamela Vyas [facility]   2022  2:00 PM MD STALIN Pizarro [facility]   10/18/2022  8:05 AM SCHEDULE, Yaz Meneses [facility]     Transitions of Care Initial Call    Was this an external facility discharge? Yes, 9/3/22  Discharge Facility: [facility].      Challenges to be reviewed by the provider         OSCAR Dominique, RN   [address] [place] [address] Transition Nurse  [phone]

## 2022-09-07 ENCOUNTER — CARE COORDINATION (OUTPATIENT)
Dept: CASE MANAGEMENT | Age: 87
End: 2022-09-07

## 2022-09-07 DIAGNOSIS — R55 SYNCOPE, UNSPECIFIED SYNCOPE TYPE: Primary | ICD-10-CM

## 2022-09-07 PROCEDURE — 1111F DSCHRG MED/CURRENT MED MERGE: CPT | Performed by: FAMILY MEDICINE

## 2022-09-07 NOTE — CARE COORDINATION
[facility] Discharge Call    2022    Patient: Alyson Mg Patient : 1935   MRN: [id number]  Reason for Admission: orthostatic hypotension  Discharge Date: 22 RARS: Readmission Risk Score: 17.7         Discharge Facility: [address] [place] [address] Course:   Pt to [facility] from  to . She presented to [facility] [place] [address] [address] [place] after multiple syncopal episodes. Imdur was stopped and midodrine and compression stockings started. Believed to be r/t hypovolemia and meds. Pt then to [facility] for 7 days with a d/c on 9/3 to home with [address] made:   cardio with Seng Fisher   HFU with Gerard Pascual Hx:   HTN, HLD, CAD, CKD, afib, dementia, pacemaker, CHF, BPH     DME:      Conversation:   Spoke to Jennifer Wilder after 2 IDs. Her dad is doing pretty good. Staples are removed and site looks good. His b/p is good and he is taking midodrine and has stopped imdur. Unsure if using his compression stockings. Educated that the stockings can help with b/p. [facility] has started coming out. He is sleeping well. He comes with her mom each evening to her house for supper and his appetite is good then. He is not SOB. Does not have edema. Reviewed appts and granddaughter will take. She is a pharm D student in her last year and she manages his meds. She is questioning the Eliquis as pt has afib and a pacemaker. Will route message to cardio. Reviewed meds with daughter not granddaughter and will route question to cardio. Agreeable to calls. Follow up plan:   [facility]      Do you have any ongoing symptoms?: No   Do you have all of your prescriptions and are they filled?: Yes   Have you scheduled your follow up appointment?: Yes   How are you going to get to your appointment?: Car - family or friend to transport (Comment: granddaughter will take who is in last year of pharm D)   Were you discharged with any Home Care or Post Acute Services or do you currently have any active services?: Yes   Post Acute Services: Home Health (Comment: [facility])         Do you feel like you have everything you need to keep you well at home?: Yes   Care Transitions Interventions         Future Appointments   Date Time Provider Yulissa Landon   2022  1:00 PM SCHEDULE, Epi Moses KEISHA   2022  1:00 PM GUS Mullins - CNP Chuy Ch[facility]   2022  2:00 PM MD STALIN Seaman [facility]   10/18/2022  8:05 AM SCHEDULE, Marco Matthews REMOTE TRANSMISSION [facility] Ch[facility]       Transitions of Care Initial Call    Was this an external facility discharge? Yes, 9/3/22  Discharge Facility: [address] [place] [address] to be reviewed by the provider   Additional needs identified to be addressed with provider: Yes  medications-Please review need for Eliquis. I think it may have been stopped d/t risk of fall. Pt will be at cardio appt tomorrow. Method of communication with provider : chart routing    Advance Care Planning:   Does patient have an Advance Directive: not on file. Care Transition Nurse contacted the family by telephone to perform post hospital discharge assessment. Verified name and  with family as identifiers. Provided introduction to self, and explanation of the CTN role. CTN reviewed discharge instructions, medical action plan and red flags with patient who verbalized understanding. Family given an opportunity to ask questions and does not have any further questions or concerns at this time. Were discharge instructions available to patient? Yes. Reviewed appropriate site of care based on symptoms and resources available to patient including: PCP. The family agrees to contact the PCP office for questions related to their healthcare.      Medication reconciliation was performed with family, who verbalizes understanding of administration of home medications. Advised obtaining a 90-day supply of all daily and as-needed medications. Was patient discharged with a pulse oximeter? no    CTN provided contact information. Plan for follow-up call in 5-7 days based on severity of symptoms and risk factors.   Plan for next call: referral to ambulatory care manager-[facility]        Warren Friedman, JEROMEN, RN   [address] [place] [address] Transition Nurse  [phone]

## 2022-09-07 NOTE — PROGRESS NOTES
[facility]   Electrophysiology Outpatient Note              Date:  September 8, 2022  Patient name: Abril Manuel  YOB: 1935    Primary Care physician: Minor Nieves MD    HISTORY OF PRESENT ILLNESS: The patient is a 82 y.o.  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 In 10/2017 he had a single chamber pacemaker implanted in [facility]. In 5/2020 he was seen by Dr. Alvarez Mg for shortness of breath. A lexiscan stress test was ordered. Burt Zhou showed an EF of 64% and a small mild defect at the apex consistent with ischemia. On 5/22/2020 he underwent LHC that showed mild to moderate diffuse epicardial CAD, 50% stenosis of mild LAD and EF of 55-60%. On 5/22/2020 EKG showed AF. He was admitted to the hospital in 8/2022 with recurrent syncope after a fall that resulted in laceration of his skull requiring staples. Echo showed an EF of 35-40% with hypokinesis of the anterior, septal, and apical walls. Interventional cardiology recommended LHC, but this was not completed due to deterioration in his mental status. This was planned to be done outpatient. He was treated for orthostatic hypotension and started on midodrine and florinef. All heart rate controlling medications were discontinued due to blood pressure. His Eliquis was discontinued due to risk of falls being greater than the benefit. Today he is being seen for AF and tachy-bruce syndrome. EKG shows AF with RVR with a HR of 130 bpm. He was at the nursing home for a week following his hospitalization. He has been home since Saturday. He continues to feel lightheaded everyday. He is dizzy today in the office. He has shortness of breath that is worse when he's up moving. He has midsternal chest pain that comes and goes. It is sometimes heavy in nature. He hasn't been doing much because he feels so weak. He did walk 200 yards to his granddaughters house today.  He feels unsteady on his feet and has been using a cane. Orthostatic BP in office today:   Supine: 130/85  Sittin/80  Standin/60     He is currently in AF with RVR with heart rates between 130 - 170 bpm.    Device check today shows:   Brand: Abbott  Mode: VVIR  Normal function   39%     Arrhythmias: AF, high ventricular rate events (180s-190s)  Battery life 10.6 years  RV impedance 660 ohms   RV threshold - V @ - ms  RV sensitivity 5.8 mV    Past Medical History:   has a past medical history of Atrial fibrillation ([address] Blind right eye, Chronic kidney disease, Dementia ([facility] [address] Hyperlipidemia, Hypertension, and Pneumonia. Past Surgical History:   has a past surgical history that includes Tonsillectomy; Appendectomy; Colonoscopy; eye surgery; Upper gastrointestinal endoscopy; joint replacement (); hernia repair; and pacemaker placement (10/13/2017). Home Medications:    Prior to Admission medications    Medication Sig Start Date End Date Taking? Authorizing Provider   atorvastatin (LIPITOR) 40 MG tablet Take 1 tablet by mouth daily 22  Yes Marly Sahni MD   fludrocortisone (FLORINEF) 0.1 MG tablet Take 2 tablets by mouth daily 22 Yes Marly Sahni MD   midodrine (PROAMATINE) 2.5 MG tablet Take 4 tablets by mouth 3 times daily 22  Yes Marly Sahni MD   memantine [facility]) 5 MG tablet Take 1 tablet by mouth 2 times daily 22  Yes Estela David MD   citalopram (CELEXA) 10 MG tablet TAKE 1 TABLET BY MOUTH DAILY 3/28/22  Yes Estela David MD   fluticasone Julianna Bellis) 50 MCG/ACT nasal spray 2 sprays by Nasal route daily 22  Yes Estela David MD   Ferrous Sulfate (IRON) 325 (65 Fe) MG TABS Take by mouth daily    Yes Historical Provider, MD   finasteride (PROSCAR) 5 MG tablet Take 5 mg by mouth daily. 3/4/15  Yes Historical Provider, MD   aspirin 81 MG tablet Take 81 mg by mouth daily.      Yes Historical Provider, MD   trospium (SANCTURA) 20 MG tablet Take 20 mg by mouth in the morning and 20 mg before bedtime. 8/26/22  Historical Provider, MD   omeprazole (PRILOSEC) 20 MG delayed release capsule TAKE 1 CAPSULE BY MOUTH 2 TIMES DAILY 8/8/22 9/7/22  Oscar Johnson MD   apixaban (ELIQUIS) 5 MG TABS tablet TAKE 1/2 TABLET BY MOUTH 2 TIMES DAILY 8/8/22 8/26/22  Oscar Johnson MD   metoprolol succinate (TOPROL XL) 25 MG extended release tablet TAKE 3 TABLETS BY MOUTH TWICE DAILY  Patient taking differently: Take 25 mg by mouth in the morning and 25 mg in the evening. 7/25/22 8/26/22  Ce Oates APRN - CNP   QUEtiapine (SEROQUEL) 50 MG tablet Take 1 tablet by mouth nightly 7/23/22 8/26/22  Oscar Johnson MD   famotidine (PEPCID) 20 MG tablet Take 1 tablet by mouth at bedtime 7/8/22 8/26/22  Oscar Johnson MD   isosorbide mononitrate (IMDUR) 30 MG extended release tablet TAKE 1 TABLET BY MOUTH DAILY 6/1/22 8/26/22  Jon Harrington MD   donepezil (ARICEPT) 10 MG tablet Take 1 tablet by mouth daily 3/24/22 8/26/22  Historical Provider, MD   tamsulosin (FLOMAX) 0.4 MG capsule Take 0.4 mg by mouth daily  8/26/22  Historical Provider, MD     Allergies:  Sulfa antibiotics    Social History:   reports that he quit smoking about 47 years ago. His smoking use included cigarettes. He has a 10.00 pack-year smoking history. He has never used smokeless tobacco. He reports that he does not drink alcohol and does not use drugs. Family History: family history includes Asthma in an other family member; Heart Disease in his father and mother; Other in his mother. All 14 point review of systems are completed and pertinent positives are mentioned in the history of present illness. Other systems are reviewed and are negative.      PHYSICAL EXAM:    Vital signs:    /80   Pulse 83   Ht 5' 7\" (1.702 m)   Wt 156 lb 6.4 oz (70.9 kg)   SpO2 98%   BMI 24.50 kg/m²      Constitutional and general appearance: alert, cooperative, no distress, and appears stated age  HEENT: PERRL, no cervical lymphadenopathy. No masses palpable. Normal oral mucosa  Respiratory:  Normal excursion and expansion without use of accessory muscles  Resp auscultation: Normal breath sounds without wheezing, rhonchi, and rales  Cardiovascular: The apical impulse is not displaced  Heart tones are crisp and normal. irregular S1 and S2., fast  Jugular venous pulsation Normal  The carotid upstroke is normal in amplitude and contour without delay or bruit  Peripheral pulses are symmetrical and full   Abdomen:  No masses or tenderness  Bowel sounds present  Extremities:   No cyanosis or clubbing   No lower extremity edema   Skin: warm and dry  Neurological:  Alert and oriented  Moves all extremities well  No abnormalities of mood, affect, memory, mentation, or behavior are noted    DATA:    ECG 9/08/2022:  AF with RVR,      Limited echo 8/10/2022:   Conclusions   Summary   Limited only f/u for LVEF. The left ventricular systolic function is moderately reduced with an   ejection fraction of 35 - 40 %. Left ventricular cavity size is normal.   Hypokinesis of the anterior, septal, and apical walls. Limited echo 4/15/2022:    Summary   Limited echo for LV function with limited doppler/color. LV function is mildly decreased with EF estimated from 45-50%. Mild global hypokinesis. Mild concentric left ventricular hypertrophy. Diastolic dysfunction grade and filling pressure are indeterminate. Severe biatrial enlargement. Aortic valve appears sclerotic but opens adequately. 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 Note no color flow doppler visualized to assess for mitral regurgitation. Mild to moderate tricuspid valve regurgitation. Systolic pulmonary artery pressure (sPAP) is normal and estimated at 26 mmHg   (RAP 3 mmHg)   Irregular heart rate throughout study. Definity® used for myocardial border enhancement.      ECHO: 3/21/2019:  Summary   Normal left ventricular systolic function with an estimated ejection   fraction of 55-60%. No regional wall motion abnormalities are seen. Normal left ventricular diastolic filling pressure. The left atrium is mildly dilated. The right atrium is moderately dilated. Right ventricular systolic function is mildly reduced . The right ventricle is mildly enlarged. The ascending aorta is mildly dilated. 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 Mild mitral regurgitation. Moderate tricuspid regurgitation. Systolic pulmonary artery pressure (SPAP) estimated at 47 mmHg (RA pressure   15 mmHg), consistent with mild pulmonary hypertension. Pacemaker lead in right ventricle. All labs and testing reviewed. CARDIOLOGY LABS:   CBC: No results for input(s): WBC, HGB, HCT, PLT in the last 72 hours. BMP: No results for input(s): NA, K, CO2, BUN, CREATININE, LABGLOM, GLUCOSE in the last 72 hours. PT/INR: No results for input(s): PROTIME, INR in the last 72 hours. APTT:No results for input(s): APTT in the last 72 hours. FASTING LIPID PANEL:  Lab Results   Component Value Date/Time    HDL 42 01/10/2022 08:05 AM    LDLDIRECT 73.3 07/19/2021 04:30 AM    LDLCALC 77 01/10/2022 08:05 AM    TRIG 89 01/10/2022 08:05 AM     LIVER PROFILE:No results for input(s): AST, ALT, ALB in the last 72 hours.     IMPRESSION:    Patient Active Problem List   Diagnosis    Pneumonia    Abnormal chest CT    Cough syncope    Tracheobronchomalacia    Hyperlipidemia    Hypertension    Chronic kidney disease, stage III (moderate) (HCC)    A-fib (HCC)    S/P placement of cardiac pacemaker    Abnormal echocardiogram    Dizziness    SOB (shortness of breath)    Abnormal cardiovascular stress test    Coronary artery disease involving native coronary artery of native heart without angina pectoris, 5/2020 abnormal stress test. [facility] mild LAD disease     Other chest pain    Atrial fibrillation with rapid ventricular response (HCC)    Syncope and collapse COVID    NATANAEL (acute kidney injury) ([facility] [address]    Atrial fibrillation with RVR ([facility] [address]    Dementia with behavioral disturbance (HCC)    Hallucinations    Syncope, unspecified syncope type    Ischemic cardiomyopathy     Assessment:   Persistent atrial fibrillation: ongoing   -[id number] score 3 (age, HTN)   -numerous high ventricular events on device interrogation, up to 180-190 bpm. Rates are currently 130-170 bpm in office  Tachy-bruce syndrome:   -s/p single chamber pacemaker implant 10/2017   -device check per HPI  Cardiomyopathy: likely ischemia     -EF 35-40% on echo 8/2022, EF 45-50% on echo 4/2022  Coronary artery disease: nonobstructive   -50% stenosis of LAD on [facility] 2020  HTN  Orthostatic hypotension: + in office   -on midodrine, florinef  HLD  CKD   Dementia with memory loss      Plan:   1. Recommend ED referral due to tachycardia and rapid heart rates in AF. Orthostatic BP + in the office. Unable to add rate control medications due to orthostatic hypotension. Unable to use anti-arrhythmic medications due to the patient not being appropriately anticoagulated because of recent falls. The only medication option would be digoxin, and given his CKD this is not an ideal. Overall treatment options are limited and guarded in the outpatient setting. Discussed with patient and granddaughter. He is agreeable for ED evaluation. ED RN called and report given.      Discussed plan of care with Dr. Vincent Sarkar        MDM high      Dockery Carson, APRN-CNP  [address]  [phone]

## 2022-09-08 ENCOUNTER — APPOINTMENT (OUTPATIENT)
Dept: GENERAL RADIOLOGY | Age: 87
DRG: 242 | End: 2022-09-08
Payer: MEDICARE

## 2022-09-08 ENCOUNTER — OFFICE VISIT (OUTPATIENT)
Dept: CARDIOLOGY CLINIC | Age: 87
End: 2022-09-08
Payer: MEDICARE

## 2022-09-08 ENCOUNTER — NURSE ONLY (OUTPATIENT)
Dept: CARDIOLOGY CLINIC | Age: 87
End: 2022-09-08
Payer: MEDICARE

## 2022-09-08 ENCOUNTER — HOSPITAL ENCOUNTER (INPATIENT)
Age: 87
LOS: 10 days | Discharge: NURSING, REHAB OR LONG-TERM CARE | DRG: 242 | End: 2022-09-18
Attending: EMERGENCY MEDICINE | Admitting: INTERNAL MEDICINE
Payer: MEDICARE

## 2022-09-08 VITALS
SYSTOLIC BLOOD PRESSURE: 110 MMHG | WEIGHT: 156.4 LBS | DIASTOLIC BLOOD PRESSURE: 60 MMHG | HEART RATE: 148 BPM | HEIGHT: 67 IN | OXYGEN SATURATION: 96 % | BODY MASS INDEX: 24.55 KG/M2

## 2022-09-08 DIAGNOSIS — I48.11 LONGSTANDING PERSISTENT ATRIAL FIBRILLATION (HCC): ICD-10-CM

## 2022-09-08 DIAGNOSIS — I48.91 ATRIAL FIBRILLATION WITH RVR (HCC): ICD-10-CM

## 2022-09-08 DIAGNOSIS — N18.9 CHRONIC KIDNEY DISEASE, UNSPECIFIED CKD STAGE: ICD-10-CM

## 2022-09-08 DIAGNOSIS — R55 SYNCOPE, UNSPECIFIED SYNCOPE TYPE: ICD-10-CM

## 2022-09-08 DIAGNOSIS — R42 DIZZINESS: ICD-10-CM

## 2022-09-08 DIAGNOSIS — Z95.0 PACEMAKER: ICD-10-CM

## 2022-09-08 DIAGNOSIS — I48.91 ATRIAL FIBRILLATION WITH RAPID VENTRICULAR RESPONSE (HCC): Primary | ICD-10-CM

## 2022-09-08 DIAGNOSIS — I48.91 ATRIAL FIBRILLATION WITH RAPID VENTRICULAR RESPONSE (HCC): ICD-10-CM

## 2022-09-08 DIAGNOSIS — I48.91 ATRIAL FIBRILLATION WITH RVR (HCC): Primary | ICD-10-CM

## 2022-09-08 DIAGNOSIS — I25.5 ISCHEMIC CARDIOMYOPATHY: Primary | ICD-10-CM

## 2022-09-08 DIAGNOSIS — I50.23 ACUTE ON CHRONIC SYSTOLIC CONGESTIVE HEART FAILURE (HCC): ICD-10-CM

## 2022-09-08 LAB
A/G RATIO: 1.3 (ref 1.1–2.2)
ALBUMIN SERPL-MCNC: 4 G/DL (ref 3.4–5)
ALP BLD-CCNC: 93 U/L (ref 40–129)
ALT SERPL-CCNC: 22 U/L (ref 10–40)
ANION GAP SERPL CALCULATED.3IONS-SCNC: 14 MMOL/L (ref 3–16)
AST SERPL-CCNC: 19 U/L (ref 15–37)
BASOPHILS ABSOLUTE: 0.1 K/UL (ref 0–0.2)
BASOPHILS RELATIVE PERCENT: 1.1 %
BILIRUB SERPL-MCNC: 1.4 MG/DL (ref 0–1)
BILIRUBIN URINE: NEGATIVE
BLOOD, URINE: NEGATIVE
BUN BLDV-MCNC: 22 MG/DL (ref 7–20)
CALCIUM SERPL-MCNC: 9.1 MG/DL (ref 8.3–10.6)
CHLORIDE BLD-SCNC: 102 MMOL/L (ref 99–110)
CLARITY: CLEAR
CO2: 26 MMOL/L (ref 21–32)
COLOR: YELLOW
CREAT SERPL-MCNC: 1.4 MG/DL (ref 0.8–1.3)
EKG ATRIAL RATE: 147 BPM
EKG ATRIAL RATE: 357 BPM
EKG DIAGNOSIS: NORMAL
EKG DIAGNOSIS: NORMAL
EKG Q-T INTERVAL: 262 MS
EKG Q-T INTERVAL: 402 MS
EKG QRS DURATION: 76 MS
EKG QRS DURATION: 78 MS
EKG QTC CALCULATION (BAZETT): 391 MS
EKG QTC CALCULATION (BAZETT): 469 MS
EKG R AXIS: 92 DEGREES
EKG R AXIS: 94 DEGREES
EKG T AXIS: -69 DEGREES
EKG T AXIS: 266 DEGREES
EKG VENTRICULAR RATE: 134 BPM
EKG VENTRICULAR RATE: 82 BPM
EOSINOPHILS ABSOLUTE: 0.1 K/UL (ref 0–0.6)
EOSINOPHILS RELATIVE PERCENT: 0.8 %
GFR AFRICAN AMERICAN: 58
GFR NON-AFRICAN AMERICAN: 48
GLUCOSE BLD-MCNC: 82 MG/DL (ref 70–99)
GLUCOSE URINE: NEGATIVE MG/DL
HCT VFR BLD CALC: 43.5 % (ref 40.5–52.5)
HEMOGLOBIN: 14.6 G/DL (ref 13.5–17.5)
KETONES, URINE: ABNORMAL MG/DL
LEUKOCYTE ESTERASE, URINE: NEGATIVE
LYMPHOCYTES ABSOLUTE: 1.5 K/UL (ref 1–5.1)
LYMPHOCYTES RELATIVE PERCENT: 21.2 %
MCH RBC QN AUTO: 30.1 PG (ref 26–34)
MCHC RBC AUTO-ENTMCNC: 33.6 G/DL (ref 31–36)
MCV RBC AUTO: 89.6 FL (ref 80–100)
MICROSCOPIC EXAMINATION: ABNORMAL
MONOCYTES ABSOLUTE: 0.7 K/UL (ref 0–1.3)
MONOCYTES RELATIVE PERCENT: 10.2 %
NEUTROPHILS ABSOLUTE: 4.6 K/UL (ref 1.7–7.7)
NEUTROPHILS RELATIVE PERCENT: 66.7 %
NITRITE, URINE: NEGATIVE
PDW BLD-RTO: 17.1 % (ref 12.4–15.4)
PH UA: 6 (ref 5–8)
PLATELET # BLD: 266 K/UL (ref 135–450)
PMV BLD AUTO: 7.9 FL (ref 5–10.5)
POTASSIUM REFLEX MAGNESIUM: 3.7 MMOL/L (ref 3.5–5.1)
PRO-BNP: ABNORMAL PG/ML (ref 0–449)
PROTEIN UA: NEGATIVE MG/DL
RBC # BLD: 4.85 M/UL (ref 4.2–5.9)
SODIUM BLD-SCNC: 142 MMOL/L (ref 136–145)
SPECIFIC GRAVITY UA: 1.02 (ref 1–1.03)
TOTAL PROTEIN: 7 G/DL (ref 6.4–8.2)
TROPONIN: 0.01 NG/ML
TROPONIN: 0.01 NG/ML
URINE REFLEX TO CULTURE: ABNORMAL
URINE TYPE: ABNORMAL
UROBILINOGEN, URINE: 0.2 E.U./DL
WBC # BLD: 6.9 K/UL (ref 4–11)

## 2022-09-08 PROCEDURE — 2500000003 HC RX 250 WO HCPCS: Performed by: EMERGENCY MEDICINE

## 2022-09-08 PROCEDURE — 99285 EMERGENCY DEPT VISIT HI MDM: CPT

## 2022-09-08 PROCEDURE — 71045 X-RAY EXAM CHEST 1 VIEW: CPT

## 2022-09-08 PROCEDURE — 6370000000 HC RX 637 (ALT 250 FOR IP): Performed by: INTERNAL MEDICINE

## 2022-09-08 PROCEDURE — 81003 URINALYSIS AUTO W/O SCOPE: CPT

## 2022-09-08 PROCEDURE — 1123F ACP DISCUSS/DSCN MKR DOCD: CPT

## 2022-09-08 PROCEDURE — 1200000000 HC SEMI PRIVATE

## 2022-09-08 PROCEDURE — 36415 COLL VENOUS BLD VENIPUNCTURE: CPT

## 2022-09-08 PROCEDURE — 93279 PRGRMG DEV EVAL PM/LDLS PM: CPT | Performed by: INTERNAL MEDICINE

## 2022-09-08 PROCEDURE — 2580000003 HC RX 258: Performed by: INTERNAL MEDICINE

## 2022-09-08 PROCEDURE — 96375 TX/PRO/DX INJ NEW DRUG ADDON: CPT

## 2022-09-08 PROCEDURE — 85025 COMPLETE CBC W/AUTO DIFF WBC: CPT

## 2022-09-08 PROCEDURE — 6360000002 HC RX W HCPCS: Performed by: INTERNAL MEDICINE

## 2022-09-08 PROCEDURE — 93005 ELECTROCARDIOGRAM TRACING: CPT | Performed by: EMERGENCY MEDICINE

## 2022-09-08 PROCEDURE — 80053 COMPREHEN METABOLIC PANEL: CPT

## 2022-09-08 PROCEDURE — 83880 ASSAY OF NATRIURETIC PEPTIDE: CPT

## 2022-09-08 PROCEDURE — 6360000002 HC RX W HCPCS: Performed by: EMERGENCY MEDICINE

## 2022-09-08 PROCEDURE — 84484 ASSAY OF TROPONIN QUANT: CPT

## 2022-09-08 PROCEDURE — 96374 THER/PROPH/DIAG INJ IV PUSH: CPT

## 2022-09-08 PROCEDURE — 99215 OFFICE O/P EST HI 40 MIN: CPT

## 2022-09-08 RX ORDER — METOPROLOL TARTRATE 5 MG/5ML
2.5 INJECTION INTRAVENOUS EVERY 6 HOURS PRN
Status: DISCONTINUED | OUTPATIENT
Start: 2022-09-08 | End: 2022-09-12

## 2022-09-08 RX ORDER — ONDANSETRON 4 MG/1
4 TABLET, ORALLY DISINTEGRATING ORAL EVERY 8 HOURS PRN
Status: DISCONTINUED | OUTPATIENT
Start: 2022-09-08 | End: 2022-09-18 | Stop reason: HOSPADM

## 2022-09-08 RX ORDER — CITALOPRAM 20 MG/1
10 TABLET ORAL DAILY
Status: DISCONTINUED | OUTPATIENT
Start: 2022-09-09 | End: 2022-09-18 | Stop reason: HOSPADM

## 2022-09-08 RX ORDER — FUROSEMIDE 10 MG/ML
20 INJECTION INTRAMUSCULAR; INTRAVENOUS 2 TIMES DAILY
Status: DISCONTINUED | OUTPATIENT
Start: 2022-09-09 | End: 2022-09-12

## 2022-09-08 RX ORDER — ENOXAPARIN SODIUM 100 MG/ML
40 INJECTION SUBCUTANEOUS DAILY
Status: DISCONTINUED | OUTPATIENT
Start: 2022-09-08 | End: 2022-09-15

## 2022-09-08 RX ORDER — POLYETHYLENE GLYCOL 3350 17 G/17G
17 POWDER, FOR SOLUTION ORAL DAILY PRN
Status: DISCONTINUED | OUTPATIENT
Start: 2022-09-08 | End: 2022-09-18 | Stop reason: HOSPADM

## 2022-09-08 RX ORDER — SODIUM CHLORIDE 0.9 % (FLUSH) 0.9 %
5-40 SYRINGE (ML) INJECTION PRN
Status: DISCONTINUED | OUTPATIENT
Start: 2022-09-08 | End: 2022-09-18 | Stop reason: HOSPADM

## 2022-09-08 RX ORDER — FINASTERIDE 5 MG/1
5 TABLET, FILM COATED ORAL DAILY
Status: DISCONTINUED | OUTPATIENT
Start: 2022-09-09 | End: 2022-09-18 | Stop reason: HOSPADM

## 2022-09-08 RX ORDER — MIDODRINE HYDROCHLORIDE 5 MG/1
10 TABLET ORAL
Status: DISCONTINUED | OUTPATIENT
Start: 2022-09-09 | End: 2022-09-18 | Stop reason: HOSPADM

## 2022-09-08 RX ORDER — SODIUM CHLORIDE 9 MG/ML
INJECTION, SOLUTION INTRAVENOUS PRN
Status: DISCONTINUED | OUTPATIENT
Start: 2022-09-08 | End: 2022-09-16 | Stop reason: SDUPTHER

## 2022-09-08 RX ORDER — SODIUM CHLORIDE 0.9 % (FLUSH) 0.9 %
5-40 SYRINGE (ML) INJECTION EVERY 12 HOURS SCHEDULED
Status: DISCONTINUED | OUTPATIENT
Start: 2022-09-08 | End: 2022-09-18 | Stop reason: HOSPADM

## 2022-09-08 RX ORDER — ATORVASTATIN CALCIUM 40 MG/1
40 TABLET, FILM COATED ORAL DAILY
Status: DISCONTINUED | OUTPATIENT
Start: 2022-09-08 | End: 2022-09-18 | Stop reason: HOSPADM

## 2022-09-08 RX ORDER — ASPIRIN 81 MG/1
81 TABLET, CHEWABLE ORAL DAILY
Status: DISCONTINUED | OUTPATIENT
Start: 2022-09-09 | End: 2022-09-18 | Stop reason: HOSPADM

## 2022-09-08 RX ORDER — ACETAMINOPHEN 650 MG/1
650 SUPPOSITORY RECTAL EVERY 6 HOURS PRN
Status: DISCONTINUED | OUTPATIENT
Start: 2022-09-08 | End: 2022-09-16

## 2022-09-08 RX ORDER — DILTIAZEM HYDROCHLORIDE 5 MG/ML
10 INJECTION INTRAVENOUS ONCE
Status: COMPLETED | OUTPATIENT
Start: 2022-09-08 | End: 2022-09-08

## 2022-09-08 RX ORDER — ACETAMINOPHEN 325 MG/1
650 TABLET ORAL EVERY 6 HOURS PRN
Status: DISCONTINUED | OUTPATIENT
Start: 2022-09-08 | End: 2022-09-16

## 2022-09-08 RX ORDER — FUROSEMIDE 10 MG/ML
40 INJECTION INTRAMUSCULAR; INTRAVENOUS ONCE
Status: COMPLETED | OUTPATIENT
Start: 2022-09-08 | End: 2022-09-08

## 2022-09-08 RX ORDER — FLUDROCORTISONE ACETATE 0.1 MG/1
0.2 TABLET ORAL DAILY
Status: DISCONTINUED | OUTPATIENT
Start: 2022-09-09 | End: 2022-09-18 | Stop reason: HOSPADM

## 2022-09-08 RX ORDER — MEMANTINE HYDROCHLORIDE 5 MG/1
5 TABLET ORAL 2 TIMES DAILY
Status: DISCONTINUED | OUTPATIENT
Start: 2022-09-08 | End: 2022-09-18 | Stop reason: HOSPADM

## 2022-09-08 RX ORDER — PANTOPRAZOLE SODIUM 40 MG/1
40 TABLET, DELAYED RELEASE ORAL
Status: DISCONTINUED | OUTPATIENT
Start: 2022-09-09 | End: 2022-09-18 | Stop reason: HOSPADM

## 2022-09-08 RX ORDER — ONDANSETRON 2 MG/ML
4 INJECTION INTRAMUSCULAR; INTRAVENOUS EVERY 6 HOURS PRN
Status: DISCONTINUED | OUTPATIENT
Start: 2022-09-08 | End: 2022-09-18 | Stop reason: HOSPADM

## 2022-09-08 RX ADMIN — MEMANTINE 5 MG: 5 TABLET ORAL at 21:29

## 2022-09-08 RX ADMIN — DILTIAZEM HYDROCHLORIDE 10 MG: 5 INJECTION, SOLUTION INTRAVENOUS at 15:48

## 2022-09-08 RX ADMIN — ATORVASTATIN CALCIUM 40 MG: 40 TABLET, FILM COATED ORAL at 21:29

## 2022-09-08 RX ADMIN — FUROSEMIDE 40 MG: 10 INJECTION, SOLUTION INTRAMUSCULAR; INTRAVENOUS at 16:31

## 2022-09-08 RX ADMIN — Medication 10 ML: at 21:30

## 2022-09-08 RX ADMIN — ENOXAPARIN SODIUM 40 MG: 100 INJECTION SUBCUTANEOUS at 21:29

## 2022-09-08 ASSESSMENT — LIFESTYLE VARIABLES
HOW MANY STANDARD DRINKS CONTAINING ALCOHOL DO YOU HAVE ON A TYPICAL DAY: PATIENT DOES NOT DRINK
HOW OFTEN DO YOU HAVE A DRINK CONTAINING ALCOHOL: NEVER

## 2022-09-08 ASSESSMENT — PAIN - FUNCTIONAL ASSESSMENT
PAIN_FUNCTIONAL_ASSESSMENT: NONE - DENIES PAIN
PAIN_FUNCTIONAL_ASSESSMENT: NONE - DENIES PAIN

## 2022-09-08 NOTE — ED NOTES
Patient identified as a positive fall risk on the ED triage fall screening. Patient placed in fall precautions which includes:  yellow fall risk bracelet on wrist and yellow socks on feet. Patient instructed on importance of not getting out of bed or ambulating without assistance for safety. Pt verbalized understanding.        Gregorio Guerra RN  09/08/22 [id number]

## 2022-09-08 NOTE — H&P
Hospital Medicine History & Physical      PCP: Minor Nieves MD    Date of Admission: 9/8/2022    Date of Service: Pt seen/examined on 9/8/2022 and Admitted to Inpatient with expected LOS greater than two midnights due to medical therapy. Chief Complaint: A. fib with rapid ventricular rate, hypertension-patient was sent to ER from cardiology office      History Of Present Illness:  (  82 y.o. male who presented to [facility] with above complaint. He is a very poor historian most of the history is from ER doctor. He told he went to cardiology is routine follow-up of following recent hospital admission. He was not feeling that great this morning. In the office they found his heart rate was high and blood pressure was low. He was sent to the emergency room for further evaluation. Currently he is having dinner and does not have any complaints like chest pain shortness of breath dizziness syncope cough fever nausea vomiting urinary complaints or diarrhea. Bood pressure is much improved upon arrival to  the emergency room   He received 1 bolus of Cardizem for rapid ventricular rate      Past Medical History:          Diagnosis Date    Atrial fibrillation ([address]     Blind right eye     Chronic kidney disease     Dementia ([address]     Hyperlipidemia     Hypertension     Pneumonia        Past Surgical History:          Procedure Laterality Date    APPENDECTOMY      COLONOSCOPY      [address] REPLACEMENT  2013    left shoulder    PACEMAKER PLACEMENT  10/13/2017    Dr Tuan Potts at [facility]. single chamber PPM    TONSILLECTOMY      UPPER GASTROINTESTINAL ENDOSCOPY         Medications Prior to Admission:      Prior to Admission medications    Medication Sig Start Date End Date Taking?  Authorizing Provider   atorvastatin (LIPITOR) 40 MG tablet Take 1 tablet by mouth daily 8/26/22   Carolyn Estrada MD   fludrocortisone (FLORINEF) 0.1 MG tablet Take 2 patient currently lives at home    TOBACCO:   reports that he quit smoking about 47 years ago. His smoking use included cigarettes. He has a 10.00 pack-year smoking history. He has never used smokeless tobacco.  ETOH:   reports no history of alcohol use. E-cigarette/Vaping       Questions Responses    E-cigarette/Vaping Use Never User    Start Date     Passive Exposure     Quit Date     Counseling Given     Comments               Family History:      Reviewed and negative in regards to presenting illness/complaint. Problem Relation Age of Onset    Heart Disease Mother     Other Mother     Heart Disease Father     Asthma Other        REVIEW OF SYSTEMS COMPLETED:   Pertinent positives as noted in the HPI. All other systems reviewed and negative. PHYSICAL EXAM PERFORMED:    /80   Pulse 91   Temp 97.8 °F (36.6 °C) (Oral)   Resp 20   Ht 5' 7\" (1.702 m)   Wt 156 lb (70.8 kg)   SpO2 96%   BMI 24.43 kg/m²     General appearance:  No apparent distress, appears stated age and cooperative. HEENT:  Normal cephalic, a right eye blind. Extra ocular muscles intact. Conjunctivae/corneas clear. Neck: Supple, with full range of motion. No jugular venous distention. Trachea midline. Respiratory:  Normal respiratory effort. Clear to auscultation, bilaterally without Rales/Wheezes/Rhonchi. Cardiovascular:  Regular rate and rhythm with normal S1/S2 without murmurs, rubs or gallops. Abdomen: Soft, non-tender, non-distended with normal bowel sounds. Musculoskeletal:  No clubbing, cyanosis , has 2+ edema bilaterally. Full range of motion without deformity. Skin: Skin color, texture, turgor normal.  No rashes or lesions. Neurologic:  Neurovascularly intact without any focal sensory/motor deficits.    Psychiatric:  Alert and oriented, t  Capillary Refill: Brisk,3 seconds, normal  Peripheral Pulses: +2 palpable, equal bilaterally       Labs:     Recent Labs     09/08/22  1456   WBC 6.9   HGB 14.6   HCT 43.5      Recent Labs     09/08/22  1456      K 3.7      CO2 26   BUN 22*   CREATININE 1.4*   CALCIUM 9.1     Recent Labs     09/08/22  1456   AST 19   ALT 22   BILITOT 1.4*   ALKPHOS 93     No results for input(s): INR in the last 72 hours. Recent Labs     09/08/22  1456   TROPONINI 0.01       Urinalysis:      Lab Results   Component Value Date/Time    NITRU Negative 09/08/2022 04:59 PM    WBCUA 21-50 04/21/2022 02:20 PM    BACTERIA 1+ 04/21/2022 02:20 PM    RBCUA 3-4 04/21/2022 02:20 PM    BLOODU Negative 09/08/2022 04:59 PM    SPECGRAV 1.020 09/08/2022 04:59 PM    GLUCOSEU Negative 09/08/2022 04:59 PM       Radiology:     CXR: I have reviewed the CXR with the following interpretation:   EKG:  I have reviewed the EKG with the following interpretation: Atrial fibrillation with rapid ventricular rate    XR CHEST PORTABLE   Final Result   Cardiomegaly with mild pulmonary vascular congestion.              Consults:    IP CONSULT TO HEART FAILURE NURSE/COORDINATOR  IP CONSULT TO DIETITIAN  IP CONSULT TO CARDIOLOGY    ASSESSMENT:    Active Hospital Problems    Diagnosis Date Noted    Atrial fibrillation, rapid [facility]) [I48.91] 09/08/2022     Priority: Medium         PLAN:    Atrial fibrillation with rapid ventricular rate  -s/p IV fluid and Cardizem bolus-currently heart rate is better controlled and blood pressure is stable-given low ejection fraction given metoprolol IV as needed for rapid ventricular rate, hold antihypertensive,   He was not on metoprolol because of orthostasis and not on anticoagulation because of fall risk  Cardiology consulted    History of orthostasis-on Florinef and midodrine, continue-currently blood pressure stable    Chronic systolic CHF with ejection fraction of 35% in August 2022-mild fluid overload-consider Lasix,as needed, Lasix 40 mg IV in the emergency room    Chronic kidney disease-stage III creatinine 1.4, monitor    Hypertension-meds on hold    Hyperlipidemia-statin    DVT Prophylaxis: Lovenox  Diet: Cardiac diet  Code Status: Full code    PT/OT Eval Status:     Dispo -admitted [facility] telemetry       Martha Hirsch MD    Thank you Romero Khan MD for the opportunity to be involved in this patient's care. If you have any questions or concerns please feel free to contact me at [phone].

## 2022-09-08 NOTE — PROGRESS NOTES
Patient presents to the device clinic today for a programming evaluation for his pacemaker. Patient has a history of AF, AF w/ RVR, and ICM. Last device interrogation was on 8/10. Since then, HVR events recorded appear to be RVR. Patient currently in RVR with V rates ranging from 130 - 170s. R wave: 5.8 mV     39%    All sensing and pacing parameters are within normal range. No changes need to be made at this time. Patient will see TL Calderon in office today. Patient education was provided about device functionality, in home monitoring, and any other patient questions and/or concerns were addressed. Patient voices understanding. Patient will follow up in 3 months in office or remotely. Please see interrogation for more detail - Paceart report located under the Cardiology tab.

## 2022-09-09 ENCOUNTER — TELEPHONE (OUTPATIENT)
Dept: CARDIOLOGY CLINIC | Age: 87
End: 2022-09-09

## 2022-09-09 PROBLEM — I95.1 ORTHOSTATIC HYPOTENSION: Status: ACTIVE | Noted: 2022-09-09

## 2022-09-09 LAB
ANION GAP SERPL CALCULATED.3IONS-SCNC: 14 MMOL/L (ref 3–16)
BASOPHILS ABSOLUTE: 0.1 K/UL (ref 0–0.2)
BASOPHILS RELATIVE PERCENT: 0.9 %
BUN BLDV-MCNC: 19 MG/DL (ref 7–20)
CALCIUM SERPL-MCNC: 9.5 MG/DL (ref 8.3–10.6)
CHLORIDE BLD-SCNC: 102 MMOL/L (ref 99–110)
CHOLESTEROL, TOTAL: 137 MG/DL (ref 0–199)
CO2: 28 MMOL/L (ref 21–32)
CREAT SERPL-MCNC: 1.3 MG/DL (ref 0.8–1.3)
EOSINOPHILS ABSOLUTE: 0.2 K/UL (ref 0–0.6)
EOSINOPHILS RELATIVE PERCENT: 3 %
GFR AFRICAN AMERICAN: >60
GFR NON-AFRICAN AMERICAN: 52
GLUCOSE BLD-MCNC: 71 MG/DL (ref 70–99)
HCT VFR BLD CALC: 42.1 % (ref 40.5–52.5)
HDLC SERPL-MCNC: 30 MG/DL (ref 40–60)
HEMOGLOBIN: 14.1 G/DL (ref 13.5–17.5)
LDL CHOLESTEROL CALCULATED: 92 MG/DL
LYMPHOCYTES ABSOLUTE: 2.3 K/UL (ref 1–5.1)
LYMPHOCYTES RELATIVE PERCENT: 32.7 %
MAGNESIUM: 1.7 MG/DL (ref 1.8–2.4)
MCH RBC QN AUTO: 30 PG (ref 26–34)
MCHC RBC AUTO-ENTMCNC: 33.4 G/DL (ref 31–36)
MCV RBC AUTO: 89.7 FL (ref 80–100)
MONOCYTES ABSOLUTE: 0.8 K/UL (ref 0–1.3)
MONOCYTES RELATIVE PERCENT: 11.8 %
NEUTROPHILS ABSOLUTE: 3.6 K/UL (ref 1.7–7.7)
NEUTROPHILS RELATIVE PERCENT: 51.6 %
PDW BLD-RTO: 17.3 % (ref 12.4–15.4)
PLATELET # BLD: 239 K/UL (ref 135–450)
PMV BLD AUTO: 8 FL (ref 5–10.5)
POTASSIUM SERPL-SCNC: 2.9 MMOL/L (ref 3.5–5.1)
RBC # BLD: 4.7 M/UL (ref 4.2–5.9)
SODIUM BLD-SCNC: 144 MMOL/L (ref 136–145)
TRIGL SERPL-MCNC: 73 MG/DL (ref 0–150)
TROPONIN: <0.01 NG/ML
VLDLC SERPL CALC-MCNC: 15 MG/DL
WBC # BLD: 6.9 K/UL (ref 4–11)

## 2022-09-09 PROCEDURE — 97166 OT EVAL MOD COMPLEX 45 MIN: CPT

## 2022-09-09 PROCEDURE — 83735 ASSAY OF MAGNESIUM: CPT

## 2022-09-09 PROCEDURE — 2500000003 HC RX 250 WO HCPCS: Performed by: INTERNAL MEDICINE

## 2022-09-09 PROCEDURE — 6360000002 HC RX W HCPCS: Performed by: INTERNAL MEDICINE

## 2022-09-09 PROCEDURE — 80061 LIPID PANEL: CPT

## 2022-09-09 PROCEDURE — 2580000003 HC RX 258: Performed by: INTERNAL MEDICINE

## 2022-09-09 PROCEDURE — 97162 PT EVAL MOD COMPLEX 30 MIN: CPT

## 2022-09-09 PROCEDURE — 1200000000 HC SEMI PRIVATE

## 2022-09-09 PROCEDURE — 97530 THERAPEUTIC ACTIVITIES: CPT

## 2022-09-09 PROCEDURE — 99223 1ST HOSP IP/OBS HIGH 75: CPT | Performed by: INTERNAL MEDICINE

## 2022-09-09 PROCEDURE — 6370000000 HC RX 637 (ALT 250 FOR IP): Performed by: INTERNAL MEDICINE

## 2022-09-09 PROCEDURE — 36415 COLL VENOUS BLD VENIPUNCTURE: CPT

## 2022-09-09 PROCEDURE — 80048 BASIC METABOLIC PNL TOTAL CA: CPT

## 2022-09-09 PROCEDURE — 85025 COMPLETE CBC W/AUTO DIFF WBC: CPT

## 2022-09-09 RX ORDER — MAGNESIUM SULFATE IN WATER 40 MG/ML
2000 INJECTION, SOLUTION INTRAVENOUS ONCE
Status: COMPLETED | OUTPATIENT
Start: 2022-09-09 | End: 2022-09-09

## 2022-09-09 RX ORDER — POTASSIUM CHLORIDE 29.8 MG/ML
20 INJECTION INTRAVENOUS
Status: DISCONTINUED | OUTPATIENT
Start: 2022-09-09 | End: 2022-09-09

## 2022-09-09 RX ORDER — POTASSIUM CHLORIDE 7.45 MG/ML
10 INJECTION INTRAVENOUS
Status: DISPENSED | OUTPATIENT
Start: 2022-09-09 | End: 2022-09-09

## 2022-09-09 RX ORDER — DIGOXIN 125 MCG
125 TABLET ORAL DAILY
Status: DISCONTINUED | OUTPATIENT
Start: 2022-09-09 | End: 2022-09-16

## 2022-09-09 RX ADMIN — FUROSEMIDE 20 MG: 10 INJECTION, SOLUTION INTRAMUSCULAR; INTRAVENOUS at 08:16

## 2022-09-09 RX ADMIN — METOPROLOL TARTRATE 2.5 MG: 5 INJECTION INTRAVENOUS at 15:59

## 2022-09-09 RX ADMIN — CITALOPRAM HYDROBROMIDE 10 MG: 20 TABLET ORAL at 08:15

## 2022-09-09 RX ADMIN — AMIODARONE HYDROCHLORIDE 1 MG/MIN: 50 INJECTION, SOLUTION INTRAVENOUS at 18:55

## 2022-09-09 RX ADMIN — DIGOXIN 125 MCG: 125 TABLET ORAL at 15:50

## 2022-09-09 RX ADMIN — FLUDROCORTISONE ACETATE 0.2 MG: 0.1 TABLET ORAL at 08:15

## 2022-09-09 RX ADMIN — POTASSIUM CHLORIDE 10 MEQ: 7.46 INJECTION, SOLUTION INTRAVENOUS at 12:58

## 2022-09-09 RX ADMIN — PANTOPRAZOLE SODIUM 40 MG: 40 TABLET, DELAYED RELEASE ORAL at 05:39

## 2022-09-09 RX ADMIN — ATORVASTATIN CALCIUM 40 MG: 40 TABLET, FILM COATED ORAL at 08:15

## 2022-09-09 RX ADMIN — POTASSIUM CHLORIDE 10 MEQ: 7.46 INJECTION, SOLUTION INTRAVENOUS at 10:48

## 2022-09-09 RX ADMIN — MIDODRINE HYDROCHLORIDE 10 MG: 5 TABLET ORAL at 08:15

## 2022-09-09 RX ADMIN — MAGNESIUM SULFATE HEPTAHYDRATE 2000 MG: 40 INJECTION, SOLUTION INTRAVENOUS at 08:35

## 2022-09-09 RX ADMIN — POTASSIUM CHLORIDE 10 MEQ: 7.46 INJECTION, SOLUTION INTRAVENOUS at 08:40

## 2022-09-09 RX ADMIN — POTASSIUM CHLORIDE 10 MEQ: 7.46 INJECTION, SOLUTION INTRAVENOUS at 09:53

## 2022-09-09 RX ADMIN — POTASSIUM CHLORIDE 10 MEQ: 7.46 INJECTION, SOLUTION INTRAVENOUS at 11:40

## 2022-09-09 RX ADMIN — METOPROLOL TARTRATE 2.5 MG: 5 INJECTION INTRAVENOUS at 09:04

## 2022-09-09 RX ADMIN — Medication 10 ML: at 09:09

## 2022-09-09 RX ADMIN — ASPIRIN 81 MG 81 MG: 81 TABLET ORAL at 08:15

## 2022-09-09 RX ADMIN — FUROSEMIDE 20 MG: 10 INJECTION, SOLUTION INTRAMUSCULAR; INTRAVENOUS at 19:41

## 2022-09-09 RX ADMIN — AMIODARONE HYDROCHLORIDE 150 MG: 50 INJECTION, SOLUTION INTRAVENOUS at 18:37

## 2022-09-09 RX ADMIN — MEMANTINE 5 MG: 5 TABLET ORAL at 08:15

## 2022-09-09 RX ADMIN — Medication 10 ML: at 20:54

## 2022-09-09 RX ADMIN — FINASTERIDE 5 MG: 5 TABLET, FILM COATED ORAL at 08:16

## 2022-09-09 RX ADMIN — MEMANTINE 5 MG: 5 TABLET ORAL at 20:54

## 2022-09-09 RX ADMIN — ENOXAPARIN SODIUM 40 MG: 100 INJECTION SUBCUTANEOUS at 08:16

## 2022-09-09 NOTE — PROGRESS NOTES
Hospitalist Progress Note      PCP: Adriano Sanchez MD    Date of Admission: 9/8/2022    Chief Complaint: Afib RVR    Hospital Course:    82 y.o. male who presented to [facility] with above complaint. He went to cardiology is routine follow-up of following recent hospital admission. In the office they found his heart rate was high and blood pressure was low. He was sent to the emergency room for further evaluation. He received 1 bolus of Cardizem for rapid ventricular rate    Subjective:   Patient is feeling well and sitting up in chair. He denies any chest pain, shortness of breath. Diarrhea, constipation, n/v, fever or chills        Medications:  Reviewed    Infusion Medications    sodium chloride       Scheduled Medications    digoxin  125 mcg Oral Daily    aspirin  81 mg Oral Daily    atorvastatin  40 mg Oral Daily    citalopram  10 mg Oral Daily    finasteride  5 mg Oral Daily    fludrocortisone  0.2 mg Oral Daily    memantine  5 mg Oral BID    midodrine  10 mg Oral TID WC    pantoprazole  40 mg Oral QAM AC    sodium chloride flush  5-40 mL IntraVENous 2 times per day    enoxaparin  40 mg SubCUTAneous Daily    furosemide  20 mg IntraVENous BID     PRN Meds: sodium chloride flush, sodium chloride, ondansetron **OR** ondansetron, polyethylene glycol, acetaminophen **OR** acetaminophen, metoprolol      Intake/Output Summary (Last 24 hours) at 9/9/2022 1505  Last data filed at 9/8/2022 2323  Gross per 24 hour   Intake --   Output 600 ml   Net -600 ml       Physical Exam Performed:    BP (!) 132/93   Pulse 60   Temp 98.2 °F (36.8 °C)   Resp 14   Ht 5' 7\" (1.702 m)   Wt 149 lb 4.8 oz (67.7 kg)   SpO2 97%   BMI 23.38 kg/m²     General appearance: No apparent distress, appears stated age and cooperative. HEENT: Prosthetic right eye. Left eye reactive to light and conjunctiva clear  Neck: Supple, with full range of motion. No jugular venous distention. Trachea midline.   Respiratory:  Normal respiratory effort. Clear to auscultation, bilaterally without Rales/Wheezes/Rhonchi. Cardiovascular: Irregular rate and rhythm with normal S1/S2 without murmurs, rubs or gallops. Abdomen: Soft, non-tender, non-distended with normal bowel sounds. Musculoskeletal: No clubbing, cyanosis or edema bilaterally. Full range of motion without deformity. Skin: Skin color, texture, turgor normal.  No rashes or lesions. Neurologic:  Neurovascularly intact without any focal sensory/motor deficits. Cranial nerves: II-XII intact, grossly non-focal.  Psychiatric: Alert and oriented, thought content appropriate  Capillary Refill: Brisk, 3 seconds, normal   Peripheral Pulses: +2 palpable, equal bilaterally       Labs:   Recent Labs     09/08/22  1456 09/09/22  0545   WBC 6.9 6.9   HGB 14.6 14.1   HCT 43.5 42.1    239     Recent Labs     09/08/22  1456 09/09/22  0545    144   K 3.7 2.9*    102   CO2 26 28   BUN 22* 19   CREATININE 1.4* 1.3   CALCIUM 9.1 9.5     Recent Labs     09/08/22  1456   AST 19   ALT 22   BILITOT 1.4*   ALKPHOS 93     No results for input(s): INR in the last 72 hours. Recent Labs     09/08/22  1456 09/08/22  2121 09/08/22  2356   TROPONINI 0.01 0.01 <0.01       Urinalysis:      Lab Results   Component Value Date/Time    NITRU Negative 09/08/2022 04:59 PM    WBCUA 21-50 04/21/2022 02:20 PM    BACTERIA 1+ 04/21/2022 02:20 PM    RBCUA 3-4 04/21/2022 02:20 PM    BLOODU Negative 09/08/2022 04:59 PM    SPECGRAV 1.020 09/08/2022 04:59 PM    GLUCOSEU Negative 09/08/2022 04:59 PM       Radiology:  XR CHEST PORTABLE   Final Result   Cardiomegaly with mild pulmonary vascular congestion.                  Assessment/Plan:    Active Hospital Problems    Diagnosis     Orthostatic hypotension [I95.1]      Priority: Medium    Atrial fibrillation, rapid (HCC) [I48.91]      Priority: Medium    Ischemic cardiomyopathy [I25.5]      Priority: Medium    Coronary artery disease involving native coronary artery of native heart without angina pectoris, 5/2020 abnormal stress test. [facility] mild LAD disease  [I25.10]        Permanent Atrial fibrillation with rapid ventricular rate    -s/p IV fluid and Cardizem bolus  He was not on metoprolol because of orthostasis and not on anticoagulation because of fall risk  EP consulted  Start low dose digoxin  Wean diltiazem due to orthostatic hypotension  Consider AVN ablation with BiV upgrade   Maintain K and Mg at adequate levels     Orthostatic hypotension  Increase fluid intake and liberate salt intake   on Florinef and midodrine, continue-currently blood pressure stable  Caution with rising   Cardiac rehab recommended outpatient      Acute on chronic systolic CHF with ejection fraction of 35% in August 2022  Ischemic cardiomyopathy   Exacerbated by above  Monitor I/Os  No fluid or salt restriction due to RVR  Lasix 20 mg BID IV      CAD of native coronary artery   Would benefit from ischemic evaluation of [facility] but due to dementia cannot consent   If non obstructive disease then plan for BiV upgrade   Continue ASA    Hypokalemia  Hypomagnesemia   Replace and recheck in AM     Chronic kidney disease-stage IIIa creatinine 1.4  Monitor renal fx with BMP daily  Avoid nephrotoxic agents      Hypertension-medications as above      Hyperlipidemia-statin continued     BPH - continue finasteride     Dementia - continue memantine     Depression - mood is stable, continue celexa     DVT Prophylaxis: Lovenox  Diet: ADULT DIET; Regular;  Low Sodium (2 gm)  ADULT ORAL NUTRITION SUPPLEMENT; Breakfast, Dinner; Standard High Calorie/High Protein Oral Supplement  Code Status: Full Code  PT/OT Eval Status: Consulted     Dispo - Pending further recs and stable BP and HR    Appropriate for A1 Discharge Unit: Yasmine Franks DO

## 2022-09-09 NOTE — PROGRESS NOTES
Physician Progress Note      Viry Monique  [facility] #:                  [id number]  :                       1935  ADMIT DATE:       2022 2:21 PM  [address] [place] DATE:  RESPONDING  PROVIDER #:        Sander Drummnod DO          QUERY TEXT:    Patient admitted with AF. Noted documentation of \"Acute on chronic systolic   congestive heart failure\" per ED provider and \"Chronic systolic CHF. ..mild   fluid overload\" per H&P. Pt was treated with IV lasix in the ED prior to   admission to nursing unit. If possible, please document in progress notes and discharge summary if you   are evaluating and /or treating any of the following: The medical record reflects the following:  Risk Factors: CHF, HTN, CKD  Clinical Indicators: Pro BNP 11,627 on arrival this admit from prior 5,026 on   22, Echo  EF 35%, CXR- Cardiomegaly with mild pulmonary vascular   congestion  Treatment: IV Lasix 40 mg x1 in ED then Lasix IVP 20 mg bid ordered, CXR,   labs, daily weights, I&O, CHF nurse / RD consults-pending,  Cardiology   c/s-pending, supportive monitored care    Thank you,  [email or web address]  Options provided:  -- Acute on chronic HFrEF POA, treated and resolved confirmed and chronic   HFrEF ruled out  -- Chronic HFrEF confirmed and acute on chronic HFrEF ruled out  -- Other - I will add my own diagnosis  -- Disagree - Not applicable / Not valid  -- Disagree - Clinically unable to determine / Unknown  -- Refer to Clinical Documentation Reviewer    PROVIDER RESPONSE TEXT:    After study, acute on chronic HFrEF POA, treated and resolved confirmed and   chronic HFrEF ruled out.     Query created by: Vamsi Urbina on 2022 9:49 AM      Electronically signed by:  Sander Drummond DO 2022 4:16 PM

## 2022-09-09 NOTE — CONSULTS
[address] [facility]   Cardiovascular Evaluation    PATIENT: Felipe Holt  DATE: 2022  MRN: [id number]  CSN: [id number]  : 1935    Primary Care Doctor/Referring provider: Gail Hunt MD, Devante Emerson MD     Reason for evaluation/Chief complaint:   Chief Complaint   Patient presents with    Nephropathy     Pt went to cardiology appt this afternoon for a check up. Pt hx of a fib and orthostatic hypotension. Pt sent to the ED for txt of a-fib RVR          Subjective:    History of present illness on initial date of evaluation:   Felipe Holt is a 82 y.o. patient who presented from our office for the evaluation of atrial fibrillation with rapid rates and hypotension. Patient is known to our cardiovascular practice and has an extensive history of recent admissions to the hospital with syncope and orthostasis. Patient recently was discharged from [facility] with an extended stay in our rehab unit. He presented to the outpatient clinic yesterday for evaluation of his atrial fibrillation was found to have rapid A. fib and recurrent orthostatic hypotension. He was subsequently referred to the hospital emergency room. He was seen and evaluated emergency room and subsequently admitted to the hospital for further evaluation and management. Cardiology is asked to see the patient for further recommendations. The patient was actually seen by our service during this current admission for a similar complaint. At that time the patient was initially considered for possible repeat ischemic evaluation but found to be quite debilitated and have overall confusion. This was put on hold. The patient was subsequently transferred to the inpatient rehab unit. The patient has progressed well with increasing strength and functionality. Unfortunately, the patient has had episodes of orthostatic hypotension limiting his rehabilitation sessions.   Patient has had blood pressure drops from normal to 57-52 systolic. This is associated with some overall weakness and lightheadedness. The patient had originally been seen in the emergency room at [facility]. [address] for the evaluation of recurrent syncope. Patient cannot recall the specifics of his initial symptom complex and presentation. Apparently, patient had a near syncopal event at home. Patient had a similar spell which resulted in a laceration of the skull requiring staples. This prompted the patient to be seen in the emergency room there. Due to his recurrent symptom complex, the patient was referred to [facility] for further cardiac evaluation. Denies any chest pain, shortness of breath, or palpitations. Unfortunately, he has developed progressive memory loss and dementia. The patient is not able to give detailed information about the events of hospitalization due to their underlying medical illness. The majority of the information is taken from the chart, medical staff, and available family. Patient did undergo screening echocardiogram which demonstrates progressive left ventricular dysfunction. Patient did undergo assessment of left ventricular function several years ago and was normal and has had serial evaluations which have now determined modest left ventricular dysfunction with anterior wall motion abnormalities. He did have an angiogram in 2020 at which time he had modest coronary disease of the LAD which was evaluated with a fractional flow reserve. At that time FFR was within normal limits.        Patient Active Problem List   Diagnosis    Pneumonia    Abnormal chest CT    Cough syncope    Tracheobronchomalacia    Hyperlipidemia    Hypertension    Chronic kidney disease, stage III (moderate) ([facility])    A-fib ([facility])    S/P placement of cardiac pacemaker    Abnormal echocardiogram    Dizziness    SOB (shortness of breath)    Abnormal cardiovascular stress test    Coronary artery disease involving native coronary artery of native heart without angina pectoris, 5/2020 abnormal stress test. [facility] mild LAD disease     Other chest pain    Atrial fibrillation with rapid ventricular response (HCC)    Syncope and collapse    COVID    NATANAEL (acute kidney injury) ([facility]    Atrial fibrillation with RVR (HCC)    Dementia with behavioral disturbance (HCC)    Hallucinations    Syncope, unspecified syncope type    Ischemic cardiomyopathy    Atrial fibrillation, rapid (HCC)    Orthostatic hypotension         Cardiac Testing: I have reviewed the findings below. EKG:  ECHO:   STRESS TEST:  CATH:  BYPASS:  VASCULAR:    Past Medical History:   has a past medical history of Atrial fibrillation ([facility] [address] Blind right eye, Chronic kidney disease, Dementia ([facility] [address] Hyperlipidemia, Hypertension, and Pneumonia. Surgical History:   has a past surgical history that includes Tonsillectomy; Appendectomy; Colonoscopy; eye surgery; Upper gastrointestinal endoscopy; joint replacement (2013); hernia repair; and pacemaker placement (10/13/2017). Social History:   reports that he quit smoking about 47 years ago. His smoking use included cigarettes. He has a 10.00 pack-year smoking history. He has never used smokeless tobacco. He reports that he does not drink alcohol and does not use drugs. Family History:  No evidence for sudden cardiac death or premature CAD    Medications:  Reviewed and are listed in nursing record. and/or listed below  Outpatient Medications:  Prior to Admission medications    Medication Sig Start Date End Date Taking?  Authorizing Provider   atorvastatin (LIPITOR) 40 MG tablet Take 1 tablet by mouth daily 8/26/22   Tanya Grubbs MD   fludrocortisone (FLORINEF) 0.1 MG tablet Take 2 tablets by mouth daily 8/27/22 9/26/22  Tanya Grubbs MD   midodrine (PROAMATINE) 2.5 MG tablet Take 4 tablets by mouth 3 times daily 8/26/22   Tanya Grubbs MD   trospium Oral Daily    fludrocortisone  0.2 mg Oral Daily    memantine  5 mg Oral BID    midodrine  10 mg Oral TID WC    pantoprazole  40 mg Oral QAM AC    sodium chloride flush  5-40 mL IntraVENous 2 times per day    enoxaparin  40 mg SubCUTAneous Daily    furosemide  20 mg IntraVENous BID         Infusion Medications:   sodium chloride           Allergies:  Sulfa antibiotics     Review of Systems:   14 point review of systems unable to be completed due to patient condition/cooperation. All available positives mentioned in history of present illness.        Physical Examination:    [unfilled]  BP (!) 137/101   Pulse (!) 153   Temp 97.6 °F (36.4 °C) (Oral)   Resp 16   Ht 5' 7\" (1.702 m)   Wt 149 lb 4.8 oz (67.7 kg)   SpO2 96%   BMI 23.38 kg/m²    Weight: 149 lb 4.8 oz (67.7 kg)     Wt Readings from Last 3 Encounters:   09/09/22 149 lb 4.8 oz (67.7 kg)   09/08/22 156 lb 6.4 oz (70.9 kg)   08/26/22 154 lb 9.6 oz (70.1 kg)       Intake/Output Summary (Last 24 hours) at 9/9/2022 1021  Last data filed at 9/8/2022 2323  Gross per 24 hour   Intake --   Output 600 ml   Net -600 ml       General Appearance:  Alert, cooperative, no distress, appears stated age   Head:  Normocephalic, without obvious abnormality, atraumatic   Eyes:  PERRL, conjunctiva/corneas clear       Nose: Nares normal, no drainage or sinus tenderness   Throat: Lips, mucosa, and tongue normal   Neck: Supple, symmetrical, trachea midline, no adenopathy, thyroid: not enlarged, symmetric, no tenderness/mass/nodules, no carotid bruit or JVD       Lungs:   Clear to auscultation bilaterally, respirations unlabored   Chest Wall:  No tenderness or deformity   Heart:  irregular rhythm and normal rate; S1, S2 are normal; no murmur noted; no rub or gallop   Abdomen:   Soft, non-tender, bowel sounds active all four quadrants,  no masses, no organomegaly           Extremities: Extremities normal, atraumatic, no cyanosis or edema   Pulses: 2+ and symmetric   Skin: Skin color, texture, turgor normal, no rashes or lesions   Pysch: Normal mood and affect   Neurologic: Normal gross motor and sensory exam. Mild dementia. Labs  Recent Labs     09/08/22  1456 09/09/22  0545   WBC 6.9 6.9   HGB 14.6 14.1   HCT 43.5 42.1   MCV 89.6 89.7    239     Recent Labs     09/08/22  1456 09/09/22  0545   CREATININE 1.4* 1.3   BUN 22* 19    144   K 3.7 2.9*    102   CO2 26 28     No results for input(s): INR, PROTIME in the last 72 hours. Recent Labs     09/08/22  1456 09/08/22  2121 09/08/22  2356   TROPONINI 0.01 0.01 <0.01       Invalid input(s): PRO-BNP  No results for input(s): CHOL, LDL, HDL in the last 72 hours. Invalid input(s): TG      Imaging:  I have reviewed the below testing personally and my interpretation is below. EKG:  Atrial fibrillation with frequent ventricular-paced complexesRightward axisLow voltage QRSST & T wave abnormality, consider inferior ischemiaAbnormal ECGWhen compared with ECG of 21-APR-2022 12:17,Electronic ventricular pacemaker has replaced Atrial fibrillation    CXR:      Assessment:  82 y.o. patient with:    Principal Problem:    Atrial fibrillation, rapid ([address]  Active Problems:    Ischemic cardiomyopathy    Orthostatic hypotension    Coronary artery disease involving native coronary artery of native heart without angina pectoris, 5/2020 abnormal stress test. [facility] mild LAD disease   Resolved Problems:    * No resolved hospital problems. *    Plan:  Rates remain elevated   ~will have EP team consult for additional guidance on orthostasis and A. fib management for  Appears otherwise very stable this AM  The patient's orthostasis was evaluated and given extensive guidance on his previous admission.   Recommendations as below:              ~recommend increasing fluid intake              ~recommend liberalizing the patient's salt intake              ~compression stockings that are above the knee              ~modify daily activity with caution in rising from seated position, valsalva-like maneuvers, and heat exposure.              ~engage in an exercise program - cardiac rehab would be helpful as OP      Medical Decision Making: The following items were considered in medical decision making:  Independent review of images  Review / order clinical lab tests  Review / order radiology tests  Decision to obtain old records  Review and summation of old records as accessed through [facility] (a summary of my findings in these old records)          All questions and concerns were addressed to the patient/family. Alternatives to my treatment were discussed. The note was completed using EMR. Every effort was made to ensure accuracy; however, inadvertent computerized transcription errors may be present.     Laura Restrepo MD, [address], [place], [place]  [phone] [facility]  [phone] [facility]  9/9/2022  10:21 AM
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
## 2022-09-09 NOTE — PROGRESS NOTES
Pt alert to self and somewhat situation.  Pt unable to confirm home medications therefore unable to complete list.

## 2022-09-09 NOTE — PLAN OF CARE
Patient's EF (Ejection Fraction) is less than 40%    Heart Failure Medications:  Diuretics[de-identified] Furosemide    (One of the following REQUIRED for EF </= 40%/SYSTOLIC FAILURE but MAY be used in EF% >40%/DIASTOLIC FAILURE)        ACE[de-identified] None        ARB[de-identified] None         ARNI[de-identified] None    (Beta Blockers)  NON- Evidenced Based Beta Blocker (for EF% >40%/DIASTOLIC FAILURE): None    Evidenced Based Beta Blocker::(REQUIRED for EF% <40%/SYSTOLIC FAILURE) None  . .................................................................................................................................................. Patient's weights and intake/output reviewed: Yes    Patient's Last Weight: 154 lbs obtained by standing scale. No intake or output data in the 24 hours ending 09/08/22 4109    Comorbidities Reviewed Yes    Patient has a past medical history of Atrial fibrillation ([facility] [address] Blind right eye, Chronic kidney disease, Dementia ([facility] [address] Hyperlipidemia, Hypertension, and Pneumonia. >>For CHF and Comorbidity documentation on Education Time and Topics, please see Education Tab    Progressive Mobility Assessment:  What is this patient's Current Level of Mobility?: Ambulatory- with Assistance  How was this patient Mobilized today?: Edge of Bed, Up in Room, Unable to Mobilize, and Patient Refuses to Mobilize, ambulated 20 ft                 With Whom? Nurse                 Level of Difficulty/Assistance: 1x Assist     Pt resting in bed at this time on room air. Pt denies shortness of breath. Pt with pitting lower extremity edema.      Patient and/or Family's stated Goal of Care this Admission: increase activity tolerance, better understand heart failure and disease management, be more comfortable, and reduce lower extremity edema prior to discharge        :

## 2022-09-09 NOTE — PROGRESS NOTES
Patient admitted to room 219 from ED. Patient oriented to room, call light, bed rails, phone, lights and bathroom. Patient instructed about the schedule of the day including: vital sign frequency, lab draws, possible tests, frequency of MD and staff rounds, including RN/MD rounding together at bedside, daily weights, and I &O's. Patient instructed about prescribed diet, how to use [id number], and television. Bed alarm in place, patient aware of placement and reason. Telemetry box in place, patient aware of placement and reason. Bed locked, in lowest position, side rails up 2/4, call light within reach. Will continue to monitor.

## 2022-09-09 NOTE — ED PROVIDER NOTES
[facility] A2 CARD TELEMETRY      CHIEF COMPLAINT  Nephropathy (Pt went to cardiology appt this afternoon for a check up. Pt hx of a fib and orthostatic hypotension. Pt sent to the ED for txt of a-fib RVR )       HISTORY OF PRESENT ILLNESS  Toro Saldana is a 82 y.o. male with a past medical history of atrial fibrillation, systolic CHF with EF 35 to 40%, CKD and dementia who presents to the ED complaining of shortness of breath. The patient is a poor historian, his granddaughter is here and provides much of the history. The patient has a history of dementia at baseline. He does state that he does not feel well and has had worsening shortness of breath on exertion over the past several days. He was at a cardiology appointment this afternoon, and was noted to be tachycardic and hypotensive on orthostatics and was sent here for further evaluation. The patient was recently admitted to the hospital, he had sustained a fall and this was found to be related to hypotension. He was taken off of his metoprolol at that time, and fludrocortisone and midodrine were added to his medication regimen. He was also taken off his blood thinner. The patient states he feels short of breath and describes lightheadedness. He denies any chest pain. He denies any symptoms here at rest he states that they occur when he exerts himself. He denies any recent fevers, chills, vomiting or diarrhea. No other complaints, modifying factors or associated symptoms. I have reviewed the following from the nursing documentation.     Past Medical History:   Diagnosis Date    Atrial fibrillation ([address]     Blind right eye     Chronic kidney disease     Dementia ([address]     Hyperlipidemia     Hypertension     Pneumonia      Past Surgical History:   Procedure Laterality Date    APPENDECTOMY      COLONOSCOPY      EYE SURGERY      HERNIA REPAIR      JOINT REPLACEMENT  2013    left shoulder    PACEMAKER PLACEMENT  10/13/2017    Dr Xiomara Griffiths at [address] single chamber PPM    TONSILLECTOMY      UPPER GASTROINTESTINAL ENDOSCOPY       Family History   Problem Relation Age of Onset    Heart Disease Mother     Other Mother     Heart Disease Father     Asthma Other      Social History     Socioeconomic History    Marital status:      Spouse name: Not on file    Number of children: Not on file    Years of education: Not on file    Highest education level: Not on file   Occupational History    Not on file   Tobacco Use    Smoking status: Former     Packs/day: 1.00     Years: 10.00     Pack years: 10.00     Types: Cigarettes     Quit date: 1975     Years since quittin.5    Smokeless tobacco: Never    Tobacco comments:     Quit 40 years ago   Vaping Use    Vaping Use: Never used   Substance and Sexual Activity    Alcohol use: No     Alcohol/week: 0.0 standard drinks    Drug use: No    Sexual activity: Yes     Partners: Female   Other Topics Concern    Not on file   Social History Narrative    Not on file     Social Determinants of Health     Financial Resource Strain: Not on file   Food Insecurity: Not on file   Transportation Needs: Not on file   Physical Activity: Not on file   Stress: Not on file   Social Connections: Not on file   Intimate Partner Violence: Not on file   Housing Stability: Not on file     Current Facility-Administered Medications   Medication Dose Route Frequency Provider Last Rate Last Admin    aspirin chewable tablet 81 mg  81 mg Oral Daily Martha Hirsch MD        atorvastatin (LIPITOR) tablet 40 mg  40 mg Oral Daily Martha Hirsch MD   40 mg at 22    citalopram (CELEXA) tablet 10 mg  10 mg Oral Daily Martha Hirsch MD        finasteride (PROSCAR) tablet 5 mg  5 mg Oral Daily Martha Hirsch MD        fludrocortisone (FLORINEF) tablet 0.2 mg  0.2 mg Oral Daily Martha Hirsch MD        memantine (NAMENDA) tablet 5 mg  5 mg Oral BID Martha Hirsch MD   5 mg at 22 midodrine (PROAMATINE) tablet 10 mg  10 mg Oral TID  Cisco Morales MD        pantoprazole (PROTONIX) tablet 40 mg  40 mg Oral QAM AC Cisco Morales MD        sodium chloride flush 0.9 % injection 5-40 mL  5-40 mL IntraVENous 2 times per day Cisco Morales MD   10 mL at 09/08/22 2130    sodium chloride flush 0.9 % injection 5-40 mL  5-40 mL IntraVENous PRN Cisco Morales MD        0.9 % sodium chloride infusion   IntraVENous PRN Cisco Morales MD        ondansetron (ZOFRAN-ODT) disintegrating tablet 4 mg  4 mg Oral Q8H PRN Cisco Morales MD        Or    ondansetron (ZOFRAN) injection 4 mg  4 mg IntraVENous Q6H PRN Cisco Morales MD        polyethylene glycol (GLYCOLAX) packet 17 g  17 g Oral Daily PRN Cisco Morales MD        acetaminophen (TYLENOL) tablet 650 mg  650 mg Oral Q6H PRN Cisco Morales MD        Or    acetaminophen (TYLENOL) suppository 650 mg  650 mg Rectal Q6H PRN Cisco Morales MD        enoxaparin (LOVENOX) injection 40 mg  40 mg SubCUTAneous Daily Cisco Morales MD   40 mg at 09/08/22 2129    furosemide (LASIX) injection 20 mg  20 mg IntraVENous BID Cisco Morales MD        metoprolol (LOPRESSOR) injection 2.5 mg  2.5 mg IntraVENous Q6H PRN Cisco Morales MD         Allergies   Allergen Reactions    Sulfa Antibiotics        REVIEW OF SYSTEMS  10 systems reviewed, pertinent positives per HPI otherwise noted to be negative. PHYSICAL EXAM  /82   Pulse (!) 118   Temp 97.9 °F (36.6 °C)   Resp 16   Ht 5' 7\" (1.702 m)   Wt 154 lb 1.6 oz (69.9 kg)   SpO2 97%   BMI 24.14 kg/m²    Physical exam:  General appearance: awake and cooperative. No distress. Chronically ill appearing, no acute distress. Skin: Warm and dry. No rashes or lesions. HENT: Normocephalic. Atraumatic. Mucus membranes are moist   Neck: supple  Eyes: blind right eye. EOM intact. Heart: tachycardic rate. Irregularly irregular rhythm. No murmurs. Lungs: Respirations unlabored. Inspiratory crackles in bases. No wheezes, rales, or rhonchi. fair air exchange  Abdomen: No tenderness. Soft. Non distended. No peritoneal signs. Musculoskeletal: 1+ pitting edema bilaterally. Compartments soft. No deformity. No tenderness in the extremities. All extremities neurovascularly intact. Radial, Dp, and PT pulses +2/4 bilaterally  Neurological: Alert and oriented but mildly confused in conversation (baseline per granddaughter). No focal deficits. No aphasia or dysarthria. Psychiatric: Normal mood and affect. LABS  I have reviewed all labs for this visit.    Results for orders placed or performed during the hospital encounter of 09/08/22   CBC with Auto Differential   Result Value Ref Range    WBC 6.9 4.0 - 11.0 K/uL    RBC 4.85 4.20 - 5.90 M/uL    Hemoglobin 14.6 13.5 - 17.5 g/dL    Hematocrit 43.5 40.5 - 52.5 %    MCV 89.6 80.0 - 100.0 fL    MCH 30.1 26.0 - 34.0 pg    MCHC 33.6 31.0 - 36.0 g/dL    RDW 17.1 (H) 12.4 - 15.4 %    Platelets 561 639 - 075 K/uL    MPV 7.9 5.0 - 10.5 fL    Neutrophils % 66.7 %    Lymphocytes % 21.2 %    Monocytes % 10.2 %    Eosinophils % 0.8 %    Basophils % 1.1 %    Neutrophils Absolute 4.6 1.7 - 7.7 K/uL    Lymphocytes Absolute 1.5 1.0 - 5.1 K/uL    Monocytes Absolute 0.7 0.0 - 1.3 K/uL    Eosinophils Absolute 0.1 0.0 - 0.6 K/uL    Basophils Absolute 0.1 0.0 - 0.2 K/uL   Comprehensive Metabolic Panel w/ Reflex to MG   Result Value Ref Range    Sodium 142 136 - 145 mmol/L    Potassium reflex Magnesium 3.7 3.5 - 5.1 mmol/L    Chloride 102 99 - 110 mmol/L    CO2 26 21 - 32 mmol/L    Anion Gap 14 3 - 16    Glucose 82 70 - 99 mg/dL    BUN 22 (H) 7 - 20 mg/dL    Creatinine 1.4 (H) 0.8 - 1.3 mg/dL    GFR Non- 48 (A) >60    GFR  58 (A) >60    Calcium 9.1 8.3 - 10.6 mg/dL    Total Protein 7.0 6.4 - 8.2 g/dL    Albumin 4.0 3.4 - 5.0 g/dL    Albumin/Globulin Ratio 1.3 1.1 - 2.2    Total Bilirubin 1.4 (H) 0.0 - 1.0 mg/dL    Alkaline Phosphatase 93 40 - 129 U/L    ALT 22 10 - 40 U/L    AST 19 15 - 37 U/L   Troponin   Result Value Ref Range    Troponin 0.01 <0.01 ng/mL   Urinalysis with Reflex to Culture    Specimen: Urine   Result Value Ref Range    Color, UA Yellow Straw/Yellow    Clarity, UA Clear Clear    Glucose, Ur Negative Negative mg/dL    Bilirubin Urine Negative Negative    Ketones, Urine TRACE (A) Negative mg/dL    Specific Gravity, UA 1.020 1.005 - 1.030    Blood, Urine Negative Negative    pH, UA 6.0 5.0 - 8.0    Protein, UA Negative Negative mg/dL    Urobilinogen, Urine 0.2 <2.0 E.U./dL    Nitrite, Urine Negative Negative    Leukocyte Esterase, Urine Negative Negative    Microscopic Examination Not Indicated     Urine Type NotGiven     Urine Reflex to Culture Not Indicated    Brain Natriuretic Peptide   Result Value Ref Range    Pro-BNP 11,627 (H) 0 - 449 pg/mL   Troponin   Result Value Ref Range    Troponin 0.01 <0.01 ng/mL   Troponin   Result Value Ref Range    Troponin <0.01 <0.01 ng/mL   EKG 12 Lead   Result Value Ref Range    Ventricular Rate 134 BPM    Atrial Rate 147 BPM    QRS Duration 76 ms    Q-T Interval 262 ms    QTc Calculation (Bazett) 391 ms    R Axis 92 degrees    T Axis -69 degrees    Diagnosis       Atrial fibrillation with rapid ventricular responseRightward axisNonspecific ST and T wave abnormality , probably digitalis effectAbnormal ECGWhen compared with ECG of 25-AUG-2022 11:58,Electronic demand pacing is now PresentHeart rate has increasedConfirmed by Jessica Hill ([id number]) on 9/8/2022 5:05:11 PM     EKG 12 Lead   Result Value Ref Range    Ventricular Rate 82 BPM    Atrial Rate 357 BPM    QRS Duration 78 ms    Q-T Interval 402 ms    QTc Calculation (Bazett) 469 ms    R Axis 94 degrees    T Axis 266 degrees    Diagnosis       Atrial fibrillationRightward axisST & T wave abnormality, consider inferolateral ischemia or digitalis effectAbnormal ECGElectronic demand pacing is  PresentWhen compared with ECG of 9/8/22 14:32,Vent. rate has decreased BY  52 BPMInverted T waves have replaced nonspecific T wave abnormality in Lateral leadsConfirmed by Daniel An ([id number]) on 9/8/2022 5:11:39 PM         ECG    The Ekg interpreted by me shows  atrial fibrillation with a rate of 82 with PVCs  Axis is   Right axis deviation  QTc is  within an acceptable range  Intervals and Durations are unremarkable. ST Segments: nonspecific ST abnormality with T wave inversions V4-V6 no acute ischemia   No significant change from prior EKG dated 8/25/22    The Ekg interpreted by me shows  atrial fibrillation RVR with a rate of 134  Axis is   Right axis deviation  QTc is  within an acceptable range  Intervals and Durations are unremarkable. ST Segments: nonspecific ST abnormality no acute ischemia   No significant change from prior EKG dated 9/8/22    RADIOLOGY  Echo Limited    Result Date: 8/10/2022  Transthoracic Echocardiography Report (TTE)  Demographics   Patient Name       Delia Cavanaugh   Date of Study      08/10/2022         Gender              Male   Patient Number     [id number]         Date of Birth       1935   Visit Number       [id number]          Age                 82 year(s)   Accession Number   [id number]         Room Number         2250   Corporate ID       [id number]           Sonographer         Tasha Beverly, RT   Ordering Physician Clarissa Mack., MD             Physician           Ginna Delvalle MD, [facility]  Procedure Type of Study   TTE procedure:ECHOCARDIOGRAM LIMITED. Procedure Date Date: 08/10/2022 Start: 06:22 AM Study Location: [address] [place] [address] - Echo Lab Technical Quality: Adequate visualization Patient Status: Routine Height: 67 inches Weight: 160 pounds BSA: 1.84 m2 BMI: 25.06 kg/m2 HR: 70 bpm BP: 146/104 mmHg  Conclusions   Summary  Limited only f/u for LVEF.   The left ventricular systolic function is moderately reduced with an  ejection fraction of 35 - 40 %. Left ventricular cavity size is normal.  Hypokinesis of the anterior, septal, and apical walls. Signature   ------------------------------------------------------------------  Electronically signed by Ginna Delvalle MD, [facility]  (Interpreting physician) on 08/10/2022 at 10:53 AM  ------------------------------------------------------------------   Findings   Left Ventricle  The left ventricular systolic function is moderately reduced with an  ejection fraction of 35 - 40 %. Hypokinesis of the anterior, septal, and apical walls. Left ventricular cavity size is normal.  Mild changes noted from previous echo on 4- in left ventricular  function. Miscellaneous  Limited only f/u for LVEF. M-Mode/2D Measurements (cm)   LV Diastolic Dimension: 1.87 cm LV Systolic Dimension: 9.86 cm  LV Septum Diastolic: 3.72 cm  LV PW Diastolic: 0.62 cm      CT HEAD WO CONTRAST    Result Date: 8/25/2022  EXAMINATION: CT OF THE HEAD WITHOUT CONTRAST  8/25/2022 12:53 pm TECHNIQUE: CT of the head was performed without the administration of intravenous contrast. Automated exposure control, iterative reconstruction, and/or weight based adjustment of the mA/kV was utilized to reduce the radiation dose to as low as reasonably achievable. COMPARISON: 4/21/22 HISTORY: ORDERING SYSTEM PROVIDED HISTORY: mental status change TECHNOLOGIST PROVIDED HISTORY: Reason for exam:->mental status change Has a \"code stroke\" or \"stroke alert\" been called? ->No Reason for Exam: pt does not answer any questions, per chart notes, mental status change, lethargic and \"ill looking\" Additional signs and symptoms: hx of dememtia in chart, no mention of prev stroke or seizure FINDINGS: The sulci and ventricles are prominent appearing. There is hypodensity seen in the periventricular white matter bilaterally. Calcification of the basal ganglia is noted bilaterally.   The gray-white matter differentiation is preserved. There are no extra-axial collections. There is no midline shift. The soft tissues and orbits demonstrate chronic calcification and atrophy within the right globe. Bony calvarium and visualized sinuses are unremarkable. Age-related changes and chronic microvascular disease noted as above. No acute intracerebral hemorrhage or midline shift. Chronic findings as above. XR CHEST PORTABLE    Result Date: 9/8/2022  EXAMINATION: ONE XRAY VIEW OF THE CHEST 9/8/2022 11:35 am COMPARISON: 08/25/2022 HISTORY: ORDERING SYSTEM PROVIDED HISTORY: a-fib RVR TECHNOLOGIST PROVIDED HISTORY: Reason for exam:->a-fib RVR Reason for Exam: a fib FINDINGS: Stable ICD lead Cardiac size is enlarged. No acute infiltrates are seen . The pulmonary vascularity is hazy and indistinct. No pneumothorax. No pleural effusions identified. Degenerative changes of the spine and right shoulder with evidence of chronic rotator cuff tear. Partially visualized left shoulder prosthesis. .     Cardiomegaly with mild pulmonary vascular congestion. XR CHEST PORTABLE    Result Date: 8/25/2022  EXAMINATION: ONE XRAY VIEW OF THE CHEST 8/25/2022 11:45 am COMPARISON: 08/15/2022 HISTORY: ORDERING SYSTEM PROVIDED HISTORY: please rule out infectious process TECHNOLOGIST PROVIDED HISTORY: Reason for exam:->please rule out infectious process Reason for Exam: rule out infectious process FINDINGS: Mild cardiomegaly. Mild bibasilar infiltrates. Cardiac pacemaker leads in stable/satisfactory position with no evidence of pneumothorax. Significant osteopenic changes and degenerative changes noted in the bony structures. Left shoulder arthroplasty. Mild cardiomegaly. Mild bibasilar infiltrates. Cardiac pacemaker leads in stable/satisfactory position with no evidence of pneumothorax.      XR CHEST 1 VIEW    Result Date: 8/15/2022  EXAMINATION: ONE XRAY VIEW OF THE CHEST 8/15/2022 9:18 am COMPARISON: 04/21/2022 HISTORY: ORDERING SYSTEM PROVIDED HISTORY: please rule out infectious process TECHNOLOGIST PROVIDED HISTORY: Reason for exam:->please rule out infectious process FINDINGS: Large hiatal hernia again seen. Cardial pericardial silhouette is prominent but stable. Unipolar pacemaker is unchanged. No acute infiltrate identified. No pneumothorax. No free air. Postsurgical changes seen in the left shoulder. No acute bony abnormality. No acute abnormality identified. Large hiatal hernia. No results found. Is this patient to be included in the SEP-1 Core Measure due to severe sepsis or septic shock? No   Exclusion criteria - the patient is NOT to be included for SEP-1 Core Measure due to: Infection is not suspected        ED COURSE/MDM  Patient seen and evaluated. Old records reviewed. Labs and imaging reviewed and results discussed with patient. The patient is an 72-year-old male presenting to the ED for evaluation of tachycardia. Heart rate is 137 on arrival.  He is actually hypertensive on arrival at 157/109. Oxygenation is 93% on room air and he was afebrile. On exam he is in no distress, chronically ill-appearing however acutely nontoxic. It does appear that he was recently taken off of his beta-blocker, now in A. fib RVR. There have been issues as far as his hypotension causing falls. In order to control his heart rate I did give him a one-time bolus of diltiazem here and he improved with his rate into the 90s after this. 2 EKGs were performed and showed no evidence of ischemia. Troponin is negative. The patient does appear to have a CHF exacerbation, could be related to high output from A. fib RVR he is given a dose of Lasix. Chest x-ray did show some pulmonary edema. His BNP is elevated. His heart rate is much improved after treatment here in the ED, he will be admitted for further treatment.     During the patient's ED course, the patient was given:  Medications   aspirin chewable tablet 81 mg (has no administration in time range)   atorvastatin (LIPITOR) tablet 40 mg (40 mg Oral Given 9/8/22 2129)   citalopram (CELEXA) tablet 10 mg (has no administration in time range)   finasteride (PROSCAR) tablet 5 mg (has no administration in time range)   fludrocortisone (FLORINEF) tablet 0.2 mg (has no administration in time range)   memantine (NAMENDA) tablet 5 mg (5 mg Oral Given 9/8/22 2129)   midodrine (PROAMATINE) tablet 10 mg (has no administration in time range)   pantoprazole (PROTONIX) tablet 40 mg (has no administration in time range)   sodium chloride flush 0.9 % injection 5-40 mL (10 mLs IntraVENous Given 9/8/22 2130)   sodium chloride flush 0.9 % injection 5-40 mL (has no administration in time range)   0.9 % sodium chloride infusion (has no administration in time range)   ondansetron (ZOFRAN-ODT) disintegrating tablet 4 mg (has no administration in time range)     Or   ondansetron (ZOFRAN) injection 4 mg (has no administration in time range)   polyethylene glycol (GLYCOLAX) packet 17 g (has no administration in time range)   acetaminophen (TYLENOL) tablet 650 mg (has no administration in time range)     Or   acetaminophen (TYLENOL) suppository 650 mg (has no administration in time range)   enoxaparin (LOVENOX) injection 40 mg (40 mg SubCUTAneous Given 9/8/22 2129)   furosemide (LASIX) injection 20 mg (has no administration in time range)   metoprolol (LOPRESSOR) injection 2.5 mg (has no administration in time range)   dilTIAZem injection 10 mg (10 mg IntraVENous Given 9/8/22 1548)   furosemide (LASIX) injection 40 mg (40 mg IntraVENous Given 9/8/22 1631)        CLINICAL IMPRESSION  1. Atrial fibrillation with RVR ([facility]    2. Acute on chronic systolic congestive heart failure ([facility]    3. Chronic kidney disease, unspecified CKD stage        Blood pressure 126/82, pulse (!) 118, temperature 97.9 °F (36.6 °C), resp.  rate 16, height 5' 7\" (1.702 m), weight 154 lb 1.6 oz (69.9 kg), SpO2 97 %.      Patient was given scripts for the following medications. I counseled patient how to take these medications. Current Discharge Medication List          Follow-up with:  No follow-up provider specified. DISCLAIMER: This chart was created using Dragon dictation software. Efforts were made by me to ensure accuracy, however some errors may be present due to limitations of this technology and occasionally words are not transcribed correctly.          Rahul GonzalezChoctaw General Hospitalroseanne  09/09/22 [id number]

## 2022-09-09 NOTE — CONSULTS
Comprehensive Nutrition Assessment    Type and Reason for Visit:  Initial, Consult    Nutrition Recommendations/Plan:   Continue 2 gm sodium diet   Add Ensure BID - monitor acceptance   Encourage PO intakes as tolerated   Monitor nutrition adequacy, pertinent labs, bowel habits, wt changes, and clinical progress     Malnutrition Assessment:  Malnutrition Status: At risk for malnutrition (Comment) (09/09/22 1101)    Context:  Acute Illness     Findings of the 6 clinical characteristics of malnutrition:  Energy Intake:  Unable to assess  Weight Loss:   (3.2% weight loss in 2 weeks)     Muscle Mass Loss: Moderate muscle mass loss Clavicles (pectoralis & deltoids)    Nutrition Assessment:    81 yo male admitted with Afib and orthostasis. Consulted for CHF nutrition education, pt not appropriate d/t confusion and no famiy present. Weights trending down per EMR. Concerns for adequate nutrition at home. On previous admission, pt was consuming ONS BID. RD to add. Will continue to monitor. Nutrition Related Findings:    3.2% weight loss in 2 weeks. Active BS. +1 pitting BLE edema. Wound Type: None       Current Nutrition Intake & Therapies:    Average Meal Intake: Unable to assess  Average Supplements Intake: None Ordered  ADULT DIET; Regular; Low Sodium (2 gm)    Anthropometric Measures:  Height: 5' 7\" (170.2 cm)  Ideal Body Weight (IBW): 148 lbs (67 kg)       Current Body Weight: 149 lb (67.6 kg), 100.7 % IBW.  Weight Source: Standing Scale  Current BMI (kg/m2): 23.3  Usual Body Weight: 154 lb (69.9 kg) (bed scale 8/26/22)  % Weight Change (Calculated): -3.2                    BMI Categories: Normal Weight (BMI 22.0 to 24.9) age over 72    Estimated Daily Nutrient Needs:  Energy Requirements Based On: Kcal/kg (25-30)  Weight Used for Energy Requirements: Current  Energy (kcal/day): 0385-2723 kcal  Weight Used for Protein Requirements: Current (1.0-1.2 g/kg)  Protein (g/day): 68-82 g     Fluid (ml/day): Less than 2000 mL oer CHF recommendations or per MD    Nutrition Diagnosis:   Inadequate oral intake related to inadequate protein-energy intake as evidenced by poor intake prior to admission, weight loss    Nutrition Interventions:   Food and/or Nutrient Delivery: Continue Current Diet, Start Oral Nutrition Supplement  Nutrition Education/Counseling: No recommendation at this time, Education not appropriate  Coordination of Nutrition Care: Continue to monitor while inpatient       Goals:     Goals: PO intake 50% or greater, prior to discharge       Nutrition Monitoring and Evaluation:   Behavioral-Environmental Outcomes: None Identified  Food/Nutrient Intake Outcomes: Food and Nutrient Intake, Supplement Intake  Physical Signs/Symptoms Outcomes: Biochemical Data, Fluid Status or Edema, Nutrition Focused Physical Findings, Weight    Discharge Planning:    Continue current diet, Continue Oral Nutrition Supplement     Bhupendra Lundberg, MS, RD, LD  Contact: [phone]
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
## 2022-09-09 NOTE — CONSULTS
Electrophysiology Consultation   Date: 9/9/2022  Admit Date:  9/8/2022  Reason for Consultation: Persistent atrial fibrillation with RVR and orthostatic hypotension. Consult Requesting Physician: Caitlin Cristobal DO     Chief Complaint   Patient presents with    Nephropathy     Pt went to cardiology appt this afternoon for a check up. Pt hx of a fib and orthostatic hypotension. Pt sent to the ED for txt of a-fib RVR      HPI:   Mr. 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 Unfortunately patient has dementia and I was unable to reach both his wife and his daughter. She recently admitted to the hospital with symptomatic orthostatic hypotension. He was found to have worsening LVEF of unknown etiology. Given his mental status and his overall clinical condition ischemic evaluation was deferred. Patient was transferred to the rehab unit and was eventually discharged home. He followed up in electrophysiology clinic yesterday and was noted to have symptomatic orthostatic hypotension in the setting of atrial fibrillation with RVR and so he was sent to the emergency room for further evaluation care. Patient denies fevers, chest pain, orthopnea, PND, lower extremity edema, abdominal swelling, shortness of breath, dyspnea on exertion, chills, visual changes, headaches, sore throat, cough, abdominal pain, nausea, vomiting, bleeding, bruising, dysuria, muscle/joint pain, confusion, depression, anxiety, skin lesions, etc.    Emergency Room/Hospital Course:  Patient was evaluated emergency room on 09/08/2022. His BNP was elevated at 11,500.   His CMP was notable for marked hypokalemia with a confusion, headaches   Psychiatric: No anxiety, no depression. Dermatological: negative for - rash    Physical Examination:  Vitals:    22 1213   BP: (!) 132/93   Pulse: 60   Resp: 14   Temp: 98.2 °F (36.8 °C)   SpO2: 97%        Intake/Output Summary (Last 24 hours) at 2022 1417  Last data filed at 2022 2323  Gross per 24 hour   Intake --   Output 600 ml   Net -600 ml     In: -   Out: 600    Wt Readings from Last 3 Encounters:   22 149 lb 4.8 oz (67.7 kg)   22 156 lb 6.4 oz (70.9 kg)   22 154 lb 9.6 oz (70.1 kg)     Temp  Av.8 °F (36.6 °C)  Min: 97.2 °F (36.2 °C)  Max: 98.2 °F (36.8 °C)  Pulse  Av.7  Min: 60  Max: 153  BP  Min: 114/91  Max: 157/109  SpO2  Av %  Min: 92 %  Max: 99 %    Telemetry: Patient currently off telemetry  Constitutional: Alert. Oriented to person, place, and time. No distress. Head: Normocephalic and atraumatic. Mouth/Throat: Lips appear moist. Oropharynx is clear and moist.  Eyes: Conjunctivae normal. EOM are normal.   Neck: Neck supple. No lymphadenopathy. No rigidity. No JVD present. Cardiovascular: Irregular rate and rhythm w/o M/G/R  Pulmonary/Chest: Bilateral respiratory sounds present. No respiratory accessory muscle use. Abdominal: Soft. Normal bowel sounds present. No distension, No tenderness. No splenomegaly. No hernia. Musculoskeletal: No tenderness. No edema    Neurological: Alert and oriented. Cranial nerve II-XII grossly intact. Skin: Skin is warm and dry. No rash, lesions, ulcerations noted. Psychiatric: No anxiety nor agitation. Labs:  Reviewed.    Recent Labs     22  1456 22  0545    144   K 3.7 2.9*    102   CO2 26 28   BUN 22* 19   CREATININE 1.4* 1.3     Recent Labs     22  1456 22  0545   WBC 6.9 6.9   HGB 14.6 14.1   HCT 43.5 42.1   MCV 89.6 89.7    239     Lab Results   Component Value Date/Time    TROPONINI <0.01 2022 11:56 PM     Lab Results   Component Value Date/Time    BNP 12.8 01/09/2013 10:00 AM     Lab Results   Component Value Date/Time    PROTIME 14.4 01/18/2022 06:05 AM    PROTIME 13.7 05/22/2020 10:19 AM    PROTIME 11.6 01/24/2016 12:35 PM    INR 1.26 01/18/2022 06:05 AM    INR 1.18 05/22/2020 10:19 AM    INR 1.02 01/24/2016 12:35 PM     Lab Results   Component Value Date/Time    CHOL 137 09/09/2022 05:45 AM    HDL 30 09/09/2022 05:45 AM    TRIG 73 09/09/2022 05:45 AM       Diagnostic and imaging results reviewed. ECG: Atrial fibrillation with controlled rates with intermittent pacing. Echo: 08/10/2022   Summary   Limited only f/u for LVEF. The left ventricular systolic function is moderately reduced with an   ejection fraction of 35 - 40 %. Left ventricular cavity size is normal.   Hypokinesis of the anterior, septal, and apical walls. Cath: 5/22/2020  Findings:     1. Left main coronary artery was normal. It gave off the left anterior descending artery and left circumflex. 2. Left anterior descending artery has moderate disease with about 50% stenosis after the takeoff of the first diagonal branch. It was moderate in size. It gave off septal perforators and a moderate sized diagonal branch. The LAD covered the entire apex of the left ventricle. 3. Left circumflex has mild atherosclerotic disease. It was moderate in size. There was a moderate sized obtuse marginal branch. 4. Right coronary artery has mild atherosclerotic disease. It was moderate in size and was the dominant artery. 5. Left ventriculogram showed normal LVEF at 55-60%. Wall motion was normal . There was no significant mitral valve or aortic valve disease noted. LVEDP was normal. There was no gradient noted across the aortic valve during pullback of the catheter. I independently reviewed the ECG and telemetry.     Scheduled Meds:   aspirin  81 mg Oral Daily    atorvastatin  40 mg Oral Daily    citalopram  10 mg Oral Daily    finasteride  5 mg Oral Daily fludrocortisone  0.2 mg Oral Daily    memantine  5 mg Oral BID    midodrine  10 mg Oral TID WC    pantoprazole  40 mg Oral QAM AC    sodium chloride flush  5-40 mL IntraVENous 2 times per day    enoxaparin  40 mg SubCUTAneous Daily    furosemide  20 mg IntraVENous BID     Continuous Infusions:   sodium chloride       PRN Meds:.sodium chloride flush, sodium chloride, ondansetron **OR** ondansetron, polyethylene glycol, acetaminophen **OR** acetaminophen, metoprolol     Assessment:   Patient Active Problem List    Diagnosis Date Noted    Pneumonia 02/27/2015    Orthostatic hypotension 09/09/2022    Atrial fibrillation, rapid ([address] 09/08/2022    Syncope, unspecified syncope type 08/10/2022    Ischemic cardiomyopathy 08/10/2022    Dementia with behavioral disturbance (HCC)     Hallucinations     Atrial fibrillation with RVR ([address]     Syncope and collapse     COVID     NATANAEL (acute kidney injury) ([address]     Atrial fibrillation with rapid ventricular response ([address] 01/17/2022    Other chest pain 04/21/2021    Coronary artery disease involving native coronary artery of native heart without angina pectoris, 5/2020 abnormal stress test. [facility] mild LAD disease  06/10/2020    Abnormal cardiovascular stress test 05/22/2020    SOB (shortness of breath) 05/04/2020    S/P placement of cardiac pacemaker 04/02/2019    Abnormal echocardiogram 04/02/2019    Dizziness 04/02/2019    A-fib ([address] 03/03/2016    Hyperlipidemia     Hypertension     Chronic kidney disease, stage III (moderate) ([facility])     Tracheobronchomalacia     Abnormal chest CT     Cough syncope       Active Hospital Problems    Diagnosis Date Noted    Orthostatic hypotension [I95.1] 09/09/2022     Priority: Medium    Atrial fibrillation, rapid ([address] [I48.91] 09/08/2022     Priority: Medium    Ischemic cardiomyopathy [I25.5] 08/10/2022     Priority: Medium    Coronary artery disease involving native coronary artery of native heart without angina pectoris, 5/2020 abnormal can consent at this point. Agree with Dr. Salena Champion. If LHC reassuring and shows only non-obstructive disease then plan for BiV upgrade given chronic RV pacing with possible AVN ablation.  - Per IC.    3. Orthostatic hypotension. Agree with Dr. Salena Champion. Difficult case given heart failure and orthostatic hypotension.  - Agree with Dr. Salena Champion. Thank you for allowing me to participate in the care of Brigette Herrera . If you have any questions/comments, please do not hesitate to contact us.     Angélica Guillory MD  Cardiac Electrophysiology  [address] [facility]  [phone] [facility]

## 2022-09-10 LAB
ANION GAP SERPL CALCULATED.3IONS-SCNC: 10 MMOL/L (ref 3–16)
BUN BLDV-MCNC: 18 MG/DL (ref 7–20)
CALCIUM SERPL-MCNC: 9 MG/DL (ref 8.3–10.6)
CHLORIDE BLD-SCNC: 102 MMOL/L (ref 99–110)
CO2: 31 MMOL/L (ref 21–32)
CREAT SERPL-MCNC: 1.4 MG/DL (ref 0.8–1.3)
GFR AFRICAN AMERICAN: 58
GFR NON-AFRICAN AMERICAN: 48
GLUCOSE BLD-MCNC: 97 MG/DL (ref 70–99)
MAGNESIUM: 2 MG/DL (ref 1.8–2.4)
POTASSIUM SERPL-SCNC: 3 MMOL/L (ref 3.5–5.1)
POTASSIUM SERPL-SCNC: 3.6 MMOL/L (ref 3.5–5.1)
SODIUM BLD-SCNC: 143 MMOL/L (ref 136–145)

## 2022-09-10 PROCEDURE — 6360000002 HC RX W HCPCS: Performed by: INTERNAL MEDICINE

## 2022-09-10 PROCEDURE — 99233 SBSQ HOSP IP/OBS HIGH 50: CPT

## 2022-09-10 PROCEDURE — 83735 ASSAY OF MAGNESIUM: CPT

## 2022-09-10 PROCEDURE — 6370000000 HC RX 637 (ALT 250 FOR IP): Performed by: INTERNAL MEDICINE

## 2022-09-10 PROCEDURE — 6370000000 HC RX 637 (ALT 250 FOR IP)

## 2022-09-10 PROCEDURE — 1200000000 HC SEMI PRIVATE

## 2022-09-10 PROCEDURE — 80048 BASIC METABOLIC PNL TOTAL CA: CPT

## 2022-09-10 PROCEDURE — 2580000003 HC RX 258: Performed by: INTERNAL MEDICINE

## 2022-09-10 PROCEDURE — 84132 ASSAY OF SERUM POTASSIUM: CPT

## 2022-09-10 PROCEDURE — 2500000003 HC RX 250 WO HCPCS: Performed by: INTERNAL MEDICINE

## 2022-09-10 PROCEDURE — 36415 COLL VENOUS BLD VENIPUNCTURE: CPT

## 2022-09-10 RX ORDER — POTASSIUM CHLORIDE 7.45 MG/ML
10 INJECTION INTRAVENOUS
Status: COMPLETED | OUTPATIENT
Start: 2022-09-10 | End: 2022-09-10

## 2022-09-10 RX ORDER — METOPROLOL SUCCINATE 25 MG/1
12.5 TABLET, EXTENDED RELEASE ORAL DAILY
Status: DISCONTINUED | OUTPATIENT
Start: 2022-09-10 | End: 2022-09-16

## 2022-09-10 RX ADMIN — POTASSIUM CHLORIDE 10 MEQ: 7.46 INJECTION, SOLUTION INTRAVENOUS at 18:55

## 2022-09-10 RX ADMIN — MEMANTINE 5 MG: 5 TABLET ORAL at 08:37

## 2022-09-10 RX ADMIN — Medication 10 ML: at 20:15

## 2022-09-10 RX ADMIN — FUROSEMIDE 20 MG: 10 INJECTION, SOLUTION INTRAMUSCULAR; INTRAVENOUS at 17:40

## 2022-09-10 RX ADMIN — FLUDROCORTISONE ACETATE 0.2 MG: 0.1 TABLET ORAL at 08:37

## 2022-09-10 RX ADMIN — Medication 10 ML: at 08:37

## 2022-09-10 RX ADMIN — FINASTERIDE 5 MG: 5 TABLET, FILM COATED ORAL at 08:37

## 2022-09-10 RX ADMIN — AMIODARONE HYDROCHLORIDE 0.5 MG/MIN: 50 INJECTION, SOLUTION INTRAVENOUS at 04:50

## 2022-09-10 RX ADMIN — SODIUM CHLORIDE: 9 INJECTION, SOLUTION INTRAVENOUS at 12:58

## 2022-09-10 RX ADMIN — ENOXAPARIN SODIUM 40 MG: 100 INJECTION SUBCUTANEOUS at 08:37

## 2022-09-10 RX ADMIN — MIDODRINE HYDROCHLORIDE 10 MG: 5 TABLET ORAL at 08:37

## 2022-09-10 RX ADMIN — FUROSEMIDE 20 MG: 10 INJECTION, SOLUTION INTRAMUSCULAR; INTRAVENOUS at 08:37

## 2022-09-10 RX ADMIN — ATORVASTATIN CALCIUM 40 MG: 40 TABLET, FILM COATED ORAL at 08:37

## 2022-09-10 RX ADMIN — MEMANTINE 5 MG: 5 TABLET ORAL at 20:15

## 2022-09-10 RX ADMIN — POTASSIUM CHLORIDE 10 MEQ: 7.46 INJECTION, SOLUTION INTRAVENOUS at 12:59

## 2022-09-10 RX ADMIN — ASPIRIN 81 MG 81 MG: 81 TABLET ORAL at 08:37

## 2022-09-10 RX ADMIN — DIGOXIN 125 MCG: 125 TABLET ORAL at 08:37

## 2022-09-10 RX ADMIN — POTASSIUM CHLORIDE 10 MEQ: 7.46 INJECTION, SOLUTION INTRAVENOUS at 16:44

## 2022-09-10 RX ADMIN — POTASSIUM CHLORIDE 10 MEQ: 7.46 INJECTION, SOLUTION INTRAVENOUS at 17:43

## 2022-09-10 RX ADMIN — POTASSIUM CHLORIDE 10 MEQ: 7.46 INJECTION, SOLUTION INTRAVENOUS at 15:08

## 2022-09-10 RX ADMIN — METOPROLOL TARTRATE 2.5 MG: 5 INJECTION INTRAVENOUS at 12:27

## 2022-09-10 RX ADMIN — POTASSIUM CHLORIDE 10 MEQ: 7.46 INJECTION, SOLUTION INTRAVENOUS at 14:05

## 2022-09-10 RX ADMIN — MIDODRINE HYDROCHLORIDE 10 MG: 5 TABLET ORAL at 17:40

## 2022-09-10 RX ADMIN — PANTOPRAZOLE SODIUM 40 MG: 40 TABLET, DELAYED RELEASE ORAL at 06:46

## 2022-09-10 RX ADMIN — CITALOPRAM HYDROBROMIDE 10 MG: 20 TABLET ORAL at 08:37

## 2022-09-10 RX ADMIN — METOPROLOL SUCCINATE 12.5 MG: 25 TABLET, EXTENDED RELEASE ORAL at 15:41

## 2022-09-10 ASSESSMENT — PAIN SCALES - GENERAL
PAINLEVEL_OUTOF10: 0
PAINLEVEL_OUTOF10: 0

## 2022-09-10 NOTE — PROGRESS NOTES
[address]     Electrophysiology                                     Progress Note    Admission date:  2022    Reason for follow up visit: AF and orthostatic hypotension     HPI/CC: Truett Dubin was admitted on 2022 after being found to be in AF with RVR with heart rates in the 130s-170s at his cardiology appointment. Orthostatic blood pressure was positive in the office. He was directed to the ED. Rhythm has been AF with HR 100s-120s. Subjective: He denies chest pain, palpitations, shortness of breath, and dizziness. Vitals:  Blood pressure (!) 157/109, pulse (!) 130, temperature 97.9 °F (36.6 °C), temperature source Oral, resp. rate 16, height 5' 7\" (1.702 m), weight 149 lb 4.8 oz (67.7 kg), SpO2 95 %.   Temp  Av.9 °F (36.6 °C)  Min: 97.7 °F (36.5 °C)  Max: 98.4 °F (36.9 °C)  Pulse  Av.7  Min: 97  Max: 158  BP  Min: 123/75  Max: 167/134  SpO2  Av %  Min: 95 %  Max: 98 %    24 hour I/O    Intake/Output Summary (Last 24 hours) at 9/10/2022 1528  Last data filed at 9/10/2022 1450  Gross per 24 hour   Intake 180 ml   Output 1025 ml   Net -845 ml     Current Facility-Administered Medications   Medication Dose Route Frequency Provider Last Rate Last Admin    potassium chloride 10 mEq/100 mL IVPB (Peripheral Line)  10 mEq IntraVENous Q1H Luis Daniel Marques  mL/hr at 09/10/22 1508 10 mEq at 09/10/22 1508    metoprolol succinate (TOPROL XL) extended release tablet 12.5 mg  12.5 mg Oral Daily GUS Flanagan - CNP        digoxin (LANOXIN) tablet 125 mcg  125 mcg Oral Daily MARIAN Bocanegra MD   125 mcg at 09/10/22 2891    aspirin chewable tablet 81 mg  81 mg Oral Daily Monica Finley MD   81 mg at 09/10/22 0837    atorvastatin (LIPITOR) tablet 40 mg  40 mg Oral Daily Monica Finley MD   40 mg at 09/10/22 0837    citalopram (CELEXA) tablet 10 mg  10 mg Oral Daily Monica Finley MD   10 mg at 09/10/22 08    finasteride (PROSCAR) tablet 5 mg  5 mg Oral Daily Micah Norton MD   5 mg at 09/10/22 0837    fludrocortisone (FLORINEF) tablet 0.2 mg  0.2 mg Oral Daily Micah Norton MD   0.2 mg at 09/10/22 0837    memantine (NAMENDA) tablet 5 mg  5 mg Oral BID Micah Norton MD   5 mg at 09/10/22 0837    midodrine (PROAMATINE) tablet 10 mg  10 mg Oral TID  Luis Daniel Marques, DO   10 mg at 09/10/22 0837    pantoprazole (PROTONIX) tablet 40 mg  40 mg Oral QAM AC Micah Norton MD   40 mg at 09/10/22 0646    sodium chloride flush 0.9 % injection 5-40 mL  5-40 mL IntraVENous 2 times per day Micah Norton MD   10 mL at 09/10/22 0837    sodium chloride flush 0.9 % injection 5-40 mL  5-40 mL IntraVENous PRN Micah Norton MD        0.9 % sodium chloride infusion   IntraVENous PRN Micah Norton MD 25 mL/hr at 09/10/22 1258 New Bag at 09/10/22 1258    ondansetron (ZOFRAN-ODT) disintegrating tablet 4 mg  4 mg Oral Q8H PRN Micah Norton MD        Or    ondansetron (ZOFRAN) injection 4 mg  4 mg IntraVENous Q6H PRN Micah Norton MD        polyethylene glycol (GLYCOLAX) packet 17 g  17 g Oral Daily PRN Micah Norton MD        acetaminophen (TYLENOL) tablet 650 mg  650 mg Oral Q6H PRN Micah Norton MD        Or    acetaminophen (TYLENOL) suppository 650 mg  650 mg Rectal Q6H PRN Micah Norton MD        enoxaparin (LOVENOX) injection 40 mg  40 mg SubCUTAneous Daily Micah Norton MD   40 mg at 09/10/22 0837    furosemide (LASIX) injection 20 mg  20 mg IntraVENous BID Micah Norton MD   20 mg at 09/10/22 0837    metoprolol (LOPRESSOR) injection 2.5 mg  2.5 mg IntraVENous Q6H PRN Micah Norton MD   2.5 mg at 09/10/22 1227       Objective:     Telemetry monitor: AF    Physical Exam:  Constitutional and general appearance: alert, cooperative, no distress, and appears stated age  HEENT: PERRL, no cervical lymphadenopathy. No masses palpable.  Normal oral mucosa  Respiratory:  Normal excursion and expansion without use of accessory muscles  Resp auscultation: Normal breath sounds without wheezing, rhonchi, and rales  Cardiovascular: The apical impulse is not displaced  Heart tones are crisp and normal. Iregular S1 and S2.  Jugular venous pulsation Normal  The carotid upstroke is normal in amplitude and contour without delay or bruit  Peripheral pulses are symmetrical and full   Abdomen:  No masses or tenderness  Bowel sounds present  Extremities:   No cyanosis or clubbing   No lower extremity edema   Skin: warm and dry  Neurological:  Alert  Moves all extremities well  No abnormalities of mood, affect, or behavior are noted    Data  Limited echo 8/10/2022:   Conclusions   Summary   Limited only f/u for LVEF. The left ventricular systolic function is moderately reduced with an   ejection fraction of 35 - 40 %. Left ventricular cavity size is normal.   Hypokinesis of the anterior, septal, and apical walls. Limited echo 4/15/2022:    Summary   Limited echo for LV function with limited doppler/color. LV function is mildly decreased with EF estimated from 45-50%. Mild global hypokinesis. Mild concentric left ventricular hypertrophy. Diastolic dysfunction grade and filling pressure are indeterminate. Severe biatrial enlargement. Aortic valve appears sclerotic but opens adequately. 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 Note no color flow doppler visualized to assess for mitral regurgitation. Mild to moderate tricuspid valve regurgitation. Systolic pulmonary artery pressure (sPAP) is normal and estimated at 26 mmHg   (RAP 3 mmHg)   Irregular heart rate throughout study. Definity® used for myocardial border enhancement. ECHO: 3/21/2019:  Summary   Normal left ventricular systolic function with an estimated ejection   fraction of 55-60%. No regional wall motion abnormalities are seen. Normal left ventricular diastolic filling pressure. The left atrium is mildly dilated.    The right atrium is moderately dilated. Right ventricular systolic function is mildly reduced . The right ventricle is mildly enlarged. The ascending aorta is mildly dilated. 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 Mild mitral regurgitation. Moderate tricuspid regurgitation. Systolic pulmonary artery pressure (SPAP) estimated at 47 mmHg (RA pressure   15 mmHg), consistent with mild pulmonary hypertension. Pacemaker lead in right ventricle. All labs and testing reviewed.   Lab Review     Renal Profile:   Lab Results   Component Value Date/Time    CREATININE 1.4 09/10/2022 06:51 AM    BUN 18 09/10/2022 06:51 AM     09/10/2022 06:51 AM    K 3.0 09/10/2022 06:51 AM    K 3.7 09/08/2022 02:56 PM     09/10/2022 06:51 AM    CO2 31 09/10/2022 06:51 AM     CBC:    Lab Results   Component Value Date/Time    WBC 6.9 09/09/2022 05:45 AM    RBC 4.70 09/09/2022 05:45 AM    HGB 14.1 09/09/2022 05:45 AM    HCT 42.1 09/09/2022 05:45 AM    MCV 89.7 09/09/2022 05:45 AM    RDW 17.3 09/09/2022 05:45 AM     09/09/2022 05:45 AM     BNP:    Lab Results   Component Value Date/Time    BNP 12.8 01/09/2013 10:00 AM     Fasting Lipid Panel:    Lab Results   Component Value Date/Time    CHOL 137 09/09/2022 05:45 AM    HDL 30 09/09/2022 05:45 AM    TRIG 73 09/09/2022 05:45 AM     Cardiac Enzymes:  CK/MbTroponin  Lab Results   Component Value Date/Time    TROPONINI <0.01 09/08/2022 11:56 PM     PT/ INR   Lab Results   Component Value Date/Time    INR 1.26 01/18/2022 06:05 AM    INR 1.18 05/22/2020 10:19 AM    INR 1.02 01/24/2016 12:35 PM    PROTIME 14.4 01/18/2022 06:05 AM    PROTIME 13.7 05/22/2020 10:19 AM    PROTIME 11.6 01/24/2016 12:35 PM     PTT No results found for: PTT   Lab Results   Component Value Date/Time    MG 2.00 09/10/2022 06:51 AM      Lab Results   Component Value Date/Time    TSH 2.45 10/21/2021 02:04 PM     Assessment:  Persistent atrial fibrillation: ongoing -[id number] score 3 (age, HTN)              -numerous high ventricular events on device interrogation   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 Risk of chemical conversion to sinus rhythm overweighs the benefit. 2. Continue digoxin 125 mcg PO daily, aspirin, and atorvastatin  3. Start low dose Toprol 12.5 mg PO given ongoing tachycardia. Likely a component related to agitation   4. PRN metoprolol 2.5 mg IV q 6 hours for sustaining HR of > 120 bpm  5. Not on anticoagulation due to fall risk  6. Consider AV node ablation with BiV upgrade  7. Compression stockings and abdominal binder with ambulation  8.  Increase fluid intake to 64 oz daily      Discussed with Dr. Alicia Osborne, APRN-CNP  [facility]  [phone]

## 2022-09-10 NOTE — PLAN OF CARE
Problem: Safety - Adult  Goal: Free from fall injury  Outcome: Progressing     Problem: ABCDS Injury Assessment  Goal: Absence of physical injury  Outcome: Progressing     For patient safety, an AVASYS camera has been placed in patient's room. AV[employer] monitoring needed for Confusion, Increased Fall Risk, and Pulling at Lines. Writer placed call to monitor observer to verify camera number  and review patient name, reason for monitoring, and contact information for primary RN. Patient notified of use of remote monitoring upon implementation of camera and verbalized understanding.

## 2022-09-10 NOTE — PROGRESS NOTES
Prosthetic right eye. Left eye reactive to light and conjunctiva clear  Neck: Supple, with full range of motion. No jugular venous distention. Trachea midline. Respiratory:  Normal respiratory effort. Clear to auscultation, bilaterally without Rales/Wheezes/Rhonchi. Cardiovascular: Irregular rate and rhythm with normal S1/S2 without murmurs, rubs or gallops. Abdomen: Soft, non-tender, non-distended with normal bowel sounds. Musculoskeletal: No clubbing, cyanosis or edema bilaterally. Full range of motion without deformity. Skin: Skin color, texture, turgor normal.  No rashes or lesions. Neurologic:  Neurovascularly intact without any focal sensory/motor deficits. Cranial nerves: II-XII intact, grossly non-focal.  Psychiatric: Alert and oriented to self. Dementia   Capillary Refill: Brisk, 3 seconds, normal   Peripheral Pulses: +2 palpable, equal bilaterally       Labs:   Recent Labs     09/08/22  1456 09/09/22  0545   WBC 6.9 6.9   HGB 14.6 14.1   HCT 43.5 42.1    239       Recent Labs     09/08/22  1456 09/09/22  0545 09/10/22  0651    144 143   K 3.7 2.9* 3.0*    102 102   CO2 26 28 31   BUN 22* 19 18   CREATININE 1.4* 1.3 1.4*   CALCIUM 9.1 9.5 9.0       Recent Labs     09/08/22  1456   AST 19   ALT 22   BILITOT 1.4*   ALKPHOS 93       No results for input(s): INR in the last 72 hours. Recent Labs     09/08/22  1456 09/08/22  2121 09/08/22  2356   TROPONINI 0.01 0.01 <0.01         Urinalysis:      Lab Results   Component Value Date/Time    NITRU Negative 09/08/2022 04:59 PM    WBCUA 21-50 04/21/2022 02:20 PM    BACTERIA 1+ 04/21/2022 02:20 PM    RBCUA 3-4 04/21/2022 02:20 PM    BLOODU Negative 09/08/2022 04:59 PM    SPECGRAV 1.020 09/08/2022 04:59 PM    GLUCOSEU Negative 09/08/2022 04:59 PM       Radiology:  XR CHEST PORTABLE   Final Result   Cardiomegaly with mild pulmonary vascular congestion.                  Assessment/Plan:    Active Hospital Problems    Diagnosis     Orthostatic hypotension [I95.1]      Priority: Medium    Atrial fibrillation, rapid ([facility] [address] [I48.91]      Priority: Medium    Ischemic cardiomyopathy [I25.5]      Priority: Medium    Coronary artery disease involving native coronary artery of native heart without angina pectoris, 5/2020 abnormal stress test. [facility] mild LAD disease  [I25.10]        Permanent Atrial fibrillation with rapid ventricular rate    -s/p IV fluid and Cardizem bolus  He was not on metoprolol because of orthostasis and not on anticoagulation because of fall risk  EP consulted  Start low dose digoxin  Wean diltiazem due to orthostatic hypotension  Consider AVN ablation with BiV upgrade   Maintain K and Mg at adequate levels  Started amio with bolus and gtt yesterday evening due to uncontrolled rates.  Rates have improved but still uncontrolled with activity  Metoprolol IV PRN for HR      Orthostatic hypotension  Complicating treatment of afib and HF  Increase fluid intake and liberate salt intake   on Florinef and midodrine, continue-currently blood pressure stable  Caution with rising   Cardiac rehab recommended outpatient      Acute on chronic systolic CHF with ejection fraction of 35%   Ischemic cardiomyopathy   Exacerbated by above  Monitor I/Os  No fluid or salt restriction due to RVR  Lasix 20 mg BID IV      CAD of native coronary artery   Would benefit from ischemic evaluation of [facility] but due to dementia cannot consent   If non obstructive disease then plan for BiV upgrade   Continue ASA    Hypokalemia - replace and recheck in AM   Hypomagnesemia - resolved, recheck in     Chronic kidney disease-stage IIIa creatinine 1.4  Monitor renal fx with BMP daily  Avoid nephrotoxic agents      Hypertension-medications as above, difficult to control with orthostatic hypotension      Hyperlipidemia-statin continued     BPH - continue finasteride     Dementia - continue memantine     Depression - mood is stable, continue celexa     DVT Prophylaxis: Lovenox  Diet: ADULT DIET; Regular;  Low Sodium (2 gm)  ADULT ORAL NUTRITION SUPPLEMENT; Breakfast, Dinner; Standard High Calorie/High Protein Oral Supplement  Code Status: Full Code  PT/OT Eval Status: Consulted     Dispo - Pending further recs and stable BP and HR    Appropriate for A1 Discharge Unit: No      Noreen Fore, DO

## 2022-09-11 LAB
ANION GAP SERPL CALCULATED.3IONS-SCNC: 12 MMOL/L (ref 3–16)
BUN BLDV-MCNC: 16 MG/DL (ref 7–20)
CALCIUM SERPL-MCNC: 9.9 MG/DL (ref 8.3–10.6)
CHLORIDE BLD-SCNC: 101 MMOL/L (ref 99–110)
CO2: 29 MMOL/L (ref 21–32)
CREAT SERPL-MCNC: 1.3 MG/DL (ref 0.8–1.3)
GFR AFRICAN AMERICAN: >60
GFR NON-AFRICAN AMERICAN: 52
GLUCOSE BLD-MCNC: 84 MG/DL (ref 70–99)
MAGNESIUM: 1.8 MG/DL (ref 1.8–2.4)
POTASSIUM SERPL-SCNC: 3.7 MMOL/L (ref 3.5–5.1)
PRO-BNP: 9046 PG/ML (ref 0–449)
SODIUM BLD-SCNC: 142 MMOL/L (ref 136–145)

## 2022-09-11 PROCEDURE — 83880 ASSAY OF NATRIURETIC PEPTIDE: CPT

## 2022-09-11 PROCEDURE — 99232 SBSQ HOSP IP/OBS MODERATE 35: CPT

## 2022-09-11 PROCEDURE — 6370000000 HC RX 637 (ALT 250 FOR IP)

## 2022-09-11 PROCEDURE — 6370000000 HC RX 637 (ALT 250 FOR IP): Performed by: INTERNAL MEDICINE

## 2022-09-11 PROCEDURE — 83735 ASSAY OF MAGNESIUM: CPT

## 2022-09-11 PROCEDURE — 36415 COLL VENOUS BLD VENIPUNCTURE: CPT

## 2022-09-11 PROCEDURE — 1200000000 HC SEMI PRIVATE

## 2022-09-11 PROCEDURE — 80048 BASIC METABOLIC PNL TOTAL CA: CPT

## 2022-09-11 PROCEDURE — 6360000002 HC RX W HCPCS: Performed by: INTERNAL MEDICINE

## 2022-09-11 PROCEDURE — 2580000003 HC RX 258: Performed by: INTERNAL MEDICINE

## 2022-09-11 RX ORDER — OXYBUTYNIN CHLORIDE 10 MG/1
TABLET, EXTENDED RELEASE ORAL
COMMUNITY
Start: 2022-06-10

## 2022-09-11 RX ORDER — POTASSIUM CHLORIDE 20 MEQ/1
40 TABLET, EXTENDED RELEASE ORAL ONCE
Status: COMPLETED | OUTPATIENT
Start: 2022-09-11 | End: 2022-09-11

## 2022-09-11 RX ORDER — PANTOPRAZOLE SODIUM 40 MG/1
1 TABLET, DELAYED RELEASE ORAL
COMMUNITY
Start: 2022-08-28 | End: 2022-10-17 | Stop reason: SDUPTHER

## 2022-09-11 RX ORDER — ONDANSETRON 4 MG/1
1 TABLET, FILM COATED ORAL
COMMUNITY
Start: 2022-08-27

## 2022-09-11 RX ORDER — DIPHENHYDRAMINE HCL 25 MG
25 TABLET ORAL
Status: ON HOLD | COMMUNITY
Start: 2022-08-27 | End: 2022-09-18 | Stop reason: HOSPADM

## 2022-09-11 RX ORDER — POLYETHYLENE GLYCOL 3350 17 G/17G
POWDER, FOR SOLUTION ORAL
COMMUNITY
Start: 2022-08-27

## 2022-09-11 RX ADMIN — Medication 10 ML: at 08:59

## 2022-09-11 RX ADMIN — ASPIRIN 81 MG 81 MG: 81 TABLET ORAL at 08:59

## 2022-09-11 RX ADMIN — METOPROLOL SUCCINATE 12.5 MG: 25 TABLET, EXTENDED RELEASE ORAL at 08:59

## 2022-09-11 RX ADMIN — FLUDROCORTISONE ACETATE 0.2 MG: 0.1 TABLET ORAL at 08:59

## 2022-09-11 RX ADMIN — FUROSEMIDE 20 MG: 10 INJECTION, SOLUTION INTRAMUSCULAR; INTRAVENOUS at 08:59

## 2022-09-11 RX ADMIN — MIDODRINE HYDROCHLORIDE 10 MG: 5 TABLET ORAL at 08:59

## 2022-09-11 RX ADMIN — MIDODRINE HYDROCHLORIDE 10 MG: 5 TABLET ORAL at 18:22

## 2022-09-11 RX ADMIN — FUROSEMIDE 20 MG: 10 INJECTION, SOLUTION INTRAMUSCULAR; INTRAVENOUS at 18:22

## 2022-09-11 RX ADMIN — Medication 10 ML: at 21:10

## 2022-09-11 RX ADMIN — MIDODRINE HYDROCHLORIDE 10 MG: 5 TABLET ORAL at 12:06

## 2022-09-11 RX ADMIN — ENOXAPARIN SODIUM 40 MG: 100 INJECTION SUBCUTANEOUS at 08:59

## 2022-09-11 RX ADMIN — MEMANTINE 5 MG: 5 TABLET ORAL at 08:59

## 2022-09-11 RX ADMIN — ATORVASTATIN CALCIUM 40 MG: 40 TABLET, FILM COATED ORAL at 08:59

## 2022-09-11 RX ADMIN — MEMANTINE 5 MG: 5 TABLET ORAL at 21:09

## 2022-09-11 RX ADMIN — DIGOXIN 125 MCG: 125 TABLET ORAL at 08:59

## 2022-09-11 RX ADMIN — POTASSIUM CHLORIDE 40 MEQ: 1500 TABLET, EXTENDED RELEASE ORAL at 10:31

## 2022-09-11 RX ADMIN — FINASTERIDE 5 MG: 5 TABLET, FILM COATED ORAL at 08:59

## 2022-09-11 RX ADMIN — CITALOPRAM HYDROBROMIDE 10 MG: 20 TABLET ORAL at 08:59

## 2022-09-11 ASSESSMENT — PAIN SCALES - GENERAL
PAINLEVEL_OUTOF10: 0

## 2022-09-11 NOTE — PROGRESS NOTES
Hospitalist Progress Note      PCP: Minor Nieves MD    Date of Admission: 9/8/2022    Chief Complaint: Afib RVR    Hospital Course:    82 y.o. male who presented to [facility] with above complaint. He went to cardiology is routine follow-up of following recent hospital admission. In the office they found his heart rate was high and blood pressure was low. He was sent to the emergency room for further evaluation. He received 1 bolus of Cardizem for rapid ventricular rate    Subjective:   Patient is feeling well and in chair. He denies any chest pain, shortness of breath. Diarrhea, constipation, n/v, fever or chills    States he does have some dizziness while sitting down      Medications:  Reviewed    Infusion Medications    sodium chloride 25 mL/hr at 09/10/22 1258     Scheduled Medications    potassium chloride  40 mEq Oral Once    metoprolol succinate  12.5 mg Oral Daily    digoxin  125 mcg Oral Daily    aspirin  81 mg Oral Daily    atorvastatin  40 mg Oral Daily    citalopram  10 mg Oral Daily    finasteride  5 mg Oral Daily    fludrocortisone  0.2 mg Oral Daily    memantine  5 mg Oral BID    midodrine  10 mg Oral TID WC    pantoprazole  40 mg Oral QAM AC    sodium chloride flush  5-40 mL IntraVENous 2 times per day    enoxaparin  40 mg SubCUTAneous Daily    furosemide  20 mg IntraVENous BID     PRN Meds: sodium chloride flush, sodium chloride, ondansetron **OR** ondansetron, polyethylene glycol, acetaminophen **OR** acetaminophen, metoprolol      Intake/Output Summary (Last 24 hours) at 9/11/2022 0954  Last data filed at 9/10/2022 1450  Gross per 24 hour   Intake --   Output 575 ml   Net -575 ml         Physical Exam Performed:    /84   Pulse 61   Temp 97.3 °F (36.3 °C)   Resp 18   Ht 5' 7\" (1.702 m)   Wt 152 lb 1.9 oz (69 kg)   SpO2 93%   BMI 23.82 kg/m²     General appearance: No apparent distress, appears stated age and cooperative. HEENT: Prosthetic right eye.  Left eye reactive to light and conjunctiva clear  Neck: Supple, with full range of motion. No jugular venous distention. Trachea midline. Respiratory:  Normal respiratory effort. Clear to auscultation, bilaterally without Rales/Wheezes/Rhonchi. Cardiovascular: Irregular rate and rhythm with normal S1/S2 without murmurs, rubs or gallops. Abdomen: Soft, non-tender, non-distended with normal bowel sounds. Musculoskeletal: No clubbing, cyanosis or edema bilaterally. Full range of motion without deformity. Skin: Skin color, texture, turgor normal.  No rashes or lesions. Neurologic:  Neurovascularly intact without any focal sensory/motor deficits. Cranial nerves: II-XII intact, grossly non-focal.  Psychiatric: Alert and oriented to self. Dementia   Capillary Refill: Brisk, 3 seconds, normal   Peripheral Pulses: +2 palpable, equal bilaterally       Labs:   Recent Labs     09/08/22  1456 09/09/22  0545   WBC 6.9 6.9   HGB 14.6 14.1   HCT 43.5 42.1    239       Recent Labs     09/09/22  0545 09/10/22  0651 09/10/22  2020 09/11/22  0518    143  --  142   K 2.9* 3.0* 3.6 3.7    102  --  101   CO2 28 31  --  29   BUN 19 18  --  16   CREATININE 1.3 1.4*  --  1.3   CALCIUM 9.5 9.0  --  9.9       Recent Labs     09/08/22  1456   AST 19   ALT 22   BILITOT 1.4*   ALKPHOS 93       No results for input(s): INR in the last 72 hours. Recent Labs     09/08/22  1456 09/08/22  2121 09/08/22  2356   TROPONINI 0.01 0.01 <0.01         Urinalysis:      Lab Results   Component Value Date/Time    NITRU Negative 09/08/2022 04:59 PM    WBCUA 21-50 04/21/2022 02:20 PM    BACTERIA 1+ 04/21/2022 02:20 PM    RBCUA 3-4 04/21/2022 02:20 PM    BLOODU Negative 09/08/2022 04:59 PM    SPECGRAV 1.020 09/08/2022 04:59 PM    GLUCOSEU Negative 09/08/2022 04:59 PM       Radiology:  XR CHEST PORTABLE   Final Result   Cardiomegaly with mild pulmonary vascular congestion.                  Assessment/Plan:    Active Hospital Problems    Diagnosis Orthostatic hypotension [I95.1]      Priority: Medium    Atrial fibrillation, rapid ([address] [I48.91]      Priority: Medium    Ischemic cardiomyopathy [I25.5]      Priority: Medium    Coronary artery disease involving native coronary artery of native heart without angina pectoris, 5/2020 abnormal stress test. [facility] mild LAD disease  [I25.10]        Permanent Atrial fibrillation with rapid ventricular rate    -s/p IV fluid and Cardizem bolus  He was not on metoprolol because of orthostasis and not on anticoagulation because of fall risk  EP consulted  Digoxin 125 PO daily, ASA  Start low dose toprol   Wean diltiazem due to orthostatic hypotension  Consider AVN ablation with BiV upgrade   Maintain K and Mg at adequate   Briefly on amio due to uncontrolled rates  Amio not ideal due to not on AC  Metoprolol IV PRN for HR      Orthostatic hypotension  Complicating treatment of afib and HF  Increase fluid intake and liberate salt intake   on Florinef and midodrine, continue-currently blood pressure stable  Caution with rising   Cardiac rehab recommended outpatient      Acute on chronic systolic CHF with ejection fraction of 35%   Ischemic cardiomyopathy   Exacerbated by above  Monitor I/Os  No fluid or salt restriction  Lasix 20 mg BID IV      CAD of native coronary artery   Would benefit from ischemic evaluation of [facility] but due to dementia cannot consent   If non obstructive disease then plan for BiV upgrade   Continue ASA    Hypokalemia - resolved recheck in AM   Hypomagnesemia - resolved, recheck in am    Chronic kidney disease-stage IIIa creatinine 1.4  Monitor renal fx with BMP daily  Avoid nephrotoxic agents      Hypertension-medications as above, difficult to control with orthostatic hypotension      Hyperlipidemia-statin continued     BPH - continue finasteride     Dementia - continue memantine     Depression - mood is stable, continue celexa     DVT Prophylaxis: Lovenox  Diet: ADULT DIET; Regular;  Low Sodium (2 gm)  ADULT ORAL NUTRITION SUPPLEMENT; Breakfast, Dinner; Standard High Calorie/High Protein Oral Supplement  Code Status: Full Code  PT/OT Eval Status: Consulted     Dispo - Pending further recs and stable BP and HR    Appropriate for A1 Discharge Unit: Yasmine Doss DO

## 2022-09-11 NOTE — PROGRESS NOTES
5 mg at 09/11/22 0859    fludrocortisone (FLORINEF) tablet 0.2 mg  0.2 mg Oral Daily Miki Odonnell MD   0.2 mg at 09/11/22 0859    memantine (NAMENDA) tablet 5 mg  5 mg Oral BID Miki Odonnell MD   5 mg at 09/11/22 0859    midodrine (PROAMATINE) tablet 10 mg  10 mg Oral TID  Luis Danielheather Marques, DO   10 mg at 09/11/22 0859    pantoprazole (PROTONIX) tablet 40 mg  40 mg Oral QAM AC Miki Odonnell MD   40 mg at 09/10/22 0646    sodium chloride flush 0.9 % injection 5-40 mL  5-40 mL IntraVENous 2 times per day Miki Odonnell MD   10 mL at 09/11/22 0859    sodium chloride flush 0.9 % injection 5-40 mL  5-40 mL IntraVENous PRN Miki Odonnell MD        0.9 % sodium chloride infusion   IntraVENous PRN Miki Odonnell MD 25 mL/hr at 09/10/22 1258 New Bag at 09/10/22 1258    ondansetron (ZOFRAN-ODT) disintegrating tablet 4 mg  4 mg Oral Q8H PRN Miki Odonnell MD        Or    ondansetron (ZOFRAN) injection 4 mg  4 mg IntraVENous Q6H PRN Miki Odonnell MD        polyethylene glycol (GLYCOLAX) packet 17 g  17 g Oral Daily PRN Miki Odonnell MD        acetaminophen (TYLENOL) tablet 650 mg  650 mg Oral Q6H PRN Miki Odonnell MD        Or    acetaminophen (TYLENOL) suppository 650 mg  650 mg Rectal Q6H PRN Miki Odonnell MD        enoxaparin (LOVENOX) injection 40 mg  40 mg SubCUTAneous Daily Miki Odonnell MD   40 mg at 09/11/22 0859    furosemide (LASIX) injection 20 mg  20 mg IntraVENous BID Miki Odonnell MD   20 mg at 09/11/22 0859    metoprolol (LOPRESSOR) injection 2.5 mg  2.5 mg IntraVENous Q6H PRN Miki Odonnell MD   2.5 mg at 09/10/22 1227       Objective:     Telemetry monitor: AF    Physical Exam:  Constitutional and general appearance: alert, cooperative, no distress, and appears stated age  HEENT: PERRL, no cervical lymphadenopathy. No masses palpable.  Normal oral mucosa  Respiratory:  Normal excursion and expansion without use of accessory muscles  Resp auscultation: Normal breath sounds without wheezing, rhonchi, and rales  Cardiovascular: The apical impulse is not displaced  Heart tones are crisp and normal. Irregular S1 and S2.  Jugular venous pulsation Normal  The carotid upstroke is normal in amplitude and contour without delay or bruit  Peripheral pulses are symmetrical and full   Abdomen:  No masses or tenderness  Bowel sounds present  Extremities:   No cyanosis or clubbing   No lower extremity edema   Skin: warm and dry  Neurological:  Alert  Moves all extremities well  No abnormalities of mood, affect, or behavior are noted    Data  Limited echo 8/10/2022:   Conclusions   Summary   Limited only f/u for LVEF. The left ventricular systolic function is moderately reduced with an   ejection fraction of 35 - 40 %. Left ventricular cavity size is normal.   Hypokinesis of the anterior, septal, and apical walls. Limited echo 4/15/2022:    Summary   Limited echo for LV function with limited doppler/color. LV function is mildly decreased with EF estimated from 45-50%. Mild global hypokinesis. Mild concentric left ventricular hypertrophy. Diastolic dysfunction grade and filling pressure are indeterminate. Severe biatrial enlargement. Aortic valve appears sclerotic but opens adequately. 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 Note no color flow doppler visualized to assess for mitral regurgitation. Mild to moderate tricuspid valve regurgitation. Systolic pulmonary artery pressure (sPAP) is normal and estimated at 26 mmHg   (RAP 3 mmHg)   Irregular heart rate throughout study. Definity® used for myocardial border enhancement. ECHO: 3/21/2019:  Summary   Normal left ventricular systolic function with an estimated ejection   fraction of 55-60%. No regional wall motion abnormalities are seen. Normal left ventricular diastolic filling pressure. The left atrium is mildly dilated. The right atrium is moderately dilated.    Right ventricular systolic function is mildly reduced . The right ventricle is mildly enlarged. The ascending aorta is mildly dilated. 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 Mild mitral regurgitation. Moderate tricuspid regurgitation. Systolic pulmonary artery pressure (SPAP) estimated at 47 mmHg (RA pressure   15 mmHg), consistent with mild pulmonary hypertension. Pacemaker lead in right ventricle. All labs and testing reviewed.   Lab Review     Renal Profile:   Lab Results   Component Value Date/Time    CREATININE 1.3 09/11/2022 05:18 AM    BUN 16 09/11/2022 05:18 AM     09/11/2022 05:18 AM    K 3.7 09/11/2022 05:18 AM    K 3.7 09/08/2022 02:56 PM     09/11/2022 05:18 AM    CO2 29 09/11/2022 05:18 AM     CBC:    Lab Results   Component Value Date/Time    WBC 6.9 09/09/2022 05:45 AM    RBC 4.70 09/09/2022 05:45 AM    HGB 14.1 09/09/2022 05:45 AM    HCT 42.1 09/09/2022 05:45 AM    MCV 89.7 09/09/2022 05:45 AM    RDW 17.3 09/09/2022 05:45 AM     09/09/2022 05:45 AM     BNP:    Lab Results   Component Value Date/Time    BNP 12.8 01/09/2013 10:00 AM     Fasting Lipid Panel:    Lab Results   Component Value Date/Time    CHOL 137 09/09/2022 05:45 AM    HDL 30 09/09/2022 05:45 AM    TRIG 73 09/09/2022 05:45 AM     Cardiac Enzymes:  CK/MbTroponin  Lab Results   Component Value Date/Time    TROPONINI <0.01 09/08/2022 11:56 PM     PT/ INR   Lab Results   Component Value Date/Time    INR 1.26 01/18/2022 06:05 AM    INR 1.18 05/22/2020 10:19 AM    INR 1.02 01/24/2016 12:35 PM    PROTIME 14.4 01/18/2022 06:05 AM    PROTIME 13.7 05/22/2020 10:19 AM    PROTIME 11.6 01/24/2016 12:35 PM     PTT No results found for: PTT   Lab Results   Component Value Date/Time    MG 1.80 09/11/2022 05:18 AM      Lab Results   Component Value Date/Time    TSH 2.45 10/21/2021 02:04 PM     Assessment:  Persistent atrial fibrillation: ongoing              -[id number] score 3 (age, HTN) -numerous high ventricular events on device interrogation   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 PRN metoprolol 2.5 mg IV q 6 hours for sustaining HR > 120 bpm  3. Not on anticoagulation due to fall risk  4. Due to the inability to tolerate dina agents because of orthostatic hypotension, need to consider AV node ablation. Pending [facility] findings, he may qualify for BiV upgrade. 5. Compression stockings and abdominal binder with ambulation  6.  Increase fluid intake to 64 oz daily      Christy Ellis, APRN-CNP  [address]  [phone]

## 2022-09-12 ENCOUNTER — TELEPHONE (OUTPATIENT)
Dept: CARDIOLOGY CLINIC | Age: 87
End: 2022-09-12

## 2022-09-12 LAB
ANION GAP SERPL CALCULATED.3IONS-SCNC: 9 MMOL/L (ref 3–16)
BUN BLDV-MCNC: 26 MG/DL (ref 7–20)
CALCIUM SERPL-MCNC: 9 MG/DL (ref 8.3–10.6)
CHLORIDE BLD-SCNC: 99 MMOL/L (ref 99–110)
CO2: 30 MMOL/L (ref 21–32)
CREAT SERPL-MCNC: 1.2 MG/DL (ref 0.8–1.3)
GFR AFRICAN AMERICAN: >60
GFR NON-AFRICAN AMERICAN: 58
GLUCOSE BLD-MCNC: 72 MG/DL (ref 70–99)
MAGNESIUM: 1.8 MG/DL (ref 1.8–2.4)
POTASSIUM SERPL-SCNC: 3.6 MMOL/L (ref 3.5–5.1)
SODIUM BLD-SCNC: 138 MMOL/L (ref 136–145)

## 2022-09-12 PROCEDURE — 6360000002 HC RX W HCPCS: Performed by: INTERNAL MEDICINE

## 2022-09-12 PROCEDURE — 97535 SELF CARE MNGMENT TRAINING: CPT

## 2022-09-12 PROCEDURE — 97110 THERAPEUTIC EXERCISES: CPT

## 2022-09-12 PROCEDURE — 36415 COLL VENOUS BLD VENIPUNCTURE: CPT

## 2022-09-12 PROCEDURE — 83735 ASSAY OF MAGNESIUM: CPT

## 2022-09-12 PROCEDURE — 80048 BASIC METABOLIC PNL TOTAL CA: CPT

## 2022-09-12 PROCEDURE — 6370000000 HC RX 637 (ALT 250 FOR IP): Performed by: INTERNAL MEDICINE

## 2022-09-12 PROCEDURE — 2580000003 HC RX 258: Performed by: INTERNAL MEDICINE

## 2022-09-12 PROCEDURE — 6370000000 HC RX 637 (ALT 250 FOR IP)

## 2022-09-12 PROCEDURE — 97530 THERAPEUTIC ACTIVITIES: CPT

## 2022-09-12 PROCEDURE — 99233 SBSQ HOSP IP/OBS HIGH 50: CPT

## 2022-09-12 PROCEDURE — 1200000000 HC SEMI PRIVATE

## 2022-09-12 PROCEDURE — 97116 GAIT TRAINING THERAPY: CPT

## 2022-09-12 RX ADMIN — MIDODRINE HYDROCHLORIDE 10 MG: 5 TABLET ORAL at 17:56

## 2022-09-12 RX ADMIN — PANTOPRAZOLE SODIUM 40 MG: 40 TABLET, DELAYED RELEASE ORAL at 05:14

## 2022-09-12 RX ADMIN — ASPIRIN 81 MG 81 MG: 81 TABLET ORAL at 09:34

## 2022-09-12 RX ADMIN — Medication 10 ML: at 09:38

## 2022-09-12 RX ADMIN — FLUDROCORTISONE ACETATE 0.2 MG: 0.1 TABLET ORAL at 09:33

## 2022-09-12 RX ADMIN — ATORVASTATIN CALCIUM 40 MG: 40 TABLET, FILM COATED ORAL at 09:34

## 2022-09-12 RX ADMIN — METOPROLOL SUCCINATE 12.5 MG: 25 TABLET, EXTENDED RELEASE ORAL at 09:33

## 2022-09-12 RX ADMIN — FINASTERIDE 5 MG: 5 TABLET, FILM COATED ORAL at 09:33

## 2022-09-12 RX ADMIN — FUROSEMIDE 20 MG: 10 INJECTION, SOLUTION INTRAMUSCULAR; INTRAVENOUS at 11:54

## 2022-09-12 RX ADMIN — MEMANTINE 5 MG: 5 TABLET ORAL at 20:23

## 2022-09-12 RX ADMIN — DIGOXIN 125 MCG: 125 TABLET ORAL at 09:34

## 2022-09-12 RX ADMIN — MIDODRINE HYDROCHLORIDE 10 MG: 5 TABLET ORAL at 14:10

## 2022-09-12 RX ADMIN — MEMANTINE 5 MG: 5 TABLET ORAL at 09:34

## 2022-09-12 RX ADMIN — Medication 10 ML: at 20:23

## 2022-09-12 RX ADMIN — CITALOPRAM HYDROBROMIDE 10 MG: 20 TABLET ORAL at 09:33

## 2022-09-12 NOTE — CARE COORDINATION
Therapy rec's of SNF noted. Discussed with patient and daughter and wife. They would like for patient to discharge to home with resumption of home care through [facility]. Daughter states her son does online college so can stay with patient and assist as needed. Patient due to have cardiac cath tomorrow.

## 2022-09-12 NOTE — FLOWSHEET NOTE
09/12/22 1203   Vitals   Orthostatic B/P and Pulse? Yes   Blood Pressure Lying 144/92   Pulse Lying 79 PER MINUTE   Blood Pressure Sitting 139/104   Pulse Sitting 75 PER MINUTE   Blood Pressure Standing 106/79   Pulse Standing 102 PER MINUTE     Orthos completed at this time. Patient asymptomatic with completion.

## 2022-09-12 NOTE — PROGRESS NOTES
Hospitalist Progress Note      PCP: Romero Khan MD    Date of Admission: 9/8/2022    Chief Complaint: Afib RVR    Hospital Course:  82 y.o. male who presented to [facility] with above complaint. He went to cardiology is routine follow-up of following recent hospital admission. In the office they found his heart rate was high and blood pressure was low. He was sent to the emergency room for further evaluation. He received 1 bolus of Cardizem for rapid ventricular rate     Subjective: Resting in bed, no voiced complaints      Medications:  Reviewed    Infusion Medications    sodium chloride 25 mL/hr at 09/10/22 1258     Scheduled Medications    metoprolol succinate  12.5 mg Oral Daily    digoxin  125 mcg Oral Daily    aspirin  81 mg Oral Daily    atorvastatin  40 mg Oral Daily    citalopram  10 mg Oral Daily    finasteride  5 mg Oral Daily    fludrocortisone  0.2 mg Oral Daily    memantine  5 mg Oral BID    midodrine  10 mg Oral TID WC    pantoprazole  40 mg Oral QAM AC    sodium chloride flush  5-40 mL IntraVENous 2 times per day    enoxaparin  40 mg SubCUTAneous Daily     PRN Meds: sodium chloride flush, sodium chloride, ondansetron **OR** ondansetron, polyethylene glycol, acetaminophen **OR** acetaminophen, metoprolol      Intake/Output Summary (Last 24 hours) at 9/12/2022 1419  Last data filed at 9/12/2022 1254  Gross per 24 hour   Intake 590 ml   Output 875 ml   Net -285 ml       Physical Exam Performed:    BP 94/62   Pulse 79   Temp 97.7 °F (36.5 °C) (Oral)   Resp 16   Ht 5' 7\" (1.702 m)   Wt 148 lb 14.4 oz (67.5 kg)   SpO2 94%   BMI 23.32 kg/m²     General appearance: No apparent distress, appears stated age and cooperative. HEENT: Prosthetic right eye  Neck: Supple, with full range of motion. No jugular venous distention. Trachea midline. Respiratory:  Normal respiratory effort. Clear to auscultation, bilaterally without Rales/Wheezes/Rhonchi.   Cardiovascular: Irregular rate and rhythm with normal S1/S2 without murmurs, rubs or gallops. Abdomen: Soft, non-tender, non-distended with normal bowel sounds. Musculoskeletal: No clubbing, cyanosis or edema bilaterally. Full range of motion without deformity. Skin: Skin color, texture, turgor normal.  No rashes or lesions. Neurologic:  Neurovascularly intact without any focal sensory/motor deficits. Cranial nerves: II-XII intact, grossly non-focal.  Psychiatric: Alert and oriented, limited insight  Capillary Refill: Brisk, 3 seconds, normal   Peripheral Pulses: +2 palpable, equal bilaterally       Labs:   No results for input(s): WBC, HGB, HCT, PLT in the last 72 hours. Recent Labs     09/10/22  0651 09/10/22  2020 09/11/22  0518 09/12/22  0650     --  142 138   K 3.0* 3.6 3.7 3.6     --  101 99   CO2 31  --  29 30   BUN 18  --  16 26*   CREATININE 1.4*  --  1.3 1.2   CALCIUM 9.0  --  9.9 9.0     No results for input(s): AST, ALT, BILIDIR, BILITOT, ALKPHOS in the last 72 hours. No results for input(s): INR in the last 72 hours. No results for input(s): Kaleen Hope in the last 72 hours. Urinalysis:      Lab Results   Component Value Date/Time    NITRU Negative 09/08/2022 04:59 PM    WBCUA 21-50 04/21/2022 02:20 PM    BACTERIA 1+ 04/21/2022 02:20 PM    RBCUA 3-4 04/21/2022 02:20 PM    BLOODU Negative 09/08/2022 04:59 PM    SPECGRAV 1.020 09/08/2022 04:59 PM    GLUCOSEU Negative 09/08/2022 04:59 PM       Radiology:  XR CHEST PORTABLE   Final Result   Cardiomegaly with mild pulmonary vascular congestion.                  Assessment/Plan:    Active Hospital Problems    Diagnosis     Orthostatic hypotension [I95.1]      Priority: Medium    Atrial fibrillation, rapid (HCC) [I48.91]      Priority: Medium    Ischemic cardiomyopathy [I25.5]      Priority: Medium    Coronary artery disease involving native coronary artery of native heart without angina pectoris, 5/2020 abnormal stress test. [facility] mild LAD disease  [I25.10] Permanent Atrial fibrillation with rapid ventricular rate    -s/p IV fluid and Cardizem bolus  He was not on metoprolol because of orthostasis and not on anticoagulation because of fall risk  EP consulted  Digoxin 125 PO daily, ASA  Start low dose toprol   Wean diltiazem due to orthostatic hypotension  Consider AVN ablation with BiV upgrade   Maintain K and Mg at adequate   Briefly on amio due to uncontrolled rates  Amio not ideal due to not on AC  Metoprolol IV PRN for HR      Orthostatic hypotension  Complicating treatment of afib and HF  Increase fluid intake and liberate salt intake   on Florinef and midodrine, continue-currently blood pressure stable  Caution with rising   Cardiac rehab recommended outpatient      Acute on chronic systolic CHF with ejection fraction of 35%   Ischemic cardiomyopathy   Exacerbated by above  Monitor I/Os  No fluid or salt restriction  Lasix 20 mg BID IV      CAD of native coronary artery   Would benefit from ischemic evaluation of [facility] but due to dementia cannot consent   If non obstructive disease then plan for BiV upgrade   Continue ASA     Hypokalemia - resolved recheck in AM   Hypomagnesemia - resolved, recheck in am     Chronic kidney disease-stage IIIa creatinine 1.2   Monitor renal fx with BMP daily  Avoid nephrotoxic agents      Hypertension-medications as above, difficult to control with orthostatic hypotension      Hyperlipidemia-statin continued      BPH - continue finasteride      Dementia - continue memantine      Depression - mood is stable, continue celexa     DVT Prophylaxis: Lovenox  Diet: ADULT DIET; Regular;  Low Sodium (2 gm)  ADULT ORAL NUTRITION SUPPLEMENT; Breakfast, Dinner; Standard High Calorie/High Protein Oral Supplement  Code Status: Full Code  PT/OT Eval Status: Consulted    Dispo - poss [facility]-consent pending    Appropriate for A1 Discharge Unit: [facility] - [facility]

## 2022-09-12 NOTE — PROGRESS NOTES
Perfect Serve sent to [facility] Calderon \"Midodrine ordered with SBP hold parameters. DBP increasing, would you like to add hold parameters for diastolic or continue to administer if systolic remains below 734? \" Awaiting response.

## 2022-09-12 NOTE — PROGRESS NOTES
[address]     Electrophysiology                                     Progress Note    Admission date:  2022    Reason for follow up visit: AF and orthostatic hypotension     HPI/CC: Becki Feng was admitted on 2022 after being found to be in AF with RVR with heart rates in the 130s-170 bpm and positive orthostatic blood pressure at his cardiology appointment. He was directed to the ED. Rhythm has been AF with HR 70s. Subjective: He has no complaints today. He has not been up out of bed yet today. He denies dizziness, chest pain, shortness of breath and palpitations. Vitals:  Blood pressure (!) 139/104, pulse 74, temperature 97.7 °F (36.5 °C), temperature source Oral, resp. rate 16, height 5' 7\" (1.702 m), weight 148 lb 14.4 oz (67.5 kg), SpO2 94 %.   Temp  Av.7 °F (36.5 °C)  Min: 97.5 °F (36.4 °C)  Max: 97.9 °F (36.6 °C)  Pulse  Av  Min: 68  Max: 76  BP  Min: 116/88  Max: 154/94  SpO2  Av.7 %  Min: 94 %  Max: 100 %    24 hour I/O    Intake/Output Summary (Last 24 hours) at 2022 1205  Last data filed at 2022 1030  Gross per 24 hour   Intake 590 ml   Output 600 ml   Net -10 ml     Current Facility-Administered Medications   Medication Dose Route Frequency Provider Last Rate Last Admin    metoprolol succinate (TOPROL XL) extended release tablet 12.5 mg  12.5 mg Oral Daily GUS Dior CNP   12.5 mg at 2233    digoxin (LANOXIN) tablet 125 mcg  125 mcg Oral Daily MARIAN Madrigal MD   125 mcg at 2234    aspirin chewable tablet 81 mg  81 mg Oral Daily Meenakshi Rodriguez MD   81 mg at 2234    atorvastatin (LIPITOR) tablet 40 mg  40 mg Oral Daily Meenakshi Rodriguez MD   40 mg at 2234    citalopram (CELEXA) tablet 10 mg  10 mg Oral Daily Meenakshi Rodriguez MD   10 mg at 2233    finasteride (PROSCAR) tablet 5 mg  5 mg Oral Daily Meenakshi Rodriguez MD   5 mg at 22 0933    fludrocortisone (FLORINEF) tablet rales  Cardiovascular: The apical impulse is not displaced  Heart tones are crisp and normal. Irregular S1 and S2.  Jugular venous pulsation Normal  The carotid upstroke is normal in amplitude and contour without delay or bruit  Peripheral pulses are symmetrical and full   Abdomen:  No masses or tenderness  Bowel sounds present  Extremities:   No cyanosis or clubbing   No lower extremity edema   Skin: warm and dry  Neurological:  Alert  Moves all extremities well  No abnormalities of mood, affect, or behavior are noted    Data  Limited echo 8/10/2022:   Conclusions   Summary   Limited only f/u for LVEF. The left ventricular systolic function is moderately reduced with an   ejection fraction of 35 - 40 %. Left ventricular cavity size is normal.   Hypokinesis of the anterior, septal, and apical walls. Limited echo 4/15/2022:    Summary   Limited echo for LV function with limited doppler/color. LV function is mildly decreased with EF estimated from 45-50%. Mild global hypokinesis. Mild concentric left ventricular hypertrophy. Diastolic dysfunction grade and filling pressure are indeterminate. Severe biatrial enlargement. Aortic valve appears sclerotic but opens adequately. 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 Note no color flow doppler visualized to assess for mitral regurgitation. Mild to moderate tricuspid valve regurgitation. Systolic pulmonary artery pressure (sPAP) is normal and estimated at 26 mmHg   (RAP 3 mmHg)   Irregular heart rate throughout study. Definity® used for myocardial border enhancement. ECHO: 3/21/2019:  Summary   Normal left ventricular systolic function with an estimated ejection   fraction of 55-60%. No regional wall motion abnormalities are seen. Normal left ventricular diastolic filling pressure. The left atrium is mildly dilated. The right atrium is moderately dilated. Right ventricular systolic function is mildly reduced . 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 PRN metoprolol 2.5 mg IV q 6 hours for sustaining HR > 120 bpm  3. Not on anticoagulation due to fall risk  4. Will have to allow for permissive HTN while resting given he has ongoing orthostatic hypotension with activity. 5. Due to the inability to tolerate dina agents because of orthostatic hypotension, need to consider AV node ablation. Pending C findings, he may qualify for BiV upgrade. 6. Compression stockings and abdominal binder with ambulation  7. Increase fluid intake to 64 oz daily  8. I talked with daughter Gm Goldberg who is his primary decision maker. She would like to proceed with LHC and AV node ablation. Addendum @ 1630:  Discussed plan of care with Dr. Nelia Soler and Dr. John Cuellar. Left heart catheterization tomorrow with Dr. Nelia Soler. Risks, benefits and alternate therapies discussed with Gm Goldberg patients daughter over the phone. She is agreeable to proceed with LHC. She plans to be here tomorrow to consent for procedure.        [facility], APRN-CNP  [address]  [phone]

## 2022-09-12 NOTE — PROGRESS NOTES
Physical Therapy  Facility/Department: [facility] A2 CARD TELEMETRY  Daily Treatment Note  NAME: Jaycee Geller  : 1935  MRN: [id number]    Date of Service: 2022    Discharge Recommendations:  Subacute/Skilled Nursing Facility (given pt's balance deficits & increased fall risk (recommend 24-hr sup/assist from family & home PT if pt refuses SNF))   PT Equipment Recommendations  Equipment Needed: No  Other: Pt has RW for home use    Patient Diagnosis(es): The primary encounter diagnosis was Atrial fibrillation with RVR ([address] Diagnoses of Acute on chronic systolic congestive heart failure (HCC) and Chronic kidney disease, unspecified CKD stage were also pertinent to this visit. Assessment   Assessment: Pt participated well with PT today with good progress made with regard to standing tolerance and level of (I) with mobility tasks. Pt able to amb distance up to ~125 feet with either support of SBQC or RW. Gait appears unsteady with several R-sided LOB's when using SBQC; level of steadiness noticeably improved with use of RW. Pt still requiring min/CGA x 1 for most transfers/gait activities and would benefit from further skilled PT in acute setting to address above deficits and return to baseline LOF. Given pt's current deficits and increased fall risk, recommend SNF at D/C. If pt refuses SNF, recommend 24-hr sup/assist from family & home PT services. Activity Tolerance: Patient tolerated treatment well  Equipment Needed: No  Other: Pt has RW for home use     Plan    Plan: 3-5 times per week  Plan weeks: 1 week, 22  Specific Instructions for Next Treatment: Progress ther ex and mobility as tolerated, gait training with RW  Current Treatment Recommendations: Balance training;Functional mobility training;Strengthening;Transfer training; Endurance training;ADL/Self-care training;Gait training;Cognitive reorientation;Home exercise program;Safety education & training;Patient/Caregiver education & training;Equipment evaluation, education, & procurement; Therapeutic activities     Restrictions  Restrictions/Precautions  Restrictions/Precautions: Fall Risk, General Precautions  Implants present? : Pacemaker  Position Activity Restriction  Other position/activity restrictions: Up with assist. [employer] monitor in room. Telemetry. Subjective    Subjective: Pt sitting up in chair upon entry of therapy staff. Pt denies dizziness. Agreeable to work with PT, having just finished eating lunch. Pain: Pt denies pain. Overall Orientation Status: Impaired  Orientation Level: Oriented to person;Oriented to place; Disoriented to time;Disoriented to situation    Objective   Vitals  Vitals in chair during ther ex: BP 98/70, HR 98 bpm, SpO2 96%. Bed Mobility Training  Bed Mobility Training: No (pt up in chair before & after therapy session)  Scooting: Stand-by assistance (to scoot forward<>backward in chair)    Balance  Sitting: Intact  Standing: Impaired  Standing - Static: Fair;Occasional  Standing - Dynamic: Fair;Occasional    Transfer Training  Interventions: Verbal cues; Safety awareness training; Tactile cues; Visual cues  Sit to Stand: Contact-guard assistance  Stand to Sit: Contact-guard assistance  Bed to Chair: Minimum assistance (using SBQC in R hand)    Gait Training  Overall Level of Assistance: Minimum assistance;Contact-guard assistance (Shawn x 1 using SBQC, CGA x 1 using RW)  Interventions: Safety awareness training;Verbal cues; Tactile cues; Visual cues  Base of Support: Shift to right;Narrowed  Speed/Elodia: Pace decreased (< 100 feet/min); Slow  Step Length: Left shortened;Right shortened  Gait Abnormalities: Path deviations; Ataxic;Trunk sway increased (pt amb with unsteady gait and increased med<>lat postural sway, LOB x 3 toward R side when using SBQC (gait appeared noticeably less steady when using SBQC vs. RW))  Distance (ft): 125 Feet (x 125 feet using SBQC, x 75 feet using RW)  Assistive Device: Walker, rolling;Cane, quad (SBQC during 1st bout, RW during 2nd bout)    PT Exercises  Exercise Treatment: Seated UE/LE ther ex x 15 reps: Ankle pumps, LAQ, marching, clamshells, glut sets, diaphragmatic breathing, shoulder shrugs, elbow flex/ext, finger flex/ext (all exercises performed in order per PT CHF exercise packet). Safety Devices  Type of Devices: All fall risk precautions in place;Call light within reach;Gait belt; Chair alarm in place; Patient at risk for falls; Left in chair;Telesitter in use;Nurse notified     [facility] 6 Clicks Inpatient Mobility:  AM-PAC Mobility Inpatient   How much difficulty turning over in bed?: A Little  How much difficulty sitting down on / standing up from a chair with arms?: A Little  How much difficulty moving from lying on back to sitting on side of bed?: A Little  How much help from another person moving to and from a bed to a chair?: A Little  How much help from another person needed to walk in hospital room?: A Little  How much help from another person for climbing 3-5 steps with a railing?: A Lot  AM-PAC Inpatient Mobility Raw Score : 17  AM-PAC Inpatient T-Scale Score : 42.13  Mobility Inpatient CMS 0-100% Score: 50.57  Mobility Inpatient CMS G-Code Modifier : CK    Goals  Short Term Goals  Time Frame for Short term goals: 1 week 9/16/22  Short term goal 1: Supine <> sit with modified independence. Short term goal 2: Sit <>stand and bed <> chair with least restrictive assistive device and SBA. Short term goal 3: Ambulate 100 feet with least restrictive assistive device and SBA. Short term goal 4: Ascend 2 steps with rail and SBA. Short term goal 5: By 9/12/22 patient will start his exercise program and tolerate 12-15 reps of his exercises to maximize his strength and endurance - MET 9/12/22  Additional Goals?: Yes  Long Term Goals  Time Frame for Long term goals : Reassess if patient is appropriate for CHF goals.  If not then his caregivers/family can be provided with CHF education. Pt appropriate for CHF education on 9/12/22. New goal: Pt will meet at least 2/5 PT-related CHF goals with teach back noted - MET 9/12/22. Patient Goals   Patient goals : \"To feel better. \" \"To go home. \"    Education  Patient Education  Education Given To: Patient  Education Provided: Role of Therapy;Plan of Care;Home Exercise Program;Precautions;Orientation;Transfer Training; Fall Prevention Strategies  Education Provided Comments: Pt educated on PT-related CHF items including red/yellow/green warning zones, Margie Scale, UE/LE ther ex for swelling management, and importance of taking daily weights - pt verbalizes/demonstrates understanding. Education Method: Demonstration;Verbal;Teach Back;Printed Information/Hand-outs (provided PT CHF packet to pt and reviewed seated UE/LE exercises this session)  Barriers to Learning: Cognition (mildly impaired cognition at times)  Education Outcome: Verbalized understanding;Demonstrated understanding;Continued education needed     PHYSICAL THERAPY HEART FAILURE EDUCATION:  PHYSICAL THERAPY HEART FAILURE EDUCATION GOALS:  1. Identifying HF Activity Zone with the Self Check Plan prior to exercises or walking    Patient educated in and demonstrated/verbalized understanding Identifying HF activity zone with the Self Check Plan referencing Red/Yellow/Green Light Symbol prior to exercises or walking  to appropriately determine daily activity level. 2.Rating self on MARGIE scale of perceived exertion   Patient educated in and demonstrated and/or verbalized understanding Rating self on MARGIE scale of perceived exertion  3. HF Therapeutic Exercises  Pt educated in and completed Therapeutic exercises (Supine, Seated ,Standing, walking) as indicated below for 1 set) to promote circulation and prevent complications of bedrest with patient verbalizes understanding of employing green zone, yellow zone and red zone to seek provider input and evaluation.    4. Daily Weight check/Stepping on Scale  Pt educated in importance of checking body weight daily. Barriers to completion of Daily weight check are identified with recommendations made or Patient demonstrates and/or verbalizes safety in:stepping up to weight self and complete downward gaze/head nod to read scale on standard scale  and safety stepping off scale. 5. Teach back of Elements of PT HF Education  Pt voices and demonstrates appropriate teach back of elements of Physical Therapy Heart Failure Education Program                        1)Heart Failure Zones Self Check Plan referencing Red/Yellow/Green Light Symbol with:  [x]Green Zone/All Clear:   physical activity is normal for you,   No new swelling in feet, ankles, legs or stomach,   No weight gain of more than 2-3 pounds,   No chest pain or worsening of shortness of breath. (Continue daily: weight check, meds as directed, low salt eating, monitoring of fluid intake, balance activity, follow up visits)  []Yellow Zone/Caution:   Increased cough or shortness of breath with activity  Increased swelling in your feet, ankles, legs or stomach from baseline  Weight gain or loss of more than 2-3 pounds in 1 day. Increase in the number of pillows needed while sleeping  (Check In!: You need to contact your doctor or provider as soon as possible)  []Red Zone/Medical Alert:  Unrelieved chest pain or shortness of breath, especially while resting  Increased Discomfort or swelling in the abdomen or lower body  Sudden weight gain of more than 5 pounds in a week  Increased cough with bubbly and/or pink sputum  (Warning: You need to be seen right away . If you cannot reach your physician, call 911)    2)Margie Rating of Perceived Exertion (RPE) Scale     The Margie rating scale ranges from 6 to 20, where 6 means \"no exertion at all\" and 20 means \"maximal exertion. \" The patient chooses the number that best describes their level of exertion.  This will objectify the intensity level of the Exercises 10-15 reps, 2x/day     []Sit to/from stand  []Standing march  []Standing side steps  []Standing bilateral heel raise  []Diaphragmatic breathing with TA set and BUE flex/ext  []Shoulder Shrugs  []Bicep Curls  []Hands open/close                        []WalkingLevel 1: Educated patient in initiating walking when in the Green zone and to Start with 2-5 minutes daily and work up to 10 minutes per day. Walk at a slow, comfortable pace with your exertion level Very Light (MARICE 9) to Light (MARCIE 11)   You should be able to comfortably hold a conversation while walking. 4)Daily Weight check/Stepping on Scale  [x]Pt educated in importance of checking body weight daily and [x]demonstrate/[]verbalizes safety in:stepping up to weigh self and complete downward gaze/head nod to read scale on standard scale  and safety stepping off scale. [x]Barriers to completion of Daily weight check: Pt voices he does not currently take his weight daily. 5)Pt voices and demonstrates appropriate teach back of Heart Failure Education Program with : use of the   [x]1. Identifying Appropriate Zone from HF Zone Self-Check Plan                          [x]2. Rating self on MARCIE. [x]3. Therapeutic exercise/walking program      [x]4. Importance of taking daily weight measurement             [x]5. Safely stepping on and off scale    [x]Pt will benefit from reinforcement of education due to   []Readiness to learn                               []Decreased cognition  []Language barrier                                  []Decreased vision/hearing  [x]First introduction to new information    []Requires intermittent cues  [x]Other: Pt met CHF goals with teach back noted; however, would likely benefit from reinforcement at future PT sessions due to the majority being new information for pt.     Education provided via:  [x]Oral instruction  [x]Demonstration  [x]Written handout    Therapy Time   Individual Concurrent Group Co-treatment Time In 1319         Time Out 1400         Minutes 41         Timed Code Treatment Minutes: Theodora [place], [place] #[id number]    If pt is unable to be seen after this session, please let this note serve as discharge summary. Please see case management note for discharge disposition. Thank you.

## 2022-09-12 NOTE — DISCHARGE INSTR - COC
Continuity of Care Form    Patient Name: Lori Huynh   :  1935  MRN:  [id number]    Admit date:  2022  Discharge date:  s    Code Status Order: Full Code   Advance Directives:     Admitting Physician:  No admitting provider for patient encounter. PCP: Danita Fisher MD    Discharging Nurse: [facility] Unit/Room#: 0219/0219-01  Discharging Unit Phone Number: [phone]    Emergency Contact:   Extended Emergency Contact Information  Primary Emergency Contact: Peg Borges  Address: [place] [address], [address], [address] Phone: [phone]  Mobile Phone: [phone]  Relation: Spouse   needed? No  Secondary Emergency Contact: Sabiha Castanedamelissa [address] Phone: [phone]  Mobile Phone: [phone]  Relation: Child   needed?  No    Past Surgical History:  Past Surgical History:   Procedure Laterality Date    APPENDECTOMY      COLONOSCOPY      [address]      JOINT REPLACEMENT  2013    left shoulder    PACEMAKER PLACEMENT  10/13/2017    Dr Tisha Mathur at [facility]. 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9    Atrial fibrillation with RVR (HCC) I48.91    Dementia with behavioral disturbance ([facility]) F03.91    Hallucinations R44.3    Syncope, unspecified syncope type R55    Ischemic cardiomyopathy I25.5    Atrial fibrillation, rapid (HCC) I48.91    Orthostatic hypotension I95.1       Isolation/Infection:   Isolation            No Isolation          Patient Infection Status       Infection Onset Added Last Indicated Last Indicated By Review Planned Expiration Resolved Resolved By    None active    Resolved    COVID-19 (Rule Out) 22 COVID-19 & Influenza Combo (Ordered)   22 Rule-Out Test Resulted    COVID-19 22 COVID-19, Rapid   22     COVID-19 (Rule Out) 22 COVID-19, Rapid (Ordered)   22 Rule-Out Test Resulted            Nurse Assessment:  Last Vital Signs: /81   Pulse 82   Temp 98.1 °F (36.7 °C) (Oral)   Resp 16   Ht 5' 7\" (1.702 m)   Wt 148 lb 14.4 oz (67.5 kg)   SpO2 97%   BMI 23.32 kg/m²     Last documented pain score (0-10 scale): Pain Level: 0  Last Weight:   Wt Readings from Last 1 Encounters:   22 148 lb 14.4 oz (67.5 kg)     Mental Status:  {IP PT MENTAL STATUS:}    IV Access:  {[facility] IV ACCESS:[id number]}    Nursing Mobility/ADLs:  Walking   Assisted  Transfer  Assisted  Bathing  Assisted  Dressing  Assisted  Toileting  Assisted  Feeding  Assisted  Med Admin  Assisted  Med Delivery   whole    Wound Care Documentation and Therapy:  Wound 22 Head Upper;Posterior;Right (Active)   Wound Etiology Other 22 0057   Dressing Status Other (Comment) 22 0057   Dressing/Treatment Open to air 08/27/22 1023   Post-Procedure Length (cm) 100 cm 08/26/22 0912   Wound Assessment Dry 08/27/22 0057   Drainage Amount None 08/27/22 0057   Juanita-wound Assessment Intact 08/23/22 0715   Number of days: 28        Elimination:  Continence: Bowel: No  Bladder: No  Urinary Catheter: None   Colostomy/Ileostomy/Ileal Conduit: No       Date of Last BM: ***    Intake/Output Summary (Last 24 hours) at 9/12/2022 1657  Last data filed at 9/12/2022 1254  Gross per 24 hour   Intake 350 ml   Output 875 ml   Net -525 ml     I/O last 3 completed shifts: In: 1080 [P.O.:1080]  Out: 800 [Urine:800]    Safety Concerns: At Risk for Falls    Impairments/Disabilities:      None    Nutrition Therapy:  Current Nutrition Therapy:   - Oral Diet:  General    Routes of Feeding: Oral  Liquids: No Liquids  Daily Fluid Restriction: no  Last Modified Barium Swallow with Video (Video Swallowing Test): not done    Treatments at the Time of Hospital Discharge:   Respiratory Treatments: ***  Oxygen Therapy:  is not on home oxygen therapy.   Ventilator:    - No ventilator support    Rehab Therapies: Physical Therapy and Occupational Therapy  Weight Bearing Status/Restrictions: No weight bearing restrictions  Other Medical Equipment (for information only, NOT a DME order):  ***  Other Treatments: ***    Patient's personal belongings (please select all that are sent with patient):  None    RN SIGNATURE:  Electronically signed by Scott Baker RN on 9/18/22 at 1:38 PM EDT    CASE MANAGEMENT/SOCIAL WORK SECTION    Inpatient Status Date: 9/8/2022    Readmission Risk Assessment Score:  Readmission Risk              Risk of Unplanned Readmission:  24           Discharging to Facility/ Agency   Name: Lauryn Grey  Address:  [facility]:[phone]  Fax:[phone]    Dialysis Facility (if applicable)   Name:  Address:  Dialysis Schedule:  Phone:  Fax:    / signature: Electronically signed by Amari Hunt RN on 9/18/22 at 1:01 PM EDT    PHYSICIAN SECTION    Prognosis: Fair    Condition at Discharge: Stable    Rehab Potential (if transferring to Rehab): Fair    Recommended Labs or Other Treatments After Discharge:   Recommended Follow-up, Labs or Other Treatments After Discharge: Follow up with PCP/Cardiology. May need to adjust midodrine dosing, monitor blood pressure. Compression stockings and abdominal binder when out of bed. PT/OT/SN               Physician Certification: I certify the above information and transfer of [place]  is necessary for the continuing treatment of the diagnosis listed and that he requires [facility] for less 30 days.      Update Admission H&P: No change in H&P    PHYSICIAN SIGNATURE:  Electronically signed by GUS Kilpatrick CNP on 9/18/22 at 12:31 PM EDT

## 2022-09-12 NOTE — PLAN OF CARE
Patient's EF (Ejection Fraction) is less than 40%    Heart Failure Medications:  Diuretics[de-identified] Furosemide    (One of the following REQUIRED for EF </= 40%/SYSTOLIC FAILURE but MAY be used in EF% >40%/DIASTOLIC FAILURE)        ACE[de-identified] None        ARB[de-identified] None         ARNI[de-identified] None    (Beta Blockers)  NON- Evidenced Based Beta Blocker (for EF% >40%/DIASTOLIC FAILURE): None    Evidenced Based Beta Blocker::(REQUIRED for EF% <40%/SYSTOLIC FAILURE) Metoprolol SUCCinate- Toprol XL  . .................................................................................................................................................. Patient's weights and intake/output reviewed: Yes    Patient's Last Weight: 148 lbs obtained by standing scale. Difference of 4 lbs less than last documented weight. Intake/Output Summary (Last 24 hours) at 9/12/2022 1242  Last data filed at 9/12/2022 1241  Gross per 24 hour   Intake 590 ml   Output 800 ml   Net -210 ml       Comorbidities Reviewed Yes    Patient has a past medical history of Atrial fibrillation ([address] Blind right eye, Chronic kidney disease, Dementia ([address] Hyperlipidemia, Hypertension, and Pneumonia. >>For CHF and Comorbidity documentation on Education Time and Topics, please see Education Tab    Progressive Mobility Assessment:  What is this patient's Current Level of Mobility?: Ambulatory- with Assistance  How was this patient Mobilized today?: Edge of Bed,                 With Whom? Nurse                 Level of Difficulty/Assistance: 1x Assist     Pt resting in bed at this time on room air. Pt denies shortness of breath. Pt with nonpitting lower extremity edema.      Patient and/or Family's stated Goal of Care this Admission: reduce shortness of breath, increase activity tolerance, better understand heart failure and disease management, be more comfortable, and reduce lower extremity edema prior to discharge        :

## 2022-09-12 NOTE — PROGRESS NOTES
Occupational Therapy  Facility/Department: [facility] A2 CARD TELEMETRY  Occupational Therapy Daily Treatment Note    Name: Becki Feng  : 1935  MRN: [id number]  Date of Service: 2022    Discharge Recommendations:  [address]     Patient Diagnosis(es): The primary encounter diagnosis was Atrial fibrillation with RVR ([facility] Diagnoses of Acute on chronic systolic congestive heart failure (HCC) and Chronic kidney disease, unspecified CKD stage were also pertinent to this visit. Past Medical History:  has a past medical history of Atrial fibrillation ([facility] Blind right eye, Chronic kidney disease, Dementia ([facility] [address] Hyperlipidemia, Hypertension, and Pneumonia. Past Surgical History:  has a past surgical history that includes Tonsillectomy; Appendectomy; Colonoscopy; eye surgery; Upper gastrointestinal endoscopy; joint replacement (); hernia repair; and pacemaker placement (10/13/2017). Treatment Diagnosis: impaired mobility    Assessment   Performance deficits / Impairments: Decreased functional mobility ; Decreased strength;Decreased cognition;Decreased coordination;Decreased balance;Decreased safe awareness;Decreased ADL status; Decreased endurance;Decreased posture  Assessment: Pt is functioning below baseline for ADLs and mobility. Mod A provided for LE dressing d/t urinary incontinence. He requires CGA for standing balance during ADLs and transfers with QC. He had 1 balance falter during transfer to chair, requiring CGA to correct. Recommend SNF for skilled OT to improve function and safety for home d/c. Cont OT in acute care.   Prognosis: Fair  Activity Tolerance  Activity Tolerance: Patient Tolerated treatment well      Plan   Plan  Times per Week: 3-5x  Current Treatment Recommendations: Strengthening, Functional mobility training, Balance training, Endurance training, Safety education & training, Pain management, Gait training, Patient/Caregiver education & training, Equipment evaluation, education, & procurement, Positioning, Self-Care / ADL, Home management training     Restrictions  Restrictions/Precautions  Restrictions/Precautions: Fall Risk, General Precautions  Implants present? : Pacemaker  Position Activity Restriction  Other position/activity restrictions: Up with assist. Avas[employer] monitor in room. Telemetry. Subjective   General  Chart Reviewed: Yes  Patient assessed for rehabilitation services?: Yes  Family / Caregiver Present: No  Referring Practitioner: Imani Herrera  Diagnosis: afib with RVR  Subjective  Subjective: Pt agreeable to OT. Denies pain. General Comment  Comments: RN approved therapy  Pt denies pain. Vitals in chair during ther ex: BP 98/70, HR 98 bpm, SpO2 96%. Objective   Heart Rate: 74  Heart Rate Source: Monitor  BP: (!) 139/104  BP Location: Right upper arm  BP Method: Automatic  MAP (Calculated): 115.67  Resp: 16  SpO2: 94 %  O2 Device: None (Room air)        Safety Devices  Type of Devices: Call light within reach;Gait belt;Left in chair;Nurse notified; All fall risk precautions in place  Toilet Transfers  Toilet - Technique: Ambulating (QC)  Equipment Used: Raised toilet seat without rails  Toilet Transfer: Contact guard assistance  Toilet Transfers Comments: CGA to ambulate to BR with QC     ADL  Feeding: Independent  Grooming: Stand by assistance  Grooming Skilled Clinical Factors: Stood at sink and washed hands  UE Dressing: Minimal assistance  UE Dressing Skilled Clinical Factors: changed wet gown  LE Dressing: Moderate assistance  LE Dressing Skilled Clinical Factors: changed wet brief  Toileting: Contact guard assistance  Toileting Skilled Clinical Factors: Pt managed pericare after BM while seated.  CGA for standing balance as he pulled up brief  Additional Comments: Pt had episode of urinary incontinence prior to OT arrival.      Bed mobility  Supine to Sit: Supervision (HOB elevated)  Transfers  Stand Pivot Transfers: Contact guard assistance (CGA for transfer to chair with QC. Pt had 1 balance falter, corrected with CGA)  Sit to stand: Stand by assistance  Stand to sit: Stand by assistance     Cognition  Overall Cognitive Status: Exceptions  Arousal/Alertness: Appropriate responses to stimuli  Following Commands: Follows one step commands consistently  Attention Span: Attends with cues to redirect  Memory: Decreased recall of recent events;Decreased short term memory  Problem Solving: Decreased awareness of errors  Insights: Decreased awareness of deficits  Initiation: Requires cues for some  Sequencing: Requires cues for some  Cognition Comment: Some impulsivity during mobility but redirectable. Orientation  Overall Orientation Status: Impaired  Orientation Level: Oriented to person;Oriented to place; Disoriented to time;Disoriented to situation      Education Given To: Patient  Education Provided: Role of Therapy;Plan of Care;Transfer Training;ADL Adaptive Strategies  Education Method: Verbal  Barriers to Learning: Cognition  Education Outcome: Verbalized understanding;Continued education needed   Disease Specific Education: Pt educated on importance of OOB mobility, prevention of complications of bedrest, and general safety during hospitalization.  Pt verbalized understanding  AM-PAC Score   AM-[facility] Inpatient Daily Activity Raw Score: 17 (09/12/22 1316)  AM-PAC Inpatient ADL T-Scale Score : 37.26 (09/12/22 1316)  ADL Inpatient CMS 0-100% Score: 50.11 (09/12/22 1316)  ADL Inpatient CMS G-Code Modifier : CK (09/12/22 1316)  Goals  Short Term Goals  Time Frame for Short term goals: 1 week (9/16) unless noted  Short Term Goal 1: Pt will complete functional mobility with supv-ongoing 9/12  Short Term Goal 2: Pt will complete bed mobility with mod I-ongoing 9/12  Short Term Goal 3: Pt will complete seated ADLs with mod I-ongoing 9/12  Short Term Goal 4: Pt will complete LB dressing with supv-ongoing 9/12  Short Term Goal 5: Pt will meet 2 OT HF goals-ongoing 9/12  Patient Goals   Patient goals : Pt unable to state goals d/t cog deficits     Attempted Therapy Heart Failure Education this date. Pt inappropriate for education at this time due to :  [x]Cognition   []Medical Status   []Readiness to learn  []Refusal   []Language barrier  []Decreased vision/hearing    []No family present     Should this change during treatment, education will be initiated. If family/ pt's support system is present at subsequent therapy session, will attempt to initiate education. Therapy Time   Individual Concurrent Group Co-treatment   Time In 6746         Time Out 1319         Minutes 24         Timed Code Treatment Minutes: 24 Minutes   If pt is discharged prior to next OT session, this note will serve as the discharge summary.   Erasmo Sainz OT

## 2022-09-13 ENCOUNTER — APPOINTMENT (OUTPATIENT)
Dept: CARDIAC CATH/INVASIVE PROCEDURES | Age: 87
DRG: 242 | End: 2022-09-13
Payer: MEDICARE

## 2022-09-13 LAB
ANION GAP SERPL CALCULATED.3IONS-SCNC: 8 MMOL/L (ref 3–16)
BASOPHILS ABSOLUTE: 0.1 K/UL (ref 0–0.2)
BASOPHILS RELATIVE PERCENT: 1 %
BUN BLDV-MCNC: 29 MG/DL (ref 7–20)
CALCIUM SERPL-MCNC: 9.4 MG/DL (ref 8.3–10.6)
CHLORIDE BLD-SCNC: 101 MMOL/L (ref 99–110)
CO2: 32 MMOL/L (ref 21–32)
CREAT SERPL-MCNC: 1.6 MG/DL (ref 0.8–1.3)
EOSINOPHILS ABSOLUTE: 0.2 K/UL (ref 0–0.6)
EOSINOPHILS RELATIVE PERCENT: 2.4 %
GFR AFRICAN AMERICAN: 50
GFR NON-AFRICAN AMERICAN: 41
GLUCOSE BLD-MCNC: 82 MG/DL (ref 70–99)
HCT VFR BLD CALC: 44.6 % (ref 40.5–52.5)
HEMOGLOBIN: 14.9 G/DL (ref 13.5–17.5)
LYMPHOCYTES ABSOLUTE: 1.5 K/UL (ref 1–5.1)
LYMPHOCYTES RELATIVE PERCENT: 16.6 %
MAGNESIUM: 1.9 MG/DL (ref 1.8–2.4)
MCH RBC QN AUTO: 30 PG (ref 26–34)
MCHC RBC AUTO-ENTMCNC: 33.5 G/DL (ref 31–36)
MCV RBC AUTO: 89.7 FL (ref 80–100)
MONOCYTES ABSOLUTE: 0.8 K/UL (ref 0–1.3)
MONOCYTES RELATIVE PERCENT: 8.3 %
NEUTROPHILS ABSOLUTE: 6.7 K/UL (ref 1.7–7.7)
NEUTROPHILS RELATIVE PERCENT: 71.7 %
PDW BLD-RTO: 17.1 % (ref 12.4–15.4)
PLATELET # BLD: 246 K/UL (ref 135–450)
PMV BLD AUTO: 7.8 FL (ref 5–10.5)
POTASSIUM SERPL-SCNC: 3.9 MMOL/L (ref 3.5–5.1)
RBC # BLD: 4.97 M/UL (ref 4.2–5.9)
SODIUM BLD-SCNC: 141 MMOL/L (ref 136–145)
WBC # BLD: 9.3 K/UL (ref 4–11)

## 2022-09-13 PROCEDURE — 6370000000 HC RX 637 (ALT 250 FOR IP): Performed by: INTERNAL MEDICINE

## 2022-09-13 PROCEDURE — 80048 BASIC METABOLIC PNL TOTAL CA: CPT

## 2022-09-13 PROCEDURE — C1894 INTRO/SHEATH, NON-LASER: HCPCS

## 2022-09-13 PROCEDURE — 2709999900 HC NON-CHARGEABLE SUPPLY

## 2022-09-13 PROCEDURE — 93005 ELECTROCARDIOGRAM TRACING: CPT | Performed by: INTERNAL MEDICINE

## 2022-09-13 PROCEDURE — 85025 COMPLETE CBC W/AUTO DIFF WBC: CPT

## 2022-09-13 PROCEDURE — B2111ZZ FLUOROSCOPY OF MULTIPLE CORONARY ARTERIES USING LOW OSMOLAR CONTRAST: ICD-10-PCS | Performed by: INTERNAL MEDICINE

## 2022-09-13 PROCEDURE — 2580000003 HC RX 258

## 2022-09-13 PROCEDURE — 36415 COLL VENOUS BLD VENIPUNCTURE: CPT

## 2022-09-13 PROCEDURE — 2500000003 HC RX 250 WO HCPCS: Performed by: NURSE PRACTITIONER

## 2022-09-13 PROCEDURE — 2500000003 HC RX 250 WO HCPCS

## 2022-09-13 PROCEDURE — C1887 CATHETER, GUIDING: HCPCS

## 2022-09-13 PROCEDURE — 83735 ASSAY OF MAGNESIUM: CPT

## 2022-09-13 PROCEDURE — 6360000002 HC RX W HCPCS

## 2022-09-13 PROCEDURE — C1769 GUIDE WIRE: HCPCS

## 2022-09-13 PROCEDURE — 6370000000 HC RX 637 (ALT 250 FOR IP)

## 2022-09-13 PROCEDURE — 99152 MOD SED SAME PHYS/QHP 5/>YRS: CPT | Performed by: INTERNAL MEDICINE

## 2022-09-13 PROCEDURE — 2060000000 HC ICU INTERMEDIATE R&B

## 2022-09-13 PROCEDURE — 99232 SBSQ HOSP IP/OBS MODERATE 35: CPT

## 2022-09-13 PROCEDURE — 93458 L HRT ARTERY/VENTRICLE ANGIO: CPT

## 2022-09-13 PROCEDURE — 4A023N8 MEASUREMENT OF CARDIAC SAMPLING AND PRESSURE, BILATERAL, PERCUTANEOUS APPROACH: ICD-10-PCS | Performed by: INTERNAL MEDICINE

## 2022-09-13 PROCEDURE — 85347 COAGULATION TIME ACTIVATED: CPT

## 2022-09-13 PROCEDURE — 6360000002 HC RX W HCPCS: Performed by: INTERNAL MEDICINE

## 2022-09-13 PROCEDURE — 2580000003 HC RX 258: Performed by: INTERNAL MEDICINE

## 2022-09-13 PROCEDURE — 93460 R&L HRT ART/VENTRICLE ANGIO: CPT | Performed by: INTERNAL MEDICINE

## 2022-09-13 RX ORDER — SODIUM CHLORIDE 0.9 % (FLUSH) 0.9 %
5-40 SYRINGE (ML) INJECTION PRN
Status: DISCONTINUED | OUTPATIENT
Start: 2022-09-13 | End: 2022-09-13

## 2022-09-13 RX ORDER — SODIUM CHLORIDE 0.9 % (FLUSH) 0.9 %
5-40 SYRINGE (ML) INJECTION EVERY 12 HOURS SCHEDULED
Status: DISCONTINUED | OUTPATIENT
Start: 2022-09-13 | End: 2022-09-16 | Stop reason: SDUPTHER

## 2022-09-13 RX ORDER — SODIUM CHLORIDE 9 MG/ML
INJECTION, SOLUTION INTRAVENOUS PRN
Status: DISCONTINUED | OUTPATIENT
Start: 2022-09-13 | End: 2022-09-18 | Stop reason: HOSPADM

## 2022-09-13 RX ORDER — METOPROLOL TARTRATE 5 MG/5ML
5 INJECTION INTRAVENOUS EVERY 6 HOURS
Status: DISCONTINUED | OUTPATIENT
Start: 2022-09-13 | End: 2022-09-13

## 2022-09-13 RX ORDER — MIDAZOLAM HYDROCHLORIDE 1 MG/ML
INJECTION INTRAMUSCULAR; INTRAVENOUS
Status: COMPLETED | OUTPATIENT
Start: 2022-09-13 | End: 2022-09-13

## 2022-09-13 RX ORDER — ACETAMINOPHEN 325 MG/1
650 TABLET ORAL EVERY 4 HOURS PRN
Status: DISCONTINUED | OUTPATIENT
Start: 2022-09-13 | End: 2022-09-18 | Stop reason: HOSPADM

## 2022-09-13 RX ORDER — SODIUM CHLORIDE 0.9 % (FLUSH) 0.9 %
5-40 SYRINGE (ML) INJECTION PRN
Status: DISCONTINUED | OUTPATIENT
Start: 2022-09-13 | End: 2022-09-16 | Stop reason: SDUPTHER

## 2022-09-13 RX ORDER — FENTANYL CITRATE 50 UG/ML
INJECTION, SOLUTION INTRAMUSCULAR; INTRAVENOUS
Status: COMPLETED | OUTPATIENT
Start: 2022-09-13 | End: 2022-09-13

## 2022-09-13 RX ORDER — SODIUM CHLORIDE 9 MG/ML
INJECTION, SOLUTION INTRAVENOUS CONTINUOUS
Status: DISCONTINUED | OUTPATIENT
Start: 2022-09-13 | End: 2022-09-13

## 2022-09-13 RX ORDER — HYDRALAZINE HYDROCHLORIDE 20 MG/ML
10 INJECTION INTRAMUSCULAR; INTRAVENOUS EVERY 6 HOURS PRN
Status: DISCONTINUED | OUTPATIENT
Start: 2022-09-13 | End: 2022-09-18 | Stop reason: HOSPADM

## 2022-09-13 RX ORDER — SODIUM CHLORIDE 9 MG/ML
INJECTION, SOLUTION INTRAVENOUS PRN
Status: DISCONTINUED | OUTPATIENT
Start: 2022-09-13 | End: 2022-09-16 | Stop reason: SDUPTHER

## 2022-09-13 RX ORDER — HEPARIN SODIUM 1000 [USP'U]/ML
INJECTION, SOLUTION INTRAVENOUS; SUBCUTANEOUS
Status: COMPLETED | OUTPATIENT
Start: 2022-09-13 | End: 2022-09-13

## 2022-09-13 RX ADMIN — FENTANYL CITRATE 25 MCG: 50 INJECTION, SOLUTION INTRAMUSCULAR; INTRAVENOUS at 13:00

## 2022-09-13 RX ADMIN — FINASTERIDE 5 MG: 5 TABLET, FILM COATED ORAL at 08:41

## 2022-09-13 RX ADMIN — METOPROLOL TARTRATE 5 MG: 5 INJECTION, SOLUTION INTRAVENOUS at 20:29

## 2022-09-13 RX ADMIN — DIGOXIN 125 MCG: 125 TABLET ORAL at 08:41

## 2022-09-13 RX ADMIN — MEMANTINE 5 MG: 5 TABLET ORAL at 08:40

## 2022-09-13 RX ADMIN — MEMANTINE 5 MG: 5 TABLET ORAL at 20:29

## 2022-09-13 RX ADMIN — METOPROLOL SUCCINATE 12.5 MG: 25 TABLET, EXTENDED RELEASE ORAL at 08:40

## 2022-09-13 RX ADMIN — ASPIRIN 81 MG 81 MG: 81 TABLET ORAL at 08:41

## 2022-09-13 RX ADMIN — HYDRALAZINE HYDROCHLORIDE 10 MG: 20 INJECTION INTRAMUSCULAR; INTRAVENOUS at 18:17

## 2022-09-13 RX ADMIN — CITALOPRAM HYDROBROMIDE 10 MG: 20 TABLET ORAL at 08:41

## 2022-09-13 RX ADMIN — Medication 10 ML: at 20:30

## 2022-09-13 RX ADMIN — FLUDROCORTISONE ACETATE 0.2 MG: 0.1 TABLET ORAL at 08:40

## 2022-09-13 RX ADMIN — Medication 10 ML: at 08:44

## 2022-09-13 RX ADMIN — HEPARIN SODIUM 5000 UNITS: 1000 INJECTION, SOLUTION INTRAVENOUS; SUBCUTANEOUS at 13:09

## 2022-09-13 RX ADMIN — MIDAZOLAM HYDROCHLORIDE 1 MG: 1 INJECTION INTRAMUSCULAR; INTRAVENOUS at 13:00

## 2022-09-13 RX ADMIN — ATORVASTATIN CALCIUM 40 MG: 40 TABLET, FILM COATED ORAL at 08:41

## 2022-09-13 ASSESSMENT — PAIN SCALES - GENERAL
PAINLEVEL_OUTOF10: 0

## 2022-09-13 NOTE — PROCEDURES
CARDIAC CATHETERIZATION REPORT     Procedure Date:  2022  Patient Name: Toro Saldana  MRN: [id number] : 1935      INDICATION     Non-STEMI  Cardiomyopathy    PROCEDURES PERFORMED     Right heart catheterization  Left heart catheterization  Coronary angiogam  Coronary cath  Monitoring of moderate conscious sedation        PROCEDURE DESCRIPTION   Risks/benefits/alternatives/outcomes were discussed with patient and/or family and informed consent was obtained. Using the [facility] scale, the patient's right radial artery was found to be a level B. Patient was prepped draped in the usual sterile fashion. Local anaesthetic was applied over puncture site. Using a front wall technique, a 4/5 [place] Terumo sheath was inserted into right radial artery. Verapamil, nitroglycerin, and nicardipine were administered through the sheath. Heparin was administered. Diagnostic 5 Italian tig catheters were used for diagnostic angiograms. Left heart catheterization was performed with a pigtail. LV gram was deferred and limited angiograms were taken due to renal insufficiency. At the conclusion of the procedure, a TR band was placed over the puncture site and hemostasis was obtained. There were no immediate complications. I supervised sedation from 1 PM to 1:35 PM with versed 1 mg/fentanyl 25 mcg during the procedure. An independent trained observer pushed meds at my direction. We monitored the patient's level of consciousness and vital signs/physiologic status throughout the procedure duration (see times listed previously). 50 cc contrast was utilized. <20cc EBL. FINDINGS     HEMODYNAMICS    CHAMBER PRESSURE SATURATION   RA 5 54%   RV 20/4    PA 21/13 48%   PW 11    AORTA 110/68 85%     TASHA CARDIAC OUTPUT 3.8 L/min   SVR 1630               Left heart catheterization    LVEDP 11   GRADIENT ACROSS AORTIC VALVE None         CORONARY ARTERIES    LM Less than 10% [id number]-tal-bmkhzh stenosis. LAD Calcified, proximal 40 to 50% stenosis, mid-distal 60 to 75% stenosis. LCX Calcified, there is proximal-mid 75% eccentric stenosis at a bifurcation with OM1 and OM 2 as well as the AV groove. RCA Dominant, calcified, large vessel, [id number]-[id number]-bcdkyd 40% stenoses.            CONCLUSIONS:     Upper normal filling pressures  Two-vessel CAD/ASHD, slight progression since last angiograms from 2020  We will treat medically given advanced age and comorbidities including renal insufficiency  Continue with aspirin statin and beta-blocker

## 2022-09-13 NOTE — PROGRESS NOTES
Hospitalist Progress Note      PCP: Carisa Khan MD    Date of Admission: 9/8/2022    Chief Complaint: Afib RVR    Hospital Course:  82 y.o. male who presented to [facility] with above complaint. He went to cardiology is routine follow-up of following recent hospital admission. In the office they found his heart rate was high and blood pressure was low. He was sent to the emergency room for further evaluation. He received 1 bolus of Cardizem for rapid ventricular rate     Subjective: Resting in bed, no voiced complaints, LHC/RHC today.       Medications:  Reviewed    Infusion Medications    sodium chloride      sodium chloride      sodium chloride 25 mL/hr at 09/10/22 1258     Scheduled Medications    sodium chloride flush  5-40 mL IntraVENous 2 times per day    sodium chloride flush  5-40 mL IntraVENous 2 times per day    metoprolol succinate  12.5 mg Oral Daily    digoxin  125 mcg Oral Daily    aspirin  81 mg Oral Daily    atorvastatin  40 mg Oral Daily    citalopram  10 mg Oral Daily    finasteride  5 mg Oral Daily    fludrocortisone  0.2 mg Oral Daily    memantine  5 mg Oral BID    midodrine  10 mg Oral TID WC    pantoprazole  40 mg Oral QAM AC    sodium chloride flush  5-40 mL IntraVENous 2 times per day    [Held by provider] enoxaparin  40 mg SubCUTAneous Daily     PRN Meds: sodium chloride flush, sodium chloride, sodium chloride flush, sodium chloride, acetaminophen, sodium chloride flush, sodium chloride, ondansetron **OR** ondansetron, polyethylene glycol, acetaminophen **OR** acetaminophen      Intake/Output Summary (Last 24 hours) at 9/13/2022 1701  Last data filed at 9/13/2022 0848  Gross per 24 hour   Intake 240 ml   Output 650 ml   Net -410 ml       Physical Exam Performed:    BP (!) 157/96   Pulse 71   Temp 97.4 °F (36.3 °C) (Oral)   Resp 18   Ht 5' 7\" (1.702 m)   Wt 149 lb 3.2 oz (67.7 kg)   SpO2 96%   BMI 23.37 kg/m²     General appearance: No apparent distress, appears stated age and cooperative. HEENT: Prosthetic right eye  Neck: Supple, with full range of motion. No jugular venous distention. Trachea midline. Respiratory:  Normal respiratory effort. Clear to auscultation, bilaterally without Rales/Wheezes/Rhonchi. Cardiovascular: Irregular rate and rhythm with normal S1/S2 without murmurs, rubs or gallops. Abdomen: Soft, non-tender, non-distended with normal bowel sounds. Musculoskeletal: No clubbing, cyanosis or edema bilaterally. Full range of motion without deformity. Skin: Skin color, texture, turgor normal.  No rashes or lesions. Neurologic:  Neurovascularly intact without any focal sensory/motor deficits. Cranial nerves: II-XII intact, grossly non-focal.  Psychiatric: Alert and oriented, limited insight  Capillary Refill: Brisk, 3 seconds, normal   Peripheral Pulses: +2 palpable, equal bilaterally       Labs:   Recent Labs     09/13/22  0756   WBC 9.3   HGB 14.9   HCT 44.6        Recent Labs     09/11/22  0518 09/12/22  0650 09/13/22  0756    138 141   K 3.7 3.6 3.9    99 101   CO2 29 30 32   BUN 16 26* 29*   CREATININE 1.3 1.2 1.6*   CALCIUM 9.9 9.0 9.4     No results for input(s): AST, ALT, BILIDIR, BILITOT, ALKPHOS in the last 72 hours. No results for input(s): INR in the last 72 hours. No results for input(s): Latoya Huntarch in the last 72 hours. Urinalysis:      Lab Results   Component Value Date/Time    NITRU Negative 09/08/2022 04:59 PM    WBCUA 21-50 04/21/2022 02:20 PM    BACTERIA 1+ 04/21/2022 02:20 PM    RBCUA 3-4 04/21/2022 02:20 PM    BLOODU Negative 09/08/2022 04:59 PM    SPECGRAV 1.020 09/08/2022 04:59 PM    GLUCOSEU Negative 09/08/2022 04:59 PM       Radiology:  XR CHEST PORTABLE   Final Result   Cardiomegaly with mild pulmonary vascular congestion.                  Assessment/Plan:    Active Hospital Problems    Diagnosis     Orthostatic hypotension [I95.1]      Priority: Medium    Atrial fibrillation, rapid ([address] [I48.91] Priority: Medium    Ischemic cardiomyopathy [I25.5]      Priority: Medium    Coronary artery disease involving native heart without angina pectoris [I25.10]      Permanent Atrial fibrillation with rapid ventricular rate    -s/p IV fluid and Cardizem bolus  He was not on metoprolol because of orthostasis and not on anticoagulation because of fall risk  EP consulted  Digoxin 125 PO daily, ASA  Start low dose toprol   Wean diltiazem due to orthostatic hypotension  Consider AVN ablation with BiV upgrade   Maintain K and Mg at adequate   Briefly on amio due to uncontrolled rates  Amio not ideal due to not on AC  Metoprolol IV PRN for HR      Orthostatic hypotension  Complicating treatment of afib and HF  Increase fluid intake and liberate salt intake   on Florinef and midodrine, continue-currently blood pressure stable  Caution with rising   Cardiac rehab recommended outpatient      Acute on chronic systolic CHF with ejection fraction of 35%   Ischemic cardiomyopathy   Exacerbated by above  Monitor I/Os  No fluid or salt restriction  Lasix 20 mg BID IV      CAD of native coronary artery   LHC: Two-vessel CAD/ASHD, RHC-upper normal filling pressures. Continue ASA/BB     Hypokalemia - resolved recheck in AM   Hypomagnesemia - resolved, recheck in am     Chronic kidney disease-stage IIIa creatinine 1.2-1.6  Monitor renal fx with BMP daily  Avoid nephrotoxic agents      Hypertension-medications as above, difficult to control with orthostatic hypotension      Hyperlipidemia-statin continued      BPH - continue finasteride      Dementia - continue memantine      Depression - mood is stable, continue celexa     DVT Prophylaxis: Lovenox  Diet: ADULT DIET;  Regular  Code Status: Full Code  PT/OT Eval Status: Consulted    Dispo - poss 9/14, repeat renal function    Appropriate for A1 Discharge Unit: No      GUS Fagan - CNP

## 2022-09-13 NOTE — PROGRESS NOTES
Brief Pre-Op Note/Sedation Assessment      Toro Saldana  1935  [id number]  12:58 PM    Planned Procedure: Cardiac Catheterization Procedure  Post Procedure Plan: Return to same level of care  Consent: I have discussed with the patient and/or the patient representative the indication, alternatives, and the possible risks and/or complications of the planned procedure and the anesthesia methods. The patient and/or patient representative appear to understand and agree to proceed. Chief Complaint:   Chest Pain/Pressure  Dyspnea      Indications for Cath Procedure:  Presentation:  ACS > 24 hrs and Worsening Angina possible nstemi  2. Anginal Classification within 2 weeks:  CCS IV - Inability to perform any activity without angina or angina at rest, i.e., severe limitation  3. Angina Symptoms Assessment:  Typical Chest Pain  4. Heart Failure Class within last 2 weeks:  No symptoms  5. Cardiovascular Instability:  No    Prior Ischemic Workup/Eval:  Pre-Procedural Medications: Yes: Aspirin, Beta Blockers, and STATIN  2. Stress Test Completed? No    Does Patient need surgery? Cath Valve Surgery:  No    Pre-Procedure Medical History:  Vital Signs:  BP (!) 151/92   Pulse 70   Temp 98.4 °F (36.9 °C) (Oral)   Resp 18   Ht 5' 7\" (1.702 m)   Wt 149 lb 3.2 oz (67.7 kg)   SpO2 93%   BMI 23.37 kg/m²         Allergies:     Allergies   Allergen Reactions    Sulfa Antibiotics      Medications:    Current Facility-Administered Medications   Medication Dose Route Frequency Provider Last Rate Last Admin    sodium chloride flush 0.9 % injection 5-40 mL  5-40 mL IntraVENous 2 times per day [facility], APRN - CNP        sodium chloride flush 0.9 % injection 5-40 mL  5-40 mL IntraVENous PRN Peruvian Gladys, APRN - CNP        0.9 % sodium chloride infusion   IntraVENous PRN [facility], APRN - CNP        metoprolol succinate (TOPROL XL) extended release tablet 12.5 mg  12.5 mg Oral Daily Peruvian Gladys, APRN - CNP   12.5 mg at 09/13/22 0840    digoxin (LANOXIN) tablet 125 mcg  125 mcg Oral Daily MARIAN Marquez MD   125 mcg at 09/13/22 0841    aspirin chewable tablet 81 mg  81 mg Oral Daily Luli Saenz MD   81 mg at 09/13/22 0841    atorvastatin (LIPITOR) tablet 40 mg  40 mg Oral Daily Luli Saenz MD   40 mg at 09/13/22 0841    citalopram (CELEXA) tablet 10 mg  10 mg Oral Daily Luli Saenz MD   10 mg at 09/13/22 0841    finasteride (PROSCAR) tablet 5 mg  5 mg Oral Daily Luli Saenz MD   5 mg at 09/13/22 0841    fludrocortisone (FLORINEF) tablet 0.2 mg  0.2 mg Oral Daily Luli Saenz MD   0.2 mg at 09/13/22 0840    memantine (NAMENDA) tablet 5 mg  5 mg Oral BID Luli Saenz MD   5 mg at 09/13/22 0840    midodrine (PROAMATINE) tablet 10 mg  10 mg Oral TID [facility],    10 mg at 09/12/22 1756    pantoprazole (PROTONIX) tablet 40 mg  40 mg Oral QAM AC Luli Saenz MD   40 mg at 09/12/22 0514    sodium chloride flush 0.9 % injection 5-40 mL  5-40 mL IntraVENous 2 times per day Luli Saenz MD   10 mL at 09/13/22 0844    sodium chloride flush 0.9 % injection 5-40 mL  5-40 mL IntraVENous PRN Luli Saenz MD        0.9 % sodium chloride infusion   IntraVENous PRN Luli Saenz MD 25 mL/hr at 09/10/22 1258 New Bag at 09/10/22 1258    ondansetron (ZOFRAN-ODT) disintegrating tablet 4 mg  4 mg Oral Q8H PRN Luli Saenz MD        Or    ondansetron (ZOFRAN) injection 4 mg  4 mg IntraVENous Q6H PRN Luli Saenz MD        polyethylene glycol (GLYCOLAX) packet 17 g  17 g Oral Daily PRN Luli Saenz MD        acetaminophen (TYLENOL) tablet 650 mg  650 mg Oral Q6H PRN Luli Saenz MD        Or    acetaminophen (TYLENOL) suppository 650 mg  650 mg Rectal Q6H PRN Luli Saenz MD        [Held by provider] enoxaparin (LOVENOX) injection 40 mg  40 mg SubCUTAneous Daily Luli Saenz MD   40 mg at 09/11/22 0859       Past Medical History:    Past Medical History:   Diagnosis Date    Atrial fibrillation ([facility] [address]     Blind right eye     Chronic kidney disease     Dementia ([facility] [address]     Hyperlipidemia     Hypertension     Pneumonia        Surgical History:    Past Surgical History:   Procedure Laterality Date    APPENDECTOMY      COLONOSCOPY      [address]      JOINT REPLACEMENT  2013    left shoulder    PACEMAKER PLACEMENT  10/13/2017    Dr Teddy Hernandez at [facility] 19. single chamber PPM    TONSILLECTOMY      UPPER GASTROINTESTINAL ENDOSCOPY               Pre-Sedation:  Pre-Sedation Documentation and Exam:  I have assessed the patient and reviewed the H&P on the chart. Prior History of Anesthesia Complications:   none    Modified Mallampati:  III (soft palate, base of uvula visible)    ASA Classification:  Class 4 - A patient with an incapacitating systemic disease that is a constant threat to life    Greg Scale: Activity:  2 - Able to move 4 extremities voluntarily on command  Respiration:  2 - Able to breathe deeply and cough freely  Circulation:  2 - BP+/- 20mmHg of normal  Consciousness:  2 - Fully awake  Oxygen Saturation (color):  2 - Able to maintain oxygen saturation >92% on room air    Sedation/Anesthesia Plan:  Guard the patient's safety and welfare. Minimize physical discomfort and pain. Minimize negative psychological responses to treatment by providing sedation and analgesia and maximize the potential amnesia. Patient to meet pre-procedure discharge plan.     Medication Planned:  midazolam intravenously and fentanyl intravenously    Patient is an appropriate candidate for plan of sedation:   yes      Electronically signed by Winter Og MD on 9/13/2022 at 12:58 PM

## 2022-09-13 NOTE — PROGRESS NOTES
Physical Therapy     PT session attempted this afternoon, although pt just returned from cath lab and is currently on bedrest per RN post-procedure. Will re-attempt PT on a later date. Thank you.     Asmita Sellers  PT, DPT #[id number]

## 2022-09-13 NOTE — PROGRESS NOTES
Occupational Therapy    OT tx session attempted. Pt currently off the floor for cath procedure. Will follow up later as schedule permits or next available date.      Electronically signed by SAMANTA Degroot/L on 9/13/2022 at 12:49 PM

## 2022-09-13 NOTE — PROGRESS NOTES
[address]     Electrophysiology                                     Progress Note    Admission date:  2022    Reason for follow up visit: AF and orthostatic hypotension     HPI/CC: William Maldonado was admitted on 2022 after being found to be in AF with RVR with heart rates in the 130s-170 bpm and positive orthostatic blood pressure at his cardiology appointment. He was directed to the ED. On 2022 he had a LHC/RHC that showed upper normal filling pressures, two-vessel CAD with slight progression from last LHC in . Medical management recommend due to age and comorbidity. Rhythm has been AF with HR 70s. Subjective: He is seen resting in bed, right arm board in place. He denies dizziness, chest pain, shortness of breath and palpitations. Vitals:  Blood pressure (!) 157/96, pulse 71, temperature 97.4 °F (36.3 °C), temperature source Oral, resp. rate 18, height 5' 7\" (1.702 m), weight 149 lb 3.2 oz (67.7 kg), SpO2 96 %.   Temp  Av °F (36.7 °C)  Min: 97.4 °F (36.3 °C)  Max: 98.4 °F (36.9 °C)  Pulse  Av.7  Min: 70  Max: 84  BP  Min: 107/75  Max: 164/87  SpO2  Av.8 %  Min: 89 %  Max: 97 %    24 hour I/O    Intake/Output Summary (Last 24 hours) at 2022 1717  Last data filed at 2022 0848  Gross per 24 hour   Intake 240 ml   Output 650 ml   Net -410 ml       Current Facility-Administered Medications   Medication Dose Route Frequency Provider Last Rate Last Admin    sodium chloride flush 0.9 % injection 5-40 mL  5-40 mL IntraVENous 2 times per day Romana Allison, APRN - CNP        0.9 % sodium chloride infusion   IntraVENous PRN Romana Allison, APRN - CNP        sodium chloride flush 0.9 % injection 5-40 mL  5-40 mL IntraVENous 2 times per day Winter Og MD        sodium chloride flush 0.9 % injection 5-40 mL  5-40 mL IntraVENous PRN Winter Og MD        0.9 % sodium chloride infusion   IntraVENous PRN Winter Og MD        acetaminophen (TYLENOL) tablet 650 mg  650 mg Oral Q4H PRN Andres Ulrich MD        metoprolol succinate (TOPROL XL) extended release tablet 12.5 mg  12.5 mg Oral Daily GUS Barcenas CNP   12.5 mg at 09/13/22 0840    digoxin (LANOXIN) tablet 125 mcg  125 mcg Oral Daily MARIAN Ruiz MD   125 mcg at 09/13/22 0841    aspirin chewable tablet 81 mg  81 mg Oral Daily Val Hodge MD   81 mg at 09/13/22 0841    atorvastatin (LIPITOR) tablet 40 mg  40 mg Oral Daily Val Hodge MD   40 mg at 09/13/22 0841    citalopram (CELEXA) tablet 10 mg  10 mg Oral Daily Val Hodge MD   10 mg at 09/13/22 0841    finasteride (PROSCAR) tablet 5 mg  5 mg Oral Daily Val Hodge MD   5 mg at 09/13/22 0841    fludrocortisone (FLORINEF) tablet 0.2 mg  0.2 mg Oral Daily Val Hodge MD   0.2 mg at 09/13/22 0840    memantine (NAMENDA) tablet 5 mg  5 mg Oral BID Val Hodge MD   5 mg at 09/13/22 0840    midodrine (PROAMATINE) tablet 10 mg  10 mg Oral TID  Luis Danielheather Marques DO   10 mg at 09/12/22 1756    pantoprazole (PROTONIX) tablet 40 mg  40 mg Oral QAM AC Val Hodge MD   40 mg at 09/12/22 0514    sodium chloride flush 0.9 % injection 5-40 mL  5-40 mL IntraVENous 2 times per day Val Hodge MD   10 mL at 09/13/22 0844    sodium chloride flush 0.9 % injection 5-40 mL  5-40 mL IntraVENous PRN Val Hodge MD        0.9 % sodium chloride infusion   IntraVENous PRN Val Hodge MD 25 mL/hr at 09/10/22 1258 New Bag at 09/10/22 1258    ondansetron (ZOFRAN-ODT) disintegrating tablet 4 mg  4 mg Oral Q8H PRN Val Hodge MD        Or    ondansetron (ZOFRAN) injection 4 mg  4 mg IntraVENous Q6H PRN Val Hodge MD        polyethylene glycol (GLYCOLAX) packet 17 g  17 g Oral Daily PRN Val Hodge MD        acetaminophen (TYLENOL) tablet 650 mg  650 mg Oral Q6H PRN Val Hodge MD        Or    acetaminophen (TYLENOL) suppository 650 mg  650 mg Rectal Q6H PRN Val Hodge MD        [facility] by provider] enoxaparin (LOVENOX) injection 40 mg  40 mg SubCUTAneous Daily Mario Low MD   40 mg at 09/11/22 0859       Objective:     Telemetry monitor: AF    Physical Exam:  Constitutional and general appearance: alert, cooperative, no distress, and appears stated age  HEENT: PERRL, no cervical lymphadenopathy. No masses palpable. Normal oral mucosa  Respiratory:  Normal excursion and expansion without use of accessory muscles  Resp auscultation: Normal breath sounds without wheezing, rhonchi, and rales  Cardiovascular: The apical impulse is not displaced  Heart tones are crisp and normal. Irregular S1 and S2.  Jugular venous pulsation Normal  The carotid upstroke is normal in amplitude and contour without delay or bruit  Peripheral pulses are symmetrical and full   Abdomen:  No masses or tenderness  Bowel sounds present  Extremities:   No cyanosis or clubbing   No lower extremity edema   Skin: warm and dry  Neurological:  Alert  Moves all extremities well  No abnormalities of mood, affect, or behavior are noted    Data   C/RHC 9/13/2022:  FINDINGS      HEMODYNAMICS     CHAMBER PRESSURE SATURATION   RA 5 54%   RV 20/4     PA 21/13 48%   PW 11     AORTA 110/68 85%      TASHA CARDIAC OUTPUT 3.8 L/min   SVR 1630           Left heart catheterization     LVEDP 11   GRADIENT ACROSS AORTIC VALVE None     CORONARY ARTERIES     LM Less than 10% [id number] stenosis. LAD Calcified, proximal 40 to 50% stenosis, mid-distal 60 to 75% stenosis. LCX Calcified, there is proximal-mid 75% eccentric stenosis at a bifurcation with OM1 and OM 2 as well as the AV groove. RCA Dominant, calcified, large vessel, [id number] 40% stenoses.      CONCLUSIONS:      Upper normal filling pressures  Two-vessel CAD/ASHD, slight progression since last angiograms from 2020  We will treat medically given advanced age and comorbidities including renal insufficiency  Continue with aspirin statin and beta-blocker    Limited echo 8/10/2022:   Conclusions   Summary   Limited only f/u for LVEF. The left ventricular systolic function is moderately reduced with an   ejection fraction of 35 - 40 %. Left ventricular cavity size is normal.   Hypokinesis of the anterior, septal, and apical walls. Limited echo 4/15/2022:    Summary   Limited echo for LV function with limited doppler/color. LV function is mildly decreased with EF estimated from 45-50%. Mild global hypokinesis. Mild concentric left ventricular hypertrophy. Diastolic dysfunction grade and filling pressure are indeterminate. Severe biatrial enlargement. Aortic valve appears sclerotic but opens adequately. 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 Note no color flow doppler visualized to assess for mitral regurgitation. Mild to moderate tricuspid valve regurgitation. Systolic pulmonary artery pressure (sPAP) is normal and estimated at 26 mmHg   (RAP 3 mmHg)   Irregular heart rate throughout study. Definity® used for myocardial border enhancement. ECHO: 3/21/2019:  Summary   Normal left ventricular systolic function with an estimated ejection   fraction of 55-60%. No regional wall motion abnormalities are seen. Normal left ventricular diastolic filling pressure. The left atrium is mildly dilated. The right atrium is moderately dilated. Right ventricular systolic function is mildly reduced . The right ventricle is mildly enlarged. The ascending aorta is mildly dilated. 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 Mild mitral regurgitation. Moderate tricuspid regurgitation. Systolic pulmonary artery pressure (SPAP) estimated at 47 mmHg (RA pressure   15 mmHg), consistent with mild pulmonary hypertension. Pacemaker lead in right ventricle. All labs and testing reviewed.   Lab Review     Renal Profile:   Lab Results   Component Value Date/Time    CREATININE 1.6 09/13/2022 07:56 AM    BUN 29 09/13/2022 07:56 AM     09/13/2022 07:56 AM    K 3.9 09/13/2022 07:56 AM    K 3.7 09/08/2022 02:56 PM     09/13/2022 07:56 AM    CO2 32 09/13/2022 07:56 AM     CBC:    Lab Results   Component Value Date/Time    WBC 9.3 09/13/2022 07:56 AM    RBC 4.97 09/13/2022 07:56 AM    HGB 14.9 09/13/2022 07:56 AM    HCT 44.6 09/13/2022 07:56 AM    MCV 89.7 09/13/2022 07:56 AM    RDW 17.1 09/13/2022 07:56 AM     09/13/2022 07:56 AM     BNP:    Lab Results   Component Value Date/Time    BNP 12.8 01/09/2013 10:00 AM     Fasting Lipid Panel:    Lab Results   Component Value Date/Time    CHOL 137 09/09/2022 05:45 AM    HDL 30 09/09/2022 05:45 AM    TRIG 73 09/09/2022 05:45 AM     Cardiac Enzymes:  CK/MbTroponin  Lab Results   Component Value Date/Time    TROPONINI <0.01 09/08/2022 11:56 PM     PT/ INR   Lab Results   Component Value Date/Time    INR 1.26 01/18/2022 06:05 AM    INR 1.18 05/22/2020 10:19 AM    INR 1.02 01/24/2016 12:35 PM    PROTIME 14.4 01/18/2022 06:05 AM    PROTIME 13.7 05/22/2020 10:19 AM    PROTIME 11.6 01/24/2016 12:35 PM     PTT No results found for: PTT   Lab Results   Component Value Date/Time    MG 1.90 09/13/2022 07:56 AM      Lab Results   Component Value Date/Time    TSH 2.45 10/21/2021 02:04 PM     Assessment:  Persistent atrial fibrillation: ongoing              -[id number] score 3 (age, HTN)              -numerous high ventricular events on device interrogation   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 Dr. Katerina Sheffield discussed procedure with daughter Cindy Valdes over the phone. Risks, benefits and alternate therapies discussed. She is agreeable to proceed. Continue digoxin 125 mcg PO daily, Toprol 12.5 mg daily, aspirin, and atorvastatin  3. PRN metoprolol 2.5 mg IV q 6 hours for sustaining HR > 120 bpm  4. Not on anticoagulation due to fall risk  5. Will have to allow for permissive HTN while resting given he has ongoing orthostatic hypotension with activity. 7. Compression stockings and abdominal binder with ambulation  8.  Increase fluid intake to 64 oz daily      Bridget Fuentes, APRN-SOLO  [address]  [phone]

## 2022-09-13 NOTE — PLAN OF CARE
Problem: Safety - Adult  Goal: Free from fall injury  Outcome: Progressing  Note: Pt will remain free from falls throughout hospital stay. Fall precautions in place, bed alarm on, bed in lowest position with wheels locked and side rails 2/4 up. Room door open and hourly rounding completed. Will continue to monitor throughout shift.

## 2022-09-13 NOTE — PROGRESS NOTES
Comprehensive Nutrition Assessment    Type and Reason for Visit:  Reassess    Nutrition Recommendations/Plan:   Recommend low sodium diet when able to consume PO   Recommend ensure high protein w/ lunch when medically appropriate   Monitor nutrition adequacy, pertinent labs, bowel habits, wt changes, and clinical progress     Malnutrition Assessment:  Malnutrition Status: At risk for malnutrition (Comment) (09/09/22 1101)    Context:  Acute Illness     Findings of the 6 clinical characteristics of malnutrition:  Energy Intake:  Mild decrease in energy intake (Comment)  Weight Loss:   (3.2% weight loss in 2 weeks)       Muscle Mass Loss: Moderate muscle mass loss Clavicles (pectoralis & deltoids)    Nutrition Assessment:    Follow up: Pt NPO for left heart catheterization today. Previously on low sodium diet w/ PO intakes of % of meals. Pt reports good appetite. No N/V or constipation reported. Pt declined diet education at this time, provided pt w/ heart healthy handout. Pt willing to trial ONS to promote optimal nutrition, RD to add ensure HP w/ lunch. Continue to encourage PO intake, will continue to monitor. Nutrition Related Findings:    + BM 9/12. Trace BLE edema. Labs reviewed. Wound Type: None       Current Nutrition Intake & Therapies:    Average Meal Intake: 51-75%, %, 26-50%  Average Supplements Intake: None Ordered  Diet NPO    Anthropometric Measures:  Height: 5' 7\" (170.2 cm)  Ideal Body Weight (IBW): 148 lbs (67 kg)       Current Body Weight: 149 lb (67.6 kg), 100.7 % IBW.  Weight Source: Standing Scale  Current BMI (kg/m2): 23.3  Usual Body Weight: 154 lb (69.9 kg) (bed scale 8/26/22)  % Weight Change (Calculated): -3.2                    BMI Categories: Normal Weight (BMI 22.0 to 24.9) age over 72    Estimated Daily Nutrient Needs:  Energy Requirements Based On: Kcal/kg (25-30)  Weight Used for Energy Requirements: Current  Energy (kcal/day): 7841-5771 kcal  Weight Used for Protein Requirements: Current (1.0-1.2 g/kg)  Protein (g/day): 68-82 g     Fluid (ml/day): Less than 2000 mL oer CHF recommendations or per MD    Nutrition Diagnosis:   Inadequate oral intake related to inadequate protein-energy intake as evidenced by poor intake prior to admission, weight loss    Nutrition Interventions:   Food and/or Nutrient Delivery: Continue Current Diet, Start Oral Nutrition Supplement  Nutrition Education/Counseling: Education declined, Education needed  Coordination of Nutrition Care: Continue to monitor while inpatient       Goals:  Previous Goal Met: Progressing toward Goal(s)  Goals: PO intake 50% or greater, prior to discharge       Nutrition Monitoring and Evaluation:   Behavioral-Environmental Outcomes: None Identified  Food/Nutrient Intake Outcomes: Food and Nutrient Intake, Supplement Intake  Physical Signs/Symptoms Outcomes: Biochemical Data, Fluid Status or Edema, Nutrition Focused Physical Findings, Weight    Discharge Planning:    Continue current diet, Continue Oral Nutrition Supplement     Dee [address], [address] [place] [address],   Contact: Office: [phone]; [address] [place] [address] Road: [phone]

## 2022-09-13 NOTE — PLAN OF CARE
Patient's EF (Ejection Fraction) is less than 40%    Heart Failure Medications:  Diuretics[de-identified] None    (One of the following REQUIRED for EF </= 40%/SYSTOLIC FAILURE but MAY be used in EF% >40%/DIASTOLIC FAILURE)        ACE[de-identified] None        ARB[de-identified] None         ARNI[de-identified] None    (Beta Blockers)  NON- Evidenced Based Beta Blocker (for EF% >40%/DIASTOLIC FAILURE): None    Evidenced Based Beta Blocker::(REQUIRED for EF% <40%/SYSTOLIC FAILURE) Metoprolol SUCCinate- Toprol XL  . .................................................................................................................................................. Patient's weights and intake/output reviewed: Yes    Patient's Last Weight: 149 lbs obtained by standing scale. Difference of 1 lbs more than last documented weight. Intake/Output Summary (Last 24 hours) at 9/13/2022 1142  Last data filed at 9/13/2022 0848  Gross per 24 hour   Intake 240 ml   Output 1050 ml   Net -810 ml       Comorbidities Reviewed Yes    Patient has a past medical history of Atrial fibrillation ([address] Blind right eye, Chronic kidney disease, Dementia ([address] Hyperlipidemia, Hypertension, and Pneumonia. >>For CHF and Comorbidity documentation on Education Time and Topics, please see Education Tab    Progressive Mobility Assessment:  What is this patient's Current Level of Mobility?: Ambulatory- with Assistance  How was this patient Mobilized today?: Edge of Bed and Up in Room                 With Whom? Nurse                 Level of Difficulty/Assistance: 1x Assist     Pt resting in bed at this time on room air. Pt denies shortness of breath. Pt with nonpitting lower extremity edema.      Patient and/or Family's stated Goal of Care this Admission: reduce shortness of breath, increase activity tolerance, better understand heart failure and disease management, be more comfortable, and reduce lower extremity edema prior to discharge        :

## 2022-09-14 LAB
ANION GAP SERPL CALCULATED.3IONS-SCNC: 13 MMOL/L (ref 3–16)
BUN BLDV-MCNC: 21 MG/DL (ref 7–20)
CALCIUM SERPL-MCNC: 9.9 MG/DL (ref 8.3–10.6)
CHLORIDE BLD-SCNC: 105 MMOL/L (ref 99–110)
CO2: 27 MMOL/L (ref 21–32)
CREAT SERPL-MCNC: 1.3 MG/DL (ref 0.8–1.3)
EKG ATRIAL RATE: 234 BPM
EKG DIAGNOSIS: NORMAL
EKG Q-T INTERVAL: 402 MS
EKG QRS DURATION: 82 MS
EKG QTC CALCULATION (BAZETT): 463 MS
EKG R AXIS: 101 DEGREES
EKG T AXIS: 262 DEGREES
EKG VENTRICULAR RATE: 80 BPM
GFR AFRICAN AMERICAN: >60
GFR NON-AFRICAN AMERICAN: 52
GLUCOSE BLD-MCNC: 80 MG/DL (ref 70–99)
HCT VFR BLD CALC: 42.9 % (ref 40.5–52.5)
HEMOGLOBIN: 14.2 G/DL (ref 13.5–17.5)
MCH RBC QN AUTO: 29.8 PG (ref 26–34)
MCHC RBC AUTO-ENTMCNC: 33 G/DL (ref 31–36)
MCV RBC AUTO: 90.3 FL (ref 80–100)
PDW BLD-RTO: 17 % (ref 12.4–15.4)
PLATELET # BLD: 240 K/UL (ref 135–450)
PMV BLD AUTO: 7.9 FL (ref 5–10.5)
POTASSIUM SERPL-SCNC: 3.3 MMOL/L (ref 3.5–5.1)
PRO-BNP: 7817 PG/ML (ref 0–449)
RBC # BLD: 4.76 M/UL (ref 4.2–5.9)
SODIUM BLD-SCNC: 145 MMOL/L (ref 136–145)
WBC # BLD: 7.1 K/UL (ref 4–11)

## 2022-09-14 PROCEDURE — 36415 COLL VENOUS BLD VENIPUNCTURE: CPT

## 2022-09-14 PROCEDURE — 6370000000 HC RX 637 (ALT 250 FOR IP): Performed by: NURSE PRACTITIONER

## 2022-09-14 PROCEDURE — 99232 SBSQ HOSP IP/OBS MODERATE 35: CPT

## 2022-09-14 PROCEDURE — 80048 BASIC METABOLIC PNL TOTAL CA: CPT

## 2022-09-14 PROCEDURE — 6370000000 HC RX 637 (ALT 250 FOR IP)

## 2022-09-14 PROCEDURE — 93010 ELECTROCARDIOGRAM REPORT: CPT | Performed by: INTERNAL MEDICINE

## 2022-09-14 PROCEDURE — 2580000003 HC RX 258: Performed by: INTERNAL MEDICINE

## 2022-09-14 PROCEDURE — 6370000000 HC RX 637 (ALT 250 FOR IP): Performed by: INTERNAL MEDICINE

## 2022-09-14 PROCEDURE — 2060000000 HC ICU INTERMEDIATE R&B

## 2022-09-14 PROCEDURE — 83880 ASSAY OF NATRIURETIC PEPTIDE: CPT

## 2022-09-14 PROCEDURE — 85027 COMPLETE CBC AUTOMATED: CPT

## 2022-09-14 PROCEDURE — 6360000002 HC RX W HCPCS: Performed by: NURSE PRACTITIONER

## 2022-09-14 RX ORDER — DIAZEPAM 5 MG/ML
2.5 INJECTION, SOLUTION INTRAMUSCULAR; INTRAVENOUS ONCE
Status: COMPLETED | OUTPATIENT
Start: 2022-09-14 | End: 2022-09-14

## 2022-09-14 RX ORDER — OLANZAPINE 5 MG/1
5 TABLET, ORALLY DISINTEGRATING ORAL ONCE
Status: COMPLETED | OUTPATIENT
Start: 2022-09-14 | End: 2022-09-14

## 2022-09-14 RX ORDER — HALOPERIDOL 5 MG/ML
5 INJECTION INTRAMUSCULAR ONCE
Status: COMPLETED | OUTPATIENT
Start: 2022-09-14 | End: 2022-09-14

## 2022-09-14 RX ORDER — DIPHENHYDRAMINE HYDROCHLORIDE 50 MG/ML
12.5 INJECTION INTRAMUSCULAR; INTRAVENOUS EVERY 6 HOURS PRN
Status: DISCONTINUED | OUTPATIENT
Start: 2022-09-14 | End: 2022-09-18 | Stop reason: HOSPADM

## 2022-09-14 RX ADMIN — METOPROLOL SUCCINATE 12.5 MG: 25 TABLET, EXTENDED RELEASE ORAL at 09:48

## 2022-09-14 RX ADMIN — ATORVASTATIN CALCIUM 40 MG: 40 TABLET, FILM COATED ORAL at 09:50

## 2022-09-14 RX ADMIN — Medication 10 ML: at 22:05

## 2022-09-14 RX ADMIN — CITALOPRAM HYDROBROMIDE 10 MG: 20 TABLET ORAL at 09:49

## 2022-09-14 RX ADMIN — ASPIRIN 81 MG 81 MG: 81 TABLET ORAL at 09:49

## 2022-09-14 RX ADMIN — DIPHENHYDRAMINE HYDROCHLORIDE 12.5 MG: 50 INJECTION, SOLUTION INTRAMUSCULAR; INTRAVENOUS at 05:11

## 2022-09-14 RX ADMIN — OLANZAPINE 5 MG: 5 TABLET, ORALLY DISINTEGRATING ORAL at 02:13

## 2022-09-14 RX ADMIN — MEMANTINE 5 MG: 5 TABLET ORAL at 09:47

## 2022-09-14 RX ADMIN — FINASTERIDE 5 MG: 5 TABLET, FILM COATED ORAL at 09:49

## 2022-09-14 RX ADMIN — Medication 10 ML: at 09:50

## 2022-09-14 RX ADMIN — SODIUM CHLORIDE, PRESERVATIVE FREE 10 ML: 5 INJECTION INTRAVENOUS at 10:28

## 2022-09-14 RX ADMIN — DIGOXIN 125 MCG: 125 TABLET ORAL at 09:47

## 2022-09-14 RX ADMIN — HALOPERIDOL LACTATE 5 MG: 5 INJECTION, SOLUTION INTRAMUSCULAR at 05:11

## 2022-09-14 RX ADMIN — POTASSIUM BICARBONATE 20 MEQ: 782 TABLET, EFFERVESCENT ORAL at 22:05

## 2022-09-14 RX ADMIN — DIAZEPAM 2.5 MG: 5 INJECTION, SOLUTION INTRAMUSCULAR; INTRAVENOUS at 05:13

## 2022-09-14 ASSESSMENT — PAIN SCALES - GENERAL
PAINLEVEL_OUTOF10: 0
PAINLEVEL_OUTOF10: 0

## 2022-09-14 NOTE — PLAN OF CARE
Problem: Safety - Adult  Goal: Free from fall injury  Outcome: Progressing   Patient will remain free of falls and injury this shift with us of [employer] camera, call light and frequent checks.     Elizabeth Osorio RN

## 2022-09-14 NOTE — PROGRESS NOTES
Spoke with patient's daughter regarding patient's episode overnight. Explained reasoning for sedation during code violet. At bedside now with patient who is alert and asking for food. Family here at bedside feeding him. Patient currently resting and eating in bed. More family to come later on today.     Shahnaz Chang RN

## 2022-09-14 NOTE — PROGRESS NOTES
Hospitalist Progress Note      PCP: Gail Hunt MD    Date of Admission: 9/8/2022    Chief Complaint: Afib RVR    Hospital Course:  82 y.o. male who presented to [facility] with above complaint. He went to cardiology is routine follow-up of following recent hospital admission. In the office they found his heart rate was high and blood pressure was low. He was sent to the emergency room for further evaluation. He received 1 bolus of Cardizem for rapid ventricular rate     Subjective: Restless/agitated overnight, received zyprexa/haldol/valium. Confused this am, in restraints.        Medications:  Reviewed    Infusion Medications    sodium chloride      sodium chloride      sodium chloride 25 mL/hr at 09/10/22 1258     Scheduled Medications    sodium chloride flush  5-40 mL IntraVENous 2 times per day    sodium chloride flush  5-40 mL IntraVENous 2 times per day    metoprolol succinate  12.5 mg Oral Daily    digoxin  125 mcg Oral Daily    aspirin  81 mg Oral Daily    atorvastatin  40 mg Oral Daily    citalopram  10 mg Oral Daily    finasteride  5 mg Oral Daily    fludrocortisone  0.2 mg Oral Daily    memantine  5 mg Oral BID    midodrine  10 mg Oral TID WC    pantoprazole  40 mg Oral QAM AC    sodium chloride flush  5-40 mL IntraVENous 2 times per day    [Held by provider] enoxaparin  40 mg SubCUTAneous Daily     PRN Meds: diphenhydrAMINE, sodium chloride, sodium chloride flush, sodium chloride, acetaminophen, hydrALAZINE, sodium chloride flush, sodium chloride, ondansetron **OR** ondansetron, polyethylene glycol, acetaminophen **OR** acetaminophen      Intake/Output Summary (Last 24 hours) at 9/14/2022 1124  Last data filed at 9/14/2022 1031  Gross per 24 hour   Intake 130 ml   Output 200 ml   Net -70 ml         Physical Exam Performed:    BP (!) 154/95   Pulse 84   Temp 97.9 °F (36.6 °C) (Oral)   Resp 18   Ht 5' 7\" (1.702 m)   Wt 149 lb 3.2 oz (67.7 kg)   SpO2 95%   BMI 23.37 kg/m²     General appearance: No apparent distress, appears stated age, restless  HEENT: Prosthetic right eye  Neck: Supple, with full range of motion. No jugular venous distention. Trachea midline. Respiratory:  Normal respiratory effort. Clear to auscultation, bilaterally without Rales/Wheezes/Rhonchi. Cardiovascular: Irregular rate and rhythm with normal S1/S2 without murmurs, rubs or gallops. Abdomen: Soft, non-tender, non-distended with normal bowel sounds. Musculoskeletal: No clubbing, cyanosis or edema bilaterally. Full range of motion without deformity. Skin: Skin color, texture, turgor normal.  No rashes or lesions. Neurologic:  Neurovascularly intact without any focal sensory/motor deficits. Cranial nerves: II-XII intact, grossly non-focal.  Psychiatric: Alert/confused limited insight  Capillary Refill: Brisk, 3 seconds, normal   Peripheral Pulses: +2 palpable, equal bilaterally       Labs:   Recent Labs     09/13/22  0756 09/14/22  0637   WBC 9.3 7.1   HGB 14.9 14.2   HCT 44.6 42.9    240       Recent Labs     09/12/22  0650 09/13/22  0756 09/14/22  0637    141 145   K 3.6 3.9 3.3*   CL 99 101 105   CO2 30 32 27   BUN 26* 29* 21*   CREATININE 1.2 1.6* 1.3   CALCIUM 9.0 9.4 9.9       No results for input(s): AST, ALT, BILIDIR, BILITOT, ALKPHOS in the last 72 hours. No results for input(s): INR in the last 72 hours. No results for input(s): Magdalene Birmingham in the last 72 hours. Urinalysis:      Lab Results   Component Value Date/Time    NITRU Negative 09/08/2022 04:59 PM    WBCUA 21-50 04/21/2022 02:20 PM    BACTERIA 1+ 04/21/2022 02:20 PM    RBCUA 3-4 04/21/2022 02:20 PM    BLOODU Negative 09/08/2022 04:59 PM    SPECGRAV 1.020 09/08/2022 04:59 PM    GLUCOSEU Negative 09/08/2022 04:59 PM       Radiology:  XR CHEST PORTABLE   Final Result   Cardiomegaly with mild pulmonary vascular congestion.                  Assessment/Plan:    Active Hospital Problems    Diagnosis     Orthostatic hypotension [I95.1]      Priority: Medium    Atrial fibrillation, rapid ([facility] [address] [I48.91]      Priority: Medium    Ischemic cardiomyopathy [I25.5]      Priority: Medium    Coronary artery disease involving native heart without angina pectoris [I25.10]      Permanent Atrial fibrillation with rapid ventricular rate    -s/p IV fluid and Cardizem bolus  He was not on metoprolol because of orthostasis and not on anticoagulation because of fall risk  EP consulted-plan for atrioventricular node radiofrequency catheter ablation with upgrade to biventricular pacemaker  Digoxin 125 PO daily, ASA  Start low dose toprol   Wean diltiazem due to orthostatic hypotension  Consider AVN ablation with BiV upgrade   Maintain K and Mg at adequate   Briefly on amio due to uncontrolled rates  Amio not ideal due to not on AC  Metoprolol IV PRN for HR      Orthostatic hypotension  Complicating treatment of afib and HF  Increase fluid intake and liberate salt intake   on Florinef and midodrine, continue-currently blood pressure stable  Caution with rising   Cardiac rehab recommended outpatient      Acute on chronic systolic CHF with ejection fraction of 35%   Ischemic cardiomyopathy   Exacerbated by above  Monitor I/Os  No fluid or salt restriction     CAD of native coronary artery   LHC: Two-vessel CAD/ASHD, RHC-upper normal filling pressures. Continue ASA/BB     Hypokalemia - resolved recheck in AM   Hypomagnesemia - resolved, recheck in am     Chronic kidney disease-stage IIIa creatinine 1.2-1.6  Monitor renal fx with BMP daily  Avoid nephrotoxic agents      Hypertension-medications as above, difficult to control with orthostatic hypotension      Hyperlipidemia-statin continued      BPH - continue finasteride      Dementia - continue memantine      Depression - mood is stable, continue celexa     DVT Prophylaxis: Lovenox  Diet: Diet NPO  ADULT DIET;  Regular  ADULT ORAL NUTRITION SUPPLEMENT; Breakfast, Dinner; Standard High Calorie/High Protein Oral Supplement  Code Status: Full Code  PT/OT Eval Status: Consulted    Dispo - poss 9/16    Appropriate for A1 Discharge Unit: GUS Worthy CNP no

## 2022-09-14 NOTE — PROGRESS NOTES
PS to [facility] Jessica::Patient's BP is 162/104 and his HR is 110 and jumps up to the 130s-140s. Came in with [facility]; had a heart cath today and a atrioventricular node radiofrequency catheter ablation with upgrade to biventricular pacemaker tomorrow. Want to add anything right now? Call placed to on call cardiology regarding patients HR and 5mg metoprolol given and patient's HR recovered.

## 2022-09-14 NOTE — PROGRESS NOTES
Writer called Esmer Lewis, daughter to let her know that per cardiology note, patient scheduled for pacemaker placement on Friday 9/16. Daughter verbalized understanding. Writer also spoke with daughter regarding manager approval for family to stay the night to help keep patient calm and avoid sedatives. Daughter verbalized understanding and stated they may stay with patient Thursday night to ensure he doesn't have an episode and cause another delay in surgery.      Estela De La Paz RN

## 2022-09-14 NOTE — PROGRESS NOTES
PS to Dr. Kristin Garcia: This patient just had a code violet and was placed in restraints but the restraints haven't been ordered yet. Can you please add the order? Can an order be placed for continuous O2 as well. We have him hooked up to it due to being given sedatives. Reply @ [id number]: ok    Awaiting orders.

## 2022-09-14 NOTE — CARE COORDINATION
Pt discussed during huddle with primary RN, code violet overnight, PPM pushed back a day, now scheduled for tomorrow. Discharge plan per family has been for pt to return home with family support and [facility]. CM will continue to follow pt's progress, see how he is doing postop tomorrow, and coordinate discharge arrangements with family.   CITLALY Boss-RN
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
## 2022-09-14 NOTE — PROGRESS NOTES
IntraVENous 2 times per day Danii Quiroz MD   10 mL at 09/14/22 1028    sodium chloride flush 0.9 % injection 5-40 mL  5-40 mL IntraVENous PRN Danii Quiroz MD        0.9 % sodium chloride infusion   IntraVENous PRN Danii Quiroz MD        acetaminophen (TYLENOL) tablet 650 mg  650 mg Oral Q4H PRN Danii Quiroz MD        hydrALAZINE (APRESOLINE) injection 10 mg  10 mg IntraVENous Q6H PRN Taqueria Cardenas MD   10 mg at 09/13/22 1817    metoprolol succinate (TOPROL XL) extended release tablet 12.5 mg  12.5 mg Oral Daily GUS Aponte - CNP   12.5 mg at 09/14/22 0948    digoxin (LANOXIN) tablet 125 mcg  125 mcg Oral Daily MARIAN Gage MD   125 mcg at 09/14/22 0947    aspirin chewable tablet 81 mg  81 mg Oral Daily Ruslan Diaz MD   81 mg at 09/14/22 0949    atorvastatin (LIPITOR) tablet 40 mg  40 mg Oral Daily Ruslan Diaz MD   40 mg at 09/14/22 0950    citalopram (CELEXA) tablet 10 mg  10 mg Oral Daily Ruslan Diaz MD   10 mg at 09/14/22 0949    finasteride (PROSCAR) tablet 5 mg  5 mg Oral Daily Ruslan Diaz MD   5 mg at 09/14/22 0949    fludrocortisone (FLORINEF) tablet 0.2 mg  0.2 mg Oral Daily Ruslan Diaz MD   0.2 mg at 09/13/22 0840    memantine (NAMENDA) tablet 5 mg  5 mg Oral BID Ruslan Diaz MD   5 mg at 09/14/22 0947    midodrine (PROAMATINE) tablet 10 mg  10 mg Oral TID  Luis Daniel Marques DO   10 mg at 09/12/22 1756    pantoprazole (PROTONIX) tablet 40 mg  40 mg Oral QAM AC Ruslan Diaz MD   40 mg at 09/12/22 0514    sodium chloride flush 0.9 % injection 5-40 mL  5-40 mL IntraVENous 2 times per day Ruslan Diaz MD   10 mL at 09/14/22 0950    sodium chloride flush 0.9 % injection 5-40 mL  5-40 mL IntraVENous PRN Ruslan Diaz MD        0.9 % sodium chloride infusion   IntraVENous PRN Ruslan Diaz MD 25 mL/hr at 09/10/22 1258 New Bag at 09/10/22 1258    ondansetron (ZOFRAN-ODT) disintegrating tablet 4 mg  4 mg Oral Q8H PRN Kely Garcia Keo Alas MD        Or    ondansetron (ZOFRAN) injection 4 mg  4 mg IntraVENous Q6H PRN Bettie Oconnor MD        polyethylene glycol (GLYCOLAX) packet 17 g  17 g Oral Daily PRN Bettie Oconnor MD        acetaminophen (TYLENOL) tablet 650 mg  650 mg Oral Q6H PRN Bettie Oconnor MD        Or    acetaminophen (TYLENOL) suppository 650 mg  650 mg Rectal Q6H PRN Bettie Oconnor MD        [Held by provider] enoxaparin (LOVENOX) injection 40 mg  40 mg SubCUTAneous Daily Bettie Oconnor MD   40 mg at 09/11/22 0859       Objective:     Telemetry monitor: AF    Physical Exam:  Constitutional and general appearance: alert, cooperative, no distress, and appears stated age  HEENT: PERRL, no cervical lymphadenopathy. No masses palpable. Normal oral mucosa  Respiratory:  Normal excursion and expansion without use of accessory muscles  Resp auscultation: Normal breath sounds without wheezing, rhonchi, and rales  Cardiovascular: The apical impulse is not displaced  Heart tones are crisp and normal. Irregular S1 and S2.  Jugular venous pulsation Normal  The carotid upstroke is normal in amplitude and contour without delay or bruit  Peripheral pulses are symmetrical and full   Abdomen:  No masses or tenderness  Bowel sounds present  Extremities:   No cyanosis or clubbing   No lower extremity edema   Skin: warm and dry  Neurological:  Alert  Moves all extremities well  No abnormalities of mood, affect, or behavior are noted    Data   [facility] 9/13/2022:  FINDINGS      HEMODYNAMICS     CHAMBER PRESSURE SATURATION   RA 5 54%   RV 20/4     PA 21/13 48%   PW 11     AORTA 110/68 85%      TASHA CARDIAC OUTPUT 3.8 L/min   SVR 1630           Left heart catheterization     LVEDP 11   GRADIENT ACROSS AORTIC VALVE None     CORONARY ARTERIES     LM Less than 10% oqztlpzp-crm-fpoawm stenosis. LAD Calcified, proximal 40 to 50% stenosis, mid-distal 60 to 75% stenosis.          LCX Calcified, there is proximal-mid 75% eccentric stenosis at a bifurcation with OM1 and OM 2 as well as the AV groove. RCA Dominant, calcified, large vessel, yuotyncb-yjo-omzhxp 40% stenoses. CONCLUSIONS:      Upper normal filling pressures  Two-vessel CAD/ASHD, slight progression since last angiograms from 2020  We will treat medically given advanced age and comorbidities including renal insufficiency  Continue with aspirin statin and beta-blocker    Limited echo 8/10/2022:   Conclusions   Summary   Limited only f/u for LVEF. The left ventricular systolic function is moderately reduced with an   ejection fraction of 35 - 40 %. Left ventricular cavity size is normal.   Hypokinesis of the anterior, septal, and apical walls. Limited echo 4/15/2022:    Summary   Limited echo for LV function with limited doppler/color. LV function is mildly decreased with EF estimated from 45-50%. Mild global hypokinesis. Mild concentric left ventricular hypertrophy. Diastolic dysfunction grade and filling pressure are indeterminate. Severe biatrial enlargement. Aortic valve appears sclerotic but opens adequately. 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 Note no color flow doppler visualized to assess for mitral regurgitation. Mild to moderate tricuspid valve regurgitation. Systolic pulmonary artery pressure (sPAP) is normal and estimated at 26 mmHg   (RAP 3 mmHg)   Irregular heart rate throughout study. Definity® used for myocardial border enhancement. ECHO: 3/21/2019:  Summary   Normal left ventricular systolic function with an estimated ejection   fraction of 55-60%. No regional wall motion abnormalities are seen. Normal left ventricular diastolic filling pressure. The left atrium is mildly dilated. The right atrium is moderately dilated. Right ventricular systolic function is mildly reduced . The right ventricle is mildly enlarged. The ascending aorta is mildly dilated.    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 Mild mitral regurgitation. Moderate tricuspid regurgitation. Systolic pulmonary artery pressure (SPAP) estimated at 47 mmHg (RA pressure   15 mmHg), consistent with mild pulmonary hypertension. Pacemaker lead in right ventricle. All labs and testing reviewed.   Lab Review     Renal Profile:   Lab Results   Component Value Date/Time    CREATININE 1.3 09/14/2022 06:37 AM    BUN 21 09/14/2022 06:37 AM     09/14/2022 06:37 AM    K 3.3 09/14/2022 06:37 AM    K 3.7 09/08/2022 02:56 PM     09/14/2022 06:37 AM    CO2 27 09/14/2022 06:37 AM     CBC:    Lab Results   Component Value Date/Time    WBC 7.1 09/14/2022 06:37 AM    RBC 4.76 09/14/2022 06:37 AM    HGB 14.2 09/14/2022 06:37 AM    HCT 42.9 09/14/2022 06:37 AM    MCV 90.3 09/14/2022 06:37 AM    RDW 17.0 09/14/2022 06:37 AM     09/14/2022 06:37 AM     BNP:    Lab Results   Component Value Date/Time    BNP 12.8 01/09/2013 10:00 AM     Fasting Lipid Panel:    Lab Results   Component Value Date/Time    CHOL 137 09/09/2022 05:45 AM    HDL 30 09/09/2022 05:45 AM    TRIG 73 09/09/2022 05:45 AM     Cardiac Enzymes:  CK/MbTroponin  Lab Results   Component Value Date/Time    TROPONINI <0.01 09/08/2022 11:56 PM     PT/ INR   Lab Results   Component Value Date/Time    INR 1.26 01/18/2022 06:05 AM    INR 1.18 05/22/2020 10:19 AM    INR 1.02 01/24/2016 12:35 PM    PROTIME 14.4 01/18/2022 06:05 AM    PROTIME 13.7 05/22/2020 10:19 AM    PROTIME 11.6 01/24/2016 12:35 PM     PTT No results found for: PTT   Lab Results   Component Value Date/Time    MG 1.90 09/13/2022 07:56 AM      Lab Results   Component Value Date/Time    TSH 2.45 10/21/2021 02:04 PM     Assessment:  Persistent atrial fibrillation: ongoing              -[id number] score 3 (age, HTN)              -numerous high ventricular events on device interrogation   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

## 2022-09-15 LAB
ANION GAP SERPL CALCULATED.3IONS-SCNC: 12 MMOL/L (ref 3–16)
BUN BLDV-MCNC: 17 MG/DL (ref 7–20)
CALCIUM SERPL-MCNC: 10.1 MG/DL (ref 8.3–10.6)
CHLORIDE BLD-SCNC: 103 MMOL/L (ref 99–110)
CO2: 27 MMOL/L (ref 21–32)
CREAT SERPL-MCNC: 1.1 MG/DL (ref 0.8–1.3)
GFR AFRICAN AMERICAN: >60
GFR NON-AFRICAN AMERICAN: >60
GLUCOSE BLD-MCNC: 144 MG/DL (ref 70–99)
GLUCOSE BLD-MCNC: 46 MG/DL (ref 70–99)
GLUCOSE BLD-MCNC: 49 MG/DL (ref 70–99)
GLUCOSE BLD-MCNC: 71 MG/DL (ref 70–99)
GLUCOSE BLD-MCNC: 96 MG/DL (ref 70–99)
PERFORMED ON: ABNORMAL
PERFORMED ON: ABNORMAL
PERFORMED ON: NORMAL
PERFORMED ON: NORMAL
POTASSIUM REFLEX MAGNESIUM: 3.8 MMOL/L (ref 3.5–5.1)
SODIUM BLD-SCNC: 142 MMOL/L (ref 136–145)

## 2022-09-15 PROCEDURE — 2700000000 HC OXYGEN THERAPY PER DAY

## 2022-09-15 PROCEDURE — 36415 COLL VENOUS BLD VENIPUNCTURE: CPT

## 2022-09-15 PROCEDURE — 6370000000 HC RX 637 (ALT 250 FOR IP)

## 2022-09-15 PROCEDURE — 2580000003 HC RX 258: Performed by: INTERNAL MEDICINE

## 2022-09-15 PROCEDURE — 6360000002 HC RX W HCPCS

## 2022-09-15 PROCEDURE — 99232 SBSQ HOSP IP/OBS MODERATE 35: CPT

## 2022-09-15 PROCEDURE — 6370000000 HC RX 637 (ALT 250 FOR IP): Performed by: INTERNAL MEDICINE

## 2022-09-15 PROCEDURE — 94761 N-INVAS EAR/PLS OXIMETRY MLT: CPT

## 2022-09-15 PROCEDURE — 2060000000 HC ICU INTERMEDIATE R&B

## 2022-09-15 PROCEDURE — 80048 BASIC METABOLIC PNL TOTAL CA: CPT

## 2022-09-15 PROCEDURE — 6370000000 HC RX 637 (ALT 250 FOR IP): Performed by: NURSE PRACTITIONER

## 2022-09-15 PROCEDURE — 2580000003 HC RX 258: Performed by: NURSE PRACTITIONER

## 2022-09-15 RX ORDER — ENOXAPARIN SODIUM 100 MG/ML
40 INJECTION SUBCUTANEOUS DAILY
Status: DISCONTINUED | OUTPATIENT
Start: 2022-09-15 | End: 2022-09-18 | Stop reason: HOSPADM

## 2022-09-15 RX ORDER — DEXTROSE MONOHYDRATE 100 MG/ML
INJECTION, SOLUTION INTRAVENOUS CONTINUOUS PRN
Status: DISCONTINUED | OUTPATIENT
Start: 2022-09-15 | End: 2022-09-18 | Stop reason: HOSPADM

## 2022-09-15 RX ORDER — DEXTROSE MONOHYDRATE 100 MG/ML
INJECTION, SOLUTION INTRAVENOUS
Status: DISPENSED
Start: 2022-09-15 | End: 2022-09-15

## 2022-09-15 RX ADMIN — DEXTROSE MONOHYDRATE 125 ML: 100 INJECTION, SOLUTION INTRAVENOUS at 08:19

## 2022-09-15 RX ADMIN — MEMANTINE 5 MG: 5 TABLET ORAL at 20:31

## 2022-09-15 RX ADMIN — ACETAMINOPHEN 650 MG: 325 TABLET ORAL at 11:52

## 2022-09-15 RX ADMIN — FINASTERIDE 5 MG: 5 TABLET, FILM COATED ORAL at 09:58

## 2022-09-15 RX ADMIN — MEMANTINE 5 MG: 5 TABLET ORAL at 09:58

## 2022-09-15 RX ADMIN — ASPIRIN 81 MG 81 MG: 81 TABLET ORAL at 09:58

## 2022-09-15 RX ADMIN — ENOXAPARIN SODIUM 40 MG: 100 INJECTION SUBCUTANEOUS at 13:00

## 2022-09-15 RX ADMIN — POTASSIUM BICARBONATE 20 MEQ: 782 TABLET, EFFERVESCENT ORAL at 09:58

## 2022-09-15 RX ADMIN — ATORVASTATIN CALCIUM 40 MG: 40 TABLET, FILM COATED ORAL at 09:58

## 2022-09-15 RX ADMIN — DIGOXIN 125 MCG: 125 TABLET ORAL at 09:58

## 2022-09-15 RX ADMIN — SODIUM CHLORIDE, PRESERVATIVE FREE 10 ML: 5 INJECTION INTRAVENOUS at 20:31

## 2022-09-15 RX ADMIN — SODIUM CHLORIDE, PRESERVATIVE FREE 10 ML: 5 INJECTION INTRAVENOUS at 09:57

## 2022-09-15 RX ADMIN — CITALOPRAM HYDROBROMIDE 10 MG: 20 TABLET ORAL at 09:58

## 2022-09-15 RX ADMIN — FLUDROCORTISONE ACETATE 0.2 MG: 0.1 TABLET ORAL at 09:58

## 2022-09-15 RX ADMIN — MIDODRINE HYDROCHLORIDE 10 MG: 5 TABLET ORAL at 13:00

## 2022-09-15 RX ADMIN — METOPROLOL SUCCINATE 12.5 MG: 25 TABLET, EXTENDED RELEASE ORAL at 09:57

## 2022-09-15 ASSESSMENT — PAIN SCALES - GENERAL
PAINLEVEL_OUTOF10: 0
PAINLEVEL_OUTOF10: 9

## 2022-09-15 ASSESSMENT — PAIN DESCRIPTION - ORIENTATION: ORIENTATION: RIGHT

## 2022-09-15 ASSESSMENT — PAIN DESCRIPTION - LOCATION: LOCATION: SHOULDER

## 2022-09-15 ASSESSMENT — PAIN DESCRIPTION - DESCRIPTORS: DESCRIPTORS: ACHING

## 2022-09-15 NOTE — PROGRESS NOTES
Resp 16   Ht 5' 7\" (1.702 m)   Wt 151 lb 10.8 oz (68.8 kg)   SpO2 100%   BMI 23.76 kg/m²     General appearance: No apparent distress, appears stated age, restless  HEENT: Prosthetic right eye  Neck: Supple, with full range of motion. No jugular venous distention. Trachea midline. Respiratory:  Normal respiratory effort. Clear to auscultation, bilaterally without Rales/Wheezes/Rhonchi. Cardiovascular: Irregular rate and rhythm with normal S1/S2 without murmurs, rubs or gallops. Abdomen: Soft, non-tender, non-distended with normal bowel sounds. Musculoskeletal: No clubbing, cyanosis or edema bilaterally. Right shoulder tenderness  Skin: Skin color, texture, turgor normal.  No rashes or lesions. Neurologic:  Neurovascularly intact without any focal sensory/motor deficits. Cranial nerves: II-XII intact, grossly non-focal.  Psychiatric: Alert/confused limited insight  Capillary Refill: Brisk, 3 seconds, normal   Peripheral Pulses: +2 palpable, equal bilaterally       Labs:   Recent Labs     09/13/22  0756 09/14/22  0637   WBC 9.3 7.1   HGB 14.9 14.2   HCT 44.6 42.9    240       Recent Labs     09/13/22  0756 09/14/22  0637 09/15/22  0720    145 142   K 3.9 3.3* 3.8    105 103   CO2 32 27 27   BUN 29* 21* 17   CREATININE 1.6* 1.3 1.1   CALCIUM 9.4 9.9 10.1       No results for input(s): AST, ALT, BILIDIR, BILITOT, ALKPHOS in the last 72 hours. No results for input(s): INR in the last 72 hours. No results for input(s): Kaleen Hope in the last 72 hours. Urinalysis:      Lab Results   Component Value Date/Time    NITRU Negative 09/08/2022 04:59 PM    WBCUA 21-50 04/21/2022 02:20 PM    BACTERIA 1+ 04/21/2022 02:20 PM    RBCUA 3-4 04/21/2022 02:20 PM    BLOODU Negative 09/08/2022 04:59 PM    SPECGRAV 1.020 09/08/2022 04:59 PM    GLUCOSEU Negative 09/08/2022 04:59 PM       Radiology:  XR CHEST PORTABLE   Final Result   Cardiomegaly with mild pulmonary vascular congestion. Assessment/Plan:    Active Hospital Problems    Diagnosis     Orthostatic hypotension [I95.1]      Priority: Medium    Atrial fibrillation, rapid (HCC) [I48.91]      Priority: Medium    Ischemic cardiomyopathy [I25.5]      Priority: Medium    Coronary artery disease involving native heart without angina pectoris [I25.10]      Permanent Atrial fibrillation with rapid ventricular rate    -s/p IV fluid and Cardizem bolus  He was not on metoprolol because of orthostasis and not on anticoagulation because of fall risk  EP consulted-plan for atrioventricular node radiofrequency catheter ablation with upgrade to biventricular pacemaker in am  9/16  Digoxin 125 PO daily, ASA  Start low dose toprol   diltiazem d/c'd  Maintain K and Mg at adequate   Briefly on amio due to uncontrolled rates  Amio not ideal due to not on AC  Metoprolol IV PRN for HR      Orthostatic hypotension  Complicating treatment of afib and HF  Increase fluid intake and liberate salt intake   on Florinef and midodrine, continue-currently blood pressure stable  Cardiac rehab recommended outpatient      Acute on chronic systolic CHF with ejection fraction of 35%   Ischemic cardiomyopathy   Exacerbated by above  Monitor I/Os  No fluid or salt restriction     CAD of native coronary artery   LHC: Two-vessel CAD/ASHD, RHC-upper normal filling pressures. Continue ASA/BB     Hypokalemia - resolved recheck in AM   Hypomagnesemia - resolved, recheck in am     Chronic kidney disease-stage IIIa creatinine 1.2-1.6  Monitor renal fx with BMP daily  Avoid nephrotoxic agents      Hypertension-medications as above, difficult to control with orthostatic hypotension      Hyperlipidemia-statin continued      BPH - continue finasteride      Dementia - continue memantine      Depression - mood is stable, continue celexa     DVT Prophylaxis: Lovenox  Diet: ADULT DIET;  Regular  ADULT ORAL NUTRITION SUPPLEMENT; Breakfast, Dinner; Standard High Calorie/High Protein Oral Supplement  Diet NPO  Code Status: Full Code  PT/OT Eval Status: Consulted    Dispo - poss 9/17    Appropriate for A1 Discharge Unit: Yasmine Farris, GUS - CNP

## 2022-09-15 NOTE — PROGRESS NOTES
[address]     Electrophysiology                                     Progress Note    Admission date:  2022    Reason for follow up visit: AF and orthostatic hypotension     HPI/CC: Sydnie Lanza was admitted on 2022 after being found to be in AF with RVR with heart rates in the 130s-170 bpm and positive orthostatic blood pressure at his cardiology appointment. He was directed to the ED. On 2022 he had a LHC/RHC that showed upper normal filling pressures, two-vessel CAD with slight progression from last LHC in . Medical management recommend due to age and comorbidity. Rhythm has been AF with HR 70s. Subjective: He is seen resting in bed. His right shoulder hurts this morning, heating pack in place. He denies chest pain, palpitations, shortness of breath, and dizziness. Vitals:  Blood pressure 109/73, pulse 73, temperature 97.5 °F (36.4 °C), temperature source Oral, resp. rate 16, height 5' 7\" (1.702 m), weight 151 lb 10.8 oz (68.8 kg), SpO2 100 %.   Temp  Av.6 °F (36.4 °C)  Min: 97.5 °F (36.4 °C)  Max: 97.8 °F (36.6 °C)  Pulse  Av.2  Min: 65  Max: 86  BP  Min: 109/73  Max: 157/106  SpO2  Av.2 %  Min: 95 %  Max: 100 %    24 hour I/O    Intake/Output Summary (Last 24 hours) at 9/15/2022 1218  Last data filed at 9/15/2022 1940  Gross per 24 hour   Intake 1060 ml   Output --   Net 1060 ml       Current Facility-Administered Medications   Medication Dose Route Frequency Provider Last Rate Last Admin    dextrose 10 % infusion             glucose chewable tablet 16 g  4 tablet Oral PRN Marguarite Micheal, APRN - CNP        dextrose bolus 10% 125 mL  125 mL IntraVENous PRN Marguarite Micheal, APRN - CNP   Stopped at 09/15/22 0827    Or    dextrose bolus 10% 250 mL  250 mL IntraVENous PRN Marguarite Micheal, APRN - CNP        glucagon (rDNA) injection 1 mg  1 mg SubCUTAneous PRN Marguarite Micheal, APRN - CNP        dextrose 10 % infusion   IntraVENous Continuous PRN Marguarite Micheal, APRN - CNP enoxaparin (LOVENOX) injection 40 mg  40 mg SubCUTAneous Daily Sarkis Ruiz APRN - CNP        diphenhydrAMINE (BENADRYL) injection 12.5 mg  12.5 mg IntraVENous Q6H PRN Stephens Mcintosh, APRN - CNP   12.5 mg at 09/14/22 0511    potassium bicarb-citric acid (EFFER-K) effervescent tablet 20 mEq  20 mEq Oral Daily Rashel Roblero, GUS - CNP   20 mEq at 09/15/22 8632    sodium chloride flush 0.9 % injection 5-40 mL  5-40 mL IntraVENous 2 times per day GUS Rodriguez - CNP   10 mL at 09/13/22 2030    0.9 % sodium chloride infusion   IntraVENous PRN GUS Rodriguez - CNP        sodium chloride flush 0.9 % injection 5-40 mL  5-40 mL IntraVENous 2 times per day Leandra Payne MD   10 mL at 09/15/22 0957    sodium chloride flush 0.9 % injection 5-40 mL  5-40 mL IntraVENous PRN Leandra Payne MD        0.9 % sodium chloride infusion   IntraVENous PRN Leandra Payne MD        acetaminophen (TYLENOL) tablet 650 mg  650 mg Oral Q4H PRN Leandra Payne MD        hydrALAZINE (APRESOLINE) injection 10 mg  10 mg IntraVENous Q6H PRN Taqueria Cardenas MD   10 mg at 09/13/22 1817    metoprolol succinate (TOPROL XL) extended release tablet 12.5 mg  12.5 mg Oral Daily GUS Rodriguez - CNP   12.5 mg at 09/15/22 0957    digoxin (LANOXIN) tablet 125 mcg  125 mcg Oral Daily MARIAN Tyler MD   125 mcg at 09/15/22 3928    aspirin chewable tablet 81 mg  81 mg Oral Daily Nuno Lundy MD   81 mg at 09/15/22 0958    atorvastatin (LIPITOR) tablet 40 mg  40 mg Oral Daily Nuno Lundy MD   40 mg at 09/15/22 0958    citalopram (CELEXA) tablet 10 mg  10 mg Oral Daily Nuno Lundy MD   10 mg at 09/15/22 0958    finasteride (PROSCAR) tablet 5 mg  5 mg Oral Daily Nuno Lundy MD   5 mg at 09/15/22 0958    fludrocortisone (FLORINEF) tablet 0.2 mg  0.2 mg Oral Daily Nuno Lundy MD   0.2 mg at 09/15/22 0958    memantine (NAMENDA) tablet 5 mg  5 mg Oral BID Nuno Lundy MD   5 mg at 09/15/22 0958    midodrine (PROAMATINE) tablet 10 mg  10 mg Oral TID  Luis Daniel Marques, DO   10 mg at 09/12/22 1756    pantoprazole (PROTONIX) tablet 40 mg  40 mg Oral QAM AC Luli Saenz MD   40 mg at 09/12/22 0514    sodium chloride flush 0.9 % injection 5-40 mL  5-40 mL IntraVENous 2 times per day Luli Saenz MD   10 mL at 09/14/22 2205    sodium chloride flush 0.9 % injection 5-40 mL  5-40 mL IntraVENous PRN Luli Saenz MD        0.9 % sodium chloride infusion   IntraVENous PRN Luli Saenz MD 25 mL/hr at 09/10/22 1258 New Bag at 09/10/22 1258    ondansetron (ZOFRAN-ODT) disintegrating tablet 4 mg  4 mg Oral Q8H PRN Luli Saenz MD        Or    ondansetron (ZOFRAN) injection 4 mg  4 mg IntraVENous Q6H PRN Luli Saenz MD        polyethylene glycol (GLYCOLAX) packet 17 g  17 g Oral Daily PRN Luli Saenz MD        acetaminophen (TYLENOL) tablet 650 mg  650 mg Oral Q6H PRN Luli Saenz MD   650 mg at 09/15/22 1152    Or    acetaminophen (TYLENOL) suppository 650 mg  650 mg Rectal Q6H PRN Luli Saenz MD           Objective:     Telemetry monitor: AF    Physical Exam:  Constitutional and general appearance: alert, cooperative, no distress, and appears stated age  HEENT: PERRL, no cervical lymphadenopathy. No masses palpable. Normal oral mucosa  Respiratory:  Normal excursion and expansion without use of accessory muscles  Resp auscultation: Normal breath sounds without wheezing, rhonchi, and rales  Cardiovascular:   The apical impulse is not displaced  Heart tones are crisp and normal. Irregular S1 and S2.  Jugular venous pulsation Normal  The carotid upstroke is normal in amplitude and contour without delay or bruit  Peripheral pulses are symmetrical and full   Abdomen:  No masses or tenderness  Bowel sounds present  Extremities:   No cyanosis or clubbing   No lower extremity edema   Skin: warm and dry  Right radial site and right brachial site clean and dry. +2 radial pulse Neurological:  Alert, confused. Insight is limited  Moves all extremities well  No abnormalities of mood, affect, or behavior are noted    Data   [facility] 9/13/2022:  FINDINGS      HEMODYNAMICS     CHAMBER PRESSURE SATURATION   RA 5 54%   RV 20/4     PA 21/13 48%   PW 11     AORTA 110/68 85%      TASHA CARDIAC OUTPUT 3.8 L/min   SVR 1630           Left heart catheterization     LVEDP 11   GRADIENT ACROSS AORTIC VALVE None     CORONARY ARTERIES     LM Less than 10% rwvpxqua-xbp-lewfty stenosis. LAD Calcified, proximal 40 to 50% stenosis, mid-distal 60 to 75% stenosis. LCX Calcified, there is proximal-mid 75% eccentric stenosis at a bifurcation with OM1 and OM 2 as well as the AV groove. RCA Dominant, calcified, large vessel, aqkdqixp-acm-phhiyt 40% stenoses. CONCLUSIONS:      Upper normal filling pressures  Two-vessel CAD/ASHD, slight progression since last angiograms from 2020  We will treat medically given advanced age and comorbidities including renal insufficiency  Continue with aspirin statin and beta-blocker    Limited echo 8/10/2022:   Conclusions   Summary   Limited only f/u for LVEF. The left ventricular systolic function is moderately reduced with an   ejection fraction of 35 - 40 %. Left ventricular cavity size is normal.   Hypokinesis of the anterior, septal, and apical walls. Limited echo 4/15/2022:    Summary   Limited echo for LV function with limited doppler/color. LV function is mildly decreased with EF estimated from 45-50%. Mild global hypokinesis. Mild concentric left ventricular hypertrophy. Diastolic dysfunction grade and filling pressure are indeterminate. Severe biatrial enlargement. Aortic valve appears sclerotic but opens adequately. 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 Note no color flow doppler visualized to assess for mitral regurgitation. Mild to moderate tricuspid valve regurgitation.    Systolic pulmonary artery pressure (sPAP) is normal and estimated at 26 mmHg   (RAP 3 mmHg)   Irregular heart rate throughout study. Definity® used for myocardial border enhancement. ECHO: 3/21/2019:  Summary   Normal left ventricular systolic function with an estimated ejection   fraction of 55-60%. No regional wall motion abnormalities are seen. Normal left ventricular diastolic filling pressure. The left atrium is mildly dilated. The right atrium is moderately dilated. Right ventricular systolic function is mildly reduced . The right ventricle is mildly enlarged. The ascending aorta is mildly dilated. 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 Mild mitral regurgitation. Moderate tricuspid regurgitation. Systolic pulmonary artery pressure (SPAP) estimated at 47 mmHg (RA pressure   15 mmHg), consistent with mild pulmonary hypertension. Pacemaker lead in right ventricle. All labs and testing reviewed.   Lab Review     Renal Profile:   Lab Results   Component Value Date/Time    CREATININE 1.1 09/15/2022 07:20 AM    BUN 17 09/15/2022 07:20 AM     09/15/2022 07:20 AM    K 3.8 09/15/2022 07:20 AM     09/15/2022 07:20 AM    CO2 27 09/15/2022 07:20 AM     CBC:    Lab Results   Component Value Date/Time    WBC 7.1 09/14/2022 06:37 AM    RBC 4.76 09/14/2022 06:37 AM    HGB 14.2 09/14/2022 06:37 AM    HCT 42.9 09/14/2022 06:37 AM    MCV 90.3 09/14/2022 06:37 AM    RDW 17.0 09/14/2022 06:37 AM     09/14/2022 06:37 AM     BNP:    Lab Results   Component Value Date/Time    BNP 12.8 01/09/2013 10:00 AM     Fasting Lipid Panel:    Lab Results   Component Value Date/Time    CHOL 137 09/09/2022 05:45 AM    HDL 30 09/09/2022 05:45 AM    TRIG 73 09/09/2022 05:45 AM     Cardiac Enzymes:  CK/MbTroponin  Lab Results   Component Value Date/Time    TROPONINI <0.01 09/08/2022 11:56 PM     PT/ INR   Lab Results   Component Value Date/Time    INR 1.26 01/18/2022 06:05 AM    INR 1.18 05/22/2020 10:19 AM    INR 1.02 01/24/2016 12:35 PM    PROTIME 14.4 01/18/2022 06:05 AM    PROTIME 13.7 05/22/2020 10:19 AM    PROTIME 11.6 01/24/2016 12:35 PM     PTT No results found for: PTT   Lab Results   Component Value Date/Time    MG 1.90 09/13/2022 07:56 AM      Lab Results   Component Value Date/Time    TSH 2.45 10/21/2021 02:04 PM     Assessment:  Persistent atrial fibrillation: ongoing              -[id number] score 3 (age, HTN)              -numerous high ventricular events on device interrogation 9/08/2022  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 Katerina Sheffield. NPO at midnight. Hold morning Lovenox  Continue digoxin 125 mcg PO daily, Toprol 12.5 mg daily, aspirin, and atorvastatin  Digoxin level tomorrow morning with AM labs  4. Not on anticoagulation due to fall risk  5. Will have to allow for permissive HTN while resting given he has ongoing orthostatic hypotension with activity. 6. Will be able to discontinue Toprol following AV node ablation. Ideally we want him on this due to GDMT of cardiomyopathy, however given his orthostatic hypotension and hx of falls, the risks are greater than the benefits. 7. Compression stockings and abdominal binder with ambulation.  Increase fluid intake to 64 oz daily      Bridget Fuentes, APRN-SOLO  [facility], [place], operated [facility]  [phone]

## 2022-09-15 NOTE — PROGRESS NOTES
No orders for PRN glucose. D10 gtt started at 125ml for 8 minutes per pharmacy for critical blood sugar of 49. Gtt started at 0819. Patient alert and able to answer questions at this time. Will continue to monitor.

## 2022-09-15 NOTE — PROGRESS NOTES
D10 gtt stopped. Patient BG 71 at this time. Breakfast tray at bedside. Set patient up to eat and given 1 can of OJ and instructed to drink juice. Will continue to monitor.

## 2022-09-16 ENCOUNTER — APPOINTMENT (OUTPATIENT)
Dept: CARDIAC CATH/INVASIVE PROCEDURES | Age: 87
DRG: 242 | End: 2022-09-16
Payer: MEDICARE

## 2022-09-16 ENCOUNTER — ANESTHESIA (OUTPATIENT)
Dept: CARDIAC CATH/INVASIVE PROCEDURES | Age: 87
DRG: 242 | End: 2022-09-16
Payer: MEDICARE

## 2022-09-16 ENCOUNTER — ANESTHESIA EVENT (OUTPATIENT)
Dept: CARDIAC CATH/INVASIVE PROCEDURES | Age: 87
DRG: 242 | End: 2022-09-16
Payer: MEDICARE

## 2022-09-16 ENCOUNTER — NURSE ONLY (OUTPATIENT)
Dept: CARDIOLOGY CLINIC | Age: 87
End: 2022-09-16

## 2022-09-16 DIAGNOSIS — Z95.0 CARDIAC RESYNCHRONIZATION THERAPY PACEMAKER (CRT-P) IN PLACE: ICD-10-CM

## 2022-09-16 LAB
ANION GAP SERPL CALCULATED.3IONS-SCNC: 8 MMOL/L (ref 3–16)
BASOPHILS ABSOLUTE: 0.1 K/UL (ref 0–0.2)
BASOPHILS RELATIVE PERCENT: 0.6 %
BUN BLDV-MCNC: 26 MG/DL (ref 7–20)
CALCIUM SERPL-MCNC: 9 MG/DL (ref 8.3–10.6)
CHLORIDE BLD-SCNC: 103 MMOL/L (ref 99–110)
CO2: 30 MMOL/L (ref 21–32)
CREAT SERPL-MCNC: 1.1 MG/DL (ref 0.8–1.3)
DIGOXIN LEVEL: 0.9 NG/ML (ref 0.8–2)
EOSINOPHILS ABSOLUTE: 0.3 K/UL (ref 0–0.6)
EOSINOPHILS RELATIVE PERCENT: 4.1 %
GFR AFRICAN AMERICAN: >60
GFR NON-AFRICAN AMERICAN: >60
GLUCOSE BLD-MCNC: 89 MG/DL (ref 70–99)
HCT VFR BLD CALC: 47.1 % (ref 40.5–52.5)
HEMOGLOBIN: 15.1 G/DL (ref 13.5–17.5)
LYMPHOCYTES ABSOLUTE: 2 K/UL (ref 1–5.1)
LYMPHOCYTES RELATIVE PERCENT: 23.7 %
MCH RBC QN AUTO: 29.3 PG (ref 26–34)
MCHC RBC AUTO-ENTMCNC: 32.1 G/DL (ref 31–36)
MCV RBC AUTO: 91.2 FL (ref 80–100)
MONOCYTES ABSOLUTE: 0.8 K/UL (ref 0–1.3)
MONOCYTES RELATIVE PERCENT: 9.9 %
NEUTROPHILS ABSOLUTE: 5.1 K/UL (ref 1.7–7.7)
NEUTROPHILS RELATIVE PERCENT: 61.7 %
PDW BLD-RTO: 17.4 % (ref 12.4–15.4)
PLATELET # BLD: 227 K/UL (ref 135–450)
PMV BLD AUTO: 8.6 FL (ref 5–10.5)
POTASSIUM SERPL-SCNC: 4.1 MMOL/L (ref 3.5–5.1)
RBC # BLD: 5.16 M/UL (ref 4.2–5.9)
SODIUM BLD-SCNC: 141 MMOL/L (ref 136–145)
WBC # BLD: 8.3 K/UL (ref 4–11)

## 2022-09-16 PROCEDURE — C1887 CATHETER, GUIDING: HCPCS

## 2022-09-16 PROCEDURE — C1900 LEAD, CORONARY VENOUS: HCPCS

## 2022-09-16 PROCEDURE — 99024 POSTOP FOLLOW-UP VISIT: CPT | Performed by: INTERNAL MEDICINE

## 2022-09-16 PROCEDURE — 02HL3JZ INSERTION OF PACEMAKER LEAD INTO LEFT VENTRICLE, PERCUTANEOUS APPROACH: ICD-10-PCS | Performed by: INTERNAL MEDICINE

## 2022-09-16 PROCEDURE — 93650 ICAR CATH ABLTJ AV NODE FUNC: CPT

## 2022-09-16 PROCEDURE — 02583ZZ DESTRUCTION OF CONDUCTION MECHANISM, PERCUTANEOUS APPROACH: ICD-10-PCS | Performed by: INTERNAL MEDICINE

## 2022-09-16 PROCEDURE — C1894 INTRO/SHEATH, NON-LASER: HCPCS

## 2022-09-16 PROCEDURE — 6370000000 HC RX 637 (ALT 250 FOR IP): Performed by: INTERNAL MEDICINE

## 2022-09-16 PROCEDURE — 33207 INSERT HEART PM VENTRICULAR: CPT

## 2022-09-16 PROCEDURE — 75820 VEIN X-RAY ARM/LEG: CPT

## 2022-09-16 PROCEDURE — C1732 CATH, EP, DIAG/ABL, 3D/VECT: HCPCS

## 2022-09-16 PROCEDURE — 7100000000 HC PACU RECOVERY - FIRST 15 MIN

## 2022-09-16 PROCEDURE — 36005 INJECTION EXT VENOGRAPHY: CPT

## 2022-09-16 PROCEDURE — 4A0234Z MEASUREMENT OF CARDIAC ELECTRICAL ACTIVITY, PERCUTANEOUS APPROACH: ICD-10-PCS | Performed by: INTERNAL MEDICINE

## 2022-09-16 PROCEDURE — 2580000003 HC RX 258: Performed by: NURSE ANESTHETIST, CERTIFIED REGISTERED

## 2022-09-16 PROCEDURE — 2060000000 HC ICU INTERMEDIATE R&B

## 2022-09-16 PROCEDURE — 93650 ICAR CATH ABLTJ AV NODE FUNC: CPT | Performed by: INTERNAL MEDICINE

## 2022-09-16 PROCEDURE — 80162 ASSAY OF DIGOXIN TOTAL: CPT

## 2022-09-16 PROCEDURE — 02HK3JZ INSERTION OF PACEMAKER LEAD INTO RIGHT VENTRICLE, PERCUTANEOUS APPROACH: ICD-10-PCS | Performed by: INTERNAL MEDICINE

## 2022-09-16 PROCEDURE — 2720000010 HC SURG SUPPLY STERILE

## 2022-09-16 PROCEDURE — 33228 REMV&REPLC PM GEN DUAL LEAD: CPT | Performed by: INTERNAL MEDICINE

## 2022-09-16 PROCEDURE — 0JH606Z INSERTION OF PACEMAKER, DUAL CHAMBER INTO CHEST SUBCUTANEOUS TISSUE AND FASCIA, OPEN APPROACH: ICD-10-PCS | Performed by: INTERNAL MEDICINE

## 2022-09-16 PROCEDURE — 2500000003 HC RX 250 WO HCPCS: Performed by: NURSE ANESTHETIST, CERTIFIED REGISTERED

## 2022-09-16 PROCEDURE — 2709999900 HC NON-CHARGEABLE SUPPLY

## 2022-09-16 PROCEDURE — 6360000002 HC RX W HCPCS: Performed by: NURSE ANESTHETIST, CERTIFIED REGISTERED

## 2022-09-16 PROCEDURE — 33225 L VENTRIC PACING LEAD ADD-ON: CPT | Performed by: INTERNAL MEDICINE

## 2022-09-16 PROCEDURE — 3700000000 HC ANESTHESIA ATTENDED CARE

## 2022-09-16 PROCEDURE — C2621 PMKR, OTHER THAN SING/DUAL: HCPCS

## 2022-09-16 PROCEDURE — 85025 COMPLETE CBC W/AUTO DIFF WBC: CPT

## 2022-09-16 PROCEDURE — 2580000003 HC RX 258: Performed by: INTERNAL MEDICINE

## 2022-09-16 PROCEDURE — C1889 IMPLANT/INSERT DEVICE, NOC: HCPCS

## 2022-09-16 PROCEDURE — 3700000001 HC ADD 15 MINUTES (ANESTHESIA)

## 2022-09-16 PROCEDURE — C1760 CLOSURE DEV, VASC: HCPCS

## 2022-09-16 PROCEDURE — 02PA3MZ REMOVAL OF CARDIAC LEAD FROM HEART, PERCUTANEOUS APPROACH: ICD-10-PCS | Performed by: INTERNAL MEDICINE

## 2022-09-16 PROCEDURE — 7100000001 HC PACU RECOVERY - ADDTL 15 MIN

## 2022-09-16 PROCEDURE — 36415 COLL VENOUS BLD VENIPUNCTURE: CPT

## 2022-09-16 PROCEDURE — C1769 GUIDE WIRE: HCPCS

## 2022-09-16 PROCEDURE — 33225 L VENTRIC PACING LEAD ADD-ON: CPT

## 2022-09-16 PROCEDURE — 80048 BASIC METABOLIC PNL TOTAL CA: CPT

## 2022-09-16 PROCEDURE — 6370000000 HC RX 637 (ALT 250 FOR IP)

## 2022-09-16 PROCEDURE — 0JPT0PZ REMOVAL OF CARDIAC RHYTHM RELATED DEVICE FROM TRUNK SUBCUTANEOUS TISSUE AND FASCIA, OPEN APPROACH: ICD-10-PCS | Performed by: INTERNAL MEDICINE

## 2022-09-16 RX ORDER — SUCCINYLCHOLINE CHLORIDE 20 MG/ML
INJECTION INTRAMUSCULAR; INTRAVENOUS PRN
Status: DISCONTINUED | OUTPATIENT
Start: 2022-09-16 | End: 2022-09-16 | Stop reason: SDUPTHER

## 2022-09-16 RX ORDER — SODIUM CHLORIDE 0.9 % (FLUSH) 0.9 %
5-40 SYRINGE (ML) INJECTION PRN
Status: CANCELLED | OUTPATIENT
Start: 2022-09-16

## 2022-09-16 RX ORDER — HYDROMORPHONE HCL 110MG/55ML
0.25 PATIENT CONTROLLED ANALGESIA SYRINGE INTRAVENOUS
Status: CANCELLED | OUTPATIENT
Start: 2022-09-16

## 2022-09-16 RX ORDER — EPHEDRINE SULFATE 50 MG/ML
INJECTION INTRAVENOUS PRN
Status: DISCONTINUED | OUTPATIENT
Start: 2022-09-16 | End: 2022-09-16 | Stop reason: SDUPTHER

## 2022-09-16 RX ORDER — SODIUM CHLORIDE 9 MG/ML
INJECTION, SOLUTION INTRAVENOUS PRN
Status: DISCONTINUED | OUTPATIENT
Start: 2022-09-16 | End: 2022-09-16 | Stop reason: SDUPTHER

## 2022-09-16 RX ORDER — ONDANSETRON 2 MG/ML
INJECTION INTRAMUSCULAR; INTRAVENOUS PRN
Status: DISCONTINUED | OUTPATIENT
Start: 2022-09-16 | End: 2022-09-16 | Stop reason: SDUPTHER

## 2022-09-16 RX ORDER — PHENYLEPHRINE HCL IN 0.9% NACL 1 MG/10 ML
SYRINGE (ML) INTRAVENOUS PRN
Status: DISCONTINUED | OUTPATIENT
Start: 2022-09-16 | End: 2022-09-16 | Stop reason: SDUPTHER

## 2022-09-16 RX ORDER — OXYCODONE HYDROCHLORIDE 5 MG/1
10 TABLET ORAL EVERY 4 HOURS PRN
Status: CANCELLED | OUTPATIENT
Start: 2022-09-16

## 2022-09-16 RX ORDER — SODIUM CHLORIDE 0.9 % (FLUSH) 0.9 %
5-40 SYRINGE (ML) INJECTION EVERY 12 HOURS SCHEDULED
Status: CANCELLED | OUTPATIENT
Start: 2022-09-16

## 2022-09-16 RX ORDER — SODIUM CHLORIDE 9 MG/ML
INJECTION, SOLUTION INTRAVENOUS PRN
Status: CANCELLED | OUTPATIENT
Start: 2022-09-16

## 2022-09-16 RX ORDER — SODIUM CHLORIDE 0.9 % (FLUSH) 0.9 %
5-40 SYRINGE (ML) INJECTION EVERY 12 HOURS SCHEDULED
Status: DISCONTINUED | OUTPATIENT
Start: 2022-09-16 | End: 2022-09-16 | Stop reason: SDUPTHER

## 2022-09-16 RX ORDER — SODIUM CHLORIDE 0.9 % (FLUSH) 0.9 %
5-40 SYRINGE (ML) INJECTION PRN
Status: DISCONTINUED | OUTPATIENT
Start: 2022-09-16 | End: 2022-09-16 | Stop reason: SDUPTHER

## 2022-09-16 RX ORDER — OXYCODONE HYDROCHLORIDE 5 MG/1
5 TABLET ORAL EVERY 4 HOURS PRN
Status: CANCELLED | OUTPATIENT
Start: 2022-09-16

## 2022-09-16 RX ORDER — FENTANYL CITRATE 50 UG/ML
INJECTION, SOLUTION INTRAMUSCULAR; INTRAVENOUS PRN
Status: DISCONTINUED | OUTPATIENT
Start: 2022-09-16 | End: 2022-09-16 | Stop reason: SDUPTHER

## 2022-09-16 RX ORDER — VANCOMYCIN HYDROCHLORIDE 1 G/20ML
INJECTION, POWDER, LYOPHILIZED, FOR SOLUTION INTRAVENOUS PRN
Status: DISCONTINUED | OUTPATIENT
Start: 2022-09-16 | End: 2022-09-16 | Stop reason: SDUPTHER

## 2022-09-16 RX ORDER — HYDROMORPHONE HCL 110MG/55ML
0.5 PATIENT CONTROLLED ANALGESIA SYRINGE INTRAVENOUS
Status: CANCELLED | OUTPATIENT
Start: 2022-09-16

## 2022-09-16 RX ORDER — PROPOFOL 10 MG/ML
INJECTION, EMULSION INTRAVENOUS PRN
Status: DISCONTINUED | OUTPATIENT
Start: 2022-09-16 | End: 2022-09-16 | Stop reason: SDUPTHER

## 2022-09-16 RX ORDER — SODIUM CHLORIDE 9 MG/ML
INJECTION, SOLUTION INTRAVENOUS CONTINUOUS PRN
Status: DISCONTINUED | OUTPATIENT
Start: 2022-09-16 | End: 2022-09-16 | Stop reason: SDUPTHER

## 2022-09-16 RX ADMIN — Medication 100 MCG: at 13:10

## 2022-09-16 RX ADMIN — Medication 10 ML: at 20:36

## 2022-09-16 RX ADMIN — MEMANTINE 5 MG: 5 TABLET ORAL at 20:36

## 2022-09-16 RX ADMIN — CITALOPRAM HYDROBROMIDE 10 MG: 20 TABLET ORAL at 10:15

## 2022-09-16 RX ADMIN — FENTANYL CITRATE 25 MCG: 50 INJECTION INTRAMUSCULAR; INTRAVENOUS at 15:40

## 2022-09-16 RX ADMIN — PHENYLEPHRINE HYDROCHLORIDE 50 MCG/MIN: 10 INJECTION INTRAVENOUS at 13:39

## 2022-09-16 RX ADMIN — FENTANYL CITRATE 50 MCG: 50 INJECTION INTRAMUSCULAR; INTRAVENOUS at 13:00

## 2022-09-16 RX ADMIN — FINASTERIDE 5 MG: 5 TABLET, FILM COATED ORAL at 10:15

## 2022-09-16 RX ADMIN — PROPOFOL 90 MG: 10 INJECTION, EMULSION INTRAVENOUS at 13:01

## 2022-09-16 RX ADMIN — DIGOXIN 125 MCG: 125 TABLET ORAL at 10:15

## 2022-09-16 RX ADMIN — SODIUM CHLORIDE: 9 INJECTION, SOLUTION INTRAVENOUS at 12:47

## 2022-09-16 RX ADMIN — ATORVASTATIN CALCIUM 40 MG: 40 TABLET, FILM COATED ORAL at 10:15

## 2022-09-16 RX ADMIN — FLUDROCORTISONE ACETATE 0.2 MG: 0.1 TABLET ORAL at 10:16

## 2022-09-16 RX ADMIN — Medication 100 MCG: at 12:32

## 2022-09-16 RX ADMIN — Medication 100 MCG: at 13:13

## 2022-09-16 RX ADMIN — MEMANTINE 5 MG: 5 TABLET ORAL at 10:15

## 2022-09-16 RX ADMIN — SUCCINYLCHOLINE CHLORIDE 140 MG: 20 INJECTION, SOLUTION INTRAMUSCULAR; INTRAVENOUS at 13:01

## 2022-09-16 RX ADMIN — METOPROLOL SUCCINATE 12.5 MG: 25 TABLET, EXTENDED RELEASE ORAL at 10:15

## 2022-09-16 RX ADMIN — FENTANYL CITRATE 25 MCG: 50 INJECTION INTRAMUSCULAR; INTRAVENOUS at 13:22

## 2022-09-16 RX ADMIN — VANCOMYCIN HYDROCHLORIDE 1000 MG: 1 INJECTION, POWDER, LYOPHILIZED, FOR SOLUTION INTRAVENOUS at 13:10

## 2022-09-16 RX ADMIN — ONDANSETRON 4 MG: 2 INJECTION INTRAMUSCULAR; INTRAVENOUS at 13:01

## 2022-09-16 RX ADMIN — LIDOCAINE HYDROCHLORIDE 50 MG: 10 INJECTION, SOLUTION INFILTRATION; PERINEURAL at 13:01

## 2022-09-16 RX ADMIN — ASPIRIN 81 MG 81 MG: 81 TABLET ORAL at 10:15

## 2022-09-16 RX ADMIN — Medication 100 MCG: at 13:01

## 2022-09-16 RX ADMIN — Medication 200 MCG: at 13:37

## 2022-09-16 RX ADMIN — EPHEDRINE SULFATE 10 MG: 50 INJECTION INTRAVENOUS at 13:13

## 2022-09-16 ASSESSMENT — PAIN SCALES - GENERAL
PAINLEVEL_OUTOF10: 0

## 2022-09-16 ASSESSMENT — ENCOUNTER SYMPTOMS: SHORTNESS OF BREATH: 1

## 2022-09-16 NOTE — PLAN OF CARE
Problem: Safety - Adult  Goal: Free from fall injury  Outcome: Progressing     Problem: ABCDS Injury Assessment  Goal: Absence of physical injury  Outcome: Progressing     Problem: Pain  Goal: Verbalizes/displays adequate comfort level or baseline comfort level  Outcome: Progressing     Problem: Skin/Tissue Integrity  Goal: Absence of new skin breakdown  Description: 1. Monitor for areas of redness and/or skin breakdown  2. Assess vascular access sites hourly  3. Every 4-6 hours minimum:  Change oxygen saturation probe site  4. Every 4-6 hours:  If on nasal continuous positive airway pressure, respiratory therapy assess nares and determine need for appliance change or resting period. Outcome: Progressing     Problem: Safety - Medical Restraint  Goal: Remains free of injury from restraints (Restraint for Interference with Medical Device)  Description: INTERVENTIONS:  1. Determine that other, less restrictive measures have been tried or would not be effective before applying the restraint  2. Evaluate the patient's condition at the time of restraint application  3. Inform patient/family regarding the reason for restraint  4.  Q2H: Monitor safety, psychosocial status, comfort, nutrition and hydration  Outcome: Progressing

## 2022-09-16 NOTE — CARE COORDINATION
Spoke with patient's wife at bedside. She states patient will not have 24/7 assist as needed if he goes home. She states her grandson is not very reliable. She would like for patient to discharge to [facility] for a short stay to build up his strength a bit. She states she will be able to take patient in her private car at discharge. Placed call to [facility] with [facility] and notified of the above. She states they will be able to accept at discharge. Patient is active with [facility]. Patient currently in the cath lab for ablation.

## 2022-09-16 NOTE — PROGRESS NOTES
Hospitalist Progress Note      PCP: Bijan Olivo MD    Date of Admission: 9/8/2022    Chief Complaint: Afib RVR    Hospital Course:  82 y.o. male who presented to [facility] with above complaint. He went to cardiology is routine follow-up of following recent hospital admission. In the office they found his heart rate was high and blood pressure was low. He was sent to the emergency room for further evaluation. He received 1 bolus of Cardizem for rapid ventricular rate     Subjective: Resting, NAD, No events overnight or complaints this am      Medications:  Reviewed    Infusion Medications    dextrose      sodium chloride       Scheduled Medications    [Held by provider] enoxaparin  40 mg SubCUTAneous Daily    potassium bicarb-citric acid  20 mEq Oral Daily    metoprolol succinate  12.5 mg Oral Daily    digoxin  125 mcg Oral Daily    aspirin  81 mg Oral Daily    atorvastatin  40 mg Oral Daily    citalopram  10 mg Oral Daily    finasteride  5 mg Oral Daily    fludrocortisone  0.2 mg Oral Daily    memantine  5 mg Oral BID    midodrine  10 mg Oral TID WC    pantoprazole  40 mg Oral QAM AC    sodium chloride flush  5-40 mL IntraVENous 2 times per day     PRN Meds: glucose, dextrose bolus **OR** dextrose bolus, glucagon (rDNA), dextrose, diphenhydrAMINE, sodium chloride, acetaminophen, hydrALAZINE, sodium chloride flush, ondansetron **OR** ondansetron, polyethylene glycol    No intake or output data in the 24 hours ending 09/16/22 1207      Physical Exam Performed:    BP (!) 170/97   Pulse 70   Temp 98 °F (36.7 °C) (Oral)   Resp 16   Ht 5' 7\" (1.702 m)   Wt 149 lb 14.4 oz (68 kg)   SpO2 99%   BMI 23.48 kg/m²     General appearance: No apparent distress, appears stated age, restless  HEENT: Prosthetic right eye  Neck: Supple, with full range of motion. No jugular venous distention. Trachea midline. Respiratory:  Normal respiratory effort.  Clear to auscultation, bilaterally without Rales/Wheezes/Rhonchi. Cardiovascular: Irregular rate and rhythm with normal S1/S2 without murmurs, rubs or gallops. Abdomen: Soft, non-tender, non-distended with normal bowel sounds. Musculoskeletal: No clubbing, cyanosis or edema bilaterally. Right shoulder tenderness  Skin: Skin color, texture, turgor normal.  No rashes or lesions. Neurologic:  Neurovascularly intact without any focal sensory/motor deficits. Cranial nerves: II-XII intact, grossly non-focal.  Psychiatric: Alert/confused limited insight  Capillary Refill: Brisk, 3 seconds, normal   Peripheral Pulses: +2 palpable, equal bilaterally       Labs:   Recent Labs     09/14/22  0637 09/16/22  0730   WBC 7.1 8.3   HGB 14.2 15.1   HCT 42.9 47.1    227       Recent Labs     09/14/22  0637 09/15/22  0720 09/16/22  0730    142 141   K 3.3* 3.8 4.1    103 103   CO2 27 27 30   BUN 21* 17 26*   CREATININE 1.3 1.1 1.1   CALCIUM 9.9 10.1 9.0       No results for input(s): AST, ALT, BILIDIR, BILITOT, ALKPHOS in the last 72 hours. No results for input(s): INR in the last 72 hours. No results for input(s): Rhae Childes in the last 72 hours. Urinalysis:      Lab Results   Component Value Date/Time    NITRU Negative 09/08/2022 04:59 PM    WBCUA 21-50 04/21/2022 02:20 PM    BACTERIA 1+ 04/21/2022 02:20 PM    RBCUA 3-4 04/21/2022 02:20 PM    BLOODU Negative 09/08/2022 04:59 PM    SPECGRAV 1.020 09/08/2022 04:59 PM    GLUCOSEU Negative 09/08/2022 04:59 PM       Radiology:  XR CHEST PORTABLE   Final Result   Cardiomegaly with mild pulmonary vascular congestion.                  Assessment/Plan:    Active Hospital Problems    Diagnosis     Orthostatic hypotension [I95.1]      Priority: Medium    Atrial fibrillation, rapid (HCC) [I48.91]      Priority: Medium    Ischemic cardiomyopathy [I25.5]      Priority: Medium    Coronary artery disease involving native heart without angina pectoris [I25.10]      Permanent Atrial fibrillation with rapid ventricular rate    -s/p IV fluid and Cardizem bolus  He was not on metoprolol because of orthostasis and not on anticoagulation because of fall risk  EP consulted-plan for atrioventricular node radiofrequency catheter ablation with upgrade to biventricular pacemaker 9/16  Digoxin 125 PO daily, ASA  Start low dose toprol   diltiazem d/c'd  Maintain K and Mg at adequate   Briefly on amio due to uncontrolled rates  Amio not ideal due to not on AC  Metoprolol IV PRN for HR      Orthostatic hypotension  Complicating treatment of afib and HF  Increase fluid intake and liberate salt intake   on Florinef and midodrine, continue-currently blood pressure stable  Cardiac rehab recommended outpatient      Acute on chronic systolic CHF with ejection fraction of 35%   Ischemic cardiomyopathy   Exacerbated by above  Monitor I/Os  No fluid or salt restriction     CAD of native coronary artery   LHC: Two-vessel CAD/ASHD, RHC-upper normal filling pressures.    Continue ASA/BB     Hypokalemia - resolved recheck in AM   Hypomagnesemia - resolved, recheck in am     Chronic kidney disease-stage IIIa creatinine 1.2-1.6  Monitor renal fx with BMP daily  Avoid nephrotoxic agents      Hypertension-medications as above, difficult to control with orthostatic hypotension      Hyperlipidemia-statin continued      BPH - continue finasteride      Dementia - continue memantine      Depression - mood is stable, continue celexa     DVT Prophylaxis: Lovenox  Diet: Diet NPO  Code Status: Full Code  PT/OT Eval Status: Consulted    Dispo - poss 9/17    Appropriate for A1 Discharge Unit: No      Lenin Cable, APRN - CNP

## 2022-09-16 NOTE — DISCHARGE INSTRUCTIONS
FOLLOW-UP APPOINTMENTS    [facility] OFFICE - Follow-up appointment on September 27th at 1pm for a device check, [facility]. You and your one visitor will need to have your mouth and nose covered with a mask. No children please. [facility],  [address] [place] [address] [address]. Office #: [phone]. If you are unable to make this appointment, please call to reschedule. [facility] OFFICE - Follow-up appointment on October 20th at 3pm with Juana Pandey CNP, [facility]. You and your one visitor will need to have your mouth and nose covered with a mask. No children please. [facility],  [place] [id number] [address]. Office #: [phone]. If you are unable to make this appointment, please call to reschedule. [facility] OFFICE - Follow-up appointment on December 13th at 10am device check and a follow-up visit with Keagan Quintanilla MD, [facility]. You and your one visitor will need to have your mouth and nose covered with a mask. No children please. [facility],  [address] Box [facility], [address]. Office #: [phone]. If you are unable to make this appointment, please call to reschedule. Directions to [place] [address] towards [place]. [address] [facility] exit. Right off exit. Cross over [employer]. Right on [address] Left into hospital. Follow the signs to the emergency room ( turn left toward the Emergency room). Go right at the first stop sign. Just past the Emergency room at the second stop sign turn right and go up the ramp and park on the top level if possible. Go in the glass doors of the [facility] [place] [facility] on the top level of the garage [address]. As soon as you get in the door turn left and our office is the one with the glass doors.       Pacemaker/ICD Discharge Instructions   You have had a pacemaker/ defibrillator implanted (or inserted) into your body. This small electronic device regulates your hearts electrical system, which helps your heart beat at the right pace. You can probably do almost everything you did before you got your device. See your doctor regularly to help make sure that you stay healthy. 1. Keep the dressing dry and intact over the incision site until the day following the procedure. You may take a tub bath, but do not get the dressing wet. 2. On the third day after the procedure remove the outside dressing and leave the incision open to air. Steri-strips should remain on the incision until the follow-up visit with your physician. Continue to keep this area dry. If you have the white fabric fabric bandage, you may leave this in place. 3. Take your pain medication as prescribed and directed by your physician. 4. Bruising and a small amount of swelling is to be expected. Please call [address] at  [phone] [address])  to report any unusual amount of drainage, odor, or fever over 100 degrees Farenheit. 5. Call during office hours (Monday through Friday 7:30-5:00) to scheduling office at [phone] [address])  to arrange your follow-up visit. The appointment should occur 7-10 days following the procedure. Generally, you will be seen in the Pacemaker clinic during this visit. Activity:   * Avoid lifting objects heavier than 10 pounds for one month on the side of the device. * Do not raise the arm on the side the device was implanted over your head for one month. * No driving for one week until initial visit after your procedure. * Sling and swathe to affected arm for at least 24 hours. These rules help to heal the implant site and stabilize the heart lead wires. Wound Care:   * Do not shower for seven days. Do not submerge your incision in any water for seven days (i.e. Tub bath, swimming pool). * Keep the incision dry and clean.    * Remove the clear plastic dressing no later than three days after your surgery. Do not remove the thin pieces of tape. They will be removed at the office visit. If they fall off naturally do not reapply. * Avoid tight clothes over the incision. Do not rub or twist the pacer/ICD. Carry your card:   * Carry your temporary pacer/ICD card with you until your permanent card arrives in four to six weeks. An ID card should always be with you. Other precautions:   * Notify your doctor, dentist, or any other healthcare provider that you have a pacemaker or ICD before you receive any treatment. * Carry an ID card that contains information about your pacemaker. You may need to show this card to security personnel if your pacemaker/ICD sets off a metal detector. * Keep your cellular phone away from your pacemaker/ICD. Dont carry the phone in your shirt pocket, even when its turned off. * Avoid strong magnets, such as those used in an MRI or in hand-held security wands. Unless your pacemaker is compatible with MRI   * Avoid strong electrical fields such as those made by radio transmitting towers, ham radios, and heavy-duty electrical equipment. * Avoid leaning over the open miles of a running car. A running engine creates an electrical field. What's Ok:   * [employer] and other appliances that are in good repair. * Computers, hair dryers, power tools, radios, televisions, stereos, electric blankets, vacuum , heating pads, and cars are all okay to use. Call your doctor if you have:   * Increase swelling and/or tenderness in incision. * Redness of incision or drainage from incision. * If a suture works its way through the incision. * Fever, unexplained temperature greater than 100 degrees. * If you receive a shock or hear a tone from the device call the [facility] numbers listed above to schedule an appointment in the device clinic. * Heart failure: Unusual swelling of lower legs/feet, chest discomfort, unusual weakness or fatigue.

## 2022-09-16 NOTE — ANESTHESIA POSTPROCEDURE EVALUATION
Department of Anesthesiology  Postprocedure Note    Patient: Sheree Vieira  MRN: [id number]  YOB: 1935  Date of evaluation: 9/16/2022      Procedure Summary     Date: 09/16/22 Room / Location: [address] [place] [address] Cardiac Cath Lab    Anesthesia Start: 4292 Anesthesia Stop:     Procedure: ABLATION Diagnosis:     Scheduled Providers:  Responsible Provider: Tanya Yanez MD    Anesthesia Type: general ASA Status: 3          Anesthesia Type: No value filed.     Greg Phase I:      Greg Phase II:        Anesthesia Post Evaluation    Patient location during evaluation: PACU  Patient participation: complete - patient participated  Level of consciousness: awake and alert  Airway patency: patent  Nausea & Vomiting: no nausea and no vomiting  Complications: no  Cardiovascular status: hemodynamically stable  Respiratory status: acceptable, room air and spontaneous ventilation  Hydration status: stable  Comments: --------------------            09/16/22               1633     --------------------   BP:                  Pulse:      91       Resp:       15       Temp:                SpO2:      93%      --------------------

## 2022-09-16 NOTE — ANESTHESIA PRE PROCEDURE
Department of Anesthesiology  Preprocedure Note       Name:  Donna Mccartney   Age:  82 y.o.  :  1935                                          MRN:  [id number]         Date:  2022      Surgeon: * No surgeons listed *    Procedure: * No procedures listed *    Medications prior to admission:   Prior to Admission medications    Medication Sig Start Date End Date Taking? Authorizing Provider   polyethylene glycol (GLYCOLAX) 17 g packet Take by mouth 22  Yes Historical Provider, MD   Acetaminophen (TYLENOL) 325 MG CAPS Take 650 mg by mouth 22  Yes Historical Provider, MD   diphenhydrAMINE (BENADRYL) 25 MG tablet Take 25 mg by mouth 22  Yes Historical Provider, MD   ondansetron (ZOFRAN) 4 MG tablet Take 1 tablet by mouth 22  Yes Historical Provider, MD   pantoprazole (PROTONIX) 40 MG tablet Take 1 tablet by mouth 22  Yes Historical Provider, MD   oxybutynin (DITROPAN-XL) 10 MG extended release tablet  6/10/22   Historical Provider, MD   atorvastatin (LIPITOR) 40 MG tablet Take 1 tablet by mouth daily 22   Tanya Grubbs MD   fludrocortisone (FLORINEF) 0.1 MG tablet Take 2 tablets by mouth daily 22  Tanya Grubbs MD   midodrine (PROAMATINE) 2.5 MG tablet Take 4 tablets by mouth 3 times daily 22   Tanya Grubbs MD   trospium (SANCTURA) 20 MG tablet Take 20 mg by mouth in the morning and 20 mg before bedtime.   22  Historical Provider, MD   apixaban (ELIQUIS) 5 MG TABS tablet TAKE 1/2 TABLET BY MOUTH 2 TIMES DAILY 22  Jena Claude Meza MD   metoprolol succinate (TOPROL XL) 25 MG extended release tablet TAKE 3 TABLETS BY MOUTH TWICE DAILY  Patient taking differently: Take 25 mg by mouth in the morning and 25 mg in the evening. 22  GUS Hitchcock CNP   QUEtiapine (SEROQUEL) 50 MG tablet Take 1 tablet by mouth nightly 22  Jean Claude Meza MD   famotidine (PEPCID) 20 MG tablet Take 1 tablet by mouth at bedtime 7/8/22 8/26/22  Melodie Sanford MD   isosorbide mononitrate (IMDUR) 30 MG extended release tablet TAKE 1 TABLET BY MOUTH DAILY 6/1/22 8/26/22  Vale Yepez MD   memantine [facility]) 5 MG tablet Take 1 tablet by mouth 2 times daily 5/20/22   Melodie Sanford MD   donepezil (ARICEPT) 10 MG tablet Take 1 tablet by mouth daily 3/24/22 8/26/22  Historical Provider, MD   citalopram (CELEXA) 10 MG tablet TAKE 1 TABLET BY MOUTH DAILY 3/28/22   Melodie Sanford MD   fluticasone Earnest Curie) 50 MCG/ACT nasal spray 2 sprays by Nasal route daily 2/1/22   Melodie Sanford MD   Ferrous Sulfate (IRON) 325 (65 Fe) MG TABS Take by mouth daily     Historical Provider, MD   tamsulosin (FLOMAX) 0.4 MG capsule Take 0.4 mg by mouth daily  8/26/22  Historical Provider, MD   finasteride (PROSCAR) 5 MG tablet Take 5 mg by mouth daily. 3/4/15   Historical Provider, MD   aspirin 81 MG tablet Take 81 mg by mouth daily.       Historical Provider, MD       Current medications:    Current Facility-Administered Medications   Medication Dose Route Frequency Provider Last Rate Last Admin    glucose chewable tablet 16 g  4 tablet Oral PRN Tricia Che APRN - CNP        dextrose bolus 10% 125 mL  125 mL IntraVENous PRN Tricia Che APRN - CNP   Stopped at 09/15/22 0827    Or    dextrose bolus 10% 250 mL  250 mL IntraVENous PRN Tricia Che APRN - CNP        glucagon (rDNA) injection 1 mg  1 mg SubCUTAneous PRN Tricia Che APRN - CNP        dextrose 10 % infusion   IntraVENous Continuous PRN Tricia Che APRN - CNP        [Held by provider] enoxaparin (LOVENOX) injection 40 mg  40 mg SubCUTAneous Daily SumitGUS Robert - CNP   40 mg at 09/15/22 1300    diphenhydrAMINE (BENADRYL) injection 12.5 mg  12.5 mg IntraVENous Q6H PRN GUS Servin - CNP   12.5 mg at 09/14/22 0511    potassium bicarb-citric acid (EFFER-K) effervescent tablet 20 mEq  20 mEq Oral Daily Tricia Che APRN - CNP   20 mEq at 09/15/22 0958    0.9 % sodium chloride infusion   IntraVENous PRN Dalton Mesa MD        acetaminophen (TYLENOL) tablet 650 mg  650 mg Oral Q4H PRN Dalton Mesa MD        hydrALAZINE (APRESOLINE) injection 10 mg  10 mg IntraVENous Q6H PRN Taqueria Cardenas MD   10 mg at 09/13/22 1817    metoprolol succinate (TOPROL XL) extended release tablet 12.5 mg  12.5 mg Oral Daily GUS Swan - CNP   12.5 mg at 09/16/22 1015    digoxin (LANOXIN) tablet 125 mcg  125 mcg Oral Daily MARIAN Galeano MD   125 mcg at 09/16/22 1015    aspirin chewable tablet 81 mg  81 mg Oral Daily Glenny Palomino MD   81 mg at 09/16/22 1015    atorvastatin (LIPITOR) tablet 40 mg  40 mg Oral Daily Glenny Palomino MD   40 mg at 09/16/22 1015    citalopram (CELEXA) tablet 10 mg  10 mg Oral Daily Glenny Palomino MD   10 mg at 09/16/22 1015    finasteride (PROSCAR) tablet 5 mg  5 mg Oral Daily Glenny Palomino MD   5 mg at 09/16/22 1015    fludrocortisone (FLORINEF) tablet 0.2 mg  0.2 mg Oral Daily Glenny Palomino MD   0.2 mg at 09/16/22 1016    memantine (NAMENDA) tablet 5 mg  5 mg Oral BID Glenny Palomino MD   5 mg at 09/16/22 1015    midodrine (PROAMATINE) tablet 10 mg  10 mg Oral TID  Luis Daniel [facility], DO   10 mg at 09/15/22 1300    pantoprazole (PROTONIX) tablet 40 mg  40 mg Oral QAM AC Glenny Palomino MD   40 mg at 09/12/22 0514    sodium chloride flush 0.9 % injection 5-40 mL  5-40 mL IntraVENous 2 times per day Glenny Palomino MD   10 mL at 09/14/22 2205    sodium chloride flush 0.9 % injection 5-40 mL  5-40 mL IntraVENous PRN Glenny Palomino MD        ondansetron (ZOFRAN-ODT) disintegrating tablet 4 mg  4 mg Oral Q8H PRN Glenny Palomino MD        Or    ondansetron (ZOFRAN) injection 4 mg  4 mg IntraVENous Q6H PRN Glenny Palomino MD        polyethylene glycol (GLYCOLAX) packet 17 g  17 g Oral Daily PRN Glenny Palomino MD           Allergies:     Allergies   Allergen Reactions    Sulfa Antibiotics        Problem List: Counseling given: Not Answered  Tobacco comments: Quit 40 years ago      Vital Signs (Current):   Vitals:    09/15/22 2000 09/15/22 2342 09/16/22 0516 09/16/22 0745   BP: 114/81 122/84 (!) 152/101 (!) 170/97   Pulse: 72 72 72 70   Resp: 20 20 18 16   Temp: 97.7 °F (36.5 °C) 97.9 °F (36.6 °C) 97.9 °F (36.6 °C) 98 °F (36.7 °C)   TempSrc: Oral Oral Oral Oral   SpO2: 96% 99% 100% 99%   Weight:   149 lb 14.4 oz (68 kg)    Height:                                                  BP Readings from Last 3 Encounters:   09/16/22 (!) 170/97   09/08/22 110/60   08/27/22 125/75       NPO Status:                                                                                 BMI:   Wt Readings from Last 3 Encounters:   09/16/22 149 lb 14.4 oz (68 kg)   09/08/22 156 lb 6.4 oz (70.9 kg)   08/26/22 154 lb 9.6 oz (70.1 kg)     Body mass index is 23.48 kg/m².     CBC:   Lab Results   Component Value Date/Time    WBC 8.3 09/16/2022 07:30 AM    RBC 5.16 09/16/2022 07:30 AM    HGB 15.1 09/16/2022 07:30 AM    HCT 47.1 09/16/2022 07:30 AM    MCV 91.2 09/16/2022 07:30 AM    RDW 17.4 09/16/2022 07:30 AM     09/16/2022 07:30 AM       CMP:   Lab Results   Component Value Date/Time     09/16/2022 07:30 AM    K 4.1 09/16/2022 07:30 AM    K 3.8 09/15/2022 07:20 AM     09/16/2022 07:30 AM    CO2 30 09/16/2022 07:30 AM    BUN 26 09/16/2022 07:30 AM    CREATININE 1.1 09/16/2022 07:30 AM    GFRAA >60 09/16/2022 07:30 AM    GFRAA >60 01/09/2013 10:00 AM    AGRATIO 1.3 09/08/2022 02:56 PM    LABGLOM >60 09/16/2022 07:30 AM    GLUCOSE 89 09/16/2022 07:30 AM    PROT 7.0 09/08/2022 02:56 PM    PROT 7.6 01/09/2013 10:00 AM    CALCIUM 9.0 09/16/2022 07:30 AM    BILITOT 1.4 09/08/2022 02:56 PM    ALKPHOS 93 09/08/2022 02:56 PM    AST 19 09/08/2022 02:56 PM    ALT 22 09/08/2022 02:56 PM       POC Tests:   Recent Labs     09/15/22  2030   POCGLU 144*       Coags:   Lab Results   Component Value Date/Time    PROTIME 14.4 01/18/2022 06:05 AM    INR 1.26 01/18/2022 06:05 AM       HCG (If Applicable): No results found for: PREGTESTUR, PREGSERUM, HCG, HCGQUANT     ABGs: No results found for: PHART, PO2ART, CHI0IXQ, AFP4IWG, BEART, T3JKKABI     Type & Screen (If Applicable):  No results found for: LABABO, LABRH    Drug/Infectious Status (If Applicable):  No results found for: HIV, HEPCAB    COVID-19 Screening (If Applicable):   Lab Results   Component Value Date/Time    COVID19 Not Detected 08/14/2022 01:48 PM    COVID19 NEGATIVE 08/09/2022 01:24 PM           Anesthesia Evaluation  Patient summary reviewed no history of anesthetic complications:   Airway: Mallampati: III  TM distance: >3 FB   Neck ROM: full  Mouth opening: > = 3 FB   Dental:    (+) upper dentures      Pulmonary:normal exam  breath sounds clear to auscultation  (+) shortness of breath:                             Cardiovascular:    (+) hypertension:, CAD:, dysrhythmias: atrial fibrillation,         Rhythm: irregular  Rate: normal                    Neuro/Psych:   (+) dementia            GI/Hepatic/Renal: Neg GI/Hepatic/Renal ROS            Endo/Other: Negative Endo/Other ROS                    Abdominal:             Vascular: Other Findings:           Anesthesia Plan      general     ASA 3     (Consented for MAC or general as needed)  Induction: intravenous. Anesthetic plan and risks discussed with patient. Plan discussed with CRNA.     Attending anesthesiologist reviewed and agrees with Preprocedure content                Mark Esquivel MD   9/16/2022

## 2022-09-17 ENCOUNTER — APPOINTMENT (OUTPATIENT)
Dept: GENERAL RADIOLOGY | Age: 87
DRG: 242 | End: 2022-09-17
Payer: MEDICARE

## 2022-09-17 LAB
ANION GAP SERPL CALCULATED.3IONS-SCNC: 12 MMOL/L (ref 3–16)
BUN BLDV-MCNC: 17 MG/DL (ref 7–20)
CALCIUM SERPL-MCNC: 9.6 MG/DL (ref 8.3–10.6)
CHLORIDE BLD-SCNC: 103 MMOL/L (ref 99–110)
CO2: 25 MMOL/L (ref 21–32)
CREAT SERPL-MCNC: 1.1 MG/DL (ref 0.8–1.3)
EKG ATRIAL RATE: 81 BPM
EKG DIAGNOSIS: NORMAL
EKG P AXIS: 46 DEGREES
EKG Q-T INTERVAL: 410 MS
EKG QRS DURATION: 130 MS
EKG QTC CALCULATION (BAZETT): 472 MS
EKG R AXIS: 130 DEGREES
EKG T AXIS: 94 DEGREES
EKG VENTRICULAR RATE: 80 BPM
GFR AFRICAN AMERICAN: >60
GFR NON-AFRICAN AMERICAN: >60
GLUCOSE BLD-MCNC: 152 MG/DL (ref 70–99)
GLUCOSE BLD-MCNC: 84 MG/DL (ref 70–99)
GLUCOSE BLD-MCNC: 86 MG/DL (ref 70–99)
GLUCOSE BLD-MCNC: 96 MG/DL (ref 70–99)
PERFORMED ON: ABNORMAL
PERFORMED ON: NORMAL
PERFORMED ON: NORMAL
POTASSIUM REFLEX MAGNESIUM: 4.2 MMOL/L (ref 3.5–5.1)
PRO-BNP: 4946 PG/ML (ref 0–449)
SODIUM BLD-SCNC: 140 MMOL/L (ref 136–145)

## 2022-09-17 PROCEDURE — 2580000003 HC RX 258: Performed by: INTERNAL MEDICINE

## 2022-09-17 PROCEDURE — 80048 BASIC METABOLIC PNL TOTAL CA: CPT

## 2022-09-17 PROCEDURE — 6370000000 HC RX 637 (ALT 250 FOR IP): Performed by: INTERNAL MEDICINE

## 2022-09-17 PROCEDURE — 6370000000 HC RX 637 (ALT 250 FOR IP): Performed by: NURSE PRACTITIONER

## 2022-09-17 PROCEDURE — 2060000000 HC ICU INTERMEDIATE R&B

## 2022-09-17 PROCEDURE — 99233 SBSQ HOSP IP/OBS HIGH 50: CPT | Performed by: NURSE PRACTITIONER

## 2022-09-17 PROCEDURE — 71045 X-RAY EXAM CHEST 1 VIEW: CPT

## 2022-09-17 PROCEDURE — 36415 COLL VENOUS BLD VENIPUNCTURE: CPT

## 2022-09-17 PROCEDURE — 93005 ELECTROCARDIOGRAM TRACING: CPT | Performed by: INTERNAL MEDICINE

## 2022-09-17 PROCEDURE — 83880 ASSAY OF NATRIURETIC PEPTIDE: CPT

## 2022-09-17 PROCEDURE — 93010 ELECTROCARDIOGRAM REPORT: CPT | Performed by: INTERNAL MEDICINE

## 2022-09-17 RX ADMIN — ATORVASTATIN CALCIUM 40 MG: 40 TABLET, FILM COATED ORAL at 08:36

## 2022-09-17 RX ADMIN — MEMANTINE 5 MG: 5 TABLET ORAL at 08:36

## 2022-09-17 RX ADMIN — POTASSIUM BICARBONATE 20 MEQ: 782 TABLET, EFFERVESCENT ORAL at 08:36

## 2022-09-17 RX ADMIN — ASPIRIN 81 MG 81 MG: 81 TABLET ORAL at 08:37

## 2022-09-17 RX ADMIN — Medication 10 ML: at 08:36

## 2022-09-17 RX ADMIN — PANTOPRAZOLE SODIUM 40 MG: 40 TABLET, DELAYED RELEASE ORAL at 08:36

## 2022-09-17 RX ADMIN — ACETAMINOPHEN 650 MG: 325 TABLET ORAL at 21:41

## 2022-09-17 RX ADMIN — MEMANTINE 5 MG: 5 TABLET ORAL at 21:41

## 2022-09-17 RX ADMIN — Medication 10 ML: at 21:41

## 2022-09-17 RX ADMIN — FLUDROCORTISONE ACETATE 0.2 MG: 0.1 TABLET ORAL at 08:36

## 2022-09-17 RX ADMIN — CITALOPRAM HYDROBROMIDE 10 MG: 20 TABLET ORAL at 08:37

## 2022-09-17 RX ADMIN — FINASTERIDE 5 MG: 5 TABLET, FILM COATED ORAL at 08:36

## 2022-09-17 RX ADMIN — MIDODRINE HYDROCHLORIDE 10 MG: 5 TABLET ORAL at 08:37

## 2022-09-17 ASSESSMENT — PAIN SCALES - GENERAL: PAINLEVEL_OUTOF10: 8

## 2022-09-17 NOTE — FLOWSHEET NOTE
09/16/22 2030   Vital Signs   Temp 97.7 °F (36.5 °C)   Temp Source Oral   Heart Rate 90   Heart Rate Source Monitor   Resp 18   BP (!) 146/96   BP Location Right upper arm   BP Method Automatic   MAP (Calculated) 112.67   Patient Position Semi fowlers   Level of Consciousness 0   MEWS Score 1   Pain Assessment   Pain Assessment None - Denies Pain   Opioid-Induced Sedation   POSS Score 1   RASS   Telles Agitation Sedation Scale (RASS) 0   Oxygen Therapy   SpO2 100 %   Pulse Oximetry Type Intermittent   Pulse Oximeter Device Mode Intermittent   Pulse Oximeter Device Location Finger   O2 Device None (Room air)   Patient Observation   Observations Resting in bed   Patient Alert, resting in bed, L sling present on arm. Avasys active. All needs addressed.

## 2022-09-17 NOTE — PROCEDURES
Electrophysiology Procedure Report    Attending MD Castro Sorto MD    Procedures  Upgrade of single chamber pacemaker to biventricular pacemaker  Occlusive coronary sinus venogram    Indication  Permanent atrial fibrillation. Complication  None  EBL   <5cc    Conclusion  Successful biventricular pacemaker upgrade. Description of Procedure  The patient was brought to the electrophysiology laboratory in the fasting state after informed consent was obtained. He was placed in the supine position and the right pectoral area was prepared and draped in a sterile fashion. The electrocardiogram, blood pressure, and oxygenation were monitored intra- and post-procedure. Intravenous antibiotic was given prior to the procedure for general antibiotic prophylaxis. Sedation was administered by anesthesia department. Venogram was performed. Axillary vein was much higher than leads and widely patent. After using buffered lidocaine 1% with epinephrine (1/100,000) for local anesthesia to the left pectoral subcutaneous area, a 4-5 cm incision was made over the existing scar with care not to damage the underlying leads. The subcutaneous tissues were dissected and the existing device was removed from the pocket. Venous access was then obtained from within the picket in the left axillary vein x1 using the modified Seldinger technique. A 10.5 F sheath was inserted into the left axillary vein over a J-wire. A long sheath was then inserted through the short sheath and the coronary sinus was accessed. A coronary sinus venogram was performed to identify the target vein in the left ventricular anterolateral wall. The left ventricular lead was then inserted via the long sheath into a left lateral wall vein. Once appropriate placement was obtained and threshold measurements made to the 's specifications, the lead was anchored to the pectoralis fascia using 2-0 silk.   After hemostasis was assured, the pulse generator was connected to the atrial and ventricular leads and appropriate lead pacing/impedance was confirmed. The device was placed into the pocket with care to ensure the leads were positioned underneath the device, and the pocket was irrigated with antibiotic solution. The fascia was closed using interrupted 3-0 Vicryl suture. The subcutaneous tissue and skin were approximated using running 3-0 Vicryl sutures, and the wound was sealed with Steri-Strips. A sterile dressing was applied to the site. He tolerated the procedure well and there were no complications. At the conclusion of the procedure, all sponges and sharps were accounted for by two counts. The attending physician, Francisco Javier Guerrero, was present for the entire procedure and for interpretation of data. Device and Lead Information  Pulse Generator Model #  Manfacturer  Serial #  Location    [id number] Schwarz [id number] Left pectoral, subcutaneous    [id number] [employer]  [id number] Explant (10/13/2017)        Lead  Model Number  Manfacturer  Serial Number  Lead position    RV [id number] [place]  [id number] Chronic, RV apex    LV 1456 Roe [id number] LV Anterolateral branch     Lead Sensing and Thresholds  Lead  R/P sensing (mV)  Threshold (V)  Threshold PW (msec)  Impedance (?)  Final Voltage (V)  Final PW (msec)    RV  - 1.0 1.0 660 3.5 0.4   LV - 1.25 1.0 380 3.5 0.4     Bradycardia Settings  Evaristo Mode  LRL  URL  Pace AVD (ms)  Sense AVD (ms)  Mode Switching  Mode SW Rate    VVIR 90 - - - - -     Proceduralist Notes:  - Successful BiV upgrade.  - CXR in am.  - Doxycycline x 5.  - TyRx pouch used.

## 2022-09-17 NOTE — PLAN OF CARE
Patient's EF (Ejection Fraction) is less than 40%    Heart Failure Medications:  Diuretics[de-identified] None    (One of the following REQUIRED for EF </= 40%/SYSTOLIC FAILURE but MAY be used in EF% >40%/DIASTOLIC FAILURE)        ACE[de-identified] None        ARB[de-identified] None         ARNI[de-identified] None    (Beta Blockers)  NON- Evidenced Based Beta Blocker (for EF% >40%/DIASTOLIC FAILURE): None    Evidenced Based Beta Blocker::(REQUIRED for EF% <40%/SYSTOLIC FAILURE) None  . .................................................................................................................................................. Patient's weights and intake/output reviewed: Yes    Patient's Last Weight: 149 lbs obtained by standing scale. Difference of 2 lbs less than last documented weight. Intake/Output Summary (Last 24 hours) at 9/17/2022 1110  Last data filed at 9/17/2022 1042  Gross per 24 hour   Intake 120 ml   Output 400 ml   Net -280 ml       Comorbidities Reviewed Yes    Patient has a past medical history of Atrial fibrillation ([address] Blind right eye, Chronic kidney disease, Dementia ([address] Hyperlipidemia, Hypertension, and Pneumonia. >>For CHF and Comorbidity documentation on Education Time and Topics, please see Education Tab    Progressive Mobility Assessment:  What is this patient's Current Level of Mobility?: Ambulatory- with Assistance  How was this patient Mobilized today?: Edge of Bed,                 With Whom? Nurse, PCA, and Self                 Level of Difficulty/Assistance: 1x Assist     Pt sitting in bed at this time on room air. Pt denies shortness of breath. Pt with nonpitting lower extremity edema.      Patient and/or Family's stated Goal of Care this Admission: reduce shortness of breath, increase activity tolerance, better understand heart failure and disease management, be more comfortable, and reduce lower extremity edema prior to discharge        :

## 2022-09-17 NOTE — PROCEDURES
Electrophysiology Procedure Report     Attending Ana Truong MD     Procedures AV node ablation    His bundle recording    Pacemaker/Defibrillator interrogation/reprogramming    Indication Permanent atrial fibrillation. Description of Procedure   After the risks and benefits of the procedure were explained and informed consent was obtained, the patient was brought to the electrophysiology lab in the fasting state. Her pacemaker was interrogated to verify pacing and sensing thresholds, lead integrity, and sensed atrial or ventricular episodes, then was reprogrammed to VVI mode with rate 40 bpm.  The right groin was prepared and draped in a sterile fashion. Conscious sedation was delivered using midazolam and fentanyl in divided doses. Heart rate, blood pressure and oxygenation were monitored throughout the procedure and in the post-procedure period. Using buffered lidocaine for local anesthesia, an 8 F venous sheath was inserted into the right femoral vein via the modified Seldinger technique. A 7 F, 8 mm tip ablation catheter was inserted and advanced to the region of the His bundle. Surface ECG leads and intracardiac electrograms were recorded. Baseline measurements were obtained. The catheter was then positioned in the region of the AV node, and ablation of the AV node was performed with complete AV block and the appearance of an accelerated junctional rhythm within seconds of the start of ablation. Ablation was continued for approximately 2 minutes. An infusion of isoproteronol was started at 5 mcg/min and continued for 30 minutes without return of AV conduction. The catheter was removed, and hemostasis was obtained using Vascade hemostasis system. A sterile dressing was applied to the access site. Her pacemaker was reprogrammed with the settings below prior to leaving the EP lab. She tolerated the procedure well and there were no complications.     Dr. Ana Truong MD, the attending physician, was present throughout the entire procedure and for interpretation of the data. Complications  None  EBL  <5cc    RESULTS    ECG   Baseline   Atrial fibrillation with RVR. Post ablation   Ventricular paced rhythm. Impression Successful radio frequency ablation of the AV node  Pacemaker reprogramming to 90 bpm with fixed AV delays with plan to decrease to 80 bpm tomorrow. Decrease baseline heart rate to 60 in 4-6 weeks.

## 2022-09-17 NOTE — PROGRESS NOTES
Electrophysiology Progress Note     Admit Date: 2022     Reason for follow up: Atrial fibrillation    Interval History:   - Patient seen and examined. - Clinical notes reviewed. - Telemetry reviewed. - No new complaints today. - No major events overnight.   - Denies having chest pain, shortness of breath, dyspnea on exertion, Orthopnea, PND at the time of this visit. Physical Examination:  Vitals:    22   BP: (!) 146/96   Pulse: 90   Resp: 18   Temp: 97.7 °F (36.5 °C)   SpO2: 100%        Intake/Output Summary (Last 24 hours) at 2022 2325  Last data filed at 2022 2200  Gross per 24 hour   Intake --   Output 400 ml   Net -400 ml     In: -   Out: 400    Wt Readings from Last 3 Encounters:   22 149 lb 14.4 oz (68 kg)   22 156 lb 6.4 oz (70.9 kg)   22 154 lb 9.6 oz (70.1 kg)     Temp  Av.7 °F (36.5 °C)  Min: 97 °F (36.1 °C)  Max: 98 °F (36.7 °C)  Pulse  Av.6  Min: 70  Max: 91  BP  Min: 108/71  Max: 170/97  SpO2  Av.2 %  Min: 93 %  Max: 100 %    Telemetry: Atrial fibrillation with RVR. Constitutional: Alert. Oriented to person, place, and time. No distress. Cardiovascular: Normal rate, regular rhythm. Normal S1&S2. Pulmonary/Chest: Bilateral respiratory sounds present. No respiratory accessory muscle use. Abdominal: Soft. Normal bowel sounds present. No distension, No tenderness. No splenomegaly. No hernia. Musculoskeletal: No tenderness. No edema    Neurological: Alert and oriented. Cranial nerve II-XII grossly intact. Skin: Skin is warm and dry. No rash, lesions, ulcerations noted. Psychiatric: No anxiety nor agitation. Labs, diagnostic and imaging results reviewed. Reviewed.    Recent Labs     22  0637 09/15/22  0720 22  0730    142 141   K 3.3* 3.8 4.1    103 103   CO2 27 27 30   BUN 21* 17 26*   CREATININE 1.3 1.1 1.1     Recent Labs     22  0637 22  0730   WBC 7.1 8.3   HGB 14.2 15.1   HCT 42.9 47.1   MCV 90.3 91.2    227     Lab Results   Component Value Date/Time    TROPONINI <0.01 09/08/2022 11:56 PM     Estimated Creatinine Clearance: 47 mL/min (based on SCr of 1.1 mg/dL).    Lab Results   Component Value Date/Time    BNP 12.8 01/09/2013 10:00 AM     Lab Results   Component Value Date/Time    PROTIME 14.4 01/18/2022 06:05 AM    PROTIME 13.7 05/22/2020 10:19 AM    PROTIME 11.6 01/24/2016 12:35 PM    INR 1.26 01/18/2022 06:05 AM    INR 1.18 05/22/2020 10:19 AM    INR 1.02 01/24/2016 12:35 PM     Lab Results   Component Value Date/Time    CHOL 137 09/09/2022 05:45 AM    HDL 30 09/09/2022 05:45 AM    TRIG 73 09/09/2022 05:45 AM       Scheduled Meds:   [Held by provider] enoxaparin  40 mg SubCUTAneous Daily    potassium bicarb-citric acid  20 mEq Oral Daily    aspirin  81 mg Oral Daily    atorvastatin  40 mg Oral Daily    citalopram  10 mg Oral Daily    finasteride  5 mg Oral Daily    fludrocortisone  0.2 mg Oral Daily    memantine  5 mg Oral BID    midodrine  10 mg Oral TID WC    pantoprazole  40 mg Oral QAM AC    sodium chloride flush  5-40 mL IntraVENous 2 times per day     Continuous Infusions:   dextrose      sodium chloride       PRN Meds:glucose, dextrose bolus **OR** dextrose bolus, glucagon (rDNA), dextrose, diphenhydrAMINE, sodium chloride, acetaminophen, hydrALAZINE, sodium chloride flush, ondansetron **OR** ondansetron, polyethylene glycol     Patient Active Problem List    Diagnosis Date Noted    Pneumonia 02/27/2015    Orthostatic hypotension 09/09/2022    Atrial fibrillation, rapid ([address] 09/08/2022    Syncope, unspecified syncope type 08/10/2022    Ischemic cardiomyopathy 08/10/2022    Dementia with behavioral disturbance ([facility]     Hallucinations     Atrial fibrillation with RVR ([facility]     Syncope and collapse     COVID     NATANAEL (acute kidney injury) ([facility] [facility]     Atrial fibrillation with rapid ventricular response ([facility] [facility] 01/17/2022    Other chest pain 04/21/2021    Coronary artery disease involving native heart without angina pectoris 06/10/2020    Abnormal cardiovascular stress test 05/22/2020    SOB (shortness of breath) 05/04/2020    S/P placement of cardiac pacemaker 04/02/2019    Abnormal echocardiogram 04/02/2019    Dizziness 04/02/2019    A-fib ([facility] 03/03/2016    Hyperlipidemia     Hypertension     Chronic kidney disease, stage III (moderate) (HCC)     Tracheobronchomalacia     Abnormal chest CT     Cough syncope       Active Hospital Problems    Diagnosis Date Noted    Orthostatic hypotension [I95.1] 09/09/2022     Priority: Medium    Atrial fibrillation, rapid ([facility] [I48.91] 09/08/2022     Priority: Medium    Ischemic cardiomyopathy [I25.5] 08/10/2022     Priority: Medium    Coronary artery disease involving native heart without angina pectoris [I25.10] 06/10/2020     Mr. 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 Problem List:  1. Permanent atrial fibrillation. 2. Cardiomyopathy. 3. Orthostatic hypotension. Assessment and Plan:  1. Permanent atrial fibrillation. 09/08/2022  Patient is a pleasant 72-year-old male with a medical history sniffing for permanent atrial fibrillation, tachybradycardia syndrome, dementia, chronic RV pacing, and moderate cardiomyopathy of unknown etiology, and renal failure who presents from home with symptomatic orthostatic hypotension however recently discharged from the hospital with similar presentation. He was tachycardia in the office however since he has been here on his home medications his rates have been controlled.   He continues on diltiazem and I am curious whether or not he would be able to discontinue this given his orthostatic hypotension if we place a LV lead and perform an AV node ablation. This will negate his need for further rate controlling agents which may lower his blood pressure. Unfortunately patient has not oriented enough to make a medical history for himself and I was not able to reach both his wife or his daughter. From an EP standpoint I think it be reasonable to continue on his diltiazem for now we will start him on low-dose digoxin. We will wean his diltiazem she had the digoxin to keep his rates under good control.  - Start on digoxin.  - Hold off on diltiazem for now. - Consider AVN ablation with BiV upgrade.  - No OAC given fall risk (previously discussed). 09/16/2022  Patient doing better mentally. He is ready for AVN ablation and upgrade as discussed with his daughter.  - AVN ablation.  - BiV upgrade. 2. Cardiomyopathy. 09/08/2022 - 09/16/2022  Patient needs ischemic evaluation with [facility] however doubt can consent at this point. Agree with Dr. Ranjith Mclean. If LHC reassuring and shows only non-obstructive disease then plan for BiV upgrade given chronic RV pacing with possible AVN ablation.  - Per IC.     3. Orthostatic hypotension. 09/08/2022  Agree with Dr. Ranjith Mclean. Difficult case given heart failure and orthostatic hypotension.  - Agree with Dr. Ranjith Mclean.    09/16/2022  Stable. Patient on metoprolol, low dose. Would like to keep him on GDMT however given recurrent falls and orthostatic hypotension we will hold metoprolol for now. - Stop metoprolol and digoxin post AVN ablation. Thank you for allowing me to participate in the care of this patient. If you have any questions, please do not hesitate to contact me.     Trell Ramachandran MD  Cardiac Electrophysiology  [address]  [phone] [facility]

## 2022-09-17 NOTE — PROGRESS NOTES
Hospitalist Progress Note      PCP: Shira Fleming MD    Date of Admission: 9/8/2022    Chief Complaint: Afib RVR    Hospital Course:  82 y.o. male who presented to [facility] with above complaint. He went to cardiology is routine follow-up of following recent hospital admission. In the office they found his heart rate was high and blood pressure was low. He was sent to the emergency room for further evaluation. He received 1 bolus of Cardizem for rapid ventricular rate     Subjective: Gen weak, NAD      Medications:  Reviewed    Infusion Medications    dextrose      sodium chloride       Scheduled Medications    [Held by provider] enoxaparin  40 mg SubCUTAneous Daily    potassium bicarb-citric acid  20 mEq Oral Daily    aspirin  81 mg Oral Daily    atorvastatin  40 mg Oral Daily    citalopram  10 mg Oral Daily    finasteride  5 mg Oral Daily    fludrocortisone  0.2 mg Oral Daily    memantine  5 mg Oral BID    midodrine  10 mg Oral TID WC    pantoprazole  40 mg Oral QAM AC    sodium chloride flush  5-40 mL IntraVENous 2 times per day     PRN Meds: glucose, dextrose bolus **OR** dextrose bolus, glucagon (rDNA), dextrose, diphenhydrAMINE, sodium chloride, acetaminophen, hydrALAZINE, sodium chloride flush, ondansetron **OR** ondansetron, polyethylene glycol      Intake/Output Summary (Last 24 hours) at 9/17/2022 1310  Last data filed at 9/17/2022 1042  Gross per 24 hour   Intake 120 ml   Output 400 ml   Net -280 ml         Physical Exam Performed:    BP (!) 160/97   Pulse 80   Temp 98.4 °F (36.9 °C) (Oral)   Resp 18   Ht 5' 7\" (1.702 m)   Wt 149 lb 14.4 oz (68 kg)   SpO2 98%   BMI 23.48 kg/m²     General appearance: No apparent distress, appears stated age, restless  HEENT: Prosthetic right eye  Neck: Supple, with full range of motion. No jugular venous distention. Trachea midline. Respiratory:  Normal respiratory effort.  Clear to auscultation, bilaterally without Rales/Wheezes/Rhonchi. Cardiovascular: Irregular rate and rhythm with normal S1/S2 without murmurs, rubs or gallops. Abdomen: Soft, non-tender, non-distended with normal bowel sounds. Musculoskeletal: No clubbing, cyanosis or edema bilaterally. Right shoulder tenderness  Skin: Skin color, texture, turgor normal.  No rashes or lesions. Neurologic:  Neurovascularly intact without any focal sensory/motor deficits. Cranial nerves: II-XII intact, grossly non-focal.  Psychiatric: Alert/confused limited insight  Capillary Refill: Brisk, 3 seconds, normal   Peripheral Pulses: +2 palpable, equal bilaterally       Labs:   Recent Labs     09/16/22  0730   WBC 8.3   HGB 15.1   HCT 47.1          Recent Labs     09/15/22  0720 09/16/22  0730 09/17/22  0712    141 140   K 3.8 4.1 4.2    103 103   CO2 27 30 25   BUN 17 26* 17   CREATININE 1.1 1.1 1.1   CALCIUM 10.1 9.0 9.6       No results for input(s): AST, ALT, BILIDIR, BILITOT, ALKPHOS in the last 72 hours. No results for input(s): INR in the last 72 hours. No results for input(s): Brent Jakes in the last 72 hours. Urinalysis:      Lab Results   Component Value Date/Time    NITRU Negative 09/08/2022 04:59 PM    WBCUA 21-50 04/21/2022 02:20 PM    BACTERIA 1+ 04/21/2022 02:20 PM    RBCUA 3-4 04/21/2022 02:20 PM    BLOODU Negative 09/08/2022 04:59 PM    SPECGRAV 1.020 09/08/2022 04:59 PM    GLUCOSEU Negative 09/08/2022 04:59 PM       Radiology:  XR CHEST PORTABLE   Final Result   No pneumothorax following pacemaker revision. Stable cardiomegaly. XR CHEST PORTABLE   Final Result   Cardiomegaly with mild pulmonary vascular congestion.                  Assessment/Plan:    Active Hospital Problems    Diagnosis     Orthostatic hypotension [I95.1]      Priority: Medium    Atrial fibrillation, rapid (HCC) [I48.91]      Priority: Medium    Ischemic cardiomyopathy [I25.5]      Priority: Medium    Coronary artery disease involving native heart without angina pectoris [I25.10]      Permanent Atrial fibrillation with rapid ventricular rate    -s/p IV fluid and Cardizem bolus  He was not on metoprolol because of orthostasis and not on anticoagulation because of fall risk  EP consulted-s/p atrioventricular node radiofrequency catheter ablation with upgrade to biventricular pacemaker 9/16  Digoxin d/c'd, ASA  diltiazem d/c'd  Maintain K and Mg at adequate   Briefly on amio due to uncontrolled rates  Metoprolol IV PRN for HR      Orthostatic hypotension  Complicating treatment of afib and HF  Increase fluid intake and liberate salt intake   on Florinef and midodrine, continue-currently blood pressure stable  Cardiac rehab recommended outpatient      Acute on chronic systolic CHF with ejection fraction of 35%   Ischemic cardiomyopathy   Exacerbated by above  Monitor I/Os  No fluid or salt restriction     CAD of native coronary artery   LHC: Two-vessel CAD/ASHD, RHC-upper normal filling pressures. Continue ASA/statin     Hypokalemia - resolved recheck in AM   Hypomagnesemia - resolved, recheck in am     Chronic kidney disease-stage IIIa creatinine 1.2-1.6  Monitor renal fx with BMP daily  Avoid nephrotoxic agents      Hypertension-medications as above, difficult to control with orthostatic hypotension      Hyperlipidemia-statin continued      BPH - continue finasteride      Dementia - continue memantine      Depression - mood is stable, continue celexa     DVT Prophylaxis: Lovenox  Diet: ADULT DIET; Regular;  Low Sodium (2 gm)  Code Status: Full Code  PT/OT Eval Status: Consulted    Dispo - pending PT/OT recs, anticipate dc 9/17-9/18    Appropriate for A1 Discharge Unit: No      Romana Huang, APRN - CNP

## 2022-09-17 NOTE — PROGRESS NOTES
[facility]     Electrophysiology                                     Progress Note    Admission date:  2022    Reason for follow up visit: AF    HPI/CC: Hermelinda Shaver was found to be in rapid AF with orthostatic hypotension at his recent EP appointment on 2022. He was sent to the ED for further evaluation. On 2022, he had a right and left heart cath that showed slight progression of his two vessel CAD and normal filling pressures. Most recent echo showed an EF of 35-40%. On 2022, he underwent PPM upgrade to BiV pacemaker and AVN ablation. Device check and CXR reviewed. Rhythm has been AS  at 80 bpm .     Subjective: He complains of weakness and reports he is not ready to go home today. Vitals:  Blood pressure (!) 142/91, pulse 79, temperature 98.8 °F (37.1 °C), temperature source Oral, resp. rate 18, height 5' 7\" (1.702 m), weight 149 lb 14.4 oz (68 kg), SpO2 97 %.   Temp  Av.9 °F (36.6 °C)  Min: 97 °F (36.1 °C)  Max: 98.8 °F (37.1 °C)  Pulse  Av.7  Min: 79  Max: 91  BP  Min: 108/71  Max: 146/88  SpO2  Av.9 %  Min: 93 %  Max: 100 %    24 hour I/O    Intake/Output Summary (Last 24 hours) at 2022 0934  Last data filed at 2022 2200  Gross per 24 hour   Intake --   Output 400 ml   Net -400 ml     Current Facility-Administered Medications   Medication Dose Route Frequency Provider Last Rate Last Admin    glucose chewable tablet 16 g  4 tablet Oral PRN Rockford Laxmi, APRN - CNP        dextrose bolus 10% 125 mL  125 mL IntraVENous PRN Sherif Hair, APRN - CNP   Stopped at 09/15/22 0827    Or    dextrose bolus 10% 250 mL  250 mL IntraVENous PRN Rockford Laxmi, APRN - CNP        glucagon (rDNA) injection 1 mg  1 mg SubCUTAneous PRN Rockfordroseanne Hair, APRN - CNP        dextrose 10 % infusion   IntraVENous Continuous PRN Sherif Hiar APRN - CNP        [Held by provider] enoxaparin (LOVENOX) injection 40 mg  40 mg SubCUTAneous Daily [place], APRN - CNP   40 mg at 09/15/22 1300    diphenhydrAMINE (BENADRYL) injection 12.5 mg  12.5 mg IntraVENous Q6H PRN Maribeth oGld, APRN - CNP   12.5 mg at 09/14/22 0511    potassium bicarb-citric acid (EFFER-K) effervescent tablet 20 mEq  20 mEq Oral Daily Amelia Ghosh, APRN - CNP   20 mEq at 09/17/22 0836    0.9 % sodium chloride infusion   IntraVENous PRN Erik Matute MD        acetaminophen (TYLENOL) tablet 650 mg  650 mg Oral Q4H PRN Erik Matute MD        hydrALAZINE (APRESOLINE) injection 10 mg  10 mg IntraVENous Q6H PRN Taqueria Cardenas MD   10 mg at 09/13/22 1817    aspirin chewable tablet 81 mg  81 mg Oral Daily Ottoniel Gilmore MD   81 mg at 09/17/22 0837    atorvastatin (LIPITOR) tablet 40 mg  40 mg Oral Daily Ottoniel Gilmore MD   40 mg at 09/17/22 0836    citalopram (CELEXA) tablet 10 mg  10 mg Oral Daily Ottoniel Gilmore MD   10 mg at 09/17/22 0837    finasteride (PROSCAR) tablet 5 mg  5 mg Oral Daily Ottoniel Gilmore MD   5 mg at 09/17/22 0836    fludrocortisone (FLORINEF) tablet 0.2 mg  0.2 mg Oral Daily Ottoniel Gilmore MD   0.2 mg at 09/17/22 0836    memantine (NAMENDA) tablet 5 mg  5 mg Oral BID Ottoniel Gilmore MD   5 mg at 09/17/22 0836    midodrine (PROAMATINE) tablet 10 mg  10 mg Oral TID  Luis Daniel Marques DO   10 mg at 09/17/22 0837    pantoprazole (PROTONIX) tablet 40 mg  40 mg Oral QAM AC Ottoniel Gilmore MD   40 mg at 09/17/22 0836    sodium chloride flush 0.9 % injection 5-40 mL  5-40 mL IntraVENous 2 times per day Ottoniel Gilmore MD   10 mL at 09/17/22 0836    sodium chloride flush 0.9 % injection 5-40 mL  5-40 mL IntraVENous PRN Ottoniel Gilmore MD        ondansetron (ZOFRAN-ODT) disintegrating tablet 4 mg  4 mg Oral Q8H PRN Ottoniel Gilmore MD        Or    ondansetron (ZOFRAN) injection 4 mg  4 mg IntraVENous Q6H PRN Ottoniel Gilmore MD        polyethylene glycol (GLYCOLAX) packet 17 g  17 g Oral Daily PRN Ottoniel Gilmore MD           Objective:     Telemetry monitor: AS     Physical Exam:  Constitutional and general appearance: alert, cooperative, no distress, and appears stated age  [de-identified]: PERRL, no cervical lymphadenopathy. No masses palpable. Normal oral mucosa  Respiratory:  Normal excursion and expansion without use of accessory muscles  Resp auscultation: Normal breath sounds without wheezing, rhonchi, and rales  Cardiovascular: The apical impulse is not displaced  Heart tones are crisp and normal. regular S1 and S2.  Jugular venous pulsation Normal  The carotid upstroke is normal in amplitude and contour without delay or bruit  Peripheral pulses are symmetrical and full   Chest wall  Left upper chest incision open to air with steristrips intact , no drainage, no ecchymosis, small amount of swelling over device pocket   Abdomen:  No masses or tenderness  Bowel sounds present  Extremities:   No cyanosis or clubbing   No lower extremity edema   Skin: warm and dry  Right femoral access site stable   Neurological:  Alert and oriented  Moves all extremities well  No abnormalities of mood, affect, memory, mentation, or behavior are noted    Data  [facility] 9/13/2022:  FINDINGS      HEMODYNAMICS     CHAMBER PRESSURE SATURATION   RA 5 54%   RV 20/4     PA 21/13 48%   PW 11     AORTA 110/68 85%      TASHA CARDIAC OUTPUT 3.8 L/min   SVR 1630            Left heart catheterization     LVEDP 11   GRADIENT ACROSS AORTIC VALVE None      CORONARY ARTERIES     LM Less than 10% [id number] stenosis. LAD Calcified, proximal 40 to 50% stenosis, mid-distal 60 to 75% stenosis. LCX Calcified, there is proximal-mid 75% eccentric stenosis at a bifurcation with OM1 and OM 2 as well as the AV groove. RCA Dominant, calcified, large vessel, [id number] 40% stenoses.       CONCLUSIONS:      Upper normal filling pressures  Two-vessel CAD/ASHD, slight progression since last angiograms from 2020  We will treat medically given advanced age and comorbidities including renal insufficiency  Continue with aspirin statin and beta-blocker     Limited echo 8/10/2022:   Conclusions   Summary   Limited only f/u for LVEF. The left ventricular systolic function is moderately reduced with an   ejection fraction of 35 - 40 %. Left ventricular cavity size is normal.   Hypokinesis of the anterior, septal, and apical walls. Limited echo 4/15/2022:    Summary   Limited echo for LV function with limited doppler/color. LV function is mildly decreased with EF estimated from 45-50%. Mild global hypokinesis. Mild concentric left ventricular hypertrophy. Diastolic dysfunction grade and filling pressure are indeterminate. Severe biatrial enlargement. Aortic valve appears sclerotic but opens adequately. 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 Note no color flow doppler visualized to assess for mitral regurgitation. Mild to moderate tricuspid valve regurgitation. Systolic pulmonary artery pressure (sPAP) is normal and estimated at 26 mmHg   (RAP 3 mmHg)   Irregular heart rate throughout study. Definity® used for myocardial border enhancement. ECHO: 3/21/2019:  Summary   Normal left ventricular systolic function with an estimated ejection   fraction of 55-60%. No regional wall motion abnormalities are seen. Normal left ventricular diastolic filling pressure. The left atrium is mildly dilated. The right atrium is moderately dilated. Right ventricular systolic function is mildly reduced . The right ventricle is mildly enlarged. The ascending aorta is mildly dilated. 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 Mild mitral regurgitation. Moderate tricuspid regurgitation. Systolic pulmonary artery pressure (SPAP) estimated at 47 mmHg (RA pressure   15 mmHg), consistent with mild pulmonary hypertension. Pacemaker lead in right ventricle. All labs and testing reviewed.   Lab Review     Renal Profile:   Lab Results   Component Value Date/Time    CREATININE 1.1 09/17/2022 07:12 AM    BUN 17 09/17/2022 07:12 AM     09/17/2022 07:12 AM    K 4.2 09/17/2022 07:12 AM     09/17/2022 07:12 AM    CO2 25 09/17/2022 07:12 AM     CBC:    Lab Results   Component Value Date/Time    WBC 8.3 09/16/2022 07:30 AM    RBC 5.16 09/16/2022 07:30 AM    HGB 15.1 09/16/2022 07:30 AM    HCT 47.1 09/16/2022 07:30 AM    MCV 91.2 09/16/2022 07:30 AM    RDW 17.4 09/16/2022 07:30 AM     09/16/2022 07:30 AM     BNP:    Lab Results   Component Value Date/Time    BNP 12.8 01/09/2013 10:00 AM     Fasting Lipid Panel:    Lab Results   Component Value Date/Time    CHOL 137 09/09/2022 05:45 AM    HDL 30 09/09/2022 05:45 AM    TRIG 73 09/09/2022 05:45 AM     Cardiac Enzymes:  CK/MbTroponin  Lab Results   Component Value Date/Time    TROPONINI <0.01 09/08/2022 11:56 PM     PT/ INR   Lab Results   Component Value Date/Time    INR 1.26 01/18/2022 06:05 AM    INR 1.18 05/22/2020 10:19 AM    INR 1.02 01/24/2016 12:35 PM    PROTIME 14.4 01/18/2022 06:05 AM    PROTIME 13.7 05/22/2020 10:19 AM    PROTIME 11.6 01/24/2016 12:35 PM     PTT No results found for: PTT   Lab Results   Component Value Date/Time    MG 1.90 09/13/2022 07:56 AM      Lab Results   Component Value Date/Time    TSH 2.45 10/21/2021 02:04 PM       Assessment:  Persistent atrial fibrillation: stable    -[id number] score 4 (age, HTN, CAD)    -s/p AV node ablation 9/16/2022  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 Continue ASA, Lipitor, florinef and midodrine  2. No beta blocker or ACEi/ARB due to orthostatic hypotension and risk for injury   3. No AC given risk for injury and bleeding   4.  CAD to be treated medically given age and comorbidities 5. Pacemaker programmed at 80 bpm. Plan to decrease to 60 bpm at his follow up appointment. 6. Post procedure instructions reviewed   7. Compression stockings and abdominal binder when out of bed   8. PT/OT evaluation  9. Device check on 9/27/2022  10. Follow up in office on 10/20/2022    CXR and device interrogation reviewed by MD.     Nothing further to add from an EP standpoint. Okay for discharge pending PT/OT evaluation.      Patient was seen outside of global device window for afib      GUS Page-CNP  [facility]  [phone]

## 2022-09-17 NOTE — PLAN OF CARE
Problem: Safety - Adult  Goal: Free from fall injury  Outcome: Progressing     Problem: Nutrition Deficit:  Goal: Optimize nutritional status  Outcome: Progressing     Problem: Pain  Goal: Verbalizes/displays adequate comfort level or baseline comfort level  Outcome: Progressing

## 2022-09-18 VITALS
RESPIRATION RATE: 20 BRPM | HEIGHT: 67 IN | BODY MASS INDEX: 23.7 KG/M2 | WEIGHT: 151 LBS | DIASTOLIC BLOOD PRESSURE: 93 MMHG | TEMPERATURE: 97.3 F | OXYGEN SATURATION: 97 % | HEART RATE: 80 BPM | SYSTOLIC BLOOD PRESSURE: 133 MMHG

## 2022-09-18 LAB
GLUCOSE BLD-MCNC: 69 MG/DL (ref 70–99)
GLUCOSE BLD-MCNC: 87 MG/DL (ref 70–99)
GLUCOSE BLD-MCNC: 89 MG/DL (ref 70–99)
PERFORMED ON: ABNORMAL
PERFORMED ON: NORMAL
PERFORMED ON: NORMAL

## 2022-09-18 PROCEDURE — 6370000000 HC RX 637 (ALT 250 FOR IP): Performed by: NURSE PRACTITIONER

## 2022-09-18 PROCEDURE — 97110 THERAPEUTIC EXERCISES: CPT

## 2022-09-18 PROCEDURE — 97530 THERAPEUTIC ACTIVITIES: CPT

## 2022-09-18 PROCEDURE — 2580000003 HC RX 258: Performed by: INTERNAL MEDICINE

## 2022-09-18 PROCEDURE — 97116 GAIT TRAINING THERAPY: CPT

## 2022-09-18 PROCEDURE — 97535 SELF CARE MNGMENT TRAINING: CPT

## 2022-09-18 PROCEDURE — 97168 OT RE-EVAL EST PLAN CARE: CPT

## 2022-09-18 PROCEDURE — 6370000000 HC RX 637 (ALT 250 FOR IP): Performed by: INTERNAL MEDICINE

## 2022-09-18 PROCEDURE — 97164 PT RE-EVAL EST PLAN CARE: CPT

## 2022-09-18 PROCEDURE — 6360000002 HC RX W HCPCS

## 2022-09-18 RX ORDER — MIDODRINE HYDROCHLORIDE 2.5 MG/1
5 TABLET ORAL 3 TIMES DAILY
Qty: 90 TABLET | Refills: 3
Start: 2022-09-18 | End: 2022-10-17 | Stop reason: SDUPTHER

## 2022-09-18 RX ORDER — DOXYCYCLINE HYCLATE 100 MG
100 TABLET ORAL 2 TIMES DAILY
Qty: 10 TABLET | Refills: 0
Start: 2022-09-18 | End: 2022-09-23

## 2022-09-18 RX ADMIN — CITALOPRAM HYDROBROMIDE 10 MG: 20 TABLET ORAL at 11:15

## 2022-09-18 RX ADMIN — ATORVASTATIN CALCIUM 40 MG: 40 TABLET, FILM COATED ORAL at 11:15

## 2022-09-18 RX ADMIN — POTASSIUM BICARBONATE 20 MEQ: 782 TABLET, EFFERVESCENT ORAL at 11:15

## 2022-09-18 RX ADMIN — FLUDROCORTISONE ACETATE 0.2 MG: 0.1 TABLET ORAL at 11:15

## 2022-09-18 RX ADMIN — ENOXAPARIN SODIUM 40 MG: 100 INJECTION SUBCUTANEOUS at 11:15

## 2022-09-18 RX ADMIN — Medication 10 ML: at 11:16

## 2022-09-18 RX ADMIN — FINASTERIDE 5 MG: 5 TABLET, FILM COATED ORAL at 11:15

## 2022-09-18 RX ADMIN — PANTOPRAZOLE SODIUM 40 MG: 40 TABLET, DELAYED RELEASE ORAL at 11:15

## 2022-09-18 RX ADMIN — ASPIRIN 81 MG 81 MG: 81 TABLET ORAL at 11:15

## 2022-09-18 RX ADMIN — MEMANTINE 5 MG: 5 TABLET ORAL at 11:15

## 2022-09-18 ASSESSMENT — PAIN SCALES - GENERAL: PAINLEVEL_OUTOF10: 0

## 2022-09-18 NOTE — DISCHARGE SUMMARY
Hospital Medicine Discharge Summary    Patient ID: Azael Doss      Patient's PCP: Lan Harris MD    Admit Date: 9/8/2022     Discharge Date: 9/18/2022      Admitting Provider: Tj Quijano DO     Discharge Provider: Isabel Martin APRN - CNP     Discharge Diagnoses: Active Hospital Problems    Diagnosis     Orthostatic hypotension [I95.1]      Priority: Medium    Atrial fibrillation, rapid (HCC) [I48.91]      Priority: Medium    Ischemic cardiomyopathy [I25.5]      Priority: Medium    Coronary artery disease involving native heart without angina pectoris [I25.10]        The patient was seen and examined on day of discharge and this discharge summary is in conjunction with any daily progress note from day of discharge. Hospital Course:     82 y.o. male who presented to [facility] with above complaint. He went to cardiology is routine follow-up of following recent hospital admission. In the office they found his heart rate was high and blood pressure was low. He was sent to the emergency room for further evaluation. He received 1 bolus of Cardizem for rapid ventricular rate. During admission patient underwent LHC/RHC: Two-vessel CAD/ASHD, slight progression since last angiograms from 2020,  will treat medically given advanced age and comorbidities including renal insufficiency. EP evaluated and recommended PPM upgrade/ablation. Plan to to dc to SNF post procedure.      Permanent Atrial fibrillation with rapid ventricular rate    -s/p IV fluid and Cardizem bolus  He was not on metoprolol because of orthostasis and not on anticoagulation because of fall risk  EP consulted-s/p atrioventricular node radiofrequency catheter ablation with upgrade to biventricular pacemaker 9/16  Digoxin d/c'd, ASA  diltiazem d/c'd  Maintain K and Mg at adequate   Briefly on amio due to uncontrolled rates  Metoprolol IV PRN for HR      Orthostatic hypotension  Complicating treatment of afib and HF  Increase fluid intake and liberate salt intake   on Florinef and midodrine, continue-currently blood pressure stable  Cardiac rehab recommended outpatient      Acute on chronic systolic CHF with ejection fraction of 35%   Ischemic cardiomyopathy   Exacerbated by above  Monitor I/Os  No fluid or salt restriction     CAD of native coronary artery   LHC: Two-vessel CAD/ASHD, RHC-upper normal filling pressures. Continue ASA/statin     Hypokalemia - resolved recheck in AM   Hypomagnesemia - resolved, recheck in am     Chronic kidney disease-stage IIIa creatinine 1.2-1.6  Monitor renal fx with BMP daily  Avoid nephrotoxic agents      Hypertension-medications as above, difficult to control with orthostatic hypotension      Hyperlipidemia-statin continued      BPH - continue finasteride      Dementia - continue memantine      Depression - mood is stable, continue celexa      DC plan: Follow up with PCP/Cardiology. May need to adjust midodrine dosing, monitor blood pressure. Compression stockings and abdominal binder when out of bed. PT/OT/SN      Physical Exam Performed:     BP (!) 133/93   Pulse 80   Temp 97.3 °F (36.3 °C) (Oral)   Resp 20   Ht 5' 7\" (1.702 m)   Wt 151 lb (68.5 kg)   SpO2 97%   BMI 23.65 kg/m²             Labs:  For convenience and continuity at follow-up the following most recent labs are provided:      CBC:    Lab Results   Component Value Date/Time    WBC 8.3 09/16/2022 07:30 AM    HGB 15.1 09/16/2022 07:30 AM    HCT 47.1 09/16/2022 07:30 AM     09/16/2022 07:30 AM       Renal:    Lab Results   Component Value Date/Time     09/17/2022 07:12 AM    K 4.2 09/17/2022 07:12 AM     09/17/2022 07:12 AM    CO2 25 09/17/2022 07:12 AM    BUN 17 09/17/2022 07:12 AM    CREATININE 1.1 09/17/2022 07:12 AM    CALCIUM 9.6 09/17/2022 07:12 AM    PHOS 4.3 07/04/2022 03:10 PM         Significant Diagnostic Studies    Radiology:   XR CHEST PORTABLE   Final Result   No pneumothorax following pacemaker revision. Stable cardiomegaly. XR CHEST PORTABLE   Final Result   Cardiomegaly with mild pulmonary vascular congestion. Consults:     IP CONSULT TO HEART FAILURE NURSE/COORDINATOR  IP CONSULT TO DIETITIAN  IP CONSULT TO CARDIOLOGY    Disposition:  [facility]    Condition at Discharge: Stable    Discharge Instructions/Follow-up:  See [facility]    Code Status:  Prior     Activity: activity as tolerated    Diet: cardiac diet      Discharge Medications:     Discharge Medication List as of 9/18/2022  1:57 PM             Details   doxycycline hyclate (VIBRA-TABS) 100 MG tablet Take 1 tablet by mouth 2 times daily for 5 days, Disp-10 tablet, R-0NO PRINT                Details   midodrine (PROAMATINE) 2.5 MG tablet Take 2 tablets by mouth 3 times daily, Disp-90 tablet, R-3NO PRINT                Details   polyethylene glycol (GLYCOLAX) 17 g packet Take by mouthHistorical Med      Acetaminophen (TYLENOL) 325 MG CAPS Take 650 mg by mouthHistorical Med      ondansetron (ZOFRAN) 4 MG tablet Take 1 tablet by mouthHistorical Med      oxybutynin (DITROPAN-XL) 10 MG extended release tablet Historical Med      pantoprazole (PROTONIX) 40 MG tablet Take 1 tablet by mouthHistorical Med      atorvastatin (LIPITOR) 40 MG tablet Take 1 tablet by mouth daily, Disp-30 tablet, R-0NO PRINT      fludrocortisone (FLORINEF) 0.1 MG tablet Take 2 tablets by mouth daily, Disp-60 tablet, R-3NO PRINT      memantine (NAMENDA) 5 MG tablet Take 1 tablet by mouth 2 times daily, Disp-60 tablet, R-3Normal      citalopram (CELEXA) 10 MG tablet TAKE 1 TABLET BY MOUTH DAILY, Disp-90 tablet, R-3Normal      fluticasone (FLONASE) 50 MCG/ACT nasal spray 2 sprays by Nasal route daily, Disp-1 each, R-3Normal      Ferrous Sulfate (IRON) 325 (65 Fe) MG TABS Take by mouth daily Historical Med      finasteride (PROSCAR) 5 MG tablet Take 5 mg by mouth daily. Historical Med      aspirin 81 MG tablet Take 81 mg by mouth daily.   Historical Med             Time Spent on discharge is more than 30 minutes in the examination, evaluation, counseling and review of medications and discharge plan. Signed:    GUS Cadet - CNP   10/7/2022      Thank you Melodie Sanford MD for the opportunity to be involved in this patient's care. If you have any questions or concerns, please feel free to contact me at [phone].

## 2022-09-18 NOTE — PROGRESS NOTES
Gave report to RN at [facility] & [facility]. Paperwork and transmitter devise is being sent with granddaughter.

## 2022-09-18 NOTE — PLAN OF CARE
Visited patient to assess left upper chest PPM implant site. Left upper chest incision open to air with intact steristrips, no drainage, no ecchymosis, small amount of swelling over device pocket     Femoral access sites stable. PT/OT recommend SNF at discharge. Patient plan to discharge to [place] [id number].      Riana Francois, APRN-CNP  [facility]  [phone]

## 2022-09-18 NOTE — CARE COORDINATION
CASE MANAGEMENT DISCHARGE SUMMARY      Discharge to: [facility]    Precertification completed: [facility] Exemption Notification (HENS) completed: yes    IMM given: (date)     New Durable Medical Equipment ordered/agency: na    Transportation:    Family/car: daughter to transport      Confirmed discharge plan with:RN, Martin Juan Ramon and daughter     Patient: yes        Facility/Agency, name:  [facility] faxed [phone]   Phone number for report to facility: [phone]   [address], name: [employer]    Note: Discharging nurse to complete BINU, reconcile AVS, and place final copy with patient's discharge packet. RN to ensure that written prescriptions for  Level II medications are sent with patient to the facility as per protocol.
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
## 2022-09-18 NOTE — PLAN OF CARE
Problem: Skin/Tissue Integrity  Goal: Absence of new skin breakdown  Description: 1. Monitor for areas of redness and/or skin breakdown  2. Assess vascular access sites hourly  3. Every 4-6 hours minimum:  Change oxygen saturation probe site  4. Every 4-6 hours:  If on nasal continuous positive airway pressure, respiratory therapy assess nares and determine need for appliance change or resting period. Outcome: Progressing     Problem: Safety - Medical Restraint  Goal: Remains free of injury from restraints (Restraint for Interference with Medical Device)  Description: INTERVENTIONS:  1. Determine that other, less restrictive measures have been tried or would not be effective before applying the restraint  2. Evaluate the patient's condition at the time of restraint application  3. Inform patient/family regarding the reason for restraint  4.  Q2H: Monitor safety, psychosocial status, comfort, nutrition and hydration  Outcome: Progressing

## 2022-09-20 ENCOUNTER — TELEPHONE (OUTPATIENT)
Dept: CARDIOLOGY CLINIC | Age: 87
End: 2022-09-20

## 2022-09-20 LAB
POC ACT LR: 278 SEC
POC ACT LR: 337 SEC

## 2022-09-20 NOTE — TELEPHONE ENCOUNTER
NPNR OOT     Pt recently D/C from [facility] - pt granddaughter called to ask if pt was supposed to continue Eliquis 5mg BID. Per NPAM note from in - hospital consult:     1. Continue ASA, Lipitor, florinef and midodrine  2. No beta blocker or ACEi/ARB due to orthostatic hypotension and risk for injury   3. No AC given risk for injury and bleeding     Relayed NPAM plan, granddaughter asked that I double check to ensure pt was not supposed to be on Eliquis 5mg BID.

## 2022-09-20 NOTE — TELEPHONE ENCOUNTER
He is at significant risk for fall with injury and bleeding. Would not resume Eliquis as the risk outweighs the benefit. Please see [address] [facility] consult note.

## 2022-09-22 ENCOUNTER — CLINICAL DOCUMENTATION ONLY (OUTPATIENT)
Facility: CLINIC | Age: 87
End: 2022-09-22

## 2022-09-26 ENCOUNTER — CARE COORDINATION (OUTPATIENT)
Dept: CARE COORDINATION | Age: 87
End: 2022-09-26

## 2022-09-26 ENCOUNTER — CARE COORDINATION (OUTPATIENT)
Dept: CASE MANAGEMENT | Age: 87
End: 2022-09-26

## 2022-09-26 NOTE — CARE COORDINATION
I spoke with Alberta Joaquin at [facility] and confirmed [facility] date is scheduled for tomorrow. Will notify CTN at this time.

## 2022-09-26 NOTE — CARE COORDINATION
[facility] Discharge Call    2022    Patient: Alyson Mg Patient : 1935   MRN: [id number]  Reason for Admission: afib with rvr  Discharge Date: 22 RARS: Readmission Risk Score: 20.1         Discharge Facility: [facility]       Acute Care Course:   Pt to [facility] from  to . He presented to [facility] from [address] [address] ED after multiple syncopal episodes. Imdur was stopped and midodrine and compression stockings started. Believed to be r/t hypovolemia and meds. Pt then to [facility] for 7 days with a d[facility]c on 9/[facility]    Pt to [facility] from  to  from Cardiology office with afib with rvr. Pt then to [facility] with a d/c after 7 days. HFU made:   cardio with Seng Fisher and sent to hospital     Sig Hx:   HTN, HLD, CAD, CKD, afib, dementia, pacemaker, CHF, BPH    with HFU with Yanet Alford. DME:      Conversation:   Left HIPPA compliant message regarding the nature of the call and a request for a return call with my contact information        OSCAR Roche, [phone]  [facility] [place] [address] Transition Nurse           Follow up plan:   Will re-attempt    Care Transitions Post Acute Facility Transition      Do you have all of your prescriptions and are they filled?: Yes         Care Transitions Interventions         Future Appointments   Date Time Provider Yulissa Landon   2022  1:00 PM Elvi Calvin, [facility]igna [facility]   10/18/2022  8:05 AM SCHEDULE, [facility] REMOTE [place] [facility]   10/20/2022  3:00 PM GUS Ratliff - SOLO Benito [facility]   2022 10:00 AM SCHEDULE, [facility]   [facility]22 [facility]       OSCAR Roche, RN   [facility] HAKAN FARLEY [facility] Transition Nurse  [phone]

## 2022-09-27 ENCOUNTER — CARE COORDINATION (OUTPATIENT)
Dept: CASE MANAGEMENT | Age: 87
End: 2022-09-27

## 2022-09-27 ENCOUNTER — TELEPHONE (OUTPATIENT)
Dept: FAMILY MEDICINE CLINIC | Age: 87
End: 2022-09-27

## 2022-09-27 NOTE — TELEPHONE ENCOUNTER
RHETT   Pt was released from [facility] a few days ago and the NH wanted him to have home care. Pt and his wife said there is not need for it.  RN from home care called and said they went out but pt is doing well walking with a cane and HC not really needed

## 2022-09-27 NOTE — CARE COORDINATION
[facility] Discharge Call    2022    Patient: Katina Hernandez Patient : 1935   MRN: [id number]  Reason for Admission: afib with rvr  Discharge Date: 22 RARS: Readmission Risk Score: 20.1         Discharge Facility: [facility]      Left HIPPA compliant message regarding the nature of the call and a request for a return call with my contact information for pt and spouse      OSCAR Dominique, [phone]  [facility] / [address] Transition      Do you have all of your prescriptions and are they filled?: Yes         Care Transitions Interventions         Future Appointments   Date Time Provider Yulissa Landon   10/18/2022  8:05 AM SCHEDULE, Yaz Meneses [facility]   10/20/2022  3:00 PM GUS Love - CNP [employer] [facility]   2022 10:00 AM SCHEDULE, [facility] [employer] [facility]   2022 10:00 AM J Lajuanda Cranker., MD [employer] [facility]     OSCAR Dominique, RN   [address] [facility] Transition Nurse  [phone]

## 2022-09-30 ENCOUNTER — CARE COORDINATION (OUTPATIENT)
Dept: CARE COORDINATION | Age: 87
End: 2022-09-30

## 2022-09-30 ENCOUNTER — TELEPHONE (OUTPATIENT)
Dept: NON INVASIVE DIAGNOSTICS | Age: 87
End: 2022-09-30

## 2022-10-03 ENCOUNTER — NURSE ONLY (OUTPATIENT)
Dept: CARDIOLOGY CLINIC | Age: 87
End: 2022-10-03

## 2022-10-03 ENCOUNTER — CARE COORDINATION (OUTPATIENT)
Dept: CARE COORDINATION | Age: 87
End: 2022-10-03

## 2022-10-03 NOTE — PATIENT INSTRUCTIONS
New Cardiac Device Implant (Pacemaker and/or Defibrillator) Post Op Instructions  Bathe with water indirectly hitting the incision site. Water and soap may run over the incision site. Do not scrub. Pat dry with a clean towel after bathing. Leave incision open to the air; do not apply any dressings, ointments, or bandages to the area. Do not apply lotion, perfume/cologne, or powders to the area until it is completely healed. Any scabbing or skin glue that is noted will fall off within 1-2 weeks after the post op appointment. If any oozing, bleeding, or pus drainage occurs, please call the office immediately at [phone]. Patient has movement restrictions in place until 4 weeks post op (to the day of implant) unless otherwise instructed by physician. Patient may not lift the device side arm above shoulder height. Do not far reach or stretch across body or behind body with effected side. Do not use this arm for pushing, pulling, or lifting body. Do not use cane on the effected side. Patient may not lift anything heavier than a gallon of milk with the associated arm. Appointments to expect going forward:  Post operatively the patient will have had a 1-week post op check, a 1 month follow up with NP/MD, and a 3 month follow up with NP/MD and the device clinic. Remote Monitoring Instructions    Within 2-3 weeks of your device being implanted, you will receive a call from the  of your device. Please answer this call as it is to set up remote monitoring for your device. Once you receive your in-home monitor, please follow the instructions provided to sync the home monitor to your implanted device. Once you have paired your home monitor to your implanted device, keep your monitor plugged in within 6 feet of where you sleep. Your monitor will routinely check in with your device during sleep hours and transmit any urgent events to the [address] [place] [address] Drive for review.      Please do not send additional routine transmissions unless specifically requested by the office staff - the steps to send a manual transmission are the same as when you paired your in-home monitor to your device for the first time. Your device and monitor are wireless and most transmit cellularly, but please periodically check your monitor is still plugged in to the electrical outlet. If you still use the telephone land line to send, please ensure the connection to the phone uma is secure. This will help to ensure successful automatic transmissions in the future. Please be aware that the remote device transmission sites are periodically monitored only during regular business hours during which simultaneous in-office device clinics are being conducted. If your transmission requires attention, we will contact you as soon as possible. Your in-home monitor will do a full report on your device every 3 months (recurring appointments that run parallel to in office checks). You do not need to initiate a transmission or be awake at the appointment time listed - this is solely for office purposes. Our office utilizes the \"no news is good news\" narrative regarding remote monitoring. A Device Specialist or RN will contact you with any critical findings from your remote monitoring. Going forward, if you experience issues with your in-home monitor, please verify that it is plugged in to the wall. If issues persist, please contact the Customer Service numbers provided below, as they can troubleshoot issues that may be happening with the monitor itself. The [employer] works closely with the remote monitoring websites - if we notice there is a disconnection we will contact you to inform you and ask you to contact the  of your device.     [employer] [facility])  [phone]  [facility]) [phone]  [place] ([address]) [phone]  [employer]   [phone]

## 2022-10-03 NOTE — PROGRESS NOTES
Steri strips removed/dermabond intact. Device site dry clean and intact. Patient reminded of post op instructions, print out given to patient. Educated patient on s/s of infection and to call the office if problems arise. F/u as schedule with EP NP in 1 months. Patient had hematoma at device site. Discussed with AGK. Site assessed by [facility] and NPNR. Monitor. Will f/u with NPNR as scheduled in 10/20/2022.

## 2022-10-03 NOTE — TELEPHONE ENCOUNTER
Patient seen in clinic for site check per [address] [facility]. Site is swollen but soft. JMB assessed. Appears to be collection of blood under skin. Incision site is well approximated with no s/s of infection. Steri strips removed. Instructions given. Will f/u with NPNR on 10/20/22 as scheduled.  V/U.

## 2022-10-03 NOTE — CARE COORDINATION
Spoke briefly with patient and his daughter; they are currently in the car  Will attempt f/u call later today;  They request this ACM contact the house phone which is listed under mobile

## 2022-10-10 ENCOUNTER — CARE COORDINATION (OUTPATIENT)
Dept: CARE COORDINATION | Age: 87
End: 2022-10-10

## 2022-10-17 ENCOUNTER — OFFICE VISIT (OUTPATIENT)
Dept: FAMILY MEDICINE CLINIC | Age: 87
End: 2022-10-17
Payer: MEDICARE

## 2022-10-17 VITALS
SYSTOLIC BLOOD PRESSURE: 138 MMHG | HEART RATE: 78 BPM | BODY MASS INDEX: 23.23 KG/M2 | WEIGHT: 148 LBS | HEIGHT: 67 IN | DIASTOLIC BLOOD PRESSURE: 86 MMHG | OXYGEN SATURATION: 98 %

## 2022-10-17 DIAGNOSIS — F03.918 DEMENTIA WITH BEHAVIORAL DISTURBANCE: ICD-10-CM

## 2022-10-17 DIAGNOSIS — E78.5 HYPERLIPIDEMIA, UNSPECIFIED HYPERLIPIDEMIA TYPE: ICD-10-CM

## 2022-10-17 DIAGNOSIS — N39.41 URGE INCONTINENCE: ICD-10-CM

## 2022-10-17 DIAGNOSIS — K21.9 GASTROESOPHAGEAL REFLUX DISEASE, UNSPECIFIED WHETHER ESOPHAGITIS PRESENT: ICD-10-CM

## 2022-10-17 DIAGNOSIS — I95.1 POSTURAL HYPOTENSION: Primary | ICD-10-CM

## 2022-10-17 PROCEDURE — G8484 FLU IMMUNIZE NO ADMIN: HCPCS | Performed by: FAMILY MEDICINE

## 2022-10-17 PROCEDURE — 99214 OFFICE O/P EST MOD 30 MIN: CPT | Performed by: FAMILY MEDICINE

## 2022-10-17 PROCEDURE — G8427 DOCREV CUR MEDS BY ELIG CLIN: HCPCS | Performed by: FAMILY MEDICINE

## 2022-10-17 PROCEDURE — 1123F ACP DISCUSS/DSCN MKR DOCD: CPT | Performed by: FAMILY MEDICINE

## 2022-10-17 PROCEDURE — 1036F TOBACCO NON-USER: CPT | Performed by: FAMILY MEDICINE

## 2022-10-17 PROCEDURE — 1111F DSCHRG MED/CURRENT MED MERGE: CPT | Performed by: FAMILY MEDICINE

## 2022-10-17 PROCEDURE — G8420 CALC BMI NORM PARAMETERS: HCPCS | Performed by: FAMILY MEDICINE

## 2022-10-17 RX ORDER — ATORVASTATIN CALCIUM 40 MG/1
40 TABLET, FILM COATED ORAL DAILY
Qty: 30 TABLET | Refills: 0 | Status: SHIPPED | OUTPATIENT
Start: 2022-10-17

## 2022-10-17 RX ORDER — MIDODRINE HYDROCHLORIDE 5 MG/1
5 TABLET ORAL 3 TIMES DAILY
Qty: 90 TABLET | Refills: 5 | Status: SHIPPED | OUTPATIENT
Start: 2022-10-17

## 2022-10-17 RX ORDER — FLUDROCORTISONE ACETATE 0.1 MG/1
0.2 TABLET ORAL DAILY
COMMUNITY
End: 2022-10-17 | Stop reason: SDUPTHER

## 2022-10-17 RX ORDER — PANTOPRAZOLE SODIUM 40 MG/1
40 TABLET, DELAYED RELEASE ORAL DAILY
Qty: 30 TABLET | Refills: 5 | Status: SHIPPED | OUTPATIENT
Start: 2022-10-17

## 2022-10-17 RX ORDER — FLUDROCORTISONE ACETATE 0.1 MG/1
0.2 TABLET ORAL DAILY
Qty: 60 TABLET | Refills: 5 | Status: SHIPPED | OUTPATIENT
Start: 2022-10-17

## 2022-10-17 NOTE — PROGRESS NOTES
Chief Complaint   Patient presents with    Hypertension    Hyperlipidemia       HPI:  Jalen Bruno is a 82 y.o. (: 1935) here today   for   Hypertension  This is a chronic problem. The current episode started more than 1 year ago. Associated symptoms include headaches. Risk factors for coronary artery disease include dyslipidemia and male gender. There are no compliance problems. Hyperlipidemia  This is a chronic problem. The current episode started more than 1 year ago. Current antihyperlipidemic treatment includes statins. There are no compliance problems. Risk factors for coronary artery disease include dyslipidemia, hypertension and male sex. Bp had been low in ecf. Has been on florinef and midodrine to keep bp up. Has cardio appt on thurs later this week. Still w/ lightheadedness. Falls consistently. Sleep pattern a little better. Still w/ hallucinations. Was seen by ? Neuro re issues. Has been destructive behaviors. Seroquel and aricept stopped in hospital due to concerns re polypharmacy. Patient's medications, allergies, past medical, surgical, social and family histories were reviewed and updated as appropriate. ROS:  Review of Systems   Constitutional:  Negative for fever. Musculoskeletal:  Positive for gait problem. Neurological:  Positive for headaches. Psychiatric/Behavioral:  Positive for hallucinations and sleep disturbance.           Hemoglobin A1C (%)   Date Value   2022 5.9     Microscopic Examination (no units)   Date Value   2022 Not Indicated     LDL Calculated (mg/dL)   Date Value   2022 92       Past Medical History:   Diagnosis Date    Atrial fibrillation (HCC)     Blind right eye     Chronic kidney disease     Dementia ([facility] [address]     Hyperlipidemia     Hypertension     Pneumonia        Family History   Problem Relation Age of Onset    Heart Disease Mother     Other Mother     Heart Disease Father     Asthma Other        Social History     Socioeconomic History    Marital status:      Spouse name: Not on file    Number of children: Not on file    Years of education: Not on file    Highest education level: Not on file   Occupational History    Not on file   Tobacco Use    Smoking status: Former     Packs/day: 1.00     Years: 10.00     Pack years: 10.00     Types: Cigarettes     Quit date: 1975     Years since quittin.6    Smokeless tobacco: Never    Tobacco comments:     Quit 40 years ago   Vaping Use    Vaping Use: Never used   Substance and Sexual Activity    Alcohol use: No     Alcohol/week: 0.0 standard drinks    Drug use: No    Sexual activity: Yes     Partners: Female   Other Topics Concern    Not on file   Social History Narrative    Not on file     Social Determinants of Health     Financial Resource Strain: Not on file   Food Insecurity: Not on file   Transportation Needs: Not on file   Physical Activity: Not on file   Stress: Not on file   Social Connections: Not on file   Intimate Partner Violence: Not on file   Housing Stability: Not on file       Prior to Visit Medications    Medication Sig Taking?  Authorizing Provider   fludrocortisone (FLORINEF) 0.1 MG tablet Take 2 tablets by mouth daily Yes Lauraine Cowden, MD   midodrine (PROAMATINE) 5 MG tablet Take 1 tablet by mouth 3 times daily Yes Lauraine Cowden, MD   atorvastatin (LIPITOR) 40 MG tablet Take 1 tablet by mouth daily Yes Lauraine Cowden, MD   pantoprazole (PROTONIX) 40 MG tablet Take 1 tablet by mouth daily Yes Lauraine Cowden, MD   mirabegron (MYRBETRIQ) 25 MG TB24 Take 1 tablet by mouth daily Yes Lauraine Cowden, MD   ondansetron (ZOFRAN) 4 MG tablet Take 1 tablet by mouth Yes Historical Provider, MD   oxybutynin (DITROPAN-XL) 10 MG extended release tablet  Yes Historical Provider, MD   memantine (NAMENDA) 5 MG tablet Take 1 tablet by mouth 2 times daily Yes Lauraine Cowden, MD   citalopram (CELEXA) 10 MG tablet TAKE 1 TABLET BY MOUTH DAILY Yes Citlaly Strickland Trina Weir MD   fluticasone [facility]) 50 MCG/ACT nasal spray 2 sprays by Nasal route daily Yes Emeli York MD   Ferrous Sulfate (IRON) 325 (65 Fe) MG TABS Take by mouth daily  Yes Historical Provider, MD   finasteride (PROSCAR) 5 MG tablet Take 5 mg by mouth daily. Yes Historical Provider, MD   aspirin 81 MG tablet Take 81 mg by mouth daily. Yes Historical Provider, MD   polyethylene glycol (GLYCOLAX) 17 g packet Take by mouth  Historical Provider, MD   Acetaminophen (TYLENOL) 325 MG CAPS Take 650 mg by mouth  Historical Provider, MD   trospium (SANCTURA) 20 MG tablet Take 20 mg by mouth in the morning and 20 mg before bedtime. Historical Provider, MD   apixaban (ELIQUIS) 5 MG TABS tablet TAKE 1/2 TABLET BY MOUTH 2 TIMES DAILY  Emeli York MD   metoprolol succinate (TOPROL XL) 25 MG extended release tablet TAKE 3 TABLETS BY MOUTH TWICE DAILY  Patient taking differently: Take 25 mg by mouth in the morning and 25 mg in the evening. Dalila Gonzales, APRN - CNP   QUEtiapine (SEROQUEL) 50 MG tablet Take 1 tablet by mouth nightly  Emeli York MD   famotidine (PEPCID) 20 MG tablet Take 1 tablet by mouth at bedtime  Emeli York MD   isosorbide mononitrate (IMDUR) 30 MG extended release tablet TAKE 1 TABLET BY MOUTH DAILY  Larene Lefort, MD   donepezil (ARICEPT) 10 MG tablet Take 1 tablet by mouth daily  Historical Provider, MD   tamsulosin (FLOMAX) 0.4 MG capsule Take 0.4 mg by mouth daily  Historical Provider, MD       Allergies   Allergen Reactions    Sulfa Antibiotics        OBJECTIVE:    /86   Pulse 78   Ht 5' 7\" (1.702 m)   Wt 148 lb (67.1 kg)   SpO2 98%   BMI 23.18 kg/m²     BP Readings from Last 2 Encounters:   10/17/22 138/86   09/18/22 (!) 133/93       Wt Readings from Last 3 Encounters:   10/17/22 148 lb (67.1 kg)   09/18/22 151 lb (68.5 kg)   09/08/22 156 lb 6.4 oz (70.9 kg)       Physical Exam  Constitutional:       Appearance: He is well-developed.    HENT:      Head: Normocephalic and atraumatic. Eyes:      Conjunctiva/sclera: Conjunctivae normal.   Neck:      Trachea: No tracheal deviation. Cardiovascular:      Rate and Rhythm: Normal rate. Rhythm irregularly irregular. Heart sounds: No murmur heard. No friction rub. No gallop. Pulmonary:      Effort: Pulmonary effort is normal.      Breath sounds: Normal breath sounds. Abdominal:      Palpations: Abdomen is soft. Tenderness: There is no abdominal tenderness. Musculoskeletal:      Right lower leg: No edema. Left lower leg: No edema. Skin:     General: Skin is warm and dry. Neurological:      Mental Status: He is alert and oriented to person, place, and time. Psychiatric:         Behavior: Behavior is cooperative. Cognition and Memory: Cognition is impaired. Memory is impaired. ASSESSMENT/PLAN:    1. Postural hypotension  Blood pressures well controlled today. He was seen by cardiology when in the hospital.  Continue medications for now. Follow-up with cardiology as scheduled. May be able to reduce or eliminate the Florinef in the future. - fludrocortisone (FLORINEF) 0.1 MG tablet; Take 2 tablets by mouth daily  Dispense: 60 tablet; Refill: 5  - midodrine (PROAMATINE) 5 MG tablet; Take 1 tablet by mouth 3 times daily  Dispense: 90 tablet; Refill: 5    2. Dementia with behavioral disturbance  Ongoing significant issues with hallucinations, sleep issues, and destructive behavior per his wife. He has destroyed some property in the house. He had been on Celexa in the past.  He is currently still taking that. He had been on Seroquel and Aricept in the past as well. These were stopped at a prior hospitalization due to concern for polypharmacy. While I agree with the polypharmacy concerns, his behavior is putting him at risk as well as his family at times.   We will attempt to get the patient into geriatric psychiatry for further evaluation to help with medication management for the behaviors that the patient is exhibiting. We will try adjusting his medicine from a bladder standpoint to see if that may help somewhat. 3. Hyperlipidemia, unspecified hyperlipidemia type  Refill medications today  - atorvastatin (LIPITOR) 40 MG tablet; Take 1 tablet by mouth daily  Dispense: 30 tablet; Refill: 0    4. Urge incontinence  Discontinue oxybutynin as it is likely contributing to his dry mouth, balance issues, and may be negatively impacting his mental status. Trial of medication as below. If tolerates well but still ongoing bladder symptoms, will try increasing dose. May need urology follow-up  - mirabegron (MYRBETRIQ) 25 MG TB24; Take 1 tablet by mouth daily  Dispense: 30 tablet; Refill: 5    5. Gastroesophageal reflux disease, unspecified whether esophagitis present  Refill medications as below  - pantoprazole (PROTONIX) 40 MG tablet; Take 1 tablet by mouth daily  Dispense: 30 tablet; Refill: 5      This document was prepared by a combination of typing and transcription through a voice recognition software.

## 2022-10-19 NOTE — PROGRESS NOTES
Impression: S/P YAG Capsulotomy (Yttrium Aluminum [place]) OD - 7 Days. Presence of intraocular lens  Z96.1.  Excellent post op course   Post operative instructions reviewed - Plan: RTC 1 year complete exam. Occupational Therapy  Facility/Department: [facility]  Rehabilitation Occupational Therapy Daily Treatment Note    Date: 22  Patient Name: Lon Yuan       Room: 9079/0169-40  MRN: [id number]  Account: [de-identified]   : 1935  (82 y.o.) Gender: male                    Past Medical History:  has a past medical history of Atrial fibrillation ([facility] [address] Blind right eye, Chronic kidney disease, Dementia ([facility] [address] Hyperlipidemia, Hypertension, and Pneumonia. Past Surgical History:   has a past surgical history that includes Tonsillectomy; Appendectomy; Colonoscopy; eye surgery; Upper gastrointestinal endoscopy; joint replacement (); hernia repair; and pacemaker placement (10/13/2017). Restrictions  Restrictions/Precautions: Fall Risk;General Precautions  Implants present? : Pacemaker  Other position/activity restrictions: LUE IV, orthostatic (low BP). abdominal binder and compression stockings for OOB activity    Subjective  Subjective: Pt in recliner, eating breakfast, pleasant, no pain, agreeable to OT session and shower, /88, , O2 sats 98% on RA in recliner at start of session. Restrictions/Precautions: Fall Risk;General Precautions             Objective     Cognition  Overall Cognitive Status: Exceptions  Arousal/Alertness: Appropriate responses to stimuli  Following Commands: Follows one step commands with increased time; Follows one step commands with repetition  Attention Span: Attends with cues to redirect  Memory: Decreased short term memory;Decreased recall of precautions;Decreased recall of recent events;Decreased long term memory  Safety Judgement: Decreased awareness of need for safety;Decreased awareness of need for assistance  Problem Solving: Decreased awareness of errors;Assistance required to identify errors made;Assistance required to generate solutions;Assistance required to correct errors made  Insights: Decreased awareness of deficits  Initiation: Requires cues for toilet; Wheelchair  Additional Factors: Verbal cues; With handrails;Hand placement cues; Increased time to complete  Device: Walker  Sit to Stand  Assistance Level: Contact guard assist  Skilled Clinical Factors: cues for hand placement  Stand to Sit  Assistance Level: Contact guard assist  Skilled Clinical Factors: cues for hand placement  Bed To/From Chair  Technique: Stand step  Assistance Level: Contact guard assist  Stand Pivot  Assistance Level: Contact guard assist         Assessment  Assessment  Assessment: Pt agreeable to OT session. Pt performed functional transfers with CGA and use of RW with improved balance from previous day but still with posterior lean at times. Pt required CGA for bathing and toileting due to mild unsteadiness. Pt exhibited increasing dizziness after toileting and transfer into shower. BP machine unable to read on 3 attempts. Pt dizziness improved after sitting on TTB for ~5 minutes. Pt completed UB dressing with min A and LB dressing with min A and difficulty problem solving for donning belt and ADITYA hose. RN took manual BP after shower, 86/54 and irregular HR. Pt completed grooming seated at sink with mod I. Pt feeling minimally light headed after donning socks seated. RN took manual BP, 108/58, HR reading on monitor 120-135 with irregular HR. Pt returned to recliner with feet elevated. No c/o dizziness on departure. Continue POC. Activity Tolerance: Treatment limited secondary to medical complications; Patient limited by endurance; Patient limited by fatigue  Discharge Recommendations: Subacute/Skilled Nursing Facility  OT Equipment Recommendations  Other: defer to next level of care  Safety Devices  Safety Devices in place: Yes  Type of devices: Gait belt;Nurse notified; Patient at risk for falls;Call light within reach; Chair alarm in place; Left in chair    Patient Education  Education  Education Given To: Patient  Education Provided: Role of Therapy; ADL Function;Plan of Care;DME/Home Modifications;Transfer Training;Precautions; Safety; Fall Prevention Strategies; Mobility Training;Equipment; Family Education;Cognition  Education Provided Comments: role of OT, need for checking BP, purpose of ADITYA hose/abdominal binder, BUE ther ex, safety with transfers, hand placement for sit>stands, safety with ADLs, need to stay seated without ADITYA hose on  Education Method: Demonstration;Verbal  Barriers to Learning: Cognition  Education Outcome: Continued education needed; Unable to demonstrate understanding;Verbalized understanding    Plan  Plan  Times per Week: 5 of 7 days  Current Treatment Recommendations: Strengthening;Balance training;Functional mobility training; Endurance training;Patient/Caregiver education & training;Equipment evaluation, education, & procurement;Self-Care / ADL; Safety education & training    Goals  Short Term Goals  Time Frame for Short term goals: 1 week (by 8/21 /22)  Short Term Goal 1: Pt will complete LBD with SPV, progressing. Pt continues to require SBA/CGA for LB dressing. Short Term Goal 2: Pt will tolerate x 5 minutes of standing for improved activity azeem. GOAL MET 8/17/22 Pt tolerated more than 5 minutes of standing activity. Short Term Goal 3: Pt will complete toileting with SPV. progressing. Pt continues to require CGA/min A for toileting. Short Term Goal 4: Pt will complete in room mobility and transfers with LRAD with SPV. progressing. Pt continues to require SBA/CGA for mobility in room and functional transfers. Long Term Goals  Time Frame for Long term goals : 14 days by 8/28/22  Long Term Goal 1: Pt will complete total body dressing with mod I. progressing. Pt continues to require CGA/min A for dressing. Long Term Goal 2: Pt will complete total body showering with mod I. progressing. Pt continues to require CGA for bathing. Long Term Goal 3: Pt will complete dynamic balance task reaching to high/low levels with SBA and LRAD. progressing.  Pt continues to require CGA for mobility and balance.     Therapy Time   Individual Concurrent Group Co-treatment   Time In 0730         Time Out 0830         Minutes 60         Timed Code Treatment Minutes: [address]

## 2022-10-20 ENCOUNTER — OFFICE VISIT (OUTPATIENT)
Dept: CARDIOLOGY CLINIC | Age: 87
End: 2022-10-20
Payer: MEDICARE

## 2022-10-20 ENCOUNTER — TELEPHONE (OUTPATIENT)
Dept: FAMILY MEDICINE CLINIC | Age: 87
End: 2022-10-20

## 2022-10-20 ENCOUNTER — NURSE ONLY (OUTPATIENT)
Dept: CARDIOLOGY CLINIC | Age: 87
End: 2022-10-20
Payer: MEDICARE

## 2022-10-20 VITALS
BODY MASS INDEX: 22.76 KG/M2 | DIASTOLIC BLOOD PRESSURE: 90 MMHG | OXYGEN SATURATION: 95 % | SYSTOLIC BLOOD PRESSURE: 152 MMHG | HEART RATE: 85 BPM | HEIGHT: 67 IN | WEIGHT: 145 LBS

## 2022-10-20 DIAGNOSIS — I48.91 ATRIAL FIBRILLATION, RAPID (HCC): ICD-10-CM

## 2022-10-20 DIAGNOSIS — F03.918 DEMENTIA WITH BEHAVIORAL DISTURBANCE: Primary | ICD-10-CM

## 2022-10-20 DIAGNOSIS — I95.1 ORTHOSTATIC HYPOTENSION: ICD-10-CM

## 2022-10-20 DIAGNOSIS — R55 SYNCOPE AND COLLAPSE: ICD-10-CM

## 2022-10-20 DIAGNOSIS — Z95.0 CARDIAC RESYNCHRONIZATION THERAPY PACEMAKER (CRT-P) IN PLACE: ICD-10-CM

## 2022-10-20 DIAGNOSIS — R55 SYNCOPE, UNSPECIFIED SYNCOPE TYPE: ICD-10-CM

## 2022-10-20 DIAGNOSIS — I48.21 PERMANENT ATRIAL FIBRILLATION (HCC): Primary | ICD-10-CM

## 2022-10-20 DIAGNOSIS — Z95.0 CARDIAC RESYNCHRONIZATION THERAPY PACEMAKER (CRT-P) IN PLACE: Primary | ICD-10-CM

## 2022-10-20 DIAGNOSIS — I48.91 ATRIAL FIBRILLATION WITH RAPID VENTRICULAR RESPONSE (HCC): ICD-10-CM

## 2022-10-20 DIAGNOSIS — I25.5 ISCHEMIC CARDIOMYOPATHY: ICD-10-CM

## 2022-10-20 DIAGNOSIS — I48.91 ATRIAL FIBRILLATION WITH RVR (HCC): ICD-10-CM

## 2022-10-20 PROCEDURE — G8427 DOCREV CUR MEDS BY ELIG CLIN: HCPCS

## 2022-10-20 PROCEDURE — G8484 FLU IMMUNIZE NO ADMIN: HCPCS

## 2022-10-20 PROCEDURE — 99214 OFFICE O/P EST MOD 30 MIN: CPT

## 2022-10-20 PROCEDURE — G8420 CALC BMI NORM PARAMETERS: HCPCS

## 2022-10-20 PROCEDURE — 1123F ACP DISCUSS/DSCN MKR DOCD: CPT

## 2022-10-20 PROCEDURE — 93280 PM DEVICE PROGR EVAL DUAL: CPT | Performed by: INTERNAL MEDICINE

## 2022-10-20 PROCEDURE — 1036F TOBACCO NON-USER: CPT

## 2022-10-20 NOTE — PATIENT INSTRUCTIONS
No changes today, continue your current medications    Try to drink more water instead of diet coke     Remote device transmissions every three months      Follow up as scheduled on 12/13/2022

## 2022-10-20 NOTE — PROGRESS NOTES
Patient presents to the device clinic today for a programming evaluation for his pacemaker. Patient has a history of AF, AF w/ RVR, and ICM. Last device interrogation was on 9/16. Since then, no arrhythmias recorded. R wave: N/A     >99%    All sensing and pacing parameters are within normal range. No changes need to be made at this time. Patient will see NP Yumiko in office today. Patient education was provided about device functionality, in home monitoring, and any other patient questions and/or concerns were addressed. Patient voices understanding. Patient will follow up in 3 months in office or remotely. Please see interrogation for more detail - Paceart report located under the Cardiology tab.

## 2022-10-20 NOTE — TELEPHONE ENCOUNTER
Cardiology Follow Up Progress Note    Date of Service  12/17/2018    Attending Physician  Vanessa Martin M.D.    Chief Complaint   Leg Swelling    Consulted for acute on chronic diastolic heart failure with persistent and severe orthostatic hypotension.     MARGUERITE Castro is a 57 y.o. male admitted 11/21/2018 with shortness of breath and bilateral lower leg swelling.  Patient reported 40 pound weight gain in 2 weeks.    Past medical history significant for atrial fibrillation with rate related cardiomyopathy and prior history of depressed LV systolic function.  Other past medical history significant for obesity, hypertension, hyperlipidemia, DM2 and, psychogenic nonepileptic seizure and chronic anticoagulation with Xarelto.    Interim Events  12/17/18: Patient resting in chair at bedside anxious to know what the plan is. Denies chest pain or shortness of breath. Does have some dizziness with prolonged periods of standing.     Monitor: SR 52-60 without ectopy.     Labs:  CBC Stable   BMP Stable    Review of Systems  Review of Systems   Constitutional: Negative for fever.   Respiratory: Negative for chest tightness and shortness of breath.    Cardiovascular: Positive for leg swelling. Negative for chest pain and palpitations.   Gastrointestinal: Negative for abdominal pain and blood in stool.   Genitourinary: Negative for hematuria.   Musculoskeletal: Negative for gait problem.   Neurological: Positive for dizziness (Occasionaly standing). Negative for syncope.   All other systems reviewed and are negative.      Vital signs in last 24 hours  Temp:  [36.1 °C (96.9 °F)-36.7 °C (98 °F)] 36.3 °C (97.3 °F)  Pulse:  [57-71] 62  Resp:  [16-18] 17  BP: ()/(63-89) 155/89    Physical Exam  Physical Exam   Constitutional: He is oriented to person, place, and time. He appears well-developed and well-nourished.   HENT:   Head: Normocephalic.   Eyes: EOM are normal.   Neck: No JVD present.   Cardiovascular: Normal  While speaking with psychiatry regarding another patient, I inquired about the issues that this patient is facing. We discussed his underlying dementia, as well as hallucinations and other issues that he is facing. While the psychiatrist does not currently excepting new patients, he did make some recommendations about potential medication options. While there is some concerned about using any antipsychotic medication in a patient with dementia, he did mention that potentially using a low-dose of Risperdal such as 0.25 mg twice daily may help with agitation. He also mentioned potentially using a low-dose of Depakote, such as 125 mg, at bedtime as well. If they are interested in trial of this medication, please let me know. I attempted to call his wife, but and left a message on her phone. They do need to note that there is some risk of this medication, but it may help agitation and benefits likely outweigh that risk. We would have to monitor closely, however. rate, regular rhythm, normal heart sounds and intact distal pulses.    Pulmonary/Chest: Effort normal and breath sounds normal.   Abdominal: Soft. There is no tenderness.   Musculoskeletal: Normal range of motion. He exhibits edema (Trace).   Neurological: He is alert and oriented to person, place, and time.   Skin: Skin is warm and dry.   Chronic stasis dermatitis observed on bilateral lower extremities.    Psychiatric: He has a normal mood and affect. His behavior is normal. Thought content normal.   Nursing note and vitals reviewed.      Lab Review  Lab Results   Component Value Date/Time    WBC 8.9 12/15/2018 12:22 AM    RBC 5.79 12/15/2018 12:22 AM    HEMOGLOBIN 16.0 12/15/2018 12:22 AM    HEMATOCRIT 49.6 12/15/2018 12:22 AM    MCV 85.7 12/15/2018 12:22 AM    MCH 27.6 12/15/2018 12:22 AM    MCHC 32.3 (L) 12/15/2018 12:22 AM    MPV 11.1 12/15/2018 12:22 AM      Lab Results   Component Value Date/Time    SODIUM 136 12/16/2018 02:48 AM    POTASSIUM 4.1 12/16/2018 02:48 AM    CHLORIDE 103 12/16/2018 02:48 AM    CO2 25 12/16/2018 02:48 AM    GLUCOSE 89 12/16/2018 02:48 AM    BUN 23 (H) 12/16/2018 02:48 AM    CREATININE 0.76 12/16/2018 02:48 AM      Lab Results   Component Value Date/Time    ASTSGOT 22 12/04/2018 10:28 AM    ALTSGPT 27 12/04/2018 10:28 AM     Lab Results   Component Value Date/Time    CHOLSTRLTOT 104 11/22/2018 12:07 AM    LDL 60 11/22/2018 12:07 AM    HDL 27 (A) 11/22/2018 12:07 AM    TRIGLYCERIDE 87 11/22/2018 12:07 AM    TROPONINI <0.01 12/04/2018 10:28 AM             Cardiac Imaging and Procedures Review  Echocardiogram 7/9/18:  Moderately reduced left ventricular systolic function.  Mild concentric left ventricular hypertrophy.  Aortic sclerosis without stenosis.  Estimated right ventricular systolic pressure  is 30 mmHg.  Compared to the images of the study done on 01/27/2018 there has been   reduction in left ventricular ejection fraction.    Echocardiogram 11/2/18:  CONCLUSIONS  Prior echo  done 07/09/18, EF now normal.  Quality improved with contrast.  Left ventricular ejection fraction is visually estimated to be 60%.  Mild concentric left ventricular hypertrophy.  Mild mitral annular calcifcation, mild MR.  Mild tricuspid regurgitation.  Estimated right ventricular systolic pressure  is 30 mmHg.    Left Ventricle  Contrast was used to enhance visualization of the endocardial border. 3ML of contrast was administered. Existing IV was used. Normal left ventricular chamber size. Mild concentric left ventricular hypertrophy. Normal left ventricular systolic function. Left ventricular ejection fraction is visually estimated to be 60%. Normal regional wall motion. Indeterminate diastolic function.    Imaging    Stress Test 1/29/18:  No evidence of significant jeopardized viable myocardium or prior myocardial infarction.  Normal left ventricular size, ejection fraction, and wall motion.    Assessment/Plan  HFpEF:  -LVEF per echo on 11/22/18 was 60%.   -Continue Lasix 20 mg prn edema or SOB.   -RHC planned for today.     Atrial Fibrillation:  -S/P DCCV on 11/26/18.  -Maintaining NSR.  -Continue low dose Amiodarone 50 mg daily due to orthostatic hypotension.     Orthostatic Hypotension:  -Significant and symptomatic.   -Tilt table test cancelled due to patients significant orthostatic hypotension, notified Dr. Tolentino.   -Continue Florinef 0.3 mg daily, Midodrine 10 mg TID and Mestinon 90 mg TID.    Hyponatremia:  -Stable.  -Continue sodium chloride 1 g TID.     HLD:  -Stable.  -LDL on 11/22/18 was 60 with .  -Continue Pravastatin 20 mg daily.     Thank you for allowing us to participate in the care of this patient. We will continue to follow alongside you in the care of this patient. Please let us know if you have any questions or concerns.     Future Appointments  Date Time Provider Department Center   12/20/2018 9:15 AM [facility] EXAM 5 [facility] None   12/24/2018 12:15 PM Sirena Larson M.D. [facility] None    12/28/2018 3:30 PM German Hinton M.D. [facility] None         CRYSTAL Llanos.   [facility]  [phone]

## 2022-10-20 NOTE — PROGRESS NOTES
[facility]   Electrophysiology Outpatient Note              Date:  October 20, 2022  Patient name: Jodee Zhou  YOB: 1935    Primary Care physician: Natali Hernandez MD    HISTORY OF PRESENT ILLNESS: The patient is a 82 y.o.  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 In 10/2017 he had a single chamber pacemaker implanted in [place], [place]. In 5/2020 he was seen by Dr. Vishnu House for shortness of breath. A lexiscan stress test was ordered. Lalita Susana showed an EF of 64% and a small mild defect at the apex consistent with ischemia. On 5/22/2020 he underwent LHC that showed mild to moderate diffuse epicardial CAD, 50% stenosis of mild LAD and EF of 55-60%. On 5/22/2020 EKG showed AF. He was admitted to the hospital in 8/2022 with recurrent syncope after a fall that resulted in laceration of his skull requiring staples. Echo showed an EF of 35-40% with hypokinesis of the anterior, septal, and apical walls. Interventional cardiology recommended LHC, but this was not completed due to deterioration in his mental status. This was planned to be done outpatient. He was treated for orthostatic hypotension and started on midodrine and florinef. All heart rate controlling medications were discontinued due to blood pressure. His Eliquis was discontinued due to risk of falls being greater than the benefit. On 9/08/2022 he was seen in the office. AF rates were in the 130's-170's. He complained of dizziness and lightheadedness. Orthostatic BP was positive. He was sent to the ED. He had a R/LHC that showed slight progression of his two vessel CAD and normal filling pressures. Echo showed EF of 35-40%. On 9/16/2022 he underwent PPM upgrade to BiV pacemaker and AV node ablation. Today he is being seen for AF and tachy-bruce syndrome. He presents to the office with his wife. He reports he is still falling. His wife isn't sure when his last fall was. He is using his cane.  He is done with physical therapy. He denies chest pain, shortness of breath and palpitations. He reports occasional lightheadedness. He lost his glasses. He does not drink much water and mostly drinks diet coke. He drinks 1-2 cans a day. Orthostatics:   Sitting: 160/102  Standin/98    Device check today shows:   Brand: Abbott  Mode: VVIR  Normal function  >99%   Arrhythmias: none  Battery life 3.1-3.4 years  RV impedance 680 ohms   LV impedance 430 ohms  RV threshold 1.0 V @ 1.0 ms  LV threshold 1.75  V @ 1.0 ms  RV sensitivity 3.0 mV    Past Medical History:   has a past medical history of Atrial fibrillation ([facility] [address] Blind right eye, Chronic kidney disease, Dementia ([facility] [address] Hyperlipidemia, Hypertension, and Pneumonia. Past Surgical History:   has a past surgical history that includes Tonsillectomy; Appendectomy; Colonoscopy; eye surgery; Upper gastrointestinal endoscopy; joint replacement (); hernia repair; pacemaker placement (10/13/2017); ablation of dysrhythmic focus; and pacemaker placement (2022). Home Medications:    Prior to Admission medications    Medication Sig Start Date End Date Taking?  Authorizing Provider   fludrocortisone (FLORINEF) 0.1 MG tablet Take 2 tablets by mouth daily 10/17/22  Yes Alane Lanes, MD   midodrine (PROAMATINE) 5 MG tablet Take 1 tablet by mouth 3 times daily 10/17/22  Yes Alane Lanes, MD   atorvastatin (LIPITOR) 40 MG tablet Take 1 tablet by mouth daily 10/17/22  Yes Alane Lanes, MD   pantoprazole (PROTONIX) 40 MG tablet Take 1 tablet by mouth daily 10/17/22  Yes Alane Lanes, MD   mirabegron [facility]) 25 MG TB24 Take 1 tablet by mouth daily 10/17/22  Yes Alane Lanes, MD   polyethylene glycol (GLYCOLAX) 17 g packet Take by mouth 22  Yes Historical Provider, MD   Acetaminophen (TYLENOL) 325 MG CAPS Take 650 mg by mouth 22  Yes Historical Provider, MD   ondansetron (ZOFRAN) 4 MG tablet Take 1 tablet by mouth 22  Yes Historical Provider, MD   oxybutynin (DITROPAN-XL) 10 MG extended release tablet  6/10/22  Yes Historical Provider, MD   memantine (NAMENDA) 5 MG tablet Take 1 tablet by mouth 2 times daily 5/20/22  Yes Emeli York MD   citalopram (CELEXA) 10 MG tablet TAKE 1 TABLET BY MOUTH DAILY 3/28/22  Yes Emeli York MD   fluticasone [facility]) 50 MCG/ACT nasal spray 2 sprays by Nasal route daily 2/1/22  Yes Emeli York MD   Ferrous Sulfate (IRON) 325 (65 Fe) MG TABS Take by mouth daily    Yes Historical Provider, MD   finasteride (PROSCAR) 5 MG tablet Take 5 mg by mouth daily. 3/4/15  Yes Historical Provider, MD   aspirin 81 MG tablet Take 81 mg by mouth daily. Yes Historical Provider, MD   trospium (SANCTURA) 20 MG tablet Take 20 mg by mouth in the morning and 20 mg before bedtime. 8/26/22  Historical Provider, MD   apixaban (ELIQUIS) 5 MG TABS tablet TAKE 1/2 TABLET BY MOUTH 2 TIMES DAILY 8/8/22 8/26/22  Emeli York MD   metoprolol succinate (TOPROL XL) 25 MG extended release tablet TAKE 3 TABLETS BY MOUTH TWICE DAILY  Patient taking differently: Take 25 mg by mouth in the morning and 25 mg in the evening. 7/25/22 8/26/22  GUS Morales - CNP   QUEtiapine (SEROQUEL) 50 MG tablet Take 1 tablet by mouth nightly 7/23/22 8/26/22  Emeli York MD   famotidine (PEPCID) 20 MG tablet Take 1 tablet by mouth at bedtime 7/8/22 8/26/22  Emeli York MD   isosorbide mononitrate (IMDUR) 30 MG extended release tablet TAKE 1 TABLET BY MOUTH DAILY 6/1/22 8/26/22  Larene Lefort, MD   donepezil (ARICEPT) 10 MG tablet Take 1 tablet by mouth daily 3/24/22 8/26/22  Historical Provider, MD   tamsulosin (FLOMAX) 0.4 MG capsule Take 0.4 mg by mouth daily  8/26/22  Historical Provider, MD     Allergies:  Sulfa antibiotics    Social History:   reports that he quit smoking about 47 years ago. His smoking use included cigarettes. He has a 10.00 pack-year smoking history.  He has never used smokeless tobacco. He reports that he does not drink alcohol and does not use drugs. Family History: family history includes Asthma in an other family member; Heart Disease in his father and mother; Other in his mother. All 14 point review of systems are completed and pertinent positives are mentioned in the history of present illness. Other systems are reviewed and are negative. PHYSICAL EXAM:    Vital signs:    BP (!) 152/90   Pulse 85   Ht 5' 7\" (1.702 m)   Wt 145 lb (65.8 kg)   SpO2 95%   BMI 22.71 kg/m²      Constitutional and general appearance: alert, cooperative, no distress, and appears stated age  HEENT: PERRL, no cervical lymphadenopathy. No masses palpable. Normal oral mucosa  Respiratory:  Normal excursion and expansion without use of accessory muscles  Resp auscultation: Normal breath sounds without wheezing, rhonchi, and rales  Cardiovascular: The apical impulse is not displaced  Heart tones are crisp and normal. irregular S1 and S2., fast  Jugular venous pulsation Normal  The carotid upstroke is normal in amplitude and contour without delay or bruit  Peripheral pulses are symmetrical and full   Chest wall:  Left chest wall incision is well healed, swelling has significantly improved. Site is soft. Abdomen:  No masses or tenderness  Bowel sounds present  Extremities:   No cyanosis or clubbing   No lower extremity edema   Skin: warm and dry  Neurological:  Alert and oriented  Moves all extremities well  No abnormalities of mood, affect, memory, mentation, or behavior are noted    DATA:    [facility] 9/13/2022:  FINDINGS      HEMODYNAMICS     CHAMBER PRESSURE SATURATION   RA 5 54%   RV 20/4     PA 21/13 48%   PW 11     AORTA 110/68 85%      TASHA CARDIAC OUTPUT 3.8 L/min   SVR 1630            Left heart catheterization     LVEDP 11   GRADIENT ACROSS AORTIC VALVE None      CORONARY ARTERIES     LM Less than 10% [id number] stenosis.          LAD Calcified, proximal 40 to 50% stenosis, mid-distal 60 to 75% stenosis. LCX Calcified, there is proximal-mid 75% eccentric stenosis at a bifurcation with OM1 and OM 2 as well as the AV groove. RCA Dominant, calcified, large vessel, [id number] 40% stenoses. CONCLUSIONS:      Upper normal filling pressures  Two-vessel CAD/ASHD, slight progression since last angiograms from 2020  We will treat medically given advanced age and comorbidities including renal insufficiency  Continue with aspirin statin and beta-blocker    Limited echo 8/10/2022:   Conclusions   Summary   Limited only f/u for LVEF. The left ventricular systolic function is moderately reduced with an   ejection fraction of 35 - 40 %. Left ventricular cavity size is normal.   Hypokinesis of the anterior, septal, and apical walls. Limited echo 4/15/2022:    Summary   Limited echo for LV function with limited doppler/color. LV function is mildly decreased with EF estimated from 45-50%. Mild global hypokinesis. Mild concentric left ventricular hypertrophy. Diastolic dysfunction grade and filling pressure are indeterminate. Severe biatrial enlargement. Aortic valve appears sclerotic but opens adequately. 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 Note no color flow doppler visualized to assess for mitral regurgitation. Mild to moderate tricuspid valve regurgitation. Systolic pulmonary artery pressure (sPAP) is normal and estimated at 26 mmHg   (RAP 3 mmHg)   Irregular heart rate throughout study. Definity® used for myocardial border enhancement. ECHO: 3/21/2019:  Summary   Normal left ventricular systolic function with an estimated ejection   fraction of 55-60%. No regional wall motion abnormalities are seen. Normal left ventricular diastolic filling pressure. The left atrium is mildly dilated. The right atrium is moderately dilated. Right ventricular systolic function is mildly reduced . The right ventricle is mildly enlarged.    The ascending aorta is mildly dilated. 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 Mild mitral regurgitation. Moderate tricuspid regurgitation. Systolic pulmonary artery pressure (SPAP) estimated at 47 mmHg (RA pressure   15 mmHg), consistent with mild pulmonary hypertension. Pacemaker lead in right ventricle. All labs and testing reviewed. CARDIOLOGY LABS:   CBC: No results for input(s): WBC, HGB, HCT, PLT in the last 72 hours. BMP: No results for input(s): NA, K, CO2, BUN, CREATININE, LABGLOM, GLUCOSE in the last 72 hours. PT/INR: No results for input(s): PROTIME, INR in the last 72 hours. APTT:No results for input(s): APTT in the last 72 hours. FASTING LIPID PANEL:  Lab Results   Component Value Date/Time    HDL 30 09/09/2022 05:45 AM    LDLDIRECT 73.3 07/19/2021 04:30 AM    LDLCALC 92 09/09/2022 05:45 AM    TRIG 73 09/09/2022 05:45 AM     LIVER PROFILE:No results for input(s): AST, ALT, ALB in the last 72 hours.     IMPRESSION:    Patient Active Problem List   Diagnosis    Pneumonia    Abnormal chest CT    Cough syncope    Tracheobronchomalacia    Hyperlipidemia    Hypertension    Chronic kidney disease, stage III (moderate) (HCC)    A-fib (HCC)    Cardiac resynchronization therapy pacemaker (CRT-P) in place    Abnormal echocardiogram    Dizziness    SOB (shortness of breath)    Abnormal cardiovascular stress test    Coronary artery disease involving native heart without angina pectoris    Other chest pain    Atrial fibrillation with rapid ventricular response (HCC)    Syncope and collapse    COVID    NATANAEL (acute kidney injury) ([facility] [address]    Atrial fibrillation with RVR (HCC)    Dementia with behavioral disturbance    Hallucinations    Syncope, unspecified syncope type    Ischemic cardiomyopathy    Atrial fibrillation, rapid (HCC)    Orthostatic hypotension     Assessment:   Persistent atrial fibrillation: ongoing   -[id number] score 3 (age, HTN)   -s/p AV node ablation 9/16/2022  Tachy-bruce syndrome:   -s/p single chamber pacemaker implant 10/2017, upgrade to BiV pacemaker 9/16/2022   -device check per HPI  Ischemic cardiomyopathy: ongoing    -EF 35-40% on echo 8/2022, EF 45-50% on echo 4/2022  Coronary artery disease: nonobstructive   -progression of two vessel CAD on [facility] 9/2022   -50% stenosis of LAD on [facility] 2020  HTN: stable  Orthostatic hypotension: ongoing   -orthostatics negative today   -on midodrine, florinef  HLD  CKD   Dementia with memory loss      Plan:   Continue atorvastatin, Florinef and midodrine  No beta blocker or ACE/ARB/ARNi due to orthostatic hypotension and risk for injury  No anticoagulation due to risk of injury and bleeding  Increase water intake. Try to drink more water than diet coke  Remote device transmissions every three months    6.    Follow up as scheduled on 12/13/2022    [facility] GUS Aguiar-CNP  [facility]  [phone]

## 2022-10-24 RX ORDER — DIVALPROEX SODIUM 125 MG/1
125 TABLET, DELAYED RELEASE ORAL NIGHTLY
Qty: 30 TABLET | Refills: 3 | Status: SHIPPED | OUTPATIENT
Start: 2022-10-24

## 2022-10-24 RX ORDER — RISPERIDONE 0.25 MG/1
0.25 TABLET, FILM COATED ORAL 2 TIMES DAILY
Qty: 60 TABLET | Refills: 3 | Status: SHIPPED | OUTPATIENT
Start: 2022-10-24

## 2022-10-27 ENCOUNTER — CARE COORDINATION (OUTPATIENT)
Dept: CARE COORDINATION | Age: 87
End: 2022-10-27

## 2022-10-27 NOTE — CARE COORDINATION
Multiple attempts to contact patient unsuccessful; patient has not returned calls  No further outreach scheduled with this ACM; episode of care resolved

## 2022-11-21 DIAGNOSIS — E78.5 HYPERLIPIDEMIA, UNSPECIFIED HYPERLIPIDEMIA TYPE: ICD-10-CM

## 2022-11-21 RX ORDER — ATORVASTATIN CALCIUM 40 MG/1
40 TABLET, FILM COATED ORAL DAILY
Qty: 90 TABLET | Refills: 3 | Status: SHIPPED | OUTPATIENT
Start: 2022-11-21

## 2022-12-05 RX ORDER — MEMANTINE HYDROCHLORIDE 5 MG/1
5 TABLET ORAL 2 TIMES DAILY
Qty: 60 TABLET | Refills: 2 | Status: SHIPPED | OUTPATIENT
Start: 2022-12-05

## 2022-12-14 ENCOUNTER — TELEPHONE (OUTPATIENT)
Dept: CARDIOLOGY CLINIC | Age: 87
End: 2022-12-14

## 2022-12-14 NOTE — TELEPHONE ENCOUNTER
Pts wife called and stated that they missed the appt with agk due to not getting a reminder call. Pt was suppose to see agk on 12/13/2022 for a hsfu. Is there an overbook date and time. Please advise.

## 2022-12-15 NOTE — TELEPHONE ENCOUNTER
Please schedule the patient at this day and time. 01/03/2022 at 2:30pm. The patient prior to this is a new patient, okay to overbook.

## 2022-12-15 NOTE — TELEPHONE ENCOUNTER
12/15 called ([phone]) unable to make contact with pt. LM for pt to call I and verify if 1/3/23 @ 2:30 with [facility] is an okay day and time for pt to come in. Will call back at a later time.

## 2022-12-15 NOTE — TELEPHONE ENCOUNTER
12/15 Called [phone] unable to make contact, LM for pt to call [facility] and schedule for approved overbook date and time

## 2022-12-15 NOTE — TELEPHONE ENCOUNTER
OK to overannie [address] 2:30PM 1/3/2023. There is nothing available for [employer] the rest of the year.

## 2022-12-19 NOTE — TELEPHONE ENCOUNTER
12/19 Pt had previously confirmed this appt with Tierney Espinoza [phone] (wifes number) [phone] (pt number), left message at both numbers to let pt know this appt has been scheduled. Mailing an appointment reminder to listed address on file as well. On vm let pt know to call [facility] either way and let know if appt still works or does not work.

## 2023-01-01 ENCOUNTER — CLINICAL DOCUMENTATION ONLY (OUTPATIENT)
Facility: CLINIC | Age: 88
End: 2023-01-01

## 2023-01-03 ENCOUNTER — NURSE ONLY (OUTPATIENT)
Dept: CARDIOLOGY CLINIC | Age: 88
End: 2023-01-03
Payer: MEDICARE

## 2023-01-03 ENCOUNTER — OFFICE VISIT (OUTPATIENT)
Dept: CARDIOLOGY CLINIC | Age: 88
End: 2023-01-03
Payer: MEDICARE

## 2023-01-03 VITALS
BODY MASS INDEX: 24.23 KG/M2 | SYSTOLIC BLOOD PRESSURE: 130 MMHG | HEART RATE: 81 BPM | WEIGHT: 154.4 LBS | OXYGEN SATURATION: 96 % | HEIGHT: 67 IN | DIASTOLIC BLOOD PRESSURE: 84 MMHG

## 2023-01-03 DIAGNOSIS — Z95.0 CARDIAC RESYNCHRONIZATION THERAPY PACEMAKER (CRT-P) IN PLACE: ICD-10-CM

## 2023-01-03 DIAGNOSIS — I25.5 ISCHEMIC CARDIOMYOPATHY: ICD-10-CM

## 2023-01-03 DIAGNOSIS — Z09 HOSPITAL DISCHARGE FOLLOW-UP: ICD-10-CM

## 2023-01-03 DIAGNOSIS — R55 SYNCOPE AND COLLAPSE: ICD-10-CM

## 2023-01-03 DIAGNOSIS — I48.91 ATRIAL FIBRILLATION WITH RVR (HCC): ICD-10-CM

## 2023-01-03 DIAGNOSIS — I48.91 ATRIAL FIBRILLATION WITH RAPID VENTRICULAR RESPONSE (HCC): ICD-10-CM

## 2023-01-03 DIAGNOSIS — I48.11 LONGSTANDING PERSISTENT ATRIAL FIBRILLATION (HCC): Primary | ICD-10-CM

## 2023-01-03 DIAGNOSIS — I48.91 ATRIAL FIBRILLATION, RAPID (HCC): Primary | ICD-10-CM

## 2023-01-03 PROCEDURE — G8427 DOCREV CUR MEDS BY ELIG CLIN: HCPCS | Performed by: INTERNAL MEDICINE

## 2023-01-03 PROCEDURE — 1123F ACP DISCUSS/DSCN MKR DOCD: CPT | Performed by: INTERNAL MEDICINE

## 2023-01-03 PROCEDURE — 3075F SYST BP GE 130 - 139MM HG: CPT | Performed by: INTERNAL MEDICINE

## 2023-01-03 PROCEDURE — 3079F DIAST BP 80-89 MM HG: CPT | Performed by: INTERNAL MEDICINE

## 2023-01-03 PROCEDURE — G8484 FLU IMMUNIZE NO ADMIN: HCPCS | Performed by: INTERNAL MEDICINE

## 2023-01-03 PROCEDURE — 93281 PM DEVICE PROGR EVAL MULTI: CPT | Performed by: INTERNAL MEDICINE

## 2023-01-03 PROCEDURE — 1036F TOBACCO NON-USER: CPT | Performed by: INTERNAL MEDICINE

## 2023-01-03 PROCEDURE — 1111F DSCHRG MED/CURRENT MED MERGE: CPT | Performed by: INTERNAL MEDICINE

## 2023-01-03 PROCEDURE — G8420 CALC BMI NORM PARAMETERS: HCPCS | Performed by: INTERNAL MEDICINE

## 2023-01-03 PROCEDURE — 93000 ELECTROCARDIOGRAM COMPLETE: CPT | Performed by: INTERNAL MEDICINE

## 2023-01-03 PROCEDURE — 99214 OFFICE O/P EST MOD 30 MIN: CPT | Performed by: INTERNAL MEDICINE

## 2023-01-03 NOTE — PROGRESS NOTES
Patient presents to the device clinic today for a programming evaluation for his pacemaker. Patient has a history of ICM and AF w RVR. Last device interrogation was on 10/20. Since then, 2 HVR events recorded - appears to be RVR. R wave: N/A    BVP >99%    All sensing and pacing parameters are within normal range. No changes need to be made at this time. Patient will see Dr. Katerina Sheffield today in office. Patient education was provided about device functionality, in home monitoring, and any other patient questions and/or concerns were addressed. Patient voices understanding. Patient will follow up in 3 months in office or remotely. Please see interrogation for more detail - Paceart report located under the Cardiology tab.

## 2023-01-03 NOTE — PATIENT INSTRUCTIONS
RECOMMENDATIONS:  Remote device checks every 3 months. Continue current medications. Discussed blood thinner versus Watchman device. Patient defers for now. Follow up in 6 months with EP NP.

## 2023-01-03 NOTE — PROGRESS NOTES
[address]   Electrophysiology Consult Note              Date:  January 3, 2023  Patient name: William Maldonado  YOB: 1935    Chief Complaint:  Follow-Up from Hospital, Device Check, and Atrial Fibrillation    Primary Care Physician:Jose De Jesus Richards MD    HISTORY OF PRESENT ILLNESS: William Maldonado is a 82 y.o. male who presents for evaluation to establish care for atrial Fibrillation/pacemaker management and transmission. He is followed by my colleague Dr. Stacie Mayo for interventional cardiology. On 5/4/20 he was seen by Dr Stacie Mayo for his shortness of breath. A Lexiscan Stress Test was ordered, which he underwent on 5/12/20 which demonstrated an EF of 64% and a small mild defect at the apex at stress that reverses at rest which is consistent with ischemia. He had a Left Heart Cath on 5/22/20 which demonstrated mild/mod diffuse epicardial CAD, 50% stenosis of mid LAD and normal LVEF of 55-60%. His EKG on 5/22/20 demonstrated Atrial Fibrillation, HR of 96 BPM. He has a PMH of [employer] single chamber PPM placed on 10/13/2017 by Dr Teddy Hernandez (Bambi Jerry), CAD, HTN, HLD and chronic AF. His device check 7/9/20 demonstrated predominantly AF with 1 event of AFIB RVR on 7/6/20 that lasted 2 seconds,  40%. His EKG 7/9/20 demonstrated AF HR 91 BPM. During this office visit his metoprolol was increased to 50 mg BID and his eliquis was decreased to 2.5mg BID due to age and kidney function. On 3/8/2022 patient's device interrogation demonstrates  46%, no new events, 10 years until RRT. ECG demonstrates AF 94 BPM. He reported that he has been feeling fatigued since having COVID January 2022. He reported he gets lightheaded/dizy all the time and has fell several times. Interval History since last office visit: Patient underwent RFCA of AV node on 9/16/2022. He underwent upgrade of PPM to BiV. Today, 12/13/2022, Device interrogation demonstrated  99%, 2 NSVT events, TIMBO 3 years.  ECG demonstrates V paced 81 BPM. He reports that he is doing well. He reports that he uses a cane still due to dizziness, but he thinks it has somewhat improved. He is taking his medications as prescribed. Patient denies current edema, chest pain, sob, palpitations, dizziness or syncope. Past Medical History:   has a past medical history of Atrial fibrillation ([address] Blind right eye, Chronic kidney disease, Dementia ([facility] [address] Hyperlipidemia, Hypertension, and Pneumonia. Past Surgical History:   has a past surgical history that includes Tonsillectomy; Appendectomy; Colonoscopy; eye surgery; Upper gastrointestinal endoscopy; joint replacement (2013); hernia repair; pacemaker placement (10/13/2017); ablation of dysrhythmic focus; and pacemaker placement (09/16/2022). Allergies:  Sulfa antibiotics    Social History:   reports that he quit smoking about 47 years ago. His smoking use included cigarettes. He has a 10.00 pack-year smoking history. He has never used smokeless tobacco. He reports that he does not drink alcohol and does not use drugs. Family History: family history includes Asthma in an other family member; Heart Disease in his father and mother; Other in his mother. Home Medications:    Prior to Admission medications    Medication Sig Start Date End Date Taking?  Authorizing Provider   memantine (NAMENDA) 5 MG tablet TAKE 1 TABLET BY MOUTH 2 TIMES DAILY 12/5/22  Yes GUS Cantrell CNP   atorvastatin (LIPITOR) 40 MG tablet TAKE 1 TABLET BY MOUTH DAILY 11/21/22  Yes Oscar Johnson MD   risperiDONE (RISPERDAL) 0.25 MG tablet Take 1 tablet by mouth 2 times daily 10/24/22  Yes Oscar Johnson MD   fludrocortisone (FLORINEF) 0.1 MG tablet Take 2 tablets by mouth daily 10/17/22  Yes Oscar Johnson MD   midodrine (PROAMATINE) 5 MG tablet Take 1 tablet by mouth 3 times daily 10/17/22  Yes Oscar Johnson MD   pantoprazole (PROTONIX) 40 MG tablet Take 1 tablet by mouth daily 10/17/22  Yes Oscar Johnson MD mirabegron (MYRBETRIQ) 25 MG TB24 Take 1 tablet by mouth daily 10/17/22  Yes Danita Fisher MD   citalopram (CELEXA) 10 MG tablet TAKE 1 TABLET BY MOUTH DAILY 3/28/22  Yes Danita Fisher MD   fluticasone [facility]) 50 MCG/ACT nasal spray 2 sprays by Nasal route daily 2/1/22  Yes Danita Fisher MD   Ferrous Sulfate (IRON) 325 (65 Fe) MG TABS Take by mouth daily    Yes Historical Provider, MD   finasteride (PROSCAR) 5 MG tablet Take 5 mg by mouth daily. 3/4/15  Yes Historical Provider, MD   aspirin 81 MG tablet Take 81 mg by mouth daily. Yes Historical Provider, MD   divalproex (DEPAKOTE) 125 MG DR tablet Take 1 tablet by mouth at bedtime  Patient not taking: Reported on 1/3/2023 10/24/22   Danita Fisher MD   Acetaminophen (TYLENOL) 325 MG CAPS Take 650 mg by mouth  Patient not taking: Reported on 1/3/2023 8/27/22   Historical Provider, MD   trospium (SANCTURA) 20 MG tablet Take 20 mg by mouth in the morning and 20 mg before bedtime.   8/26/22  Historical Provider, MD   apixaban (ELIQUIS) 5 MG TABS tablet TAKE 1/2 TABLET BY MOUTH 2 TIMES DAILY 8/8/22 8/26/22  Danita Fisher MD   metoprolol succinate (TOPROL XL) 25 MG extended release tablet TAKE 3 TABLETS BY MOUTH TWICE DAILY  Patient taking differently: Take 25 mg by mouth in the morning and 25 mg in the evening. 7/25/22 8/26/22  Geroge Mcburney, APRN - CNP   QUEtiapine (SEROQUEL) 50 MG tablet Take 1 tablet by mouth nightly 7/23/22 8/26/22  Danita Fisher MD   famotidine (PEPCID) 20 MG tablet Take 1 tablet by mouth at bedtime 7/8/22 8/26/22  Danita Fisher MD   isosorbide mononitrate (IMDUR) 30 MG extended release tablet TAKE 1 TABLET BY MOUTH DAILY 6/1/22 8/26/22  Liseth Kelly MD   donepezil (ARICEPT) 10 MG tablet Take 1 tablet by mouth daily 3/24/22 8/26/22  Historical Provider, MD   tamsulosin (FLOMAX) 0.4 MG capsule Take 0.4 mg by mouth daily  8/26/22  Historical Provider, MD       REVIEW OF SYSTEMS:    All 14-point review of systems are completed and  pertinent positives are mentioned in the history of present illness. Other  systems are reviewed and are negative. Physical Examination:    /84   Pulse 81   Ht 5' 7\" (1.702 m)   Wt 154 lb 6.4 oz (70 kg)   SpO2 96%   BMI 24.18 kg/m²      Constitutional and General Appearance:    alert, cooperative, no distress and appears stated age  [de-identified]:    PERRLA, no cervical lymphadenopathy. No masses palpable. Normal oral  mucosa  Respiratory:  Normal excursion and expansion without use of accessory muscles  Resp Auscultation: Normal breath sounds without dullness or wheezing  Cardiovascular: The apical impulse is not displaced  Irregular rate and rhythm without M/G/R  Abdomen:  No masses or tenderness  Bowel sounds present  Extremities:   No Cyanosis or Clubbing   Lower extremity edema: No  Skin: Warm and dry  Neurological:  Alert and oriented. Moves all extremities well  No abnormalities of mood, affect, memory, mentation, or behavior are noted    DATA:    ECG 1/3/23: Personally reviewed. ECHO: 3/21/2019:  Summary   Normal left ventricular systolic function with an estimated ejection   fraction of 55-60%. No regional wall motion abnormalities are seen. Normal left ventricular diastolic filling pressure. The left atrium is mildly dilated. The right atrium is moderately dilated. Right ventricular systolic function is mildly reduced . The right ventricle is mildly enlarged. The ascending aorta is mildly dilated. 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 Mild mitral regurgitation. Moderate tricuspid regurgitation. Systolic pulmonary artery pressure (SPAP) estimated at 47 mmHg (RA pressure   15 mmHg), consistent with mild pulmonary hypertension. Pacemaker lead in right ventricle. IMPRESSION:    1. Permanent atrial fibrillation. 07/09/2020  Mr. 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  Patient doing well.  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  Heart rates mostly well controlled during atrial fibrillation however still not as controlled as I'd like to see (averge up to 130 rarely).  We discussed anticoagulation (NOAC and warfarin), rate control, and rhythm control, AVN + pacemaker.  We reviewed risks and benefits of each approach.  We discussed side effects of class IC, class II, class III, and class IV antiarrhythmics.  All questions were answered.  Patient considering cardioversion to see how NSR makes him feel.  If makes him feel better we will consider antiarrhythmic therapy.  Patient and wife will reach out with decision.  - Increase metoprolol to 50 mg BID.  - Decrease apixaban to 2.5 mg BID given renal function and age.    - Follow up with me in 3 months.    10/13/20  Patient presents for follow-up.  He has no symptoms with his atrial fibrillation.  He is tolerating his anticoagulation.  Heart rates are largely well controlled.  He does have some RVR however this is very rare.  His average heart rates appear to be around 80 beats a minute.  He is paced more than 40% of the time.  No changes recommended at this time.  Plan to follow-up patient and monitor clinically for signs of heart failure.  If any heart failure will consider BiV upgrade of patient.  - Continue apixaban at 2.5 mg BID.  - Continue metoprolol 50 mg BID.  - Monitor for heart failure symptoms at which case we will order echocardiogram for LVEF and consider BiV upgrade of his device.  - Follow up with EP NP in 1 year unless/until procedure/discussion required or PRN.    03/08/2022  Patient presents for follow up.  He has been having some chest pain, shortness of breath, and dizziness with falls.  No significant arrhythmias on his device.  We discussed Negin  given falls (recovering from COVID-19). - Consider Watchman. - Limited echocardiogram.  - Continue apixaban 2.5 mg BID.  - Continue metoprolol 50 mg BID.  - Follow up in 6 months. 12/13/2022  Patient presents for follow up. He underwent BiV upgrade and AVN ablation on 09/16/2022. His rates are controlled. He remains off [address] given fall risk and orthostatic hypotension. We discussed Watchman again and patient and family will consider.    - Consider Watchman.  - Continue metoprolol 50 mg BID.  - Follow up in 6 months. 2. Hypertension. Stable. 3. 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 Abbott device being paced approximately 40% of the time. No heart failure symptoms. Continue to monitor for heart failure symptoms given elevated pacing percentage. - Increase metoprolol. Pacing rate may increase so will need to monitor for heart failure symptoms and decline of ejection fraction (LVEF normal currently). 10/13/2020 - 12/13/2022  - Plan as per above. 4. Chest pain. Etiology unclear. - Refer to Dr. Anthony Henson. 12/13/2022  - Per cardiology. RECOMMENDATIONS:  Remote device checks every 3 months. Continue current medications. Discussed blood thinner versus Watchman device. Patient defers for now. Follow up in 6 months with EP NP.    QUALITY MEASURES  1. Tobacco Cessation Counseling: NA  2. Retake of BP if >140/90:   NA  3. Documentation to PCP/referring for new patient:  Sent to PCP at close of office visit  4. CAD patient on anti-platelet: Yes  5. CAD patient on STATIN therapy:  Yes  6. Patient with CHF and aFib on anticoagulation:  Yes     All questions and concerns were addressed to the patient/family. Alternatives to my treatment were discussed. Rajinder Ingram RN, am scribing for and in the presence of Dr. Danielle Gomez.  01/03/23 2:59 PM   Darin Meek RN    I reviewed with the resident the medical history and the resident's findings on the physical examination. I discussed with the resident the patient's diagnosis and concur with the plan. Dr. Jorge Levin MD  Electrophysiology  [facility]. [address]. [address]. [place], [address]  Phone: [phone]  Fax: [phone]     NOTE: This report was transcribed using voice recognition software. Every effort was made to ensure accuracy, however, inadvertent computerized transcription errors may be present.

## 2023-01-19 ENCOUNTER — HOSPITAL ENCOUNTER (EMERGENCY)
Age: 88
Discharge: ANOTHER HOSPITAL | End: 2023-01-19
Attending: STUDENT IN AN ORGANIZED HEALTH CARE EDUCATION/TRAINING PROGRAM
Payer: MEDICARE

## 2023-01-19 ENCOUNTER — HOSPITAL ENCOUNTER (INPATIENT)
Age: 88
LOS: 3 days | Discharge: ANOTHER HOSPITAL | DRG: 314 | End: 2023-01-22
Attending: HOSPITALIST | Admitting: INTERNAL MEDICINE
Payer: MEDICARE

## 2023-01-19 ENCOUNTER — APPOINTMENT (OUTPATIENT)
Dept: CT IMAGING | Age: 88
End: 2023-01-19
Payer: MEDICARE

## 2023-01-19 ENCOUNTER — APPOINTMENT (OUTPATIENT)
Dept: GENERAL RADIOLOGY | Age: 88
End: 2023-01-19
Payer: MEDICARE

## 2023-01-19 VITALS
HEIGHT: 67 IN | TEMPERATURE: 98.2 F | WEIGHT: 176 LBS | SYSTOLIC BLOOD PRESSURE: 141 MMHG | BODY MASS INDEX: 27.62 KG/M2 | OXYGEN SATURATION: 91 % | DIASTOLIC BLOOD PRESSURE: 83 MMHG | RESPIRATION RATE: 21 BRPM | HEART RATE: 80 BPM

## 2023-01-19 DIAGNOSIS — T82.7XXA INFECTION OF PACEMAKER PULSE GENERATOR SITE (HCC): Primary | ICD-10-CM

## 2023-01-19 DIAGNOSIS — F03.918 DEMENTIA WITH BEHAVIORAL DISTURBANCE: Primary | ICD-10-CM

## 2023-01-19 LAB
A/G RATIO: 1.3 (ref 1.1–2.2)
ALBUMIN SERPL-MCNC: 4 G/DL (ref 3.4–5)
ALP BLD-CCNC: 99 U/L (ref 40–129)
ALT SERPL-CCNC: 10 U/L (ref 10–40)
ANION GAP SERPL CALCULATED.3IONS-SCNC: 11 MMOL/L (ref 3–16)
AST SERPL-CCNC: 18 U/L (ref 15–37)
BASOPHILS ABSOLUTE: 0 K/UL (ref 0–0.2)
BASOPHILS RELATIVE PERCENT: 0.6 %
BILIRUB SERPL-MCNC: 1 MG/DL (ref 0–1)
BILIRUBIN URINE: NEGATIVE
BLOOD, URINE: NEGATIVE
BUN BLDV-MCNC: 21 MG/DL (ref 7–20)
CALCIUM SERPL-MCNC: 8.8 MG/DL (ref 8.3–10.6)
CHLORIDE BLD-SCNC: 101 MMOL/L (ref 99–110)
CLARITY: CLEAR
CO2: 30 MMOL/L (ref 21–32)
COLOR: YELLOW
CREAT SERPL-MCNC: 1.7 MG/DL (ref 0.8–1.3)
EKG ATRIAL RATE: 74 BPM
EKG DIAGNOSIS: NORMAL
EKG Q-T INTERVAL: 440 MS
EKG QRS DURATION: 138 MS
EKG QTC CALCULATION (BAZETT): 507 MS
EKG R AXIS: 132 DEGREES
EKG T AXIS: 78 DEGREES
EKG VENTRICULAR RATE: 80 BPM
EOSINOPHILS ABSOLUTE: 0.1 K/UL (ref 0–0.6)
EOSINOPHILS RELATIVE PERCENT: 2 %
GFR SERPL CREATININE-BSD FRML MDRD: 39 ML/MIN/{1.73_M2}
GLUCOSE BLD-MCNC: 86 MG/DL (ref 70–99)
GLUCOSE URINE: NEGATIVE MG/DL
HCT VFR BLD CALC: 44.7 % (ref 40.5–52.5)
HEMOGLOBIN: 14.9 G/DL (ref 13.5–17.5)
KETONES, URINE: NEGATIVE MG/DL
LACTIC ACID, SEPSIS: 1 MMOL/L (ref 0.4–1.9)
LACTIC ACID, SEPSIS: 1.1 MMOL/L (ref 0.4–1.9)
LEUKOCYTE ESTERASE, URINE: NEGATIVE
LYMPHOCYTES ABSOLUTE: 1.3 K/UL (ref 1–5.1)
LYMPHOCYTES RELATIVE PERCENT: 18.3 %
MAGNESIUM: 1.8 MG/DL (ref 1.8–2.4)
MCH RBC QN AUTO: 29.2 PG (ref 26–34)
MCHC RBC AUTO-ENTMCNC: 33.4 G/DL (ref 31–36)
MCV RBC AUTO: 87.3 FL (ref 80–100)
MICROSCOPIC EXAMINATION: NORMAL
MONOCYTES ABSOLUTE: 0.7 K/UL (ref 0–1.3)
MONOCYTES RELATIVE PERCENT: 10 %
NEUTROPHILS ABSOLUTE: 4.9 K/UL (ref 1.7–7.7)
NEUTROPHILS RELATIVE PERCENT: 69.1 %
NITRITE, URINE: NEGATIVE
PDW BLD-RTO: 15.9 % (ref 12.4–15.4)
PH UA: 6 (ref 5–8)
PLATELET # BLD: 187 K/UL (ref 135–450)
PMV BLD AUTO: 8.6 FL (ref 5–10.5)
POTASSIUM REFLEX MAGNESIUM: 3.1 MMOL/L (ref 3.5–5.1)
PRO-BNP: 6104 PG/ML (ref 0–449)
PROCALCITONIN: 0.07 NG/ML (ref 0–0.15)
PROTEIN UA: NEGATIVE MG/DL
RBC # BLD: 5.12 M/UL (ref 4.2–5.9)
SODIUM BLD-SCNC: 142 MMOL/L (ref 136–145)
SPECIFIC GRAVITY UA: 1.01 (ref 1–1.03)
TOTAL PROTEIN: 7 G/DL (ref 6.4–8.2)
TROPONIN: <0.01 NG/ML
URINE REFLEX TO CULTURE: NORMAL
URINE TYPE: NORMAL
UROBILINOGEN, URINE: 0.2 E.U./DL
WBC # BLD: 7.1 K/UL (ref 4–11)

## 2023-01-19 PROCEDURE — 93005 ELECTROCARDIOGRAM TRACING: CPT | Performed by: PHYSICIAN ASSISTANT

## 2023-01-19 PROCEDURE — 84145 PROCALCITONIN (PCT): CPT

## 2023-01-19 PROCEDURE — 84484 ASSAY OF TROPONIN QUANT: CPT

## 2023-01-19 PROCEDURE — 96375 TX/PRO/DX INJ NEW DRUG ADDON: CPT

## 2023-01-19 PROCEDURE — 2580000003 HC RX 258: Performed by: NURSE PRACTITIONER

## 2023-01-19 PROCEDURE — 6370000000 HC RX 637 (ALT 250 FOR IP): Performed by: PHYSICIAN ASSISTANT

## 2023-01-19 PROCEDURE — 93010 ELECTROCARDIOGRAM REPORT: CPT | Performed by: INTERNAL MEDICINE

## 2023-01-19 PROCEDURE — 6370000000 HC RX 637 (ALT 250 FOR IP): Performed by: NURSE PRACTITIONER

## 2023-01-19 PROCEDURE — 96365 THER/PROPH/DIAG IV INF INIT: CPT

## 2023-01-19 PROCEDURE — 83735 ASSAY OF MAGNESIUM: CPT

## 2023-01-19 PROCEDURE — 2060000000 HC ICU INTERMEDIATE R&B

## 2023-01-19 PROCEDURE — 96366 THER/PROPH/DIAG IV INF ADDON: CPT

## 2023-01-19 PROCEDURE — 85025 COMPLETE CBC W/AUTO DIFF WBC: CPT

## 2023-01-19 PROCEDURE — 71046 X-RAY EXAM CHEST 2 VIEWS: CPT

## 2023-01-19 PROCEDURE — 96368 THER/DIAG CONCURRENT INF: CPT

## 2023-01-19 PROCEDURE — 83605 ASSAY OF LACTIC ACID: CPT

## 2023-01-19 PROCEDURE — 80053 COMPREHEN METABOLIC PANEL: CPT

## 2023-01-19 PROCEDURE — 36415 COLL VENOUS BLD VENIPUNCTURE: CPT

## 2023-01-19 PROCEDURE — 71250 CT THORAX DX C-: CPT

## 2023-01-19 PROCEDURE — 2580000003 HC RX 258: Performed by: PHYSICIAN ASSISTANT

## 2023-01-19 PROCEDURE — 81003 URINALYSIS AUTO W/O SCOPE: CPT

## 2023-01-19 PROCEDURE — 83880 ASSAY OF NATRIURETIC PEPTIDE: CPT

## 2023-01-19 PROCEDURE — 99285 EMERGENCY DEPT VISIT HI MDM: CPT

## 2023-01-19 PROCEDURE — 87040 BLOOD CULTURE FOR BACTERIA: CPT

## 2023-01-19 PROCEDURE — 6360000002 HC RX W HCPCS: Performed by: PHYSICIAN ASSISTANT

## 2023-01-19 RX ORDER — POTASSIUM CHLORIDE 7.45 MG/ML
10 INJECTION INTRAVENOUS ONCE
Status: COMPLETED | OUTPATIENT
Start: 2023-01-19 | End: 2023-01-19

## 2023-01-19 RX ORDER — MORPHINE SULFATE 4 MG/ML
4 INJECTION, SOLUTION INTRAMUSCULAR; INTRAVENOUS ONCE
Status: COMPLETED | OUTPATIENT
Start: 2023-01-19 | End: 2023-01-19

## 2023-01-19 RX ORDER — PANTOPRAZOLE SODIUM 40 MG/1
40 TABLET, DELAYED RELEASE ORAL DAILY
Status: DISCONTINUED | OUTPATIENT
Start: 2023-01-20 | End: 2023-01-22 | Stop reason: HOSPADM

## 2023-01-19 RX ORDER — CITALOPRAM 20 MG/1
10 TABLET ORAL DAILY
Status: DISCONTINUED | OUTPATIENT
Start: 2023-01-20 | End: 2023-01-22 | Stop reason: HOSPADM

## 2023-01-19 RX ORDER — POLYETHYLENE GLYCOL 3350 17 G/17G
17 POWDER, FOR SOLUTION ORAL DAILY PRN
Status: DISCONTINUED | OUTPATIENT
Start: 2023-01-19 | End: 2023-01-22 | Stop reason: HOSPADM

## 2023-01-19 RX ORDER — ONDANSETRON 2 MG/ML
4 INJECTION INTRAMUSCULAR; INTRAVENOUS EVERY 6 HOURS PRN
Status: DISCONTINUED | OUTPATIENT
Start: 2023-01-19 | End: 2023-01-22 | Stop reason: HOSPADM

## 2023-01-19 RX ORDER — ACETAMINOPHEN 325 MG/1
650 TABLET ORAL EVERY 6 HOURS PRN
Status: DISCONTINUED | OUTPATIENT
Start: 2023-01-19 | End: 2023-01-22 | Stop reason: HOSPADM

## 2023-01-19 RX ORDER — SODIUM CHLORIDE 9 MG/ML
INJECTION, SOLUTION INTRAVENOUS CONTINUOUS
Status: ACTIVE | OUTPATIENT
Start: 2023-01-19 | End: 2023-01-20

## 2023-01-19 RX ORDER — SODIUM CHLORIDE 9 MG/ML
INJECTION, SOLUTION INTRAVENOUS PRN
Status: DISCONTINUED | OUTPATIENT
Start: 2023-01-19 | End: 2023-01-22 | Stop reason: HOSPADM

## 2023-01-19 RX ORDER — ACETAMINOPHEN 650 MG/1
650 SUPPOSITORY RECTAL EVERY 6 HOURS PRN
Status: DISCONTINUED | OUTPATIENT
Start: 2023-01-19 | End: 2023-01-22 | Stop reason: HOSPADM

## 2023-01-19 RX ORDER — FINASTERIDE 5 MG/1
5 TABLET, FILM COATED ORAL DAILY
Status: DISCONTINUED | OUTPATIENT
Start: 2023-01-20 | End: 2023-01-22 | Stop reason: HOSPADM

## 2023-01-19 RX ORDER — RISPERIDONE 0.25 MG/1
0.25 TABLET ORAL 2 TIMES DAILY
Status: DISCONTINUED | OUTPATIENT
Start: 2023-01-19 | End: 2023-01-22 | Stop reason: HOSPADM

## 2023-01-19 RX ORDER — SODIUM CHLORIDE 0.9 % (FLUSH) 0.9 %
5-40 SYRINGE (ML) INJECTION EVERY 12 HOURS SCHEDULED
Status: DISCONTINUED | OUTPATIENT
Start: 2023-01-19 | End: 2023-01-22 | Stop reason: HOSPADM

## 2023-01-19 RX ORDER — ENOXAPARIN SODIUM 100 MG/ML
40 INJECTION SUBCUTANEOUS DAILY
Status: DISCONTINUED | OUTPATIENT
Start: 2023-01-20 | End: 2023-01-22 | Stop reason: HOSPADM

## 2023-01-19 RX ORDER — ASPIRIN 81 MG/1
81 TABLET ORAL DAILY
Status: DISCONTINUED | OUTPATIENT
Start: 2023-01-20 | End: 2023-01-22 | Stop reason: HOSPADM

## 2023-01-19 RX ORDER — ATORVASTATIN CALCIUM 40 MG/1
40 TABLET, FILM COATED ORAL DAILY
Status: DISCONTINUED | OUTPATIENT
Start: 2023-01-19 | End: 2023-01-22 | Stop reason: HOSPADM

## 2023-01-19 RX ORDER — POTASSIUM CHLORIDE 20 MEQ/1
40 TABLET, EXTENDED RELEASE ORAL ONCE
Status: COMPLETED | OUTPATIENT
Start: 2023-01-19 | End: 2023-01-19

## 2023-01-19 RX ORDER — SODIUM CHLORIDE 0.9 % (FLUSH) 0.9 %
5-40 SYRINGE (ML) INJECTION PRN
Status: DISCONTINUED | OUTPATIENT
Start: 2023-01-19 | End: 2023-01-22 | Stop reason: HOSPADM

## 2023-01-19 RX ORDER — MEMANTINE HYDROCHLORIDE 5 MG/1
5 TABLET ORAL 2 TIMES DAILY
Status: DISCONTINUED | OUTPATIENT
Start: 2023-01-19 | End: 2023-01-22 | Stop reason: HOSPADM

## 2023-01-19 RX ORDER — ONDANSETRON 4 MG/1
4 TABLET, ORALLY DISINTEGRATING ORAL EVERY 8 HOURS PRN
Status: DISCONTINUED | OUTPATIENT
Start: 2023-01-19 | End: 2023-01-22 | Stop reason: HOSPADM

## 2023-01-19 RX ADMIN — CEFEPIME 2000 MG: 2 INJECTION, POWDER, FOR SOLUTION INTRAVENOUS at 11:03

## 2023-01-19 RX ADMIN — POTASSIUM CHLORIDE 40 MEQ: 1500 TABLET, EXTENDED RELEASE ORAL at 13:04

## 2023-01-19 RX ADMIN — MEMANTINE 5 MG: 5 TABLET ORAL at 23:17

## 2023-01-19 RX ADMIN — SODIUM CHLORIDE: 9 INJECTION, SOLUTION INTRAVENOUS at 23:10

## 2023-01-19 RX ADMIN — ATORVASTATIN CALCIUM 40 MG: 40 TABLET, FILM COATED ORAL at 23:17

## 2023-01-19 RX ADMIN — POTASSIUM CHLORIDE 10 MEQ: 7.46 INJECTION, SOLUTION INTRAVENOUS at 13:10

## 2023-01-19 RX ADMIN — Medication 10 ML: at 23:14

## 2023-01-19 RX ADMIN — RISPERIDONE 0.25 MG: 0.25 TABLET ORAL at 23:17

## 2023-01-19 RX ADMIN — MORPHINE SULFATE 4 MG: 4 INJECTION, SOLUTION INTRAMUSCULAR; INTRAVENOUS at 14:50

## 2023-01-19 RX ADMIN — VANCOMYCIN HYDROCHLORIDE 1500 MG: 10 INJECTION, POWDER, LYOPHILIZED, FOR SOLUTION INTRAVENOUS at 13:08

## 2023-01-19 ASSESSMENT — PAIN DESCRIPTION - DESCRIPTORS
DESCRIPTORS: ACHING;DULL
DESCRIPTORS: ACHING;DULL
DESCRIPTORS: DULL

## 2023-01-19 ASSESSMENT — ENCOUNTER SYMPTOMS
GASTROINTESTINAL NEGATIVE: 1
RESPIRATORY NEGATIVE: 1

## 2023-01-19 ASSESSMENT — PAIN DESCRIPTION - ORIENTATION
ORIENTATION: LEFT
ORIENTATION: LEFT
ORIENTATION: RIGHT

## 2023-01-19 ASSESSMENT — PAIN SCALES - GENERAL
PAINLEVEL_OUTOF10: 6
PAINLEVEL_OUTOF10: 0
PAINLEVEL_OUTOF10: 3
PAINLEVEL_OUTOF10: 6

## 2023-01-19 ASSESSMENT — PAIN DESCRIPTION - LOCATION
LOCATION: CHEST
LOCATION: CHEST
LOCATION: OTHER (COMMENT)

## 2023-01-19 ASSESSMENT — PAIN - FUNCTIONAL ASSESSMENT: PAIN_FUNCTIONAL_ASSESSMENT: NONE - DENIES PAIN

## 2023-01-19 NOTE — ED NOTES
Transport at bedside report given. Report called to Musa Mccollum at [phone].       Andree Rivera RN  01/19/23 0249

## 2023-01-19 NOTE — ED NOTES
Provider made aware that pt has [employer] pacemaker, unable to interrograte that brand of Pacemaker      Ary Sutton RN  01/19/23 1200

## 2023-01-19 NOTE — ED PROVIDER NOTES
I independently examined and evaluated Lilly Gomez. I personally saw the patient and performed a substantive portion of the visit including all aspects of the medical decision making. In brief, this 74-year-old male is presenting with redness and swelling around his left-sided pacemaker. Denies any fevers, chills, nausea, vomiting. Only mild pain in the area. .    Focused exam revealed   General: Alert, no acute distress, patient resting comfortably   Skin: warm, intact, no pallor noted   Head: Normocephalic, atraumatic   Eye: Normal conjunctiva   Cardiac: Normal peripheral perfusion. Left chest pacemaker site has erythema overlying with notable bogginess on palpation, no significant fluctuance. Respiratory: No acute distress   Musculoskeletal: No deformity, full ROM. Neurological: alert and oriented, normal sensory and motor observed. Psychiatric: Cooperative    ED course: Work-up obtained. Patient does have an azotemia with a creatinine of 1.7. Potassium mildly low at 3.1. Chest CT concerning only for congestive heart failure, no obvious fluid collection. Patient treated with vancomycin and cefepime. Discussed with cardiology and patient will be transferred to [facility] for continued treatment of this infection seems to involve his pacemaker site. ECG    The Ekg interpreted by me shows paced rhythm with a rate of 80 bpm.  No acute injury pattern. , QTc 507. All diagnostic, treatment, and disposition decisions were made by myself in conjunction with the advanced practice provider. For all further details of the patient's emergency department visit, please see the advanced practice provider's documentation. Comment: Please note this report has been produced using speech recognition software and may contain errors related to that system including errors in grammar, punctuation, and spelling, as well as words and phrases that may be inappropriate.  If there are any questions or concerns please feel free to contact the dictating provider for clarification.         Pablo Whitehead,   01/19/23 9339

## 2023-01-19 NOTE — ED PROVIDER NOTES
[address] ED  EMERGENCY DEPARTMENT ENCOUNTER        Pt Name: Stra Tapia  MRN: [id number]  [place] 1935  Date of evaluation: 1/19/2023  Provider: Josue Hidalgo PA-C  PCP: Prudencio Eubanks MD  Note Started: 10:16 AM EST 1/19/23       I have seen and evaluated this patient with my supervising physician Cal Dumont DO.      CHIEF COMPLAINT       Chief Complaint   Patient presents with    Pacemaker Problem     PT ACCOMPANIED BY HIS DAUGHTER, REPORTS THAT PT HAS HAD OFF AN ON SWELLING OVER PACEMAKER INSERTION SITE ON LEFT CHEST WALL. + REDNESS NOTED AND NOTABLE SWELLING    Hypertension     PTS BP AT HOME 150/101       HISTORY OF PRESENT ILLNESS: 1 or more Elements     History From: patient/family at bedside  Limitations to history : kiran Tapia is a 82 y.o. male with a past medical history of hypertension, A. fib dementia hyperlipidemia chronic kidney disease former smoker CAD today with daughter with complaints of swelling, redness and tenderness to pacemaker site. He had a pacemaker placed about 5 months ago. He states over the past several days he has noticed increased swelling redness and tenderness to the area. He does endorse some fevers and chills at home. States its painful to touch. Onset of symptoms over the past 2 to 3 days. Duration of symptoms have been persistent since onset. Context includes pacemaker complication. Denies any nausea vomiting diarrhea or abdominal pain. Denies numbness or tingling. Denies cough or congestion. He has not taken anything today for pain currently denies any pain. States it is only painful to touch. Otherwise denies any other complaints. Nothing seems to make symptoms better or worse. Nursing Notes were all reviewed and agreed with or any disagreements were addressed in the HPI. REVIEW OF SYSTEMS :      Review of Systems   Constitutional:  Positive for chills and fever. Respiratory: Negative. Cardiovascular: Negative. Gastrointestinal: Negative. Genitourinary: Negative. Musculoskeletal: Negative. Skin:  Positive for wound. Neurological: Negative. Positives and Pertinent negatives as per HPI. SURGICAL HISTORY     Past Surgical History:   Procedure Laterality Date    ABLATION OF DYSRHYTHMIC FOCUS      APPENDECTOMY      COLONOSCOPY      EYE SURGERY      HERNIA REPAIR      JOINT REPLACEMENT  2013    left shoulder    PACEMAKER PLACEMENT  10/13/2017    Dr Valentina Gallagher at [facility] [address] single chamber PPM    PACEMAKER PLACEMENT  09/16/2022    biventricular    TONSILLECTOMY      UPPER GASTROINTESTINAL ENDOSCOPY         CURRENTMEDICATIONS       Previous Medications    ACETAMINOPHEN (TYLENOL) 325 MG CAPS    Take 650 mg by mouth    ASPIRIN 81 MG TABLET    Take 81 mg by mouth daily. ATORVASTATIN (LIPITOR) 40 MG TABLET    TAKE 1 TABLET BY MOUTH DAILY    CITALOPRAM (CELEXA) 10 MG TABLET    TAKE 1 TABLET BY MOUTH DAILY    DIVALPROEX (DEPAKOTE) 125 MG DR TABLET    Take 1 tablet by mouth at bedtime    FERROUS SULFATE (IRON) 325 (65 FE) MG TABS    Take by mouth daily     FINASTERIDE (PROSCAR) 5 MG TABLET    Take 5 mg by mouth daily.     FLUDROCORTISONE (FLORINEF) 0.1 MG TABLET    Take 2 tablets by mouth daily    FLUTICASONE (FLONASE) 50 MCG/ACT NASAL SPRAY    2 sprays by Nasal route daily    MEMANTINE (NAMENDA) 5 MG TABLET    TAKE 1 TABLET BY MOUTH 2 TIMES DAILY    MIDODRINE (PROAMATINE) 5 MG TABLET    Take 1 tablet by mouth 3 times daily    MIRABEGRON (MYRBETRIQ) 25 MG TB24    Take 1 tablet by mouth daily    PANTOPRAZOLE (PROTONIX) 40 MG TABLET    Take 1 tablet by mouth daily    RISPERIDONE (RISPERDAL) 0.25 MG TABLET    Take 1 tablet by mouth 2 times daily       ALLERGIES     Sulfa antibiotics    FAMILYHISTORY       Family History   Problem Relation Age of Onset    Heart Disease Mother     Other Mother     Heart Disease Father     Asthma Other         SOCIAL HISTORY       Social History     Tobacco Use    Smoking status: Former     Packs/day: 1.00     Years: 10.00     Pack years: 10.00     Types: Cigarettes     Quit date: 1975     Years since quittin.9    Smokeless tobacco: Never    Tobacco comments:     Quit 40 years ago   Vaping Use    Vaping Use: Never used   Substance Use Topics    Alcohol use: No     Alcohol/week: 0.0 standard drinks    Drug use: No       SCREENINGS        Barbara Coma Scale  Eye Opening: Spontaneous  Best Verbal Response: Confused  Best Motor Response: Obeys commands  Barbara Coma Scale Score: 14                CIWA Assessment  BP: (!) 167/105  Heart Rate: 80           PHYSICAL EXAM  1 or more Elements     ED Triage Vitals   BP Temp Temp Source Heart Rate Resp SpO2 Height Weight   23 1003 23 1003 23 1003 23 1003 23 1013 23 1003 23 1003 23 1003   (!) 180/118 98.2 °F (36.8 °C) Oral 80 22 95 % 5' 7\" (1.702 m) 176 lb (79.8 kg)       Physical Exam  Vitals and nursing note reviewed. Constitutional:       General: He is awake. He is not in acute distress. Appearance: Normal appearance. He is well-developed. He is obese. He is not ill-appearing, toxic-appearing or diaphoretic. HENT:      Head: Normocephalic and atraumatic. Nose: Nose normal.   Eyes:      General:         Right eye: No discharge. Left eye: No discharge. Cardiovascular:      Rate and Rhythm: Normal rate and regular rhythm. Pulses:           Radial pulses are 2+ on the right side and 2+ on the left side. Heart sounds: Normal heart sounds. No murmur heard. No gallop. Pulmonary:      Effort: Pulmonary effort is normal. No respiratory distress. Breath sounds: Normal breath sounds. No decreased breath sounds, wheezing, rhonchi or rales. Chest:      Chest wall: No tenderness. Comments: Pacemaker site is erythematous, swollen, tender to touch, warm to touch. Musculoskeletal:         General: No deformity. Normal range of motion. Cervical back: Normal range of motion and neck supple. Right lower leg: No edema. Left lower leg: No edema. Skin:     General: Skin is warm and dry. Neurological:      Mental Status: He is alert and oriented to person, place, and time. Psychiatric:         Behavior: Behavior normal. Behavior is cooperative. DIAGNOSTIC RESULTS   LABS:    Labs Reviewed   CBC WITH AUTO DIFFERENTIAL - Abnormal; Notable for the following components:       Result Value    RDW 15.9 (*)     All other components within normal limits   COMPREHENSIVE METABOLIC PANEL W/ REFLEX TO MG FOR LOW K - Abnormal; Notable for the following components:    Potassium reflex Magnesium 3.1 (*)     BUN 21 (*)     Creatinine 1.7 (*)     Est, Glom Filt Rate 39 (*)     All other components within normal limits   BRAIN NATRIURETIC PEPTIDE - Abnormal; Notable for the following components:    Pro-BNP 6,104 (*)     All other components within normal limits   CULTURE, BLOOD 1   CULTURE, BLOOD 2   TROPONIN   LACTATE, SEPSIS   LACTATE, SEPSIS   PROCALCITONIN   MAGNESIUM   URINALYSIS WITH MICROSCOPIC       When ordered only abnormal lab results are displayed. All other labs were within normal range or not returned as of this dictation. EKG: When ordered, EKG's are interpreted by the Emergency Department Physician in the absence of a cardiologist.  Please see their note for interpretation of EKG. RADIOLOGY:   Non-plain film images such as CT, Ultrasound and MRI are read by the radiologist. Plain radiographic images are visualized and preliminarily interpreted by the ED Provider with the below findings:        Interpretation per the Radiologist below, if available at the time of this note:    CT CHEST WO CONTRAST   Final Result   1. Congestive heart failure. XR CHEST (2 VW)   Final Result   1. Cardiomegaly with mild pulmonary vascular congestion. No results found.     No results found.    PROCEDURES   Unless otherwise noted below, none     Procedures    CRITICAL CARE TIME (.cctime)       PAST MEDICAL HISTORY      has a past medical history of Atrial fibrillation ([address] Blind right eye, Chronic kidney disease, Dementia ([address] Hyperlipidemia, Hypertension, and Pneumonia. EMERGENCY DEPARTMENT COURSE and DIFFERENTIAL DIAGNOSIS/MDM:   Vitals:    Vitals:    01/19/23 1201 01/19/23 1229 01/19/23 1231 01/19/23 1332   BP: (!) 167/102 (!) 173/118 (!) 175/115 (!) 167/105   Pulse: 80 80 80 80   Resp: 13 19 18 17   Temp:       TempSrc:       SpO2: 98% 98% 95% 97%   Weight:       Height:           Patient was given the following medications:  Medications   vancomycin 1500 mg in dextrose 5% 300 mL IVPB (1,500 mg IntraVENous New Bag 1/19/23 1308)   cefepime (MAXIPIME) 2000 mg IVPB minibag (0 mg IntraVENous Stopped 1/19/23 1133)   potassium chloride 10 mEq/100 mL IVPB (Peripheral Line) (0 mEq IntraVENous Stopped 1/19/23 1342)   potassium chloride (KLOR-CON M) extended release tablet 40 mEq (40 mEq Oral Given 1/19/23 1304)   morphine sulfate (PF) injection 4 mg (4 mg IntraVENous Given 1/19/23 1450)             Is this patient to be included in the SEP-1 Core Measure due to severe sepsis or septic shock? No   Exclusion criteria - the patient is NOT to be included for SEP-1 Core Measure due to:  2+ SIRS criteria are not met    Chronic Conditions affecting care:    has a past medical history of Atrial fibrillation ([address] Blind right eye, Chronic kidney disease, Dementia ([address] Hyperlipidemia, Hypertension, and Pneumonia. CONSULTS: (Who and What was discussed)  PHARMACY TO DOSE VANCOMYCIN  IP CONSULT TO HOSPITALIST          Records Reviewed (Source): NONE    CC/HPI Summary, DDx, ED Course, and Reassessment:     Patient brought in today by private vehicle with daughter with complaints of swelling, redness and pain to current pacemaker site.   On exam he is hypertensive afebrile breathing on room air satting at 97%. Nontoxic. No acute respiratory distress. Old labs and records reviewed. Patient seen and evaluated by myself and my attending as well as needed. CBC shows no white count. Hemoglobin of 14.9. EKG was obtained, please see my attendings note for interpretation. Chest x-ray reveals cardiomegaly with mild pulmonary vascular congestion. This was read and interpreted by radiology. Patient given IV antibiotics. Potassium of 3.1. BUN of 21 creatinine of 1.7 GFR of 39. Troponin less than 0.01. BNP of 6104. Lactic acid of 1.1. Procalcitonin is negative. Patient received p.o. and IV potassium. He also received IV morphine. Patient given IV antibiotics IV vancomycin and IV cefepime. Blood cultures were sent. CT chest without contrast was obtained and read and interpreted by radiology which shows congestive heart failure no other acute findings noted. Disposition Considerations (tests considered but not done, Admit vs D/C, Shared Decision Making, Pt Expectation of Test or Tx.):     Plan at this time will be to transfer to [facility] for IV antibiotics. I spoke to Dr. Sabrina Good who did accept this transfer. Patient did have his pacemaker placed at [facility]. This was discussed with the patient and he was agreeable to this plan and stable at time of transfer. I am the Primary Clinician of Record. FINAL IMPRESSION      1. Infection of pacemaker pulse generator site [facility])          DISPOSITION/PLAN     DISPOSITION Decision To Transfer 01/19/2023 02:54:42 PM      PATIENT REFERRED TO:  No follow-up provider specified.     DISCHARGE MEDICATIONS:  New Prescriptions    No medications on file       DISCONTINUED MEDICATIONS:  Discontinued Medications    No medications on file              (Please note that portions of this note were completed with a voice recognition program.  Efforts were made to edit the dictations but occasionally words are mis-transcribed.)    Ese iRch, JOSÉ MIGUEL (electronically signed)        China Dennis PA-C  01/19/23 1509

## 2023-01-19 NOTE — ED NOTES
1696-Call back received from [facility] with Dr. Holly Herbert [address] on the line for [facility] regarding consult.       Ruel Mahmood  01/19/23 5845

## 2023-01-19 NOTE — ED NOTES
[address] contacted for transfer to [facility], [facility] RN handling     Reina Heady  01/19/23 3419

## 2023-01-19 NOTE — PROGRESS NOTES
Vanc per ED  Dx: SSTI  Pt wt 80kg,  Srcr 1.7   Vanc 1500mg times one  [place] D 1/19/202312:37 PM  .

## 2023-01-20 ENCOUNTER — APPOINTMENT (OUTPATIENT)
Dept: CARDIAC CATH/INVASIVE PROCEDURES | Age: 88
DRG: 314 | End: 2023-01-20
Attending: HOSPITALIST
Payer: MEDICARE

## 2023-01-20 ENCOUNTER — ANESTHESIA EVENT (OUTPATIENT)
Dept: CARDIAC CATH/INVASIVE PROCEDURES | Age: 88
End: 2023-01-20
Payer: MEDICARE

## 2023-01-20 ENCOUNTER — NURSE ONLY (OUTPATIENT)
Dept: CARDIOLOGY CLINIC | Age: 88
End: 2023-01-20

## 2023-01-20 ENCOUNTER — ANESTHESIA (OUTPATIENT)
Dept: CARDIAC CATH/INVASIVE PROCEDURES | Age: 88
End: 2023-01-20
Payer: MEDICARE

## 2023-01-20 LAB
ANION GAP SERPL CALCULATED.3IONS-SCNC: 11 MMOL/L (ref 3–16)
BASOPHILS ABSOLUTE: 0 K/UL (ref 0–0.2)
BASOPHILS RELATIVE PERCENT: 0.4 %
BLOOD CULTURE, ROUTINE: NORMAL
BUN BLDV-MCNC: 19 MG/DL (ref 7–20)
CALCIUM SERPL-MCNC: 9.3 MG/DL (ref 8.3–10.6)
CHLORIDE BLD-SCNC: 104 MMOL/L (ref 99–110)
CO2: 27 MMOL/L (ref 21–32)
CREAT SERPL-MCNC: 1.2 MG/DL (ref 0.8–1.3)
CULTURE, BLOOD 2: NORMAL
EOSINOPHILS ABSOLUTE: 0.1 K/UL (ref 0–0.6)
EOSINOPHILS RELATIVE PERCENT: 0.7 %
GFR SERPL CREATININE-BSD FRML MDRD: 59 ML/MIN/{1.73_M2}
GLUCOSE BLD-MCNC: 106 MG/DL (ref 70–99)
HCT VFR BLD CALC: 41.5 % (ref 40.5–52.5)
HEMOGLOBIN: 13.8 G/DL (ref 13.5–17.5)
LV EF: 50 %
LVEF MODALITY: NORMAL
LYMPHOCYTES ABSOLUTE: 1.1 K/UL (ref 1–5.1)
LYMPHOCYTES RELATIVE PERCENT: 15.2 %
MAGNESIUM: 1.8 MG/DL (ref 1.8–2.4)
MCH RBC QN AUTO: 28.7 PG (ref 26–34)
MCHC RBC AUTO-ENTMCNC: 33.3 G/DL (ref 31–36)
MCV RBC AUTO: 86.2 FL (ref 80–100)
MONOCYTES ABSOLUTE: 0.7 K/UL (ref 0–1.3)
MONOCYTES RELATIVE PERCENT: 8.8 %
NEUTROPHILS ABSOLUTE: 5.6 K/UL (ref 1.7–7.7)
NEUTROPHILS RELATIVE PERCENT: 74.9 %
PDW BLD-RTO: 16 % (ref 12.4–15.4)
PLATELET # BLD: 191 K/UL (ref 135–450)
PMV BLD AUTO: 8.3 FL (ref 5–10.5)
POTASSIUM REFLEX MAGNESIUM: 3.4 MMOL/L (ref 3.5–5.1)
RBC # BLD: 4.81 M/UL (ref 4.2–5.9)
SODIUM BLD-SCNC: 142 MMOL/L (ref 136–145)
VANCOMYCIN RANDOM: 11.6 UG/ML
WBC # BLD: 7.5 K/UL (ref 4–11)

## 2023-01-20 PROCEDURE — 93320 DOPPLER ECHO COMPLETE: CPT

## 2023-01-20 PROCEDURE — 3700000001 HC ADD 15 MINUTES (ANESTHESIA)

## 2023-01-20 PROCEDURE — 85025 COMPLETE CBC W/AUTO DIFF WBC: CPT

## 2023-01-20 PROCEDURE — 6360000002 HC RX W HCPCS: Performed by: NURSE PRACTITIONER

## 2023-01-20 PROCEDURE — 93312 ECHO TRANSESOPHAGEAL: CPT

## 2023-01-20 PROCEDURE — 2580000003 HC RX 258: Performed by: INTERNAL MEDICINE

## 2023-01-20 PROCEDURE — 83735 ASSAY OF MAGNESIUM: CPT

## 2023-01-20 PROCEDURE — 6370000000 HC RX 637 (ALT 250 FOR IP): Performed by: NURSE PRACTITIONER

## 2023-01-20 PROCEDURE — 2580000003 HC RX 258: Performed by: NURSE PRACTITIONER

## 2023-01-20 PROCEDURE — 80048 BASIC METABOLIC PNL TOTAL CA: CPT

## 2023-01-20 PROCEDURE — 2580000003 HC RX 258: Performed by: NURSE ANESTHETIST, CERTIFIED REGISTERED

## 2023-01-20 PROCEDURE — 6370000000 HC RX 637 (ALT 250 FOR IP): Performed by: INTERNAL MEDICINE

## 2023-01-20 PROCEDURE — 80202 ASSAY OF VANCOMYCIN: CPT

## 2023-01-20 PROCEDURE — 6360000002 HC RX W HCPCS: Performed by: INTERNAL MEDICINE

## 2023-01-20 PROCEDURE — 36415 COLL VENOUS BLD VENIPUNCTURE: CPT

## 2023-01-20 PROCEDURE — 3700000000 HC ANESTHESIA ATTENDED CARE

## 2023-01-20 PROCEDURE — 6360000002 HC RX W HCPCS: Performed by: NURSE ANESTHETIST, CERTIFIED REGISTERED

## 2023-01-20 PROCEDURE — 2060000000 HC ICU INTERMEDIATE R&B

## 2023-01-20 PROCEDURE — 2500000003 HC RX 250 WO HCPCS: Performed by: NURSE ANESTHETIST, CERTIFIED REGISTERED

## 2023-01-20 PROCEDURE — 93325 DOPPLER ECHO COLOR FLOW MAPG: CPT

## 2023-01-20 PROCEDURE — B24BZZ4 ULTRASONOGRAPHY OF HEART WITH AORTA, TRANSESOPHAGEAL: ICD-10-PCS | Performed by: INTERNAL MEDICINE

## 2023-01-20 PROCEDURE — 2500000003 HC RX 250 WO HCPCS: Performed by: INTERNAL MEDICINE

## 2023-01-20 RX ORDER — LABETALOL HYDROCHLORIDE 5 MG/ML
20 INJECTION, SOLUTION INTRAVENOUS EVERY 4 HOURS PRN
Status: DISCONTINUED | OUTPATIENT
Start: 2023-01-20 | End: 2023-01-22 | Stop reason: HOSPADM

## 2023-01-20 RX ORDER — PROPOFOL 10 MG/ML
INJECTION, EMULSION INTRAVENOUS PRN
Status: DISCONTINUED | OUTPATIENT
Start: 2023-01-20 | End: 2023-01-20 | Stop reason: SDUPTHER

## 2023-01-20 RX ORDER — LACTOBACILLUS RHAMNOSUS GG 10B CELL
1 CAPSULE ORAL 2 TIMES DAILY WITH MEALS
Status: DISCONTINUED | OUTPATIENT
Start: 2023-01-20 | End: 2023-01-22 | Stop reason: HOSPADM

## 2023-01-20 RX ORDER — AMLODIPINE BESYLATE 5 MG/1
5 TABLET ORAL ONCE
Status: COMPLETED | OUTPATIENT
Start: 2023-01-20 | End: 2023-01-20

## 2023-01-20 RX ORDER — AMLODIPINE BESYLATE 5 MG/1
10 TABLET ORAL DAILY
Status: DISCONTINUED | OUTPATIENT
Start: 2023-01-21 | End: 2023-01-22 | Stop reason: HOSPADM

## 2023-01-20 RX ORDER — ISOSORBIDE MONONITRATE 30 MG/1
30 TABLET, EXTENDED RELEASE ORAL DAILY
Status: DISCONTINUED | OUTPATIENT
Start: 2023-01-20 | End: 2023-01-22 | Stop reason: HOSPADM

## 2023-01-20 RX ORDER — AMLODIPINE BESYLATE 5 MG/1
5 TABLET ORAL DAILY
Status: DISCONTINUED | OUTPATIENT
Start: 2023-01-20 | End: 2023-01-20

## 2023-01-20 RX ORDER — SODIUM CHLORIDE 9 MG/ML
INJECTION, SOLUTION INTRAVENOUS PRN
OUTPATIENT
Start: 2023-01-20

## 2023-01-20 RX ORDER — SODIUM CHLORIDE 0.9 % (FLUSH) 0.9 %
5-40 SYRINGE (ML) INJECTION PRN
Status: CANCELLED | OUTPATIENT
Start: 2023-01-20

## 2023-01-20 RX ORDER — SODIUM CHLORIDE, SODIUM LACTATE, POTASSIUM CHLORIDE, CALCIUM CHLORIDE 600; 310; 30; 20 MG/100ML; MG/100ML; MG/100ML; MG/100ML
INJECTION, SOLUTION INTRAVENOUS CONTINUOUS PRN
Status: DISCONTINUED | OUTPATIENT
Start: 2023-01-20 | End: 2023-01-20 | Stop reason: SDUPTHER

## 2023-01-20 RX ORDER — LABETALOL HYDROCHLORIDE 5 MG/ML
10 INJECTION, SOLUTION INTRAVENOUS
Status: CANCELLED | OUTPATIENT
Start: 2023-01-20

## 2023-01-20 RX ORDER — SODIUM CHLORIDE 9 MG/ML
25 INJECTION, SOLUTION INTRAVENOUS PRN
Status: CANCELLED | OUTPATIENT
Start: 2023-01-20

## 2023-01-20 RX ORDER — QUETIAPINE FUMARATE 25 MG/1
25 TABLET, FILM COATED ORAL
Status: DISCONTINUED | OUTPATIENT
Start: 2023-01-20 | End: 2023-01-22 | Stop reason: HOSPADM

## 2023-01-20 RX ORDER — SODIUM CHLORIDE 0.9 % (FLUSH) 0.9 %
5-40 SYRINGE (ML) INJECTION EVERY 12 HOURS SCHEDULED
Status: CANCELLED | OUTPATIENT
Start: 2023-01-20

## 2023-01-20 RX ORDER — LIDOCAINE HYDROCHLORIDE 20 MG/ML
INJECTION, SOLUTION INFILTRATION; PERINEURAL PRN
Status: DISCONTINUED | OUTPATIENT
Start: 2023-01-20 | End: 2023-01-20 | Stop reason: SDUPTHER

## 2023-01-20 RX ORDER — OXYCODONE HYDROCHLORIDE 5 MG/1
10 TABLET ORAL PRN
Status: CANCELLED | OUTPATIENT
Start: 2023-01-20 | End: 2023-01-20

## 2023-01-20 RX ORDER — ONDANSETRON 2 MG/ML
4 INJECTION INTRAMUSCULAR; INTRAVENOUS
Status: CANCELLED | OUTPATIENT
Start: 2023-01-20 | End: 2023-01-21

## 2023-01-20 RX ORDER — QUETIAPINE FUMARATE 25 MG/1
50 TABLET, FILM COATED ORAL NIGHTLY
Status: DISCONTINUED | OUTPATIENT
Start: 2023-01-20 | End: 2023-01-22 | Stop reason: HOSPADM

## 2023-01-20 RX ORDER — TAMSULOSIN HYDROCHLORIDE 0.4 MG/1
0.4 CAPSULE ORAL DAILY
Status: DISCONTINUED | OUTPATIENT
Start: 2023-01-20 | End: 2023-01-22 | Stop reason: HOSPADM

## 2023-01-20 RX ORDER — HYDRALAZINE HYDROCHLORIDE 20 MG/ML
10 INJECTION INTRAMUSCULAR; INTRAVENOUS EVERY 4 HOURS PRN
Status: DISCONTINUED | OUTPATIENT
Start: 2023-01-20 | End: 2023-01-22 | Stop reason: HOSPADM

## 2023-01-20 RX ORDER — SODIUM CHLORIDE 9 MG/ML
INJECTION, SOLUTION INTRAVENOUS PRN
Status: DISCONTINUED | OUTPATIENT
Start: 2023-01-20 | End: 2023-01-22 | Stop reason: HOSPADM

## 2023-01-20 RX ORDER — DIPHENHYDRAMINE HYDROCHLORIDE 50 MG/ML
12.5 INJECTION INTRAMUSCULAR; INTRAVENOUS
Status: CANCELLED | OUTPATIENT
Start: 2023-01-20 | End: 2023-01-21

## 2023-01-20 RX ORDER — LORAZEPAM 2 MG/ML
1 INJECTION INTRAMUSCULAR EVERY 4 HOURS PRN
Status: DISCONTINUED | OUTPATIENT
Start: 2023-01-20 | End: 2023-01-22 | Stop reason: HOSPADM

## 2023-01-20 RX ORDER — SODIUM CHLORIDE 0.9 % (FLUSH) 0.9 %
5-40 SYRINGE (ML) INJECTION EVERY 12 HOURS SCHEDULED
Status: DISCONTINUED | OUTPATIENT
Start: 2023-01-20 | End: 2023-01-22 | Stop reason: HOSPADM

## 2023-01-20 RX ORDER — SODIUM CHLORIDE 0.9 % (FLUSH) 0.9 %
5-40 SYRINGE (ML) INJECTION PRN
OUTPATIENT
Start: 2023-01-20

## 2023-01-20 RX ORDER — OXYCODONE HYDROCHLORIDE 5 MG/1
5 TABLET ORAL PRN
Status: CANCELLED | OUTPATIENT
Start: 2023-01-20 | End: 2023-01-20

## 2023-01-20 RX ORDER — SODIUM CHLORIDE 0.9 % (FLUSH) 0.9 %
5-40 SYRINGE (ML) INJECTION EVERY 12 HOURS SCHEDULED
OUTPATIENT
Start: 2023-01-20

## 2023-01-20 RX ORDER — SODIUM CHLORIDE 0.9 % (FLUSH) 0.9 %
5-40 SYRINGE (ML) INJECTION PRN
Status: DISCONTINUED | OUTPATIENT
Start: 2023-01-20 | End: 2023-01-22 | Stop reason: HOSPADM

## 2023-01-20 RX ADMIN — VANCOMYCIN HYDROCHLORIDE 1000 MG: 1 INJECTION, POWDER, LYOPHILIZED, FOR SOLUTION INTRAVENOUS at 09:22

## 2023-01-20 RX ADMIN — MEMANTINE 5 MG: 5 TABLET ORAL at 22:10

## 2023-01-20 RX ADMIN — ACETAMINOPHEN 650 MG: 325 TABLET ORAL at 22:11

## 2023-01-20 RX ADMIN — CEFEPIME 2000 MG: 2 INJECTION, POWDER, FOR SOLUTION INTRAVENOUS at 12:04

## 2023-01-20 RX ADMIN — Medication 1 CAPSULE: at 22:10

## 2023-01-20 RX ADMIN — ISOSORBIDE MONONITRATE 30 MG: 30 TABLET, EXTENDED RELEASE ORAL at 22:09

## 2023-01-20 RX ADMIN — ACETAMINOPHEN 650 MG: 325 TABLET ORAL at 09:28

## 2023-01-20 RX ADMIN — SODIUM CHLORIDE: 9 INJECTION, SOLUTION INTRAVENOUS at 07:51

## 2023-01-20 RX ADMIN — Medication 10 ML: at 22:11

## 2023-01-20 RX ADMIN — FINASTERIDE 5 MG: 5 TABLET, FILM COATED ORAL at 09:10

## 2023-01-20 RX ADMIN — LORAZEPAM 1 MG: 2 INJECTION INTRAMUSCULAR; INTRAVENOUS at 08:00

## 2023-01-20 RX ADMIN — ATORVASTATIN CALCIUM 40 MG: 40 TABLET, FILM COATED ORAL at 22:10

## 2023-01-20 RX ADMIN — RISPERIDONE 0.25 MG: 0.25 TABLET ORAL at 09:10

## 2023-01-20 RX ADMIN — AMLODIPINE BESYLATE 5 MG: 5 TABLET ORAL at 22:07

## 2023-01-20 RX ADMIN — HYDRALAZINE HYDROCHLORIDE 10 MG: 20 INJECTION INTRAMUSCULAR; INTRAVENOUS at 00:58

## 2023-01-20 RX ADMIN — PROPOFOL 250 MG: 10 INJECTION, EMULSION INTRAVENOUS at 10:18

## 2023-01-20 RX ADMIN — SODIUM CHLORIDE, SODIUM LACTATE, POTASSIUM CHLORIDE, AND CALCIUM CHLORIDE: .6; .31; .03; .02 INJECTION, SOLUTION INTRAVENOUS at 10:12

## 2023-01-20 RX ADMIN — TAMSULOSIN HYDROCHLORIDE 0.4 MG: 0.4 CAPSULE ORAL at 22:14

## 2023-01-20 RX ADMIN — QUETIAPINE FUMARATE 50 MG: 25 TABLET ORAL at 22:10

## 2023-01-20 RX ADMIN — ASPIRIN 81 MG: 81 TABLET, COATED ORAL at 09:10

## 2023-01-20 RX ADMIN — Medication 10 ML: at 09:25

## 2023-01-20 RX ADMIN — ENOXAPARIN SODIUM 40 MG: 100 INJECTION SUBCUTANEOUS at 09:25

## 2023-01-20 RX ADMIN — QUETIAPINE FUMARATE 25 MG: 25 TABLET ORAL at 09:10

## 2023-01-20 RX ADMIN — Medication 10 ML: at 22:12

## 2023-01-20 RX ADMIN — PANTOPRAZOLE SODIUM 40 MG: 40 TABLET, DELAYED RELEASE ORAL at 09:10

## 2023-01-20 RX ADMIN — CITALOPRAM HYDROBROMIDE 10 MG: 20 TABLET ORAL at 09:10

## 2023-01-20 RX ADMIN — SODIUM CHLORIDE: 9 INJECTION, SOLUTION INTRAVENOUS at 09:19

## 2023-01-20 RX ADMIN — MEMANTINE 5 MG: 5 TABLET ORAL at 09:10

## 2023-01-20 RX ADMIN — RISPERIDONE 0.25 MG: 0.25 TABLET ORAL at 22:11

## 2023-01-20 RX ADMIN — AMLODIPINE BESYLATE 5 MG: 5 TABLET ORAL at 14:19

## 2023-01-20 RX ADMIN — LABETALOL HYDROCHLORIDE 20 MG: 5 INJECTION INTRAVENOUS at 16:45

## 2023-01-20 RX ADMIN — LIDOCAINE HYDROCHLORIDE 100 MG: 20 INJECTION, SOLUTION INFILTRATION; PERINEURAL at 10:18

## 2023-01-20 ASSESSMENT — PAIN DESCRIPTION - DESCRIPTORS: DESCRIPTORS: ACHING

## 2023-01-20 ASSESSMENT — PAIN DESCRIPTION - LOCATION
LOCATION: CHEST
LOCATION: CHEST

## 2023-01-20 ASSESSMENT — PAIN SCALES - GENERAL
PAINLEVEL_OUTOF10: 0
PAINLEVEL_OUTOF10: 0
PAINLEVEL_OUTOF10: 3
PAINLEVEL_OUTOF10: 0
PAINLEVEL_OUTOF10: 5
PAINLEVEL_OUTOF10: 0
PAINLEVEL_OUTOF10: 0

## 2023-01-20 ASSESSMENT — ENCOUNTER SYMPTOMS: SHORTNESS OF BREATH: 1

## 2023-01-20 ASSESSMENT — PAIN DESCRIPTION - ORIENTATION
ORIENTATION: LEFT
ORIENTATION: LEFT

## 2023-01-20 ASSESSMENT — PAIN - FUNCTIONAL ASSESSMENT: PAIN_FUNCTIONAL_ASSESSMENT: ACTIVITIES ARE NOT PREVENTED

## 2023-01-20 NOTE — PLAN OF CARE
Problem: Chronic Conditions and Co-morbidities  Goal: Patient's chronic conditions and co-morbidity symptoms are monitored and maintained or improved  Outcome: Progressing  Flowsheets (Taken 1/20/2023 3964)  Care Plan - Patient's Chronic Conditions and Co-Morbidity Symptoms are Monitored and Maintained or Improved:   Monitor and assess patient's chronic conditions and comorbid symptoms for stability, deterioration, or improvement   Collaborate with multidisciplinary team to address chronic and comorbid conditions and prevent exacerbation or deterioration   Update acute care plan with appropriate goals if chronic or comorbid symptoms are exacerbated and prevent overall improvement and discharge

## 2023-01-20 NOTE — H&P
Hospital Medicine History & Physical      PCP: Shira Fleming MD    Date of Admission: 1/19/2023    Date of Service: Pt seen/examined on 1/19/2023 and Admitted to Inpatient with expected LOS greater than two midnights due to medical therapy. Chief Complaint: Infection of pacemaker site    History Of Present Illness:      82 y.o. male, with past medical history of hypertension, hyperlipidemia, dementia and atrial fibrillation, who was a direct admit from [facility] to [facility] with an infected pacemaker site. History obtained from the patient and review of EMR. The patient stated he was taken to [facility] emergency room by his daughter because he was lost and got himself turned around. He also stated he was taking because he has an infection in his pacemaker. The patient does have a history of dementia and has episodes of confusion. Per EMR, the patient's daughter reported that for the last 2 to 3 days she has noticed the patient's left chest wall pacemaker site has been swollen, red and painful. The patient stated he is unsure how long ago his pacemaker was placed, but EMR reveals that it was placed 5 months ago. The patient's daughter reported that the patient has had some fevers and chills at home. However, the patient has been afebrile and has a negative lactic and leukocytosis. In the emergency room a chest CT was obtained that revealed congestive heart failure. Blood cultures were obtained and the patient was started on vancomycin and cefepime. He was also given morphine for discomfort. Throughout the patient's work-up he was also found to have an NATANAEL with a creatinine of 1.7. His baseline appears to be 1.1. His UA was negative. The patient was ultimately transferred to [facility] for further evaluation and treatment. Cardiology was consulted for the a.m. The patient denied any other associated symptoms as well as any aggravating and/or alleviating factors.  At the time of this assessment, the patient was resting comfortably in bed. He currently denies any chest pain, back pain, abdominal pain, shortness of breath, numbness, tingling, N/V/C/D, fever and/or chills. Past Medical History:          Diagnosis Date    Atrial fibrillation ([facility] [address]     Blind right eye     Chronic kidney disease     Dementia ([facility] [address]     Hyperlipidemia     Hypertension     Pneumonia      Past Surgical History:          Procedure Laterality Date    ABLATION OF DYSRHYTHMIC FOCUS      APPENDECTOMY      COLONOSCOPY      EYE SURGERY      HERNIA REPAIR      JOINT REPLACEMENT  2013    left shoulder    PACEMAKER PLACEMENT  10/13/2017    Dr Begum Staff at [facility]. single chamber PPM    PACEMAKER PLACEMENT  09/16/2022    biventricular    TONSILLECTOMY      UPPER GASTROINTESTINAL ENDOSCOPY       Medications Prior to Admission:      Prior to Admission medications    Medication Sig Start Date End Date Taking?  Authorizing Provider   memantine (NAMENDA) 5 MG tablet TAKE 1 TABLET BY MOUTH 2 TIMES DAILY 12/5/22   GUS Arriaza - CNP   atorvastatin (LIPITOR) 40 MG tablet TAKE 1 TABLET BY MOUTH DAILY 11/21/22   Artur Carrera MD   risperiDONE (RISPERDAL) 0.25 MG tablet Take 1 tablet by mouth 2 times daily 10/24/22   Artur Carrera MD   divalproex (DEPAKOTE) 125 MG DR tablet Take 1 tablet by mouth at bedtime  Patient not taking: No sig reported 10/24/22   Artur Carrera MD   fludrocortisone (FLORINEF) 0.1 MG tablet Take 2 tablets by mouth daily 10/17/22   Artur Carrera MD   midodrine (PROAMATINE) 5 MG tablet Take 1 tablet by mouth 3 times daily 10/17/22   Artur Carrera MD   pantoprazole (PROTONIX) 40 MG tablet Take 1 tablet by mouth daily 10/17/22   Artur Carrera MD   mirabegron [facility]) 25 MG TB24 Take 1 tablet by mouth daily 10/17/22   Artur Carrera MD   Acetaminophen (TYLENOL) 325 MG CAPS Take 650 mg by mouth  Patient not taking: No sig reported 8/27/22   Historical Provider, MD trospium (SANCTURA) 20 MG tablet Take 20 mg by mouth in the morning and 20 mg before bedtime. 8/26/22  Historical Provider, MD   apixaban (ELIQUIS) 5 MG TABS tablet TAKE 1/2 TABLET BY MOUTH 2 TIMES DAILY 8/8/22 8/26/22  Lorna Donald MD   metoprolol succinate (TOPROL XL) 25 MG extended release tablet TAKE 3 TABLETS BY MOUTH TWICE DAILY  Patient taking differently: Take 25 mg by mouth in the morning and 25 mg in the evening. 7/25/22 8/26/22  Leonel Garcia, APRN - CNP   QUEtiapine (SEROQUEL) 50 MG tablet Take 1 tablet by mouth nightly 7/23/22 8/26/22  Lorna Donald MD   famotidine (PEPCID) 20 MG tablet Take 1 tablet by mouth at bedtime 7/8/22 8/26/22  Lorna Donald MD   isosorbide mononitrate (IMDUR) 30 MG extended release tablet TAKE 1 TABLET BY MOUTH DAILY 6/1/22 8/26/22  Harsh Castillo MD   donepezil (ARICEPT) 10 MG tablet Take 1 tablet by mouth daily 3/24/22 8/26/22  Historical Provider, MD   citalopram (CELEXA) 10 MG tablet TAKE 1 TABLET BY MOUTH DAILY 3/28/22   Lorna Donald MD   fluticasone [facility]) 50 MCG/ACT nasal spray 2 sprays by Nasal route daily  Patient not taking: Reported on 1/19/2023 2/1/22   Lorna Donald MD   Ferrous Sulfate (IRON) 325 (65 Fe) MG TABS Take by mouth daily     Historical Provider, MD   tamsulosin (FLOMAX) 0.4 MG capsule Take 0.4 mg by mouth daily  8/26/22  Historical Provider, MD   finasteride (PROSCAR) 5 MG tablet Take 5 mg by mouth daily. 3/4/15   Historical Provider, MD   aspirin 81 MG tablet Take 81 mg by mouth daily. Historical Provider, MD     Allergies:  Sulfa antibiotics    Social History:      The patient currently lives at home    TOBACCO:   reports that he quit smoking about 47 years ago. His smoking use included cigarettes. He has a 10.00 pack-year smoking history. He has never used smokeless tobacco.  ETOH:   reports no history of alcohol use.   E-cigarette/Vaping       Questions Responses    E-cigarette/Vaping Use Never User    Start Date Passive Exposure     Quit Date     Counseling Given     Comments           Family History:      Reviewed and negative in regards to presenting illness/complaint. Problem Relation Age of Onset    Heart Disease Mother     Other Mother     Heart Disease Father     Asthma Other      REVIEW OF SYSTEMS COMPLETED:   Pertinent positives as noted in the HPI. All other systems reviewed and negative. PHYSICAL EXAM PERFORMED:    BP (!) 162/106   Pulse 82   Temp 98.2 °F (36.8 °C) (Oral)   Resp 16   SpO2 98%     General appearance: Pleasant, elderly male in no apparent distress, appears stated age and cooperative. HEENT:Pupils equal, round, and reactive to light. Wears glasses extra ocular muscles intact. Conjunctivae/corneas clear. Neck: Supple, with full range of motion. No jugular venous distention. Trachea midline. Respiratory:  Normal respiratory effort. Clear to auscultation, bilaterally without Rales/Wheezes/Rhonchi. Cardiovascular:  Regular rate and rhythm with normal S1/S2 without murmurs, rubs or gallops. Abdomen: Soft, non-tender, non-distended with normal bowel sounds. Musculoskeletal:  No clubbing, cyanosis or edema bilaterally. Full range of motion without deformity.   Skin: Skin color, texture, turgor normal.  Left chest wall pacemaker site edematous and red  Neurologic:  Neurovascularly intact Cranial nerves: II-XII intact, grossly non-focal.  Psychiatric:  Alert and oriented x2, forgetful, thought content appropriate, normal insight  Capillary Refill: Brisk,3 seconds, normal  Peripheral Pulses: +2 palpable, equal bilaterally     Labs:     Recent Labs     01/19/23  1017   WBC 7.1   HGB 14.9   HCT 44.7        Recent Labs     01/19/23  1017      K 3.1*      CO2 30   BUN 21*   CREATININE 1.7*   CALCIUM 8.8     Recent Labs     01/19/23  1017   AST 18   ALT 10   BILITOT 1.0   ALKPHOS 99     Recent Labs     01/19/23  1017   TROPONINI <0.01     Urinalysis:      Lab Results Component Value Date/Time    NITRU Negative 01/19/2023 12:25 PM    [address] 21-50 04/21/2022 02:20 PM    BACTERIA 1+ 04/21/2022 02:20 PM    RBCUA 3-4 04/21/2022 02:20 PM    BLOODU Negative 01/19/2023 12:25 PM    SPECGRAV 1.015 01/19/2023 12:25 PM    GLUCOSEU Negative 01/19/2023 12:25 PM     Radiology:     CXR: I have reviewed the CXR with the following interpretation: Cardiomegaly with mild pulmonary vascular congestion    EKG:  I have reviewed the EKG with the following interpretation: Ventricular-paced rhythm. Biventricular pacemaker detected. Abnormal ECG. When compared with ECG of 17-SEP-2022 10:44, No significant change was found. Confirmed by Guzman Cassidy MD, [address] ([id number]) on 1/19/2023 1:01:09 PM    No orders to display     Consults:    None    ASSESSMENT:    Active Hospital Problems    Diagnosis Date Noted    Infection of pacemaker pulse generator site ([address] Shelli.Low. 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     Atrial fibrillation  -No longer taking Eliquis for anticoagulation  -Currently V paced  -Continue aspirin    Dementia  -Supportive care  -Continue Namenda     Hyperlipidemia  -Continue atorvastatin    Hypokalemia  -Replaced in ED  -Recheck potassium    DVT Prophylaxis: Lovenox    Diet: No diet orders on file    Code Status: Prior    PT/OT Eval Status: No indication for need at this time    Dispo -2-3 days pending clinical improvement Cortez Ruby, APRN - CNP    Thank you Henry Soto MD for the opportunity to be involved in this patient's care.  If you have any questions or concerns please feel free to contact me at [phone].  ------------------Dr. Slime Don----------------------------

## 2023-01-20 NOTE — H&P
Brief Pre-Op Note/Sedation Assessment      Lan Kang  1935  Cath Pool Rm/NONE      [id number]  10:02 AM    Planned Procedure: DORITA    Post Procedure Plan: Return to same level of care    Consent: I have discussed with the patient and/or the patient representative/his daughter Danni Larson the indication, alternatives, and the possible risks and/or complications of the planned procedure and the anesthesia methods. The patient and/or patient representative appear to understand and agree to proceed. Chief Complaint: ICD pocket infection ,needs device removal, DORITA to assess TV/lead infection      Vital Signs:  BP (!) 166/99   Pulse 80   Temp 99 °F (37.2 °C) (Oral)   Resp 18   Wt 176 lb (79.8 kg)   SpO2 97%   BMI 27.57 kg/m²     Allergies:   Allergies   Allergen Reactions    Sulfa Antibiotics        Past Medical History:  Past Medical History:   Diagnosis Date    Atrial fibrillation ([facility] [address]     Blind right eye     Chronic kidney disease     Dementia ([facility] [address]     Hyperlipidemia     Hypertension     Pneumonia          Surgical History:  Past Surgical History:   Procedure Laterality Date    ABLATION OF DYSRHYTHMIC FOCUS      APPENDECTOMY      COLONOSCOPY      EYE SURGERY      HERNIA REPAIR      JOINT REPLACEMENT  2013    left shoulder    PACEMAKER PLACEMENT  10/13/2017    Dr Radha Cain at [facility]. single chamber PPM    PACEMAKER PLACEMENT  09/16/2022    biventricular    TONSILLECTOMY      UPPER GASTROINTESTINAL ENDOSCOPY           Medications:  Current Facility-Administered Medications   Medication Dose Route Frequency Provider Last Rate Last Admin    hydrALAZINE (APRESOLINE) injection 10 mg  10 mg IntraVENous Q4H PRN Thiago Candelario MD   10 mg at 01/20/23 0058    vancomycin 1000 mg IVPB in 250 mL D5W addavial  1,000 mg IntraVENous Q24H Estelle Dwyer  mL/hr at 01/20/23 0941 Rate Verify at 01/20/23 0941    LORazepam (ATIVAN) injection 1 mg  1 mg IntraVENous Q4H PRN Nicky Mak Ailyn Stanford MD        QUEtiapine (SEROQUEL) tablet 25 mg  25 mg Oral QAM AC Elsa Watson MD   25 mg at 01/20/23 2605    And    QUEtiapine (SEROQUEL) tablet 50 mg  50 mg Oral Nightly Elsa Watson MD        aspirin EC tablet 81 mg  81 mg Oral Daily Jodi Pew, APRN - CNP   81 mg at 01/20/23 0910    atorvastatin (LIPITOR) tablet 40 mg  40 mg Oral Daily Jodi Pew, APRN - CNP   40 mg at 01/19/23 2317    citalopram (CELEXA) tablet 10 mg  10 mg Oral Daily Jodi Pew, APRN - CNP   10 mg at 01/20/23 0910    finasteride (PROSCAR) tablet 5 mg  5 mg Oral Daily Jodi Pew, APRN - CNP   5 mg at 01/20/23 0910    memantine (NAMENDA) tablet 5 mg  5 mg Oral BID Jodi Pew, APRN - CNP   5 mg at 01/20/23 0910    pantoprazole (PROTONIX) tablet 40 mg  40 mg Oral Daily Jodi Pew, APRN - CNP   40 mg at 01/20/23 0910    risperiDONE (RISPERDAL) tablet 0.25 mg  0.25 mg Oral BID Jodi Pew, APRN - CNP   0.25 mg at 01/20/23 0910    sodium chloride flush 0.9 % injection 5-40 mL  5-40 mL IntraVENous 2 times per day Jodi Pew, APRN - CNP   10 mL at 01/20/23 0925    sodium chloride flush 0.9 % injection 5-40 mL  5-40 mL IntraVENous PRN Jodi Pew, APRN - CNP        0.9 % sodium chloride infusion   IntraVENous PRN Jodi Pew, APRN - CNP 25 mL/hr at 01/20/23 0919 New Bag at 01/20/23 0919    enoxaparin (LOVENOX) injection 40 mg  40 mg SubCUTAneous Daily Jodi Pew, APRN - CNP   40 mg at 01/20/23 0925    ondansetron (ZOFRAN-ODT) disintegrating tablet 4 mg  4 mg Oral Q8H PRN Jodi Pew, APRN - CNP        Or    ondansetron (ZOFRAN) injection 4 mg  4 mg IntraVENous Q6H PRN Jodi Pew, APRN - CNP        polyethylene glycol (GLYCOLAX) packet 17 g  17 g Oral Daily PRN Jodi Pew, APRN - CNP        acetaminophen (TYLENOL) tablet 650 mg  650 mg Oral Q6H PRN Jodi Pew, APRN - CNP   650 mg at 01/20/23 7523    Or    acetaminophen (TYLENOL) suppository 650 mg  650 mg Rectal Q6H PRN Jodi Pew, APRN - CNP        0.9 % sodium chloride infusion   IntraVENous Continuous GUS Moss CNP   Stopped at 01/20/23 0757    vancomycin (VANCOCIN) intermittent dosing (placeholder)   Other RX Placeholder GUS Moss CNP        cefepime (MAXIPIME) 2000 mg IVPB minibag  2,000 mg IntraVENous Q24H GUS Moss CNP               Pre-Sedation:    Pre-Sedation Documentation and Exam:  I have personally completed a history, physical exam & review of systems for this patient (see notes). Prior History of Anesthesia Complications:   none    Modified Mallampati:  III (soft palate, base of uvula visible)    ASA Classification:  Class 3 - A patient with severe systemic disease that limits activity but is not incapacitating      Greg Scale: Activity:  2 - Able to move 4 extremities voluntarily on command  Respiration:  2 - Able to breathe deeply and cough freely  Circulation:  2 - BP+/- 20mmHg of normal  Consciousness:  2 - Fully awake  Oxygen Saturation (color):  2 - Able to maintain oxygen saturation >92% on room air    Sedation/Anesthesia Plan:  Guard the patient's safety and welfare. Minimize physical discomfort and pain. Minimize negative psychological responses to treatment by providing sedation and analgesia and maximize the potential amnesia. Patient to meet pre-procedure discharge plan.     Medication Planned:  Per anesthesia    Patient is an appropriate candidate for plan of sedation: yes      Electronically signed by Miya Pereyra MD on 1/20/2023 at 10:02 AM

## 2023-01-20 NOTE — PROCEDURES
1/20/2023    PROCEDURE PERFORMED:     1. Transesophageal echocardiogram.    INDICATIONS:     PROCEDURE: The patient was brought to the lab in fasting state. The patient received adequate sedation with the assistance of anesthesia for the case. After ensuring adequate sedation, the esophagus was intubated and a complete transesophageal echocardiogram was completed. There were no complications related to the study. CONCLUSION:   No evidence of native valve infective endocarditis/lead infection     PLAN:   1. Continue current cardiac medications. EP service and daughter updated on results. See formal report for full details.      Larissa Herbert MD, [address] [facility]   [phone] [facility] office   [phone] [facility]

## 2023-01-20 NOTE — ANESTHESIA POSTPROCEDURE EVALUATION
Department of Anesthesiology  Postprocedure Note    Patient: Rafaela Bennett  MRN: [id number]  YOB: 1935  Date of evaluation: 1/20/2023      Procedure Summary     Date: 01/20/23 Room / Location: [facility] Cath Lab    Anesthesia Start: 1017 Anesthesia Stop: 1034    Procedure: TRANSESOPHAGEAL ECHO Diagnosis:     Scheduled Providers:  Responsible Provider: Irma Hyde MD    Anesthesia Type: MAC ASA Status: 3          Anesthesia Type: No value filed. Greg Phase I:      Greg Phase II:        Anesthesia Post Evaluation    Patient location during evaluation: PACU  Patient participation: complete - patient participated  Level of consciousness: awake and alert  Airway patency: patent  Nausea & Vomiting: no nausea and no vomiting  Complications: no  Cardiovascular status: blood pressure returned to baseline  Respiratory status: acceptable  Hydration status: euvolemic  Comments: VSS on transfer to phase 2 recovery. No anesthetic complications.

## 2023-01-20 NOTE — PROGRESS NOTES
Patient back from cath lab out of restraints. Patient calm in the bed. CMU and AVASYS notified of patient return. VSS.      Vitals:    01/20/23 1200   BP: (!) 152/90   Pulse: 80   Resp: 18   Temp:    SpO2:

## 2023-01-20 NOTE — PROGRESS NOTES
The following is a report from a pacemaker/ICD that was interrogated on 01- 08:11 AM  EST at [facility]. The patient is currently enrolled in [email or web address]. Below is the  relevant contact information.  Site with [employer] capability: [facility]  [address], [place], [address]  [place]  Phone:[phone]  Fax:[phone]    Remote transmission received for patient’s CRT-P.  Transmission shows normal sensing and pacing function.  EP physician will review.  See interrogation under the cardiology tab in the Paceart field for more details.  Will continue to monitor remotely.   Hx AVN ablation.    Hx AF-Eliquis noted on med list to be discontinued. Programmed VVIR. BP >99%. 2 HVR events-no EGM to view.

## 2023-01-20 NOTE — PROGRESS NOTES
Patient transferred from room 201 to 215 to be closer to nurses station. CMU notified, AVASYS notified. All belongings with patient. Patient remains calm, asleep, and out of restraints.

## 2023-01-20 NOTE — PROGRESS NOTES
Patient began screaming \"help\" at 0700, standing up and not following direction from nurses. Security was called. Patient was not oriented and said nurses were \"stealing\" from him. MD notified of situation. Patient was given 1mg IV ativan and put into bilateral wrist restraints per order. Family notified by [facility].

## 2023-01-20 NOTE — PLAN OF CARE
Problem: Safety - Adult  Goal: Free from fall injury  1/20/2023 1220 by Jonny Esquivel RN  Outcome: Progressing  Note: Pt will remain free from falls throughout hospital stay. Fall precautions in place, bed alarm on, bed in lowest position with wheels locked and side rails 2/4 up. Room door open and hourly rounding completed. Will continue to monitor throughout shift. For patient safety, an AVASYS camera has been placed in patient's room. AVASYS monitoring needed for Confusion, Increased Fall Risk, Pulling at Lines, and Delirium. Writer placed call to monitor observer to verify camera number  and review patient name, reason for monitoring, and contact information for primary RN. Family/Healthcare Decision Maker notified of use of remote monitoring upon implementation of camera and verbalized understanding. Problem: Safety - Medical Restraint  Goal: Remains free of injury from restraints (Restraint for Interference with Medical Device)  Description: INTERVENTIONS:  1. Determine that other, less restrictive measures have been tried or would not be effective before applying the restraint  2. Evaluate the patient's condition at the time of restraint application  3. Inform patient/family regarding the reason for restraint  4.  Q2H: Monitor safety, psychosocial status, comfort, nutrition and hydration  Recent Flowsheet Documentation  Taken 1/20/2023 0800 by Jonny Esquivel RN  Remains free of injury from restraints (restraint for interference with medical device):   Every 2 hours: Monitor safety, psychosocial status, comfort, nutrition and hydration   Inform patient/family regarding the reason for restraint   Evaluate the patient's condition at the time of restraint application   Determine that other, less restrictive measures have been tried or would not be effective before applying the restraint     Problem: Pain  Goal: Verbalizes/displays adequate comfort level or baseline comfort level  Outcome: Progressing  Note: Pt will be satisfied with pain control with prn Tyelnol. Pt uses numeric pain rating scale with reassessments after pain med administration. Will continue to monitor progression throughout shift.

## 2023-01-20 NOTE — CONSULTS
Pharmacy Note  Vancomycin Consult    Lon Yuan is a 82 y.o. male started on Vancomycin for SSTI ; consult received from Ms. Townsend/TL  to manage therapy. Also receiving the following antibiotics:   - Cefepime     Allergies:  Sulfa antibiotics     Tmax: 98.2    Recent Labs     01/19/23  1017   CREATININE 1.7*       Recent Labs     01/19/23  1017   WBC 7.1       Estimated Creatinine Clearance: 32 mL/min (A) (based on SCr of 1.7 mg/dL (H)). Intake/Output Summary (Last 24 hours) at 1/19/2023 2307  Last data filed at 1/19/2023 2000  Gross per 24 hour   Intake --   Output 75 ml   Net -75 ml       Wt Readings from Last 1 Encounters:   01/19/23 176 lb (79.8 kg)         Body mass index is 27.57 kg/m². Culture Date      Source                       Results  1/19/23               Blood                       N/A    Loading dose (critically ill or in ICU, require dialysis or renal replacement therapy): Vancomycin 25 mg/kg IVPB x 1 (maximum 2500 mg). Maintenance dose: 15 mg/kg (maximum: 2000 mg/dose and 4500 mg/day) starting at the next dosing interval determined by renal function  Pulse dose: fluctuating renal function, NATANAEL, ESRD   Goal Vancomycin trough: 10-15 mcg/mL or 15-20 mcg/mL   Goal Vancomycin AUC: 400-600     Assessment/Plan:  Pt.received vanc 1500mg X ~12:45, due to NATANAEL, will check level before giving next dose . Level due on 1/20 am    Thank you for the consult. Vancomycin Level, Random [[id number]]    Collected: 01/20/23 0514    Updated: 01/20/23 0618    Specimen Type: Blood     Vancomycin Rm 11.6 ug/mL     Recent Labs     01/19/23  1017 01/20/23  0514   CREATININE 1.7* 1.2       Estimated Creatinine Clearance: 46 mL/min (based on SCr of 1.2 mg/dL). Regimen: 1000 mg IV every 24 hours.   Start time: 06:24 on 01/20/2023  Exposure target: AUC24 (range)400-600 mg/L.hr   AUC24,ss: 439 mg/L.hr  Probability of AUC24 > 400: 66 %  Ctrough,ss: 13.5 mg/L  Probability of Ctrough,ss > 20: 9 %  Probability of nephrotoxicity (Lodise NORY 2009): 9 %  Vanc trough 1/21 0600  CHOCO Garcia [facility] 1/20/2023 6:21 AM    Vancomycin Level, Trough [[id number]] (Abnormal)    Collected: 01/21/23 0626    Updated: 01/21/23 0719    Specimen Type: Blood     Vancomycin Tr 33.5 High Panic   ug/mL     Looks like trough was drawn after bag was hung instead of before. Will check trough 1/22 0500  Neftaly Prado Pharm D.1/21/2023 7:34 AM    Vancomycin Level, Trough [[id number]]    Collected: 01/22/23 0537    Updated: 01/22/23 8277    Specimen Type: Blood     Vancomycin Tr 11.8 ug/mL     Regimen: 1000 mg IV every 24 hours.   Start time: 07:00 on 01/22/2023  Exposure target: AUC24 (range)400-600 mg/L.hr   AUC24,ss: 416 mg/L.hr  Probability of AUC24 > 400: 58 %  Ctrough,ss: 12.5 mg/L  Probability of Ctrough,ss > 20: 3 %  Probability of nephrotoxicity (Lodise NORY 2009): 8 %  Vanc monitor  Neftaly Prado Pharm D.1/22/2023 6:43 AM

## 2023-01-20 NOTE — PLAN OF CARE
Problem: Chronic Conditions and Co-morbidities  Goal: Patient's chronic conditions and co-morbidity symptoms are monitored and maintained or improved  1/20/2023 0556 by Emilia Cassidy RN  Outcome: Progressing     Patient's EF (Ejection Fraction) is less than 40%    Heart Failure Medications:  Diuretics:: None    (One of the following REQUIRED for EF </= 40%/SYSTOLIC FAILURE but MAY be used in EF% >40%/DIASTOLIC FAILURE)        ACE:: None        ARB:: None         ARNI:: None    (Beta Blockers)  NON- Evidenced Based Beta Blocker (for EF% >40%/DIASTOLIC FAILURE): None    Evidenced Based Beta Blocker::(REQUIRED for EF% <40%/SYSTOLIC FAILURE) None  ...................................................................................................................................................  Patient's weights and intake/output reviewed: No    Patient's Last Weight: 176 lbs obtained by standing scale. Difference of 0 lbs  0  than last documented weight.      Intake/Output Summary (Last 24 hours) at 1/20/2023 0557  Last data filed at 1/20/2023 0400  Gross per 24 hour   Intake --   Output 375 ml   Net -375 ml       Education Booklet Provided: yes    Comorbidities Reviewed Yes    Patient has a past medical history of Atrial fibrillation (HCC), Blind right eye, Chronic kidney disease, Dementia (HCC), Hyperlipidemia, Hypertension, and Pneumonia.     >>For CHF and Comorbidity documentation on Education Time and Topics, please see Education Tab    Progressive Mobility Assessment:  What is this patient's Current Level of Mobility?: Requires Bed Rest  How was this patient Mobilized today?: Edge of Bed and Up in Room, ambulated 3 ft                 With Whom? Nurse                 Level of Difficulty/Assistance: 1x Assist     Pt resting in bed at this time on room air. Pt denies shortness of breath. Pt with pitting lower extremity edema.     Patient and/or Family's stated Goal of Care this Admission: reduce shortness of  breath, increase activity tolerance, better understand heart failure and disease management, be more comfortable, and reduce lower extremity edema prior to discharge        : No

## 2023-01-20 NOTE — CARE COORDINATION
CASE MANAGEMENT INITIAL ASSESSMENT      Reviewed chart and completed assessment with patient's wife      Health Care Decision Maker :   Primary Decision Maker: Marilee Correa Child - [phone]    Secondary Decision Maker: Nancy March - Spouse - [phone]          Can this person be reached and be able to respond quickly, such as within a few minutes or hours? Yes      Admit date/status:1/19/23 Inpatient   Diagnosis:infected pacemaker    Is this a Readmission?:  No    Readmission Screening completed?: No     Insurance:Medicare, [facility]   Precert required for SNF: No       3 night stay required: Yes    Living arrangements, Adls, care needs, prior to admission:lives in a house with his wife     Durable Medical Equipment at home:  Walker_X_Cane_X_RTS__ BSC__Shower Chair__  02__ HHN__ CPAP__  BiPap__  Hospital Bed__ W/C___ Other_____    Services in the home and/or outpatient, prior to admission:none    Current PCP:Jose De Jesus [facility] needs: family provides      Dialysis Facility (if applicable)   Name:  Address:  Dialysis Schedule:  Phone:  Fax:    PT/OT recs:none    Hospital Exemption Notification (HEN):not initiated     Barriers to discharge:none    Plan/comments:Patient is from home with wife. Wife states he normally gets around ok with his walker. Patient has had home care in the past with [facility] and has been to [facility] in the past.   Spoke with RN who states patient will be here through the weekend and patient will need to be transferred to [facility] at the beginning of next week. Patient and family aware of the above. Will continue to follow.       ECOC on chart for MD signature

## 2023-01-20 NOTE — ANESTHESIA PRE PROCEDURE
Department of Anesthesiology  Preprocedure Note       Name:  Filipe Thompson   Age:  82 y.o.  :  1935                                          MRN:  [id number]         Date:  2023      Surgeon: * No surgeons listed *    Procedure: * No procedures listed *    Medications prior to admission:   Prior to Admission medications    Medication Sig Start Date End Date Taking? Authorizing Provider   memantine (NAMENDA) 5 MG tablet TAKE 1 TABLET BY MOUTH 2 TIMES DAILY 22   GUS Rayo CNP   atorvastatin (LIPITOR) 40 MG tablet TAKE 1 TABLET BY MOUTH DAILY 22   Terrell Sommers MD   risperiDONE (RISPERDAL) 0.25 MG tablet Take 1 tablet by mouth 2 times daily 10/24/22   Terrell Sommers MD   divalproex (DEPAKOTE) 125 MG DR tablet Take 1 tablet by mouth at bedtime  Patient not taking: No sig reported 10/24/22   Terrell Sommers MD   fludrocortisone (FLORINEF) 0.1 MG tablet Take 2 tablets by mouth daily 10/17/22   Terrell Sommers MD   midodrine (PROAMATINE) 5 MG tablet Take 1 tablet by mouth 3 times daily 10/17/22   Terrell Sommers MD   pantoprazole (PROTONIX) 40 MG tablet Take 1 tablet by mouth daily 10/17/22   Terrell Sommers MD   mirabegron [facility]) 25 MG TB24 Take 1 tablet by mouth daily 10/17/22   Terrell Sommers MD   Acetaminophen (TYLENOL) 325 MG CAPS Take 650 mg by mouth  Patient not taking: No sig reported 22   Historical Provider, MD   trospium (SANCTURA) 20 MG tablet Take 20 mg by mouth in the morning and 20 mg before bedtime.   22  Historical Provider, MD   apixaban (ELIQUIS) 5 MG TABS tablet TAKE 1/2 TABLET BY MOUTH 2 TIMES DAILY 22  Terrell Sommers MD   metoprolol succinate (TOPROL XL) 25 MG extended release tablet TAKE 3 TABLETS BY MOUTH TWICE DAILY  Patient taking differently: Take 25 mg by mouth in the morning and 25 mg in the evening. 22  GUS Rayo CNP   QUEtiapine (SEROQUEL) 50 MG tablet Take 1 tablet by mouth nightly 22 8/26/22  Prudencio Eubanks MD   famotidine (PEPCID) 20 MG tablet Take 1 tablet by mouth at bedtime 7/8/22 8/26/22  Prudencio Eubanks MD   isosorbide mononitrate (IMDUR) 30 MG extended release tablet TAKE 1 TABLET BY MOUTH DAILY 6/1/22 8/26/22  Cb Martinez MD   donepezil (ARICEPT) 10 MG tablet Take 1 tablet by mouth daily 3/24/22 8/26/22  Historical Provider, MD   citalopram (CELEXA) 10 MG tablet TAKE 1 TABLET BY MOUTH DAILY 3/28/22   Prudencio Eubanks MD   fluticasone [facility]) 50 MCG/ACT nasal spray 2 sprays by Nasal route daily  Patient not taking: Reported on 1/19/2023 2/1/22   Prudencio Eubanks MD   Ferrous Sulfate (IRON) 325 (65 Fe) MG TABS Take by mouth daily     Historical Provider, MD   tamsulosin (FLOMAX) 0.4 MG capsule Take 0.4 mg by mouth daily  8/26/22  Historical Provider, MD   finasteride (PROSCAR) 5 MG tablet Take 5 mg by mouth daily. 3/4/15   Historical Provider, MD   aspirin 81 MG tablet Take 81 mg by mouth daily.       Historical Provider, MD       Current medications:    Current Facility-Administered Medications   Medication Dose Route Frequency Provider Last Rate Last Admin    hydrALAZINE (APRESOLINE) injection 10 mg  10 mg IntraVENous Q4H PRN Ariadna Barnes MD   10 mg at 01/20/23 0058    vancomycin 1000 mg IVPB in 250 mL D5W addavial  1,000 mg IntraVENous Q24H Clara Hameed  mL/hr at 01/20/23 0941 Rate Verify at 01/20/23 0941    LORazepam (ATIVAN) injection 1 mg  1 mg IntraVENous Q4H PRN Clara Hameed MD        QUEtiapine (SEROQUEL) tablet 25 mg  25 mg Oral QAM AC Clara Hameed MD   25 mg at 01/20/23 0910    And    QUEtiapine (SEROQUEL) tablet 50 mg  50 mg Oral Nightly Clara Hameed MD        aspirin EC tablet 81 mg  81 mg Oral Daily GUS Kraft - CNP   81 mg at 01/20/23 0910    atorvastatin (LIPITOR) tablet 40 mg  40 mg Oral Daily GUS Kraft CNP   40 mg at 01/19/23 2311    citalopram (CELEXA) tablet 10 mg  10 mg Oral Daily Vishal Mims Knabb, APRN - CNP   10 mg at 01/20/23 0910    finasteride (PROSCAR) tablet 5 mg  5 mg Oral Daily Alveta Munda, APRN - CNP   5 mg at 01/20/23 0910    memantine (NAMENDA) tablet 5 mg  5 mg Oral BID Alveta Munda, APRN - CNP   5 mg at 01/20/23 0910    pantoprazole (PROTONIX) tablet 40 mg  40 mg Oral Daily Alveta Munda, APRN - CNP   40 mg at 01/20/23 0910    risperiDONE (RISPERDAL) tablet 0.25 mg  0.25 mg Oral BID Alveta Munda, APRN - CNP   0.25 mg at 01/20/23 0910    sodium chloride flush 0.9 % injection 5-40 mL  5-40 mL IntraVENous 2 times per day Alveta Munda, APRN - CNP   10 mL at 01/20/23 0925    sodium chloride flush 0.9 % injection 5-40 mL  5-40 mL IntraVENous PRN Alveta Munda, APRN - CNP        0.9 % sodium chloride infusion   IntraVENous PRN Alveta Munda, APRN - CNP 25 mL/hr at 01/20/23 0919 New Bag at 01/20/23 0919    enoxaparin (LOVENOX) injection 40 mg  40 mg SubCUTAneous Daily Alveta Munda, APRN - CNP   40 mg at 01/20/23 0925    ondansetron (ZOFRAN-ODT) disintegrating tablet 4 mg  4 mg Oral Q8H PRN Alveta Munda, APRN - CNP        Or    ondansetron (ZOFRAN) injection 4 mg  4 mg IntraVENous Q6H PRN Alveta Munda, APRN - CNP        polyethylene glycol (GLYCOLAX) packet 17 g  17 g Oral Daily PRN Alveta Munda, APRN - CNP        acetaminophen (TYLENOL) tablet 650 mg  650 mg Oral Q6H PRN Alveta Munda, APRN - CNP   650 mg at 01/20/23 7226    Or    acetaminophen (TYLENOL) suppository 650 mg  650 mg Rectal Q6H PRN Alveta Munda, APRN - CNP        0.9 % sodium chloride infusion   IntraVENous Continuous Alveta Munda, APRN - CNP   Stopped at 01/20/23 8248    vancomycin (VANCOCIN) intermittent dosing (placeholder)   Other RX Placeholder Alveta Munda, APRN - CNP        cefepime (MAXIPIME) 2000 mg IVPB minibag  2,000 mg IntraVENous Q24H GUS Trejo CNP           Allergies:     Allergies   Allergen Reactions    Sulfa Antibiotics        Problem List:    Patient Active Problem List   Diagnosis Code    Pneumonia J18.9    Abnormal chest CT R93.89    Cough syncope R55, R05.4    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    Hyperlipidemia E78.5    Hypertension I10    Chronic kidney disease, stage III (moderate) (HCC) N18.30    A-fib (HCC) I48.91    Cardiac resynchronization therapy pacemaker (CRT-P) in place Z95.0    Abnormal echocardiogram R93.1    Dizziness R42    SOB (shortness of breath) R06.02    Abnormal cardiovascular stress test R94.39    Coronary artery disease involving native heart without angina pectoris I25.10    Other chest pain R07.89    Atrial fibrillation with rapid ventricular response (HCC) I48.91    Syncope and collapse R55    COVID U07.1    NATANAEL (acute kidney injury) ([address] N17.9    Atrial fibrillation with RVR (HCC) I48.91    Dementia with behavioral disturbance F03.918    Hallucinations R44.3    Syncope, unspecified syncope type R55    Ischemic cardiomyopathy I25.5    Atrial fibrillation, rapid (HCC) I48.91    Orthostatic hypotension I95.1    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 7XXA       Past Medical History:        Diagnosis Date    Atrial fibrillation ([address]     Blind right eye     Chronic kidney disease     Dementia ([address]     Hyperlipidemia     Hypertension     Pneumonia        Past Surgical History:        Procedure Laterality Date    ABLATION OF DYSRHYTHMIC FOCUS      APPENDECTOMY      COLONOSCOPY      EYE SURGERY      HERNIA REPAIR      JOINT REPLACEMENT  2013    left shoulder    PACEMAKER PLACEMENT  10/13/2017    [facility] at [facility] 19. single chamber PPM    PACEMAKER PLACEMENT  2022    biventricular    TONSILLECTOMY      UPPER GASTROINTESTINAL ENDOSCOPY         Social History:    Social History     Tobacco Use    Smoking status: Former     Packs/day: 1.00     Years: 10.00     Pack years: 10.00     Types: Cigarettes     Quit date: 1975     Years since quittin.9    Smokeless tobacco: Never    Tobacco comments:     Quit 40 years ago   Substance Use Topics   • Alcohol use: No     Alcohol/week: 0.0 standard drinks                                Counseling given: Not Answered  Tobacco comments: Quit 40 years ago      Vital Signs (Current):   Vitals:    01/20/23 0136 01/20/23 0437 01/20/23 0800 01/20/23 0955   BP: 132/79 (!) 145/95 (!) 164/90 (!) 166/99   Pulse: 79 79 81 80   Resp:  18 18 18   Temp:  99 °F (37.2 °C) 97.9 °F (36.6 °C) 99 °F (37.2 °C)   TempSrc:  Oral Axillary Oral   SpO2:  98% 98% 97%   Weight:                                                  BP Readings from Last 3 Encounters:   01/20/23 (!) 166/99   01/19/23 (!) 141/83   01/03/23 130/84       NPO Status:                                                                                 BMI:   Wt Readings from Last 3 Encounters:   01/19/23 176 lb (79.8 kg)   01/19/23 176 lb (79.8 kg)   01/03/23 154 lb 6.4 oz (70 kg)     Body mass index is 27.57 kg/m².    CBC:   Lab Results   Component Value Date/Time    WBC 7.5 01/20/2023 05:14 AM    RBC 4.81 01/20/2023 05:14 AM    HGB 13.8 01/20/2023 05:14 AM    HCT 41.5 01/20/2023 05:14 AM    MCV 86.2 01/20/2023 05:14 AM    RDW 16.0 01/20/2023 05:14 AM     01/20/2023 05:14 AM       CMP:   Lab Results   Component Value Date/Time     01/20/2023 05:14 AM    K 3.4 01/20/2023 05:14 AM     01/20/2023 05:14 AM    CO2 27 01/20/2023 05:14 AM    BUN 19 01/20/2023 05:14 AM    CREATININE 1.2 01/20/2023 05:14 AM    GFRAA >60 09/17/2022 07:12 AM    GFRAA >60 01/09/2013 10:00 AM    AGRATIO 1.3 01/19/2023 10:17 AM    LABGLOM 59 01/20/2023 05:14 AM    GLUCOSE 106 01/20/2023 05:14 AM    PROT 7.0 01/19/2023 10:17 AM    PROT 7.6 01/09/2013 10:00 AM    CALCIUM 9.3 01/20/2023 05:14 AM    BILITOT 1.0 01/19/2023 10:17 AM    ALKPHOS 99 01/19/2023 10:17 AM    AST 18 01/19/2023 10:17 AM    ALT 10 01/19/2023 10:17 AM       POC Tests: No results for input(s): POCGLU, POCNA, POCK, POCCL, POCBUN, POCHEMO, POCHCT in the last 72  hours.    Coags:   Lab Results   Component Value Date/Time    PROTIME 14.4 01/18/2022 06:05 AM    INR 1.26 01/18/2022 06:05 AM       HCG (If Applicable): No results found for: PREGTESTUR, PREGSERUM, HCG, HCGQUANT     ABGs: No results found for: PHART, PO2ART, FWC7ZZS, OAS4DDK, BEART, I8DOEFXY     Type & Screen (If Applicable):  No results found for: LABABO, LABRH    Drug/Infectious Status (If Applicable):  No results found for: HIV, HEPCAB    COVID-19 Screening (If Applicable):   Lab Results   Component Value Date/Time    COVID19 Not Detected 08/14/2022 01:48 PM    COVID19 NEGATIVE 08/09/2022 01:24 PM           Anesthesia Evaluation   no history of anesthetic complications:   Airway: Mallampati: III  TM distance: <3 FB   Neck ROM: limited  Mouth opening: < 3 FB   Dental:    (+) upper dentures and lower dentures      Pulmonary:   (+) pneumonia: resolved,  shortness of breath:                             Cardiovascular:    (+) hypertension:, pacemaker:, CAD:, hyperlipidemia                  Neuro/Psych:   (+) psychiatric history:dementia            GI/Hepatic/Renal:   (+) renal disease: CRI,           Endo/Other: Negative Endo/Other ROS                    Abdominal:             Vascular: negative vascular ROS. Other Findings:           Anesthesia Plan      MAC     ASA 3     (Risks, benefits and alternatives of MAC anesthesia discussed with pt. Questions answered. Willing to proceed.)  Induction: intravenous. Anesthetic plan and risks discussed with patient.                         Mohsen Lynn MD   1/20/2023

## 2023-01-20 NOTE — DISCHARGE INSTRUCTIONS
Heart Failure Resources:    Heart Failure Interactive Workbook:   Go to [email or web address]-heartfailure for a Free Heart Failure Interactive Workbook provided by [employer]. This interactive workbook will provide information on Healthier Living with Heart Failure. Please copy and paste link into search bar. Use your mouse to scroll through the pages. HF Dalmatia lazaro:   Heart Failure Free smart phone lazaro available for iPhone and Android download. Use your phone to track sodium intake, fluid intake, symptoms, and weight. Low Sodium Diet:  Go to www. [email or web address]. org website for SCANnPario which is Low Sodium! [facility] is a dialysis company, but this website offers free seasonal cookbooks. Each quarter, they will release 25-30 new recipes with a breakdown of calories, sodium, and glucose. Recipes:   Go to [email or web address]/recipes website for free recipes.

## 2023-01-20 NOTE — PLAN OF CARE
Problem: Discharge Planning  Goal: Discharge to home or other facility with appropriate resources  Outcome: Progressing  Flowsheets (Taken 1/19/2023 2000)  Discharge to home or other facility with appropriate resources:   Identify barriers to discharge with patient and caregiver   Arrange for needed discharge resources and transportation as appropriate     Problem: Safety - Adult  Goal: Free from fall injury  Outcome: Progressing

## 2023-01-20 NOTE — CONSULTS
Cardiac Electrophysiology Consult Note      Physician requesting consult: Tavo Salas MD   Reason for Consult: infected pacemaker site  Admission Date: 1/19/2023  Room/Bed:  0201/0201-02    Assessment:     1. 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 Hemodynamically stable. Reported fever and chills prior to admission. WBC within normal limits. Patient will need extraction of device and all leads with implantation of a temporary pacing system (likely a Micra leadless pacemaker). Given that one of the leads is from 2017, it may need extraction for removal (may not be easy to remove). This should be done at an experienced device extraction center. Spoke to Dr. Drew Snell at [facility] and discussed case. Plan to transfer patient to [facility] next week after few days of antibiotics. Meanwhile, would focus on analgesia and optimizing antibiotic regimen based on cultures. 2. Atrial fibrillation: patient with permanent atrial fibrillation and status post AV node ablation. He is pacemaker dependent. Device function recently checked with no issues. Anticoagulation on hold. Elevated overall risk of stroke. 3. Hypertension: sub-optimal blood pressure control at present     Plan:     1. DORITA today  2. Antibiotics/follow-up cultures  3. Plan to transfer to [facility] next week for device/lead extraction and placement of temporary pacing system (possibly Micra leadless pacemaker)    Subjective:     History of Present Illness:    Patient is a 82 y.o. male with past medical history significant for permanent atrial fibrillation (AV node ablation, status post CRTp). Also history of hypertension and dementia. Patient was brought to hospital by family after he complained of pain along the pacemaker site and was noted to have redness and swelling at site. He also complained of subjective fever and chills. Patient unable to provide any relevant history due to dementia and confusion    Social and family history reviewed    Problem List:  Patient Active Problem List   Diagnosis    Pneumonia    Abnormal chest CT    Cough syncope    Tracheobronchomalacia    Hyperlipidemia    Hypertension    Chronic kidney disease, stage III (moderate) (HCC)    A-fib (HCC)    Cardiac resynchronization therapy pacemaker (CRT-P) in place    Abnormal echocardiogram    Dizziness    SOB (shortness of breath)    Abnormal cardiovascular stress test    Coronary artery disease involving native heart without angina pectoris    Other chest pain    Atrial fibrillation with rapid ventricular response (HCC)    Syncope and collapse    COVID    NATANAEL (acute kidney injury) ([facility]    Atrial fibrillation with RVR ([facility]    Dementia with behavioral disturbance    Hallucinations    Syncope, unspecified syncope type    Ischemic cardiomyopathy    Atrial fibrillation, rapid (HCC)    Orthostatic hypotension    Infection of pacemaker pulse generator site ([facility]       History:  Past Medical History:   Diagnosis Date    Atrial fibrillation ([facility]     Blind right eye     Chronic kidney disease     Dementia ([facility]     Hyperlipidemia     Hypertension     Pneumonia      Past Surgical History:   Procedure Laterality Date    ABLATION OF DYSRHYTHMIC FOCUS      APPENDECTOMY      COLONOSCOPY      EYE SURGERY      HERNIA REPAIR      JOINT REPLACEMENT  2013    left shoulder    PACEMAKER PLACEMENT  10/13/2017    Dr Daniel Avila at [facility]. single chamber PPM    PACEMAKER PLACEMENT  09/16/2022    biventricular    TONSILLECTOMY      UPPER GASTROINTESTINAL ENDOSCOPY       Family History   Problem Relation Age of Onset    Heart Disease Mother     Other Mother     Heart Disease Father     Asthma Other      Social History     Socioeconomic History    Marital status:    Tobacco Use    Smoking status: Former     Packs/day: 1.00     Years: 10.00     Pack years: 10.00 Types: Cigarettes     Quit date: 1975     Years since quittin.9    Smokeless tobacco: Never    Tobacco comments:     Quit 40 years ago   Vaping Use    Vaping Use: Never used   Substance and Sexual Activity    Alcohol use: No     Alcohol/week: 0.0 standard drinks    Drug use: No    Sexual activity: Yes     Partners: Female         Review of Systems:  Review of Systems   Unable to perform ROS: Dementia     Objective:     Vital Signs (last 24 hours):  Patient Vitals for the past 24 hrs:   BP Temp Temp src Pulse Resp SpO2 Weight   23 0800 (!) 164/90 97.9 °F (36.6 °C) Axillary 81 18 98 % --   23 0437 (!) 145/95 99 °F (37.2 °C) Oral 79 18 98 % --   23 0136 132/79 -- -- 79 -- -- --   23 0057 (!) 190/100 -- -- 80 -- 100 % --   23 0032 (!) 179/105 98.2 °F (36.8 °C) -- 81 -- 98 % --   23 2215 (!) 162/106 -- -- -- -- -- --   23 (!) 162/106 98.2 °F (36.8 °C) Oral 82 16 98 % 176 lb (79.8 kg)         Physical Examination:     Physical Exam  Constitutional:       Appearance: Normal appearance. HENT:      Head: Normocephalic and atraumatic. Nose: Nose normal. No rhinorrhea. Eyes:      General: No scleral icterus. Conjunctiva/sclera: Conjunctivae normal.   Cardiovascular:      Rate and Rhythm: Normal rate and regular rhythm. Pulmonary:      Effort: Pulmonary effort is normal.      Breath sounds: Normal breath sounds. Abdominal:      General: There is no distension. Musculoskeletal:         General: Normal range of motion. Cervical back: Normal range of motion and neck supple. Skin:     General: Skin is warm and dry. Neurological:      General: No focal deficit present.         1901 -  0700  In: -   Out: 375 [Urine:375]    Medications:    Current Facility-Administered Medications   Medication Dose Route Frequency Provider Last Rate Last Admin    hydrALAZINE (APRESOLINE) injection 10 mg  10 mg IntraVENous Q4H PRN Gordy Hawthorne MD   10 mg at 01/20/23 0058    vancomycin 1000 mg IVPB in 250 mL D5W addavial  1,000 mg IntraVENous Q24H Joslyn Godienz MD        LORazepam (ATIVAN) injection 1 mg  1 mg IntraVENous Q4H PRN Manus MD Herbert        QUEtiapine (SEROQUEL) tablet 25 mg  25 mg Oral QAM MARY GRACE Godinez MD        And    QUEtiapine (SEROQUEL) tablet 50 mg  50 mg Oral Nightly Joslyn Godinez MD        aspirin EC tablet 81 mg  81 mg Oral Daily Twylla Pito, APRN - CNP        atorvastatin (LIPITOR) tablet 40 mg  40 mg Oral Daily Twylla Pito, APRN - CNP   40 mg at 01/19/23 2317    citalopram (CELEXA) tablet 10 mg  10 mg Oral Daily Twylla Pito, APRN - CNP        finasteride (PROSCAR) tablet 5 mg  5 mg Oral Daily Twylla Pito, APRN - CNP        memantine (NAMENDA) tablet 5 mg  5 mg Oral BID Twylla Pito, APRN - CNP   5 mg at 01/19/23 2317    pantoprazole (PROTONIX) tablet 40 mg  40 mg Oral Daily Twylla Pito, APRN - CNP        risperiDONE (RISPERDAL) tablet 0.25 mg  0.25 mg Oral BID Twylla Pito, APRN - CNP   0.25 mg at 01/19/23 2317    sodium chloride flush 0.9 % injection 5-40 mL  5-40 mL IntraVENous 2 times per day Twylla Pito, APRN - CNP   10 mL at 01/19/23 2314    sodium chloride flush 0.9 % injection 5-40 mL  5-40 mL IntraVENous PRN Twylla Pito, APRN - CNP        0.9 % sodium chloride infusion   IntraVENous PRN Twylla Pito, APRN - CNP 5 mL/hr at 01/20/23 0751 New Bag at 01/20/23 0751    enoxaparin (LOVENOX) injection 40 mg  40 mg SubCUTAneous Daily Twylla Ptio, APRN - CNP        ondansetron (ZOFRAN-ODT) disintegrating tablet 4 mg  4 mg Oral Q8H PRN Twylla Pito, APRN - CNP        Or    ondansetron (ZOFRAN) injection 4 mg  4 mg IntraVENous Q6H PRN Twylla Pito, APRN - CNP        polyethylene glycol (GLYCOLAX) packet 17 g  17 g Oral Daily PRN Twylla Pito, APRN - CNP        acetaminophen (TYLENOL) tablet 650 mg  650 mg Oral Q6H PRN Twylla Pito, APRN - CNP        Or    acetaminophen (TYLENOL) suppository 650 mg 650 mg Rectal Q6H PRN GUS Francis - CNP        0.9 % sodium chloride infusion   IntraVENous Continuous GUS Francis CNP 75 mL/hr at 01/19/23 2310 New Bag at 01/19/23 2310    vancomycin (VANCOCIN) intermittent dosing (placeholder)   Other RX Placeholder GUS Francis - CNP        cefepime (MAXIPIME) 2000 mg IVPB minibag  2,000 mg IntraVENous Q24H GUS Francis CNP             ECG Interpretation:  (Date: 1/19/2023)  Rhythm: Ventricular paced rhythm  Rate: 80 BPM  PAC's / PVC's present: None        Echocardiogram:    Results for orders placed or performed during the hospital encounter of 03/21/19   Echocardiogram complete   Result Value Ref Range    Left Ventricular Ejection Fraction 58     LVEF MODALITY ECHO     Narrative    Transthoracic Echocardiography Report (TTE)     Demographics      Patient Name       Maria M BARBOSA      Date of Study      03/21/2019         Gender              Male      Patient Number     [id number]         Date of Birth       1935      Visit Number       [id number]          Age                 82 year(s)      Accession Number   [id number]          Room Number         OP      Corporate ID       [id number]           Sonographer         Alexa Mt,                                                             [address] [place] [address]      Ordering Physician Anahi Bundy,   Interpreting        [address] [place] [address].                      MD                 Physician           Ava Chino MD, [facility]     Procedure    Type of Study      TTE procedure:ECHOCARDIOGRAM COMPLETE 2D W DOPPLER W COLOR. Procedure Date  Date: 03/21/2019 Start: 09:44 AM    Study Location: [address] [place] [address] - Echo Lab  Technical Quality: Adequate visualization    Indications:Atrial fibrillation. Additional Indications:Dizziness.     Patient Status: Routine    Height: 66 inches Weight: 193 pounds BSA: 1.97 m2 BMI: 31.15 kg/m2    BP: 128/80 mmHg     Conclusions      Summary   Normal left ventricular systolic function with an estimated ejection   fraction of 55-60%. No regional wall motion abnormalities are seen. Normal left ventricular diastolic filling pressure. The left atrium is mildly dilated. The right atrium is moderately dilated. Right ventricular systolic function is mildly reduced . The right ventricle is mildly enlarged. The ascending aorta is mildly dilated. 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 Mild mitral regurgitation. Moderate tricuspid regurgitation. Systolic pulmonary artery pressure (SPAP) estimated at 47 mmHg (RA pressure   15 mmHg), consistent with mild pulmonary hypertension. Pacemaker lead in right ventricle. Signature      ------------------------------------------------------------------   Electronically signed by Jose Daniel Spears MD, [facility] (Interpreting   physician) on 03/21/2019 at 10:53 AM   ------------------------------------------------------------------      Findings      Left Ventricle   Normal left ventricle size, wall thickness, and systolic function with an   estimated ejection fraction of 55-60%. No regional wall motion abnormalities   are seen. Normal left ventricular diastolic filling pressure. Mitral Valve   There is mild prolapse of the anterior and severe prolapse of posterior   mitral valve leaflet. Mild mitral regurgitation. Left Atrium   The left atrium is mildly dilated. Aortic Valve   The aortic valve is normal in structure and function. There is no   significant aortic regurgitation. Aorta   The aortic root is normal in size. The ascending aorta is mildly dilated at 3.6cm. Right Ventricle   Right ventricular systolic function is mildly reduced . The right ventricle is mildly enlarged.       Tricuspid Valve   Tricuspid valve is structurally normal.   Tricuspid valve leaflet mobility is normal.   Moderate tricuspid regurgitation. Systolic pulmonary artery pressure (SPAP) estimated at 47 mmHg (RA pressure   15 mmHg), consistent with mild pulmonary hypertension. Right Atrium   The right atrium is moderately dilated. Pulmonic Valve   The pulmonic valve is not well visualized. No evidence of pulmonic valve stenosis. Trivial pulmonic regurgitation present. Pericardial Effusion   No pericardial effusion noted. Pleural Effusion   No pleural effusion. Miscellaneous   IVC size is dilated (>2.1 cm) and collapses < 50% with respiration   consistent with elevated RA pressure (15 mmHg). No obvious masses, thrombi, or vegetations are noted.    Patient history includes pacemaker which is not visualized on this exam.     M-Mode/2D Measurements (cm)      LV Diastolic Dimension: 0.99 cm LV Systolic Dimension: 4.94 cm   LV Septum Diastolic: 9.05 cm   LV PW Diastolic: 2.96 cm        AO Root Dimension: 3.4 cm                                      LA Area: 21.4 cm2                                   LA volume/Index: 70.33 ml /36 ml/m2     Doppler Measurements      AV Peak Velocity: 93.3 cm/s    MV Peak E-Wave: 57.2 cm/s   AV Peak Gradient: 3.48 mmHg   AV Mean Gradient: 2 mmHg       MV Mean Gradient: 0 mmHg                                  MV Max P mmHg                                  MV Vmax:64.5 cm/s   TR Velocity:285 cm/s           MV VTI:13 cm/s   TR Gradient:32.49 mmHg   Estimated RAP:15 mmHg   Estimated RVSP: 47.49 mmHg   E' Septal Velocity: 10.4 cm/s   E' Lateral Velocity: 10.3 cm/s   E/E' ratio: 5.6   PV Peak Velocity: 86.9 cm/s   PV Peak Gradient: 3.02 mmHg      Aortic Valve      Peak Velocity: 93.3 cm/s Mean Velocity: 64.3 cm/s   Peak Gradient: 3.48 mmHg Mean Gradient: 2 mmHg   AV VTI: 16.1 cm     Aorta      Aortic Root: 3.4 cm   Ascending Aorta: 3.6 cm          Telemetry:    Ventricular paced    Lab Review:    Recent Labs     23  1017 23  0514   WBC 7.1 7.5   HGB 14.9 13.8   HCT 44.7 41.5 MCV 87.3 86.2    191       Recent Labs     01/19/23  1017 01/20/23  0514    142   K 3.1* 3.4*    104   CO2 30 27   BUN 21* 19   CREATININE 1.7* 1.2       No results for input(s): INR, PROTIME in the last 72 hours. Recent Labs     01/19/23  1017   TROPONINI <0.01       Recent Labs     01/19/23  1017   PROBNP 6,104*       Imaging:    XR CHEST (2 VW)    Result Date: 1/19/2023  1. Cardiomegaly with mild pulmonary vascular congestion. CT CHEST WO CONTRAST    Result Date: 1/19/2023  1. Congestive heart failure.          Signed by: Cordell Emerson MD

## 2023-01-20 NOTE — PROGRESS NOTES
DIRECT ADMIT  FROM [facility] TO . ADMISSION ASSESSMENT COMPLETED AND DOCUMENTED ON FLOWSHEETS. 4 EYES SKIN ASSESSMENT COMPLETED WITH ROZINA RN AND MOO RN. SIDERAILS UP, CALL LIGHTS WITHIN REACH. DOES  NOT REQUIRE TO HOOK ON TELEBOX AS PER DR ROCHA.

## 2023-01-20 NOTE — PROGRESS NOTES
Hospitalist Progress Note    CC: Infection of pacemaker pulse generator site [facility])    Hospital course:  82 y.o. male, with past medical history of hypertension, hyperlipidemia, dementia and atrial fibrillation, who was a direct admit from [facility] to [facility] with an infected pacemaker site. History obtained from the patient and review of EMR. The patient stated he was taken to [facility] emergency room by his daughter because he was lost and got himself turned around. He also stated he was taking because he has an infection in his pacemaker. The patient does have a history of dementia and has episodes of confusion. Per EMR, the patient's daughter reported that for the last 2 to 3 days she has noticed the patient's left chest wall pacemaker site has been swollen, red and painful. The patient stated he is unsure how long ago his pacemaker was placed, but EMR reveals that it was placed 5 months ago. The patient's daughter reported that the patient has had some fevers and chills at home. However, the patient has been afebrile and has a negative lactic and leukocytosis. In the emergency room a chest CT was obtained that revealed congestive heart failure. Blood cultures were obtained and the patient was started on vancomycin and cefepime. He was also given morphine for discomfort. Throughout the patient's work-up he was also found to have an NATANAEL with a creatinine of 1.7. His baseline appears to be 1.1. His UA was negative. The patient was ultimately transferred to [facility] for further evaluation and treatment. Pt confused intermittently along with combativeness.     Admit date: 1/19/2023  Days in hospital:  1  Status:  Inpatient       24 Hour Events: pt c/o some discomfort at pacemaker site    Subjective: significant swelling at site of pacemaker - pt has dementia at baseline and has periods of being combative and then OK - had tried some sedation overnight - has sitter camera, but will randomly become angry and swing at staff - impulsive and tries to get out of bed - seroquel ordered bid 25mg in am and 50mg in pm    ROS:   A comprehensive review of systems was negative except for: swelling at site of pacemaker, feels funny      Objective:    BP (!) 145/95   Pulse 79   Temp 99 °F (37.2 °C) (Oral)   Resp 18   Wt 176 lb (79.8 kg)   SpO2 98%   BMI 27.57 kg/m²     Gen: confused appearing  HEENT: NC/AT, moist mucous membranes, no oropharyngeal erythema or exudate  Neck: supple, trachea midline, no anterior cervical or SC LAD  Heart:  Normal s1/s2, RRR, no murmurs, gallops, or rubs. no leg edema  Lungs:  diminished bilaterally, no wheeze, no rales, no rhonchi, no crackles, no use of accessory muscles  Abd: bowel sounds present, soft, nontender, nondistended, no masses  Extrem:  No clubbing, cyanosis,  no edema  Skin: no rashes or lesions  Psych: Not oriented to person, confused about place and date - 2010  Neuro: grossly intact, moves all four extremities. Assessment:    Principal Problem:    Infection of pacemaker pulse generator site [facility])  Active Problems:    Dementia with behavioral disturbance    Hyperlipidemia    A-fib ([facility])  Resolved Problems:    * No resolved hospital problems.  *      Plan:   Pacemaker site infection - will transfer to [place] this weekend in anticipation of pacemaker removal on Tuesday - continue with cefepime and vanco - he will need extraction of device and all leads  Acute metabolic encephalopathy in setting of dementia with sundowning - add seroquel bid and bilateral wrist restraints for safety - namenda most likely is not adding any benefit to patient at this point as it only slows the rate of dementia progression  Afib - rate controlled - off of anticoagulation at this time - he is pacemaker dependent so may need temporary placement at time of removal - will be complicated by his dementia  HTN - poorly controlled due to agitation - increase norvasc, add isosorbide back into regimen - avoid beta blocker since pacemaker coming out soon    Prognosis:  Fair    Code status:  Full code    DVT prophylaxis: Lovenox  GI prophylaxis: Proton Pump Inhibitor  Antibiotic prophylaxis indicated:  Probiotic     Diet:  Diet NPO Exceptions are: Sips of Water with Meds    Disposition:  Other transfer to [place]  in 1-2 day(s). Appropriate for A1 discharge unit:  Yes    Medications:  Scheduled Meds:   vancomycin  1,000 mg IntraVENous Q24H    aspirin  81 mg Oral Daily    atorvastatin  40 mg Oral Daily    citalopram  10 mg Oral Daily    finasteride  5 mg Oral Daily    memantine  5 mg Oral BID    pantoprazole  40 mg Oral Daily    risperiDONE  0.25 mg Oral BID    sodium chloride flush  5-40 mL IntraVENous 2 times per day    enoxaparin  40 mg SubCUTAneous Daily    vancomycin (VANCOCIN) intermittent dosing (placeholder)   Other RX Placeholder    cefepime  2,000 mg IntraVENous Q24H       PRN Meds:  hydrALAZINE, sodium chloride flush, sodium chloride, ondansetron **OR** ondansetron, polyethylene glycol, acetaminophen **OR** acetaminophen    IV:   sodium chloride      sodium chloride 75 mL/hr at 01/19/23 2310         Intake/Output Summary (Last 24 hours) at 1/20/2023 0732  Last data filed at 1/20/2023 0400  Gross per 24 hour   Intake --   Output 375 ml   Net -375 ml       Results:  CBC:   Recent Labs     01/19/23  1017 01/20/23  0514   WBC 7.1 7.5   HGB 14.9 13.8   HCT 44.7 41.5   MCV 87.3 86.2    191     BMP:   Recent Labs     01/19/23  1017 01/20/23  0514    142   K 3.1* 3.4*    104   CO2 30 27   BUN 21* 19   CREATININE 1.7* 1.2     Mag: No results for input(s): MAG in the last 72 hours.   Phos:   Lab Results   Component Value Date    PHOS 3.3 12/14/2022     No results found for: GLU    LIVER PROFILE:   Recent Labs     01/19/23  1017   AST 18   ALT 10   BILITOT 1.0   ALKPHOS 99     PT/INR: No results for input(s): PROTIME, INR in the last 72 hours. APTT: No results for input(s): APTT in the last 72 hours.   UA:  Recent Labs     01/19/23  1225   COLORU Yellow   PHUR 6.0   CLARITYU Clear   SPECGRAV 1.015   LEUKOCYTESUR Negative   UROBILINOGEN 0.2   BILIRUBINUR Negative   BLOODU Negative   GLUCOSEU Negative       Invalid input(s): ABG  Lab Results   Component Value Date    CALCIUM 9.3 01/20/2023    PHOS 3.3 12/14/2022               Electronically signed by Tanya Ortiz MD on 1/20/2023 at 7:32 AM

## 2023-01-21 LAB
ANION GAP SERPL CALCULATED.3IONS-SCNC: 12 MMOL/L (ref 3–16)
BUN BLDV-MCNC: 16 MG/DL (ref 7–20)
CALCIUM SERPL-MCNC: 8.8 MG/DL (ref 8.3–10.6)
CHLORIDE BLD-SCNC: 106 MMOL/L (ref 99–110)
CO2: 22 MMOL/L (ref 21–32)
CREAT SERPL-MCNC: 1.2 MG/DL (ref 0.8–1.3)
GFR SERPL CREATININE-BSD FRML MDRD: 59 ML/MIN/{1.73_M2}
GLUCOSE BLD-MCNC: 97 MG/DL (ref 70–99)
POTASSIUM SERPL-SCNC: 3.7 MMOL/L (ref 3.5–5.1)
SODIUM BLD-SCNC: 140 MMOL/L (ref 136–145)
VANCOMYCIN TROUGH: 33.5 UG/ML (ref 10–20)

## 2023-01-21 PROCEDURE — 2060000000 HC ICU INTERMEDIATE R&B

## 2023-01-21 PROCEDURE — 6370000000 HC RX 637 (ALT 250 FOR IP): Performed by: INTERNAL MEDICINE

## 2023-01-21 PROCEDURE — 6370000000 HC RX 637 (ALT 250 FOR IP): Performed by: NURSE PRACTITIONER

## 2023-01-21 PROCEDURE — 80048 BASIC METABOLIC PNL TOTAL CA: CPT

## 2023-01-21 PROCEDURE — 2580000003 HC RX 258: Performed by: INTERNAL MEDICINE

## 2023-01-21 PROCEDURE — 36415 COLL VENOUS BLD VENIPUNCTURE: CPT

## 2023-01-21 PROCEDURE — 6360000002 HC RX W HCPCS: Performed by: NURSE PRACTITIONER

## 2023-01-21 PROCEDURE — 6360000002 HC RX W HCPCS: Performed by: INTERNAL MEDICINE

## 2023-01-21 PROCEDURE — 2580000003 HC RX 258: Performed by: NURSE PRACTITIONER

## 2023-01-21 PROCEDURE — 80202 ASSAY OF VANCOMYCIN: CPT

## 2023-01-21 RX ORDER — ISOSORBIDE MONONITRATE 30 MG/1
30 TABLET, EXTENDED RELEASE ORAL DAILY
Qty: 30 TABLET | Refills: 3 | DISCHARGE
Start: 2023-01-21

## 2023-01-21 RX ORDER — LACTOBACILLUS RHAMNOSUS GG 10B CELL
1 CAPSULE ORAL 2 TIMES DAILY WITH MEALS
DISCHARGE
Start: 2023-01-21

## 2023-01-21 RX ORDER — LORAZEPAM 2 MG/ML
1 INJECTION INTRAMUSCULAR EVERY 4 HOURS PRN
Status: ON HOLD | OUTPATIENT
Start: 2023-01-21 | End: 2023-01-26 | Stop reason: HOSPADM

## 2023-01-21 RX ORDER — TAMSULOSIN HYDROCHLORIDE 0.4 MG/1
0.4 CAPSULE ORAL DAILY
Qty: 30 CAPSULE | Refills: 3 | DISCHARGE
Start: 2023-01-22

## 2023-01-21 RX ORDER — AMLODIPINE BESYLATE 10 MG/1
10 TABLET ORAL DAILY
Qty: 30 TABLET | Refills: 3 | DISCHARGE
Start: 2023-01-21

## 2023-01-21 RX ORDER — QUETIAPINE FUMARATE 50 MG/1
50 TABLET, FILM COATED ORAL NIGHTLY
Qty: 60 TABLET | Refills: 3 | DISCHARGE
Start: 2023-01-21

## 2023-01-21 RX ORDER — LABETALOL HYDROCHLORIDE 5 MG/ML
20 INJECTION, SOLUTION INTRAVENOUS EVERY 4 HOURS PRN
Status: ON HOLD | DISCHARGE
Start: 2023-01-21 | End: 2023-01-26 | Stop reason: HOSPADM

## 2023-01-21 RX ORDER — TROSPIUM CHLORIDE 20 MG/1
20 TABLET, FILM COATED ORAL NIGHTLY
Status: DISCONTINUED | OUTPATIENT
Start: 2023-01-21 | End: 2023-01-22 | Stop reason: HOSPADM

## 2023-01-21 RX ORDER — ENOXAPARIN SODIUM 100 MG/ML
40 INJECTION SUBCUTANEOUS DAILY
DISCHARGE
Start: 2023-01-21

## 2023-01-21 RX ORDER — QUETIAPINE FUMARATE 25 MG/1
25 TABLET, FILM COATED ORAL
Qty: 60 TABLET | Refills: 3 | DISCHARGE
Start: 2023-01-22

## 2023-01-21 RX ORDER — HYDRALAZINE HYDROCHLORIDE 20 MG/ML
10 INJECTION INTRAMUSCULAR; INTRAVENOUS EVERY 4 HOURS PRN
Status: ON HOLD | DISCHARGE
Start: 2023-01-21 | End: 2023-01-26 | Stop reason: HOSPADM

## 2023-01-21 RX ORDER — POLYETHYLENE GLYCOL 3350 17 G/17G
17 POWDER, FOR SOLUTION ORAL DAILY PRN
Qty: 527 G | Refills: 1 | DISCHARGE
Start: 2023-01-21 | End: 2023-02-20

## 2023-01-21 RX ADMIN — Medication 1 CAPSULE: at 16:35

## 2023-01-21 RX ADMIN — ENOXAPARIN SODIUM 40 MG: 100 INJECTION SUBCUTANEOUS at 09:38

## 2023-01-21 RX ADMIN — Medication 1 CAPSULE: at 09:42

## 2023-01-21 RX ADMIN — TAMSULOSIN HYDROCHLORIDE 0.4 MG: 0.4 CAPSULE ORAL at 09:38

## 2023-01-21 RX ADMIN — MEMANTINE 5 MG: 5 TABLET ORAL at 21:36

## 2023-01-21 RX ADMIN — SODIUM CHLORIDE: 9 INJECTION, SOLUTION INTRAVENOUS at 09:38

## 2023-01-21 RX ADMIN — ISOSORBIDE MONONITRATE 30 MG: 30 TABLET, EXTENDED RELEASE ORAL at 09:38

## 2023-01-21 RX ADMIN — ATORVASTATIN CALCIUM 40 MG: 40 TABLET, FILM COATED ORAL at 21:36

## 2023-01-21 RX ADMIN — VANCOMYCIN HYDROCHLORIDE 1000 MG: 1 INJECTION, POWDER, LYOPHILIZED, FOR SOLUTION INTRAVENOUS at 05:40

## 2023-01-21 RX ADMIN — Medication 10 ML: at 21:36

## 2023-01-21 RX ADMIN — PANTOPRAZOLE SODIUM 40 MG: 40 TABLET, DELAYED RELEASE ORAL at 09:38

## 2023-01-21 RX ADMIN — QUETIAPINE FUMARATE 25 MG: 25 TABLET ORAL at 06:44

## 2023-01-21 RX ADMIN — AMLODIPINE BESYLATE 10 MG: 5 TABLET ORAL at 09:38

## 2023-01-21 RX ADMIN — ASPIRIN 81 MG: 81 TABLET, COATED ORAL at 09:38

## 2023-01-21 RX ADMIN — Medication 10 ML: at 09:39

## 2023-01-21 RX ADMIN — FINASTERIDE 5 MG: 5 TABLET, FILM COATED ORAL at 09:39

## 2023-01-21 RX ADMIN — QUETIAPINE FUMARATE 50 MG: 25 TABLET ORAL at 21:36

## 2023-01-21 RX ADMIN — RISPERIDONE 0.25 MG: 0.25 TABLET ORAL at 09:39

## 2023-01-21 RX ADMIN — TROSPIUM CHLORIDE 20 MG: 20 TABLET, FILM COATED ORAL at 21:37

## 2023-01-21 RX ADMIN — RISPERIDONE 0.25 MG: 0.25 TABLET ORAL at 21:37

## 2023-01-21 RX ADMIN — CITALOPRAM HYDROBROMIDE 10 MG: 20 TABLET ORAL at 09:38

## 2023-01-21 RX ADMIN — MEMANTINE 5 MG: 5 TABLET ORAL at 09:38

## 2023-01-21 RX ADMIN — CEFEPIME 2000 MG: 2 INJECTION, POWDER, FOR SOLUTION INTRAVENOUS at 09:40

## 2023-01-21 ASSESSMENT — PAIN SCALES - GENERAL
PAINLEVEL_OUTOF10: 0
PAINLEVEL_OUTOF10: 0

## 2023-01-21 NOTE — PLAN OF CARE
Problem: Discharge Planning  Goal: Discharge to home or other facility with appropriate resources  Outcome: Progressing     Problem: Safety - Adult  Goal: Free from fall injury  1/21/2023 0047 by Josafat Jean RN  Outcome: Progressing  Flowsheets (Taken 1/20/2023 2000)  Free From Fall Injury: Instruct family/caregiver on patient safety  Problem: Chronic Conditions and Co-morbidities  Goal: Patient's chronic conditions and co-morbidity symptoms are monitored and maintained or improved  Outcome: Progressing     Problem: Safety - Medical Restraint  Goal: Remains free of injury from restraints (Restraint for Interference with Medical Device)  Description: INTERVENTIONS:  1. Determine that other, less restrictive measures have been tried or would not be effective before applying the restraint  2. Evaluate the patient's condition at the time of restraint application  3. Inform patient/family regarding the reason for restraint  4.  Q2H: Monitor safety, psychosocial status, comfort, nutrition and hydration  Outcome: Progressing     Problem: Pain  Goal: Verbalizes/displays adequate comfort level or baseline comfort level  1/21/2023 0047 by Josafat Jean RN  Outcome: Progressing

## 2023-01-21 NOTE — DISCHARGE INSTR - COC
Continuity of Care Form    Patient Name: Rosales Forbes   :  1935  MRN:  [id number]    Admit date:  2023  Discharge date:  23      Code Status Order: Full Code   Advance Directives:     Admitting Physician:  Martín Bae MD  PCP: Shasta Carter MD    Discharging Nurse: Andres Reyes [facility] Unit/Room#: 0215/0215-01  Discharging Unit Phone Number: [phone]    Emergency Contact:   Extended Emergency Contact Information  Primary Emergency Contact: Cristobaliliano Fabry  Address: [address], [address], [address] [address] Phone: [phone]  Mobile Phone: [phone]  Relation: Spouse   needed? No  Secondary Emergency Contact: [address] Phone: [phone]  Mobile Phone: [phone]  Relation: Child   needed?  No    Past Surgical History:  Past Surgical History:   Procedure Laterality Date    ABLATION OF DYSRHYTHMIC FOCUS      APPENDECTOMY      COLONOSCOPY      EYE SURGERY      HERNIA REPAIR      JOINT REPLACEMENT  2013    left shoulder    PACEMAKER PLACEMENT  10/13/2017    Dr Grupo Browning at [facility]. 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    Hyperlipidemia E78.5    Hypertension I10    Chronic kidney disease, stage III (moderate) (HCC) N18.30    A-fib ([address] I48.91    Cardiac resynchronization therapy pacemaker (CRT-P) in place Z95.0    Abnormal echocardiogram R93.1    Dizziness R42    SOB (shortness of breath) R06.02    Abnormal cardiovascular stress test R94.39    Coronary artery disease involving native heart without angina pectoris I25.10    Other chest pain R07.89    Atrial fibrillation with rapid ventricular response (HCC) I48.91    Syncope and collapse R55    COVID U07.1    NATANAEL (acute kidney injury) ([facility] N17.9    Atrial fibrillation with RVR (HCC) I48.91    Dementia with behavioral disturbance F03.918    Hallucinations R44.3    Syncope, unspecified syncope type R55    Ischemic cardiomyopathy I25.5    Atrial fibrillation, rapid (HCC) I48.91    Orthostatic hypotension I95.1    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 7XXA       Isolation/Infection:   Isolation            No Isolation          Patient Infection Status       Infection Onset Added Last Indicated Last Indicated By Review Planned Expiration Resolved Resolved By    None active    Resolved    COVID-19 (Rule Out) 22 COVID-19 & Influenza Combo (Ordered)   22 Rule-Out Test Resulted    COVID-19 22 COVID-19, Rapid   22     COVID-19 (Rule Out) 22 COVID-19, Rapid (Ordered)   22 Rule-Out Test Resulted            Nurse Assessment:  Last Vital Signs: BP (!) 116/90   Pulse 79   Temp 97.9 °F (36.6 °C) (Axillary)   Resp 18   Wt 160 lb 4.4 oz (72.7 kg)   SpO2 100%   BMI 25.10 kg/m²     Last documented pain score (0-10 scale): Pain Level: 0  Last Weight:   Wt Readings from Last 1 Encounters:   23 160 lb 4.4 oz (72.7 kg)     Mental Status:  alert and oriented to person, disoriented to place, time, situation, has a history of dementia    IV Access:  - Peripheral IV - site  L Antecubital, insertion date: 23    Nursing Mobility/ADLs:  Walking   Assisted  Transfer  Assisted  Bathing  Assisted  Dressing  Assisted  Toileting Assisted  Feeding  Assisted, needs set up  Med Admin  Assisted  Med Delivery   whole and with water    Wound Care Documentation and Therapy:  Wound 08/14/22 Head Upper;Posterior;Right (Active)   Number of days: 159        Elimination:  Continence: Bowel: Yes and No  Bladder: Yes and No  Urinary Catheter: None   Colostomy/Ileostomy/Ileal Conduit: No       Date of Last BM: 1/22/23    Intake/Output Summary (Last 24 hours) at 1/21/2023 0719  Last data filed at 1/21/2023 0600  Gross per 24 hour   Intake 1374.69 ml   Output 1200 ml   Net 174.69 ml     I/O last 3 completed shifts: In: 1374.7 [P.O.:360; I.V.:887.5; IV Piggyback:127.2]  Out: 1575 [UCEAA:5345]    Safety Concerns: At Risk for Falls    Impairments/Disabilities:      Vision - Right eye prosthetic     Nutrition Therapy:  Current Nutrition Therapy:   - Oral Diet:  Cardiac and Low Sodium (2gm)    Routes of Feeding: Oral  Liquids: Thin Liquids  Daily Fluid Restriction: no  Last Modified Barium Swallow with Video (Video Swallowing Test): not done    Treatments at the Time of Hospital Discharge:   Respiratory Treatments:   Oxygen Therapy:  is not on home oxygen therapy.   Ventilator:    - No ventilator support    Rehab Therapies: Physical Therapy and Occupational Therapy  Weight Bearing Status/Restrictions: No weight bearing restrictions  Other Medical Equipment (for information only, NOT a DME order):  wheelchair and walker  Other Treatments:     Patient's personal belongings (please select all that are sent with patient):       RN SIGNATURE:  Electronically signed by Saintclair Pigeon, RN on 1/22/23 at 12:05 PM EST    CASE MANAGEMENT/SOCIAL WORK SECTION    Inpatient Status Date: ***    Readmission Risk Assessment Score:  Readmission Risk              Risk of Unplanned Readmission:  28           Discharging to Facility/ Agency   Name:   Address:  Phone:  Fax:    Dialysis Facility (if applicable)   Name:  Address:  Dialysis Schedule:  Phone:  Fax:    Case Manager/ signature: {Esignature:[id number]}    PHYSICIAN SECTION    Prognosis: Fair    Condition at Discharge: Stable    Rehab Potential (if transferring to Rehab): Fair    Recommended Labs or Other Treatments After Discharge: transfer to [facility] for complex infected pacemaker removal in a pacemaker dependent patient    Physician Certification: I certify the above information and transfer of Donna Mccartney  is necessary for the continuing treatment of the diagnosis listed and that he requires another acute care facility for less 30 days.      Update Admission H&P: Changes in H&P as follows - see discharge summary    PHYSICIAN SIGNATURE:  Electronically signed by Joslyn Godinez MD on 1/21/23 at 7:19 AM EST

## 2023-01-21 NOTE — PLAN OF CARE
Problem: Chronic Conditions and Co-morbidities  Goal: Patient's chronic conditions and co-morbidity symptoms are monitored and maintained or improved  Outcome: Progressing  Flowsheets (Taken 1/21/2023 0400 by Jessica Street RN)  Care Plan - Patient's Chronic Conditions and Co-Morbidity Symptoms are Monitored and Maintained or Improved: Monitor and assess patient's chronic conditions and comorbid symptoms for stability, deterioration, or improvement  Note:   Patient's EF (Ejection Fraction) is greater than 40%    Heart Failure Medications:  Diuretics[de-identified] None    (One of the following REQUIRED for EF </= 40%/SYSTOLIC FAILURE but MAY be used in EF% >40%/DIASTOLIC FAILURE)        ACE[de-identified] None        ARB[de-identified] None         ARNI[de-identified] None    (Beta Blockers)  NON- Evidenced Based Beta Blocker (for EF% >40%/DIASTOLIC FAILURE): None    Evidenced Based Beta Blocker::(REQUIRED for EF% <40%/SYSTOLIC FAILURE) None  . .................................................................................................................................................. Patient's weights and intake/output reviewed: Yes    Patient's Last Weight: 151 lbs obtained by standing scale. Difference of 9 lbs less than last documented weight. Intake/Output Summary (Last 24 hours) at 1/21/2023 1506  Last data filed at 1/21/2023 0600  Gross per 24 hour   Intake 459.59 ml   Output 800 ml   Net -340.41 ml       Education Booklet Provided: yes    Comorbidities Reviewed Yes    Patient has a past medical history of Atrial fibrillation ([address] Blind right eye, Chronic kidney disease, Dementia ([address] Hyperlipidemia, Hypertension, and Pneumonia. >>For CHF and Comorbidity documentation on Education Time and Topics, please see Education Tab    Progressive Mobility Assessment:  What is this patient's Current Level of Mobility?: Wheelchair    Dependent  How was this patient Mobilized today?: Edge of Bed, ambulated 0 ft                 With Whom?  Nurse Level of Difficulty/Assistance: 2x Assist     Pt sitting in bed at this time on room air. Pt denies shortness of breath. Pt with nonpitting lower extremity edema.      Patient and/or Family's stated Goal of Care this Admission: reduce shortness of breath, increase activity tolerance, better understand heart failure and disease management, be more comfortable, and reduce lower extremity edema prior to discharge        :

## 2023-01-21 NOTE — PROGRESS NOTES
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 Hemodynamically stable. Reported fever and chills prior to admission. WBC within normal limits. DORITA done yesterday with no evidence of vegetations on device leads  Blood cultures with NGTD    Patient will need extraction of device and all leads with implantation of a temporary pacing system (likely a Micra leadless pacemaker). Given that one of the leads is from 2017, it may need extraction for removal (may not be easy to remove). This should be done at an experienced device extraction center. Spoke to Dr. Juliana Dominguez at [facility] and discussed case. Plan to transfer patient to [facility] on Monday after few days of antibiotics. Meanwhile, would focus on analgesia and optimizing antibiotic regimen based on cultures. 2. Atrial fibrillation: patient with permanent atrial fibrillation and status post AV node ablation. He is pacemaker dependent. Device function recently checked with no issues. Anticoagulation on hold. Elevated overall risk of stroke. 3. Hypertension: improved blood pressure today. Monitor clinically. Plan:     1. Continue antibiotics/follow-up cultures  3. Plan to transfer to [facility] Monday for device/lead extraction and placement of temporary pacing system (possibly Micra leadless pacemaker) on Tuesday    Subjective: Interval History:  Patient with only complaint of mild dizziness this morning. Otherwise, his pain is a bit better (left shoulder area). No chest pain or shortness of breath reported.        Problem List:  Patient Active Problem List   Diagnosis    Pneumonia    Abnormal chest CT    Cough syncope Tracheobronchomalacia    Hyperlipidemia    Hypertension    Chronic kidney disease, stage III (moderate) ([facility])    A-fib (HCC)    Cardiac resynchronization therapy pacemaker (CRT-P) in place    Abnormal echocardiogram    Dizziness    SOB (shortness of breath)    Abnormal cardiovascular stress test    Coronary artery disease involving native heart without angina pectoris    Other chest pain    Atrial fibrillation with rapid ventricular response ([facility])    Syncope and collapse    COVID    NATANAEL (acute kidney injury) ([facility])    Atrial fibrillation with RVR ([facility])    Dementia with behavioral disturbance    Hallucinations    Syncope, unspecified syncope type    Ischemic cardiomyopathy    Atrial fibrillation, rapid ([facility])    Orthostatic hypotension    Pacemaker infection ([address]       History:  Past Medical History:   Diagnosis Date    Atrial fibrillation ([address]     Blind right eye     Chronic kidney disease     Dementia ([address]     Hyperlipidemia     Hypertension     Pneumonia      Past Surgical History:   Procedure Laterality Date    ABLATION OF DYSRHYTHMIC FOCUS      APPENDECTOMY      COLONOSCOPY      EYE SURGERY      HERNIA REPAIR      JOINT REPLACEMENT  2013    left shoulder    PACEMAKER PLACEMENT  10/13/2017    Dr Taun Potts at [facility]. single chamber PPM    PACEMAKER PLACEMENT  2022    biventricular    TONSILLECTOMY      UPPER GASTROINTESTINAL ENDOSCOPY       Family History   Problem Relation Age of Onset    Heart Disease Mother     Other Mother     Heart Disease Father     Asthma Other      Social History     Socioeconomic History    Marital status:    Tobacco Use    Smoking status: Former     Packs/day: 1.00     Years: 10.00     Pack years: 10.00     Types: Cigarettes     Quit date: 1975     Years since quittin.9    Smokeless tobacco: Never    Tobacco comments:     Quit 40 years ago   Vaping Use    Vaping Use: Never used   Substance and Sexual Activity    Alcohol use:  No Alcohol/week: 0.0 standard drinks    Drug use: No    Sexual activity: Yes     Partners: Female         Review of Systems:  Review of Systems   Unable to perform ROS: Dementia     Objective:     Vital Signs (last 24 hours):  Patient Vitals for the past 24 hrs:   BP Temp Temp src Pulse Resp SpO2 Weight   01/21/23 0835 127/86 98.1 °F (36.7 °C) Axillary 80 18 99 % --   01/21/23 0526 (!) 116/90 97.9 °F (36.6 °C) Axillary 79 18 100 % 160 lb 4.4 oz (72.7 kg)   01/21/23 0052 125/86 97.5 °F (36.4 °C) Axillary 81 16 100 % --   01/20/23 2159 (!) 118/91 97.3 °F (36.3 °C) Oral 81 16 98 % --   01/20/23 1725 (!) 124/96 -- -- 80 18 -- --   01/20/23 1645 (!) 159/113 -- -- 80 18 -- --   01/20/23 1604 (!) 158/108 97.9 °F (36.6 °C) Oral 80 18 97 % --   01/20/23 1508 (!) 162/95 -- -- 80 -- -- --   01/20/23 1459 (!) 167/107 -- -- 81 16 -- --   01/20/23 1400 (!) 151/106 -- -- 80 -- -- --   01/20/23 1330 (!) 132/112 -- -- 79 -- -- --   01/20/23 1300 (!) 158/94 -- -- 79 16 -- --   01/20/23 1245 (!) 148/106 -- -- 80 16 -- --   01/20/23 1230 (!) 141/110 -- -- 79 -- -- --   01/20/23 1215 (!) 147/98 -- -- 80 18 -- --   01/20/23 1200 (!) 152/90 -- -- 80 18 -- --   01/20/23 1156 (!) 149/87 -- -- -- -- -- --   01/20/23 1154 (!) 140/101 97.7 °F (36.5 °C) Oral 80 16 100 % --   01/20/23 0955 (!) 166/99 99 °F (37.2 °C) Oral 80 18 97 % --           Physical Examination:     Physical Exam  Constitutional:       Appearance: Normal appearance. HENT:      Head: Normocephalic and atraumatic. Nose: Nose normal. No rhinorrhea. Eyes:      General: No scleral icterus. Conjunctiva/sclera: Conjunctivae normal.   Cardiovascular:      Rate and Rhythm: Normal rate and regular rhythm. Pulmonary:      Effort: Pulmonary effort is normal.      Breath sounds: Normal breath sounds. Abdominal:      General: There is no distension. Musculoskeletal:         General: Normal range of motion. Cervical back: Normal range of motion and neck supple. Skin:     General: Skin is warm and dry. Neurological:      General: No focal deficit present.        01/19 1901 - 01/21 0700  In: 1374.7 [P.O.:360; I.V.:887.5]  Out: 1575 [Urine:1575]    Medications:    Current Facility-Administered Medications   Medication Dose Route Frequency Provider Last Rate Last Admin    trospium (SANCTURA) tablet 20 mg  20 mg Oral Nightly Elena Ashley MD        hydrALAZINE (APRESOLINE) injection 10 mg  10 mg IntraVENous Q4H PRN Donna Mares MD   10 mg at 01/20/23 0058    LORazepam (ATIVAN) injection 1 mg  1 mg IntraVENous Q4H PRN Elena Ashley MD   1 mg at 01/20/23 0800    QUEtiapine (SEROQUEL) tablet 25 mg  25 mg Oral QAM AC Elena Ashley MD   25 mg at 01/21/23 4474    And    QUEtiapine (SEROQUEL) tablet 50 mg  50 mg Oral Nightly Elena Ashley MD   50 mg at 01/20/23 2210    sodium chloride flush 0.9 % injection 5-40 mL  5-40 mL IntraVENous 2 times per day Nicky Lozano MD   10 mL at 01/20/23 2211    sodium chloride flush 0.9 % injection 5-40 mL  5-40 mL IntraVENous PRN Nicky Lozano MD        0.9 % sodium chloride infusion   IntraVENous PRN Nicky Lozano MD        vancomycin 1000 mg IVPB in 250 mL D5W addavial  1,000 mg IntraVENous Q24H Elena Ashley MD   Stopped at 01/21/23 0646    labetalol (NORMODYNE;TRANDATE) injection 20 mg  20 mg IntraVENous Q4H PRN Elena Ashley MD   20 mg at 01/20/23 1645    amLODIPine (NORVASC) tablet 10 mg  10 mg Oral Daily Elena Ashley MD        isosorbide mononitrate (IMDUR) extended release tablet 30 mg  30 mg Oral Daily Elena Ashley MD   30 mg at 01/20/23 2209    tamsulosin (FLOMAX) capsule 0.4 mg  0.4 mg Oral Daily Elena Ashley MD   0.4 mg at 01/20/23 2214    lactobacillus (CULTURELLE) capsule 1 capsule  1 capsule Oral BID  Elena Ashley MD   1 capsule at 01/20/23 2210    aspirin EC tablet 81 mg  81 mg Oral Daily GUS Klein Shaw CNP   81 mg at 01/20/23 5111 atorvastatin (LIPITOR) tablet 40 mg  40 mg Oral Daily Chen Guy APRN - CNP   40 mg at 01/20/23 2210    citalopram (CELEXA) tablet 10 mg  10 mg Oral Daily Chen Guy APRN - CNP   10 mg at 01/20/23 0910    finasteride (PROSCAR) tablet 5 mg  5 mg Oral Daily Chen Guy APRN - CNP   5 mg at 01/20/23 0910    memantine (NAMENDA) tablet 5 mg  5 mg Oral BID Chen Guy APRN - CNP   5 mg at 01/20/23 2210    pantoprazole (PROTONIX) tablet 40 mg  40 mg Oral Daily Chen Guy APRN - CNP   40 mg at 01/20/23 0910    risperiDONE (RISPERDAL) tablet 0.25 mg  0.25 mg Oral BID Chen Guy APRN - CNP   0.25 mg at 01/20/23 2211    sodium chloride flush 0.9 % injection 5-40 mL  5-40 mL IntraVENous 2 times per day GUS Valencia - CNP   10 mL at 01/20/23 2212    sodium chloride flush 0.9 % injection 5-40 mL  5-40 mL IntraVENous PRN Chen Guy APRN - CNP        0.9 % sodium chloride infusion   IntraVENous PRN GUS Valencia CNP 5 mL/hr at 01/21/23 0534 Restarted at 01/21/23 0534    enoxaparin (LOVENOX) injection 40 mg  40 mg SubCUTAneous Daily Chen Guy APRN - CNP   40 mg at 01/20/23 0925    ondansetron (ZOFRAN-ODT) disintegrating tablet 4 mg  4 mg Oral Q8H PRN GUS Valencia CNP        Or    ondansetron (ZOFRAN) injection 4 mg  4 mg IntraVENous Q6H PRN Chen Guy APRN - CNP        polyethylene glycol (GLYCOLAX) packet 17 g  17 g Oral Daily PRN Chen Guy APRN - SOLO        acetaminophen (TYLENOL) tablet 650 mg  650 mg Oral Q6H PRN Chen Guy APRN - CNP   650 mg at 01/20/23 2211    Or    acetaminophen (TYLENOL) suppository 650 mg  650 mg Rectal Q6H PRN GUS Valencia - CNP        cefepime (MAXIPIME) 2000 mg IVPB minibag  2,000 mg IntraVENous Q24H GUS Valencia CNP   Stopped at 01/20/23 1608         ECG Interpretation:  (Date: 1/19/2023)  Rhythm: Ventricular paced rhythm  Rate: 80 BPM  PAC's / PVC's present: None        Echocardiogram:    Results for orders placed or performed during the hospital encounter of 03/21/19   Echocardiogram complete   Result Value Ref Range    Left Ventricular Ejection Fraction 58     LVEF MODALITY ECHO     Narrative    Transthoracic Echocardiography Report (TTE)     Demographics      Patient Name       Dominick BARBOSA      Date of Study      03/21/2019         Gender              Male      Patient Number     [id number]         Date of Birth       1935      Visit Number       [id number]          Age                 82 year(s)      Accession Number   [id number]          Room Number         OP      Corporate ID       [id number]           Rox Challenger,                                                             [address] [facility]      Ordering Physician Adelso Tran,   Interpreting        [address] [place] [address].                      MD                 Physician           Beatris Rivera MD, [facility] [place]     Procedure    Type of Study      TTE procedure:ECHOCARDIOGRAM COMPLETE 2D W DOPPLER W COLOR. Procedure Date  Date: 03/21/2019 Start: 09:44 AM    Study Location: [address] [place] [address] - Echo Lab  Technical Quality: Adequate visualization    Indications:Atrial fibrillation. Additional Indications:Dizziness. Patient Status: Routine    Height: 66 inches Weight: 193 pounds BSA: 1.97 m2 BMI: 31.15 kg/m2    BP: 128/80 mmHg     Conclusions      Summary   Normal left ventricular systolic function with an estimated ejection   fraction of 55-60%. No regional wall motion abnormalities are seen. Normal left ventricular diastolic filling pressure. The left atrium is mildly dilated. The right atrium is moderately dilated. Right ventricular systolic function is mildly reduced . The right ventricle is mildly enlarged. The ascending aorta is mildly dilated. 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 Mild mitral regurgitation. Moderate tricuspid regurgitation. Systolic pulmonary artery pressure (SPAP) estimated at 47 mmHg (RA pressure   15 mmHg), consistent with mild pulmonary hypertension. Pacemaker lead in right ventricle. Signature      ------------------------------------------------------------------   Electronically signed by Colten Gonzalez MD, [facility] (Interpreting   physician) on 03/21/2019 at 10:53 AM   ------------------------------------------------------------------      Findings      Left Ventricle   Normal left ventricle size, wall thickness, and systolic function with an   estimated ejection fraction of 55-60%. No regional wall motion abnormalities   are seen. Normal left ventricular diastolic filling pressure. Mitral Valve   There is mild prolapse of the anterior and severe prolapse of posterior   mitral valve leaflet. Mild mitral regurgitation. Left Atrium   The left atrium is mildly dilated. Aortic Valve   The aortic valve is normal in structure and function. There is no   significant aortic regurgitation. Aorta   The aortic root is normal in size. The ascending aorta is mildly dilated at 3.6cm. Right Ventricle   Right ventricular systolic function is mildly reduced . The right ventricle is mildly enlarged. Tricuspid Valve   Tricuspid valve is structurally normal.   Tricuspid valve leaflet mobility is normal.   Moderate tricuspid regurgitation. Systolic pulmonary artery pressure (SPAP) estimated at 47 mmHg (RA pressure   15 mmHg), consistent with mild pulmonary hypertension. Right Atrium   The right atrium is moderately dilated. Pulmonic Valve   The pulmonic valve is not well visualized. No evidence of pulmonic valve stenosis. Trivial pulmonic regurgitation present. Pericardial Effusion   No pericardial effusion noted. Pleural Effusion   No pleural effusion.       Miscellaneous   IVC size is dilated (>2.1 cm) and collapses < 50% with respiration   consistent with elevated RA pressure (15 mmHg). No obvious masses, thrombi, or vegetations are noted. Patient history includes pacemaker which is not visualized on this exam.     M-Mode/2D Measurements (cm)      LV Diastolic Dimension: 2.92 cm LV Systolic Dimension: 5.81 cm   LV Septum Diastolic: 1.83 cm   LV PW Diastolic: 5.36 cm        AO Root Dimension: 3.4 cm                                      LA Area: 21.4 cm2                                   LA volume/Index: 70.33 ml /36 ml/m2     Doppler Measurements      AV Peak Velocity: 93.3 cm/s    MV Peak E-Wave: 57.2 cm/s   AV Peak Gradient: 3.48 mmHg   AV Mean Gradient: 2 mmHg       MV Mean Gradient: 0 mmHg                                  MV Max P mmHg                                  MV Vmax:64.5 cm/s   TR Velocity:285 cm/s           MV VTI:13 cm/s   TR Gradient:32.49 mmHg   Estimated RAP:15 mmHg   Estimated RVSP: 47.49 mmHg   E' Septal Velocity: 10.4 cm/s   E' Lateral Velocity: 10.3 cm/s   E/E' ratio: 5.6   PV Peak Velocity: 86.9 cm/s   PV Peak Gradient: 3.02 mmHg      Aortic Valve      Peak Velocity: 93.3 cm/s Mean Velocity: 64.3 cm/s   Peak Gradient: 3.48 mmHg Mean Gradient: 2 mmHg   AV VTI: 16.1 cm     Aorta      Aortic Root: 3.4 cm   Ascending Aorta: 3.6 cm          Telemetry:    Ventricular paced    Lab Review:    Recent Labs     23  1017 23  0514   WBC 7.1 7.5   HGB 14.9 13.8   HCT 44.7 41.5   MCV 87.3 86.2    191         Recent Labs     23  1017 23  0514 23  0626    142 140   K 3.1* 3.4* 3.7    104 106   CO2 30 27 22   BUN 21* 19 16   CREATININE 1.7* 1.2 1.2         No results for input(s): INR, PROTIME in the last 72 hours.     Recent Labs     23  1017   TROPONINI <0.01         Recent Labs     23  1017   PROBNP 6,104*             Signed by: Zuleima Mitchell MD

## 2023-01-21 NOTE — PROGRESS NOTES
Hospitalist Progress Note    CC: Pacemaker infection [facility])    Hospital course:  82 y.o. male, with past medical history of hypertension, hyperlipidemia, dementia and atrial fibrillation, who was a direct admit from [facility] to [facility] with an infected pacemaker site. History obtained from the patient and review of EMR. The patient stated he was taken to [facility] emergency room by his daughter because he was lost and got himself turned around. He also stated he was taking because he has an infection in his pacemaker. The patient does have a history of dementia and has episodes of confusion. Per EMR, the patient's daughter reported that for the last 2 to 3 days she has noticed the patient's left chest wall pacemaker site has been swollen, red and painful. The patient stated he is unsure how long ago his pacemaker was placed, but EMR reveals that it was placed 5 months ago. The patient's daughter reported that the patient has had some fevers and chills at home. However, the patient has been afebrile and has a negative lactic and leukocytosis. In the emergency room a chest CT was obtained that revealed congestive heart failure. Blood cultures were obtained and the patient was started on vancomycin and cefepime. He was also given morphine for discomfort. Throughout the patient's work-up he was also found to have an NATANAEL with a creatinine of 1.7. His baseline appears to be 1.1. His UA was negative. The patient was ultimately transferred to [facility] for further evaluation and treatment. Pt confused intermittently along with combativeness. Plan is to transfer to [facility] on Sunday or Monday - currently on wait list for telemetry bed -  procedure for pacemaker removal is Tuesday with Dr. Delfino Esparza.     Admit date: 1/19/2023  Days in hospital:  2  Status:  Inpatient       24 Hour Events: pt c/o some discomfort at pacemaker site    Subjective: seems calmer on bid seroquel - had better night last night - DORITA did not show thrombus or vegetation on native valve or lead  Continue with IV abx until transfer    ROS:   A comprehensive review of systems was negative except for: swelling at site of pacemaker, feels funny      Objective:    /74   Pulse 81   Temp 97.5 °F (36.4 °C)   Resp 16   Wt 160 lb 4.4 oz (72.7 kg)   SpO2 95%   BMI 25.10 kg/m²     Gen: confused appearing  HEENT: NC/AT, moist mucous membranes, no oropharyngeal erythema or exudate  Neck: supple, trachea midline, no anterior cervical or SC LAD  Heart:  Normal s1/s2, RRR, no murmurs, gallops, or rubs. no leg edema  Lungs:  diminished bilaterally, no wheeze, no rales, no rhonchi, no crackles, no use of accessory muscles  Abd: bowel sounds present, soft, nontender, nondistended, no masses  Extrem:  No clubbing, cyanosis,  no edema  Skin: no rashes or lesions  Psych: Not oriented to person, confused about place and date - 2010  Neuro: grossly intact, moves all four extremities. Assessment:    Principal Problem:    Pacemaker infection ([address]  Active Problems:    Dementia with behavioral disturbance    Hallucinations    Ischemic cardiomyopathy    Hyperlipidemia    Hypertension    Chronic kidney disease, stage III (moderate) ([facility])    A-fib ([facility])  Resolved Problems:    * No resolved hospital problems.  *      Plan:   Pacemaker site infection - will transfer to [place] this weekend in anticipation of pacemaker removal on Tuesday - continue with cefepime and vanco - he will need extraction of device and all leads - now on waitlist for bed to open up - discussed with hospitalist  Acute metabolic encephalopathy in setting of dementia with sundowning - add seroquel bid and pt did not require restraints, just camera - namenda most likely is not adding any benefit to patient at this point as it only slows the rate of dementia progression  Afib - rate controlled - off of anticoagulation at this time - he is pacemaker dependent so may need temporary placement at time of removal - will be complicated by his dementia  HTN -  BP much better controlled today on increased norvasc, and added isosorbide back into regimen -     Prognosis:  Fair    Code status:  Full code    DVT prophylaxis: Lovenox  GI prophylaxis: Proton Pump Inhibitor  Antibiotic prophylaxis indicated:  Probiotic     Diet:  ADULT DIET; Regular; Low Fat/Low Chol/High Fiber/2 gm Na    Disposition:  Other transfer to [place]  in 1-2 day(s).   Appropriate for A1 discharge unit:  Yes    Medications:  Scheduled Meds:   trospium  20 mg Oral Nightly    QUEtiapine  25 mg Oral QAM AC    And    QUEtiapine  50 mg Oral Nightly    sodium chloride flush  5-40 mL IntraVENous 2 times per day    vancomycin  1,000 mg IntraVENous Q24H    amLODIPine  10 mg Oral Daily    isosorbide mononitrate  30 mg Oral Daily    tamsulosin  0.4 mg Oral Daily    lactobacillus  1 capsule Oral BID WC    aspirin  81 mg Oral Daily    atorvastatin  40 mg Oral Daily    citalopram  10 mg Oral Daily    finasteride  5 mg Oral Daily    memantine  5 mg Oral BID    pantoprazole  40 mg Oral Daily    risperiDONE  0.25 mg Oral BID    sodium chloride flush  5-40 mL IntraVENous 2 times per day    enoxaparin  40 mg SubCUTAneous Daily    cefepime  2,000 mg IntraVENous Q24H       PRN Meds:  hydrALAZINE, LORazepam, sodium chloride flush, sodium chloride, labetalol, sodium chloride flush, sodium chloride, ondansetron **OR** ondansetron, polyethylene glycol, acetaminophen **OR** acetaminophen    IV:   sodium chloride      sodium chloride 25 mL/hr at 01/21/23 0938         Intake/Output Summary (Last 24 hours) at 1/21/2023 1403  Last data filed at 1/21/2023 0600  Gross per 24 hour   Intake 459.59 ml   Output 800 ml   Net -340.41 ml         Results:  CBC:   Recent Labs     01/19/23  1017 01/20/23  0514   WBC 7.1 7.5   HGB 14.9 13.8   HCT 44.7 41.5   MCV 87.3 86.2    191 BMP:   Recent Labs     01/19/23  1017 01/20/23  0514 01/21/23  0626    142 140   K 3.1* 3.4* 3.7    104 106   CO2 30 27 22   BUN 21* 19 16   CREATININE 1.7* 1.2 1.2       Mag: No results for input(s): MAG in the last 72 hours. Phos:   Lab Results   Component Value Date    PHOS 3.3 12/14/2022     No results found for: GLU    LIVER PROFILE:   Recent Labs     01/19/23  1017   AST 18   ALT 10   BILITOT 1.0   ALKPHOS 99       PT/INR: No results for input(s): PROTIME, INR in the last 72 hours. APTT: No results for input(s): APTT in the last 72 hours.   UA:  Recent Labs     01/19/23  1225   COLORU Yellow   PHUR 6.0   CLARITYU Clear   SPECGRAV 1.015   LEUKOCYTESUR Negative   UROBILINOGEN 0.2   BILIRUBINUR Negative   BLOODU Negative   GLUCOSEU Negative         Invalid input(s): ABG  Lab Results   Component Value Date    CALCIUM 8.8 01/21/2023    PHOS 3.3 12/14/2022               Electronically signed by Jim Vaca MD on 1/21/2023 at 2:03 PM

## 2023-01-21 NOTE — PLAN OF CARE
Problem: Chronic Conditions and Co-morbidities  Goal: Patient's chronic conditions and co-morbidity symptoms are monitored and maintained or improved  1/21/2023 0103 by Zofia Santillan RN  Outcome: Progressing     Patient's EF (Ejection Fraction) is greater than 40%    Heart Failure Medications:  Diuretics[de-identified] None    (One of the following REQUIRED for EF </= 40%/SYSTOLIC FAILURE but MAY be used in EF% >40%/DIASTOLIC FAILURE)        ACE[de-identified] None        ARB[de-identified] None         ARNI[de-identified] None    (Beta Blockers)  NON- Evidenced Based Beta Blocker (for EF% >40%/DIASTOLIC FAILURE): None    Evidenced Based Beta Blocker::(REQUIRED for EF% <40%/SYSTOLIC FAILURE) None  . .................................................................................................................................................. Patient's weights and intake/output reviewed: Yes    Patient's Last Weight: 176 lbs obtained by bed scale. Difference of 0 lbs less than last documented weight. Intake/Output Summary (Last 24 hours) at 1/21/2023 0103  Last data filed at 1/21/2023 0000  Gross per 24 hour   Intake 1374.69 ml   Output 1125 ml   Net 249.69 ml       Education Booklet Provided: yes    Comorbidities Reviewed Yes    Patient has a past medical history of Atrial fibrillation ([address] Blind right eye, Chronic kidney disease, Dementia ([address] Hyperlipidemia, Hypertension, and Pneumonia. >>For CHF and Comorbidity documentation on Education Time and Topics, please see Education Tab    Progressive Mobility Assessment:  What is this patient's Current Level of Mobility?: Requires Bed Rest  How was this patient Mobilized today?: Unable to Mobilize and Patient Refuses to Mobilize, ambulated 0 ft                 With Whom? Nurse                 Level of Difficulty/Assistance: 2x Assist     Pt resting in bed at this time on room air. Pt denies shortness of breath. Pt without lower extremity edema.      Patient and/or Family's stated Goal of Care this Admission: reduce shortness of breath, increase activity tolerance, better understand heart failure and disease management, be more comfortable, and reduce lower extremity edema prior to discharge        :

## 2023-01-21 NOTE — PROGRESS NOTES
[address]     Electrophysiology                                     Progress Note    Admission date:  2023    Reason for follow up visit: pacemaker infection    HPI/CC: Azael Doss was admitted on 2023 with redness, swelling and pain to pacemaker site. He reported fever and chills prior to admission. DORITA did not show any vegetations on device leads. Blood cultures have been NGTD. Rhythm has been AS . Subjective: He complains of some tenderness to PPM site. Vitals:  Blood pressure 127/86, pulse 80, temperature 98.1 °F (36.7 °C), temperature source Axillary, resp. rate 18, weight 160 lb 4.4 oz (72.7 kg), SpO2 99 %.   Temp  Av.9 °F (36.6 °C)  Min: 97.3 °F (36.3 °C)  Max: 99 °F (37.2 °C)  Pulse  Av.9  Min: 79  Max: 81  BP  Min: 116/90  Max: 167/107  SpO2  Av.7 %  Min: 97 %  Max: 100 %    24 hour I/O    Intake/Output Summary (Last 24 hours) at 2023 0846  Last data filed at 2023 0600  Gross per 24 hour   Intake 1374.69 ml   Output 1200 ml   Net 174.69 ml     Current Facility-Administered Medications   Medication Dose Route Frequency Provider Last Rate Last Admin    trospium (SANCTURA) tablet 20 mg  20 mg Oral Nightly Estelita Marroquin MD        hydrALAZINE (APRESOLINE) injection 10 mg  10 mg IntraVENous Q4H PRN Sindhu Bustos MD   10 mg at 23 0058    LORazepam (ATIVAN) injection 1 mg  1 mg IntraVENous Q4H PRN Estelita Marroquin MD   1 mg at 23 0800    QUEtiapine (SEROQUEL) tablet 25 mg  25 mg Oral QAM AC Estelita Marroquin MD   25 mg at 23 2513    And    QUEtiapine (SEROQUEL) tablet 50 mg  50 mg Oral Nightly Estelita Marroquin MD   50 mg at 23 2210    sodium chloride flush 0.9 % injection 5-40 mL  5-40 mL IntraVENous 2 times per day Cindy Rodríguez MD   10 mL at 23 2211    sodium chloride flush 0.9 % injection 5-40 mL  5-40 mL IntraVENous PRN Cindy Rodríguez MD        0.9 % sodium chloride infusion   IntraVENous PRN Bailee Barbosa MD        vancomycin 1000 mg IVPB in 250 mL D5W addavial  1,000 mg IntraVENous Q24H Christina Prasad MD   Stopped at 01/21/23 9241    labetalol (NORMODYNE;TRANDATE) injection 20 mg  20 mg IntraVENous Q4H PRN Christina Prasad MD   20 mg at 01/20/23 1645    amLODIPine (NORVASC) tablet 10 mg  10 mg Oral Daily Christina Prasad MD        isosorbide mononitrate (IMDUR) extended release tablet 30 mg  30 mg Oral Daily Christina Prasad MD   30 mg at 01/20/23 2209    tamsulosin (FLOMAX) capsule 0.4 mg  0.4 mg Oral Daily Christina Prasad MD   0.4 mg at 01/20/23 2214    lactobacillus (CULTURELLE) capsule 1 capsule  1 capsule Oral BID WC Christina Prasad MD   1 capsule at 01/20/23 2210    aspirin EC tablet 81 mg  81 mg Oral Daily Adam Coca, APRN - CNP   81 mg at 01/20/23 0910    atorvastatin (LIPITOR) tablet 40 mg  40 mg Oral Daily Adam Coca, APRN - CNP   40 mg at 01/20/23 2210    citalopram (CELEXA) tablet 10 mg  10 mg Oral Daily Adam Coca, APRN - CNP   10 mg at 01/20/23 0910    finasteride (PROSCAR) tablet 5 mg  5 mg Oral Daily Adam Coca, APRN - CNP   5 mg at 01/20/23 0910    memantine (NAMENDA) tablet 5 mg  5 mg Oral BID Adam Coca, APRN - CNP   5 mg at 01/20/23 2210    pantoprazole (PROTONIX) tablet 40 mg  40 mg Oral Daily Adam Coca, APRN - CNP   40 mg at 01/20/23 0910    risperiDONE (RISPERDAL) tablet 0.25 mg  0.25 mg Oral BID Adam Coca, APRN - CNP   0.25 mg at 01/20/23 2211    sodium chloride flush 0.9 % injection 5-40 mL  5-40 mL IntraVENous 2 times per day Adam Coca, APRN - CNP   10 mL at 01/20/23 2212    sodium chloride flush 0.9 % injection 5-40 mL  5-40 mL IntraVENous PRN Adam Coca, APRN - CNP        0.9 % sodium chloride infusion   IntraVENous PRN Adam Coca, APRN - CNP 5 mL/hr at 01/21/23 0534 Restarted at 01/21/23 0534    enoxaparin (LOVENOX) injection 40 mg  40 mg SubCUTAneous Daily Adam Coca, APRN - CNP   40 mg at 01/20/23 7887 ondansetron (ZOFRAN-ODT) disintegrating tablet 4 mg  4 mg Oral Q8H PRN Willie November, APRN - CNP        Or    ondansetron (ZOFRAN) injection 4 mg  4 mg IntraVENous Q6H PRN Willie November, APRN - CNP        polyethylene glycol (GLYCOLAX) packet 17 g  17 g Oral Daily PRN Willie November, APRN - CNP        acetaminophen (TYLENOL) tablet 650 mg  650 mg Oral Q6H PRN Willie November, APRN - CNP   650 mg at 01/20/23 2211    Or    acetaminophen (TYLENOL) suppository 650 mg  650 mg Rectal Q6H PRN Willie November, APRN - CNP        cefepime (MAXIPIME) 2000 mg IVPB minibag  2,000 mg IntraVENous Q24H Willie November, APRN - CNP   Stopped at 01/20/23 1608       Objective:     Telemetry monitor: AS     Physical Exam:  Constitutional and general appearance: alert, cooperative, no distress, and appears stated age  [de-identified]: PERRL, no cervical lymphadenopathy. No masses palpable. Normal oral mucosa  Respiratory:  Normal excursion and expansion without use of accessory muscles  Resp auscultation: Normal breath sounds without wheezing, rhonchi, and rales  Cardiovascular:   The apical impulse is not displaced  Heart tones are crisp and normal. regular S1 and S2.  Jugular venous pulsation Normal  The carotid upstroke is normal in amplitude and contour without delay or bruit  Peripheral pulses are symmetrical and full   Chest wall:  + for erythema and small amount of swelling to device pocket   Abdomen:  No masses or tenderness  Bowel sounds present  Extremities:   No cyanosis or clubbing   No lower extremity edema   Skin: warm and dry  Neurological:  Alert and oriented  Moves all extremities well  No abnormalities of mood, affect, memory, mentation, or behavior are noted    Data  [facility] 9/13/2022:  FINDINGS      HEMODYNAMICS     CHAMBER PRESSURE SATURATION   RA 5 54%   RV 20/4     PA 21/13 48%   PW 11     AORTA 110/68 85%      TASHA CARDIAC OUTPUT 3.8 L/min   SVR 1630            Left heart catheterization     LVEDP 11   GRADIENT ACROSS AORTIC VALVE None      CORONARY ARTERIES     LM Less than 10% [id number] stenosis. LAD Calcified, proximal 40 to 50% stenosis, mid-distal 60 to 75% stenosis. LCX Calcified, there is proximal-mid 75% eccentric stenosis at a bifurcation with OM1 and OM 2 as well as the AV groove. RCA Dominant, calcified, large vessel, [id number] 40% stenoses. CONCLUSIONS:      Upper normal filling pressures  Two-vessel CAD/ASHD, slight progression since last angiograms from 2020  We will treat medically given advanced age and comorbidities including renal insufficiency  Continue with aspirin statin and beta-blocker     Limited echo 8/10/2022:   Conclusions   Summary   Limited only f/u for LVEF. The left ventricular systolic function is moderately reduced with an   ejection fraction of 35 - 40 %. Left ventricular cavity size is normal.   Hypokinesis of the anterior, septal, and apical walls. Limited echo 4/15/2022:    Summary   Limited echo for LV function with limited doppler/color. LV function is mildly decreased with EF estimated from 45-50%. Mild global hypokinesis. Mild concentric left ventricular hypertrophy. Diastolic dysfunction grade and filling pressure are indeterminate. Severe biatrial enlargement. Aortic valve appears sclerotic but opens adequately. 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 Note no color flow doppler visualized to assess for mitral regurgitation. Mild to moderate tricuspid valve regurgitation. Systolic pulmonary artery pressure (sPAP) is normal and estimated at 26 mmHg   (RAP 3 mmHg)   Irregular heart rate throughout study. Definity® used for myocardial border enhancement. ECHO: 3/21/2019:  Summary   Normal left ventricular systolic function with an estimated ejection   fraction of 55-60%. No regional wall motion abnormalities are seen. Normal left ventricular diastolic filling pressure.    The left atrium is mildly dilated. The right atrium is moderately dilated. Right ventricular systolic function is mildly reduced . The right ventricle is mildly enlarged. The ascending aorta is mildly dilated. 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 Mild mitral regurgitation. Moderate tricuspid regurgitation. Systolic pulmonary artery pressure (SPAP) estimated at 47 mmHg (RA pressure   15 mmHg), consistent with mild pulmonary hypertension. Pacemaker lead in right ventricle      All labs and testing reviewed.   Lab Review     Renal Profile:   Lab Results   Component Value Date/Time    CREATININE 1.2 01/21/2023 06:26 AM    BUN 16 01/21/2023 06:26 AM     01/21/2023 06:26 AM    K 3.7 01/21/2023 06:26 AM    K 3.4 01/20/2023 05:14 AM     01/21/2023 06:26 AM    CO2 22 01/21/2023 06:26 AM     CBC:    Lab Results   Component Value Date/Time    WBC 7.5 01/20/2023 05:14 AM    RBC 4.81 01/20/2023 05:14 AM    HGB 13.8 01/20/2023 05:14 AM    HCT 41.5 01/20/2023 05:14 AM    MCV 86.2 01/20/2023 05:14 AM    RDW 16.0 01/20/2023 05:14 AM     01/20/2023 05:14 AM     BNP:    Lab Results   Component Value Date/Time    BNP 12.8 01/09/2013 10:00 AM     Fasting Lipid Panel:    Lab Results   Component Value Date/Time    CHOL 137 09/09/2022 05:45 AM    HDL 30 09/09/2022 05:45 AM    TRIG 73 09/09/2022 05:45 AM     Cardiac Enzymes:  CK/MbTroponin  Lab Results   Component Value Date/Time    TROPONINI <0.01 01/19/2023 10:17 AM     PT/ INR   Lab Results   Component Value Date/Time    INR 1.26 01/18/2022 06:05 AM    INR 1.18 05/22/2020 10:19 AM    INR 1.02 01/24/2016 12:35 PM    PROTIME 14.4 01/18/2022 06:05 AM    PROTIME 13.7 05/22/2020 10:19 AM    PROTIME 11.6 01/24/2016 12:35 PM     PTT No results found for: PTT   Lab Results   Component Value Date/Time    MG 1.80 01/20/2023 05:14 AM      Lab Results   Component Value Date/Time    TSH 2.45 10/21/2021 02:04 PM       Assessment:  Pacemaker infection: ongoing    -s/p DORITA that showed no evidence of endocarditis    -blood cultures with no growth to date   Permanent atrial fibrillation: stable               -[id number] score 4 (age, HTN, CAD)               -s/p AV node ablation 9/16/2022 (pacemaker dependent)  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 Continue to monitor on telemetry   2. Not on [facility] given risk of injury and bleeding   3. Awaiting bed at [facility] for device and lead explant with Dr. Kathryn Rose. Given AVN ablation, he will likely need a Micra implanted while he recovers from infection.    4. Continue antibiotics      GUS Aquino-CNP  [facility], [place], operated by [facility]  [phone]

## 2023-01-22 ENCOUNTER — HOSPITAL ENCOUNTER (INPATIENT)
Age: 88
LOS: 4 days | Discharge: OTHER INSTITUTION | End: 2023-01-26
Attending: INTERNAL MEDICINE | Admitting: INTERNAL MEDICINE
Payer: MEDICARE

## 2023-01-22 VITALS
BODY MASS INDEX: 23.63 KG/M2 | DIASTOLIC BLOOD PRESSURE: 91 MMHG | OXYGEN SATURATION: 98 % | TEMPERATURE: 97.7 F | SYSTOLIC BLOOD PRESSURE: 129 MMHG | HEART RATE: 80 BPM | WEIGHT: 150.9 LBS | RESPIRATION RATE: 18 BRPM

## 2023-01-22 PROBLEM — T82.7XXA PACEMAKER INFECTION, INITIAL ENCOUNTER (HCC): Status: ACTIVE | Noted: 2023-01-22

## 2023-01-22 LAB — VANCOMYCIN TROUGH: 11.8 UG/ML (ref 10–20)

## 2023-01-22 PROCEDURE — 2060000000 HC ICU INTERMEDIATE R&B

## 2023-01-22 PROCEDURE — 6370000000 HC RX 637 (ALT 250 FOR IP): Performed by: INTERNAL MEDICINE

## 2023-01-22 PROCEDURE — 6360000002 HC RX W HCPCS: Performed by: INTERNAL MEDICINE

## 2023-01-22 PROCEDURE — 80202 ASSAY OF VANCOMYCIN: CPT

## 2023-01-22 PROCEDURE — 2580000003 HC RX 258: Performed by: NURSE PRACTITIONER

## 2023-01-22 PROCEDURE — 2580000003 HC RX 258: Performed by: INTERNAL MEDICINE

## 2023-01-22 PROCEDURE — 93005 ELECTROCARDIOGRAM TRACING: CPT | Performed by: INTERNAL MEDICINE

## 2023-01-22 PROCEDURE — 6370000000 HC RX 637 (ALT 250 FOR IP): Performed by: NURSE PRACTITIONER

## 2023-01-22 PROCEDURE — 6360000002 HC RX W HCPCS: Performed by: NURSE PRACTITIONER

## 2023-01-22 PROCEDURE — 36415 COLL VENOUS BLD VENIPUNCTURE: CPT

## 2023-01-22 RX ORDER — QUETIAPINE FUMARATE 25 MG/1
25 TABLET, FILM COATED ORAL
Status: DISCONTINUED | OUTPATIENT
Start: 2023-01-23 | End: 2023-01-26 | Stop reason: HOSPADM

## 2023-01-22 RX ORDER — ONDANSETRON 2 MG/ML
4 INJECTION INTRAMUSCULAR; INTRAVENOUS EVERY 6 HOURS PRN
Status: DISCONTINUED | OUTPATIENT
Start: 2023-01-22 | End: 2023-01-26 | Stop reason: HOSPADM

## 2023-01-22 RX ORDER — ISOSORBIDE MONONITRATE 30 MG/1
30 TABLET, EXTENDED RELEASE ORAL DAILY
Status: DISCONTINUED | OUTPATIENT
Start: 2023-01-23 | End: 2023-01-26 | Stop reason: HOSPADM

## 2023-01-22 RX ORDER — AMLODIPINE BESYLATE 5 MG/1
10 TABLET ORAL DAILY
Status: DISCONTINUED | OUTPATIENT
Start: 2023-01-23 | End: 2023-01-26 | Stop reason: HOSPADM

## 2023-01-22 RX ORDER — POLYETHYLENE GLYCOL 3350 17 G/17G
17 POWDER, FOR SOLUTION ORAL DAILY PRN
Status: DISCONTINUED | OUTPATIENT
Start: 2023-01-22 | End: 2023-01-26 | Stop reason: HOSPADM

## 2023-01-22 RX ORDER — ACETAMINOPHEN 325 MG/1
650 TABLET ORAL EVERY 6 HOURS PRN
Status: DISCONTINUED | OUTPATIENT
Start: 2023-01-22 | End: 2023-01-24 | Stop reason: SDUPTHER

## 2023-01-22 RX ORDER — FERROUS SULFATE 325(65) MG
325 TABLET ORAL DAILY
Status: DISCONTINUED | OUTPATIENT
Start: 2023-01-23 | End: 2023-01-26 | Stop reason: HOSPADM

## 2023-01-22 RX ORDER — QUETIAPINE FUMARATE 25 MG/1
50 TABLET, FILM COATED ORAL NIGHTLY
Status: DISCONTINUED | OUTPATIENT
Start: 2023-01-22 | End: 2023-01-26 | Stop reason: HOSPADM

## 2023-01-22 RX ORDER — CITALOPRAM 20 MG/1
10 TABLET ORAL DAILY
Status: DISCONTINUED | OUTPATIENT
Start: 2023-01-23 | End: 2023-01-26 | Stop reason: HOSPADM

## 2023-01-22 RX ORDER — ONDANSETRON 4 MG/1
4 TABLET, ORALLY DISINTEGRATING ORAL EVERY 8 HOURS PRN
Status: DISCONTINUED | OUTPATIENT
Start: 2023-01-22 | End: 2023-01-26 | Stop reason: HOSPADM

## 2023-01-22 RX ORDER — SODIUM CHLORIDE 0.9 % (FLUSH) 0.9 %
5-40 SYRINGE (ML) INJECTION PRN
Status: DISCONTINUED | OUTPATIENT
Start: 2023-01-22 | End: 2023-01-26 | Stop reason: HOSPADM

## 2023-01-22 RX ORDER — ASPIRIN 81 MG/1
81 TABLET ORAL DAILY
Status: DISCONTINUED | OUTPATIENT
Start: 2023-01-23 | End: 2023-01-26 | Stop reason: HOSPADM

## 2023-01-22 RX ORDER — MEMANTINE HYDROCHLORIDE 5 MG/1
5 TABLET ORAL 2 TIMES DAILY
Status: DISCONTINUED | OUTPATIENT
Start: 2023-01-22 | End: 2023-01-26 | Stop reason: HOSPADM

## 2023-01-22 RX ORDER — PANTOPRAZOLE SODIUM 40 MG/1
40 TABLET, DELAYED RELEASE ORAL DAILY
Status: DISCONTINUED | OUTPATIENT
Start: 2023-01-23 | End: 2023-01-26 | Stop reason: HOSPADM

## 2023-01-22 RX ORDER — LACTOBACILLUS RHAMNOSUS GG 10B CELL
1 CAPSULE ORAL 2 TIMES DAILY WITH MEALS
Status: DISCONTINUED | OUTPATIENT
Start: 2023-01-22 | End: 2023-01-26 | Stop reason: HOSPADM

## 2023-01-22 RX ORDER — ENOXAPARIN SODIUM 100 MG/ML
40 INJECTION SUBCUTANEOUS NIGHTLY
Status: DISCONTINUED | OUTPATIENT
Start: 2023-01-22 | End: 2023-01-26 | Stop reason: HOSPADM

## 2023-01-22 RX ORDER — SODIUM CHLORIDE 9 MG/ML
INJECTION, SOLUTION INTRAVENOUS PRN
Status: DISCONTINUED | OUTPATIENT
Start: 2023-01-22 | End: 2023-01-26 | Stop reason: HOSPADM

## 2023-01-22 RX ORDER — FINASTERIDE 5 MG/1
5 TABLET, FILM COATED ORAL DAILY
Status: DISCONTINUED | OUTPATIENT
Start: 2023-01-23 | End: 2023-01-26 | Stop reason: HOSPADM

## 2023-01-22 RX ORDER — SODIUM CHLORIDE 0.9 % (FLUSH) 0.9 %
5-40 SYRINGE (ML) INJECTION EVERY 12 HOURS SCHEDULED
Status: DISCONTINUED | OUTPATIENT
Start: 2023-01-22 | End: 2023-01-26 | Stop reason: HOSPADM

## 2023-01-22 RX ORDER — ACETAMINOPHEN 650 MG/1
650 SUPPOSITORY RECTAL EVERY 6 HOURS PRN
Status: DISCONTINUED | OUTPATIENT
Start: 2023-01-22 | End: 2023-01-24 | Stop reason: SDUPTHER

## 2023-01-22 RX ORDER — OLANZAPINE 10 MG/1
5 INJECTION, POWDER, LYOPHILIZED, FOR SOLUTION INTRAMUSCULAR ONCE
Status: DISCONTINUED | OUTPATIENT
Start: 2023-01-22 | End: 2023-01-26 | Stop reason: HOSPADM

## 2023-01-22 RX ORDER — TAMSULOSIN HYDROCHLORIDE 0.4 MG/1
0.4 CAPSULE ORAL DAILY
Status: DISCONTINUED | OUTPATIENT
Start: 2023-01-23 | End: 2023-01-26 | Stop reason: HOSPADM

## 2023-01-22 RX ORDER — RISPERIDONE 0.5 MG/1
0.25 TABLET ORAL 2 TIMES DAILY
Status: DISCONTINUED | OUTPATIENT
Start: 2023-01-22 | End: 2023-01-26 | Stop reason: HOSPADM

## 2023-01-22 RX ORDER — ATORVASTATIN CALCIUM 40 MG/1
40 TABLET, FILM COATED ORAL NIGHTLY
Status: DISCONTINUED | OUTPATIENT
Start: 2023-01-22 | End: 2023-01-26 | Stop reason: HOSPADM

## 2023-01-22 RX ORDER — POLYETHYLENE GLYCOL 3350 17 G/17G
17 POWDER, FOR SOLUTION ORAL DAILY PRN
Status: DISCONTINUED | OUTPATIENT
Start: 2023-01-22 | End: 2023-01-22 | Stop reason: SDUPTHER

## 2023-01-22 RX ORDER — TROSPIUM CHLORIDE 20 MG/1
20 TABLET, FILM COATED ORAL NIGHTLY
Status: DISCONTINUED | OUTPATIENT
Start: 2023-01-22 | End: 2023-01-26 | Stop reason: HOSPADM

## 2023-01-22 RX ADMIN — Medication 1 CAPSULE: at 18:16

## 2023-01-22 RX ADMIN — Medication 1 CAPSULE: at 08:57

## 2023-01-22 RX ADMIN — AMLODIPINE BESYLATE 10 MG: 5 TABLET ORAL at 08:58

## 2023-01-22 RX ADMIN — RISPERIDONE 0.25 MG: 0.25 TABLET ORAL at 08:58

## 2023-01-22 RX ADMIN — MEMANTINE 5 MG: 5 TABLET ORAL at 08:58

## 2023-01-22 RX ADMIN — ONDANSETRON 4 MG: 2 INJECTION INTRAMUSCULAR; INTRAVENOUS at 18:16

## 2023-01-22 RX ADMIN — Medication 10 ML: at 20:37

## 2023-01-22 RX ADMIN — ATORVASTATIN CALCIUM 40 MG: 40 TABLET, FILM COATED ORAL at 20:37

## 2023-01-22 RX ADMIN — MEMANTINE 5 MG: 5 TABLET ORAL at 20:36

## 2023-01-22 RX ADMIN — RISPERIDONE 0.25 MG: 0.25 TABLET, FILM COATED ORAL at 20:44

## 2023-01-22 RX ADMIN — SODIUM CHLORIDE: 9 INJECTION, SOLUTION INTRAVENOUS at 09:42

## 2023-01-22 RX ADMIN — VANCOMYCIN HYDROCHLORIDE 1000 MG: 1 INJECTION, POWDER, LYOPHILIZED, FOR SOLUTION INTRAVENOUS at 06:40

## 2023-01-22 RX ADMIN — ENOXAPARIN SODIUM 40 MG: 100 INJECTION SUBCUTANEOUS at 08:57

## 2023-01-22 RX ADMIN — CEFEPIME 2000 MG: 2 INJECTION, POWDER, FOR SOLUTION INTRAVENOUS at 09:43

## 2023-01-22 RX ADMIN — CITALOPRAM HYDROBROMIDE 10 MG: 20 TABLET ORAL at 08:58

## 2023-01-22 RX ADMIN — QUETIAPINE FUMARATE 50 MG: 25 TABLET ORAL at 20:36

## 2023-01-22 RX ADMIN — TAMSULOSIN HYDROCHLORIDE 0.4 MG: 0.4 CAPSULE ORAL at 08:58

## 2023-01-22 RX ADMIN — ISOSORBIDE MONONITRATE 30 MG: 30 TABLET, EXTENDED RELEASE ORAL at 08:57

## 2023-01-22 RX ADMIN — FINASTERIDE 5 MG: 5 TABLET, FILM COATED ORAL at 08:57

## 2023-01-22 RX ADMIN — PANTOPRAZOLE SODIUM 40 MG: 40 TABLET, DELAYED RELEASE ORAL at 08:58

## 2023-01-22 RX ADMIN — Medication 10 ML: at 08:58

## 2023-01-22 RX ADMIN — TROSPIUM CHLORIDE 20 MG: 20 TABLET, FILM COATED ORAL at 20:36

## 2023-01-22 RX ADMIN — QUETIAPINE FUMARATE 25 MG: 25 TABLET ORAL at 06:32

## 2023-01-22 RX ADMIN — ASPIRIN 81 MG: 81 TABLET, COATED ORAL at 08:57

## 2023-01-22 ASSESSMENT — PAIN SCALES - GENERAL
PAINLEVEL_OUTOF10: 0
PAINLEVEL_OUTOF10: 0

## 2023-01-22 NOTE — CONSULTS
Clinical Pharmacy Note: Pharmacy to Dose Vancomycin    Júnior Valles is a 82 y.o. male started on Vancomycin for SSI; consult received from Dr. Isadora Rose to manage therapy. Also receiving the following antibiotics: cefepime. Goal AUC: 400-600 mg/L*hr    Assessment/Plan:  Initiate vancomycin 1000 mg IV every 24 hours. Bayesian modeling predicts an AUC of 505 mg/L*hr and a trough of 15.6 mcg/mL at steady state concentration. A vancomycin random level has been ordered for 1/23 at 0600. Changes in regimen will be determined based on culture results, renal function, and clinical response. Pharmacy will continue to monitor and adjust regimen as necessary. Allergies:  Sulfa antibiotics     Recent Labs     01/20/23  0514 01/21/23  0626   CREATININE 1.2 1.2       Recent Labs     01/20/23  0514   WBC 7.5       Ht Readings from Last 1 Encounters:   01/19/23 5' 7\" (1.702 m)        Wt Readings from Last 1 Encounters:   01/22/23 150 lb 14.4 oz (68.4 kg)         Estimated Creatinine Clearance: 42 mL/min (based on SCr of 1.2 mg/dL).       Thank you for the consult,    Tom Pretty, [facility]

## 2023-01-22 NOTE — PROGRESS NOTES
Received call back from Transfer center to notify that patient has an ETA of 2 hours and will be picked up by [facility]. Pt daughter Reg Blanton called and notified.

## 2023-01-22 NOTE — PLAN OF CARE
Problem: Discharge Planning  Goal: Discharge to home or other facility with appropriate resources  1/22/2023 0113 by Flo Onofre RN  Outcome: Progressing     Problem: Safety - Adult  Goal: Free from fall injury  Outcome: Progressing     Problem: Chronic Conditions and Co-morbidities  Goal: Patient's chronic conditions and co-morbidity symptoms are monitored and maintained or improved  1/22/2023 0113 by Flo Onofre RN  Outcome: Progressing      Patient's EF (Ejection Fraction) is greater than 40%    Heart Failure Medications:  Diuretics[de-identified] None    (One of the following REQUIRED for EF </= 40%/SYSTOLIC FAILURE but MAY be used in EF% >40%/DIASTOLIC FAILURE)        ACE[de-identified] None        ARB[de-identified] None         ARNI[de-identified] None    (Beta Blockers)  NON- Evidenced Based Beta Blocker (for EF% >40%/DIASTOLIC FAILURE): None    Evidenced Based Beta Blocker::(REQUIRED for EF% <40%/SYSTOLIC FAILURE) None  . .................................................................................................................................................. Patient's weights and intake/output reviewed: No    Patient's Last Weight: 151.2 lbs obtained by standing scale. Difference of 2 lbs less than last documented weight. Intake/Output Summary (Last 24 hours) at 1/22/2023 0114  Last data filed at 1/21/2023 2102  Gross per 24 hour   Intake --   Output 1375 ml   Net -1375 ml       Education Booklet Provided: no    Comorbidities Reviewed No    Patient has a past medical history of Atrial fibrillation ([address] Blind right eye, Chronic kidney disease, Dementia ([address] Hyperlipidemia, Hypertension, and Pneumonia. >>For CHF and Comorbidity documentation on Education Time and Topics, please see Education Tab    Progressive Mobility Assessment:  What is this patient's Current Level of Mobility?: Ambulatory- with Assistance  How was this patient Mobilized today?: Edge of Bed and Up in Room, ambulated 1 ft                 With Whom?  Nurses Level of Difficulty/Assistance: 2x Assist     Pt resting in bed at this time on room air. Pt denies shortness of breath. Pt without lower extremity edema.      Patient and/or Family's stated Goal of Care this Admission: reduce shortness of breath, increase activity tolerance, better understand heart failure and disease management, and be more comfortable prior to discharge        :

## 2023-01-22 NOTE — H&P
Hospital Medicine History & Physical      PCP: Ewa Ackerman MD    Date of Admission: 1/22/2023    Date of Service: Pt seen/examined on 01/22/23 and Admitted to Inpatient with expected LOS greater than two midnights due to medical therapy. Chief Complaint:  Left chest wall redness, swelling and pain       History Of Present Illness:      82 y.o. male, with past medical history of hypertension, hyperlipidemia, dementia and atrial fibrillation, who was a direct admit from [facility] to [facility] with an infected pacemaker site. History obtained from the patient and review of EMR. The patient stated he was taken to [facility] emergency room by his daughter because he was lost and got himself turned around. He also stated he was taking because he has an infection in his pacemaker. The patient does have a history of dementia and has episodes of confusion. Per EMR, the patient's daughter reported that for the last 2 to 3 days she has noticed the patient's left chest wall pacemaker site has been swollen, red and painful. The patient stated he is unsure how long ago his pacemaker was placed, but EMR reveals that it was placed 5 months ago. The patient's daughter reported that the patient has had some fevers and chills at home. However, the patient has been afebrile and has a negative lactic and leukocytosis. In the emergency room a chest CT was obtained that revealed congestive heart failure. Blood cultures were obtained and the patient was started on vancomycin and cefepime. He was also given morphine for discomfort. Throughout the patient's work-up he was also found to have an NATANAEL with a creatinine of 1.7. His baseline appears to be 1.1. His UA was negative. The patient was ultimately transferred to [facility] for further evaluation and treatment. Pt confused intermittently along with combativeness.   Plan is to transfer to [facility] on Sunday or Monday - currently on wait list for telemetry bed -  procedure for pacemaker removal is Tuesday with Dr. Bonnie Flores. Confusion slightly less with seroquel and pt does need sitter camera, but not restraints. On interview today, he has no complaint and tells me the pain and swelling on the left chest has subsided    Past Medical History:          Diagnosis Date    Atrial fibrillation ([facility] [address]     Blind right eye     Chronic kidney disease     Dementia ([facility] [address]     Hyperlipidemia     Hypertension     Pneumonia        Past Surgical History:          Procedure Laterality Date    ABLATION OF DYSRHYTHMIC FOCUS      APPENDECTOMY      COLONOSCOPY      EYE SURGERY      HERNIA REPAIR      JOINT REPLACEMENT  2013    left shoulder    PACEMAKER PLACEMENT  10/13/2017    Dr Joao Husain at [facility]. single chamber PPM    PACEMAKER PLACEMENT  09/16/2022    biventricular    TONSILLECTOMY      UPPER GASTROINTESTINAL ENDOSCOPY         Medications Prior to Admission:      Prior to Admission medications    Medication Sig Start Date End Date Taking? Authorizing Provider   isosorbide mononitrate (IMDUR) 30 MG extended release tablet Take 1 tablet by mouth daily 1/21/23   Whitney Tran MD   LORazepam (ATIVAN) 2 MG/ML injection Infuse 0.5 mLs intravenously every 4 hours as needed (anxiety) for up to 7 days.  Max Daily Amount: 6 mg 1/21/23 1/28/23  Whitney Tran MD   enoxaparin (LOVENOX) 40 MG/0.4ML Inject 0.4 mLs into the skin daily 1/21/23   Whitney Tran MD   lactobacillus (CULTURELLE) capsule Take 1 capsule by mouth 2 times daily (with meals) 1/21/23   Whitney Tran MD   QUEtiapine (SEROQUEL) 25 MG tablet Take 1 tablet by mouth every morning (before breakfast) 1/22/23   Whitney Tran MD   QUEtiapine (SEROQUEL) 50 MG tablet Take 1 tablet by mouth nightly 1/21/23   Whitney Tran MD   hydrALAZINE (APRESOLINE) 20 MG/ML injection Infuse 0.5 mLs intravenously every 4 hours as needed (SBP greater than 180 mmHg) 1/21/23   Loc De Los Santos MD   amLODIPine (NORVASC) 10 MG tablet Take 1 tablet by mouth daily 1/21/23   Loc De Los Santos MD   polyethylene glycol (GLYCOLAX) 17 g packet Take 17 g by mouth daily as needed for Constipation 1/21/23 2/20/23  Loc De Los Santos MD   labetalol (NORMODYNE;TRANDATE) 5 MG/ML injection Infuse 4 mLs intravenously every 4 hours as needed (Give for systolic blood pressure greater than 351) Give for systolic blood pressure greater than 160 1/21/23   Loc De Los Santos MD   cefepime (MAXIPIME) infusion Infuse 2,000 mg intravenously every 24 hours for 10 days Compound per protocol 1/21/23 1/31/23  Loc De Los Santos MD   tamsulosin [facility]) 0.4 MG capsule Take 1 capsule by mouth daily 1/22/23   Loc De Los Santos MD   vancomycin [facility]) infusion Infuse 1,000 mg intravenously every 24 hours for 10 days Compound per protocol. 1/21/23 1/31/23  Loc De Los Santos MD   memantine (NAMENDA) 5 MG tablet TAKE 1 TABLET BY MOUTH 2 TIMES DAILY 12/5/22   GUS Rangel CNP   atorvastatin (LIPITOR) 40 MG tablet TAKE 1 TABLET BY MOUTH DAILY 11/21/22   Fracisco Valladares MD   risperiDONE (RISPERDAL) 0.25 MG tablet Take 1 tablet by mouth 2 times daily 10/24/22   Fracisco Valladares MD   pantoprazole (PROTONIX) 40 MG tablet Take 1 tablet by mouth daily 10/17/22   Fracisco Valladares MD   mirabegron [facility]) 25 MG TB24 Take 1 tablet by mouth daily 10/17/22   Fracisco Valladares MD   trospium (SANCTURA) 20 MG tablet Take 20 mg by mouth in the morning and 20 mg before bedtime.   8/26/22  Historical Provider, MD   apixaban (ELIQUIS) 5 MG TABS tablet TAKE 1/2 TABLET BY MOUTH 2 TIMES DAILY 8/8/22 8/26/22  Fracisco Valladares MD   metoprolol succinate (TOPROL XL) 25 MG extended release tablet TAKE 3 TABLETS BY MOUTH TWICE DAILY  Patient taking differently: Take 25 mg by mouth in the morning and 25 mg in the evening. 7/25/22 8/26/22  GUS Rangel - CNP   famotidine (PEPCID) 20 MG tablet Take 1 tablet by mouth at bedtime 7/8/22 8/26/22  Oscar Johnson MD   donepezil (ARICEPT) 10 MG tablet Take 1 tablet by mouth daily 3/24/22 8/26/22  Historical Provider, MD   citalopram (CELEXA) 10 MG tablet TAKE 1 TABLET BY MOUTH DAILY 3/28/22   Oscar Johnson MD   Ferrous Sulfate (IRON) 325 (65 Fe) MG TABS Take by mouth daily     Historical Provider, MD   finasteride (PROSCAR) 5 MG tablet Take 5 mg by mouth daily. 3/4/15   Historical Provider, MD   aspirin 81 MG tablet Take 81 mg by mouth daily. Historical Provider, MD       Allergies:  Sulfa antibiotics    Social History:      The patient currently lives at home    TOBACCO:   reports that he quit smoking about 47 years ago. His smoking use included cigarettes. He has a 10.00 pack-year smoking history. He has never used smokeless tobacco.  ETOH:   reports no history of alcohol use. E-cigarette/Vaping       Questions Responses    E-cigarette/Vaping Use Never User    Start Date     Passive Exposure     Quit Date     Counseling Given     Comments               Family History:      Reviewed and negative in regards to presenting illness/complaint. Problem Relation Age of Onset    Heart Disease Mother     Other Mother     Heart Disease Father     Asthma Other        REVIEW OF SYSTEMS COMPLETED:   Pertinent positives as noted in the HPI. All other systems reviewed and negative. PHYSICAL EXAM PERFORMED:    BP (!) 145/87   Pulse 80   Temp 97.6 °F (36.4 °C) (Oral)   Resp 18   SpO2 96%     General appearance:  No apparent distress, appears stated age and cooperative. HEENT:  Normal cephalic, atraumatic without obvious deformity. Pupils equal, round, and reactive to light. Extra ocular muscles intact. Conjunctivae/corneas clear. Neck: Supple, with full range of motion. No jugular venous distention. Trachea midline. Respiratory:  Normal respiratory effort. Clear to auscultation, bilaterally without Rales/Wheezes/Rhonchi.   Cardiovascular:  Regular rate and rhythm with normal S1/S2 without murmurs, rubs or gallops. Abdomen: Soft, non-tender, non-distended with normal bowel sounds. Musculoskeletal:  No clubbing, cyanosis or edema bilaterally. Full range of motion without deformity. Skin: Skin color, texture, turgor normal.  No rashes or lesions. Neurologic:  Neurovascularly intact without any focal sensory/motor deficits. Cranial nerves: II-XII intact, grossly non-focal.  Psychiatric:  Alert and oriented, thought content appropriate, normal insight  Capillary Refill: Brisk,3 seconds, normal  Peripheral Pulses: +2 palpable, equal bilaterally       Labs:     Recent Labs     01/20/23  0514   WBC 7.5   HGB 13.8   HCT 41.5        Recent Labs     01/20/23  0514 01/21/23  0626    140   K 3.4* 3.7    106   CO2 27 22   BUN 19 16   CREATININE 1.2 1.2   CALCIUM 9.3 8.8     No results for input(s): AST, ALT, BILIDIR, BILITOT, ALKPHOS in the last 72 hours. No results for input(s): INR in the last 72 hours. No results for input(s): Denio Pears in the last 72 hours. Urinalysis:      Lab Results   Component Value Date/Time    NITRU Negative 01/19/2023 12:25 PM    [address] 04/21/2022 02:20 PM    BACTERIA 1+ 04/21/2022 02:20 PM    RBCUA 3-4 04/21/2022 02:20 PM    BLOODU Negative 01/19/2023 12:25 PM    SPECGRAV 1.015 01/19/2023 12:25 PM    GLUCOSEU Negative 01/19/2023 12:25 PM               Consults:    IP CONSULT TO CARDIOLOGY  PHARMACY TO DOSE VANCOMYCIN  IP CONSULT TO INFECTIOUS DISEASES    ASSESSMENT:    Pacemaker infection   Dementia with behavioral disturbance  Ischemic cardiomyopathy  Atrial fibrillation  Hyperlipidemia  Hypertension  CAD    Lan Kang was admitted on 1/19/2023 with redness, swelling and pain to pacemaker site on the left chest.   DORITA did not show any vegetations on device leads. Blood cultures have been NGTD.  Rhythm has been AS   He is now transferred to [facility] for device and lead explant with  Mehzad. PLAN:    Pacemaker site infection -plan for pacemaker removal on Monday or Tuesday. Continue with cefepime and vanco - he will need extraction of device and all leads   Acute metabolic encephalopathy in setting of dementia with sundowning - Continued seroquel bid which has been helpful for him and pt did not require restraints, just camera - namenda most likely is not adding any benefit to patient at this point as it only slows the rate of dementia progression  Afib - rate controlled - off of anticoagulation at this time - he is pacemaker dependent so may need temporary placement at time of removal - will be complicated by his dementia  HTN -  BP much better controlled on increased norvasc, and added isosorbide back into regimen -     DVT Prophylaxis: Lovenox  Diet: ADULT DIET; Regular  Diet NPO  Code Status: Full Code    PT/OT Eval Status: PT/OT consult is not ordered    Dispo -admit as inpatient       Nadiya Orellana MD    Thank you Eladio Grimes MD for the opportunity to be involved in this patient's care. If you have any questions or concerns please feel free to contact me at [phone].

## 2023-01-22 NOTE — PROGRESS NOTES
Transport here to pick patient up, BINU and AVS given to them to take to [place]. Patient only belongings are dentures and glasses, and both are on patient at time of leaving. Tele box removed, CMU notified. Peripheral IV in the left antecubital sent with patient. Called unit to give report, RN unable to take report at this time and callback number given. Call placed to Shyam Cordon - Wife and Esmer Lewis -Daughter to notify that patient was leaving but no answer from either number at this time.

## 2023-01-22 NOTE — PROGRESS NOTES
Call received from access center to notify that patient has a bed at [facility] and it is room #1376 and phone number for report is [phone]. Awaiting for call for information on transport time. Patient daughter called to notify of the bed number.

## 2023-01-23 PROBLEM — I49.8 PACEMAKER-DEPENDENT DUE TO NATIVE CARDIAC RHYTHM INSUFFICIENT TO SUPPORT LIFE: Status: ACTIVE | Noted: 2023-01-23

## 2023-01-23 PROBLEM — Z95.0 PACEMAKER-DEPENDENT DUE TO NATIVE CARDIAC RHYTHM INSUFFICIENT TO SUPPORT LIFE: Status: ACTIVE | Noted: 2023-01-23

## 2023-01-23 LAB
ABO/RH: NORMAL
ANION GAP SERPL CALCULATED.3IONS-SCNC: 8 MMOL/L (ref 3–16)
ANTIBODY SCREEN: NORMAL
BASOPHILS ABSOLUTE: 0 K/UL (ref 0–0.2)
BASOPHILS RELATIVE PERCENT: 0.4 %
BLOOD CULTURE, ROUTINE: NORMAL
BUN BLDV-MCNC: 19 MG/DL (ref 7–20)
C-REACTIVE PROTEIN: 5 MG/L (ref 0–5.1)
CALCIUM SERPL-MCNC: 9.2 MG/DL (ref 8.3–10.6)
CHLORIDE BLD-SCNC: 104 MMOL/L (ref 99–110)
CO2: 31 MMOL/L (ref 21–32)
CREAT SERPL-MCNC: 1 MG/DL (ref 0.8–1.3)
CULTURE, BLOOD 2: NORMAL
EKG ATRIAL RATE: 68 BPM
EKG DIAGNOSIS: NORMAL
EKG Q-T INTERVAL: 460 MS
EKG QRS DURATION: 134 MS
EKG QTC CALCULATION (BAZETT): 530 MS
EKG R AXIS: 142 DEGREES
EKG T AXIS: 76 DEGREES
EKG VENTRICULAR RATE: 80 BPM
EOSINOPHILS ABSOLUTE: 0.2 K/UL (ref 0–0.6)
EOSINOPHILS RELATIVE PERCENT: 2.5 %
GFR SERPL CREATININE-BSD FRML MDRD: >60 ML/MIN/{1.73_M2}
GLUCOSE BLD-MCNC: 87 MG/DL (ref 70–99)
HCT VFR BLD CALC: 42.5 % (ref 40.5–52.5)
HEMOGLOBIN: 14 G/DL (ref 13.5–17.5)
LYMPHOCYTES ABSOLUTE: 1.1 K/UL (ref 1–5.1)
LYMPHOCYTES RELATIVE PERCENT: 15.1 %
MAGNESIUM: 1.8 MG/DL (ref 1.8–2.4)
MCH RBC QN AUTO: 28.5 PG (ref 26–34)
MCHC RBC AUTO-ENTMCNC: 32.9 G/DL (ref 31–36)
MCV RBC AUTO: 86.8 FL (ref 80–100)
MONOCYTES ABSOLUTE: 0.8 K/UL (ref 0–1.3)
MONOCYTES RELATIVE PERCENT: 10.1 %
NEUTROPHILS ABSOLUTE: 5.5 K/UL (ref 1.7–7.7)
NEUTROPHILS RELATIVE PERCENT: 71.9 %
PDW BLD-RTO: 15.7 % (ref 12.4–15.4)
PLATELET # BLD: 196 K/UL (ref 135–450)
PMV BLD AUTO: 8 FL (ref 5–10.5)
POTASSIUM SERPL-SCNC: 4.3 MMOL/L (ref 3.5–5.1)
RBC # BLD: 4.9 M/UL (ref 4.2–5.9)
SEDIMENTATION RATE, ERYTHROCYTE: 4 MM/HR (ref 0–20)
SODIUM BLD-SCNC: 143 MMOL/L (ref 136–145)
VANCOMYCIN RANDOM: 10.1 UG/ML
WBC # BLD: 7.6 K/UL (ref 4–11)

## 2023-01-23 PROCEDURE — 2580000003 HC RX 258: Performed by: INTERNAL MEDICINE

## 2023-01-23 PROCEDURE — 80202 ASSAY OF VANCOMYCIN: CPT

## 2023-01-23 PROCEDURE — 6370000000 HC RX 637 (ALT 250 FOR IP): Performed by: INTERNAL MEDICINE

## 2023-01-23 PROCEDURE — 86900 BLOOD TYPING SEROLOGIC ABO: CPT

## 2023-01-23 PROCEDURE — 36415 COLL VENOUS BLD VENIPUNCTURE: CPT

## 2023-01-23 PROCEDURE — 6360000002 HC RX W HCPCS: Performed by: INTERNAL MEDICINE

## 2023-01-23 PROCEDURE — 6360000002 HC RX W HCPCS: Performed by: NURSE PRACTITIONER

## 2023-01-23 PROCEDURE — 80048 BASIC METABOLIC PNL TOTAL CA: CPT

## 2023-01-23 PROCEDURE — 99223 1ST HOSP IP/OBS HIGH 75: CPT | Performed by: INTERNAL MEDICINE

## 2023-01-23 PROCEDURE — 86140 C-REACTIVE PROTEIN: CPT

## 2023-01-23 PROCEDURE — 83735 ASSAY OF MAGNESIUM: CPT

## 2023-01-23 PROCEDURE — 86901 BLOOD TYPING SEROLOGIC RH(D): CPT

## 2023-01-23 PROCEDURE — 86850 RBC ANTIBODY SCREEN: CPT

## 2023-01-23 PROCEDURE — 85652 RBC SED RATE AUTOMATED: CPT

## 2023-01-23 PROCEDURE — 1200000000 HC SEMI PRIVATE

## 2023-01-23 PROCEDURE — 85025 COMPLETE CBC W/AUTO DIFF WBC: CPT

## 2023-01-23 PROCEDURE — 2060000000 HC ICU INTERMEDIATE R&B

## 2023-01-23 PROCEDURE — 86923 COMPATIBILITY TEST ELECTRIC: CPT

## 2023-01-23 RX ORDER — DIAZEPAM 5 MG/ML
5 INJECTION, SOLUTION INTRAMUSCULAR; INTRAVENOUS ONCE
Status: COMPLETED | OUTPATIENT
Start: 2023-01-23 | End: 2023-01-23

## 2023-01-23 RX ORDER — WATER 1000 ML/1000ML
INJECTION, SOLUTION INTRAVENOUS
Status: DISPENSED
Start: 2023-01-23 | End: 2023-01-23

## 2023-01-23 RX ADMIN — FERROUS SULFATE TAB 325 MG (65 MG ELEMENTAL FE) 325 MG: 325 (65 FE) TAB at 09:24

## 2023-01-23 RX ADMIN — Medication 10 ML: at 23:51

## 2023-01-23 RX ADMIN — RISPERIDONE 0.25 MG: 0.25 TABLET, FILM COATED ORAL at 09:25

## 2023-01-23 RX ADMIN — QUETIAPINE FUMARATE 25 MG: 25 TABLET ORAL at 06:49

## 2023-01-23 RX ADMIN — DIAZEPAM 5 MG: 5 INJECTION, SOLUTION INTRAMUSCULAR; INTRAVENOUS at 21:28

## 2023-01-23 RX ADMIN — SODIUM CHLORIDE, PRESERVATIVE FREE 10 ML: 5 INJECTION INTRAVENOUS at 06:53

## 2023-01-23 RX ADMIN — CITALOPRAM HYDROBROMIDE 10 MG: 20 TABLET ORAL at 09:24

## 2023-01-23 RX ADMIN — Medication 10 ML: at 09:26

## 2023-01-23 RX ADMIN — SODIUM CHLORIDE: 9 INJECTION, SOLUTION INTRAVENOUS at 06:55

## 2023-01-23 RX ADMIN — FINASTERIDE 5 MG: 5 TABLET, FILM COATED ORAL at 09:24

## 2023-01-23 RX ADMIN — Medication 1 CAPSULE: at 16:02

## 2023-01-23 RX ADMIN — TAMSULOSIN HYDROCHLORIDE 0.4 MG: 0.4 CAPSULE ORAL at 09:24

## 2023-01-23 RX ADMIN — TROSPIUM CHLORIDE 20 MG: 20 TABLET, FILM COATED ORAL at 20:08

## 2023-01-23 RX ADMIN — VANCOMYCIN HYDROCHLORIDE 1000 MG: 1 INJECTION, POWDER, LYOPHILIZED, FOR SOLUTION INTRAVENOUS at 06:56

## 2023-01-23 RX ADMIN — Medication 1 CAPSULE: at 09:23

## 2023-01-23 RX ADMIN — RISPERIDONE 0.25 MG: 0.25 TABLET, FILM COATED ORAL at 20:08

## 2023-01-23 RX ADMIN — ATORVASTATIN CALCIUM 40 MG: 40 TABLET, FILM COATED ORAL at 20:08

## 2023-01-23 RX ADMIN — AMLODIPINE BESYLATE 10 MG: 5 TABLET ORAL at 09:23

## 2023-01-23 RX ADMIN — ISOSORBIDE MONONITRATE 30 MG: 30 TABLET, EXTENDED RELEASE ORAL at 09:23

## 2023-01-23 RX ADMIN — MEMANTINE 5 MG: 5 TABLET ORAL at 09:23

## 2023-01-23 RX ADMIN — PANTOPRAZOLE SODIUM 40 MG: 40 TABLET, DELAYED RELEASE ORAL at 09:23

## 2023-01-23 RX ADMIN — ENOXAPARIN SODIUM 40 MG: 100 INJECTION SUBCUTANEOUS at 20:08

## 2023-01-23 RX ADMIN — ONDANSETRON 4 MG: 2 INJECTION INTRAMUSCULAR; INTRAVENOUS at 09:32

## 2023-01-23 RX ADMIN — QUETIAPINE FUMARATE 50 MG: 25 TABLET ORAL at 20:08

## 2023-01-23 RX ADMIN — CEFEPIME 2000 MG: 2 INJECTION, POWDER, FOR SOLUTION INTRAVENOUS at 09:29

## 2023-01-23 RX ADMIN — ASPIRIN 81 MG: 81 TABLET, COATED ORAL at 09:24

## 2023-01-23 RX ADMIN — MEMANTINE 5 MG: 5 TABLET ORAL at 20:08

## 2023-01-23 ASSESSMENT — ENCOUNTER SYMPTOMS
CONSTIPATION: 0
SORE THROAT: 0
NAUSEA: 0
WHEEZING: 0
EYE DISCHARGE: 0
SINUS PAIN: 0
SHORTNESS OF BREATH: 0
ABDOMINAL PAIN: 0
EYE REDNESS: 0
SINUS PRESSURE: 0
RHINORRHEA: 0
COUGH: 0
BACK PAIN: 0
DIARRHEA: 0

## 2023-01-23 ASSESSMENT — PAIN SCALES - GENERAL
PAINLEVEL_OUTOF10: 0
PAINLEVEL_OUTOF10: 0

## 2023-01-23 NOTE — PROGRESS NOTES
Hospitalist Progress Note      PCP: Forrest Arellano MD    Date of Admission: 1/22/2023    Chief Complaint: Direct admit from [facility] with infected pacemaker    Hospital Course:  82 y.o. male, with past medical history of hypertension, hyperlipidemia, dementia and atrial fibrillation, who was a direct admit from [facility] to [facility] with an infected pacemaker site. Admitted as inpatient for further management. Started on IV Cefepime and Vanco.  Followed by EP and ID. EP to proceed with explant. Subjective:  Patient with pain pacemaker. No CP, SOB, HA or abdominal pain. No fevers.        Medications:  Reviewed    Infusion Medications    sodium chloride 10 mL/hr at 01/23/23 0655     Scheduled Medications    [START ON 1/24/2023] vancomycin  1,250 mg IntraVENous Q24H    aspirin  81 mg Oral Daily    isosorbide mononitrate  30 mg Oral Daily    citalopram  10 mg Oral Daily    atorvastatin  40 mg Oral Nightly    lactobacillus  1 capsule Oral BID WC    trospium  20 mg Oral Nightly    pantoprazole  40 mg Oral Daily    memantine  5 mg Oral BID    finasteride  5 mg Oral Daily    tamsulosin  0.4 mg Oral Daily    ferrous sulfate  325 mg Oral Daily    amLODIPine  10 mg Oral Daily    QUEtiapine  50 mg Oral Nightly    QUEtiapine  25 mg Oral QAM AC    risperiDONE  0.25 mg Oral BID    cefepime  2,000 mg IntraVENous Q24H    sodium chloride flush  5-40 mL IntraVENous 2 times per day    enoxaparin  40 mg SubCUTAneous Nightly    OLANZapine  5 mg IntraMUSCular Once     PRN Meds: sodium chloride flush, sodium chloride, ondansetron **OR** ondansetron, polyethylene glycol, acetaminophen **OR** acetaminophen      Intake/Output Summary (Last 24 hours) at 1/23/2023 1659  Last data filed at 1/23/2023 0929  Gross per 24 hour   Intake 250 ml   Output --   Net 250 ml       Physical Exam Performed:    /78   Pulse 80   Temp 97.4 °F (36.3 °C) (Oral)   Resp 18   Ht 5' 6\" (1.676 m)   Wt 150 lb 8 oz (68.3 kg) SpO2 94%   BMI 24.29 kg/m²     General appearance: No apparent distress, appears stated age and cooperative. HEENT: Pupils equal, round, and reactive to light. Conjunctivae/corneas clear. Neck: Supple, with full range of motion. No jugular venous distention. Trachea midline. Respiratory:  Normal respiratory effort. Clear to auscultation, bilaterally without Rales/Wheezes/Rhonchi. Cardiovascular: Regular rate and rhythm with normal S1/S2 without murmurs, rubs or gallops. L PPM with erythema, swelling and tenderness to palpation  Abdomen: Soft, non-tender, non-distended with normal bowel sounds. Musculoskeletal: No clubbing, cyanosis or edema bilaterally. Full range of motion without deformity. Skin: Skin color, texture, turgor normal.  No rashes or lesions. Neurologic:  Neurovascularly intact without any focal sensory/motor deficits. Cranial nerves: II-XII intact, grossly non-focal.  Psychiatric: Alert and oriented to self, not time and place  Capillary Refill: Brisk, 3 seconds, normal   Peripheral Pulses: +2 palpable, equal bilaterally       Labs:   Recent Labs     01/23/23  0441   WBC 7.6   HGB 14.0   HCT 42.5        Recent Labs     01/21/23  0626 01/23/23  0441    143   K 3.7 4.3    104   CO2 22 31   BUN 16 19   CREATININE 1.2 1.0   CALCIUM 8.8 9.2     No results for input(s): AST, ALT, BILIDIR, BILITOT, ALKPHOS in the last 72 hours. No results for input(s): INR in the last 72 hours. No results for input(s): Ivory Risk in the last 72 hours.     Urinalysis:      Lab Results   Component Value Date/Time    NITRU Negative 01/19/2023 12:25 PM    [address] 04/21/2022 02:20 PM    BACTERIA 1+ 04/21/2022 02:20 PM    RBCUA 3-4 04/21/2022 02:20 PM    BLOODU Negative 01/19/2023 12:25 PM    SPECGRAV 1.015 01/19/2023 12:25 PM    GLUCOSEU Negative 01/19/2023 12:25 PM       Radiology:  No orders to display       IP CONSULT TO CARDIOLOGY  PHARMACY TO DOSE VANCOMYCIN  IP CONSULT TO INFECTIOUS DISEASES    Assessment/Plan:    Active Hospital Problems    Diagnosis     Pacemaker-dependent due to native cardiac rhythm insufficient to support life [I49.8, Z95.0]      Priority: Medium    Infection of pacemaker pocket ([facility] [address] [T82. 7XXA]      Priority: Medium    Atrial fibrillation, rapid ([facility] [address] [I48.91]      Priority: Medium    Ischemic cardiomyopathy [I25.5]      Priority: Medium    Dementia with behavioral disturbance [F03.918]      Priority: Medium    Coronary artery disease involving native heart without angina pectoris [I25.10]     A-fib ([facility] [address] [I48.91]     Hypertension [I10]     Hyperlipidemia [E78.5]      Infected pacemaker  Continue IV Vanco and Cefepime  For explant on 1/24/23  EP input appreciated  ID input appreciated  DORITA planned on 1/24/23 for lead extraction  2. Afib   - May need temporary pacemaker after explant  3. HTN   - Continue Norvasc and Imdur  4. Dementia   - Continue Namenda and Seroquel  5. BPH   - Continue Finasteride    DVT Prophylaxis: Lovenox  Diet: ADULT DIET; Easy to Chew  Diet NPO  Code Status: Full Code  PT/OT Eval Status: Requested    Dispo - Unclear    Discussed with patient, nursing and CM. Will need pacemaker explant. May need SNF upon DC.       Cris Bland MD

## 2023-01-23 NOTE — CONSULTS
Infectious Diseases   Consult Note        Admission Date: 1/22/2023  Hospital Day: Hospital Day: 2   Attending: Jay Borges MD  Date of service: 1/23/23     Reason for admission: Pacemaker infection, initial encounter ([facility]. 7XXA]    Chief complaint/ Reason for consult: Pacemaker pocket infection    Microbiology:        I have reviewed allavailable micro lab data and cultures    Blood culture (2/2) - collected on 1/19/2023: Negative      Antibiotics and immunizations:       Current antibiotics: All antibiotics and their doses were reviewed by me    Recent Abx Admin                     cefepime (MAXIPIME) 2000 mg IVPB minibag (mg) 2,000 mg New Bag 01/23/23 0929    vancomycin 1000 mg IVPB in 250 mL D5W addavial (mg) 1,000 mg New Bag 01/23/23 0656                      Immunization History: All immunization history was reviewed by me today. Immunization History   Administered Date(s) Administered    Influenza A (Z1R0-92) Vaccine PF IM 11/19/2009    Pneumococcal Conjugate 13-valent ([id number]) 08/25/2015    Pneumococcal Conjugate 7-valent (Prevnar7) 02/27/2013    Pneumococcal Polysaccharide ([id number]) 03/09/2010, 02/27/2013, 06/26/2019       Known drug allergies: All allergies were reviewed and updated    Allergies   Allergen Reactions    Sulfa Antibiotics        Social history:     Social History:  All social andepidemiologic history was reviewed and updated by me today as needed. Tobacco use:   reports that he quit smoking about 47 years ago. His smoking use included cigarettes. He has a 10.00 pack-year smoking history. He has never used smokeless tobacco.  Alcohol use:   reports no history of alcohol use. Currently lives in: [place] [address]   reports no history of drug use.      COVID VACCINATION AND LAB RESULT RECORDS:     Internal Administration   First Dose      Second Dose           Last COVID Lab SARS-CoV-2 (no units)   Date Value   05/15/2020 NOT DETECTED     SARS-CoV-2 RNA, RT PCR (no units)   Date Value   04/21/2022 NOT DETECTED     SARS-CoV-2, MICHELLE (no units)   Date Value   08/09/2022 NEGATIVE     SARS-CoV-2, NAAT (no units)   Date Value   08/14/2022 Not Detected            Assessment:     The patient is a 82 y.o. old male who  has a past medical history of Atrial fibrillation ([address] Blind right eye, Chronic kidney disease, Dementia ([address] Hyperlipidemia, Hypertension, and Pneumonia. with following problems:    Pacemaker pocket site infection  Recent acute kidney injury  History of dementia  Essential hypertension  Mixed hyperlipidemia  History of atrial fibrillation      Discussion:      The patient had 2 sets of blood cultures done on January 19 at [facility] and had a transesophageal echo done on January 20. The blood cultures are negative and DORITA did not show any vegetations. The patient has been on empiric IV vancomycin and IV cefepime since January 19. There is no open wound at the pacemaker pocket site but the area has redness and induration    Plan:     Diagnostic Workup: Will order sed rate and CRP  Follow-up on blood cultures from January 19  Continue to follow fever curve, WBC count and blood cultures  Follow up on liverand renal functions closely    Antimicrobials: Will continue empiric IV vancomycin for empiric gram-positive coverage  Check Vancomycin trough before the 5th dose. Target vancomycin trough level of 15-20  mg/L or vancomycin area under curve (AUC) of 400-600 mg*h/L by Bayesian modeling method. If dosing vancomycin based on trough levels, keep vancomycin trough below 20 at all times. Avoid increasing the dose of vancomycin above a total of 4 grams in a 24-hour period in patients younger than 45 years and above 3 grams in a 24-hour period in a patient of age 39 years or older. Continue to monitor serum creatinine and Vanco levels closely, while the patient is on I/v Vancomycin.    Continue IV cefepime 2 g every 12 hour for empiric gram-negative coverage  Will follow up electrophysiology input  We will follow up on the culture results and clinical progress and will make further recommendations accordingly. Continue close vitals monitoring. Maintain good glycemic control. Fall precautions. Aspiration precautions. Continue to watch for new fever or diarrhea. DVT prophylaxis. Discussed all above with patient and RN. Drug Monitoring:    Continue serial monitoring for antibiotic toxicity as follows: Vancomycin trough, CBC, CMP  Continue to watch for following: new or worsening fever, hypotension, hives, lip swelling and redness or purulence at vascular access sites. I/v access Management:    Continue to monitor i.v access sites for erythema, induration, discharge or tenderness. As always, continue efforts to minimizetubes/lines/drains as clinically appropriate to reduce chances of line associated infections. Current isolation precautions: There are no current isolations documented for this patient. Level of complexity of visit and medical decision making: High     Risk of Complications/Morbidity: High     Illness(es)/ Infection present that pose threat to life/bodily function. There is potential for severe exacerbation of infection/side effects of treatment. Therapy requires intensive monitoring for antimicrobial agent toxicity. Thank you for involving me in the care of your patient. I will continue to follow. If you have any additional questions, please do not hesitate to contact me. Subjective:     Presenting complaint in ER:     No chief complaint on file. HPI: Jaycee Geller is a 82 y.o. male patient, who was seen at the request of Dr. Matty An MD.    History was obtained from chart review and the patient. The patient was admitted on 1/22/2023. I have been consulted to see the patient for above mentioned reason(s).     The patient has multiple medical comorbidities, and presented [facility] the ER at [facility] on January 19 for redness, swelling and tenderness at the pacemaker site. He had a pacemaker placement done around 5 months ago and was having increasing redness and tenderness in that area along with fevers and chills. The patient was admitted. 2 sets of blood cultures were done which were negative and patient was transferred by hospitalist at [facility] on January 22 to [facility]. Patient was receiving empiric IV vancomycin and IV cefepime at [facility]. IV vancomycin and cefepime have been continued by the hospitalist team here. Electrophysiology has been consulted    I have been asked for my opinion for management for this patient. Past Medical History: All past medical history reviewed today. Past Medical History:   Diagnosis Date    Atrial fibrillation ([address]     Blind right eye     Chronic kidney disease     Dementia ([facility]     Hyperlipidemia     Hypertension     Pneumonia          Past Surgical History: All pastsurgical history was reviewed today. Past Surgical History:   Procedure Laterality Date    ABLATION OF DYSRHYTHMIC FOCUS      APPENDECTOMY      COLONOSCOPY      EYE SURGERY      HERNIA REPAIR      JOINT REPLACEMENT  2013    left shoulder    PACEMAKER PLACEMENT  10/13/2017    Dr Stefany Key at [facility]. single chamber PPM    PACEMAKER PLACEMENT  09/16/2022    biventricular    TONSILLECTOMY      UPPER GASTROINTESTINAL ENDOSCOPY           Family History: All family history was reviewed today. Problem Relation Age of Onset    Heart Disease Mother     Other Mother     Heart Disease Father     Asthma Other          Medications: All current and past medications were reviewed. Medications Prior to Admission: isosorbide mononitrate (IMDUR) 30 MG extended release tablet, Take 1 tablet by mouth daily  LORazepam (ATIVAN) 2 MG/ML injection, Infuse 0.5 mLs intravenously every 4 hours as needed (anxiety) for up to 7 days. Max Daily Amount: 6 mg  enoxaparin (LOVENOX) 40 MG/0.4ML, Inject 0.4 mLs into the skin daily  lactobacillus (CULTURELLE) capsule, Take 1 capsule by mouth 2 times daily (with meals)  QUEtiapine (SEROQUEL) 25 MG tablet, Take 1 tablet by mouth every morning (before breakfast)  QUEtiapine (SEROQUEL) 50 MG tablet, Take 1 tablet by mouth nightly  hydrALAZINE (APRESOLINE) 20 MG/ML injection, Infuse 0.5 mLs intravenously every 4 hours as needed (SBP greater than 180 mmHg)  amLODIPine (NORVASC) 10 MG tablet, Take 1 tablet by mouth daily  polyethylene glycol (GLYCOLAX) 17 g packet, Take 17 g by mouth daily as needed for Constipation  labetalol (NORMODYNE;TRANDATE) 5 MG/ML injection, Infuse 4 mLs intravenously every 4 hours as needed (Give for systolic blood pressure greater than 483) Give for systolic blood pressure greater than 160  cefepime (MAXIPIME) infusion, Infuse 2,000 mg intravenously every 24 hours for 10 days Compound per protocol  tamsulosin (FLOMAX) 0.4 MG capsule, Take 1 capsule by mouth daily  vancomycin (VANCOCIN) infusion, Infuse 1,000 mg intravenously every 24 hours for 10 days Compound per protocol. memantine (NAMENDA) 5 MG tablet, TAKE 1 TABLET BY MOUTH 2 TIMES DAILY  atorvastatin (LIPITOR) 40 MG tablet, TAKE 1 TABLET BY MOUTH DAILY (Patient taking differently: Take 40 mg by mouth at bedtime)  risperiDONE (RISPERDAL) 0.25 MG tablet, Take 1 tablet by mouth 2 times daily  pantoprazole (PROTONIX) 40 MG tablet, Take 1 tablet by mouth daily  mirabegron (MYRBETRIQ) 25 MG TB24, Take 1 tablet by mouth daily  citalopram (CELEXA) 10 MG tablet, TAKE 1 TABLET BY MOUTH DAILY  Ferrous Sulfate (IRON) 325 (65 Fe) MG TABS, Take by mouth daily   finasteride (PROSCAR) 5 MG tablet, Take 5 mg by mouth daily. aspirin 81 MG tablet, Take 81 mg by mouth daily.        sterile water        [START ON 1/24/2023] vancomycin  1,250 mg IntraVENous Q24H    aspirin  81 mg Oral Daily    isosorbide mononitrate  30 mg Oral Daily    citalopram 10 mg Oral Daily    atorvastatin  40 mg Oral Nightly    lactobacillus  1 capsule Oral BID WC    trospium  20 mg Oral Nightly    pantoprazole  40 mg Oral Daily    memantine  5 mg Oral BID    finasteride  5 mg Oral Daily    tamsulosin  0.4 mg Oral Daily    ferrous sulfate  325 mg Oral Daily    amLODIPine  10 mg Oral Daily    QUEtiapine  50 mg Oral Nightly    QUEtiapine  25 mg Oral QAM AC    risperiDONE  0.25 mg Oral BID    cefepime  2,000 mg IntraVENous Q24H    sodium chloride flush  5-40 mL IntraVENous 2 times per day    enoxaparin  40 mg SubCUTAneous Nightly    OLANZapine  5 mg IntraMUSCular Once          REVIEW OF SYSTEMS:       Review of Systems   Constitutional:  Positive for fatigue. Negative for chills, diaphoresis and fever. HENT:  Negative for ear discharge, ear pain, postnasal drip, rhinorrhea, sinus pressure, sinus pain and sore throat. Eyes:  Negative for discharge and redness. Respiratory:  Negative for cough, shortness of breath and wheezing. Cardiovascular:  Negative for chest pain and leg swelling. Gastrointestinal:  Negative for abdominal pain, constipation, diarrhea and nausea. Endocrine: Negative for cold intolerance, heat intolerance and polydipsia. Genitourinary:  Negative for dysuria, flank pain, frequency, hematuria and urgency. Musculoskeletal:  Negative for back pain and myalgias. Skin:  Negative for rash. Redness and tenderness at the pacemaker site   Allergic/Immunologic: Negative for immunocompromised state. Neurological:  Negative for dizziness, seizures and headaches. Hematological:  Does not bruise/bleed easily. Psychiatric/Behavioral:  Negative for agitation, hallucinations and suicidal ideas. The patient is not nervous/anxious. All other systems reviewed and are negative.        Objective:       PHYSICAL EXAM:      Vitals:   Vitals:    01/23/23 0449 01/23/23 0503 01/23/23 0718 01/23/23 0837   BP: (!) 162/80 (!) 152/91 (!) 148/88    Pulse: 80  81 Resp: 16  17    Temp: 98.2 °F (36.8 °C)  97.7 °F (36.5 °C)    TempSrc: Oral  Oral    SpO2: 93%  96%    Weight:    150 lb 8 oz (68.3 kg)   Height:           Physical Exam  Vitals and nursing note reviewed. Constitutional:       Appearance: He is well-developed. He is not diaphoretic. Comments: The patient was seen earlier today. HENT:      Head: Normocephalic and atraumatic. Right Ear: External ear normal. There is no impacted cerumen. Left Ear: External ear normal. There is no impacted cerumen. Nose: Nose normal.      Mouth/Throat:      Mouth: Mucous membranes are moist.      Pharynx: Oropharynx is clear. No oropharyngeal exudate. Eyes:      General: No scleral icterus. Right eye: No discharge. Left eye: No discharge. Conjunctiva/sclera: Conjunctivae normal.      Pupils: Pupils are equal, round, and reactive to light. Neck:      Thyroid: No thyromegaly. Cardiovascular:      Rate and Rhythm: Normal rate and regular rhythm. Heart sounds: Normal heart sounds. No murmur heard. No friction rub. Pulmonary:      Effort: No respiratory distress. Breath sounds: No stridor. No wheezing or rales. Abdominal:      General: Bowel sounds are normal.      Palpations: Abdomen is soft. Tenderness: There is no abdominal tenderness. There is no guarding or rebound. Musculoskeletal:         General: Swelling and tenderness present. No deformity. Normal range of motion. Cervical back: Normal range of motion and neck supple. Right lower leg: No edema. Left lower leg: No edema. Lymphadenopathy:      Cervical: No cervical adenopathy. Skin:     General: Skin is warm and dry. Coloration: Skin is not jaundiced. Findings: Erythema present. No bruising or rash. Comments: Pacemaker pocket site on the left side of the chest, no open wound   Neurological:      General: No focal deficit present.       Mental Status: He is alert and oriented to person, place, and time. Mental status is at baseline. Motor: No abnormal muscle tone. Psychiatric:         Mood and Affect: Mood normal.         Behavior: Behavior normal.         Lines and drains: All vascular access sites are healthy with no local erythema, discharge or tenderness. Intake and output:     I/O last 3 completed shifts: In: 10 [I.V.:10]  Out: -     Lab Data:   All available labs were reviewed by me today. CBC:   Recent Labs     01/23/23  0441   WBC 7.6   RBC 4.90   HGB 14.0   HCT 42.5      MCV 86.8   MCH 28.5   MCHC 32.9   RDW 15.7*        BMP:  Recent Labs     01/21/23  0626 01/23/23  0441    143   K 3.7 4.3    104   CO2 22 31   BUN 16 19   CREATININE 1.2 1.0   CALCIUM 8.8 9.2   GLUCOSE 97 87        Hepatic FunctionPanel:   Lab Results   Component Value Date/Time    ALKPHOS 99 01/19/2023 10:17 AM    ALT 10 01/19/2023 10:17 AM    AST 18 01/19/2023 10:17 AM    PROT 7.0 01/19/2023 10:17 AM    PROT 7.6 01/09/2013 10:00 AM    BILITOT 1.0 01/19/2023 10:17 AM    LABALBU 4.0 01/19/2023 10:17 AM       CPK: No results found for: CKTOTAL  ESR: No results found for: SEDRATE  CRP: No results found for: CRP      Imaging: All pertinent images and reports for the current visit were reviewed by me during this visit. I reviewed the chest x-ray/CT scan/MRI images and independently interpreted the findings and results today. No orders to display       Outside records:    Labs, Microbiology, Radiology and pertinent results from Care everywhere, if available, were reviewed as a part ofthe consultation.       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    Hyperlipidemia E78.5    Hypertension I10    Chronic kidney disease, stage III (moderate) (HCC) N18.30    A-fib (HCC) I48.91    Cardiac resynchronization therapy pacemaker (CRT-P) in place Z95.0 Abnormal echocardiogram R93.1    Dizziness R42    SOB (shortness of breath) R06.02    Abnormal cardiovascular stress test R94.39    Coronary artery disease involving native heart without angina pectoris I25.10    Other chest pain R07.89    Syncope and collapse R55    COVID U07.1    Dementia with behavioral disturbance F03.918    Hallucinations R44.3    Syncope, unspecified syncope type R55    Ischemic cardiomyopathy I25.5    Atrial fibrillation, rapid (HCC) I48.91    Orthostatic hypotension I95.1    Pacemaker infection ([address] T82. 7XXA         Please note that this chart was generated using Dragon dictation software. Although every effort was made to ensure the accuracy of this automated transcription, some errors in transcription may have occurred inadvertently. If you may need any clarification, please do not hesitate to contact me through EPIC or at the phone number provided below with my electronic signature. Any pictures or media included in this note were obtained after taking informed verbal consent from the patient and with their approval to include those in the patient's medical record. Isma Daniel MD, MPH, FACP, FIDSA  1/23/2023, 10:15 AM  [facility] Infectious Disease   [address] [place] [address], [address] [place] [address]  Office: [phone]  Fax: [phone]  In-person Clinic days:  Tuesday & Thursday a.m. Virtual clinic days: Monday, Wednesday & Friday a.m.

## 2023-01-23 NOTE — DISCHARGE INSTR - COC
Continuity of Care Form    Patient Name: Truett Dubin   :  1935  MRN:  [id number]    Admit date:  2023  Discharge date:  2023    Code Status Order: Full Code   Advance Directives:     Admitting Physician:  Andre Montiel MD  PCP: Adriano Sanchez MD    Discharging Nurse: [facility] Unit/Room#: 1FY-3745/2580-10  Discharging Unit Phone Number: [phone]    Emergency Contact:   Extended Emergency Contact Information  Primary Emergency Contact:   Address: Felicia Rodriguez. [address] [place] [address], [address], [address] [place] [address]  Phone: [phone]  Mobile Phone: [phone]  Relation: Spouse   needed? No  Secondary Emergency Contact: Cristina Vanegas [place] of [address]  Phone: [phone]  Mobile Phone: [phone]  Relation: Child   needed?  No    Past Surgical History:  Past Surgical History:   Procedure Laterality Date    ABLATION OF DYSRHYTHMIC FOCUS      APPENDECTOMY      COLONOSCOPY      EYE SURGERY      HERNIA REPAIR      JOINT REPLACEMENT      left shoulder    PACEMAKER PLACEMENT  10/13/2017    Dr Chucky Monroe at [facility] 19. 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    Hyperlipidemia E78.5    Hypertension I10    Chronic kidney disease, stage III (moderate) (HCC) N18.30    A-fib ([address] I48.91    Cardiac resynchronization therapy pacemaker (CRT-P) in place Z95.0    Abnormal echocardiogram R93.1    Dizziness R42    SOB (shortness of breath) R06.02    Abnormal cardiovascular stress test R94.39    Coronary artery disease involving native heart without angina pectoris I25.10    Other chest pain R07.89    Syncope and collapse R55    COVID U07.1    Dementia with behavioral disturbance F03.918    Hallucinations R44.3    Syncope, unspecified syncope type R55    Ischemic cardiomyopathy I25.5    Atrial fibrillation, rapid (HCC) I48.91    Orthostatic hypotension I95.1    Pacemaker infection ([address] T82. 7XXA       Isolation/Infection:   Isolation            No Isolation          Patient Infection Status       Infection Onset Added Last Indicated Last Indicated By Review Planned Expiration Resolved Resolved By    None active    Resolved    COVID-19 (Rule Out) 22 COVID-19 & Influenza Combo (Ordered)   22 Rule-Out Test Resulted    COVID-19 22 COVID-19, Rapid   22     COVID-19 (Rule Out) 22 COVID-19, Rapid (Ordered)   22 Rule-Out Test Resulted            Nurse Assessment:  Last Vital Signs: BP (!) 148/88   Pulse 81   Temp 97.7 °F (36.5 °C) (Oral)   Resp 17   Ht 5' 6\" (1.676 m)   Wt 150 lb 8 oz (68.3 kg)   SpO2 96%   BMI 24.29 kg/m²     Last documented pain score (0-10 scale): Pain Level: 0  Last Weight:   Wt Readings from Last 1 Encounters:   23 150 lb 8 oz (68.3 kg)     Mental Status:  disoriented and alert    IV Access:  - None    Nursing Mobility/ADLs:  Walking   Assisted  Transfer  Assisted  Bathing  Assisted  Dressing  Assisted  Toileting  Assisted  Feeding  Assisted  Med Admin  Assisted  Med Delivery   whole    Wound Care Documentation and Therapy:  Wound 22 Head Upper;Posterior;Right (Active)   Number of days: 161        Elimination:  Continence:    Bowel: No  Bladder: No  Urinary Catheter: None Colostomy/Ileostomy/Ileal Conduit: No       Date of Last BM: 1-    Intake/Output Summary (Last 24 hours) at 1/23/2023 0916  Last data filed at 1/22/2023 2037  Gross per 24 hour   Intake 10 ml   Output --   Net 10 ml     I/O last 3 completed shifts: In: 10 [I.V.:10]  Out: -     Safety Concerns: At Risk for Falls    Impairments/Disabilities:      Vision right eye prosthetic     Nutrition Therapy:  Current Nutrition Therapy:   - Oral Diet:  General    Routes of Feeding: Oral  Liquids: Thin Liquids  Daily Fluid Restriction: no  Last Modified Barium Swallow with Video (Video Swallowing Test): not done    Treatments at the Time of Hospital Discharge:   Respiratory Treatments:   Oxygen Therapy:  is on oxygen at 2 L/min per nasal cannula. Ventilator:    - No ventilator support    Rehab Therapies:   Weight Bearing Status/Restrictions: No weight bearing restrictions  Other Medical Equipment (for information only, NOT a DME order):     Other Treatments:     Patient's personal belongings (please select all that are sent with patient):  None    RN SIGNATURE:  Electronically signed by Felicia Baltazar RN on 1/26/23 at 12:59 PM EST    CASE MANAGEMENT/SOCIAL WORK SECTION    Inpatient Status Date: 1/22/2023    Readmission Risk Assessment Score: 21  Readmission Risk              Risk of Unplanned Readmission:  27           Discharging to Facility/ [facility], [address]  Phone: [phone]  Fax:  [phone]    / signature: Electronically signed by Sybil Webb RN on 1/25/23 at 3:03 PM EST    PHYSICIAN SECTION    Prognosis: Guarded    Condition at Discharge: Stable    Rehab Potential (if transferring to Rehab): Guarded    Recommended Labs or Other Treatments After Discharge: ]    Physician Certification: I certify the above information and transfer of Rosales Forbes  is necessary for the continuing treatment of the diagnosis listed and that he requires [facility] for less 30 days.      Update Admission H&P: No change in H&P    PHYSICIAN SIGNATURE:  Electronically signed by Linda Mary MD on 1/26/23 at 12:15 PM EST

## 2023-01-23 NOTE — PROGRESS NOTES
Clinical Pharmacy Note: Pharmacy to Dose Vancomycin    Vancomycin Day: 3  Indication: pacemaker infection  Current Dose: 1000 mg q24  Dosing Method: Bayesian Modeling    Random: 10.1    Recent Labs     01/21/23  0626 01/23/23  0441   BUN 16 19       Recent Labs     01/21/23  0626 01/23/23  0441   CREATININE 1.2 1.0       Recent Labs     01/23/23  0441   WBC 7.6         Intake/Output Summary (Last 24 hours) at 1/23/2023 0759  Last data filed at 1/22/2023 2037  Gross per 24 hour   Intake 10 ml   Output --   Net 10 ml         Ht Readings from Last 1 Encounters:   01/22/23 5' 6\" (1.676 m)        Wt Readings from Last 1 Encounters:   01/22/23 150 lb 9.6 oz (68.3 kg)         Body mass index is 24.31 kg/m². Estimated Creatinine Clearance: 49 mL/min (based on SCr of 1 mg/dL). Assessment/Plan:  Vancomycin level is therapeutic. Increase vancomycin regimen to 1250 every 24 hours. Bayesian Modeling predicts an AUC of 497 mg/L*hr and trough of 13.9 mg/L. A vancomycin random level has been ordered on 1/25/2023 at 0600 for follow-up. Changes in regimen will be determined based on culture results, renal function, and clinical response. Pharmacy will continue to monitor and adjust regimen as necessary.     Thank you for the consult,    Gary Suárez, PharmD, [address] [place] [address]

## 2023-01-23 NOTE — CARE COORDINATION
01/23/23 0911   Readmission Assessment   Previous Disposition   (The patient was a direct transfer from [facility] to [facility].)   Who is being Esme Camacho   (Chart reviewed.)   What was the patient's/caregiver's perception as to why they think they needed to return back to the hospital?   (The patient was a direct transfer from [facility] to [facility] due to a infected pace maker)   Did you visit your Primary Care Physician after you left the hospital, before you returned this time? No   Did you see a specialist, such as Cardiac, Pulmonary, Orthopedic Physician, etc. after you left the hospital? No   Does the patient report anything that got in the way of taking their medications?  No

## 2023-01-23 NOTE — CONSULTS
[address]   Electrophysiology Consultation   Date: 1/23/2023  Reason for Consultation: Pocket infection   Consult Requesting Physician: Karen Harrington MD     CC: pocket pain and swelling   HPI: Hector Callaway is a 82 y.o. male has been transferred from outside hospital for pocket infection. Patient has dementia and is poor historian. History obtained reviewing chart and from nursing staff. Patient states that she had some pain and discomfort and swelling at the site of pacemaker. There is also reported fever and chills prior to admission. The site has been noted to be swollen and has erythema. It is also tender. Patient has a complex past medical history significant for permanent atrial fibrillation, tachybradycardia syndrome, cardiomyopathy, orthostatic intolerance. Patient is s/p single-chamber pacemaker, [employer] placed on 10/13/2017 by Dr. Jono Cramer at [facility] in [place]. He later had AV node ablation and upgrade of his pacemaker to biventricular pacemaker on 9/16/2022. He has been admitted with pacemaker pocket infection and has been transferred to [facility] for lead and device extraction and implantation of pacing system. Patient is pacemaker dependent. Assessment:   Pacemaker pocket infection  Cardiomyopathy  CAD  Permanent atrial fibrillation   Pacemaker dependent   HTN  HLD  UTI    Plan:   Discussed treatment options with patient and his POA and family members, Lester Albright (daughter). Risks, benefits and alternative of lead extraction were discussed with patient. The risks including, but not limited to, the risks of bleeding, infection, pain, device malfunction, lead dislodgement, radiation exposure, injury to cardiac and surrounding structures (including pneumothorax), stroke, cardiac perforation, tamponade, need for emergent heart surgery, injury to esophagus, myocardial infarction and death were discussed in detail.  Patient understand that lead extraction could be a potentially life threatening procedure. Educational material about lead extraction, risks, benefits and alternatives were given to patient. Patient was encouraged to review information, and call back with any questions. Also patient is pacemaker dependant. Discussed transvenous vs MICRA implantation for him. Risks, benefits and alternative of procedure were explained. All questions answered. Family opted for MICRA implantation. They opted to proceed with lead extraction and implantation of MICRA pacemaker. Continue with broad spectrum antibiotic therapy. Discussed with nursing staff. Discussed with referring physician. Active Hospital Problems    Diagnosis Date Noted    Pacemaker infection ([facility] [address] Natasha Gomes. 7XXA] 01/19/2023     Priority: Medium    Atrial fibrillation, rapid ([address] [I48.91] 09/08/2022     Priority: Medium    Ischemic cardiomyopathy [I25.5] 08/10/2022     Priority: Medium    Dementia with behavioral disturbance [F03.918]      Priority: Medium    Coronary artery disease involving native heart without angina pectoris [I25.10] 06/10/2020    A-fib ([facility] [address] [I48.91] 03/03/2016    Hypertension [I10]     Hyperlipidemia [E78.5]        Diagnostic studies:   ECG BiV paced rhythm    proBNP 6104    DORITA: 1/2023     Low-normal left ventricular systolic function with ejection fraction   visually estimated at 50%. No evidence of mass, vegetation or thrombus noted on native valves nor   pacemaker leads. Mild mitral regurgitation. Moderate tricuspid regurgitation. Systolic pulmonary artery pressure (SPAP) estimated at 26 mmHg (RA pressure   8 mmHg). Blood culture negative   Urine Culture: E-coli     Prior ECGs reviewed  Device interrogations reviewed  Outside notes reviewed. I independently reviewed the cardiac diagnostic studies, ECG and relevant imaging studies.      Lab Results   Component Value Date    LVEF 50 01/20/2023     Lab Results   Component Value Date    TSHFT4 2.05 08/25/2022 TSH 2.45 10/21/2021       Physical Examination:  Vitals:    23 0718   BP: (!) 148/88   Pulse: 81   Resp: 17   Temp: 97.7 °F (36.5 °C)   SpO2: 96%      In: 10 [I.V.:10]  Out: -    Wt Readings from Last 3 Encounters:   23 150 lb 9.6 oz (68.3 kg)   23 150 lb 14.4 oz (68.4 kg)   23 176 lb (79.8 kg)     Temp  Av.7 °F (36.5 °C)  Min: 97.3 °F (36.3 °C)  Max: 98.2 °F (36.8 °C)  Pulse  Av.1  Min: 79  Max: 81  BP  Min: 129/91  Max: 162/80  SpO2  Av.3 %  Min: 93 %  Max: 98 %    Intake/Output Summary (Last 24 hours) at 2023 0818  Last data filed at 2023  Gross per 24 hour   Intake 10 ml   Output --   Net 10 ml         I independently reviewed all cardiac tracing from cardiac telemetry. Constitutional: Oriented to name only. No distress. Head: Normocephalic and atraumatic. Mouth/Throat: Oropharynx is clear and moist.   Eyes: Conjunctivae normal. EOM are normal.   Neck: Neck supple. No JVD present. Cardiovascular: Normal rate, regular rhythm, S1&S2. Pulmonary/Chest: Bilateral respiratory sounds. No rhonchi. Pacemaker pocket is erythematous swollen, mild tenderness,   Abdominal: Soft. No tenderness. Musculoskeletal: No tenderness. No edema    Lymphadenopathy: Has no cervical adenopathy. Neurological: Alert and oriented. Follows command, No Gross deficit   Skin: Skin is warm, No rash noted.    Psychiatric: Has a normal behavior       Scheduled Meds:   sterile water        [START ON 2023] vancomycin  1,250 mg IntraVENous Q24H    aspirin  81 mg Oral Daily    isosorbide mononitrate  30 mg Oral Daily    citalopram  10 mg Oral Daily    atorvastatin  40 mg Oral Nightly    lactobacillus  1 capsule Oral BID WC    trospium  20 mg Oral Nightly    pantoprazole  40 mg Oral Daily    memantine  5 mg Oral BID    finasteride  5 mg Oral Daily    tamsulosin  0.4 mg Oral Daily    ferrous sulfate  325 mg Oral Daily    amLODIPine  10 mg Oral Daily    QUEtiapine  50 mg Oral Nightly    QUEtiapine  25 mg Oral QAM AC    risperiDONE  0.25 mg Oral BID    cefepime  2,000 mg IntraVENous Q24H    sodium chloride flush  5-40 mL IntraVENous 2 times per day    enoxaparin  40 mg SubCUTAneous Nightly    OLANZapine  5 mg IntraMUSCular Once     Continuous Infusions:   sodium chloride 10 mL/hr at 01/23/23 0655     PRN Meds:.sodium chloride flush, sodium chloride, ondansetron **OR** ondansetron, polyethylene glycol, acetaminophen **OR** acetaminophen     Review of System:  [x] Full ROS obtained and negative except as mentioned in HPI    Prior to Admission medications    Medication Sig Start Date End Date Taking? Authorizing Provider   isosorbide mononitrate (IMDUR) 30 MG extended release tablet Take 1 tablet by mouth daily 1/21/23   Tanya Ortiz MD   LORazepam (ATIVAN) 2 MG/ML injection Infuse 0.5 mLs intravenously every 4 hours as needed (anxiety) for up to 7 days.  Max Daily Amount: 6 mg 1/21/23 1/28/23  Tanya Ortiz MD   enoxaparin (LOVENOX) 40 MG/0.4ML Inject 0.4 mLs into the skin daily 1/21/23   Tanya Ortiz MD   lactobacillus (CULTURELLE) capsule Take 1 capsule by mouth 2 times daily (with meals) 1/21/23   Tanya Otriz MD   QUEtiapine (SEROQUEL) 25 MG tablet Take 1 tablet by mouth every morning (before breakfast) 1/22/23   Tnaya Ortiz MD   QUEtiapine (SEROQUEL) 50 MG tablet Take 1 tablet by mouth nightly 1/21/23   Tanya Ortiz MD   hydrALAZINE (APRESOLINE) 20 MG/ML injection Infuse 0.5 mLs intravenously every 4 hours as needed (SBP greater than 180 mmHg) 1/21/23   Tanya Ortiz MD   amLODIPine (NORVASC) 10 MG tablet Take 1 tablet by mouth daily 1/21/23   Tanya Ortiz MD   polyethylene glycol (GLYCOLAX) 17 g packet Take 17 g by mouth daily as needed for Constipation 1/21/23 2/20/23  Tanya Ortiz MD   labetalol (NORMODYNE;TRANDATE) 5 MG/ML injection Infuse 4 mLs intravenously every 4 hours as needed (Give for systolic blood pressure greater than 015) Give for systolic blood pressure greater than 160 1/21/23   Margaret Patel MD   cefepime (MAXIPIME) infusion Infuse 2,000 mg intravenously every 24 hours for 10 days Compound per protocol 1/21/23 1/31/23  Margaret Patel MD   tamsulosin [facility]) 0.4 MG capsule Take 1 capsule by mouth daily 1/22/23   Margaret Patel MD   vancomycin [facility]) infusion Infuse 1,000 mg intravenously every 24 hours for 10 days Compound per protocol. 1/21/23 1/31/23  Margaret Patel MD   memantine (NAMENDA) 5 MG tablet TAKE 1 TABLET BY MOUTH 2 TIMES DAILY 12/5/22   GUS Talavera CNP   atorvastatin (LIPITOR) 40 MG tablet TAKE 1 TABLET BY MOUTH DAILY  Patient taking differently: Take 40 mg by mouth at bedtime 11/21/22   Lorna Donald MD   risperiDONE (RISPERDAL) 0.25 MG tablet Take 1 tablet by mouth 2 times daily 10/24/22   Lorna Donald MD   pantoprazole (PROTONIX) 40 MG tablet Take 1 tablet by mouth daily 10/17/22   Lorna Donald MD   mirabegron [facility]) 25 MG TB24 Take 1 tablet by mouth daily 10/17/22   Lorna Doanld MD   trospium (SANCTURA) 20 MG tablet Take 20 mg by mouth in the morning and 20 mg before bedtime.   8/26/22  Historical Provider, MD   apixaban (ELIQUIS) 5 MG TABS tablet TAKE 1/2 TABLET BY MOUTH 2 TIMES DAILY 8/8/22 8/26/22  Lorna Donald MD   metoprolol succinate (TOPROL XL) 25 MG extended release tablet TAKE 3 TABLETS BY MOUTH TWICE DAILY  Patient taking differently: Take 25 mg by mouth in the morning and 25 mg in the evening. 7/25/22 8/26/22  GUS Talavera CNP   famotidine (PEPCID) 20 MG tablet Take 1 tablet by mouth at bedtime 7/8/22 8/26/22  Lorna Donald MD   donepezil (ARICEPT) 10 MG tablet Take 1 tablet by mouth daily 3/24/22 8/26/22  Historical Provider, MD   citalopram (CELEXA) 10 MG tablet TAKE 1 TABLET BY MOUTH DAILY 3/28/22   Lorna Donald MD   Ferrous Sulfate (IRON) 325 (65 Fe) MG TABS Take by mouth daily Historical Provider, MD   finasteride (PROSCAR) 5 MG tablet Take 5 mg by mouth daily. 3/4/15   Historical Provider, MD   aspirin 81 MG tablet Take 81 mg by mouth daily. Historical Provider, MD       Past Medical History:   Diagnosis Date    Atrial fibrillation ([facility] [address]     Blind right eye     Chronic kidney disease     Dementia ([facility] [address]     Hyperlipidemia     Hypertension     Pneumonia         Past Surgical History:   Procedure Laterality Date    ABLATION OF DYSRHYTHMIC FOCUS      APPENDECTOMY      COLONOSCOPY      EYE SURGERY      HERNIA REPAIR      JOINT REPLACEMENT  2013    left shoulder    PACEMAKER PLACEMENT  10/13/2017    Dr Stefany Key at [facility]. single chamber PPM    PACEMAKER PLACEMENT  09/16/2022    biventricular    TONSILLECTOMY      UPPER GASTROINTESTINAL ENDOSCOPY         Allergies   Allergen Reactions    Sulfa Antibiotics        Social History:  Reviewed. reports that he quit smoking about 47 years ago. His smoking use included cigarettes. He has a 10.00 pack-year smoking history. He has never used smokeless tobacco. He reports that he does not drink alcohol and does not use drugs. Family History:  Reviewed. Reviewed. No family history of SCD. Relevant and available labs, and cardiovascular diagnostics reviewed. Reviewed. Recent Labs     01/21/23  0626 01/23/23  0441    143   K 3.7 4.3    104   CO2 22 31   BUN 16 19   CREATININE 1.2 1.0     Recent Labs     01/23/23  0441   WBC 7.6   HGB 14.0   HCT 42.5   MCV 86.8        Estimated Creatinine Clearance: 49 mL/min (based on SCr of 1 mg/dL). Lab Results   Component Value Date/Time    BNP 12.8 01/09/2013 10:00 AM       I independently reviewed all cardiac tracing from cardiac telemetry. I independently reviewed relevant and available cardiac diagnostic tests ECG, CXR, Echo, Stress test, Device interrogation, Holter, CT scan.    Outside medical records via Care everywhere reviewed and summarized in H&P above.   Complex medical condition with multiple medical problems affecting prognosis and outcome of EP interventions  Severe exacerbation of underlying medical condition requiring hospitalization and at risk of decompensation. All questions and concerns were addressed to the patient/family. Alternatives to my treatment were discussed. I have discussed the above stated plan and the patient verbalized understanding and agreed with the plan. NOTE: This report was transcribed using voice recognition software. Every effort was made to ensure accuracy, however, inadvertent computerized transcription errors may be present.      Suki Tang MD, MPH  [facility]   Office: [phone]  Fax: [phone]

## 2023-01-23 NOTE — PLAN OF CARE
Shift assessment complete. Pt alert and oriented to person and place at this time. Pt found with telemetry off when this RN walked into the room. Pt then said, \"I'm going home. \" Pt easily redirectable  Problem: Safety - Adult  Goal: Free from fall injury  Outcome: Progressing     Problem: ABCDS Injury Assessment  Goal: Absence of physical injury  Outcome: Progressing     Problem: Skin/Tissue Integrity  Goal: Absence of new skin breakdown  Description: 1. Monitor for areas of redness and/or skin breakdown  2. Assess vascular access sites hourly  3. Every 4-6 hours minimum:  Change oxygen saturation probe site  4. Every 4-6 hours:  If on nasal continuous positive airway pressure, respiratory therapy assess nares and determine need for appliance change or resting period. Outcome: Progressing    and took medications per MAR calm and cooperatively. Pt reoriented to room and situation. Respirations even and unlabored. Vital signs stable. Telemetry on. Bed alarm on and call light within reach.

## 2023-01-23 NOTE — PROGRESS NOTES
This RN found pt trying to get out of bed repeatedly. Pt told this RN, \"I need to call my mom. I need to go home. My mom is going to be worried about me. \" This RN talked pt into laying back down into bed. Notified María Saul NP pt is starting to get more restless. Awaiting a response.

## 2023-01-24 ENCOUNTER — ANESTHESIA EVENT (OUTPATIENT)
Dept: CARDIAC CATH/INVASIVE PROCEDURES | Age: 88
End: 2023-01-24
Payer: MEDICARE

## 2023-01-24 ENCOUNTER — APPOINTMENT (OUTPATIENT)
Dept: CARDIAC CATH/INVASIVE PROCEDURES | Age: 88
End: 2023-01-24
Payer: MEDICARE

## 2023-01-24 ENCOUNTER — ANESTHESIA (OUTPATIENT)
Dept: CARDIAC CATH/INVASIVE PROCEDURES | Age: 88
End: 2023-01-24
Payer: MEDICARE

## 2023-01-24 PROBLEM — Z95.0 CARDIAC RESYNCHRONIZATION THERAPY PACEMAKER (CRT-P) IN PLACE: Status: RESOLVED | Noted: 2019-04-02 | Resolved: 2023-01-24

## 2023-01-24 LAB
ANION GAP SERPL CALCULATED.3IONS-SCNC: 12 MMOL/L (ref 3–16)
BUN BLDV-MCNC: 25 MG/DL (ref 7–20)
CALCIUM SERPL-MCNC: 9.1 MG/DL (ref 8.3–10.6)
CHLORIDE BLD-SCNC: 103 MMOL/L (ref 99–110)
CO2: 24 MMOL/L (ref 21–32)
CREAT SERPL-MCNC: 1.2 MG/DL (ref 0.8–1.3)
GFR SERPL CREATININE-BSD FRML MDRD: 59 ML/MIN/{1.73_M2}
GLUCOSE BLD-MCNC: 109 MG/DL (ref 70–99)
HCT VFR BLD CALC: 41.5 % (ref 40.5–52.5)
HEMOGLOBIN: 13.9 G/DL (ref 13.5–17.5)
INR BLD: 1.31 (ref 0.87–1.14)
LV EF: 50 %
LVEF MODALITY: NORMAL
MAGNESIUM: 1.7 MG/DL (ref 1.8–2.4)
MCH RBC QN AUTO: 28.9 PG (ref 26–34)
MCHC RBC AUTO-ENTMCNC: 33.4 G/DL (ref 31–36)
MCV RBC AUTO: 86.6 FL (ref 80–100)
PDW BLD-RTO: 16.1 % (ref 12.4–15.4)
PLATELET # BLD: 205 K/UL (ref 135–450)
PMV BLD AUTO: 8 FL (ref 5–10.5)
POTASSIUM SERPL-SCNC: 4.1 MMOL/L (ref 3.5–5.1)
PROTHROMBIN TIME: 16.2 SEC (ref 11.7–14.5)
RBC # BLD: 4.8 M/UL (ref 4.2–5.9)
SODIUM BLD-SCNC: 139 MMOL/L (ref 136–145)
WBC # BLD: 12 K/UL (ref 4–11)

## 2023-01-24 PROCEDURE — 97530 THERAPEUTIC ACTIVITIES: CPT

## 2023-01-24 PROCEDURE — 7100000000 HC PACU RECOVERY - FIRST 15 MIN

## 2023-01-24 PROCEDURE — 2500000003 HC RX 250 WO HCPCS

## 2023-01-24 PROCEDURE — 3600000017 HC SURGERY HYBRID ADDL 15MIN

## 2023-01-24 PROCEDURE — 36620 INSERTION CATHETER ARTERY: CPT | Performed by: INTERNAL MEDICINE

## 2023-01-24 PROCEDURE — 6370000000 HC RX 637 (ALT 250 FOR IP): Performed by: INTERNAL MEDICINE

## 2023-01-24 PROCEDURE — 2500000003 HC RX 250 WO HCPCS: Performed by: NURSE ANESTHETIST, CERTIFIED REGISTERED

## 2023-01-24 PROCEDURE — 99233 SBSQ HOSP IP/OBS HIGH 50: CPT | Performed by: INTERNAL MEDICINE

## 2023-01-24 PROCEDURE — 2580000003 HC RX 258: Performed by: INTERNAL MEDICINE

## 2023-01-24 PROCEDURE — 6360000002 HC RX W HCPCS: Performed by: INTERNAL MEDICINE

## 2023-01-24 PROCEDURE — 33274 TCAT INSJ/RPL PERM LDLS PM: CPT | Performed by: INTERNAL MEDICINE

## 2023-01-24 PROCEDURE — 6360000002 HC RX W HCPCS

## 2023-01-24 PROCEDURE — 2060000000 HC ICU INTERMEDIATE R&B

## 2023-01-24 PROCEDURE — 85027 COMPLETE CBC AUTOMATED: CPT

## 2023-01-24 PROCEDURE — 33233 REMOVAL OF PM GENERATOR: CPT | Performed by: INTERNAL MEDICINE

## 2023-01-24 PROCEDURE — C2628 CATHETER, OCCLUSION: HCPCS

## 2023-01-24 PROCEDURE — 0JPT0PZ REMOVAL OF CARDIAC RHYTHM RELATED DEVICE FROM TRUNK SUBCUTANEOUS TISSUE AND FASCIA, OPEN APPROACH: ICD-10-PCS | Performed by: INTERNAL MEDICINE

## 2023-01-24 PROCEDURE — 87070 CULTURE OTHR SPECIMN AEROBIC: CPT

## 2023-01-24 PROCEDURE — 33235 REMOVAL PACEMAKER ELECTRODE: CPT | Performed by: INTERNAL MEDICINE

## 2023-01-24 PROCEDURE — 80048 BASIC METABOLIC PNL TOTAL CA: CPT

## 2023-01-24 PROCEDURE — 7100000001 HC PACU RECOVERY - ADDTL 15 MIN

## 2023-01-24 PROCEDURE — 02HK3NZ INSERTION OF INTRACARDIAC PACEMAKER INTO RIGHT VENTRICLE, PERCUTANEOUS APPROACH: ICD-10-PCS | Performed by: INTERNAL MEDICINE

## 2023-01-24 PROCEDURE — 87205 SMEAR GRAM STAIN: CPT

## 2023-01-24 PROCEDURE — C1889 IMPLANT/INSERT DEVICE, NOC: HCPCS

## 2023-01-24 PROCEDURE — 2720000010 HC SURG SUPPLY STERILE

## 2023-01-24 PROCEDURE — 97166 OT EVAL MOD COMPLEX 45 MIN: CPT

## 2023-01-24 PROCEDURE — 2580000003 HC RX 258: Performed by: NURSE ANESTHETIST, CERTIFIED REGISTERED

## 2023-01-24 PROCEDURE — 85610 PROTHROMBIN TIME: CPT

## 2023-01-24 PROCEDURE — 2709999900 HC NON-CHARGEABLE SUPPLY

## 2023-01-24 PROCEDURE — 06HY33Z INSERTION OF INFUSION DEVICE INTO LOWER VEIN, PERCUTANEOUS APPROACH: ICD-10-PCS | Performed by: INTERNAL MEDICINE

## 2023-01-24 PROCEDURE — 99233 SBSQ HOSP IP/OBS HIGH 50: CPT | Performed by: NURSE PRACTITIONER

## 2023-01-24 PROCEDURE — 6360000002 HC RX W HCPCS: Performed by: NURSE ANESTHETIST, CERTIFIED REGISTERED

## 2023-01-24 PROCEDURE — 83735 ASSAY OF MAGNESIUM: CPT

## 2023-01-24 PROCEDURE — 3600000007 HC SURGERY HYBRID BASE

## 2023-01-24 PROCEDURE — 3700000000 HC ANESTHESIA ATTENDED CARE

## 2023-01-24 PROCEDURE — C1760 CLOSURE DEV, VASC: HCPCS

## 2023-01-24 PROCEDURE — 36415 COLL VENOUS BLD VENIPUNCTURE: CPT

## 2023-01-24 PROCEDURE — 3700000001 HC ADD 15 MINUTES (ANESTHESIA)

## 2023-01-24 PROCEDURE — C1894 INTRO/SHEATH, NON-LASER: HCPCS

## 2023-01-24 PROCEDURE — 36556 INSERT NON-TUNNEL CV CATH: CPT | Performed by: INTERNAL MEDICINE

## 2023-01-24 PROCEDURE — 97162 PT EVAL MOD COMPLEX 30 MIN: CPT

## 2023-01-24 PROCEDURE — 2580000003 HC RX 258

## 2023-01-24 PROCEDURE — 04HY32Z INSERTION OF MONITORING DEVICE INTO LOWER ARTERY, PERCUTANEOUS APPROACH: ICD-10-PCS | Performed by: INTERNAL MEDICINE

## 2023-01-24 PROCEDURE — C1769 GUIDE WIRE: HCPCS

## 2023-01-24 PROCEDURE — C1786 PMKR, SINGLE, RATE-RESP: HCPCS

## 2023-01-24 RX ORDER — HYDROMORPHONE HCL 110MG/55ML
0.25 PATIENT CONTROLLED ANALGESIA SYRINGE INTRAVENOUS EVERY 5 MIN PRN
Status: DISCONTINUED | OUTPATIENT
Start: 2023-01-24 | End: 2023-01-26 | Stop reason: HOSPADM

## 2023-01-24 RX ORDER — OXYCODONE HYDROCHLORIDE 5 MG/1
10 TABLET ORAL PRN
Status: ACTIVE | OUTPATIENT
Start: 2023-01-24 | End: 2023-01-24

## 2023-01-24 RX ORDER — SODIUM CHLORIDE 0.9 % (FLUSH) 0.9 %
5-40 SYRINGE (ML) INJECTION PRN
Status: DISCONTINUED | OUTPATIENT
Start: 2023-01-24 | End: 2023-01-26 | Stop reason: HOSPADM

## 2023-01-24 RX ORDER — LIDOCAINE HYDROCHLORIDE 20 MG/ML
INJECTION, SOLUTION INTRAVENOUS PRN
Status: DISCONTINUED | OUTPATIENT
Start: 2023-01-24 | End: 2023-01-24 | Stop reason: SDUPTHER

## 2023-01-24 RX ORDER — ROCURONIUM BROMIDE 10 MG/ML
INJECTION, SOLUTION INTRAVENOUS PRN
Status: DISCONTINUED | OUTPATIENT
Start: 2023-01-24 | End: 2023-01-24 | Stop reason: SDUPTHER

## 2023-01-24 RX ORDER — DEXMEDETOMIDINE HYDROCHLORIDE 100 UG/ML
INJECTION, SOLUTION INTRAVENOUS PRN
Status: DISCONTINUED | OUTPATIENT
Start: 2023-01-24 | End: 2023-01-24 | Stop reason: SDUPTHER

## 2023-01-24 RX ORDER — SODIUM CHLORIDE 0.9 % (FLUSH) 0.9 %
5-40 SYRINGE (ML) INJECTION EVERY 12 HOURS SCHEDULED
Status: DISCONTINUED | OUTPATIENT
Start: 2023-01-24 | End: 2023-01-26 | Stop reason: HOSPADM

## 2023-01-24 RX ORDER — ONDANSETRON 2 MG/ML
INJECTION INTRAMUSCULAR; INTRAVENOUS PRN
Status: DISCONTINUED | OUTPATIENT
Start: 2023-01-24 | End: 2023-01-24 | Stop reason: SDUPTHER

## 2023-01-24 RX ORDER — SODIUM CHLORIDE 9 MG/ML
INJECTION, SOLUTION INTRAVENOUS PRN
Status: DISCONTINUED | OUTPATIENT
Start: 2023-01-24 | End: 2023-01-26 | Stop reason: HOSPADM

## 2023-01-24 RX ORDER — SODIUM CHLORIDE 9 MG/ML
25 INJECTION, SOLUTION INTRAVENOUS PRN
Status: DISCONTINUED | OUTPATIENT
Start: 2023-01-24 | End: 2023-01-26 | Stop reason: HOSPADM

## 2023-01-24 RX ORDER — MAGNESIUM SULFATE IN WATER 40 MG/ML
2000 INJECTION, SOLUTION INTRAVENOUS ONCE
Status: DISCONTINUED | OUTPATIENT
Start: 2023-01-24 | End: 2023-01-26 | Stop reason: HOSPADM

## 2023-01-24 RX ORDER — HYDROMORPHONE HCL 110MG/55ML
0.5 PATIENT CONTROLLED ANALGESIA SYRINGE INTRAVENOUS EVERY 5 MIN PRN
Status: DISCONTINUED | OUTPATIENT
Start: 2023-01-24 | End: 2023-01-26 | Stop reason: HOSPADM

## 2023-01-24 RX ORDER — SODIUM CHLORIDE 9 MG/ML
INJECTION, SOLUTION INTRAVENOUS CONTINUOUS PRN
Status: DISCONTINUED | OUTPATIENT
Start: 2023-01-24 | End: 2023-01-24 | Stop reason: SDUPTHER

## 2023-01-24 RX ORDER — OXYCODONE HYDROCHLORIDE 5 MG/1
5 TABLET ORAL PRN
Status: ACTIVE | OUTPATIENT
Start: 2023-01-24 | End: 2023-01-24

## 2023-01-24 RX ORDER — DEXAMETHASONE SODIUM PHOSPHATE 4 MG/ML
INJECTION, SOLUTION INTRA-ARTICULAR; INTRALESIONAL; INTRAMUSCULAR; INTRAVENOUS; SOFT TISSUE PRN
Status: DISCONTINUED | OUTPATIENT
Start: 2023-01-24 | End: 2023-01-24 | Stop reason: SDUPTHER

## 2023-01-24 RX ORDER — ONDANSETRON 2 MG/ML
4 INJECTION INTRAMUSCULAR; INTRAVENOUS
Status: ACTIVE | OUTPATIENT
Start: 2023-01-24 | End: 2023-01-25

## 2023-01-24 RX ORDER — MAGNESIUM SULFATE IN WATER 40 MG/ML
2000 INJECTION, SOLUTION INTRAVENOUS PRN
Status: DISCONTINUED | OUTPATIENT
Start: 2023-01-24 | End: 2023-01-26 | Stop reason: HOSPADM

## 2023-01-24 RX ORDER — LABETALOL HYDROCHLORIDE 5 MG/ML
10 INJECTION, SOLUTION INTRAVENOUS
Status: DISCONTINUED | OUTPATIENT
Start: 2023-01-24 | End: 2023-01-26 | Stop reason: HOSPADM

## 2023-01-24 RX ORDER — ACETAMINOPHEN 325 MG/1
650 TABLET ORAL EVERY 4 HOURS PRN
Status: DISCONTINUED | OUTPATIENT
Start: 2023-01-24 | End: 2023-01-26 | Stop reason: HOSPADM

## 2023-01-24 RX ORDER — DIPHENHYDRAMINE HYDROCHLORIDE 50 MG/ML
12.5 INJECTION INTRAMUSCULAR; INTRAVENOUS
Status: ACTIVE | OUTPATIENT
Start: 2023-01-24 | End: 2023-01-25

## 2023-01-24 RX ORDER — CALCIUM CHLORIDE 100 MG/ML
INJECTION INTRAVENOUS; INTRAVENTRICULAR PRN
Status: DISCONTINUED | OUTPATIENT
Start: 2023-01-24 | End: 2023-01-24 | Stop reason: SDUPTHER

## 2023-01-24 RX ORDER — PROPOFOL 10 MG/ML
INJECTION, EMULSION INTRAVENOUS PRN
Status: DISCONTINUED | OUTPATIENT
Start: 2023-01-24 | End: 2023-01-24 | Stop reason: SDUPTHER

## 2023-01-24 RX ADMIN — SUGAMMADEX 200 MG: 100 INJECTION, SOLUTION INTRAVENOUS at 14:34

## 2023-01-24 RX ADMIN — TROSPIUM CHLORIDE 20 MG: 20 TABLET, FILM COATED ORAL at 21:22

## 2023-01-24 RX ADMIN — ONDANSETRON 4 MG: 2 INJECTION INTRAMUSCULAR; INTRAVENOUS at 12:35

## 2023-01-24 RX ADMIN — VANCOMYCIN HYDROCHLORIDE 1250 MG: 10 INJECTION, POWDER, LYOPHILIZED, FOR SOLUTION INTRAVENOUS at 09:26

## 2023-01-24 RX ADMIN — MAGNESIUM SULFATE HEPTAHYDRATE 2000 MG: 40 INJECTION, SOLUTION INTRAVENOUS at 08:50

## 2023-01-24 RX ADMIN — RISPERIDONE 0.25 MG: 0.25 TABLET, FILM COATED ORAL at 21:21

## 2023-01-24 RX ADMIN — PHENYLEPHRINE HYDROCHLORIDE 80 MCG: 10 INJECTION INTRAVENOUS at 14:32

## 2023-01-24 RX ADMIN — ATORVASTATIN CALCIUM 40 MG: 40 TABLET, FILM COATED ORAL at 21:21

## 2023-01-24 RX ADMIN — DEXAMETHASONE SODIUM PHOSPHATE 4 MG: 4 INJECTION, SOLUTION INTRAMUSCULAR; INTRAVENOUS at 12:35

## 2023-01-24 RX ADMIN — DEXMEDETOMIDINE HYDROCHLORIDE 8 MCG: 100 INJECTION, SOLUTION INTRAVENOUS at 14:31

## 2023-01-24 RX ADMIN — ACETAMINOPHEN 650 MG: 325 TABLET ORAL at 21:21

## 2023-01-24 RX ADMIN — QUETIAPINE FUMARATE 50 MG: 25 TABLET ORAL at 21:20

## 2023-01-24 RX ADMIN — ENOXAPARIN SODIUM 40 MG: 100 INJECTION SUBCUTANEOUS at 21:20

## 2023-01-24 RX ADMIN — ROCURONIUM BROMIDE 50 MG: 10 INJECTION, SOLUTION INTRAVENOUS at 12:15

## 2023-01-24 RX ADMIN — ROCURONIUM BROMIDE 20 MG: 10 INJECTION, SOLUTION INTRAVENOUS at 13:20

## 2023-01-24 RX ADMIN — DEXMEDETOMIDINE HYDROCHLORIDE 8 MCG: 100 INJECTION, SOLUTION INTRAVENOUS at 13:23

## 2023-01-24 RX ADMIN — MEMANTINE 5 MG: 5 TABLET ORAL at 21:21

## 2023-01-24 RX ADMIN — SODIUM CHLORIDE: 9 INJECTION, SOLUTION INTRAVENOUS at 17:13

## 2023-01-24 RX ADMIN — DEXMEDETOMIDINE HYDROCHLORIDE 4 MCG: 100 INJECTION, SOLUTION INTRAVENOUS at 14:22

## 2023-01-24 RX ADMIN — ASPIRIN 81 MG: 81 TABLET, COATED ORAL at 21:20

## 2023-01-24 RX ADMIN — ROCURONIUM BROMIDE 30 MG: 10 INJECTION, SOLUTION INTRAVENOUS at 13:01

## 2023-01-24 RX ADMIN — Medication 1 CAPSULE: at 21:20

## 2023-01-24 RX ADMIN — SODIUM CHLORIDE: 9 INJECTION, SOLUTION INTRAVENOUS at 12:10

## 2023-01-24 RX ADMIN — CALCIUM CHLORIDE 1 G: 100 INJECTION, SOLUTION INTRAVENOUS at 12:49

## 2023-01-24 RX ADMIN — ISOSORBIDE MONONITRATE 30 MG: 30 TABLET, EXTENDED RELEASE ORAL at 21:20

## 2023-01-24 RX ADMIN — DEXMEDETOMIDINE HYDROCHLORIDE 8 MCG: 100 INJECTION, SOLUTION INTRAVENOUS at 14:00

## 2023-01-24 RX ADMIN — PHENYLEPHRINE HYDROCHLORIDE 25 MCG/MIN: 10 INJECTION INTRAVENOUS at 12:20

## 2023-01-24 RX ADMIN — FINASTERIDE 5 MG: 5 TABLET, FILM COATED ORAL at 21:21

## 2023-01-24 RX ADMIN — PROPOFOL 80 MG: 10 INJECTION, EMULSION INTRAVENOUS at 12:14

## 2023-01-24 RX ADMIN — TAMSULOSIN HYDROCHLORIDE 0.4 MG: 0.4 CAPSULE ORAL at 21:20

## 2023-01-24 RX ADMIN — CITALOPRAM HYDROBROMIDE 10 MG: 20 TABLET ORAL at 21:20

## 2023-01-24 RX ADMIN — AMLODIPINE BESYLATE 10 MG: 5 TABLET ORAL at 21:21

## 2023-01-24 RX ADMIN — FERROUS SULFATE TAB 325 MG (65 MG ELEMENTAL FE) 325 MG: 325 (65 FE) TAB at 21:20

## 2023-01-24 RX ADMIN — LIDOCAINE HYDROCHLORIDE 80 MG: 20 INJECTION, SOLUTION INTRAVENOUS at 12:13

## 2023-01-24 RX ADMIN — CEFEPIME 2000 MG: 2 INJECTION, POWDER, FOR SOLUTION INTRAVENOUS at 17:14

## 2023-01-24 RX ADMIN — PANTOPRAZOLE SODIUM 40 MG: 40 TABLET, DELAYED RELEASE ORAL at 21:20

## 2023-01-24 ASSESSMENT — PAIN DESCRIPTION - LOCATION: LOCATION: CHEST

## 2023-01-24 ASSESSMENT — ENCOUNTER SYMPTOMS
EYE REDNESS: 0
SINUS PAIN: 0
BACK PAIN: 0
SINUS PRESSURE: 0
EYE DISCHARGE: 0
SHORTNESS OF BREATH: 0
SHORTNESS OF BREATH: 1
NAUSEA: 0
DIARRHEA: 0
ABDOMINAL PAIN: 0
COUGH: 0
WHEEZING: 0
CONSTIPATION: 0
SORE THROAT: 0
RHINORRHEA: 0

## 2023-01-24 ASSESSMENT — PAIN DESCRIPTION - DESCRIPTORS: DESCRIPTORS: ACHING

## 2023-01-24 ASSESSMENT — PAIN DESCRIPTION - FREQUENCY: FREQUENCY: CONTINUOUS

## 2023-01-24 ASSESSMENT — PAIN DESCRIPTION - ONSET: ONSET: ON-GOING

## 2023-01-24 ASSESSMENT — PAIN SCALES - GENERAL
PAINLEVEL_OUTOF10: 0
PAINLEVEL_OUTOF10: 3
PAINLEVEL_OUTOF10: 0

## 2023-01-24 ASSESSMENT — PAIN DESCRIPTION - ORIENTATION: ORIENTATION: LEFT;UPPER

## 2023-01-24 ASSESSMENT — PAIN - FUNCTIONAL ASSESSMENT: PAIN_FUNCTIONAL_ASSESSMENT: ACTIVITIES ARE NOT PREVENTED

## 2023-01-24 ASSESSMENT — PAIN DESCRIPTION - PAIN TYPE: TYPE: SURGICAL PAIN

## 2023-01-24 NOTE — CARE COORDINATION
Discharge Planning Note:    Attempted to see pt/family, pt off unit.   CM will re-attempt at later time for DCPA and SNF list.     Electronically signed by Wilian Molina RN on 1/24/2023 at 5:15 PM

## 2023-01-24 NOTE — BRIEF OP NOTE
Brief Postoperative Note      Patient: Katina Hernandez  YOB: 1935  MRN: [id number]     - Removal and extraction of pacemaker leads.  - Pocket debridement  - Micra implant. Full report will follow. Plan    Venous and arterial groin access site management  Resume previous diet and meds.

## 2023-01-24 NOTE — ANESTHESIA POSTPROCEDURE EVALUATION
Department of Anesthesiology  Postprocedure Note    Patient: Herrera Johnson  MRN: [id number]  YOB: 1935  Date of evaluation: 1/24/2023      Procedure Summary     Date: 01/24/23 Room / Location: [facility] Cath Lab    Anesthesia Start: 9384 Anesthesia Stop: 2482    Procedure: LASER LEAD EXTRACTION Diagnosis:     Scheduled Providers:  Responsible Provider: Nika Gilliam MD    Anesthesia Type: general ASA Status: 3          Anesthesia Type: No value filed.     Greg Phase I: Greg Score: 8    Greg Phase II:        Anesthesia Post Evaluation    Patient location during evaluation: PACU  Patient participation: complete - patient participated  Level of consciousness: awake and alert  Airway patency: patent  Nausea & Vomiting: no nausea and no vomiting  Complications: no  Cardiovascular status: hemodynamically stable  Respiratory status: acceptable  Hydration status: stable  Multimodal analgesia pain management approach

## 2023-01-24 NOTE — PROGRESS NOTES
H&P Update    I have reviewed the history and physical and examined the patient and updated with relevant changes. Consent: I have discussed with the patient and his wife and daughter, indication, alternatives, and the possible risks and/or complications of the planned procedure and the anesthesia methods. The patient and family members, including POA appear to understand and agree to proceed. I met with patient's POA, his wife, Shyam Cordon, and his daughter, Esmer Lewis at bedside. I explained the procedure, risks, benefits and alternative and informed consent was obtained. Vitals:    01/24/23 0756   BP: 112/74   Pulse: 82   Resp: 18   Temp: 97.9 °F (36.6 °C)   SpO2: 96%     Prior to Admission medications    Medication Sig Start Date End Date Taking? Authorizing Provider   isosorbide mononitrate (IMDUR) 30 MG extended release tablet Take 1 tablet by mouth daily 1/21/23   Elena Ashley MD   LORazepam (ATIVAN) 2 MG/ML injection Infuse 0.5 mLs intravenously every 4 hours as needed (anxiety) for up to 7 days.  Max Daily Amount: 6 mg 1/21/23 1/28/23  Elena Ashley MD   enoxaparin (LOVENOX) 40 MG/0.4ML Inject 0.4 mLs into the skin daily 1/21/23   Elena Ashley MD   lactobacillus (CULTURELLE) capsule Take 1 capsule by mouth 2 times daily (with meals) 1/21/23   Elena Ashley MD   QUEtiapine (SEROQUEL) 25 MG tablet Take 1 tablet by mouth every morning (before breakfast) 1/22/23   Elena Ashley MD   QUEtiapine (SEROQUEL) 50 MG tablet Take 1 tablet by mouth nightly 1/21/23   Elena Ashley MD   hydrALAZINE (APRESOLINE) 20 MG/ML injection Infuse 0.5 mLs intravenously every 4 hours as needed (SBP greater than 180 mmHg) 1/21/23   Elena Ashley MD   amLODIPine (NORVASC) 10 MG tablet Take 1 tablet by mouth daily 1/21/23   Elena Ashley MD   polyethylene glycol (GLYCOLAX) 17 g packet Take 17 g by mouth daily as needed for Constipation 1/21/23 2/20/23  Aracely Joseph Colby Viveros MD   labetalol (NORMODYNE;TRANDATE) 5 MG/ML injection Infuse 4 mLs intravenously every 4 hours as needed (Give for systolic blood pressure greater than 131) Give for systolic blood pressure greater than 160 1/21/23   Sven Loco MD   cefepime (MAXIPIME) infusion Infuse 2,000 mg intravenously every 24 hours for 10 days Compound per protocol 1/21/23 1/31/23  Sven Loco MD   tamsulosin [facility]) 0.4 MG capsule Take 1 capsule by mouth daily 1/22/23   Sven Loco MD   vancomycin [facility]) infusion Infuse 1,000 mg intravenously every 24 hours for 10 days Compound per protocol. 1/21/23 1/31/23  Sven Loco MD   memantine (NAMENDA) 5 MG tablet TAKE 1 TABLET BY MOUTH 2 TIMES DAILY 12/5/22   GUS Harris CNP   atorvastatin (LIPITOR) 40 MG tablet TAKE 1 TABLET BY MOUTH DAILY  Patient taking differently: Take 40 mg by mouth at bedtime 11/21/22   Gail Hunt MD   risperiDONE (RISPERDAL) 0.25 MG tablet Take 1 tablet by mouth 2 times daily 10/24/22   Gail Hunt MD   pantoprazole (PROTONIX) 40 MG tablet Take 1 tablet by mouth daily 10/17/22   Gail Hunt MD   mirabegron [facility]) 25 MG TB24 Take 1 tablet by mouth daily 10/17/22   Gail Hunt MD   trospium (SANCTURA) 20 MG tablet Take 20 mg by mouth in the morning and 20 mg before bedtime.   8/26/22  Historical Provider, MD   apixaban (ELIQUIS) 5 MG TABS tablet TAKE 1/2 TABLET BY MOUTH 2 TIMES DAILY 8/8/22 8/26/22  Gail Hunt MD   metoprolol succinate (TOPROL XL) 25 MG extended release tablet TAKE 3 TABLETS BY MOUTH TWICE DAILY  Patient taking differently: Take 25 mg by mouth in the morning and 25 mg in the evening. 7/25/22 8/26/22  GUS Harris CNP   famotidine (PEPCID) 20 MG tablet Take 1 tablet by mouth at bedtime 7/8/22 8/26/22  Gail Hunt MD   donepezil (ARICEPT) 10 MG tablet Take 1 tablet by mouth daily 3/24/22 8/26/22  Historical Provider, MD   citalopram (CELEXA) 10 MG tablet TAKE 1 TABLET BY MOUTH DAILY 3/28/22   Shasta Carter MD   Ferrous Sulfate (IRON) 325 (65 Fe) MG TABS Take by mouth daily     Historical Provider, MD   finasteride (PROSCAR) 5 MG tablet Take 5 mg by mouth daily. 3/4/15   Historical Provider, MD   aspirin 81 MG tablet Take 81 mg by mouth daily. Historical Provider, MD     Past Medical History:   Diagnosis Date    Atrial fibrillation ([facility] [address]     Blind right eye     Chronic kidney disease     Dementia ([facility] [address]     Hyperlipidemia     Hypertension     Pneumonia      Past Surgical History:   Procedure Laterality Date    ABLATION OF DYSRHYTHMIC FOCUS      APPENDECTOMY      COLONOSCOPY      EYE SURGERY      HERNIA REPAIR      JOINT REPLACEMENT  2013    left shoulder    PACEMAKER PLACEMENT  10/13/2017    Dr Grupo Browning at [facility] 19. single chamber PPM    PACEMAKER PLACEMENT  09/16/2022    biventricular    TONSILLECTOMY      UPPER GASTROINTESTINAL ENDOSCOPY       Allergies   Allergen Reactions    Sulfa Antibiotics        Documentation and Exam:   I have personally completed a history, physical exam & review of systems for this patient (see notes).     Sedation will be provided by anesthesia team.     Electronically signed by Mona Siu MD on 1/24/2023 at 11:53 AM

## 2023-01-24 NOTE — PROGRESS NOTES
Pt to pacu from cath lab post laser lead extraction. VSS, O2 sats 100% on 6L simple mask. Carlos groin sites soft and without hematoma or oozing, figure 8 stitch to right femoral site. Carlos LE warm, cap refill< 3 sec. Pedal and post tib pulses palpable.   Will monitor

## 2023-01-24 NOTE — PROGRESS NOTES
Infectious Diseases   Progress Note      Admission Date: 1/22/2023  Hospital Day: Hospital Day: 3   Attending: Rosa Narayanan MD  Date of service: 1/24/2023     Chief complaint/ Reason for consult:     Pacemaker pocket site infection  Recent acute kidney injury  History of dementia  Essential hypertension    Microbiology:        I have reviewed allavailable micro lab data and cultures          Antibiotics and immunizations:       Current antibiotics: All antibiotics and their doses were reviewed by me    Recent Abx Admin                     vancomycin (VANCOCIN) 1,250 mg in sodium chloride 0.9 % 250 mL IVPB (mg) 1,250 mg New Bag 01/24/23 0926                      Immunization History: All immunization history was reviewed by me today. Immunization History   Administered Date(s) Administered    Influenza A ([id number]) Vaccine PF IM 11/19/2009    Pneumococcal Conjugate 13-valent ([id number]) 08/25/2015    Pneumococcal Conjugate 7-valent (Prevnar7) 02/27/2013    Pneumococcal Polysaccharide ([id number]) 03/09/2010, 02/27/2013, 06/26/2019       Known drug allergies: All allergies were reviewed and updated    Allergies   Allergen Reactions    Sulfa Antibiotics        Social history:     Social History:  All social andepidemiologic history was reviewed and updated by me today as needed. Tobacco use:   reports that he quit smoking about 47 years ago. His smoking use included cigarettes. He has a 10.00 pack-year smoking history. He has never used smokeless tobacco.  Alcohol use:   reports no history of alcohol use. Currently lives in: [place] [id number]   reports no history of drug use.      COVID VACCINATION AND LAB RESULT RECORDS:     Internal Administration   First Dose      Second Dose           Last COVID Lab SARS-CoV-2 (no units)   Date Value   05/15/2020 NOT DETECTED     SARS-CoV-2 RNA, RT PCR (no units)   Date Value   04/21/2022 NOT DETECTED     SARS-CoV-2, MICHELLE (no units)   Date Value   08/09/2022 NEGATIVE SARS-CoV-2, NAAT (no units)   Date Value   08/14/2022 Not Detected            Assessment:     The patient is a 82 y.o. old male who  has a past medical history of Atrial fibrillation ([address] Blind right eye, Chronic kidney disease, Dementia ([address] Hyperlipidemia, Hypertension, and Pneumonia. 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 He is on IV vancomycin and cefepime. White cell count is 12,000 today. Serum creatinine is 1.2. Patient is a sed rate was only 40 yesterday and CRP was 5. The patient has undergone pacemaker assembly extraction today      Plan:     Diagnostic Workup:    Follow-up on surgical cultures  Continue to follow  fever curve, WBC count and blood cultures. Continue to monitor blood counts, liver and renal function. Antimicrobials: Will continue IV vancomycin for empiric gram-positive coverage  Check Vancomycin trough before the 5th dose. Target vancomycin trough level of 15-20  mg/L or vancomycin area under curve (AUC) of 400-600 mg*h/L by Bayesian modeling method. If dosing vancomycin based on trough levels, keep vancomycin trough below 20 at all times. Avoid increasing the dose of vancomycin above a total of 4 grams in a 24-hour period in patients younger than 45 years and above 3 grams in a 24-hour period in a patient of age 39 years or older. Continue to monitor serum creatinine and Vanco levels closely, while the patient is on I/v Vancomycin. Continue IV cefepime 2 g every 12 hour for empiric gram-negative coverage  Surgical site care  We will follow up on the culture results and clinical progress and will make further recommendations accordingly.   Continue close vitals monitoring. Maintain good glycemic control. Fall precautions. Aspiration precautions. Continue to watch for new fever or diarrhea. DVT prophylaxis. Discussed all above with patient and RN. Drug Monitoring:    Continue monitoring for antibiotic toxicity as follows: CBC, CMP, vancomycin trough  Continue to watch for following: new or worsening fever, new hypotension, hives, lip swelling and redness or purulence at vascular access sites. I/v access Management:    Continue to monitor i.v access sites for erythema, induration, discharge or tenderness. As always, continue efforts to minimize tubes/lines/drains as clinically appropriate to reduce chances of line associated infections. Patient education and counseling: The patient was educated in detail about the side-effects of various antibiotics and things to watch for like new rashes, lip swelling, severe reaction, worsening diarrhea, break through fever etc.  Discussed patient's condition and what to expect. All of the patient's questions were addressed in a satisfactory manner and patient verbalized understanding all instructions. Level of complexity of visit and medical decision making: High     Risk of Complications/Morbidity: High     Illness(es)/ Infection present that pose threat tobodily function. There is potential for severe exacerbation of infection/side effects of treatment. Therapy requires intensive monitoring for antimicrobial agent toxicity. TIME SPENT TODAY:     - Spent over  36 minutes on visit (including interval history, physical exam, review of data including labs, cultures, imaging, development and implementation of treatment plan and coordination of complex care). More than 50 percent of this includes face-to-face time spent with the patient for counseling and coordination of care. Thank you for involving me in the care of your patient. I will continue to follow.  If you have anyadditional questions, please do not hesitate to contact me. Subjective: Interval history: Interval history was obtained from chart review and patient/ RN. The patient is afebrile. He is tolerating antibiotics okay. He has undergone pacemaker extraction today     REVIEW OF SYSTEMS:      Review of Systems   Constitutional:  Negative for chills, diaphoresis and fever. HENT:  Negative for ear discharge, ear pain, postnasal drip, rhinorrhea, sinus pressure, sinus pain and sore throat. Eyes:  Negative for discharge and redness. Respiratory:  Negative for cough, shortness of breath and wheezing. Cardiovascular:  Negative for chest pain and leg swelling. Gastrointestinal:  Negative for abdominal pain, constipation, diarrhea and nausea. Endocrine: Negative for cold intolerance, heat intolerance and polydipsia. Genitourinary:  Negative for dysuria, flank pain, frequency, hematuria and urgency. Musculoskeletal:  Negative for back pain and myalgias. Skin:  Negative for rash. Allergic/Immunologic: Negative for immunocompromised state. Neurological:  Negative for dizziness, seizures and headaches. Hematological:  Does not bruise/bleed easily. Psychiatric/Behavioral:  Negative for agitation, hallucinations and suicidal ideas. The patient is not nervous/anxious. All other systems reviewed and are negative. Past Medical History: All past medical history reviewed today. Past Medical History:   Diagnosis Date    Atrial fibrillation ([address]     Blind right eye     Chronic kidney disease     Dementia ([address]     Hyperlipidemia     Hypertension     Pneumonia        Past Surgical History: All past surgical history was reviewed today.     Past Surgical History:   Procedure Laterality Date    ABLATION OF DYSRHYTHMIC FOCUS      APPENDECTOMY      COLONOSCOPY      EYE SURGERY      HERNIA REPAIR      JOINT REPLACEMENT  2013    left shoulder    PACEMAKER PLACEMENT  10/13/2017    Dr Jose Rizzo at [facility] [place] single chamber PPM    PACEMAKER PLACEMENT  09/16/2022    biventricular    TONSILLECTOMY      UPPER GASTROINTESTINAL ENDOSCOPY         Family History: All family history was reviewed today. Problem Relation Age of Onset    Heart Disease Mother     Other Mother     Heart Disease Father     Asthma Other        Objective:       PHYSICAL EXAM:      Vitals:   Vitals:    01/23/23 1535 01/23/23 2007 01/24/23 0405 01/24/23 0756   BP: 122/78 116/71 117/71 112/74   Pulse: 80 80 80 82   Resp: 18 17 17 18   Temp: 97.4 °F (36.3 °C) 97.5 °F (36.4 °C) 97.8 °F (36.6 °C) 97.9 °F (36.6 °C)   TempSrc: Oral Oral Oral Axillary   SpO2: 94% 95% 92% 96%   Weight:       Height:           Physical Exam  Vitals and nursing note reviewed. Constitutional:       Appearance: He is well-developed. He is not diaphoretic. Comments: The patient was seen earlier today. HENT:      Head: Normocephalic and atraumatic. Right Ear: External ear normal. There is no impacted cerumen. Left Ear: External ear normal. There is no impacted cerumen. Nose: Nose normal.      Mouth/Throat:      Mouth: Mucous membranes are moist.      Pharynx: Oropharynx is clear. No oropharyngeal exudate. Eyes:      General: No scleral icterus. Right eye: No discharge. Left eye: No discharge. Conjunctiva/sclera: Conjunctivae normal.      Pupils: Pupils are equal, round, and reactive to light. Neck:      Thyroid: No thyromegaly. Cardiovascular:      Rate and Rhythm: Normal rate and regular rhythm. Heart sounds: Normal heart sounds. No murmur heard. No friction rub. Pulmonary:      Effort: No respiratory distress. Breath sounds: No stridor. No wheezing or rales. Abdominal:      General: Bowel sounds are normal.      Palpations: Abdomen is soft. Tenderness: There is no abdominal tenderness. There is no guarding or rebound. Musculoskeletal:         General: No swelling, tenderness or deformity. Normal range of motion. Cervical back: Normal range of motion and neck supple. Right lower leg: No edema. Left lower leg: No edema. Lymphadenopathy:      Cervical: No cervical adenopathy. Skin:     General: Skin is warm and dry. Coloration: Skin is not jaundiced. Findings: No bruising, erythema or rash. Comments: Surgical dressing at the pacemaker removal site   Neurological:      General: No focal deficit present. Mental Status: He is alert and oriented to person, place, and time. Mental status is at baseline. Motor: No abnormal muscle tone. Psychiatric:         Mood and Affect: Mood normal.         Behavior: Behavior normal.          Lines and drains: All vascular access sites are healthy with no local erythema, discharge or tenderness. Intake and output:    I/O last 3 completed shifts: In: 56 [P.O.:480; I.V.:10]  Out: -     Lab Data:   All available labs and old records have been reviewed by me. CBC:  Recent Labs     01/23/23  0441 01/24/23  0532   WBC 7.6 12.0*   RBC 4.90 4.80   HGB 14.0 13.9   HCT 42.5 41.5    205   MCV 86.8 86.6   MCH 28.5 28.9   MCHC 32.9 33.4   RDW 15.7* 16.1*        BMP:  Recent Labs     01/23/23  0441 01/24/23  0532    139   K 4.3 4.1    103   CO2 31 24   BUN 19 25*   CREATININE 1.0 1.2   CALCIUM 9.2 9.1   GLUCOSE 87 109*        Hepatic Function Panel:   Lab Results   Component Value Date/Time    ALKPHOS 99 01/19/2023 10:17 AM    ALT 10 01/19/2023 10:17 AM    AST 18 01/19/2023 10:17 AM    PROT 7.0 01/19/2023 10:17 AM    PROT 7.6 01/09/2013 10:00 AM    BILITOT 1.0 01/19/2023 10:17 AM    LABALBU 4.0 01/19/2023 10:17 AM       CPK: No results found for: CKTOTAL  ESR:   Lab Results   Component Value Date    SEDRATE 4 01/23/2023     CRP:   Lab Results   Component Value Date    CRP 5.0 01/23/2023           Imaging: All pertinent images and reports for the current visit were reviewed by me during this visit.   I reviewed the chest x-ray/CT scan/MRI images and independently interpreted the findings and results today. No orders to display       Medications: All current and past medications were reviewed.      magnesium sulfate  2,000 mg IntraVENous Once    sodium chloride flush  5-40 mL IntraVENous 2 times per day    sodium chloride flush  5-40 mL IntraVENous 2 times per day    vancomycin  1,250 mg IntraVENous Q24H    aspirin  81 mg Oral Daily    isosorbide mononitrate  30 mg Oral Daily    citalopram  10 mg Oral Daily    atorvastatin  40 mg Oral Nightly    lactobacillus  1 capsule Oral BID WC    trospium  20 mg Oral Nightly    pantoprazole  40 mg Oral Daily    memantine  5 mg Oral BID    finasteride  5 mg Oral Daily    tamsulosin  0.4 mg Oral Daily    ferrous sulfate  325 mg Oral Daily    amLODIPine  10 mg Oral Daily    QUEtiapine  50 mg Oral Nightly    QUEtiapine  25 mg Oral QAM AC    risperiDONE  0.25 mg Oral BID    cefepime  2,000 mg IntraVENous Q24H    sodium chloride flush  5-40 mL IntraVENous 2 times per day    enoxaparin  40 mg SubCUTAneous Nightly    OLANZapine  5 mg IntraMUSCular Once        sodium chloride      sodium chloride      sodium chloride      sodium chloride 10 mL/hr at 01/23/23 5001       sodium chloride, magnesium sulfate, sodium chloride flush, sodium chloride, HYDROmorphone, ondansetron, sodium chloride flush, sodium chloride, HYDROmorphone, HYDROmorphone, oxyCODONE **OR** oxyCODONE, ondansetron, diphenhydrAMINE, labetalol, sodium chloride flush, sodium chloride, ondansetron **OR** ondansetron, polyethylene glycol, acetaminophen **OR** acetaminophen      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    Hyperlipidemia E78.5    Hypertension I10    Chronic kidney disease, stage III (moderate) (HCC) N18.30    A-fib ([facility] I48.91    Cardiac resynchronization therapy pacemaker (CRT-P) in place Z95.0    Abnormal echocardiogram R93.1    Dizziness R42    SOB (shortness of breath) R06.02    Abnormal cardiovascular stress test R94.39    Coronary artery disease involving native heart without angina pectoris I25.10    Other chest pain R07.89    Syncope and collapse R55    COVID U07.1    Dementia with behavioral disturbance F03.918    Hallucinations R44.3    Syncope, unspecified syncope type R55    Ischemic cardiomyopathy I25.5    Atrial fibrillation, rapid (HCC) I48.91    Orthostatic hypotension I95.1    Infection of pacemaker pocket ([address] T82. 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 Although every effort was made to ensure the accuracy of this automated transcription, some errors in transcription may have occurred inadvertently. If you may need any clarification, please do not hesitate to contact me through EPIC or at the phone number provided below with my electronic signature. Any pictures or media included in this note were obtained after taking informed verbal consent from the patient and with their approval to include those in the patient's medical record. Jennifer Cohen MD, MPH, FACP, FIDSA  1/24/2023, 1:05 PM  [facility] Infectious Disease   [address] [place] [address], [address] [place] [address]  Office: [phone]  Fax: [phone]  In-person Clinic days:  Tuesday & Thursday a.m. Virtual clinic days: Monday, Wednesday & Friday a.m.

## 2023-01-24 NOTE — PROGRESS NOTES
[address]   Electrophysiology Progress Note     Date: 1/24/2023  Admit Date: 1/22/2023     Reason for consultation: Device Infection    Chief Complaint: Weakness    History of Present Illness: History obtained from patient and medical record. Rodolfo Spaulding is a 82 y.o. male with a past medical history of permanent atrial fibrillation, tachy-bradycardia syndrome, cardiomyopathy, orthostatic intolerance. Patient is s/p single-chamber pacemaker, [place] placed on 10/13/2017 by Dr. Rafi Chan at [facility]. He later had AV node ablation and upgrade of his pacemaker to biventricular pacemaker on 9/16/2022. Pt been transferred from outside hospital for pocket infection. Patient has dementia and is poor historian. History obtained reviewing chart and from nursing staff. Patient states that she had some pain and discomfort and swelling at the site of pacemaker. He had fever and chills. The site has been noted to be swollen and has erythema. It is also tender. He has been admitted with pacemaker pocket infection and has been transferred to [facility] for lead and device extraction and implantation of pacing system. Patient is pacemaker dependent. Interval Hx: Today, he is being seen for EP follow up. He is pleasantly confused and very weak. He remains V-paced with stable BP. I called and spoke to his daughter, Sharifa Gordon over the phone. Patient seen and examined. Clinical notes reviewed. Telemetry reviewed. No new complaints today. No major events overnight. Denies having chest pain, palpitations, shortness of breath, orthopnea/PND, cough, or dizziness at the time of this visit. Allergies: Allergies   Allergen Reactions    Sulfa Antibiotics      Home Meds:  Prior to Visit Medications    Medication Sig Taking?  Authorizing Provider   isosorbide mononitrate (IMDUR) 30 MG extended release tablet Take 1 tablet by mouth daily  Davis Soto MD   LORazepam (ATIVAN) 2 MG/ML injection Infuse 0.5 mLs intravenously every 4 hours as needed (anxiety) for up to 7 days. Max Daily Amount: 6 mg  Mario Felder MD   enoxaparin (LOVENOX) 40 MG/0.4ML Inject 0.4 mLs into the skin daily  Mario Felder MD   lactobacillus (CULTURELLE) capsule Take 1 capsule by mouth 2 times daily (with meals)  Mario Fleder MD   QUEtiapine (SEROQUEL) 25 MG tablet Take 1 tablet by mouth every morning (before breakfast)  Mario Felder MD   QUEtiapine (SEROQUEL) 50 MG tablet Take 1 tablet by mouth nightly  Mario Felder MD   hydrALAZINE (APRESOLINE) 20 MG/ML injection Infuse 0.5 mLs intravenously every 4 hours as needed (SBP greater than 180 mmHg)  Mario Felder MD   amLODIPine (NORVASC) 10 MG tablet Take 1 tablet by mouth daily  Mario Felder MD   polyethylene glycol (GLYCOLAX) 17 g packet Take 17 g by mouth daily as needed for Constipation  Mario Felder MD   labetalol (NORMODYNE;TRANDATE) 5 MG/ML injection Infuse 4 mLs intravenously every 4 hours as needed (Give for systolic blood pressure greater than 692) Give for systolic blood pressure greater than 160  Mario Felder MD   cefepime (MAXIPIME) infusion Infuse 2,000 mg intravenously every 24 hours for 10 days Compound per protocol  Mario Felder MD   tamsulosin (FLOMAX) 0.4 MG capsule Take 1 capsule by mouth daily  Mario Felder MD   vancomycin (VANCOCIN) infusion Infuse 1,000 mg intravenously every 24 hours for 10 days Compound per protocol.   Mario Felder MD   memantine (NAMENDA) 5 MG tablet TAKE 1 TABLET BY MOUTH 2 TIMES DAILY  GUS Samuels - CNP   atorvastatin (LIPITOR) 40 MG tablet TAKE 1 TABLET BY MOUTH DAILY  Patient taking differently: Take 40 mg by mouth at bedtime  Qiana Smith MD   risperiDONE (RISPERDAL) 0.25 MG tablet Take 1 tablet by mouth 2 times daily  Qiana Smith MD   pantoprazole (PROTONIX) 40 MG tablet Take 1 tablet by mouth daily  Raul Nieto Dougie Royal MD   mirabegron (MYRBETRIQ) 25 MG TB24 Take 1 tablet by mouth daily  Moses Hoyos MD   trospium (SANCTURA) 20 MG tablet Take 20 mg by mouth in the morning and 20 mg before bedtime. Historical Provider, MD   apixaban (ELIQUIS) 5 MG TABS tablet TAKE 1/2 TABLET BY MOUTH 2 TIMES DAILY  Moses Hoyos MD   metoprolol succinate (TOPROL XL) 25 MG extended release tablet TAKE 3 TABLETS BY MOUTH TWICE DAILY  Patient taking differently: Take 25 mg by mouth in the morning and 25 mg in the evening. Gregg Cruz, APRN - CNP   famotidine (PEPCID) 20 MG tablet Take 1 tablet by mouth at bedtime  Moses Hoyos MD   donepezil (ARICEPT) 10 MG tablet Take 1 tablet by mouth daily  Historical Provider, MD   citalopram (CELEXA) 10 MG tablet TAKE 1 TABLET BY MOUTH DAILY  Moses Hoyos MD   Ferrous Sulfate (IRON) 325 (65 Fe) MG TABS Take by mouth daily   Historical Provider, MD   finasteride (PROSCAR) 5 MG tablet Take 5 mg by mouth daily. Historical Provider, MD   aspirin 81 MG tablet Take 81 mg by mouth daily.     Historical Provider, MD      Scheduled Meds:   vancomycin  1,250 mg IntraVENous Q24H    aspirin  81 mg Oral Daily    isosorbide mononitrate  30 mg Oral Daily    citalopram  10 mg Oral Daily    atorvastatin  40 mg Oral Nightly    lactobacillus  1 capsule Oral BID WC    trospium  20 mg Oral Nightly    pantoprazole  40 mg Oral Daily    memantine  5 mg Oral BID    finasteride  5 mg Oral Daily    tamsulosin  0.4 mg Oral Daily    ferrous sulfate  325 mg Oral Daily    amLODIPine  10 mg Oral Daily    QUEtiapine  50 mg Oral Nightly    QUEtiapine  25 mg Oral QAM AC    risperiDONE  0.25 mg Oral BID    cefepime  2,000 mg IntraVENous Q24H    sodium chloride flush  5-40 mL IntraVENous 2 times per day    enoxaparin  40 mg SubCUTAneous Nightly    OLANZapine  5 mg IntraMUSCular Once     Continuous Infusions:   sodium chloride      sodium chloride 10 mL/hr at 01/23/23 0655     PRN Meds:sodium chloride, magnesium sulfate, sodium chloride flush, sodium chloride, ondansetron **OR** ondansetron, polyethylene glycol, acetaminophen **OR** acetaminophen     Past Medical History:  Past Medical History:   Diagnosis Date    Atrial fibrillation ([facility]     Blind right eye     Chronic kidney disease     Dementia ([facility] [address]     Hyperlipidemia     Hypertension     Pneumonia         Past Surgical History:    has a past surgical history that includes Tonsillectomy; Appendectomy; Colonoscopy; eye surgery; Upper gastrointestinal endoscopy; joint replacement (2013); hernia repair; pacemaker placement (10/13/2017); ablation of dysrhythmic focus; and pacemaker placement (09/16/2022). Social History:  Reviewed. reports that he quit smoking about 47 years ago. His smoking use included cigarettes. He has a 10.00 pack-year smoking history. He has never used smokeless tobacco. He reports that he does not drink alcohol and does not use drugs. Family History:  Reviewed. family history includes Asthma in an other family member; Heart Disease in his father and mother; Other in his mother. Denies family history of sudden cardiac death, arrhythmia, premature CAD    Review of Systems:  Constitutional: Positive for fatigue and weakness. Negative for fever, night sweats, chills, weight changes  Skin: Negative for rash, dry skin, pruritus, bruising, bleeding, blood clots, or changes in skin pigment  HEENT: Negative for vision changes, ringing in the ears, sore throat, dysphagia, or swollen lymph nodes  Respiratory: Reviewed in HPI  Cardiovascular: Reviewed in HPI  Gastrointestinal: Negative for abdominal pain, N/V/D, constipation, or black/tarry stools  Genito-Urinary: Negative for dysuria, incontinence, urgency, or hematuria  Musculoskeletal: Negative for joint swelling, muscle pain, or injuries  Neurological/Psych: Positive for confusion. Negative for seizures, headaches, balance issues or TIA-like symptoms.  No anxiety, depression, or insomnia    Physical Examination:  Vitals:    01/24/23 0756   BP: 112/74   Pulse: 82   Resp: 18   Temp: 97.9 °F (36.6 °C)   SpO2: 96%      In: 480 [P.O.:480]  Out: -    Wt Readings from Last 3 Encounters:   01/23/23 150 lb 8 oz (68.3 kg)   01/22/23 150 lb 14.4 oz (68.4 kg)   01/19/23 176 lb (79.8 kg)       Intake/Output Summary (Last 24 hours) at 1/24/2023 0846  Last data filed at 1/23/2023 2007  Gross per 24 hour   Intake 480 ml   Output --   Net 480 ml       Telemetry: Personally Reviewed  - V-paced  Constitutional: Cooperative and in no apparent distress, and appears frail  Skin: Warm and pink; no pallor, cyanosis, bruising, or clubbing. Left chest PPM site swollen with redness  HEENT: Symmetric and normocephalic. PERRL, EOM intact. Conjunctiva pink with clear sclera. Mucus membranes pink and moist. Teeth intact. Thyroid smooth without nodules or goiter. Cardiovascular: Regular rate and rhythm. S1/S2 present without murmurs, rubs, or gallops. Peripheral pulses 2+, capillary refill < 3 seconds. No elevation of JVP. No peripheral edema  Respiratory: Respirations symmetric and unlabored. Lungs clear to auscultation bilaterally, no wheezing, crackles, or rhonchi  Gastrointestinal: Abdomen soft and round. Bowel sounds normoactive in all quadrants without tenderness or masses. Musculoskeletal: Bilateral upper and lower extremity strength 5/5 with full ROM. + Generalized weakness  Neurologic/Psych: Awake. + Confusion. Calm affect, appropriate mood    Pertinent labs, diagnostic, device, and imaging results reviewed as a part of this visit    Labs:    BMP:   Recent Labs     01/23/23  0441 01/24/23  0532    139   K 4.3 4.1    103   CO2 31 24   BUN 19 25*   CREATININE 1.0 1.2   MG 1.80 1.70*     Estimated Creatinine Clearance: 41 mL/min (based on SCr of 1.2 mg/dL).    CBC:   Recent Labs     01/23/23  0441 01/24/23  0532   WBC 7.6 12.0*   HGB 14.0 13.9   HCT 42.5 41.5   MCV 86.8 86.6    205     Thyroid:   Lab Results Component Value Date/Time    TSH 2.45 10/21/2021 02:04 PM     Lipids:   Lab Results   Component Value Date/Time    CHOL 137 2022 05:45 AM    HDL 30 2022 05:45 AM    TRIG 73 2022 05:45 AM     LFTS:   Lab Results   Component Value Date/Time    ALT 10 2023 10:17 AM    AST 18 2023 10:17 AM    ALKPHOS 99 2023 10:17 AM    PROT 7.0 2023 10:17 AM    PROT 7.6 2013 10:00 AM    AGRATIO 1.3 2023 10:17 AM    BILITOT 1.0 2023 10:17 AM     Cardiac Enzymes:   Lab Results   Component Value Date/Time    TROPONINI <0.01 2023 10:17 AM    TROPONINI <0.01 2022 11:56 PM    TROPONINI 0.01 2022 09:21 PM     Coags:   Lab Results   Component Value Date/Time    PROTIME 16.2 2023 05:32 AM    INR 1.31 2023 05:32 AM       EC23 (Personally Interpreted)   - Ventricular paced    DORITA:    Low-normal left ventricular systolic function with ejection fraction visually estimated at 50%. No evidence of mass, vegetation or thrombus noted on native valves nor pacemaker leads. Mild mitral regurgitation. Moderate tricuspid regurgitation. Systolic pulmonary artery pressure (SPAP) estimated at 26 mmHg (RA pressure 8 mmHg). Cath:   RA 5 54%  RV 20/4    PA  48%  PW 11    AORTA 110/68 85%  TASHA CARDIAC OUTPUT 3.8 L/min  SVR 1630           LVEDP 11  GRADIENT ACROSS AORTIC VALVE None     LM Less than 10% riyjmaoh-[id number] stenosis. LAD Calcified, proximal 40 to 50% stenosis, mid-distal 60 to 75% stenosis. LCX Calcified, there is proximal-mid 75% eccentric stenosis at a bifurcation with OM1 and OM 2 as well as the AV groove. RCA Dominant, calcified, large vessel, [id number] 40% stenoses.     Problem List:   Patient Active Problem List    Diagnosis Date Noted    Pneumonia 2015    Pacemaker-dependent due to native cardiac rhythm insufficient to support life 2023    Infection of pacemaker pocket ([facility] [address] 2023 Orthostatic hypotension 09/09/2022    Atrial fibrillation, rapid ([facility] [address] 09/08/2022    Syncope, unspecified syncope type 08/10/2022    Ischemic cardiomyopathy 08/10/2022    Dementia with behavioral disturbance     Hallucinations     Syncope and collapse     COVID     Other chest pain 04/21/2021    Coronary artery disease involving native heart without angina pectoris 06/10/2020    Abnormal cardiovascular stress test 05/22/2020    SOB (shortness of breath) 05/04/2020    Cardiac resynchronization therapy pacemaker (CRT-P) in place 04/02/2019    Abnormal echocardiogram 04/02/2019    Dizziness 04/02/2019    A-fib ([facility] [address] 03/03/2016    Hyperlipidemia     Hypertension     Chronic kidney disease, stage III (moderate) (HCC)     Tracheobronchomalacia     Abnormal chest CT     Cough syncope         Assessment and Plan:     1. Pacemaker Pocket Infection   - On IV ABX. ID following    - Pt needs lead/device extraction due to pocket infection   - Risks, benefits and alternative of lead extraction discussed with pt/family. Printed material about lead extraction, risks, benefits and alternatives were given to patient. Patient was encouraged to review information given, and call back with any questions about risks, benefits and alternative of procedure. Patient understand that lead extraction could be a potentially life threatening procedure. The risks, benefits and alternatives of the lead extraction and AICD implantation were discussed with the patient. The risks including, but not limited to, the risks of bleeding, infection, pain, device malfunction, lead dislodgement, radiation exposure, injury to cardiac and surrounding structures (including pneumothorax), stroke, cardiac perforation, tamponade, need for emergent heart surgery, injury to esophagus, myocardial infarction and death were discussed in detail.       2. Permanent Atrial Fibrillation  - Hx of AV dina ablation  - Currently in V-paced (underlying AF)  - No anti-coagulation due to falls and orthostatic issues. Consider HAN closure in future    3. Pacemaker Dependent   - Will require leadless micra PPM placement with device extraction as he is pacemaker dependent   - The risks, benefits and alternatives of the procedure were discussed with the patient. The risks including, but not limited to, the risks of bleeding, infection, pain, device malfunction, lead dislodgement, radiation exposure, injury to cardiac and surrounding structures (including pneumothorax), stroke, cardiac perforation, tamponade, need for emergent heart surgery, myocardial infarction and death were discussed in detail. ---- > The patient/family opted to proceed with the device implantation. 4. Orthostatic Hypotension   - Stable   - Encourage adequate hydration    5. CAD  - Stable  - No complaints of angina  - Continue ASA and statin    6. Hypomagnesemia   - Low this AM   - Replace to keep >2    Multiple medical conditions with risk of decompensation. All pertinent information and plan of care discussed with the EP physician. All questions and concerns were addressed to the patient. Alternatives to my treatment were discussed. I have discussed the above stated plan with patient and the nurse. The patient verbalized understanding and agreed with the plan.     Thank you for allowing to us to participate in the care of GUS Claros-CNP  [address]   Office: [phone]

## 2023-01-24 NOTE — PROGRESS NOTES
[facility] [address] Department   Phone: [phone]    Occupational Therapy    [x] Initial Evaluation            [] Daily Treatment Note         [] Discharge Summary      Patient: Camila Ly   : 1935   MRN: [id number]   Date of Service:  2023    Admitting Diagnosis:  Infection of pacemaker pocket [facility])  Current Admission Summary: 82 y.o. male, with past medical history of hypertension, hyperlipidemia, dementia and atrial fibrillation, who was a direct admit from [facility] to [facility] with an infected pacemaker site. Admitted as inpatient for further management. Started on IV Cefepime and Vanco.  Followed by EP and ID. EP to proceed with explant. Past Medical History:  has a past medical history of Atrial fibrillation ([address] Blind right eye, Chronic kidney disease, Dementia ([address] Hyperlipidemia, Hypertension, and Pneumonia. Past Surgical History:  has a past surgical history that includes Tonsillectomy; Appendectomy; Colonoscopy; eye surgery; Upper gastrointestinal endoscopy; joint replacement (); hernia repair; pacemaker placement (10/13/2017); ablation of dysrhythmic focus; and pacemaker placement (2022). Discharge Recommendations: Camila Ly scored a 7/24 on the AM-PAC ADL Inpatient form. Current research shows that an AM-PAC score of 17 or less is typically not associated with a discharge to the patient's home setting. Based on the patient's AM-PAC score and their current ADL deficits, it is recommended that the patient have 3-5 sessions per week of Occupational Therapy at d/c to increase the patient's independence. Please see assessment section for further patient specific details. If patient discharges prior to next session this note will serve as a discharge summary. Please see below for the latest assessment towards goals.        DME Required For Discharge: DME to be determined at next level of care, DME to be determined pending patient progress    Precautions/Restrictions: high fall risk      Pre-Admission Information   Lives With: spouse    Type of Home: house  Home Layout: one level, able to live on main level  Home Access: 2 step to enter with handrail. Bathroom Layout: walk in shower  Bathroom Equipment: grab bars in shower  Toilet Height: standard height  Home Equipment: rolling walker, rollator - 4 wheeled walker, single point cane  Transfer Assistance: Independent without use of device  Ambulation Assistance:modified independent with use of SPC  ADL Assistance: independent with all ADL's  IADL Assistance: requires assistance with all homemaking tasks  Active :        [] Yes  [x] No  Hand Dominance: [] Left  [] Right  Current Employment: retired. 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 Per chart, pt typically falls to the right and has had multiple falls at home (per therapy evaluation on 8/15/22)    Examination   Vision:   Vision Gross Assessment: Impaired and Vision Corrective Device: wears glasses at all times, (R) eye prosthetic  Hearing:   WFL, hard of hearing  ROM:   (B) UE AROM WFL  Strength:   (B) UE strength grossly [facility]    Therapist Clinical Decision Making (Complexity): medium complexity  Clinical Presentation: evolving      Subjective  General: Pt supine in bed upon arrival, pleasantly confused, cooperative with evaluation  Pain: 0/10  Pain Interventions: not applicable        Activities of Daily Living  Basic Activities of Daily Living  Lower Extremity Dressing: dependent  Dressing Comments: depends change  Toileting: dependent. Toileting Comments: A for pericare, depends change s/p BM  Instrumental Activities of Daily Living  No IADL completed on this date.     Functional Mobility  Bed Mobility  Supine to Sit: dependent assistance  Sit to Supine: 2 person assistance with Dx2   Rolling Left: dependent assistance  Rolling Right: dependent assistance  Scootin person assistance with dx2   Comments:  Transfers  Sit to stand transfer:2 person assistance with max/dependent x2   Stand to sit transfer: 2 person assistance with max/dependent x2   Comments: Unable to achieve full stand d/t posterior lean, flexed hips  Functional Mobility:  Sitting Balance: maximum assistance, ~1 min prior to pt fatiguing requiring dependent assist, heavy posterior lean EOB. Standing Balance: 2 person assistance with dependent x2 . No functional mobility completed on this date secondary to safety concerns.     Other Therapeutic Interventions    Functional Outcomes  -PAC Inpatient Daily Activity Raw Score: 7    Cognition  Overall Cognitive Status: Impaired  Arousal/Alterness: inconsistent responses to stimuli  Following Commands: inconsistently follows commands  Attention Span: difficulty attending to directions, difficulty dividing attention  Memory: decreased recall of precautions, decreased recall of recent events, decreased short term memory, decreased long term memory  Safety Judgement: decreased awareness of need for assistance, decreased awareness of need for safety  Problem Solving: decreased awareness of errors  Insights: not aware of deficits  Initiation: requires cues for all  Sequencing: requires cues for all  Orientation:    alert and oriented x 4  Command Following:   impaired     Education  Barriers To Learning: cognition  Patient Education: patient educated on goals, OT role and benefits, plan of care, ADL adaptive strategies, transfer training, discharge recommendations  Learning Assessment:  patient will require reinforcement due to cognitive deficits    Assessment  Activity Tolerance: Limited d/t cognition, weakness  Impairments Requiring Therapeutic Intervention: decreased functional mobility, decreased ADL status, decreased strength, decreased cognition, decreased endurance, decreased balance, decreased IADL  Prognosis: good  Clinical Assessment: The patient is a 82 y.o. male who presents below their baseline level of function due to above deficits, associated with pacemaker infection. Typically, pt is independent for ADLs and is mod I with SPC for mobility. Currently, pt is requiring total assist of 1-2 persons for transfers and bed mobility. Limited assessment secondary to pts impaired cognition--unsafe to return home at this time secondary to pt's global weakness and anticipated burden on caregiver in home setting.  Continued OT indicated in order to promote return to PLOF    Safety Interventions: patient left in bed, bed alarm in place, call light within reach, gait belt, telesitter in use, and nurse notified    Plan  Frequency: 3-5 x/per week  Current Treatment Recommendations: strengthening, balance training, functional mobility training, transfer training, endurance training, patient/caregiver education, ADL/self-care training, IADL training, and equipment evaluation/education    Goals    Short Term Goals:  Time Frame: by discharge  Patient will complete self-feeding at set up assistance   Patient will complete grooming at stand by assistance   Patient will complete functional transfers at moderate assistance   Patient will increase functional sitting balance to CGA for improved ADL completion    Therapy Session Time     Individual Group Co-treatment   Time In    0819   Time Out    0912   Minutes    53        Timed Code Treatment Minutes:   38 minutes  Total Treatment Minutes:  53 minutes       Electronically Signed By: BRENNEN Shearer, [address] [place] [address] OTR/L [id number]

## 2023-01-24 NOTE — ANESTHESIA PRE PROCEDURE
Department of Anesthesiology  Preprocedure Note       Name:  Jacqui Cohen   Age:  82 y.o.  :  1935                                          MRN:  [id number]         Date:  2023      Surgeon: * Surgery not found *    Procedure:     Medications prior to admission:   Prior to Admission medications    Medication Sig Start Date End Date Taking? Authorizing Provider   isosorbide mononitrate (IMDUR) 30 MG extended release tablet Take 1 tablet by mouth daily 23   Norberto Severs, MD   LORazepam (ATIVAN) 2 MG/ML injection Infuse 0.5 mLs intravenously every 4 hours as needed (anxiety) for up to 7 days.  Max Daily Amount: 6 mg 23  Norberto Severs, MD   enoxaparin (LOVENOX) 40 MG/0.4ML Inject 0.4 mLs into the skin daily 23   Norberto Severs, MD   lactobacillus (CULTURELLE) capsule Take 1 capsule by mouth 2 times daily (with meals) 23   Norberto Severs, MD   QUEtiapine (SEROQUEL) 25 MG tablet Take 1 tablet by mouth every morning (before breakfast) 23   Norberto Severs, MD   QUEtiapine (SEROQUEL) 50 MG tablet Take 1 tablet by mouth nightly 23   Norberto Severs, MD   hydrALAZINE (APRESOLINE) 20 MG/ML injection Infuse 0.5 mLs intravenously every 4 hours as needed (SBP greater than 180 mmHg) 23   Norberto Severs, MD   amLODIPine (NORVASC) 10 MG tablet Take 1 tablet by mouth daily 23   Norberto Severs, MD   polyethylene glycol (GLYCOLAX) 17 g packet Take 17 g by mouth daily as needed for Constipation 23  Norberto Severs, MD   labetalol (NORMODYNE;TRANDATE) 5 MG/ML injection Infuse 4 mLs intravenously every 4 hours as needed (Give for systolic blood pressure greater than 208) Give for systolic blood pressure greater than 160 23   Norberto Severs, MD   cefepime (MAXIPIME) infusion Infuse 2,000 mg intravenously every 24 hours for 10 days Compound per protocol 23  Norberto Severs, MD tamsulosin (FLOMAX) 0.4 MG capsule Take 1 capsule by mouth daily 1/22/23   Elena Ashley MD   vancomycin [facility]) infusion Infuse 1,000 mg intravenously every 24 hours for 10 days Compound per protocol. 1/21/23 1/31/23  Elena Ashley MD   memantine (NAMENDA) 5 MG tablet TAKE 1 TABLET BY MOUTH 2 TIMES DAILY 12/5/22   GUS Arriaza CNP   atorvastatin (LIPITOR) 40 MG tablet TAKE 1 TABLET BY MOUTH DAILY  Patient taking differently: Take 40 mg by mouth at bedtime 11/21/22   Artur Carrera MD   risperiDONE (RISPERDAL) 0.25 MG tablet Take 1 tablet by mouth 2 times daily 10/24/22   Artur Carrera MD   pantoprazole (PROTONIX) 40 MG tablet Take 1 tablet by mouth daily 10/17/22   Artur Carrera MD   mirabegron [facility]) 25 MG TB24 Take 1 tablet by mouth daily 10/17/22   Artur Carrera MD   trospium (SANCTURA) 20 MG tablet Take 20 mg by mouth in the morning and 20 mg before bedtime. 8/26/22  Historical Provider, MD   apixaban (ELIQUIS) 5 MG TABS tablet TAKE 1/2 TABLET BY MOUTH 2 TIMES DAILY 8/8/22 8/26/22  Artur Carrera MD   metoprolol succinate (TOPROL XL) 25 MG extended release tablet TAKE 3 TABLETS BY MOUTH TWICE DAILY  Patient taking differently: Take 25 mg by mouth in the morning and 25 mg in the evening. 7/25/22 8/26/22  GUS Arriaza CNP   famotidine (PEPCID) 20 MG tablet Take 1 tablet by mouth at bedtime 7/8/22 8/26/22  Artur Carrera MD   donepezil (ARICEPT) 10 MG tablet Take 1 tablet by mouth daily 3/24/22 8/26/22  Historical Provider, MD   citalopram (CELEXA) 10 MG tablet TAKE 1 TABLET BY MOUTH DAILY 3/28/22   Artur Carrera MD   Ferrous Sulfate (IRON) 325 (65 Fe) MG TABS Take by mouth daily     Historical Provider, MD   finasteride (PROSCAR) 5 MG tablet Take 5 mg by mouth daily. 3/4/15   Historical Provider, MD   aspirin 81 MG tablet Take 81 mg by mouth daily.       Historical Provider, MD       Current medications:    No current facility-administered medications for this visit. No current outpatient medications on file.      Facility-Administered Medications Ordered in Other Visits   Medication Dose Route Frequency Provider Last Rate Last Admin    0.9 % sodium chloride infusion   IntraVENous PRN Jerome Cardona MD        magnesium sulfate 2000 mg in 50 mL IVPB premix  2,000 mg IntraVENous PRN Rosie Cortez MD 25 mL/hr at 01/24/23 0850 2,000 mg at 01/24/23 0850    magnesium sulfate 2000 mg in 50 mL IVPB premix  2,000 mg IntraVENous Once Jacinad Bread, APRN - CNP        vancomycin (VANCOCIN) 1,250 mg in sodium chloride 0.9 % 250 mL IVPB  1,250 mg IntraVENous Q24H Rosie Cortez .7 mL/hr at 01/24/23 0926 1,250 mg at 01/24/23 0926    aspirin EC tablet 81 mg  81 mg Oral Daily Saba Webster MD   81 mg at 01/23/23 4874    isosorbide mononitrate (IMDUR) extended release tablet 30 mg  30 mg Oral Daily Saba Webster MD   30 mg at 01/23/23 0923    citalopram (CELEXA) tablet 10 mg  10 mg Oral Daily Saba Webster MD   10 mg at 01/23/23 0924    atorvastatin (LIPITOR) tablet 40 mg  40 mg Oral Nightly Saba Webster MD   40 mg at 01/23/23 2008    lactobacillus (CULTURELLE) capsule 1 capsule  1 capsule Oral BID  Saba Webster MD   1 capsule at 01/23/23 1602    trospium (SANCTURA) tablet 20 mg  20 mg Oral Nightly Saba Webster MD   20 mg at 01/23/23 2008    pantoprazole (PROTONIX) tablet 40 mg  40 mg Oral Daily Saba Webtser MD   40 mg at 01/23/23 0923    memantine (NAMENDA) tablet 5 mg  5 mg Oral BID Saba Webster MD   5 mg at 01/23/23 2008    finasteride (PROSCAR) tablet 5 mg  5 mg Oral Daily Saba Webster MD   5 mg at 01/23/23 0924    tamsulosin (FLOMAX) capsule 0.4 mg  0.4 mg Oral Daily Saba Webster MD   0.4 mg at 01/23/23 5523    ferrous sulfate (IRON 325) tablet 325 mg  325 mg Oral Daily Saba Webster MD   325 mg at 01/23/23 0924    amLODIPine (NORVASC) tablet 10 mg  10 mg Oral Daily Saba Webster MD   10 mg at 01/23/23 0923    QUEtiapine (SEROQUEL) tablet 50 mg  50 mg Oral Nightly Robles Garcia MD   50 mg at 01/23/23 2008    QUEtiapine (SEROQUEL) tablet 25 mg  25 mg Oral QAM AC Robles Garcia MD   25 mg at 01/23/23 0649    risperiDONE (RISPERDAL) tablet 0.25 mg  0.25 mg Oral BID Robles Garcia MD   0.25 mg at 01/23/23 2008    cefepime (MAXIPIME) 2000 mg IVPB minibag  2,000 mg IntraVENous Q24H Walker Cedeno MD   Stopped at 01/23/23 1329    sodium chloride flush 0.9 % injection 5-40 mL  5-40 mL IntraVENous 2 times per day Robles Garcia MD   10 mL at 01/23/23 2351    sodium chloride flush 0.9 % injection 5-40 mL  5-40 mL IntraVENous PRN Robles Garcia MD   10 mL at 01/23/23 0653    0.9 % sodium chloride infusion   IntraVENous PRN Robles Garcia MD 10 mL/hr at 01/23/23 0655 New Bag at 01/23/23 0655    enoxaparin (LOVENOX) injection 40 mg  40 mg SubCUTAneous Nightly Robles Garcia MD   40 mg at 01/23/23 2008    ondansetron (ZOFRAN-ODT) disintegrating tablet 4 mg  4 mg Oral Q8H PRN Robles Garcia MD        Or    ondansetron (ZOFRAN) injection 4 mg  4 mg IntraVENous Q6H PRN Robles Garcia MD   4 mg at 01/23/23 0932    polyethylene glycol (GLYCOLAX) packet 17 g  17 g Oral Daily PRN Robles Garcia MD        acetaminophen (TYLENOL) tablet 650 mg  650 mg Oral Q6H PRN Robles Garcia MD        Or    acetaminophen (TYLENOL) suppository 650 mg  650 mg Rectal Q6H PRN Robles Garcia MD        OLANZapine (ZYPREXA) injection 5 mg  5 mg IntraMUSCular Once Chen Guy APRN - CNP           Allergies:     Allergies   Allergen Reactions    Sulfa Antibiotics        Problem List:    Patient Active Problem List   Diagnosis Code    Pneumonia J18.9    Abnormal chest CT R93.89    Cough syncope R55, R05.4    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    Hyperlipidemia E78.5    Hypertension I10    Chronic kidney disease, stage III (moderate) (HCC) N18.30    A-fib ([facility] [address] I48.91    Cardiac resynchronization therapy pacemaker (CRT-P) in place Z95.0    Abnormal echocardiogram R93.1  Dizziness R42    SOB (shortness of breath) R06.02    Abnormal cardiovascular stress test R94.39    Coronary artery disease involving native heart without angina pectoris I25.10    Other chest pain R07.89    Syncope and collapse R55    COVID U07.1    Dementia with behavioral disturbance F03.918    Hallucinations R44.3    Syncope, unspecified syncope type R55    Ischemic cardiomyopathy I25.5    Atrial fibrillation, rapid (HCC) I48.91    Orthostatic hypotension I95.1    Infection of pacemaker pocket ([address] T82. 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 single chamber PPM    PACEMAKER PLACEMENT  2022    biventricular    TONSILLECTOMY      UPPER GASTROINTESTINAL ENDOSCOPY         Social History:    Social History     Tobacco Use    Smoking status: Former     Packs/day: 1.00     Years: 10.00     Pack years: 10.00     Types: Cigarettes     Quit date: 1975     Years since quittin.9    Smokeless tobacco: Never    Tobacco comments:     Quit 40 years ago   Substance Use Topics    Alcohol use: No     Alcohol/week: 0.0 standard drinks                                Counseling given: Not Answered  Tobacco comments: Quit 40 years ago      Vital Signs (Current): There were no vitals filed for this visit.                                            BP Readings from Last 3 Encounters:   23 112/74   23 (!) 129/91   23 (!) 141/83       NPO Status:                                                                                 BMI:   Wt Readings from Last 3 Encounters:   01/23/23 150 lb 8 oz (68.3 kg)   01/22/23 150 lb 14.4 oz (68.4 kg)   01/19/23 176 lb (79.8 kg)     There is no height or weight on file to calculate BMI.    CBC:   Lab Results   Component Value Date/Time    WBC 12.0 01/24/2023 05:32 AM    RBC 4.80 01/24/2023 05:32 AM    HGB 13.9 01/24/2023 05:32 AM    HCT 41.5 01/24/2023 05:32 AM    MCV 86.6 01/24/2023 05:32 AM    RDW 16.1 01/24/2023 05:32 AM     01/24/2023 05:32 AM       CMP:   Lab Results   Component Value Date/Time     01/24/2023 05:32 AM    K 4.1 01/24/2023 05:32 AM    K 3.4 01/20/2023 05:14 AM     01/24/2023 05:32 AM    CO2 24 01/24/2023 05:32 AM    BUN 25 01/24/2023 05:32 AM    CREATININE 1.2 01/24/2023 05:32 AM    GFRAA >60 09/17/2022 07:12 AM    GFRAA >60 01/09/2013 10:00 AM    AGRATIO 1.3 01/19/2023 10:17 AM    LABGLOM 59 01/24/2023 05:32 AM    GLUCOSE 109 01/24/2023 05:32 AM    PROT 7.0 01/19/2023 10:17 AM    PROT 7.6 01/09/2013 10:00 AM    CALCIUM 9.1 01/24/2023 05:32 AM    BILITOT 1.0 01/19/2023 10:17 AM    ALKPHOS 99 01/19/2023 10:17 AM    AST 18 01/19/2023 10:17 AM    ALT 10 01/19/2023 10:17 AM       POC Tests: No results for input(s): POCGLU, POCNA, POCK, POCCL, POCBUN, POCHEMO, POCHCT in the last 72 hours.     Coags:   Lab Results   Component Value Date/Time    PROTIME 16.2 01/24/2023 05:32 AM    INR 1.31 01/24/2023 05:32 AM       HCG (If Applicable): No results found for: PREGTESTUR, PREGSERUM, HCG, HCGQUANT     ABGs: No results found for: PHART, PO2ART, CMV1GBZ, KIQ5WBE, BEART, J8VSLRHK     Type & Screen (If Applicable):  No results found for: LABABO, LABRH    Drug/Infectious Status (If Applicable):  No results found for: HIV, HEPCAB    COVID-19 Screening (If Applicable):   Lab Results   Component Value Date/Time    COVID19 Not Detected 08/14/2022 01:48 PM    COVID19 NEGATIVE 08/09/2022 01:24 PM           Anesthesia Evaluation  Patient summary reviewed and Nursing notes reviewed no history of anesthetic complications:   Airway: Mallampati: III  TM distance: <3 FB   Neck ROM: limited  Mouth opening: < 3 FB   Dental:    (+) upper dentures and lower dentures      Pulmonary:   (+) pneumonia: resolved,  shortness of breath:                             Cardiovascular:  Exercise tolerance: poor (<4 METS),   (+) hypertension:, pacemaker:, CAD:, dysrhythmias: atrial fibrillation, hyperlipidemia                  Neuro/Psych:   (+) psychiatric history:dementia            GI/Hepatic/Renal:   (+) renal disease: CRI,           Endo/Other: Negative Endo/Other ROS                    Abdominal:             Vascular: negative vascular ROS. Other Findings:             Anesthesia Plan      general     ASA 3     (Risks, benefits and alternatives of Gen anesthesia discussed with pt. Questions answered. Willing to proceed.)  Induction: intravenous. Anesthetic plan and risks discussed with patient.                         Zach Colin MD   1/24/2023

## 2023-01-24 NOTE — PROCEDURES
[facility]     Electrophysiology Procedure Note       Date of Procedure: 1/24/2023  Patient's Name: Hermelinda Shaver  YOB: 1935   Medical Record Number: [id number]  Procedure Performed by: MD Jasmin Hernández MD    Procedures performed:   lead removal of the right ventricular pacing lead   Lead removal of the LV/CS pacing lead   Removal of pacemaker pulse generator   Transcatheter insertion of permanent leadless ventricular pacemaker   Insertion of right femoral venous central line   Insertion of right femoral arterial line     Indication of the procedure: Pocket infection   Hermelinda Shaver is 82 y.o. male with history permanent atrial fibrillation, single chamber PPM in 2017 followed by Biv-pacemaker upgrade in 9/16/2017. Patient has presented with pocket infection and referred for lead extraction. He is pacemaker dependent. Details of procedure: The patient was brought to the electrophysiology laboratory in stable condition. The patient was in a fasting and non-sedated state. The risks, benefits and alternatives of the lead extraction were discussed with patient and his family members. The risks including, but not limited to, the risks of bleeding, infection, radiation exposure, injury to vascular, cardiac and surrounding structures (including pneumothorax), stroke, cardiac perforation, tamponade, need for emergent open heart surgery, myocardial infarction and death were discussed in detail. The patient opted to proceed with the procedure. A timeout protocol was completed to identify the patient and the procedure being performed. Patient underwent general anesthesia. Cardiothoracic surgery was informed. Chest and groin was prepped and draped in the usual sterile fashion. Femoral venous and arterial access was obtained under live ultrasound guidance.   The femoral vein and artery were identified with real time visualization of needle passage to the venous lumen.     Sheaths placed:   6F left femoral vein  5F left femoral artery     Patient blood culture is negative. Since he is pacemaker dependent we proceeded with implantation of leadless MICRA pacemaker first.     After femoral venous access, an extra stiff guide wire was placed in SVC. Serial dilation was performed using dilators up to 20 Fr. Then the MICRA introducer was flushed and placed over wire in the right atrium. The MICRA delivery system was also flushed using heparinized saline and then placed inside the introducer. Tricuspid valve was negotiated and the MICRA device was advanced into the right ventricle. The leadless pacemaker was placed on the septum. Venography was used to confirm location on the septum. The device was then deployed. Testing was done to assure engagement of the tines and stability of the device. Initial attempt did not provide good sensing and capture threshold. The device was recaptured and moved to another location on the septum. Good sensing and capture threshold was achieved. The device was then released and the tether string was cut. The delivery sytem and introducer were removed and a figure of eight suture and manual pressure was used to obtain hemostasis. After injection of 2% lidocaine in the left pectoral area, an incision was made over the old scar and extended to to the pocket using electrocautery and blunt dissection. The old pulse generator was explanted. Leads and device were released from the scar tissues inside the pocket using electrocautery. Cultures were sent. Pocket appeared infected. Extensive debridement of the infected pocket. All necrotic skin/tissue were removed. The LV lead was completely removed using traction. A standard stylet was advanced inside the ventricular lead and under fluoroscopy we tried to retract the screw at the tip of the lead. This was was successful. The lead was cut and prepped in usual fashion.  A locking stylet (LLD-EZ) was advanced inside the lead and locked in. Suture tie was made around the lead. TightRail 12 F was used and advanced over the lead. There was heavy calcification at the level of clavicle and the Tightrail could be be passed the clavicle area. TightRail Sub-C Rotating Dilator Sheath was used and we manage to advance it across the calcified area to the innominate vein. Then it was switched to TightRail again and it was advanced all the way to the distal part of the lead. However, there was severe fibrosis and attachment at the distal part of the lead. Despite multiple attempts with TightRail and mechanical sheath over it, the lead remained attached to heavy fibrosis and could not be removed. TightRail was up sized to 14F with mechanical sheath. Again multiple attempts to release the lead and remove it failed. TightRail was exchanged to a 14F GlideLight laser sheath. A medium size VisiSheath was also used over the GlideLight laser sheath. Using laser sheath and VisiSheath the fibrosis at the distal lead was passed and lead was removed completely. The lead was completely removed. The pocket was then closed in three separate subcutaneous layers using 2-0 & 3-0 Vicryl and subcuticular layer using 4-0 Vicryl. The skin was covered with pressure dressing. All sponge and needle counts were reported as correct at the end of the procedure. The patient tolerated the procedure well and there were no immediate complications. Patient was transported to the holding area in stable condition. Procedure was complex and prolonged due to several factors including heavy calcification and fibrosis requiring exchanging and using mechanical and laser sheaths. EBL less than 50 ml     Lead and device information:         Plan:   The patient will be admitted and have usual post-implant care, including chest x-ray, and antibiotic therapy and interrogation of the device.

## 2023-01-24 NOTE — PROGRESS NOTES
[facility] [address] Department   Phone: [phone]    Physical Therapy    [x] Initial Evaluation            [] Daily Treatment Note         [] Discharge Summary      Patient: Joi Kendall   : 1935   MRN: [id number]   Date of Service:  2023  Admitting Diagnosis: Infection of pacemaker pocket [facility])    Current Admission Summary: Joi Kendall is a 82 y.o. male with a past medical history of hypertension, A. fib dementia hyperlipidemia chronic kidney disease former smoker CAD today with daughter with complaints of swelling, redness and tenderness to pacemaker site. He had a pacemaker placed about 5 months ago. He states over the past several days he has noticed increased swelling redness and tenderness to the area. He does endorse some fevers and chills at home. States its painful to touch. Onset of symptoms over the past 2 to 3 days. Duration of symptoms have been persistent since onset. Context includes pacemaker complication. Denies any nausea vomiting diarrhea or abdominal pain. Denies numbness or tingling. Denies cough or congestion. He has not taken anything today for pain currently denies any pain. States it is only painful to touch. Otherwise denies any other complaints. Nothing seems to make symptoms better or worse. Patient found to have infected pacemaker and pacemaker extraction is scheduled for 2023. Past Medical History:  has a past medical history of Atrial fibrillation ([address] Blind right eye, Chronic kidney disease, Dementia ([address] Hyperlipidemia, Hypertension, and Pneumonia. Past Surgical History:  has a past surgical history that includes Tonsillectomy; Appendectomy; Colonoscopy; eye surgery; Upper gastrointestinal endoscopy; joint replacement (); hernia repair; pacemaker placement (10/13/2017); ablation of dysrhythmic focus; and pacemaker placement (2022).     Discharge Recommendations: Joi Kendall scored a  on the AM-PAC short mobility form. Current research shows that an AM-PAC score of 17 or less is typically not associated with a discharge to the patient's home setting. Based on the patient's AM-PAC score and their current functional mobility deficits, it is recommended that the patient have 3-5 sessions per week of Physical Therapy at d/c to increase the patient's independence. Please see assessment section for further patient specific details. If patient discharges prior to next session this note will serve as a discharge summary. Please see below for the latest assessment towards goals. DME Required For Discharge: DME to be determined at next level of care, DME to be determined pending patient progress  Precautions/Restrictions: high fall risk  Weight Bearing Restrictions: weight bearing as tolerated  [] Right Upper Extremity  [] Left Upper Extremity [] Right Lower Extremity  [] Left Lower Extremity     Required Braces/Orthotics: no braces required   [] Right  [] Left  Positional Restrictions:no positional restrictions    Pre-Admission Information   Lives With: Spouse  Type of Home: House  Home Layout: One level, Able to Live on Main level with bedroom/bathroom  Home Access: Stairs to enter with rails  Entrance Stairs - Number of Steps: 2  Entrance Stairs - Rails: Both  Bathroom Shower/Tub: Walk-in shower  Bathroom Toilet: Standard  Bathroom Equipment: Grab bars in shower  Home Equipment: Barbi Goes, 4 wheeled  Has the patient had two or more falls in the past year or any fall with injury in the past year?: Yes (Per patient's most recent PT/OT evaluation on 8/15.  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 Posture:   Unable to maintain sitting position at EOB so evaluating posture was impossible. Sensation:   WFL  Proprioception:    absent proprioception in (B) LE  Tone:   Normotonic  Coordination Testing:   Unable to formally test secondary to poor command following, dementia. ROM:   (B) LE AROM WFL  Strength:   (B) LE strength grossly 2  Therapist Clinical Decision Making (Complexity): medium complexity  Clinical Presentation: evolving      Subjective  General: Patient oriented to self only. He is asking when his wife is coming. Pain: 0/10  Pain Interventions: RN notified and repositioned        Functional Mobility  Bed Mobility  Supine to Sit: dependent assistance  Sit to Supine: 2 person assistance with dependent   Rolling Left: dependent assistance  Rolling Right: dependent assistance  Scootin person assistance with dependent   Comments:  Transfers  Sit to stand transfer: 2 person assistance with dependent   Stand to sit transfer: 2 person assistance with dependent   Comments:  Ambulation  Ambulation not tested on this date secondary to patient lacking physical ability to perform. Distance: N/A  Gait Mechanics: N/A  Comments:    Stair Mobility  Stair mobility not completed on this date. Comments:  Wheelchair Mobility:  No w/c mobility completed on this date.   Comments:  Balance  Static Sitting Balance: poor (-): requires max (A) to maintain balance  Comments:  Patient unable to maintain sitting EOB, leaning posteriorly     Other Therapeutic Interventions    Functional Outcomes  AM-PAC Inpatient Mobility Raw Score : 7              Cognition  Overall Cognitive Status: Impaired  Orientation:    oriented to person, disoriented to place, disoriented to time , and disoriented to situation  Command Following:   impaired    Education  Barriers To Learning: cognition  Patient Education: patient educated on goals, PT role and benefits, plan of care, general safety, functional mobility training, proper use of assistive device/equipment, orientation, injury prevention, transfer training, discharge recommendations  Learning Assessment:  patient will require reinforcement due to cognitive deficits    Assessment  Activity Tolerance: Patient with very limited tolerance this session, difficulty maintaining sitting balance, only able to stand with assist x2. Impairments Requiring Therapeutic Intervention: decreased functional mobility, decreased strength, decreased safety awareness, decreased cognition, decreased endurance, decreased balance, decreased posture  Prognosis: fair  Clinical Assessment: Patient is only oriented to self and is not a reliable historian. Per patient's therapy noted dated 9/2022, he was living at home with his spouse and ambulatory with . He does not have any pressure sores to speak of so he was likely at least somewhat mobile at home however, patient's chart reports patient was having multiple falls at home. Presently, he requires MAX to TOTAL assist for bed mobility and standing and he is unable to transfer or ambulate. Recommend 24 hour assist and continued therapy as it appears patient is not at his functional baseline. At this time, it would be very difficult for his spouse to care for him at home based on today's performance.     Safety Interventions: patient left in bed, bed alarm in place, call light within reach, gait belt, patient at risk for falls, telesitter in use, nurse notified, and fall mats at bedside    Plan  Frequency: 3-5 x/per week  Current Treatment Recommendations: strengthening, balance training, functional mobility training, transfer training, gait training, stair training, endurance training, patient/caregiver education, safety education, and equipment evaluation/education    Goals  Patient Goals: None verbalized. Short Term Goals:  Time Frame: Discharge.   Patient will complete bed mobility at moderate assistance   Patient will complete transfers at moderate assistance   Patient will ambulate 10 ft with use of LRAD at moderate assistance  Patient will ascend/descend 2 stairs with (B) handrail at moderate assistance    Therapy Session Time      Individual Group Co-treatment   Time In     0819   Time Out     0912   Minutes     53     Timed Code Treatment Minutes:  38 Minutes  Total Treatment Minutes:  53       Electronically Signed By: Star William PT, DPT, [id number] [id number]

## 2023-01-24 NOTE — PROGRESS NOTES
Pt returned to 3A from cath lab. Pt alert, drowsy. Vss. Lt chest pacemaker dressing c/d/I. Rt femoral cath site dressing c/d/I, No bleeding, hematoma observed. Lt femoral cath site dressing c/d/I. No bleeding/hematoma observed. Tele reading vpaced 76s. Pt on low bed. Camera in place fall precautions in place.  Bed low, call bell in reach, instructed to call for assist.

## 2023-01-24 NOTE — PROGRESS NOTES
Pt transferred to back to room 301 on telemetry monitor, pt VSS and remains in ventricular paced rhythm. Pt awake and able to follow commands. Pt family at bedside waiting for pt upon return to room. Bilateral groin site dressings clean dry and intact, bilateral pedal pulses easily palpated. Left upper chest dressing clean dry and intact. Pt family, pt and Beatrice RN aware pt to lay flat for 4 hours.

## 2023-01-24 NOTE — PROGRESS NOTES
Hospitalist Progress Note      PCP: Shandra Leblanc MD    Date of Admission: 1/22/2023    Chief Complaint: Direct admit from [facility] with infected pacemaker    Hospital Course:  82 y.o. male, with past medical history of hypertension, hyperlipidemia, dementia and atrial fibrillation, who was a direct admit from [facility] to [facility] with an infected pacemaker site. Admitted as inpatient for further management. Started on IV Cefepime and Vanco.  Followed by EP and ID. On 1/24/23, S/P Removal and extraction of pacemaker leads.  - Pocket debridement  - Micra implant. Subjective:  Patient with pain pacemaker. No CP, SOB, HA or abdominal pain. No fevers. Daughter and wife at bedside.         Medications:  Reviewed    Infusion Medications    sodium chloride      sodium chloride      sodium chloride      sodium chloride 10 mL/hr at 01/23/23 1150     Scheduled Medications    magnesium sulfate  2,000 mg IntraVENous Once    sodium chloride flush  5-40 mL IntraVENous 2 times per day    sodium chloride flush  5-40 mL IntraVENous 2 times per day    vancomycin  1,250 mg IntraVENous Q24H    aspirin  81 mg Oral Daily    isosorbide mononitrate  30 mg Oral Daily    citalopram  10 mg Oral Daily    atorvastatin  40 mg Oral Nightly    lactobacillus  1 capsule Oral BID WC    trospium  20 mg Oral Nightly    pantoprazole  40 mg Oral Daily    memantine  5 mg Oral BID    finasteride  5 mg Oral Daily    tamsulosin  0.4 mg Oral Daily    ferrous sulfate  325 mg Oral Daily    amLODIPine  10 mg Oral Daily    QUEtiapine  50 mg Oral Nightly    QUEtiapine  25 mg Oral QAM AC    risperiDONE  0.25 mg Oral BID    cefepime  2,000 mg IntraVENous Q24H    sodium chloride flush  5-40 mL IntraVENous 2 times per day    enoxaparin  40 mg SubCUTAneous Nightly    OLANZapine  5 mg IntraMUSCular Once     PRN Meds: sodium chloride, magnesium sulfate, sodium chloride flush, sodium chloride, HYDROmorphone, ondansetron, sodium chloride flush, sodium chloride, HYDROmorphone, HYDROmorphone, oxyCODONE **OR** oxyCODONE, ondansetron, diphenhydrAMINE, labetalol, sodium chloride flush, sodium chloride, ondansetron **OR** ondansetron, polyethylene glycol, acetaminophen **OR** acetaminophen      Intake/Output Summary (Last 24 hours) at 1/24/2023 1524  Last data filed at 1/24/2023 1446  Gross per 24 hour   Intake 240 ml   Output 350 ml   Net -110 ml         Physical Exam Performed:    BP 96/65   Pulse 70   Temp 98.2 °F (36.8 °C) (Temporal)   Resp 18   Ht 5' 6\" (1.676 m)   Wt 150 lb 8 oz (68.3 kg)   SpO2 100%   BMI 24.29 kg/m²     General appearance: No apparent distress, appears stated age and cooperative. HEENT: Pupils equal, round, and reactive to light. Conjunctivae/corneas clear. Neck: Supple, with full range of motion. No jugular venous distention. Trachea midline. Respiratory:  Normal respiratory effort. Clear to auscultation, bilaterally without Rales/Wheezes/Rhonchi. Cardiovascular: Regular rate and rhythm with normal S1/S2 without murmurs, rubs or gallops. L PPM with erythema, swelling and tenderness to palpation  Abdomen: Soft, non-tender, non-distended with normal bowel sounds. Musculoskeletal: No clubbing, cyanosis or edema bilaterally. Full range of motion without deformity. Skin: Skin color, texture, turgor normal.  No rashes or lesions. Neurologic:  Neurovascularly intact without any focal sensory/motor deficits.  Cranial nerves: II-XII intact, grossly non-focal.  Psychiatric: Alert and oriented to self, not time and place  Capillary Refill: Brisk, 3 seconds, normal   Peripheral Pulses: +2 palpable, equal bilaterally       Labs:   Recent Labs     01/23/23  0441 01/24/23  0532   WBC 7.6 12.0*   HGB 14.0 13.9   HCT 42.5 41.5    205       Recent Labs     01/23/23  0441 01/24/23  0532    139   K 4.3 4.1    103   CO2 31 24   BUN 19 25*   CREATININE 1.0 1.2   CALCIUM 9.2 9.1       No results for input(s): AST, ALT, BILIDIR, BILITOT, ALKPHOS in the last 72 hours. Recent Labs     01/24/23  0532   INR 1.31*     No results for input(s): Shelbie Kras in the last 72 hours. Urinalysis:      Lab Results   Component Value Date/Time    NITRU Negative 01/19/2023 12:25 PM    WBCUA 21-50 04/21/2022 02:20 PM    BACTERIA 1+ 04/21/2022 02:20 PM    RBCUA 3-4 04/21/2022 02:20 PM    BLOODU Negative 01/19/2023 12:25 PM    SPECGRAV 1.015 01/19/2023 12:25 PM    GLUCOSEU Negative 01/19/2023 12:25 PM       Radiology:  No orders to display       IP CONSULT TO CARDIOLOGY  PHARMACY TO DOSE VANCOMYCIN  IP CONSULT TO INFECTIOUS DISEASES    Assessment/Plan:    Active Hospital Problems    Diagnosis     Pacemaker-dependent due to native cardiac rhythm insufficient to support life [I49.8, Z95.0]      Priority: Medium    Pacemaker infection ([facility] [T82. 7XXA]      Priority: Medium    Atrial fibrillation, rapid ([facility] [I48.91]      Priority: Medium    Ischemic cardiomyopathy [I25.5]      Priority: Medium    Dementia with behavioral disturbance [F03.918]      Priority: Medium    Coronary artery disease involving native heart without angina pectoris [I25.10]     A-fib (HCC) [I48.91]     Hypertension [I10]     Hyperlipidemia [E78.5]      Infected pacemaker  Continue IV Vanco and Cefepime  S/P explant on 1/24/23  EP input appreciated  ID input appreciated  2. Afib   - May need temporary pacemaker after explant  3. HTN   - Continue Norvasc and Imdur  4. Dementia   - Continue Namenda and Seroquel  5. BPH   - Continue Finasteride    DVT Prophylaxis: Lovenox  Diet: Diet NPO  Code Status: Full Code  PT/OT Eval Status: Following    [facility]    Discussed with patient, nursing and CM. Discussed in detail with wife and daughter. Will need SNF upon DC. Will see what antibiotic regimen is needed.     Mylene Huff MD

## 2023-01-24 NOTE — ACP (ADVANCE CARE PLANNING)
Advanced Care Planning Note. Purpose of Encounter: Advanced care planning in light of dementia  Parties In Attendance: Patient, wife, daughter  Decisional Capacity: No  Subjective: Patient with pain in pacemaker  Objective: Cr 1.2  Goals of Care Determination: Patient/POA want full support (CPR, vent, surgery, HD, no trach, no PEG)  Plan:  IV antibiotics. Pacemaker explant. Cardio and ID consults. PT/OT. SNF placement  Code Status: Full code   Time spent on Advanced care Plannin minutes  Advanced Care Planning Documents: Completed advanced directives on chart, wife is the POA.     Donya Emerson MD  2023 3:27 PM

## 2023-01-25 ENCOUNTER — NURSE ONLY (OUTPATIENT)
Dept: CARDIOLOGY CLINIC | Age: 88
End: 2023-01-25
Payer: MEDICARE

## 2023-01-25 DIAGNOSIS — Z95.0 CARDIAC RESYNCHRONIZATION THERAPY PACEMAKER (CRT-P) IN PLACE: ICD-10-CM

## 2023-01-25 DIAGNOSIS — Z95.0 PACEMAKER-DEPENDENT DUE TO NATIVE CARDIAC RHYTHM INSUFFICIENT TO SUPPORT LIFE: ICD-10-CM

## 2023-01-25 DIAGNOSIS — I49.8 PACEMAKER-DEPENDENT DUE TO NATIVE CARDIAC RHYTHM INSUFFICIENT TO SUPPORT LIFE: ICD-10-CM

## 2023-01-25 DIAGNOSIS — I48.11 LONGSTANDING PERSISTENT ATRIAL FIBRILLATION (HCC): ICD-10-CM

## 2023-01-25 DIAGNOSIS — Z95.0 PACEMAKER: Primary | ICD-10-CM

## 2023-01-25 LAB
ANION GAP SERPL CALCULATED.3IONS-SCNC: 12 MMOL/L (ref 3–16)
BASOPHILS ABSOLUTE: 0 K/UL (ref 0–0.2)
BASOPHILS RELATIVE PERCENT: 0.3 %
BUN BLDV-MCNC: 36 MG/DL (ref 7–20)
CALCIUM SERPL-MCNC: 8.9 MG/DL (ref 8.3–10.6)
CHLORIDE BLD-SCNC: 106 MMOL/L (ref 99–110)
CO2: 23 MMOL/L (ref 21–32)
CREAT SERPL-MCNC: 1.4 MG/DL (ref 0.8–1.3)
EKG ATRIAL RATE: 250 BPM
EKG DIAGNOSIS: NORMAL
EKG Q-T INTERVAL: 474 MS
EKG QRS DURATION: 130 MS
EKG QTC CALCULATION (BAZETT): 511 MS
EKG R AXIS: 52 DEGREES
EKG T AXIS: -77 DEGREES
EKG VENTRICULAR RATE: 70 BPM
EOSINOPHILS ABSOLUTE: 0 K/UL (ref 0–0.6)
EOSINOPHILS RELATIVE PERCENT: 0.1 %
GFR SERPL CREATININE-BSD FRML MDRD: 49 ML/MIN/{1.73_M2}
GLUCOSE BLD-MCNC: 114 MG/DL (ref 70–99)
HCT VFR BLD CALC: 37.8 % (ref 40.5–52.5)
HEMOGLOBIN: 12.5 G/DL (ref 13.5–17.5)
LYMPHOCYTES ABSOLUTE: 1.1 K/UL (ref 1–5.1)
LYMPHOCYTES RELATIVE PERCENT: 9.1 %
MCH RBC QN AUTO: 28.8 PG (ref 26–34)
MCHC RBC AUTO-ENTMCNC: 33 G/DL (ref 31–36)
MCV RBC AUTO: 87.4 FL (ref 80–100)
MONOCYTES ABSOLUTE: 0.9 K/UL (ref 0–1.3)
MONOCYTES RELATIVE PERCENT: 7.7 %
NEUTROPHILS ABSOLUTE: 10 K/UL (ref 1.7–7.7)
NEUTROPHILS RELATIVE PERCENT: 82.8 %
PDW BLD-RTO: 16.1 % (ref 12.4–15.4)
PLATELET # BLD: 196 K/UL (ref 135–450)
PMV BLD AUTO: 9.6 FL (ref 5–10.5)
POTASSIUM REFLEX MAGNESIUM: 4.9 MMOL/L (ref 3.5–5.1)
RBC # BLD: 4.33 M/UL (ref 4.2–5.9)
SODIUM BLD-SCNC: 141 MMOL/L (ref 136–145)
VANCOMYCIN RANDOM: 16.3 UG/ML
WBC # BLD: 12 K/UL (ref 4–11)

## 2023-01-25 PROCEDURE — 6360000002 HC RX W HCPCS: Performed by: INTERNAL MEDICINE

## 2023-01-25 PROCEDURE — 6370000000 HC RX 637 (ALT 250 FOR IP): Performed by: INTERNAL MEDICINE

## 2023-01-25 PROCEDURE — 99233 SBSQ HOSP IP/OBS HIGH 50: CPT | Performed by: INTERNAL MEDICINE

## 2023-01-25 PROCEDURE — 97530 THERAPEUTIC ACTIVITIES: CPT

## 2023-01-25 PROCEDURE — 93279 PRGRMG DEV EVAL PM/LDLS PM: CPT | Performed by: INTERNAL MEDICINE

## 2023-01-25 PROCEDURE — 2580000003 HC RX 258: Performed by: INTERNAL MEDICINE

## 2023-01-25 PROCEDURE — 2580000003 HC RX 258: Performed by: ANESTHESIOLOGY

## 2023-01-25 PROCEDURE — 94150 VITAL CAPACITY TEST: CPT

## 2023-01-25 PROCEDURE — 94760 N-INVAS EAR/PLS OXIMETRY 1: CPT

## 2023-01-25 PROCEDURE — 93010 ELECTROCARDIOGRAM REPORT: CPT | Performed by: INTERNAL MEDICINE

## 2023-01-25 PROCEDURE — 85025 COMPLETE CBC W/AUTO DIFF WBC: CPT

## 2023-01-25 PROCEDURE — 2060000000 HC ICU INTERMEDIATE R&B

## 2023-01-25 PROCEDURE — 80048 BASIC METABOLIC PNL TOTAL CA: CPT

## 2023-01-25 PROCEDURE — 36415 COLL VENOUS BLD VENIPUNCTURE: CPT

## 2023-01-25 PROCEDURE — 51798 US URINE CAPACITY MEASURE: CPT

## 2023-01-25 PROCEDURE — 80202 ASSAY OF VANCOMYCIN: CPT

## 2023-01-25 PROCEDURE — 93005 ELECTROCARDIOGRAM TRACING: CPT | Performed by: INTERNAL MEDICINE

## 2023-01-25 RX ORDER — LINEZOLID 600 MG/1
600 TABLET, FILM COATED ORAL EVERY 12 HOURS SCHEDULED
Status: DISCONTINUED | OUTPATIENT
Start: 2023-01-25 | End: 2023-01-26 | Stop reason: HOSPADM

## 2023-01-25 RX ORDER — SODIUM CHLORIDE 9 MG/ML
INJECTION, SOLUTION INTRAVENOUS CONTINUOUS
Status: DISCONTINUED | OUTPATIENT
Start: 2023-01-25 | End: 2023-01-26 | Stop reason: HOSPADM

## 2023-01-25 RX ADMIN — RISPERIDONE 0.25 MG: 0.25 TABLET, FILM COATED ORAL at 21:01

## 2023-01-25 RX ADMIN — ASPIRIN 81 MG: 81 TABLET, COATED ORAL at 09:31

## 2023-01-25 RX ADMIN — VANCOMYCIN HYDROCHLORIDE 1250 MG: 10 INJECTION, POWDER, LYOPHILIZED, FOR SOLUTION INTRAVENOUS at 06:42

## 2023-01-25 RX ADMIN — FINASTERIDE 5 MG: 5 TABLET, FILM COATED ORAL at 09:31

## 2023-01-25 RX ADMIN — MEMANTINE 5 MG: 5 TABLET ORAL at 09:31

## 2023-01-25 RX ADMIN — CEFEPIME 2000 MG: 2 INJECTION, POWDER, FOR SOLUTION INTRAVENOUS at 15:40

## 2023-01-25 RX ADMIN — ACETAMINOPHEN 650 MG: 325 TABLET ORAL at 09:30

## 2023-01-25 RX ADMIN — Medication 1 CAPSULE: at 21:01

## 2023-01-25 RX ADMIN — QUETIAPINE FUMARATE 50 MG: 25 TABLET ORAL at 21:00

## 2023-01-25 RX ADMIN — ENOXAPARIN SODIUM 40 MG: 100 INJECTION SUBCUTANEOUS at 21:01

## 2023-01-25 RX ADMIN — CITALOPRAM HYDROBROMIDE 10 MG: 20 TABLET ORAL at 09:30

## 2023-01-25 RX ADMIN — PANTOPRAZOLE SODIUM 40 MG: 40 TABLET, DELAYED RELEASE ORAL at 09:31

## 2023-01-25 RX ADMIN — MEMANTINE 5 MG: 5 TABLET ORAL at 21:01

## 2023-01-25 RX ADMIN — SODIUM CHLORIDE: 9 INJECTION, SOLUTION INTRAVENOUS at 12:37

## 2023-01-25 RX ADMIN — TAMSULOSIN HYDROCHLORIDE 0.4 MG: 0.4 CAPSULE ORAL at 09:31

## 2023-01-25 RX ADMIN — SODIUM CHLORIDE 25 ML: 9 INJECTION, SOLUTION INTRAVENOUS at 06:41

## 2023-01-25 RX ADMIN — Medication 10 ML: at 09:32

## 2023-01-25 RX ADMIN — ATORVASTATIN CALCIUM 40 MG: 40 TABLET, FILM COATED ORAL at 21:01

## 2023-01-25 RX ADMIN — Medication 1 CAPSULE: at 09:43

## 2023-01-25 RX ADMIN — RISPERIDONE 0.25 MG: 0.25 TABLET, FILM COATED ORAL at 09:31

## 2023-01-25 RX ADMIN — LINEZOLID 600 MG: 600 TABLET, FILM COATED ORAL at 21:01

## 2023-01-25 RX ADMIN — SODIUM CHLORIDE, PRESERVATIVE FREE 10 ML: 5 INJECTION INTRAVENOUS at 06:41

## 2023-01-25 RX ADMIN — FERROUS SULFATE TAB 325 MG (65 MG ELEMENTAL FE) 325 MG: 325 (65 FE) TAB at 09:32

## 2023-01-25 RX ADMIN — TROSPIUM CHLORIDE 20 MG: 20 TABLET, FILM COATED ORAL at 21:01

## 2023-01-25 ASSESSMENT — PAIN SCALES - GENERAL
PAINLEVEL_OUTOF10: 0
PAINLEVEL_OUTOF10: 0
PAINLEVEL_OUTOF10: 7
PAINLEVEL_OUTOF10: 0
PAINLEVEL_OUTOF10: 0
PAINLEVEL_OUTOF10: 3

## 2023-01-25 ASSESSMENT — PAIN DESCRIPTION - LOCATION
LOCATION: CHEST
LOCATION: BACK

## 2023-01-25 ASSESSMENT — PAIN DESCRIPTION - FREQUENCY: FREQUENCY: INTERMITTENT

## 2023-01-25 ASSESSMENT — PAIN DESCRIPTION - PAIN TYPE: TYPE: CHRONIC PAIN

## 2023-01-25 ASSESSMENT — PAIN DESCRIPTION - DESCRIPTORS
DESCRIPTORS: ACHING
DESCRIPTORS: SHARP

## 2023-01-25 ASSESSMENT — PAIN DESCRIPTION - ORIENTATION
ORIENTATION: LOWER
ORIENTATION: LEFT

## 2023-01-25 ASSESSMENT — PAIN DESCRIPTION - ONSET: ONSET: GRADUAL

## 2023-01-25 ASSESSMENT — PAIN - FUNCTIONAL ASSESSMENT: PAIN_FUNCTIONAL_ASSESSMENT: PREVENTS OR INTERFERES SOME ACTIVE ACTIVITIES AND ADLS

## 2023-01-25 NOTE — CARE COORDINATION
Discharge Planning Note:    CM received VM from pt's wife asking for referral to be sent to [facility]. Spouse also asked that I call dtr  An Gunter at [phone] with any new information as she manages most of the healthcare issues and plans. Referral called to [facility] at [facility].      Electronically signed by Tala Cardona RN on 1/25/2023 at 11:21 AM

## 2023-01-25 NOTE — PROGRESS NOTES
Hospitalist Progress Note      PCP: Santy Duran MD    Date of Admission: 1/22/2023    Chief Complaint: Direct admit from [facility] with infected pacemaker    Hospital Course:  82 y.o. male, with past medical history of hypertension, hyperlipidemia, dementia and atrial fibrillation, who was a direct admit from [facility] to [facility] with an infected pacemaker site. Admitted as inpatient for further management. Started on IV Cefepime and Vanco.  Followed by EP and ID. On 1/24/23, S/P Removal and extraction of pacemaker leads.  - Pocket debridement  - Micra implant. Subjective:  Patient with pain at pacemaker pocket. No CP, SOB, HA or abdominal pain. No fevers. Hypotensive this morning.       Medications:  Reviewed    Infusion Medications    sodium chloride      sodium chloride      sodium chloride 25 mL (01/25/23 0641)    sodium chloride      sodium chloride 25 mL/hr at 01/24/23 1713     Scheduled Medications    magnesium sulfate  2,000 mg IntraVENous Once    sodium chloride flush  5-40 mL IntraVENous 2 times per day    sodium chloride flush  5-40 mL IntraVENous 2 times per day    sodium chloride flush  5-40 mL IntraVENous 2 times per day    vancomycin  1,250 mg IntraVENous Q24H    aspirin  81 mg Oral Daily    isosorbide mononitrate  30 mg Oral Daily    citalopram  10 mg Oral Daily    atorvastatin  40 mg Oral Nightly    lactobacillus  1 capsule Oral BID WC    trospium  20 mg Oral Nightly    pantoprazole  40 mg Oral Daily    memantine  5 mg Oral BID    finasteride  5 mg Oral Daily    tamsulosin  0.4 mg Oral Daily    ferrous sulfate  325 mg Oral Daily    amLODIPine  10 mg Oral Daily    QUEtiapine  50 mg Oral Nightly    QUEtiapine  25 mg Oral QAM AC    risperiDONE  0.25 mg Oral BID    cefepime  2,000 mg IntraVENous Q24H    sodium chloride flush  5-40 mL IntraVENous 2 times per day    enoxaparin  40 mg SubCUTAneous Nightly    OLANZapine  5 mg IntraMUSCular Once     PRN Meds: sodium chloride, magnesium sulfate, sodium chloride flush, sodium chloride, HYDROmorphone, sodium chloride flush, sodium chloride, HYDROmorphone, HYDROmorphone, labetalol, sodium chloride flush, sodium chloride, acetaminophen, sodium chloride flush, sodium chloride, ondansetron **OR** ondansetron, polyethylene glycol      Intake/Output Summary (Last 24 hours) at 1/25/2023 1225  Last data filed at 1/25/2023 0920  Gross per 24 hour   Intake 240 ml   Output 975 ml   Net -735 ml         Physical Exam Performed:    /70   Pulse 70   Temp 97.6 °F (36.4 °C) (Oral)   Resp 16   Ht 5' 6\" (1.676 m)   Wt 157 lb 1.6 oz (71.3 kg)   SpO2 93%   BMI 25.36 kg/m²     General appearance: No apparent distress, appears stated age and cooperative. HEENT: Pupils equal, round, and reactive to light. Conjunctivae/corneas clear. Neck: Supple, with full range of motion. No jugular venous distention. Trachea midline. Respiratory:  Normal respiratory effort. Clear to auscultation, bilaterally without Rales/Wheezes/Rhonchi. Cardiovascular: Regular rate and rhythm with normal S1/S2 without murmurs, rubs or gallops. L pacemaker pocket is covered  Abdomen: Soft, non-tender, non-distended with normal bowel sounds. Musculoskeletal: No clubbing, cyanosis or edema bilaterally. Full range of motion without deformity. Skin: Skin color, texture, turgor normal.  No rashes or lesions. Neurologic:  Neurovascularly intact without any focal sensory/motor deficits.  Cranial nerves: II-XII intact, grossly non-focal.  Psychiatric: Alert and oriented to self, not time and place  Capillary Refill: Brisk, 3 seconds, normal   Peripheral Pulses: +2 palpable, equal bilaterally       Labs:   Recent Labs     01/23/23  0441 01/24/23  0532 01/25/23  0533   WBC 7.6 12.0* 12.0*   HGB 14.0 13.9 12.5*   HCT 42.5 41.5 37.8*    205 196       Recent Labs     01/23/23  0441 01/24/23  0532 01/25/23  0533    139 141   K 4.3 4.1 4.9    103 106   CO2 31 24 23   BUN 19 25* 36*   CREATININE 1.0 1.2 1.4*   CALCIUM 9.2 9.1 8.9       No results for input(s): AST, ALT, BILIDIR, BILITOT, ALKPHOS in the last 72 hours. Recent Labs     01/24/23  0532   INR 1.31*       No results for input(s): Gabriel Randall in the last 72 hours. Urinalysis:      Lab Results   Component Value Date/Time    NITRU Negative 01/19/2023 12:25 PM    [address] 04/21/2022 02:20 PM    BACTERIA 1+ 04/21/2022 02:20 PM    RBCUA 3-4 04/21/2022 02:20 PM    BLOODU Negative 01/19/2023 12:25 PM    SPECGRAV 1.015 01/19/2023 12:25 PM    GLUCOSEU Negative 01/19/2023 12:25 PM       Radiology:  No orders to display       IP CONSULT TO CARDIOLOGY  PHARMACY TO DOSE VANCOMYCIN  IP CONSULT TO INFECTIOUS DISEASES    Assessment/Plan:    Active Hospital Problems    Diagnosis     Pacemaker-dependent due to native cardiac rhythm insufficient to support life [I49.8, Z95.0]      Priority: Medium    Infection of pacemaker pocket ([address] [G52. 7XXA]      Priority: Medium    Atrial fibrillation, rapid ([address] [I48.91]      Priority: Medium    Ischemic cardiomyopathy [I25.5]      Priority: Medium    Dementia with behavioral disturbance [F03.918]      Priority: Medium    Coronary artery disease involving native heart without angina pectoris [I25.10]     A-fib (HCC) [I48.91]     Hypertension [I10]     Hyperlipidemia [E78.5]      Infected pacemaker  Continue IV Vanco and Cefepime  S/P explant on 1/24/23  EP input appreciated  ID input appreciated  2. NATANAEL   - Gentle IVF today   - Avoid BP meds if SBP < 110  3. HTN   - Hold Norvasc and Imdur due to hypotension  4. Dementia   - Continue Namenda and Seroquel  5. BPH   - Continue Finasteride  6. Afib   - Cardiology input appreciated    DVT Prophylaxis: Lovenox  Diet: ADULT DIET; Regular  Code Status: Full Code  PT/OT Eval Status: Following    [address] SNF    Discussed with patient, nursing and CM. Await plan of care from Cardio and ID.   Will need SNF upon Kael Watson MD

## 2023-01-25 NOTE — CARE COORDINATION
Discharge Planning Note:    Pt has been accepted at [facility], no pre-cert required. Dtr Rainer Swift updated via phone.       Electronically signed by Alicia Heath RN on 1/25/2023 at 3:01 PM

## 2023-01-25 NOTE — PROGRESS NOTES
Clinical Pharmacy Note: Pharmacy to Dose Vancomycin followup      Dosing Method: Bayesian Modeling    Random:      Latest Reference Range & Units 1/25/23 05:33   Vancomycin Rm ug/mL 16.3           Recent Labs     01/23/23  0441 01/24/23  0532   BUN 19 25*     Recent Labs     01/23/23  0441 01/24/23  0532   CREATININE 1.0 1.2     Recent Labs     01/24/23  0532 01/25/23  0533   WBC 12.0* 12.0*     Intake/Output Summary (Last 24 hours) at 1/25/2023 0954  Last data filed at 1/25/2023 0444  Gross per 24 hour   Intake 240 ml   Output 775 ml   Net -535 ml     Ht Readings from Last 1 Encounters:   01/22/23 5' 6\" (1.676 m)        Wt Readings from Last 1 Encounters:   01/25/23 157 lb 1.6 oz (71.3 kg)       Body mass index is 25.36 kg/m². Estimated Creatinine Clearance: 41 mL/min (based on SCr of 1.2 mg/dL). Assessment:  Regimen: 1250 mg IV every 24 hours. Start time: 06:42 on 01/26/2023  Exposure target: AUC24 (range)400-600 mg/L.hr   AUC24,ss: 503 mg/L.hr  Probability of AUC24 > 400: 91 %  Ctrough,ss: 13.9 mg/L  Probability of Ctrough,ss > 20: 7 %  Probability of nephrotoxicity (Lodise NORY 2009): 9 %    Plan:  Vancomycin level is therapeutic. Continue current vancomycin regimen of 1250mg every 24 hours. Bayesian Modeling predicts an AUC of 503 mg/L*hr and trough of 13.9 mg/L. Changes in regimen will be determined based on culture results, renal function, and clinical response. Pharmacy will continue to monitor and adjust regimen as necessary. Thank you for allowing pharmacy to participate in the care of this patient.   Nikkie Phoenix, PharmD

## 2023-01-25 NOTE — PROGRESS NOTES
Pt awake and alert, eating breakfast. Carlos cath sites wnl, no bleeding/hematoma observed. Fall precautions in place. Low bed in place.

## 2023-01-25 NOTE — PROGRESS NOTES
Infectious Diseases   Progress Note      Admission Date: 1/22/2023  Hospital Day: Hospital Day: 4   Attending: Linda Mary MD  Date of service: 1/25/2023     Chief complaint/ Reason for consult:     Pacemaker pocket site infection  Recent acute kidney injury  History of dementia  Essential hypertension    Microbiology:        I have reviewed allavailable micro lab data and cultures      Pacemaker pocket site wound culture: collected on 1/24/23: in process      Culture, Wound  Order: [id number]  Status: Preliminary result    Visible to patient: No (not released)    Next appt: 01/27/2023 at 10:00 AM in Family Medicine Prerna Herron MD)    Specimen Information: Wound   0 Result Notes  Component 1/24/23 1300  Resulting Agency   WOUND/ABSCESS No growth to date [place] [address] Lab   Gram Stain Result No WBC's seen   No organisms seen  [facility]           Narrative  Performed by: [facility]  ORDER#: [id number]                          ORDERED BY: FANNY [address]: Wound pacemaker pocket             COLLECTED:  01/24/23 13:00   ANTIBIOTICS AT OVIDIO.:                      RECEIVED :  01/25/23 02:31   ORDER WAS CANCELLED 01/24/2023 18:17, only aerobic swab sent to lab. Antibiotics and immunizations:       Current antibiotics: All antibiotics and their doses were reviewed by me    Recent Abx Admin                     vancomycin (VANCOCIN) 1,250 mg in sodium chloride 0.9 % 250 mL IVPB (mg) 1,250 mg New Bag 01/25/23 0642    cefepime (MAXIPIME) 2000 mg IVPB minibag (mg) 2,000 mg New Bag 01/24/23 1714                      Immunization History: All immunization history was reviewed by me today.     Immunization History   Administered Date(s) Administered    Influenza A (K1G9-66) Vaccine PF IM 11/19/2009    Pneumococcal Conjugate 13-valent (Mknqonf18) 08/25/2015    Pneumococcal Conjugate 7-valent (Prevnar7) 02/27/2013    Pneumococcal Polysaccharide (Riybmeidq24) 03/09/2010, 02/27/2013, 06/26/2019       Known drug allergies: All allergies were reviewed and updated    Allergies   Allergen Reactions    Sulfa Antibiotics        Social history:     Social History:  All social andepidemiologic history was reviewed and updated by me today as needed. Tobacco use:   reports that he quit smoking about 47 years ago. His smoking use included cigarettes. He has a 10.00 pack-year smoking history. He has never used smokeless tobacco.  Alcohol use:   reports no history of alcohol use. Currently lives in: [place]   reports no history of drug use. COVID VACCINATION AND LAB RESULT RECORDS:     Internal Administration   First Dose      Second Dose           Last COVID Lab SARS-CoV-2 (no units)   Date Value   05/15/2020 NOT DETECTED     SARS-CoV-2 RNA, RT PCR (no units)   Date Value   04/21/2022 NOT DETECTED     SARS-CoV-2, MICHELLE (no units)   Date Value   08/09/2022 NEGATIVE     SARS-CoV-2, NAAT (no units)   Date Value   08/14/2022 Not Detected            Assessment:     The patient is a 82 y.o. old male who  has a past medical history of Atrial fibrillation ([facility] [address] Blind right eye, Chronic kidney disease, Dementia ([address] Hyperlipidemia, Hypertension, and Pneumonia. 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 His [place]BC count is 553[address]    S Cr is 1.4    Pacemaker extraction surgical culture from 1/24/23 is in process      Plan:     Diagnostic Workup:    Fu on surgical cultures  Continue to follow  fever curve, WBC count and blood cultures. Continue to monitor blood counts, liver and renal function. Antimicrobials: Will stop iv vancomycin  Platelet count is 625,915.  Start Po linezolid 600 mg every 12 hours  Will continue iv cefepime 2 gm every 24 hours   We will follow up on the culture results and clinical progress and will make further recommendations accordingly. Continue close vitals monitoring. Maintain good glycemic control. Fall precautions. Aspiration precautions. Continue to watch for new fever or diarrhea. DVT prophylaxis. Discussed all above with patient and RN. Drug Monitoring:    Continue monitoring for antibiotic toxicity as follows: CBC, CMP   Continue to watch for following: new or worsening fever, new hypotension, hives, lip swelling and redness or purulence at vascular access sites. I/v access Management:    Continue to monitor i.v access sites for erythema, induration, discharge or tenderness. As always, continue efforts to minimize tubes/lines/drains as clinically appropriate to reduce chances of line associated infections. Level of complexity of visit and medical decision making: High     TIME SPENT TODAY:     - Spent over  37 minutes on visit (including interval history, physical exam, review of data including labs, cultures, imaging, development and implementation of treatment plan and coordination of complex care). More than 50 percent of this includes face-to-face time spent with the patient for counseling and coordination of care. Thank you for involving me in the care of your patient. I will continue to follow. If you have anyadditional questions, please do not hesitate to contact me. Subjective: Interval history: Interval history was obtained from chart review and He is afebrile. He is tolerating antibiotics ok. No diarrhea. He is encephalopathic     REVIEW OF SYSTEMS:      Review of Systems   Unable to perform ROS: Mental status change       Past Medical History: All past medical history reviewed today.     Past Medical History:   Diagnosis Date    Atrial fibrillation ([facility] [address]     Blind right eye     Chronic kidney disease     Dementia ([facility] [address]     Hyperlipidemia     Hypertension     Pneumonia        Past Surgical History: All past surgical history was reviewed today. Past Surgical History:   Procedure Laterality Date    ABLATION OF DYSRHYTHMIC FOCUS      APPENDECTOMY      COLONOSCOPY      EYE SURGERY      HERNIA REPAIR      JOINT REPLACEMENT  2013    left shoulder    PACEMAKER PLACEMENT  10/13/2017    Dr Pulido Shock at [facility] [address] single chamber PPM    PACEMAKER PLACEMENT  09/16/2022    biventricular    TONSILLECTOMY      UPPER GASTROINTESTINAL ENDOSCOPY         Family History: All family history was reviewed today. Problem Relation Age of Onset    Heart Disease Mother     Other Mother     Heart Disease Father     Asthma Other        Objective:       PHYSICAL EXAM:      Vitals:   Vitals:    01/25/23 0920 01/25/23 1030 01/25/23 1150 01/25/23 1230   BP: (!) 81/45 110/70  (!) 94/55   Pulse: 70 70  70   Resp: 18  16 18   Temp: 97.6 °F (36.4 °C)   97.8 °F (36.6 °C)   TempSrc: Oral   Oral   SpO2: 96%  93% 97%   Weight:       Height:           Physical Exam       General: Encephalopathic but protecting the airway so far,   HEENT: normocephalic, atraumatic, sclera clear, pupils equal, light reflex preserved bilaterally  Cardiovascular: RRR, no murmurs/rubs/gallops detected  Pulmonary: CTABL, no rhonchi/rales   Abdomen/GI: soft, no organomegaly, bowel sounds positive  Neuro: Encephalopathic, pupils are equal and reactive to light, moves all extremities  Skin: no rash,   Musculoskeletal:  No obvious joint swelling, redness. No limitation of range of passive motion  Genitourinary: Brenner's catheter in place   Psych: could not assess  Lymphatic/Immunologic: No obvious bruising, no cervical lymphadenopathy    Lines: All vascular access sites are healthy with no local erythema, discharge or tenderness       Intake and output:    I/O last 3 completed shifts:   In: 480 [P.O.:480]  Out: 775 [Urine:725; Blood:50]    Lab Data:   All available labs and old records have been reviewed by me. CBC:  Recent Labs     01/23/23  0441 01/24/23  0532 01/25/23  0533   WBC 7.6 12.0* 12.0*   RBC 4.90 4.80 4.33   HGB 14.0 13.9 12.5*   HCT 42.5 41.5 37.8*    205 196   MCV 86.8 86.6 87.4   MCH 28.5 28.9 28.8   MCHC 32.9 33.4 33.0   RDW 15.7* 16.1* 16.1*          BMP:  Recent Labs     01/23/23  0441 01/24/23  0532 01/25/23  0533    139 141   K 4.3 4.1 4.9    103 106   CO2 31 24 23   BUN 19 25* 36*   CREATININE 1.0 1.2 1.4*   CALCIUM 9.2 9.1 8.9   GLUCOSE 87 109* 114*          Hepatic Function Panel:   Lab Results   Component Value Date/Time    ALKPHOS 99 01/19/2023 10:17 AM    ALT 10 01/19/2023 10:17 AM    AST 18 01/19/2023 10:17 AM    PROT 7.0 01/19/2023 10:17 AM    PROT 7.6 01/09/2013 10:00 AM    BILITOT 1.0 01/19/2023 10:17 AM    LABALBU 4.0 01/19/2023 10:17 AM       CPK: No results found for: CKTOTAL  ESR:   Lab Results   Component Value Date    SEDRATE 4 01/23/2023     CRP:   Lab Results   Component Value Date    CRP 5.0 01/23/2023           Imaging: All pertinent images and reports for the current visit were reviewed by me during this visit. I reviewed the chest x-ray/CT scan/MRI images and independently interpreted the findings and results today. No orders to display       Medications: All current and past medications were reviewed.      magnesium sulfate  2,000 mg IntraVENous Once    sodium chloride flush  5-40 mL IntraVENous 2 times per day    sodium chloride flush  5-40 mL IntraVENous 2 times per day    sodium chloride flush  5-40 mL IntraVENous 2 times per day    vancomycin  1,250 mg IntraVENous Q24H    aspirin  81 mg Oral Daily    isosorbide mononitrate  30 mg Oral Daily    citalopram  10 mg Oral Daily    atorvastatin  40 mg Oral Nightly    lactobacillus  1 capsule Oral BID WC    trospium  20 mg Oral Nightly    pantoprazole  40 mg Oral Daily    memantine  5 mg Oral BID    finasteride  5 mg Oral Daily    tamsulosin  0.4 mg Oral Daily ferrous sulfate  325 mg Oral Daily    amLODIPine  10 mg Oral Daily    QUEtiapine  50 mg Oral Nightly    QUEtiapine  25 mg Oral QAM AC    risperiDONE  0.25 mg Oral BID    cefepime  2,000 mg IntraVENous Q24H    sodium chloride flush  5-40 mL IntraVENous 2 times per day    enoxaparin  40 mg SubCUTAneous Nightly    OLANZapine  5 mg IntraMUSCular Once        sodium chloride 50 mL/hr at 01/25/23 1237    sodium chloride      sodium chloride      sodium chloride 25 mL (01/25/23 0641)    sodium chloride      sodium chloride 25 mL/hr at 01/24/23 1713       sodium chloride, magnesium sulfate, sodium chloride flush, sodium chloride, HYDROmorphone, sodium chloride flush, sodium chloride, HYDROmorphone, HYDROmorphone, labetalol, sodium chloride flush, sodium chloride, acetaminophen, sodium chloride flush, sodium chloride, ondansetron **OR** ondansetron, polyethylene glycol      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    Hyperlipidemia E78.5    Hypertension I10    Chronic kidney disease, stage III (moderate) (HCC) N18.30    A-fib ([facility]) I48.91    Abnormal echocardiogram R93.1    Dizziness R42    SOB (shortness of breath) R06.02    Abnormal cardiovascular stress test R94.39    Coronary artery disease involving native heart without angina pectoris I25.10    Other chest pain R07.89    Syncope and collapse R55    COVID U07.1    Dementia with behavioral disturbance F03.918    Hallucinations R44.3    Syncope, unspecified syncope type R55    Ischemic cardiomyopathy I25.5    Atrial fibrillation, rapid ([facility]) I48.91    Orthostatic hypotension I95.1    Infection of pacemaker pocket ([facility] [address] T82. 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  Although every effort was made to ensure the accuracy of this automated transcription, some errors in transcription may have occurred inadvertently. If you may need any clarification, please do not hesitate to contact me through EPIC or at the phone number provided below with my electronic signature. Any pictures or media included in this note were obtained after taking informed verbal consent from the patient and with their approval to include those in the patient's medical record. Felicita Melchor MD, MPH, FACP, FIDSA  1/25/2023, 1:35 PM  [facility]   [address] [place] [address]., [address], [address] [place] [address]  Office: [phone]  Fax: [phone]  In-person Clinic days:  Tuesday & Thursday a.m. Virtual clinic days: Monday, Wednesday & Friday a.m.

## 2023-01-25 NOTE — PROGRESS NOTES
Restrictions over. This RN removed figure-8 stitch from pt right groin site, no bleeding noted. Guaze pad and Tegaderm applied to site.

## 2023-01-25 NOTE — PROGRESS NOTES
[facility] [address] Department   Phone: [phone]    Occupational Therapy    [] Initial Evaluation            [x] Daily Treatment Note         [] Discharge Summary      Patient: Mable Low   : 1935   MRN: [id number]   Date of Service:  2023    Admitting Diagnosis:  Infection of pacemaker pocket [facility])  Current Admission Summary: 82 y.o. male, with past medical history of hypertension, hyperlipidemia, dementia and atrial fibrillation, who was a direct admit from [facility] to [facility] with an infected pacemaker site. Admitted as inpatient for further management. Started on IV Cefepime and Vanco.  Followed by EP and ID. EP to proceed with explant. Past Medical History:  has a past medical history of Atrial fibrillation ([address] Blind right eye, Chronic kidney disease, Dementia ([address] Hyperlipidemia, Hypertension, and Pneumonia. Past Surgical History:  has a past surgical history that includes Tonsillectomy; Appendectomy; Colonoscopy; eye surgery; Upper gastrointestinal endoscopy; joint replacement (); hernia repair; pacemaker placement (10/13/2017); ablation of dysrhythmic focus; and pacemaker placement (2022). Discharge Recommendations: Mable Low scored a 10/24 on the AM-PAC ADL Inpatient form. Current research shows that an AM-PAC score of 17 or less is typically not associated with a discharge to the patient's home setting. Based on the patient's AM-PAC score and their current ADL deficits, it is recommended that the patient have 3-5 sessions per week of Occupational Therapy at d/c to increase the patient's independence. Please see assessment section for further patient specific details. If patient discharges prior to next session this note will serve as a discharge summary. Please see below for the latest assessment towards goals.        DME Required For Discharge: DME to be determined at next level of care, DME to be determined pending patient progress    Precautions/Restrictions: high fall risk      Pre-Admission Information   Lives With: spouse    Type of Home: house  Home Layout: one level, able to live on main level  Home Access: 2 step to enter with handrail. Bathroom Layout: walk in shower  Bathroom Equipment: grab bars in shower  Toilet Height: standard height  Home Equipment: rolling walker, rollator - 4 wheeled walker, single point cane  Transfer Assistance: Independent without use of device  Ambulation Assistance:modified independent with use of SPC  ADL Assistance: independent with all ADL's  IADL Assistance: requires assistance with all homemaking tasks  Active :        [] Yes  [x] No  Hand Dominance: [] Left  [] Right  Current Employment: retired. 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 Per chart, pt typically falls to the right and has had multiple falls at home (per therapy evaluation on 8/15/22)        Subjective  General: Pt supine in bed upon arrival; pleasant and agreeable to eval. Decreased confusion on this date with pt oriented to person, place, and situation (not time). Pt states he feels better than yesterday. Pain: 0/10  Pain Interventions: not applicable      Vitals:  BP seated - 110/70  BP standing - 120/69        Activities of Daily Living  Basic Activities of Daily Living  Feeding Comments: managed beverage without assist  General Comments: declined ADLs on this date; brief clean and dry  Instrumental Activities of Daily Living  No IADL completed on this date.     Functional Mobility  Bed Mobility  Supine to Sit: 2 person assistance with mod A x2   Scooting: maximum assistance  Comments: HOB elevated, cues for initiation and sequencing  Transfers  Sit to stand transfer:2 person assistance with mod A x2 from EOB x2 with RW    Stand to sit transfer: 2 person assistance with mod A x2   Bed / Chair transfer: lynnette utilized requiring min A x2 . Comments: cues for motor planning and hand placement, improved eccentric control with lowering with verbal and tactile cues  Functional Mobility:  Sitting Balance: min-mod A seated EOB ~10 min. Standing Balance: 2 person assistance with min-mod x2 ~2 minutes; forward flexion with max cues for glute engagement and anterior weight shift . No functional mobility completed on this date secondary to safety concerns.     Other Therapeutic Interventions    Functional Outcomes  AM-PAC Inpatient Daily Activity Raw Score: 10    Cognition  Overall Cognitive Status: Impaired  Arousal/Alterness: delayed responses to stimuli  Following Commands: follows one step commands with repetition, follows one step commands with increased time  Attention Span: difficulty attending to directions, difficulty dividing attention  Memory: decreased recall of precautions, decreased recall of recent events, decreased short term memory, decreased long term memory  Safety Judgement: decreased awareness of need for assistance, decreased awareness of need for safety  Problem Solving: decreased awareness of errors  Insights: decreased awareness of deficits  Initiation: requires cues for some  Sequencing: requires cues for some  Comments: improved on this date compared to last session  Orientation:    oriented to person, oriented to place, oriented to situation, and disoriented to time   Command Following:   impaired     Education  Barriers To Learning: cognition  Patient Education: patient educated on goals, OT role and benefits, plan of care, ADL adaptive strategies, transfer training, discharge recommendations  Learning Assessment:  patient will require reinforcement due to cognitive deficits    Assessment  Activity Tolerance: Improved standing tolerance on this date; continued limitations d/t cognition, weakness  Impairments Requiring Therapeutic Intervention: decreased functional mobility, decreased ADL status, decreased strength, decreased cognition, decreased endurance, decreased balance, decreased IADL  Prognosis: good  Clinical Assessment: The patient is a 82 y.o. male who presents below their baseline level of function due to above deficits, associated with pacemaker infection. Typically, pt is independent for ADLs and is mod I with SPC for mobility. Currently, pt is requiring 2-person assist for transfers. Limited assessment secondary to pts impaired cognition--unsafe to return home at this time secondary to pt's global weakness and anticipated burden on caregiver in home setting. Continued OT indicated in order to promote return to PLOF.      Safety Interventions: patient left in chair, chair alarm in place, call light within reach, gait belt, patient at risk for falls, and nurse notified    Plan  Frequency: 3-5 x/per week  Current Treatment Recommendations: strengthening, balance training, functional mobility training, transfer training, endurance training, patient/caregiver education, ADL/self-care training, IADL training, and equipment evaluation/education    Goals    Short Term Goals:  Time Frame: by discharge  Patient will complete self-feeding at set up assistance   Patient will complete grooming at stand by assistance   Patient will complete functional transfers at moderate assistance   Patient will increase functional sitting balance to CGA for improved ADL completion    Continue goals 1/25    Therapy Session Time     Individual Group Co-treatment   Time In    0955   Time Out    1033   Minutes    38        Timed Code Treatment Minutes:  Timed Code Treatment Minutes: 38 Minutes  Total Treatment Minutes:  38 minutes       Electronically Signed By: SAMANTA Bellamy/L, C/SARAH ([id number])

## 2023-01-25 NOTE — PROGRESS NOTES
[facility] Post Op System Explant/ILR explant/Micra Implant/PDE Device Check  Rep Checked Device   Device shows normal function   No parameters have been changed during the current session.

## 2023-01-25 NOTE — PLAN OF CARE
Problem: Cardiovascular - Adult  Goal: Maintains optimal cardiac output and hemodynamic stability  Outcome: Progressing   BP improved from this morning, IVF infusing per orders. Problem: Cardiovascular - Adult  Goal: Absence of cardiac dysrhythmias or at baseline  Outcome: Progressing   Vpaced 70.

## 2023-01-25 NOTE — PROGRESS NOTES
01/25/23 1150   Incentive Spirometry Tx   Treatment Effort Initial;Assisted by RT; Independent; Instructed; Well   Predicted Volume 638   Achieved Volume (mL) 1200 mL

## 2023-01-25 NOTE — PROGRESS NOTES
[facility] [address] Department   Phone: [phone]    Physical Therapy    [] Initial Evaluation            [x] Daily Treatment Note         [] Discharge Summary      Patient: Herrera Johnson   : 1935   MRN: [id number]   Date of Service:  2023  Admitting Diagnosis: Infection of pacemaker pocket [facility])     Current Admission Summary: Herrera Johnson is a 82 y.o. male with a past medical history of hypertension, A. fib dementia hyperlipidemia chronic kidney disease former smoker CAD today with daughter with complaints of swelling, redness and tenderness to pacemaker site. He had a pacemaker placed about 5 months ago. He states over the past several days he has noticed increased swelling redness and tenderness to the area. He does endorse some fevers and chills at home. States its painful to touch. Onset of symptoms over the past 2 to 3 days. Duration of symptoms have been persistent since onset. Context includes pacemaker complication. Denies any nausea vomiting diarrhea or abdominal pain. Denies numbness or tingling. Denies cough or congestion. He has not taken anything today for pain currently denies any pain. States it is only painful to touch. Otherwise denies any other complaints. Nothing seems to make symptoms better or worse. Patient found to have infected pacemaker and pacemaker extraction is scheduled for 2023. Past Medical History:  has a past medical history of Atrial fibrillation ([address] Blind right eye, Chronic kidney disease, Dementia ([address] Hyperlipidemia, Hypertension, and Pneumonia. Past Surgical History:  has a past surgical history that includes Tonsillectomy; Appendectomy; Colonoscopy; eye surgery; Upper gastrointestinal endoscopy; joint replacement (); hernia repair; pacemaker placement (10/13/2017); ablation of dysrhythmic focus; and pacemaker placement (2022).      Discharge Recommendations: Kate Irby Dayana scored a 7/24 on the AM-PAC short mobility form. Current research shows that an AM-PAC score of 17 or less is typically not associated with a discharge to the patient's home setting. Based on the patient's AM-PAC score and their current functional mobility deficits, it is recommended that the patient have 3-5 sessions per week of Physical Therapy at d/c to increase the patient's independence. Please see assessment section for further patient specific details. If patient discharges prior to next session this note will serve as a discharge summary. Please see below for the latest assessment towards goals. DME Required For Discharge: DME to be determined at next level of care, DME to be determined pending patient progress  Precautions/Restrictions: high fall risk  Weight Bearing Restrictions: weight bearing as tolerated  [] Right Upper Extremity        [] Left Upper Extremity         [] Right Lower Extremity         [] Left Lower Extremity             Required Braces/Orthotics: no braces required                   [] Right            [] Left  Positional Restrictions:no positional restrictions     Pre-Admission Information   Lives With: Spouse  Type of Home: House  Home Layout: One level, Able to Live on Main level with bedroom/bathroom  Home Access: Stairs to enter with rails  Entrance Stairs - Number of Steps: 2  Entrance Stairs - Rails: Both  Bathroom Shower/Tub: Walk-in shower  Bathroom Toilet: Standard  Bathroom Equipment: Grab bars in shower  Home Equipment: Tito Harrell, 4 wheeled  Has the patient had two or more falls in the past year or any fall with injury in the past year?: Yes (Per patient's most recent PT/OT evaluation on 8/15.  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 More alert and oriented today. Pt agreeable to PT/OT treat. Pain: 0/10  Pain Interventions: not applicable       Functional Mobility  Bed Mobility  Supine to Sit: 2 person assistance with mod A   Scooting: maximum assistance  Comments:  Transfers  Sit to stand transfer: 2 person assistance with mod A    Stand to sit transfer: 2 person assistance with modA   Bed to chair transfer: dependent assistance (via angelo steady)   Comments: Sit>stand from EOB up to RW with mod A x2. Sit>stand from EOB with angelo steady min Ax2. Bed>chair via angelo steady. Mod A x2 for stand>sit due to poor eccentric control. Cues for sequencing and anterior weight shifting due to strong posterior lean. Ambulation  Ambulation not tested on this date secondary to patient lacking physical ability to perform. Distance: N/A  Gait Mechanics: N/A  Comments:    Stair Mobility  Stair mobility not completed on this date. Comments:  Wheelchair Mobility:  No w/c mobility completed on this date. Comments:  Balance  Static Sitting Balance: poor (+): requires min (A) to maintain balance  Dynamic Sitting Balance: poor: requires mod (A) to maintain balance  Static Standing Balance: poor (-): requires max (A) to maintain balance  Dynamic Standing Balance: poor (-): requires max (A) to maintain balance  Comments: min-mod A for sitting EOB balance due to posterior lean. Min A x2 for standing balance at RW for ~2 mins.  Cues for glute activation and upright position in standing     Other Therapeutic Interventions    Functional Outcomes  AM-PAC Inpatient Mobility Raw Score : 6              Cognition  Overall Cognitive Status: Impaired  Comments: hx of dementia   Orientation:    oriented to person, oriented to place, disoriented to time , and oriented to situation  Command Following: Follows one step commands with increased time      Education  Barriers To Learning: cognition  Patient Education: patient educated on goals, PT role and benefits, plan of care, general safety, functional mobility training, proper use of assistive device/equipment, orientation, injury prevention, transfer training, discharge recommendations  Learning Assessment:  patient will require reinforcement due to cognitive deficits     Assessment  Activity Tolerance: Pt with increased activity tolerance this session, able to stand for 2 mins at RW with min A x2 before requesting seated rest break. BP sitting /70, BP standing 120/68. Pt does report some lightheadedness throughout session. Nursing notified   Impairments Requiring Therapeutic Intervention: decreased functional mobility, decreased strength, decreased safety awareness, decreased cognition, decreased endurance, decreased balance, decreased posture  Prognosis: fair  Clinical Assessment: Per patient's therapy noted dated 9/2022, he was living at home with his spouse and ambulatory with RW. Pt demonstrates improvement this date, completing bed mobility with less assistance and standing up to RW with assistance x2 for ~2 mins. Pt transfers to a chair this session with use of angelo steady min A x2. Recommend 24 hour assist and continued therapy as it appears patient is not at his functional baseline. At this time, it would be very difficult for his spouse to care for him at home based on today's performance.     Safety Interventions: patient left in chair, chair alarm in place, call light within reach, gait belt, patient at risk for falls, telesitter in use, nurse notified  Plan  Frequency: 3-5 x/per week  Current Treatment Recommendations: strengthening, balance training, functional mobility training, transfer training, gait training, stair training, endurance training, patient/caregiver education, safety education, and equipment evaluation/education     Goals  Patient Goals: None verbalized. Short Term Goals:  Time Frame: Discharge.   Patient will complete bed mobility at moderate assistance   Patient will complete transfers at moderate assistance   Patient will ambulate 10 ft with use of LRAD at moderate assistance  Patient will ascend/descend 2 stairs with (B) handrail at moderate assistance  NO GOALS MET 1/25/23  Therapy Session Time      Individual Group Co-treatment   Time In     [address] [place] [address]   Time Out     1033   Minutes     38       Total Treatment Minutes:  38       Electronically Signed By: Fausto Campos, [address] [place] [address] [place], [address] [place] [address]

## 2023-01-25 NOTE — CARE COORDINATION
Case Management Assessment  Initial Evaluation    Date/Time of Evaluation: 1/25/2023 11:33 AM  Assessment Completed by: Chinedu Crawley RN    If patient is discharged prior to next notation, then this note serves as note for discharge by case management. Patient Name: Samantha Beach                   YOB: 1935  Diagnosis: Pacemaker infection, initial encounter ([facility] [address] 7XXA]                   Date / Time: 1/22/2023  2:28 PM    Patient Admission Status: Inpatient   Readmission Risk (Low < 19, Mod (19-27), High > 27): Readmission Risk Score: 19    Current PCP: Melodie Sanford MD  PCP verified by CM? Yes    Chart Reviewed: Yes      History Provided by: Child/Family  Patient Orientation: Person, Self, Place    Patient Cognition: Short Term Memory Deficit    Hospitalization in the last 30 days (Readmission):  No    If yes, Readmission Assessment in  Navigator will be completed. Advance Directives:      Code Status: Full Code   Patient's Primary Decision Maker is: Legal Next of Kin    Primary Decision MakerRemo Khushbu Child - [phone]    Secondary Decision Maker: Barron Agarwal - Spouse - [phone]    Discharge Planning:    Patient lives with: Spouse/Significant Other Type of Home: House  Primary Care Giver: Self  Patient Support Systems include: Spouse/Significant Other, Children, Family Members   Current Financial resources: Medicare  Current community resources: None  Current services prior to admission: Home Care            Current DME: RW and cane             Type of Home Care services:  OT, PT    ADLS  Prior functional level: Assistance with the following:, Housework  Current functional level: Bathing, Dressing, Assistance with the following:, Housework, Mobility    PT AM-PAC: 6 /24  OT AM-PAC: 10 /24    Family can provide assistance at DC:  Other (comment) (not at pt's current level of need)  Would you like Case Management to discuss the discharge plan with any other family members/significant others, and if so, who? Yes (Reg Blanton for DC planning)  Plans to Return to Present Housing: Unknown at present  Other Identified Issues/Barriers to RETURNING to current housing:  increased need for SN rehab  Potential Assistance needed at discharge: González Darden            Potential DME:    Patient expects to discharge to: Annalise Guzmán  for transportation at discharge: Family    Financial    Payor: Nimisha David / Plan: MEDICARE PART A AND B / Product Type: *No Product type* /     Does insurance require precert for SNF: No    Potential assistance Purchasing Medications: No  Meds-to-Beds request:        [facility], [address] - [address] [place] [phone] - [address] [facility] [place] [facility] # [address] [address]  Phone: [phone] Fax: [phone]      Notes:    Factors facilitating achievement of predicted outcomes: Family support, Cooperative, Pleasant, and Has needed Durable Medical Equipment at home    Barriers to discharge: Limited family support, Confusion, Lower extremity weakness, and Medication managment    Additional Case Management Notes: Pt from home w/wife. Wife requests referral to SNF [facility]) which is close to home. Dtr Reg Blanton manages most of the healthcare related decisions and spouse asked CM to f/u with Reg Blanton with DC plans. The Plan for Transition of Care is related to the following treatment goals of Pacemaker infection, initial encounter ([facility] [T78. 7XXA]    IF APPLICABLE: The Patient and/or patient representative Glen An and his family were provided with a choice of provider and agrees with the discharge plan.  Freedom of choice list with basic dialogue that supports the patient's individualized plan of care/goals and shares the quality data associated with the providers was provided to: Patient Representative   Patient Representative Name: wife     The Patient and/or Patient Representative Agree with the Discharge Plan?  Yes    Electronically signed by Jenni Oakley RN on 1/25/2023 at 11:35 AM

## 2023-01-26 VITALS
HEIGHT: 66 IN | SYSTOLIC BLOOD PRESSURE: 92 MMHG | WEIGHT: 157.6 LBS | RESPIRATION RATE: 16 BRPM | BODY MASS INDEX: 25.33 KG/M2 | HEART RATE: 68 BPM | DIASTOLIC BLOOD PRESSURE: 58 MMHG | TEMPERATURE: 97 F | OXYGEN SATURATION: 95 %

## 2023-01-26 LAB
ANION GAP SERPL CALCULATED.3IONS-SCNC: 10 MMOL/L (ref 3–16)
BASOPHILS ABSOLUTE: 0 K/UL (ref 0–0.2)
BASOPHILS RELATIVE PERCENT: 0.6 %
BUN BLDV-MCNC: 35 MG/DL (ref 7–20)
CALCIUM SERPL-MCNC: 8.3 MG/DL (ref 8.3–10.6)
CHLORIDE BLD-SCNC: 106 MMOL/L (ref 99–110)
CO2: 23 MMOL/L (ref 21–32)
CREAT SERPL-MCNC: 1.2 MG/DL (ref 0.8–1.3)
EOSINOPHILS ABSOLUTE: 0.2 K/UL (ref 0–0.6)
EOSINOPHILS RELATIVE PERCENT: 2.9 %
GFR SERPL CREATININE-BSD FRML MDRD: 59 ML/MIN/{1.73_M2}
GLUCOSE BLD-MCNC: 82 MG/DL (ref 70–99)
HCT VFR BLD CALC: 32.8 % (ref 40.5–52.5)
HEMOGLOBIN: 11.2 G/DL (ref 13.5–17.5)
LYMPHOCYTES ABSOLUTE: 1.8 K/UL (ref 1–5.1)
LYMPHOCYTES RELATIVE PERCENT: 22.5 %
MAGNESIUM: 1.9 MG/DL (ref 1.8–2.4)
MCH RBC QN AUTO: 29.5 PG (ref 26–34)
MCHC RBC AUTO-ENTMCNC: 34 G/DL (ref 31–36)
MCV RBC AUTO: 86.8 FL (ref 80–100)
MONOCYTES ABSOLUTE: 0.9 K/UL (ref 0–1.3)
MONOCYTES RELATIVE PERCENT: 11 %
NEUTROPHILS ABSOLUTE: 5 K/UL (ref 1.7–7.7)
NEUTROPHILS RELATIVE PERCENT: 63 %
PDW BLD-RTO: 15.8 % (ref 12.4–15.4)
PLATELET # BLD: 185 K/UL (ref 135–450)
PMV BLD AUTO: 8.2 FL (ref 5–10.5)
POTASSIUM REFLEX MAGNESIUM: 4.2 MMOL/L (ref 3.5–5.1)
RBC # BLD: 3.78 M/UL (ref 4.2–5.9)
SODIUM BLD-SCNC: 139 MMOL/L (ref 136–145)
WBC # BLD: 8 K/UL (ref 4–11)

## 2023-01-26 PROCEDURE — 6370000000 HC RX 637 (ALT 250 FOR IP): Performed by: INTERNAL MEDICINE

## 2023-01-26 PROCEDURE — 97530 THERAPEUTIC ACTIVITIES: CPT

## 2023-01-26 PROCEDURE — 85025 COMPLETE CBC W/AUTO DIFF WBC: CPT

## 2023-01-26 PROCEDURE — 99232 SBSQ HOSP IP/OBS MODERATE 35: CPT | Performed by: INTERNAL MEDICINE

## 2023-01-26 PROCEDURE — 2580000003 HC RX 258: Performed by: INTERNAL MEDICINE

## 2023-01-26 PROCEDURE — 80048 BASIC METABOLIC PNL TOTAL CA: CPT

## 2023-01-26 PROCEDURE — 83735 ASSAY OF MAGNESIUM: CPT

## 2023-01-26 PROCEDURE — 99232 SBSQ HOSP IP/OBS MODERATE 35: CPT | Performed by: NURSE PRACTITIONER

## 2023-01-26 PROCEDURE — 97116 GAIT TRAINING THERAPY: CPT

## 2023-01-26 PROCEDURE — 36415 COLL VENOUS BLD VENIPUNCTURE: CPT

## 2023-01-26 RX ORDER — LINEZOLID 600 MG/1
600 TABLET, FILM COATED ORAL EVERY 12 HOURS SCHEDULED
Qty: 20 TABLET | Refills: 0
Start: 2023-01-26 | End: 2023-02-05

## 2023-01-26 RX ORDER — CEFUROXIME AXETIL 500 MG/1
500 TABLET ORAL 2 TIMES DAILY
Qty: 20 TABLET | Refills: 0
Start: 2023-01-26 | End: 2023-02-05

## 2023-01-26 RX ADMIN — RISPERIDONE 0.25 MG: 0.25 TABLET, FILM COATED ORAL at 09:22

## 2023-01-26 RX ADMIN — ISOSORBIDE MONONITRATE 30 MG: 30 TABLET, EXTENDED RELEASE ORAL at 09:22

## 2023-01-26 RX ADMIN — FERROUS SULFATE TAB 325 MG (65 MG ELEMENTAL FE) 325 MG: 325 (65 FE) TAB at 09:21

## 2023-01-26 RX ADMIN — ASPIRIN 81 MG: 81 TABLET, COATED ORAL at 09:21

## 2023-01-26 RX ADMIN — QUETIAPINE FUMARATE 25 MG: 25 TABLET ORAL at 09:21

## 2023-01-26 RX ADMIN — FINASTERIDE 5 MG: 5 TABLET, FILM COATED ORAL at 09:22

## 2023-01-26 RX ADMIN — ACETAMINOPHEN 650 MG: 325 TABLET ORAL at 00:01

## 2023-01-26 RX ADMIN — Medication 1 CAPSULE: at 09:20

## 2023-01-26 RX ADMIN — PANTOPRAZOLE SODIUM 40 MG: 40 TABLET, DELAYED RELEASE ORAL at 09:21

## 2023-01-26 RX ADMIN — MEMANTINE 5 MG: 5 TABLET ORAL at 09:22

## 2023-01-26 RX ADMIN — LINEZOLID 600 MG: 600 TABLET, FILM COATED ORAL at 09:20

## 2023-01-26 RX ADMIN — TAMSULOSIN HYDROCHLORIDE 0.4 MG: 0.4 CAPSULE ORAL at 09:21

## 2023-01-26 RX ADMIN — CITALOPRAM HYDROBROMIDE 10 MG: 20 TABLET ORAL at 09:21

## 2023-01-26 RX ADMIN — AMLODIPINE BESYLATE 10 MG: 5 TABLET ORAL at 09:21

## 2023-01-26 RX ADMIN — SODIUM CHLORIDE: 9 INJECTION, SOLUTION INTRAVENOUS at 09:32

## 2023-01-26 ASSESSMENT — ENCOUNTER SYMPTOMS
EYE REDNESS: 0
SHORTNESS OF BREATH: 0
DIARRHEA: 0
SINUS PAIN: 0
NAUSEA: 0
SINUS PRESSURE: 0
BACK PAIN: 0
EYE DISCHARGE: 0
COUGH: 0
SORE THROAT: 0
CONSTIPATION: 0
WHEEZING: 0
ABDOMINAL PAIN: 0
RHINORRHEA: 0

## 2023-01-26 ASSESSMENT — PAIN SCALES - GENERAL
PAINLEVEL_OUTOF10: 0

## 2023-01-26 NOTE — DISCHARGE SUMMARY
Hospital Medicine Discharge Summary    Patient: Felipe Holt     Gender: male  : 1935   Age: 82 y.o. MRN: [id number]    Admitting Physician: Arline Beasley MD  Discharge Physician: Gela Kaur MD     Code Status: Full Code     Admit Date: 2023   Discharge Date:   23    Disposition:  [facility]    Discharge Diagnoses: Active Hospital Problems    Diagnosis Date Noted    Pacemaker-dependent due to native cardiac rhythm insufficient to support life [I49.8, Z95.0] 2023     Priority: Medium    Pacemaker infection ([address] [P46. 7XXA] 2023     Priority: Medium    Atrial fibrillation, rapid ([address] [I48.91] 2022     Priority: Medium    Ischemic cardiomyopathy [I25.5] 08/10/2022     Priority: Medium    Dementia with behavioral disturbance [F03.918]      Priority: Medium    Coronary artery disease involving native heart without angina pectoris [I25.10] 06/10/2020    A-fib ([address] [I48.91] 2016    Hypertension [I10]     Hyperlipidemia [E78.5]        Follow-up appointments:  one week    Outpatient to do list: F/U with PCP, ID and Cardio    Condition at Discharge:  [address] Course:   82 y.o. male, with past medical history of hypertension, hyperlipidemia, dementia and atrial fibrillation, who was a direct admit from [facility] to [facility] with an infected pacemaker site. Admitted as inpatient for further management. Started on IV Cefepime and Vanco.  Followed by EP and ID. On 23, S/P Removal and extraction of pacemaker leads.  - Pocket debridement  - Micra implant. Will finish 10 days of PO Zyvox and Ceftin. Will discharge to SNF for rehab. F/U with PCP, EP and ID.     Discharge Medications:   Current Discharge Medication List        START taking these medications    Details   linezolid (ZYVOX) 600 MG tablet Take 1 tablet by mouth every 12 hours for 10 days  Qty: 20 tablet, Refills: 0      cefUROXime (CEFTIN) 500 MG tablet Take 1 tablet by mouth 2 times daily for 10 days  Qty: 20 tablet, Refills: 0           Current Discharge Medication List        Current Discharge Medication List        CONTINUE these medications which have NOT CHANGED    Details   isosorbide mononitrate (IMDUR) 30 MG extended release tablet Take 1 tablet by mouth daily  Qty: 30 tablet, Refills: 3      enoxaparin (LOVENOX) 40 MG/0.4ML Inject 0.4 mLs into the skin daily      lactobacillus (CULTURELLE) capsule Take 1 capsule by mouth 2 times daily (with meals)      !! QUEtiapine (SEROQUEL) 25 MG tablet Take 1 tablet by mouth every morning (before breakfast)  Qty: 60 tablet, Refills: 3      !!  QUEtiapine (SEROQUEL) 50 MG tablet Take 1 tablet by mouth nightly  Qty: 60 tablet, Refills: 3      amLODIPine (NORVASC) 10 MG tablet Take 1 tablet by mouth daily  Qty: 30 tablet, Refills: 3      polyethylene glycol (GLYCOLAX) 17 g packet Take 17 g by mouth daily as needed for Constipation  Qty: 527 g, Refills: 1      tamsulosin (FLOMAX) 0.4 MG capsule Take 1 capsule by mouth daily  Qty: 30 capsule, Refills: 3      memantine (NAMENDA) 5 MG tablet TAKE 1 TABLET BY MOUTH 2 TIMES DAILY  Qty: 60 tablet, Refills: 2      atorvastatin (LIPITOR) 40 MG tablet TAKE 1 TABLET BY MOUTH DAILY  Qty: 90 tablet, Refills: 3    Associated Diagnoses: Hyperlipidemia, unspecified hyperlipidemia type      risperiDONE (RISPERDAL) 0.25 MG tablet Take 1 tablet by mouth 2 times daily  Qty: 60 tablet, Refills: 3    Associated Diagnoses: Dementia with behavioral disturbance      pantoprazole (PROTONIX) 40 MG tablet Take 1 tablet by mouth daily  Qty: 30 tablet, Refills: 5    Associated Diagnoses: Gastroesophageal reflux disease, unspecified whether esophagitis present      mirabegron (MYRBETRIQ) 25 MG TB24 Take 1 tablet by mouth daily  Qty: 30 tablet, Refills: 5    Associated Diagnoses: Urge incontinence      Ferrous Sulfate (IRON) 325 (65 Fe) MG TABS Take by mouth daily       finasteride (PROSCAR) 5 MG tablet Take 5 mg by mouth daily. aspirin 81 MG tablet Take 81 mg by mouth daily. !! - Potential duplicate medications found. Please discuss with provider. Current Discharge Medication List        STOP taking these medications       LORazepam (ATIVAN) 2 MG/ML injection Comments:   Reason for Stopping:         hydrALAZINE (APRESOLINE) 20 MG/ML injection Comments:   Reason for Stopping:         labetalol (NORMODYNE;TRANDATE) 5 MG/ML injection Comments:   Reason for Stopping:         cefepime (MAXIPIME) infusion Comments:   Reason for Stopping:         vancomycin (VANCOCIN) infusion Comments:   Reason for Stopping:         trospium (SANCTURA) 20 MG tablet Comments:   Reason for Stopping:         apixaban (ELIQUIS) 5 MG TABS tablet Comments:   Reason for Stopping:         metoprolol succinate (TOPROL XL) 25 MG extended release tablet Comments:   Reason for Stopping:         famotidine (PEPCID) 20 MG tablet Comments:   Reason for Stopping:         donepezil (ARICEPT) 10 MG tablet Comments:   Reason for Stopping:         citalopram (CELEXA) 10 MG tablet Comments:   Reason for Stopping:               Discharge Exam:    /71   Pulse 74   Temp 97.3 °F (36.3 °C) (Axillary)   Resp 16   Ht 5' 6\" (1.676 m)   Wt 157 lb 9.6 oz (71.5 kg)   SpO2 94%   BMI 25.44 kg/m²   General appearance: No apparent distress, appears stated age and cooperative. HEENT: Pupils equal, round, and reactive to light. Conjunctivae/corneas clear. Neck: Supple, with full range of motion. No jugular venous distention. Trachea midline. Respiratory:  Normal respiratory effort. Clear to auscultation, bilaterally without Rales/Wheezes/Rhonchi. Cardiovascular: Regular rate and rhythm with normal S1/S2 without murmurs, rubs or gallops. L pacemaker pocket is covered  Abdomen: Soft, non-tender, non-distended with normal bowel sounds. Musculoskeletal: No clubbing, cyanosis or edema bilaterally. Full range of motion without deformity.   Skin: Skin color, texture, turgor normal.  No rashes or lesions. Neurologic:  Neurovascularly intact without any focal sensory/motor deficits. Cranial nerves: II-XII intact, grossly non-focal.  Psychiatric: Alert and oriented to self, not time and place  Capillary Refill: Brisk, 3 seconds, normal   Peripheral Pulses: +2 palpable, equal bilaterally        Labs: For convenience and continuity at follow-up the following most recent labs are provided:    Lab Results   Component Value Date/Time    WBC 8.0 01/26/2023 04:38 AM    HGB 11.2 01/26/2023 04:38 AM    HCT 32.8 01/26/2023 04:38 AM    MCV 86.8 01/26/2023 04:38 AM     01/26/2023 04:38 AM     01/26/2023 04:38 AM    K 4.2 01/26/2023 04:38 AM     01/26/2023 04:38 AM    CO2 23 01/26/2023 04:38 AM    BUN 35 01/26/2023 04:38 AM    CREATININE 1.2 01/26/2023 04:38 AM    CALCIUM 8.3 01/26/2023 04:38 AM    PHOS 3.3 12/14/2022 05:16 PM    BNP 12.8 01/09/2013 10:00 AM    ALKPHOS 99 01/19/2023 10:17 AM    ALT 10 01/19/2023 10:17 AM    AST 18 01/19/2023 10:17 AM    BILITOT 1.0 01/19/2023 10:17 AM    LABALBU 4.0 01/19/2023 10:17 AM    LDLCALC 92 09/09/2022 05:45 AM    TRIG 73 09/09/2022 05:45 AM     Lab Results   Component Value Date    INR 1.31 (H) 01/24/2023    INR 1.26 (H) 01/18/2022    INR 1.18 (H) 05/22/2020       Radiology:  XR CHEST (2 VW)    Result Date: 1/19/2023  EXAMINATION: TWO XRAY VIEWS OF THE CHEST 1/19/2023 10:32 am COMPARISON: 09/17/2022 HISTORY: ORDERING SYSTEM PROVIDED HISTORY: pacemaker complication TECHNOLOGIST PROVIDED HISTORY: Reason for exam:->pacemaker complication Reason for Exam: pacemaker complication FINDINGS: There is bibasilar scarring with mild pulmonary vascular congestion. Cardiomegaly is stable status post dual lead pacemaker. The leads are intact overlying the right ventricle and great cardiac vein. There is no pneumothorax or pleural effusion. Status post left shoulder arthroplasty.      1. Cardiomegaly with mild pulmonary vascular congestion. CT CHEST WO CONTRAST    Result Date: 1/19/2023  EXAMINATION: CT OF THE CHEST WITHOUT CONTRAST 1/19/2023 12:30 pm TECHNIQUE: CT of the chest was performed without the administration of intravenous contrast. Multiplanar reformatted images are provided for review. Automated exposure control, iterative reconstruction, and/or weight based adjustment of the mA/kV was utilized to reduce the radiation dose to as low as reasonably achievable. COMPARISON: 01/19/2023 and 01/24/2016 HISTORY: ORDERING SYSTEM PROVIDED HISTORY: pacemaker complication TECHNOLOGIST PROVIDED HISTORY: Reason for exam:->pacemaker complication Reason for Exam: Pacemaker complications FINDINGS: Mediastinum: Motion artifact degrades image quality. The heart is enlarged status post dual lead pacemaker. Coronary artery calcifications are a marker of atherosclerosis. There are no enlarged thoracic lymph nodes. Calcified granulomatous disease is noted. Lungs/pleura: The tracheobronchial tree is patent. There is no pneumothorax. There is a trace right pleural effusion with bibasilar atelectasis. Mild bilateral interstitial thickening is due to interstitial edema. Upper Abdomen: There is an enlarging moderate to large hiatal hernia. A punctate nonobstructing right nephrolithiasis is noted. Soft Tissues/Bones: Degenerative changes involve the thoracic spine and right glenohumeral joint. Status post left shoulder arthroplasty. There is mild-to-moderate bilateral gynecomastia. 1. Congestive heart failure. The patient was seen and examined on day of discharge and this discharge summary is in conjunction with any daily progress note from day of discharge. Time Spent on discharge is 45 minutes  in the examination, evaluation, counseling and review of medications and discharge plan.       Note that more than 30 minutes was spent in preparing discharge papers, discussing discharge with patient, medication review, etc. Signed:    Cris Bland MD   1/26/2023      Thank you Forrest Arellano MD for the opportunity to be involved in this patient's care.  If you have any questions or concerns please feel free to contact me at [address] [place] [address]

## 2023-01-26 NOTE — CARE COORDINATION
Discharge note:      CM/SW has been notified of discharge. Patient noted to have the following needs at discharge. CM/SW has coordinated the following services:  Skilled rehab      Discharge Destination:     [facility]  [address] [place] [address], [address] [place] [address]  Phone: [phone]  Fax:  [phone]    Transportation: [address]   ([phone])  Discharge Time: 1500  AVS faxed and agency notified: Yes  The following prescriptions sent with patient: None  Nurse to call report to facility  Family advised of discharge and in agreement. - Trina DTEMILI  HENS completed. Yes          Comment: IMM reviewed with Laverne Luna and copy emailed to [email or web address]      All CM/SW needs met, will sign off.     Electronically signed by Popeye Montero RN on 1/26/2023 at 1:02 PM

## 2023-01-26 NOTE — PROGRESS NOTES
[facility] [address]   Phone: [phone]    Occupational Therapy    [] Initial Evaluation            [x] Daily Treatment Note         [] Discharge Summary      Patient: Lon Yuan   : 1935   MRN: [id number]   Date of Service:  2023    Admitting Diagnosis:  Pacemaker infection [facility])  Current Admission Summary: 82 y.o. male, with past medical history of hypertension, hyperlipidemia, dementia and atrial fibrillation, who was a direct admit from [facility] to [place] with an infected pacemaker site. Admitted as inpatient for further management. Started on IV Cefepime and Vanco.  Followed by EP and ID. EP to proceed with explant. Past Medical History:  has a past medical history of Atrial fibrillation ([address] Blind right eye, Chronic kidney disease, Dementia ([address] Hyperlipidemia, Hypertension, and Pneumonia. Past Surgical History:  has a past surgical history that includes Tonsillectomy; Appendectomy; Colonoscopy; eye surgery; Upper gastrointestinal endoscopy; joint replacement (); hernia repair; pacemaker placement (10/13/2017); ablation of dysrhythmic focus; and pacemaker placement (2022). Discharge Recommendations: Lon Yuan scored a  on the AM-PAC ADL Inpatient form. Current research shows that an AM-PAC score of 17 or less is typically not associated with a discharge to the patient's home setting. Based on the patient's AM-PAC score and their current ADL deficits, it is recommended that the patient have 3-5 sessions per week of Occupational Therapy at d/c to increase the patient's independence. Please see assessment section for further patient specific details. If patient discharges prior to next session this note will serve as a discharge summary. Please see below for the latest assessment towards goals.        DME Required For Discharge: DME to be determined at next level of care, DME to be determined pending patient progress    Precautions/Restrictions: high fall risk, pacemaker 1/24/2023  Weight Bearing Restrictions: weight bearing as tolerated  [] Right Upper Extremity        [] Left Upper Extremity         [] Right Lower Extremity         [] Left Lower Extremity             Required Braces/Orthotics: no braces required                   [] Right            [] Left  Positional Restrictions: pacemaker, no elevation above 90 degrees L shoulder flexion/abduction      Pre-Admission Information   Lives With: spouse    Type of Home: house  Home Layout: one level, able to live on main level  Home Access: 2 step to enter with handrail. Bathroom Layout: walk in shower  Bathroom Equipment: grab bars in shower  Toilet Height: standard height  Home Equipment: rolling walker, rollator - 4 wheeled walker, single point cane  Transfer Assistance: Independent without use of device  Ambulation Assistance:modified independent with use of SPC  ADL Assistance: independent with all ADL's  IADL Assistance: requires assistance with all homemaking tasks  Active :        [] Yes  [x] No  Hand Dominance: [] Left  [] Right  Current Employment: retired. 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 Per chart, pt typically falls to the right and has had multiple falls at home (per therapy evaluation on 8/15/22)        Subjective  General: Pt supine in bed upon arrival; pleasant and agreeable to eval. Decreased confusion on this date with pt oriented to person, place, and situation (not time). Pain: 0/10  Pain Interventions: not applicable     Activities of Daily Living  Basic Activities of Daily Living  Lower Extremity Dressing: dependent  Dressing Comments: depends change  Toileting: maximum assistance. Instrumental Activities of Daily Living  No IADL completed on this date.     Functional Mobility  Bed Mobility  Supine to Sit: moderate assistance  Scooting: stand by assistance  Comments: HOB elevated, cues for initiation and sequencing  Transfers  Sit to stand transfer:minimal assistance  Stand to sit transfer: minimal assistance  Comments: cues for motor planning and hand placement, improved eccentric control with lowering with verbal and tactile cues  Functional Mobility:  Sitting Balance: stand by assistance. Standing Balance: minimal assistance.     Functional Mobility: .  minimal assistance  Functional Mobility Activity: hallway amb  Functional Mobility Device Use: rolling walker    Other Therapeutic Interventions    Functional Outcomes  AM-PAC Inpatient Daily Activity Raw Score: 11    Cognition  Overall Cognitive Status: Impaired  Arousal/Alterness: delayed responses to stimuli  Following Commands: follows one step commands with repetition, follows one step commands with increased time  Attention Span: difficulty attending to directions, difficulty dividing attention  Memory: decreased recall of precautions, decreased recall of recent events, decreased short term memory, decreased long term memory  Safety Judgement: decreased awareness of need for assistance, decreased awareness of need for safety  Problem Solving: decreased awareness of errors  Insights: decreased awareness of deficits  Initiation: requires cues for some  Sequencing: requires cues for some  Comments: improved on this date compared to last session  Orientation:    oriented to person, oriented to place, oriented to situation, and disoriented to time   Command Following:   impaired     Education  Barriers To Learning: cognition  Patient Education: patient educated on goals, OT role and benefits, plan of care, ADL adaptive strategies, transfer training, discharge recommendations  Learning Assessment:  patient will require reinforcement due to cognitive deficits    Assessment  Activity Tolerance: Improved standing tolerance on this date; continued limitations d/t cognition, weakness  Impairments Requiring Therapeutic Intervention: decreased functional mobility, decreased ADL status, decreased strength, decreased cognition, decreased endurance, decreased balance, decreased IADL  Prognosis: good  Clinical Assessment: The patient is a 82 y.o. male who presents below their baseline level of function due to above deficits, associated with pacemaker infection. Typically, pt is independent for ADLs and is mod I with SPC for mobility. Currently, pt is requiring assist of one for transfers and ambulation. Pt continues to be unsafe to return home at this time secondary to pt's global weakness and anticipated burden on caregiver in home setting. Continued OT indicated in order to promote return to PLOF.      Safety Interventions: patient left in chair, chair alarm in place, call light within reach, gait belt, patient at risk for falls, and nurse notified    Plan  Frequency: 3-5 x/per week  Current Treatment Recommendations: strengthening, balance training, functional mobility training, transfer training, endurance training, patient/caregiver education, ADL/self-care training, IADL training, and equipment evaluation/education    Goals    Short Term Goals:  Time Frame: by discharge  Patient will complete self-feeding at set up assistance   Patient will complete grooming at stand by assistance   Patient will complete functional transfers at moderate assistance - Goal met 1/26, upgrade to SBA  Patient will increase functional sitting balance to CGA for improved ADL completion - Goal met 1/26, upgrade to supervision    All other goals progressing 1/26    Therapy Session Time     Individual Group Co-treatment   Time In    1023   Time Out    1102   Minutes    39        Timed Code Treatment Minutes:  Timed Code Treatment Minutes: 38 Minutes  Total Treatment Minutes:  39 minutes       Electronically Signed By: ELISHA Conrad OTR/L [id number]

## 2023-01-26 NOTE — PROGRESS NOTES
Pt awake in bed watching tv. VSS, assessment complete and medications given. Fresh water provided and pt denies any needs.  Call light in reach and bed alarm engaged

## 2023-01-26 NOTE — PROGRESS NOTES
[address]   Electrophysiology Progress Note     Date: 1/26/2023  Admit Date: 1/22/2023     Reason for consultation: Device Infection    Chief Complaint: Weakness    History of Present Illness: History obtained from patient and medical record. Felipe Holt is a 82 y.o. male with a past medical history of permanent atrial fibrillation, tachy-bradycardia syndrome, cardiomyopathy, orthostatic intolerance. Patient is s/p single-chamber pacemaker, [place] placed on 10/13/2017 by Dr. Cem Nixon at [facility]. He later had AV node ablation and upgrade of his pacemaker to biventricular pacemaker on 9/16/2022. Pt been transferred from outside hospital for pocket infection. Patient has dementia and is poor historian. History obtained reviewing chart and from nursing staff. Patient states that she had some pain and discomfort and swelling at the site of pacemaker. He had fever and chills. The site has been noted to be swollen and has erythema. It is also tender. He has been admitted with pacemaker pocket infection and has been transferred to [facility] for lead and device extraction and implantation of pacing system. Patient is pacemaker dependent. Interval Hx: Today, he is being seen for EP follow up. Pt is pleasantly confused resting in bed. S/p device extraction with micra PPM placement on 1/24/23. Uneventful post operative course. Right groin and left chest PPM sites soft, no hematoma/ooozing/swelling noted. Up in chair, ambulating in room without issue. He is hemodynamically stable in V-paced rhythm. Patient seen and examined. Clinical notes reviewed. Telemetry reviewed. No new complaints today. No major events overnight. Denies having chest pain, palpitations, shortness of breath, orthopnea/PND, cough, or dizziness at the time of this visit. Allergies:   Allergies   Allergen Reactions    Sulfa Antibiotics      Home Meds:  Prior to Visit Medications    Medication Sig Taking? Authorizing Provider   isosorbide mononitrate (IMDUR) 30 MG extended release tablet Take 1 tablet by mouth daily  Alana Risk, MD   LORazepam (ATIVAN) 2 MG/ML injection Infuse 0.5 mLs intravenously every 4 hours as needed (anxiety) for up to 7 days. Max Daily Amount: 6 mg  Alana Risk, MD   enoxaparin (LOVENOX) 40 MG/0.4ML Inject 0.4 mLs into the skin daily  Alana Risk, MD   lactobacillus (CULTURELLE) capsule Take 1 capsule by mouth 2 times daily (with meals)  Alana Risk, MD   QUEtiapine (SEROQUEL) 25 MG tablet Take 1 tablet by mouth every morning (before breakfast)  Alana Risk, MD   QUEtiapine (SEROQUEL) 50 MG tablet Take 1 tablet by mouth nightly  Alana Risk, MD   hydrALAZINE (APRESOLINE) 20 MG/ML injection Infuse 0.5 mLs intravenously every 4 hours as needed (SBP greater than 180 mmHg)  Alana Risk, MD   amLODIPine (NORVASC) 10 MG tablet Take 1 tablet by mouth daily  Alana Risk, MD   polyethylene glycol (GLYCOLAX) 17 g packet Take 17 g by mouth daily as needed for Constipation  Alana Risk, MD   labetalol (NORMODYNE;TRANDATE) 5 MG/ML injection Infuse 4 mLs intravenously every 4 hours as needed (Give for systolic blood pressure greater than 211) Give for systolic blood pressure greater than 160  Alana Risk, MD   cefepime (MAXIPIME) infusion Infuse 2,000 mg intravenously every 24 hours for 10 days Compound per protocol  Alana Risk, MD   tamsulosin (FLOMAX) 0.4 MG capsule Take 1 capsule by mouth daily  Alaan Risk, MD   vancomycin (VANCOCIN) infusion Infuse 1,000 mg intravenously every 24 hours for 10 days Compound per protocol.   Alana Risk, MD   memantine (NAMENDA) 5 MG tablet TAKE 1 TABLET BY MOUTH 2 TIMES DAILY  GUS Giraldo - CNP   atorvastatin (LIPITOR) 40 MG tablet TAKE 1 TABLET BY MOUTH DAILY  Patient taking differently: Take 40 mg by mouth at bedtime  Bijan Olivo MD   risperiDONE (RISPERDAL) 0.25 MG tablet Take 1 tablet by mouth 2 times daily  Shira Fleming MD   pantoprazole (PROTONIX) 40 MG tablet Take 1 tablet by mouth daily  Shira Fleming MD   mirabegron (MYRBETRIQ) 25 MG TB24 Take 1 tablet by mouth daily  Shira Fleming MD   trospium (SANCTURA) 20 MG tablet Take 20 mg by mouth in the morning and 20 mg before bedtime. Historical Provider, MD   apixaban (ELIQUIS) 5 MG TABS tablet TAKE 1/2 TABLET BY MOUTH 2 TIMES DAILY  Shira Fleming MD   metoprolol succinate (TOPROL XL) 25 MG extended release tablet TAKE 3 TABLETS BY MOUTH TWICE DAILY  Patient taking differently: Take 25 mg by mouth in the morning and 25 mg in the evening. Angela Coreas, APRN - CNP   famotidine (PEPCID) 20 MG tablet Take 1 tablet by mouth at bedtime  Shira Fleming MD   donepezil (ARICEPT) 10 MG tablet Take 1 tablet by mouth daily  Historical Provider, MD   citalopram (CELEXA) 10 MG tablet TAKE 1 TABLET BY MOUTH DAILY  Shira Fleming MD   Ferrous Sulfate (IRON) 325 (65 Fe) MG TABS Take by mouth daily   Historical Provider, MD   finasteride (PROSCAR) 5 MG tablet Take 5 mg by mouth daily. Historical Provider, MD   aspirin 81 MG tablet Take 81 mg by mouth daily.     Historical Provider, MD      Scheduled Meds:   linezolid  600 mg Oral 2 times per day    magnesium sulfate  2,000 mg IntraVENous Once    sodium chloride flush  5-40 mL IntraVENous 2 times per day    sodium chloride flush  5-40 mL IntraVENous 2 times per day    sodium chloride flush  5-40 mL IntraVENous 2 times per day    aspirin  81 mg Oral Daily    isosorbide mononitrate  30 mg Oral Daily    citalopram  10 mg Oral Daily    atorvastatin  40 mg Oral Nightly    lactobacillus  1 capsule Oral BID WC    trospium  20 mg Oral Nightly    pantoprazole  40 mg Oral Daily    memantine  5 mg Oral BID    finasteride  5 mg Oral Daily    tamsulosin  0.4 mg Oral Daily    ferrous sulfate  325 mg Oral Daily    amLODIPine  10 mg Oral Daily    QUEtiapine 50 mg Oral Nightly    QUEtiapine  25 mg Oral QAM AC    risperiDONE  0.25 mg Oral BID    cefepime  2,000 mg IntraVENous Q24H    sodium chloride flush  5-40 mL IntraVENous 2 times per day    enoxaparin  40 mg SubCUTAneous Nightly    OLANZapine  5 mg IntraMUSCular Once     Continuous Infusions:   sodium chloride 50 mL/hr at 01/26/23 0932    sodium chloride      sodium chloride      sodium chloride Stopped (01/25/23 1956)    sodium chloride      sodium chloride Stopped (01/24/23 2208)     PRN Meds:sodium chloride, magnesium sulfate, sodium chloride flush, sodium chloride, HYDROmorphone, sodium chloride flush, sodium chloride, HYDROmorphone, HYDROmorphone, labetalol, sodium chloride flush, sodium chloride, acetaminophen, sodium chloride flush, sodium chloride, ondansetron **OR** ondansetron, polyethylene glycol     Past Medical History:  Past Medical History:   Diagnosis Date    Atrial fibrillation ([facility] [address]     Blind right eye     Chronic kidney disease     Dementia ([facility]     Hyperlipidemia     Hypertension     Pneumonia         Past Surgical History:    has a past surgical history that includes Tonsillectomy; Appendectomy; Colonoscopy; eye surgery; Upper gastrointestinal endoscopy; joint replacement (2013); hernia repair; pacemaker placement (10/13/2017); ablation of dysrhythmic focus; and pacemaker placement (09/16/2022). Social History:  Reviewed. reports that he quit smoking about 47 years ago. His smoking use included cigarettes. He has a 10.00 pack-year smoking history. He has never used smokeless tobacco. He reports that he does not drink alcohol and does not use drugs. Family History:  Reviewed. family history includes Asthma in an other family member; Heart Disease in his father and mother; Other in his mother. Review of Systems:  Constitutional: Positive for fatigue and weakness.  Negative for fever, night sweats, chills, weight changes  Skin: Negative for rash, dry skin, pruritus, bruising, bleeding, blood clots, or changes in skin pigment  HEENT: Negative for vision changes, ringing in the ears, sore throat, dysphagia, or swollen lymph nodes  Respiratory: Reviewed in HPI  Cardiovascular: Reviewed in HPI  Gastrointestinal: Negative for abdominal pain, N/V/D, constipation, or black/tarry stools  Genito-Urinary: Negative for dysuria, incontinence, urgency, or hematuria  Musculoskeletal: Negative for joint swelling, muscle pain, or injuries  Neurological/Psych: Positive for confusion. Negative for seizures, headaches, balance issues or TIA-like symptoms. No anxiety, depression, or insomnia    Physical Examination:  Vitals:    01/26/23 0900   BP: 114/71   Pulse: 74   Resp: 16   Temp: 97.3 °F (36.3 °C)   SpO2: 94%      In: 2101.8 [P.O.:720; I.V.:803.2]  Out: 200    Wt Readings from Last 3 Encounters:   01/26/23 157 lb 9.6 oz (71.5 kg)   01/22/23 150 lb 14.4 oz (68.4 kg)   01/19/23 176 lb (79.8 kg)       Intake/Output Summary (Last 24 hours) at 1/26/2023 1006  Last data filed at 1/26/2023 7279  Gross per 24 hour   Intake 2101.82 ml   Output --   Net 2101.82 ml       Telemetry: Personally Reviewed  - V-paced  Constitutional: Cooperative and in no apparent distress, and appears frail  Skin: Warm and pink; no pallor, cyanosis, bruising, or clubbing. Left chest PPM site stable, no swelling/hematoma/oozing. Right groin site stable, C/D/I  HEENT: Symmetric and normocephalic. PERRL, EOM intact. Conjunctiva pink with clear sclera. Mucus membranes pink and moist. Teeth intact. Thyroid smooth without nodules or goiter. Cardiovascular: Regular rate and rhythm. S1/S2 present without murmurs, rubs, or gallops. Peripheral pulses 2+, capillary refill < 3 seconds. No elevation of JVP. No peripheral edema  Respiratory: Respirations symmetric and unlabored. Lungs clear to auscultation bilaterally, no wheezing, crackles, or rhonchi  Gastrointestinal: Abdomen soft and round.  Bowel sounds normoactive in all quadrants without tenderness or masses. Musculoskeletal: Bilateral upper and lower extremity strength 5/5 with full ROM. + Generalized weakness  Neurologic/Psych: Awake. + Confusion. Calm affect, appropriate mood    Pertinent labs, diagnostic, device, and imaging results reviewed as a part of this visit    Labs:    BMP:   Recent Labs     23  0532 23  0533 23    141 139   K 4.1 4.9 4.2    106 106   CO2    BUN 25* 36* 35*   CREATININE 1.2 1.4* 1.2   MG 1.70*  --  1.90     Estimated Creatinine Clearance: 41 mL/min (based on SCr of 1.2 mg/dL). CBC:   Recent Labs     23  0532 23  0523   WBC 12.0* 12.0* 8.0   HGB 13.9 12.5* 11.2*   HCT 41.5 37.8* 32.8*   MCV 86.6 87.4 86.8    196 185     Thyroid:   Lab Results   Component Value Date/Time    TSH 2.45 10/21/2021 02:04 PM     Lipids:   Lab Results   Component Value Date/Time    CHOL 137 2022 05:45 AM    HDL 30 2022 05:45 AM    TRIG 73 2022 05:45 AM     LFTS:   Lab Results   Component Value Date/Time    ALT 10 2023 10:17 AM    AST 18 2023 10:17 AM    ALKPHOS 99 2023 10:17 AM    PROT 7.0 2023 10:17 AM    PROT 7.6 2013 10:00 AM    AGRATIO 1.3 2023 10:17 AM    BILITOT 1.0 2023 10:17 AM     Cardiac Enzymes:   Lab Results   Component Value Date/Time    TROPONINI <0.01 2023 10:17 AM    TROPONINI <0.01 2022 11:56 PM    TROPONINI 0.01 2022 09:21 PM     Coags:   Lab Results   Component Value Date/Time    PROTIME 16.2 2023 05:32 AM    INR 1.31 2023 05:32 AM       EC23 (Personally Interpreted)   - Ventricular paced    DORITA:    Low-normal left ventricular systolic function with ejection fraction visually estimated at 50%. No evidence of mass, vegetation or thrombus noted on native valves nor pacemaker leads. Mild mitral regurgitation. Moderate tricuspid regurgitation.    Systolic pulmonary artery pressure (SPAP) estimated at 26 mmHg (RA pressure 8 mmHg). Cath: 9/22  RA 5 54%  RV 20/4    PA 21/13 48%  PW 11    AORTA 110/68 85%  TASHA CARDIAC OUTPUT 3.8 L/min  SVR 1630           LVEDP 11  GRADIENT ACROSS AORTIC VALVE None     LM Less than 10% [id number] stenosis. LAD Calcified, proximal 40 to 50% stenosis, mid-distal 60 to 75% stenosis. LCX Calcified, there is proximal-mid 75% eccentric stenosis at a bifurcation with OM1 and OM 2 as well as the AV groove. RCA Dominant, calcified, large vessel, [id number] 40% stenoses. Problem List:   Patient Active Problem List    Diagnosis Date Noted    Pneumonia 02/27/2015    Pacemaker-dependent due to native cardiac rhythm insufficient to support life 01/23/2023    Pacemaker infection ([facility] [address] 01/19/2023    Orthostatic hypotension 09/09/2022    Atrial fibrillation, rapid ([facility] [address] 09/08/2022    Syncope, unspecified syncope type 08/10/2022    Ischemic cardiomyopathy 08/10/2022    Dementia with behavioral disturbance     Hallucinations     Syncope and collapse     COVID     Other chest pain 04/21/2021    Coronary artery disease involving native heart without angina pectoris 06/10/2020    Abnormal cardiovascular stress test 05/22/2020    SOB (shortness of breath) 05/04/2020    Abnormal echocardiogram 04/02/2019    Dizziness 04/02/2019    A-fib ([address] 03/03/2016    Hyperlipidemia     Hypertension     Chronic kidney disease, stage III (moderate) (HCC)     Tracheobronchomalacia     Abnormal chest CT     Cough syncope         Assessment and Plan:     1. Pacemaker Pocket Infection   - S/p laser lead/device extraction with placement of micra PPM  - Left chest PPM extraction site stable, no swelling/hematoma  - Educated on post device extraction and micra PPM placement, discharge instructions/restrictions, pt VU    - On IV ABX. Management per ID    2.  Permanent Atrial Fibrillation  - Hx of AV dina ablation  - Currently in V-paced (underlying AF)  - No anti-coagulation due to falls and orthostatic issues. Consider HAN closure in future    3. Pacemaker Dependent   - S/p device extraction with micra PPM placement    ~ Right groin stable, no hematoma/swelling/oozing  - I reviewed device interrogation today. Device functioning normally. - Follow up with device clinic as scheduled    4. Orthostatic Hypotension   - Stable   - Encourage adequate hydration    5. CAD  - Stable  - No complaints of angina  - Continue ASA and statin    6. Hypomagnesemia   - Resolved   - Replace to keep >2    No further EP recommendations. Will arrange one week device check and 3 month follow up at [facility] (per pt/family request). EP will sign off. Please call if there are any questions. Thank you for consult. All pertinent information and plan of care discussed with the EP physician. All questions and concerns were addressed to the patient. Alternatives to my treatment were discussed. I have discussed the above stated plan with patient and the nurse. The patient verbalized understanding and agreed with the plan.     Thank you for allowing to us to participate in the care of TRISH Thomas  [facility], [place], operated by [facility]   Office: [phone]

## 2023-01-26 NOTE — PROGRESS NOTES
[facility] [address] Department   Phone: [phone]    Physical Therapy    [] Initial Evaluation            [x] Daily Treatment Note         [] Discharge Summary      Patient: Becki Feng   : 1935   MRN: [id number]   Date of Service:  2023  Admitting Diagnosis: Infection of pacemaker pocket [facility])     Current Admission Summary: Becki Feng is a 82 y.o. male with a past medical history of hypertension, A. fib dementia hyperlipidemia chronic kidney disease former smoker CAD today with daughter with complaints of swelling, redness and tenderness to pacemaker site. He had a pacemaker placed about 5 months ago. He states over the past several days he has noticed increased swelling redness and tenderness to the area. He does endorse some fevers and chills at home. States its painful to touch. Onset of symptoms over the past 2 to 3 days. Duration of symptoms have been persistent since onset. Context includes pacemaker complication. Denies any nausea vomiting diarrhea or abdominal pain. Denies numbness or tingling. Denies cough or congestion. He has not taken anything today for pain currently denies any pain. States it is only painful to touch. Otherwise denies any other complaints. Nothing seems to make symptoms better or worse. Patient found to have infected pacemaker and pacemaker extraction is scheduled for 2023. Past Medical History:  has a past medical history of Atrial fibrillation ([address] Blind right eye, Chronic kidney disease, Dementia ([address] Hyperlipidemia, Hypertension, and Pneumonia. Past Surgical History:  has a past surgical history that includes Tonsillectomy; Appendectomy; Colonoscopy; eye surgery; Upper gastrointestinal endoscopy; joint replacement (); hernia repair; pacemaker placement (10/13/2017); ablation of dysrhythmic focus; and pacemaker placement (2022).      Discharge Recommendations: Jared Grant Dayana scored a 15/24 on the AM-PAC short mobility form. Current research shows that an AM-PAC score of 17 or less is typically not associated with a discharge to the patient's home setting. Based on the patient's AM-PAC score and their current functional mobility deficits, it is recommended that the patient have 3-5 sessions per week of Physical Therapy at d/c to increase the patient's independence. Please see assessment section for further patient specific details. If patient discharges prior to next session this note will serve as a discharge summary. Please see below for the latest assessment towards goals. DME Required For Discharge: DME to be determined at next level of care, DME to be determined pending patient progress  Precautions/Restrictions: high fall risk, pacemaker 1/24/2023  Weight Bearing Restrictions: weight bearing as tolerated  [] Right Upper Extremity        [] Left Upper Extremity         [] Right Lower Extremity         [] Left Lower Extremity             Required Braces/Orthotics: no braces required                   [] Right            [] Left  Positional Restrictions: pacemaker, no elevation above 90 degrees L shoulder flexion/abduction     Pre-Admission Information   Lives With: Spouse  Type of Home: House  Home Layout: One level, Able to Live on Main level with bedroom/bathroom  Home Access: Stairs to enter with rails  Entrance Stairs - Number of Steps: 2  Entrance Stairs - Rails: Both  Bathroom Shower/Tub: Walk-in shower  Bathroom Toilet: Standard  Bathroom Equipment: Grab bars in shower  Home Equipment: Ac Knife, 4 wheeled  Has the patient had two or more falls in the past year or any fall with injury in the past year?: Yes (Per patient's most recent PT/OT evaluation on 8/15.  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  Pt agreeable to PT/OT treat. Pain: 0/10  Pain Interventions: not applicable       Functional Mobility  Bed Mobility  Supine to Sit: moderate assistance  Rolling Left: moderate assistance  Scooting: contact guard assistance  Comments:  Assistance for trunk and BLEs. Transfers  Sit to stand transfer: minimal assistance  Stand to sit transfer: minimal assistance  Bed to chair transfer: minimal assistance   Comments:  Gait belt donned prior to mobility. Ambulation  Surface:level surface  Assistive Device: rolling walker  Assistance: minimal assistance  Distance:  100'  Gait Mechanics: dec'd fred, forward lean on walker, dec'd step length  Comments:   verbal cues to stay close to walker during ambulation, MIN assist to direct walker on turns, CGA for balance   Stair Mobility  Stair mobility not completed on this date. Comments:  Wheelchair Mobility:  No w/c mobility completed on this date.   Comments:  Balance  Static Sitting Balance: fair (+): maintains balance at SBA/supervision without use of UE support  Dynamic Sitting Balance: fair: maintains balance at CGA without use of UE support  Static Standing Balance: fair (-): maintains balance at CGA with use of UE support  Dynamic Standing Balance: fair (-): maintains balance at MIN with use of UE support  Comments:     Other Therapeutic Interventions    Functional Outcomes  AM-PAC Inpatient Mobility Raw Score : 15              Cognition  Overall Cognitive Status: Impaired  Comments: hx of dementia   Orientation:    oriented to person, oriented to place, disoriented to time , and oriented to situation  Command Following: Follows one step commands with increased time      Education  Barriers To Learning: cognition  Patient Education: patient educated on goals, PT role and benefits, plan of care, general safety, functional mobility training, proper use of assistive device/equipment, orientation, injury prevention, transfer training, discharge recommendations  Learning Assessment:  patient will require reinforcement due to cognitive deficits     Assessment  Activity Tolerance: Fatigue with ambulation. Impairments Requiring Therapeutic Intervention: decreased functional mobility, decreased strength, decreased safety awareness, decreased cognition, decreased endurance, decreased balance, decreased posture  Prognosis: fair  Clinical Assessment: Patient with improved mobility this session, able to initiate ambulation but with impaired safety awareness, impaired balance, no insight into deficits. Patient will need 24 hour assist and supervision for safety, continued therapy recommended for deficits. Safety Interventions: patient left in chair, chair alarm in place, call light within reach, gait belt, patient at risk for falls, telesitter in use, nurse notified  Plan  Frequency: 3-5 x/per week  Current Treatment Recommendations: strengthening, balance training, functional mobility training, transfer training, gait training, stair training, endurance training, patient/caregiver education, safety education, and equipment evaluation/education     Goals  Patient Goals: None verbalized. Short Term Goals:  Time Frame: Discharge.   Patient will complete bed mobility at moderate assistance  (Goal met 1/26/2023)  New goal:  Patient will complete bed mobility w/ MIN assist.   Patient will complete transfers at moderate assistance (Goal met 1/26/2023)  New goal:  Patient will complete transfers at [facility] w/ LRAD. Patient will ambulate 10 ft with use of LRAD at moderate assistance  (Goal met 1/26/2023)  New goal:  Patient will ambulate 100' w/ use of LRAD and CGA. Patient will ascend/descend 2 stairs with (B) handrail at moderate assistance.   (Goal mot met)      Therapy Session Time    Individual Group Co-treatment   Time In      1023   Time Out      1102   Minutes      39     Total Treatment Minutes:  39       Electronically Signed By: Ladonna Haas PT, DPT, ATC-R [id number]

## 2023-01-26 NOTE — PROGRESS NOTES
Infectious Diseases   Progress Note      Admission Date: 1/22/2023  Hospital Day: Hospital Day: 5   Attending: Mylene Huff MD  Date of service: 1/26/2023     Chief complaint/ Reason for consult:     Pacemaker pocket site infection  Recent acute kidney injury  History of dementia  Essential hypertension    Microbiology:        I have reviewed allavailable micro lab data and cultures      Pacemaker pocket site wound culture: collected on 1/24/23: in process      Culture, Wound  Order: [id number]  Status: Preliminary result    Visible to patient: No (not released)    Next appt: 01/27/2023 at 10:00 AM in Family Medicine Daija Nascimento MD)    Specimen Information: Wound   0 Result Notes  Component 1/24/23 1300  Resulting Agency   WOUND/ABSCESS No growth to date [place] [id number] Lab   Gram Stain Result No WBC's seen   No organisms seen  [facility]           Narrative  Performed by: [facility]  ORDER#: [id number]                          ORDERED BY: FANNY [address]: Wound pacemaker pocket             COLLECTED:  01/24/23 13:00   ANTIBIOTICS AT OVIDIO.:                      RECEIVED :  01/25/23 02:31   ORDER WAS CANCELLED 01/24/2023 18:17, only aerobic swab sent to lab. Antibiotics and immunizations:       Current antibiotics: All antibiotics and their doses were reviewed by me    Recent Abx Admin                     linezolid (ZYVOX) tablet 600 mg (mg) 600 mg Given 01/26/23 0920     600 mg Given 01/25/23 2101    cefepime (MAXIPIME) 2000 mg IVPB minibag (mg) 2,000 mg New Bag 01/25/23 1540                      Immunization History: All immunization history was reviewed by me today.     Immunization History   Administered Date(s) Administered    Influenza A ([id number]) Vaccine PF IM 11/19/2009    Pneumococcal Conjugate 13-valent (Nhuxkqt51) 08/25/2015    Pneumococcal Conjugate 7-valent (Prevnar7) 02/27/2013    Pneumococcal Polysaccharide (Aombtwewp54) 03/09/2010, 02/27/2013, 06/26/2019       Known drug allergies: All allergies were reviewed and updated    Allergies   Allergen Reactions    Sulfa Antibiotics        Social history:     Social History:  All social andepidemiologic history was reviewed and updated by me today as needed. Tobacco use:   reports that he quit smoking about 47 years ago. His smoking use included cigarettes. He has a 10.00 pack-year smoking history. He has never used smokeless tobacco.  Alcohol use:   reports no history of alcohol use. Currently lives in: [facility]   reports no history of drug use. COVID VACCINATION AND LAB RESULT RECORDS:     Internal Administration   First Dose      Second Dose           Last COVID Lab SARS-CoV-2 (no units)   Date Value   05/15/2020 NOT DETECTED     SARS-CoV-2 RNA, RT PCR (no units)   Date Value   04/21/2022 NOT DETECTED     SARS-CoV-2, MICHELLE (no units)   Date Value   08/09/2022 NEGATIVE     SARS-CoV-2, NAAT (no units)   Date Value   08/14/2022 Not Detected            Assessment:     The patient is a 82 y.o. old male who  has a past medical history of Atrial fibrillation ([address] Blind right eye, Chronic kidney disease, Dementia ([address] Hyperlipidemia, Hypertension, and Pneumonia. with following problems:    Pacemaker pocket site infection-s/p pacemaker assembly extraction on 1/24/2022 -cultures remain negative  Recent acute kidney injury -serum creatinine is 1.2  History of dementia  Essential hypertension-blood pressure okay  Mixed hyperlipidemia  History of atrial fibrillation- rate controlled      Discussion:      The patient is on empiric IV linezolid and IV cefepime. Pacemaker pocket removal site culture is running negative. Serum creatinine is 1.2. White cell count is 8000. Platelet count is 349,693. Plan:     Diagnostic Workup:      Continue to follow  fever curve, WBC count and blood cultures. Continue to monitor blood counts, liver and renal function. Antimicrobials:     Will continue oral linezolid 600 mg every 12 hours  Plan to switch IV cefepime to oral Ceftin 250 mg every 12 hours discharge plan for total of 10 days of both antibiotics at discharge  We will follow up on the culture results and clinical progress and will make further recommendations accordingly. Continue close vitals monitoring. Maintain good glycemic control. Fall precautions. Aspiration precautions. Continue to watch for new fever or diarrhea. DVT prophylaxis. Discussed all above with patient and RN. Discussed with Dr. Ochoa Quant      Drug Monitoring:    Continue monitoring for antibiotic toxicity as follows: CBC, CMP  Continue to watch for following: new or worsening fever, new hypotension, hives, lip swelling and redness or purulence at vascular access sites. I/v access Management:    Continue to monitor i.v access sites for erythema, induration, discharge or tenderness. As always, continue efforts to minimize tubes/lines/drains as clinically appropriate to reduce chances of line associated infections. Patient education and counseling: The patient was educated in detail about the side-effects of various antibiotics and things to watch for like new rashes, lip swelling, severe reaction, worsening diarrhea, break through fever etc.  Discussed patient's condition and what to expect. All of the patient's questions were addressed in a satisfactory manner and patient verbalized understanding all instructions. Thank you for involving me in the care of your patient. I will continue to follow. If you have anyadditional questions, please do not hesitate to contact me. Subjective: Interval history: Interval history was obtained from chart review and The patient is afebrile. He is tolerating antibiotics okay. No diarrhea     REVIEW OF SYSTEMS:      Review of Systems   Constitutional:  Positive for fatigue. Negative for chills, diaphoresis and fever.    HENT:  Negative for ear discharge, ear pain, postnasal drip, rhinorrhea, sinus pressure, sinus pain and sore throat. Eyes:  Negative for discharge and redness. Respiratory:  Negative for cough, shortness of breath and wheezing. Cardiovascular:  Negative for chest pain and leg swelling. Gastrointestinal:  Negative for abdominal pain, constipation, diarrhea and nausea. Endocrine: Negative for cold intolerance, heat intolerance and polydipsia. Genitourinary:  Negative for dysuria, flank pain, frequency, hematuria and urgency. Musculoskeletal:  Negative for back pain and myalgias. Skin:  Negative for rash. Allergic/Immunologic: Negative for immunocompromised state. Neurological:  Negative for dizziness, seizures and headaches. Hematological:  Does not bruise/bleed easily. Psychiatric/Behavioral:  Negative for agitation, hallucinations and suicidal ideas. The patient is not nervous/anxious. All other systems reviewed and are negative. *    Past Medical History: All past medical history reviewed today. Past Medical History:   Diagnosis Date    Atrial fibrillation [address]     Blind right eye     Chronic kidney disease     Dementia ([facility]     Hyperlipidemia     Hypertension     Pneumonia        Past Surgical History: All past surgical history was reviewed today. Past Surgical History:   Procedure Laterality Date    ABLATION OF DYSRHYTHMIC FOCUS      APPENDECTOMY      COLONOSCOPY      EYE SURGERY      HERNIA REPAIR      JOINT REPLACEMENT  2013    left shoulder    PACEMAKER PLACEMENT  10/13/2017    Dr Daniel Avila at [facility]. single chamber PPM    PACEMAKER PLACEMENT  09/16/2022    biventricular    TONSILLECTOMY      UPPER GASTROINTESTINAL ENDOSCOPY         Family History: All family history was reviewed today.         Problem Relation Age of Onset    Heart Disease Mother     Other Mother     Heart Disease Father     Asthma Other        Objective:       PHYSICAL EXAM:      Vitals:   Vitals:    01/26/23 0439 01/26/23 0601 01/26/23 0900 01/26/23 1223   BP: 118/75  114/71 (!) 92/58   Pulse: 70 70 74 68   Resp: 12  16 16   Temp: 97.1 °F (36.2 °C)  97.3 °F (36.3 °C) 97 °F (36.1 °C)   TempSrc: Axillary  Axillary Oral   SpO2: 96%  94% 95%   Weight: 157 lb 9.6 oz (71.5 kg)      Height:           Physical Exam  Vitals and nursing note reviewed. Constitutional:       Appearance: He is well-developed. He is not diaphoretic. Comments: The patient was seen earlier today. HENT:      Head: Normocephalic and atraumatic. Right Ear: External ear normal. There is no impacted cerumen. Left Ear: External ear normal. There is no impacted cerumen. Nose: Nose normal.      Mouth/Throat:      Mouth: Mucous membranes are moist.      Pharynx: Oropharynx is clear. No oropharyngeal exudate. Eyes:      General: No scleral icterus. Right eye: No discharge. Left eye: No discharge. Conjunctiva/sclera: Conjunctivae normal.      Pupils: Pupils are equal, round, and reactive to light. Neck:      Thyroid: No thyromegaly. Cardiovascular:      Rate and Rhythm: Normal rate and regular rhythm. Heart sounds: Normal heart sounds. No murmur heard. No friction rub. Pulmonary:      Effort: No respiratory distress. Breath sounds: No stridor. No wheezing or rales. Abdominal:      General: Bowel sounds are normal.      Palpations: Abdomen is soft. Tenderness: There is no abdominal tenderness. There is no guarding or rebound. Musculoskeletal:         General: No swelling, tenderness or deformity. Normal range of motion. Cervical back: Normal range of motion and neck supple. Right lower leg: No edema. Left lower leg: No edema. Lymphadenopathy:      Cervical: No cervical adenopathy. Skin:     General: Skin is warm and dry. Coloration: Skin is not jaundiced. Findings: No bruising, erythema or rash. Neurological:      General: No focal deficit present.       Mental Status: He is alert and oriented to person, place, and time. Mental status is at baseline. Motor: No abnormal muscle tone. Psychiatric:         Mood and Affect: Mood normal.         Behavior: Behavior normal.          *      Intake and output:    I/O last 3 completed shifts: In: 2101.8 [P.O.:720; I.V.:803.2; IV Piggyback:578.6]  Out: 500 [Urine:500]    Lab Data:   All available labs and old records have been reviewed by me. CBC:  Recent Labs     01/24/23  0532 01/25/23  0533 01/26/23  0438   WBC 12.0* 12.0* 8.0   RBC 4.80 4.33 3.78*   HGB 13.9 12.5* 11.2*   HCT 41.5 37.8* 32.8*    196 185   MCV 86.6 87.4 86.8   MCH 28.9 28.8 29.5   MCHC 33.4 33.0 34.0   RDW 16.1* 16.1* 15.8*          BMP:  Recent Labs     01/24/23  0532 01/25/23  0533 01/26/23  0438    141 139   K 4.1 4.9 4.2    106 106   CO2 24 23 23   BUN 25* 36* 35*   CREATININE 1.2 1.4* 1.2   CALCIUM 9.1 8.9 8.3   GLUCOSE 109* 114* 82          Hepatic Function Panel:   Lab Results   Component Value Date/Time    ALKPHOS 99 01/19/2023 10:17 AM    ALT 10 01/19/2023 10:17 AM    AST 18 01/19/2023 10:17 AM    PROT 7.0 01/19/2023 10:17 AM    PROT 7.6 01/09/2013 10:00 AM    BILITOT 1.0 01/19/2023 10:17 AM    LABALBU 4.0 01/19/2023 10:17 AM       CPK: No results found for: CKTOTAL  ESR:   Lab Results   Component Value Date    SEDRATE 4 01/23/2023     CRP:   Lab Results   Component Value Date    CRP 5.0 01/23/2023           Imaging: All pertinent images and reports for the current visit were reviewed by me during this visit. I reviewed the chest x-ray/CT scan/MRI images and independently interpreted the findings and results today. No orders to display       Medications: All current and past medications were reviewed.      linezolid  600 mg Oral 2 times per day    magnesium sulfate  2,000 mg IntraVENous Once    sodium chloride flush  5-40 mL IntraVENous 2 times per day    sodium chloride flush  5-40 mL IntraVENous 2 times per day sodium chloride flush  5-40 mL IntraVENous 2 times per day    aspirin  81 mg Oral Daily    isosorbide mononitrate  30 mg Oral Daily    citalopram  10 mg Oral Daily    atorvastatin  40 mg Oral Nightly    lactobacillus  1 capsule Oral BID WC    trospium  20 mg Oral Nightly    pantoprazole  40 mg Oral Daily    memantine  5 mg Oral BID    finasteride  5 mg Oral Daily    tamsulosin  0.4 mg Oral Daily    ferrous sulfate  325 mg Oral Daily    amLODIPine  10 mg Oral Daily    QUEtiapine  50 mg Oral Nightly    QUEtiapine  25 mg Oral QAM AC    risperiDONE  0.25 mg Oral BID    cefepime  2,000 mg IntraVENous Q24H    sodium chloride flush  5-40 mL IntraVENous 2 times per day    enoxaparin  40 mg SubCUTAneous Nightly    OLANZapine  5 mg IntraMUSCular Once        sodium chloride 50 mL/hr at 01/26/23 0932    sodium chloride      sodium chloride      sodium chloride Stopped (01/25/23 1956)    sodium chloride      sodium chloride Stopped (01/24/23 2208)       sodium chloride, magnesium sulfate, sodium chloride flush, sodium chloride, HYDROmorphone, sodium chloride flush, sodium chloride, HYDROmorphone, HYDROmorphone, labetalol, sodium chloride flush, sodium chloride, acetaminophen, sodium chloride flush, sodium chloride, ondansetron **OR** ondansetron, polyethylene glycol      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    Hyperlipidemia E78.5    Hypertension I10    Chronic kidney disease, stage III (moderate) ([facility]) N18.30    A-fib ([facility]) I48.91    Abnormal echocardiogram R93.1    Dizziness R42    SOB (shortness of breath) R06.02    Abnormal cardiovascular stress test R94.39    Coronary artery disease involving native heart without angina pectoris I25.10    Other chest pain R07.89    Syncope and collapse R55    COVID U07.1    Dementia with behavioral disturbance F03.918    Hallucinations R44.3    Syncope, unspecified syncope type R55    Ischemic cardiomyopathy I25.5    Atrial fibrillation, rapid ([facility]) I48.91    Orthostatic hypotension I95.1    Pacemaker infection ([address] T82. 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 Although every effort was made to ensure the accuracy of this automated transcription, some errors in transcription may have occurred inadvertently. If you may need any clarification, please do not hesitate to contact me through EPIC or at the phone number provided below with my electronic signature. Any pictures or media included in this note were obtained after taking informed verbal consent from the patient and with their approval to include those in the patient's medical record. Mohsen Salgado MD, MPH, FACP, [facility]  1/26/2023, 1:50 PM  [facility] Infectious Disease   [address] [place] [address]., [address] [facility], [address] [place] [address]  Office: [phone]  Fax: [phone]  In-person Clinic days:  Tuesday & Thursday a.m. Virtual clinic days: Monday, Wednesday & Friday a.m.

## 2023-01-27 LAB
BLOOD BANK DISPENSE STATUS: NORMAL
BLOOD BANK PRODUCT CODE: NORMAL
BPU ID: NORMAL
DESCRIPTION BLOOD BANK: NORMAL
GRAM STAIN RESULT: NORMAL
WOUND/ABSCESS: NORMAL

## 2023-02-01 ENCOUNTER — CARE COORDINATION (OUTPATIENT)
Dept: CARE COORDINATION | Age: 88
End: 2023-02-01

## 2023-02-01 ENCOUNTER — TELEPHONE (OUTPATIENT)
Dept: CARDIOLOGY CLINIC | Age: 88
End: 2023-02-01

## 2023-02-01 NOTE — TELEPHONE ENCOUNTER
Pts wife called and stated that pt is currently admitted in [facility]. Pts wife stated that pt needs his device/site checked still and that [facility] has not done that. Pts wife stated that pt was sent from his nursing home to the hospital to possible septis and pacemaker swelling. Pts wife would like to know what to do about pts appt on 02/02 for a device check. Pts wife was advised that usually cardiology sees pts in the hospital if its cardiac related. Pts wife does not want to cancel pt but does not think that pt will be released prior to appt. Malia Tong can be reached at [phone].

## 2023-02-01 NOTE — TELEPHONE ENCOUNTER
[facility] called stating they are treating pt for dehydration and confusion. Not mention in ED notes about this admission being pacer related.  GREGORYI

## 2023-02-01 NOTE — TELEPHONE ENCOUNTER
LVM for wife/patient to return call to the office. Spoke with NPAM and KATJA - patient does not need to keep appointment tomorrow - they can cancel and r/s to a different time and day if available. Please express that they do not need to worry about the post op check. It is very important that the patient stay inpatient to be treated appropriately for the suspected sepsis.

## 2023-02-01 NOTE — TELEPHONE ENCOUNTER
Pts wife returned call. Message given. Pts wife stated that the [facility] has stated that his condition is pacemaker related and she would like someone to check his pacemaker. Pts wife was advised that she would need to speak with his nurse/medical staff at [facility] about that. Pts wife stated that there is no one there to check his pacemaker. She was advised again to speak to the medical staff there to maybe then put in a transfer. She V/U. I left appt scheduled due to pts wife not saying they wanted to cancel and reschedule.

## 2023-02-02 NOTE — TELEPHONE ENCOUNTER
I left a message for  to return call to office to see if pt is still inpatient and if so reschedule in office device ck if possible when she calls back.

## 2023-02-02 NOTE — TELEPHONE ENCOUNTER
Please try and touch base with wife to see if they will be making it in. If not, no big deal, can r/s.    Patient may still be inpatient

## 2023-02-02 NOTE — TELEPHONE ENCOUNTER
Pts wife returned call. Pts wife stated that he is at the nursing facility now. Pt is rescheduled to 02/06/2023.

## 2023-02-07 NOTE — PROGRESS NOTES
Physician Progress Note      Christian Castro  [facility] #:                  [id number]  :                       1935  ADMIT DATE:       2023 2:28 PM  Tessie Arcos DATE:        2023 2:55 PM  RESPONDING  PROVIDER #:        Olga Leavitt MD          QUERY TEXT:    Patient admitted with implanted pacemaker infection. Noted documentation of   acute metabolic encephalopathy in  H&P. In order to support the diagnosis   of acute metabolic encephalopathy, please include additional clinical   indicators in your documentation. Or please document if the diagnosis of   acute metabolic encephalopathy has been ruled out after further study. The medical record reflects the following:  Risk Factors: dementia  Clinical Indicators: Per Enjoin- Patient has dementia with sundowning and had   combativeness improved with Seroquel and a sitter. The patient remained Ox1. Description of the patient's mental status is consistent with dementia. Treatment: Seroquel, sitter  Options provided:  -- Acute metabolic encephalopathy present as evidenced by, Please document   evidence. -- Acute metabolic encephalopathy was ruled out  -- Other - I will add my own diagnosis  -- Disagree - Not applicable / Not valid  -- Disagree - Clinically unable to determine / Unknown  -- Refer to Clinical Documentation Reviewer    PROVIDER RESPONSE TEXT:    Acute metabolic encephalopathy was ruled out after study.     Query created by: Gerardo Reyes on 2023 7:48 AM      Electronically signed by:  Olga Leavitt MD 2023 5:49 PM

## 2023-02-08 ENCOUNTER — NURSE ONLY (OUTPATIENT)
Dept: CARDIOLOGY CLINIC | Age: 88
End: 2023-02-08

## 2023-02-08 DIAGNOSIS — Z95.0 CARDIAC RESYNCHRONIZATION THERAPY PACEMAKER (CRT-P) IN PLACE: ICD-10-CM

## 2023-02-08 DIAGNOSIS — I25.5 ISCHEMIC CARDIOMYOPATHY: Primary | ICD-10-CM

## 2023-02-08 DIAGNOSIS — R55 SYNCOPE AND COLLAPSE: ICD-10-CM

## 2023-02-08 DIAGNOSIS — Z95.0 PACEMAKER-DEPENDENT DUE TO NATIVE CARDIAC RHYTHM INSUFFICIENT TO SUPPORT LIFE: ICD-10-CM

## 2023-02-08 DIAGNOSIS — I49.8 PACEMAKER-DEPENDENT DUE TO NATIVE CARDIAC RHYTHM INSUFFICIENT TO SUPPORT LIFE: ICD-10-CM

## 2023-02-08 DIAGNOSIS — I48.91 ATRIAL FIBRILLATION, RAPID (HCC): ICD-10-CM

## 2023-02-08 NOTE — PROGRESS NOTES
Patient presents to the device clinic today for a programming evaluation for his pacemaker. Patient has a history of AF, ICM, and rapid AF. Patient had a device explant with Micra implant on 1/25 by Dr. Adrienne Cline. Since then, no arrhythmias recorded. R wave: N/A     99.9%    All sensing and pacing parameters are within normal range. No changes need to be made at this time. Incision is closed, clean, and dry with all dressings/steri strips removed. Site left open to the air. Incision well approximated. Large, soft hematoma noted at explant site. Site reviewed by Dr. Tiffany Gutierrez and NP [address] [place] [address]. Patient to return in approx. 1 week for a second site check. Patient education was provided about site care, device functionality, in home monitoring, and any other patient questions and/or concerns were addressed. Aftercare and remote monitoring literature was provided. Patient voices understanding. Patient will follow up in 3 months in office or remotely. Please see interrogation for more detail - Paceart report located under the Cardiology tab.

## 2023-02-08 NOTE — PATIENT INSTRUCTIONS
New Cardiac Device Implant (Pacemaker and/or Defibrillator) Post Op Instructions  Bathe with water indirectly hitting the incision site. Water and soap may run over the incision site. Do not scrub. Pat dry with a clean towel after bathing. Leave incision open to the air; do not apply any dressings, ointments, or bandages to the area. Do not apply lotion, perfume/cologne, or powders to the area until it is completely healed. Any scabbing or skin glue that is noted will fall off within 1-2 weeks after the post op appointment. If any oozing, bleeding, or pus drainage occurs, please call the office immediately at [phone]. Remote Monitoring Instructions    Within 2-3 weeks of your device being implanted, you will receive a call from the  of your device. Please answer this call as it is to set up remote monitoring for your device. Once you receive your in-home monitor, please follow the instructions provided to sync the home monitor to your implanted device. Once you have paired your home monitor to your implanted device, keep your monitor plugged in within 6 feet of where you sleep. Your monitor will routinely check in with your device during sleep hours and transmit any urgent events to the [address] [place] [address] Drive for review. Please do not send additional routine transmissions unless specifically requested by the office staff - the steps to send a manual transmission are the same as when you paired your in-home monitor to your device for the first time. Your device and monitor are wireless and most transmit cellularly, but please periodically check your monitor is still plugged in to the electrical outlet. If you still use the telephone land line to send, please ensure the connection to the phone uma is secure. This will help to ensure successful automatic transmissions in the future.      Please be aware that the remote device transmission sites are periodically monitored only during regular business hours during which simultaneous in-office device clinics are being conducted. If your transmission requires attention, we will contact you as soon as possible. Your in-home monitor will do a full report on your device every 3 months (recurring appointments that run parallel to in office checks). You do not need to initiate a transmission or be awake at the appointment time listed - this is solely for office purposes. Our office utilizes the \"no news is good news\" narrative regarding remote monitoring. A Device Specialist or RN will contact you with any critical findings from your remote monitoring. Going forward, if you experience issues with your in-home monitor, please verify that it is plugged in to the wall. If issues persist, please contact the Customer Service numbers provided below, as they can troubleshoot issues that may be happening with the monitor itself. The [employer] works closely with the remote monitoring websites - if we notice there is a disconnection we will contact you to inform you and ask you to contact the  of your device.     [employer])  [phone]

## 2023-02-09 ENCOUNTER — TELEPHONE (OUTPATIENT)
Dept: CARDIOLOGY CLINIC | Age: 88
End: 2023-02-09

## 2023-02-09 NOTE — TELEPHONE ENCOUNTER
Syl Choudhury from [employer] called and stated pt was seen yesterday but they were wondering if it was \"normal\" for area around device be swollen, please advise

## 2023-02-17 ENCOUNTER — HOSPITAL ENCOUNTER (INPATIENT)
Age: 88
LOS: 5 days | Discharge: NURSING, REHAB OR LONG-TERM CARE | DRG: 908 | End: 2023-02-22
Attending: INTERNAL MEDICINE | Admitting: INTERNAL MEDICINE
Payer: MEDICARE

## 2023-02-17 ENCOUNTER — ANESTHESIA (OUTPATIENT)
Dept: CARDIAC CATH/INVASIVE PROCEDURES | Age: 88
End: 2023-02-17
Payer: MEDICARE

## 2023-02-17 ENCOUNTER — ANESTHESIA EVENT (OUTPATIENT)
Dept: CARDIAC CATH/INVASIVE PROCEDURES | Age: 88
End: 2023-02-17
Payer: MEDICARE

## 2023-02-17 ENCOUNTER — APPOINTMENT (OUTPATIENT)
Dept: CARDIAC CATH/INVASIVE PROCEDURES | Age: 88
DRG: 908 | End: 2023-02-17
Attending: INTERNAL MEDICINE
Payer: MEDICARE

## 2023-02-17 PROBLEM — T82.837A PACEMAKER POCKET HEMATOMA, INITIAL ENCOUNTER: Status: ACTIVE | Noted: 2023-02-17

## 2023-02-17 LAB
A/G RATIO: 1.6 (ref 1.1–2.2)
ALBUMIN SERPL-MCNC: 3.9 G/DL (ref 3.4–5)
ALP BLD-CCNC: 86 U/L (ref 40–129)
ALT SERPL-CCNC: 19 U/L (ref 10–40)
ANION GAP SERPL CALCULATED.3IONS-SCNC: 14 MMOL/L (ref 3–16)
AST SERPL-CCNC: 23 U/L (ref 15–37)
BASOPHILS ABSOLUTE: 0 K/UL (ref 0–0.2)
BASOPHILS RELATIVE PERCENT: 0.5 %
BILIRUB SERPL-MCNC: 1 MG/DL (ref 0–1)
BUN BLDV-MCNC: 29 MG/DL (ref 7–20)
CALCIUM SERPL-MCNC: 9.5 MG/DL (ref 8.3–10.6)
CHLORIDE BLD-SCNC: 105 MMOL/L (ref 99–110)
CO2: 23 MMOL/L (ref 21–32)
CREAT SERPL-MCNC: 1.3 MG/DL (ref 0.8–1.3)
EOSINOPHILS ABSOLUTE: 0.2 K/UL (ref 0–0.6)
EOSINOPHILS RELATIVE PERCENT: 2.7 %
GFR SERPL CREATININE-BSD FRML MDRD: 54 ML/MIN/{1.73_M2}
GLUCOSE BLD-MCNC: 89 MG/DL (ref 70–99)
HCT VFR BLD CALC: 32.2 % (ref 40.5–52.5)
HEMOGLOBIN: 10.4 G/DL (ref 13.5–17.5)
LYMPHOCYTES ABSOLUTE: 1.1 K/UL (ref 1–5.1)
LYMPHOCYTES RELATIVE PERCENT: 19.7 %
MCH RBC QN AUTO: 28.1 PG (ref 26–34)
MCHC RBC AUTO-ENTMCNC: 32.3 G/DL (ref 31–36)
MCV RBC AUTO: 86.9 FL (ref 80–100)
MONOCYTES ABSOLUTE: 0.4 K/UL (ref 0–1.3)
MONOCYTES RELATIVE PERCENT: 6.7 %
NEUTROPHILS ABSOLUTE: 4 K/UL (ref 1.7–7.7)
NEUTROPHILS RELATIVE PERCENT: 70.4 %
PDW BLD-RTO: 15.3 % (ref 12.4–15.4)
PLATELET # BLD: 74 K/UL (ref 135–450)
PLATELET SLIDE REVIEW: ABNORMAL
PMV BLD AUTO: 8.3 FL (ref 5–10.5)
POTASSIUM REFLEX MAGNESIUM: 4.3 MMOL/L (ref 3.5–5.1)
RBC # BLD: 3.7 M/UL (ref 4.2–5.9)
SLIDE REVIEW: ABNORMAL
SODIUM BLD-SCNC: 142 MMOL/L (ref 136–145)
TOTAL PROTEIN: 6.4 G/DL (ref 6.4–8.2)
WBC # BLD: 5.6 K/UL (ref 4–11)

## 2023-02-17 PROCEDURE — 6360000002 HC RX W HCPCS: Performed by: NURSE ANESTHETIST, CERTIFIED REGISTERED

## 2023-02-17 PROCEDURE — 85025 COMPLETE CBC W/AUTO DIFF WBC: CPT

## 2023-02-17 PROCEDURE — 80053 COMPREHEN METABOLIC PANEL: CPT

## 2023-02-17 PROCEDURE — 6370000000 HC RX 637 (ALT 250 FOR IP): Performed by: PHYSICIAN ASSISTANT

## 2023-02-17 PROCEDURE — 2500000003 HC RX 250 WO HCPCS

## 2023-02-17 PROCEDURE — 10140 I&D HMTMA SEROMA/FLUID COLLJ: CPT

## 2023-02-17 PROCEDURE — 0JPT0PZ REMOVAL OF CARDIAC RHYTHM RELATED DEVICE FROM TRUNK SUBCUTANEOUS TISSUE AND FASCIA, OPEN APPROACH: ICD-10-PCS | Performed by: FAMILY MEDICINE

## 2023-02-17 PROCEDURE — 87070 CULTURE OTHR SPECIMN AEROBIC: CPT

## 2023-02-17 PROCEDURE — 3700000000 HC ANESTHESIA ATTENDED CARE

## 2023-02-17 PROCEDURE — 2709999900 HC NON-CHARGEABLE SUPPLY

## 2023-02-17 PROCEDURE — 36415 COLL VENOUS BLD VENIPUNCTURE: CPT

## 2023-02-17 PROCEDURE — 6360000002 HC RX W HCPCS

## 2023-02-17 PROCEDURE — 2580000003 HC RX 258: Performed by: PHYSICIAN ASSISTANT

## 2023-02-17 PROCEDURE — 6370000000 HC RX 637 (ALT 250 FOR IP): Performed by: INTERNAL MEDICINE

## 2023-02-17 PROCEDURE — 87324 CLOSTRIDIUM AG IA: CPT

## 2023-02-17 PROCEDURE — 87449 NOS EACH ORGANISM AG IA: CPT

## 2023-02-17 PROCEDURE — 2060000000 HC ICU INTERMEDIATE R&B

## 2023-02-17 PROCEDURE — 2580000003 HC RX 258: Performed by: NURSE ANESTHETIST, CERTIFIED REGISTERED

## 2023-02-17 PROCEDURE — 33222 RELOCATION POCKET PACEMAKER: CPT | Performed by: INTERNAL MEDICINE

## 2023-02-17 PROCEDURE — 3700000001 HC ADD 15 MINUTES (ANESTHESIA)

## 2023-02-17 PROCEDURE — 87205 SMEAR GRAM STAIN: CPT

## 2023-02-17 PROCEDURE — 2500000003 HC RX 250 WO HCPCS: Performed by: NURSE ANESTHETIST, CERTIFIED REGISTERED

## 2023-02-17 PROCEDURE — 99223 1ST HOSP IP/OBS HIGH 75: CPT | Performed by: INTERNAL MEDICINE

## 2023-02-17 PROCEDURE — 0JC60ZZ EXTIRPATION OF MATTER FROM CHEST SUBCUTANEOUS TISSUE AND FASCIA, OPEN APPROACH: ICD-10-PCS | Performed by: FAMILY MEDICINE

## 2023-02-17 RX ORDER — POLYETHYLENE GLYCOL 3350 17 G/17G
17 POWDER, FOR SOLUTION ORAL DAILY PRN
Status: DISCONTINUED | OUTPATIENT
Start: 2023-02-17 | End: 2023-02-22 | Stop reason: HOSPADM

## 2023-02-17 RX ORDER — QUETIAPINE FUMARATE 25 MG/1
25 TABLET, FILM COATED ORAL
Status: DISCONTINUED | OUTPATIENT
Start: 2023-02-18 | End: 2023-02-22 | Stop reason: HOSPADM

## 2023-02-17 RX ORDER — ACETAMINOPHEN 650 MG/1
650 SUPPOSITORY RECTAL EVERY 6 HOURS PRN
Status: DISCONTINUED | OUTPATIENT
Start: 2023-02-17 | End: 2023-02-22 | Stop reason: HOSPADM

## 2023-02-17 RX ORDER — ONDANSETRON 2 MG/ML
4 INJECTION INTRAMUSCULAR; INTRAVENOUS EVERY 6 HOURS PRN
Status: DISCONTINUED | OUTPATIENT
Start: 2023-02-17 | End: 2023-02-22 | Stop reason: HOSPADM

## 2023-02-17 RX ORDER — PANTOPRAZOLE SODIUM 40 MG/1
40 TABLET, DELAYED RELEASE ORAL DAILY
Status: DISCONTINUED | OUTPATIENT
Start: 2023-02-17 | End: 2023-02-22 | Stop reason: HOSPADM

## 2023-02-17 RX ORDER — RISPERIDONE 0.25 MG/1
0.25 TABLET ORAL 2 TIMES DAILY
Status: DISCONTINUED | OUTPATIENT
Start: 2023-02-17 | End: 2023-02-22 | Stop reason: HOSPADM

## 2023-02-17 RX ORDER — MEMANTINE HYDROCHLORIDE 5 MG/1
5 TABLET ORAL 2 TIMES DAILY
Status: DISCONTINUED | OUTPATIENT
Start: 2023-02-17 | End: 2023-02-22 | Stop reason: HOSPADM

## 2023-02-17 RX ORDER — SODIUM CHLORIDE 0.9 % (FLUSH) 0.9 %
5-40 SYRINGE (ML) INJECTION PRN
Status: DISCONTINUED | OUTPATIENT
Start: 2023-02-17 | End: 2023-02-22 | Stop reason: HOSPADM

## 2023-02-17 RX ORDER — CODEINE PHOSPHATE AND GUAIFENESIN 10; 100 MG/5ML; MG/5ML
5 SOLUTION ORAL EVERY 4 HOURS PRN
Status: ON HOLD | COMMUNITY
End: 2023-02-21 | Stop reason: HOSPADM

## 2023-02-17 RX ORDER — FINASTERIDE 5 MG/1
5 TABLET, FILM COATED ORAL DAILY
Status: DISCONTINUED | OUTPATIENT
Start: 2023-02-17 | End: 2023-02-22 | Stop reason: HOSPADM

## 2023-02-17 RX ORDER — AMLODIPINE BESYLATE 5 MG/1
10 TABLET ORAL DAILY
Status: DISCONTINUED | OUTPATIENT
Start: 2023-02-17 | End: 2023-02-17

## 2023-02-17 RX ORDER — ISOSORBIDE MONONITRATE 30 MG/1
30 TABLET, EXTENDED RELEASE ORAL DAILY
Status: DISCONTINUED | OUTPATIENT
Start: 2023-02-17 | End: 2023-02-21

## 2023-02-17 RX ORDER — LANOLIN ALCOHOL/MO/W.PET/CERES
5 CREAM (GRAM) TOPICAL NIGHTLY
COMMUNITY

## 2023-02-17 RX ORDER — SODIUM CHLORIDE 9 MG/ML
INJECTION, SOLUTION INTRAVENOUS CONTINUOUS PRN
Status: DISCONTINUED | OUTPATIENT
Start: 2023-02-17 | End: 2023-02-17 | Stop reason: SDUPTHER

## 2023-02-17 RX ORDER — PROMETHAZINE HYDROCHLORIDE 12.5 MG/1
12.5 SUPPOSITORY RECTAL EVERY 6 HOURS PRN
COMMUNITY

## 2023-02-17 RX ORDER — ACETAMINOPHEN 325 MG/1
650 TABLET ORAL EVERY 6 HOURS PRN
Status: DISCONTINUED | OUTPATIENT
Start: 2023-02-17 | End: 2023-02-22 | Stop reason: HOSPADM

## 2023-02-17 RX ORDER — PROPOFOL 10 MG/ML
INJECTION, EMULSION INTRAVENOUS CONTINUOUS PRN
Status: DISCONTINUED | OUTPATIENT
Start: 2023-02-17 | End: 2023-02-17 | Stop reason: SDUPTHER

## 2023-02-17 RX ORDER — ATORVASTATIN CALCIUM 40 MG/1
40 TABLET, FILM COATED ORAL NIGHTLY
Status: DISCONTINUED | OUTPATIENT
Start: 2023-02-17 | End: 2023-02-22 | Stop reason: HOSPADM

## 2023-02-17 RX ORDER — DOXYCYCLINE HYCLATE 100 MG
100 TABLET ORAL EVERY 12 HOURS SCHEDULED
Status: DISCONTINUED | OUTPATIENT
Start: 2023-02-17 | End: 2023-02-22 | Stop reason: HOSPADM

## 2023-02-17 RX ORDER — SODIUM CHLORIDE 0.9 % (FLUSH) 0.9 %
5-40 SYRINGE (ML) INJECTION EVERY 12 HOURS SCHEDULED
Status: DISCONTINUED | OUTPATIENT
Start: 2023-02-17 | End: 2023-02-22 | Stop reason: HOSPADM

## 2023-02-17 RX ORDER — AMLODIPINE BESYLATE 5 MG/1
5 TABLET ORAL DAILY
Status: DISCONTINUED | OUTPATIENT
Start: 2023-02-18 | End: 2023-02-20

## 2023-02-17 RX ORDER — LACTOBACILLUS RHAMNOSUS GG 10B CELL
1 CAPSULE ORAL 2 TIMES DAILY WITH MEALS
Status: DISCONTINUED | OUTPATIENT
Start: 2023-02-17 | End: 2023-02-22 | Stop reason: HOSPADM

## 2023-02-17 RX ORDER — BISACODYL 10 MG
10 SUPPOSITORY, RECTAL RECTAL 2 TIMES DAILY PRN
COMMUNITY

## 2023-02-17 RX ORDER — TAMSULOSIN HYDROCHLORIDE 0.4 MG/1
0.4 CAPSULE ORAL DAILY
Status: DISCONTINUED | OUTPATIENT
Start: 2023-02-17 | End: 2023-02-21

## 2023-02-17 RX ORDER — ONDANSETRON 4 MG/1
4 TABLET, ORALLY DISINTEGRATING ORAL EVERY 8 HOURS PRN
Status: DISCONTINUED | OUTPATIENT
Start: 2023-02-17 | End: 2023-02-22 | Stop reason: HOSPADM

## 2023-02-17 RX ORDER — PHENYLEPHRINE HCL IN 0.9% NACL 1 MG/10 ML
SYRINGE (ML) INTRAVENOUS PRN
Status: DISCONTINUED | OUTPATIENT
Start: 2023-02-17 | End: 2023-02-17 | Stop reason: SDUPTHER

## 2023-02-17 RX ORDER — LIDOCAINE HYDROCHLORIDE 20 MG/ML
INJECTION, SOLUTION EPIDURAL; INFILTRATION; INTRACAUDAL; PERINEURAL PRN
Status: DISCONTINUED | OUTPATIENT
Start: 2023-02-17 | End: 2023-02-17 | Stop reason: SDUPTHER

## 2023-02-17 RX ORDER — TROSPIUM CHLORIDE 20 MG/1
20 TABLET, FILM COATED ORAL
Status: DISCONTINUED | OUTPATIENT
Start: 2023-02-17 | End: 2023-02-22 | Stop reason: HOSPADM

## 2023-02-17 RX ORDER — SODIUM CHLORIDE 9 MG/ML
INJECTION, SOLUTION INTRAVENOUS PRN
Status: DISCONTINUED | OUTPATIENT
Start: 2023-02-17 | End: 2023-02-22 | Stop reason: HOSPADM

## 2023-02-17 RX ORDER — QUETIAPINE FUMARATE 25 MG/1
50 TABLET, FILM COATED ORAL NIGHTLY
Status: DISCONTINUED | OUTPATIENT
Start: 2023-02-17 | End: 2023-02-22 | Stop reason: HOSPADM

## 2023-02-17 RX ADMIN — LIDOCAINE HYDROCHLORIDE 50 MG: 20 INJECTION, SOLUTION EPIDURAL; INFILTRATION; INTRACAUDAL; PERINEURAL at 14:11

## 2023-02-17 RX ADMIN — DOXYCYCLINE HYCLATE 100 MG: 100 TABLET, COATED ORAL at 22:22

## 2023-02-17 RX ADMIN — PROPOFOL 50 MCG/KG/MIN: 10 INJECTION, EMULSION INTRAVENOUS at 14:11

## 2023-02-17 RX ADMIN — Medication 100 MCG: at 14:49

## 2023-02-17 RX ADMIN — ATORVASTATIN CALCIUM 40 MG: 40 TABLET, FILM COATED ORAL at 21:22

## 2023-02-17 RX ADMIN — Medication 10 ML: at 21:23

## 2023-02-17 RX ADMIN — SODIUM CHLORIDE: 9 INJECTION, SOLUTION INTRAVENOUS at 13:14

## 2023-02-17 RX ADMIN — Medication 10 ML: at 12:22

## 2023-02-17 RX ADMIN — ACETAMINOPHEN 325MG 650 MG: 325 TABLET ORAL at 22:56

## 2023-02-17 ASSESSMENT — PAIN DESCRIPTION - LOCATION
LOCATION: HEAD
LOCATION: HEAD

## 2023-02-17 ASSESSMENT — PAIN SCALES - WONG BAKER
WONGBAKER_NUMERICALRESPONSE: 4
WONGBAKER_NUMERICALRESPONSE: 0
WONGBAKER_NUMERICALRESPONSE: 0
WONGBAKER_NUMERICALRESPONSE: 4
WONGBAKER_NUMERICALRESPONSE: 0

## 2023-02-17 ASSESSMENT — PAIN SCALES - GENERAL
PAINLEVEL_OUTOF10: 0
PAINLEVEL_OUTOF10: 3
PAINLEVEL_OUTOF10: 3
PAINLEVEL_OUTOF10: 0

## 2023-02-17 ASSESSMENT — ENCOUNTER SYMPTOMS: SHORTNESS OF BREATH: 1

## 2023-02-17 NOTE — ANESTHESIA PRE PROCEDURE
Department of Anesthesiology  Preprocedure Note       Name:  Snow Little   Age:  82 y.o.  :  1935                                          MRN:  [id number]         Date:  2023      Surgeon: * Surgery not found *    Procedure:     Medications prior to admission:   Prior to Admission medications    Medication Sig Start Date End Date Taking? Authorizing Provider   bisacodyl (DULCOLAX) 10 MG suppository Place 10 mg rectally 2 times daily as needed for Constipation   Yes Historical Provider, MD   melatonin 3 MG TABS tablet Take 5 mg by mouth at bedtime   Yes Historical Provider, MD   promethazine (PHENERGAN) 12.5 MG suppository Place 12.5 mg rectally every 6 hours as needed for Nausea   Yes Historical Provider, MD   guaiFENesin-codeine (GUAIFENESIN AC) 100-10 MG/5ML liquid Take 5 mLs by mouth every 4 hours as needed for Cough.    Yes Historical Provider, MD   isosorbide mononitrate (IMDUR) 30 MG extended release tablet Take 1 tablet by mouth daily 23   Opal Sultana MD   enoxaparin (LOVENOX) 40 MG/0.4ML Inject 0.4 mLs into the skin daily  Patient not taking: Reported on 2023   Opal Sultana MD   lactobacillus (CULTURELLE) capsule Take 1 capsule by mouth 2 times daily (with meals)  Patient taking differently: Take 1 capsule by mouth in the morning, at noon, and at bedtime 23   Opal Sultana MD   QUEtiapine (SEROQUEL) 25 MG tablet Take 1 tablet by mouth every morning (before breakfast)  Patient not taking: Reported on 2023   Opal Sultana MD   QUEtiapine (SEROQUEL) 50 MG tablet Take 1 tablet by mouth nightly 23   Opal Sultana MD   amLODIPine (NORVASC) 10 MG tablet Take 1 tablet by mouth daily  Patient taking differently: Take 5 mg by mouth daily 23   Opal Sultana MD   polyethylene glycol (GLYCOLAX) 17 g packet Take 17 g by mouth daily as needed for Constipation 23  Opal Sultana MD tamsulosin (FLOMAX) 0.4 MG capsule Take 1 capsule by mouth daily 1/22/23   Opal Sultana MD   memantine (NAMENDA) 5 MG tablet TAKE 1 TABLET BY MOUTH 2 TIMES DAILY  Patient taking differently: Take 10 mg by mouth 2 times daily 12/5/22   GUS Mullins CNP   atorvastatin (LIPITOR) 40 MG tablet TAKE 1 TABLET BY MOUTH DAILY  Patient taking differently: Take 40 mg by mouth at bedtime 11/21/22   Henry Soto MD   risperiDONE (RISPERDAL) 0.25 MG tablet Take 1 tablet by mouth 2 times daily 10/24/22   Henry Soto MD   pantoprazole (PROTONIX) 40 MG tablet Take 1 tablet by mouth daily 10/17/22   Henry Soto MD   mirabegron [facility]) 25 MG TB24 Take 1 tablet by mouth daily 10/17/22   Henry Soto MD   trospium (SANCTURA) 20 MG tablet Take 20 mg by mouth in the morning and 20 mg before bedtime. 8/26/22  Historical Provider, MD   apixaban (ELIQUIS) 5 MG TABS tablet TAKE 1/2 TABLET BY MOUTH 2 TIMES DAILY 8/8/22 8/26/22  Henry Soto MD   metoprolol succinate (TOPROL XL) 25 MG extended release tablet TAKE 3 TABLETS BY MOUTH TWICE DAILY  Patient taking differently: Take 25 mg by mouth in the morning and 25 mg in the evening. 7/25/22 8/26/22  GUS Mullins CNP   famotidine (PEPCID) 20 MG tablet Take 1 tablet by mouth at bedtime 7/8/22 8/26/22  Henry Soto MD   donepezil (ARICEPT) 10 MG tablet Take 1 tablet by mouth daily 3/24/22 8/26/22  Historical Provider, MD   Ferrous Sulfate (IRON) 325 (65 Fe) MG TABS Take by mouth daily     Historical Provider, MD   finasteride (PROSCAR) 5 MG tablet Take 5 mg by mouth daily. 3/4/15   Historical Provider, MD   aspirin 81 MG tablet Take 81 mg by mouth daily.       Historical Provider, MD       Current medications:    Current Facility-Administered Medications   Medication Dose Route Frequency Provider Last Rate Last Admin    amLODIPine (NORVASC) tablet 10 mg  10 mg Oral Daily BHAVNA Peterson        atorvastatin (LIPITOR) tablet 40 mg 40 mg Oral Nightly Elverna Aase, [address]        finasteride (PROSCAR) tablet 5 mg  5 mg Oral Daily Elverna Aase, [address]        isosorbide mononitrate (IMDUR) extended release tablet 30 mg  30 mg Oral Daily Elverna Aase, [address]        lactobacillus (CULTURELLE) capsule 1 capsule  1 capsule Oral BID WC Elverna Aase, [place]        memantine [facility]) tablet 5 mg  5 mg Oral BID Elverna Aase, PA        tamsulosin [facility]) capsule 0.4 mg  0.4 mg Oral Daily Elverna Aase, PA        risperiDONE (RISPERDAL) tablet 0.25 mg  0.25 mg Oral BID Elverna Aase, PA        QUEtiapine (SEROQUEL) tablet 50 mg  50 mg Oral Nightly Elverna Aase, [address]        [START ON 2/18/2023] QUEtiapine (SEROQUEL) tablet 25 mg  25 mg Oral QAM AC Elverna Aase, [address]        pantoprazole (PROTONIX) tablet 40 mg  40 mg Oral Daily Elverna Aase, [address]        troium [facility]) tablet 20 mg  20 mg Oral BID AC Elverna Aase, [address]        sodium chloride flush 0.9 % injection 5-40 mL  5-40 mL IntraVENous 2 times per day Elverna Aase, PA   10 mL at 02/17/23 1222    sodium chloride flush 0.9 % injection 5-40 mL  5-40 mL IntraVENous PRN Elverna Aase, PA        0.9 % sodium chloride infusion   IntraVENous PRN Elverna Aase, PA        ondansetron (ZOFRAN-ODT) disintegrating tablet 4 mg  4 mg Oral Q8H PRN Elverna Aase, PA        Or    ondansetron [facility]) injection 4 mg  4 mg IntraVENous Q6H PRN Elverna Aase, PA        polyethylene glycol (GLYCOLAX) packet 17 g  17 g Oral Daily PRN Elverna Aase, PA        acetaminophen (TYLENOL) tablet 650 mg  650 mg Oral Q6H PRN Elverna Aase, PA        Or    acetaminophen (TYLENOL) suppository 650 mg  650 mg Rectal Q6H PRN Elverna Aase, PA           Allergies:     Allergies   Allergen Reactions    Sulfa Antibiotics        Problem List:    Patient Active Problem List   Diagnosis Code    Pneumonia J18.9    Abnormal chest CT R93.89    Cough syncope R55, R05.4    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    Hyperlipidemia E78.5    Hypertension I10    Chronic kidney disease, stage III (moderate) (HCC) N18.30    A-fib (HCC) I48.91    Abnormal echocardiogram R93.1    Dizziness R42    SOB (shortness of breath) R06.02    Abnormal cardiovascular stress test R94.39    Coronary artery disease involving native heart without angina pectoris I25.10    Other chest pain R07.89    Syncope and collapse R55    COVID U07.1    Dementia with behavioral disturbance F03.918    Hallucinations R44.3    Syncope, unspecified syncope type R55    Ischemic cardiomyopathy I25.5    Atrial fibrillation, rapid (HCC) I48.91    Orthostatic hypotension I95.1    Pacemaker infection ([facility] [address] T82. 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 single chamber PPM    PACEMAKER PLACEMENT  2022    biventricular    TONSILLECTOMY      UPPER GASTROINTESTINAL ENDOSCOPY         Social History:    Social History     Tobacco Use    Smoking status: Former     Packs/day: 1.00     Years: 10.00     Pack years: 10.00     Types: Cigarettes     Quit date: 1975     Years since quittin.0    Smokeless tobacco: Never    Tobacco comments:     Quit 40 years ago   Substance Use Topics    Alcohol use: No     Alcohol/week: 0.0 standard drinks Counseling given: Not Answered  Tobacco comments: Quit 40 years ago      Vital Signs (Current):   Vitals:    02/17/23 1040 02/17/23 1050 02/17/23 1140   BP: (!) 145/86  100/79   Pulse: 72  (!) 107   Resp: 16  20   Temp: 97.9 °F (36.6 °C)  98.2 °F (36.8 °C)   TempSrc: Axillary  Axillary   SpO2: 99%  (!) 89%   Weight:  155 lb (70.3 kg)    Height:  5' 6\" (1.676 m)                                               BP Readings from Last 3 Encounters:   02/17/23 100/79   01/26/23 (!) 92/58   01/22/23 (!) 129/91       NPO Status:                                                                                 BMI:   Wt Readings from Last 3 Encounters:   02/17/23 155 lb (70.3 kg)   01/26/23 157 lb 9.6 oz (71.5 kg)   01/22/23 150 lb 14.4 oz (68.4 kg)     Body mass index is 25.02 kg/m². CBC:   Lab Results   Component Value Date/Time    WBC 5.6 02/17/2023 11:49 AM    RBC 3.70 02/17/2023 11:49 AM    HGB 10.4 02/17/2023 11:49 AM    HCT 32.2 02/17/2023 11:49 AM    MCV 86.9 02/17/2023 11:49 AM    RDW 15.3 02/17/2023 11:49 AM    PLT 74 02/17/2023 11:49 AM       CMP:   Lab Results   Component Value Date/Time     02/17/2023 11:49 AM    K 4.3 02/17/2023 11:49 AM     02/17/2023 11:49 AM    CO2 23 02/17/2023 11:49 AM    BUN 29 02/17/2023 11:49 AM    CREATININE 1.3 02/17/2023 11:49 AM    GFRAA >60 09/17/2022 07:12 AM    GFRAA >60 01/09/2013 10:00 AM    AGRATIO 1.6 02/17/2023 11:49 AM    LABGLOM 54 02/17/2023 11:49 AM    GLUCOSE 89 02/17/2023 11:49 AM    PROT 6.4 02/17/2023 11:49 AM    PROT 7.6 01/09/2013 10:00 AM    CALCIUM 9.5 02/17/2023 11:49 AM    BILITOT 1.0 02/17/2023 11:49 AM    ALKPHOS 86 02/17/2023 11:49 AM    AST 23 02/17/2023 11:49 AM    ALT 19 02/17/2023 11:49 AM       POC Tests: No results for input(s): POCGLU, POCNA, POCK, POCCL, POCBUN, POCHEMO, POCHCT in the last 72 hours.     Coags:   Lab Results   Component Value Date/Time    PROTIME 16.2 01/24/2023 05:32 AM    INR 1.31 01/24/2023 05:32 AM       HCG (If Applicable): No results found for: PREGTESTUR, PREGSERUM, HCG, HCGQUANT     ABGs: No results found for: PHART, PO2ART, FEK7HZL, XOK0JRY, BEART, A8NQXFEV     Type & Screen (If Applicable):  No results found for: LABABO, LABRH    Drug/Infectious Status (If Applicable):  No results found for: HIV, HEPCAB    COVID-19 Screening (If Applicable):   Lab Results   Component Value Date/Time    COVID19 NEGATIVE 02/01/2023 08:36 AM           Anesthesia Evaluation   no history of anesthetic complications:   Airway: Mallampati: II  TM distance: >3 FB   Neck ROM: limited  Mouth opening: > = 3 FB   Dental:    (+) upper dentures and lower dentures      Pulmonary:   (+) pneumonia:  shortness of breath:                            ROS comment: H/o tob   Cardiovascular:    (+) hypertension:, pacemaker:, CAD:, dysrhythmias: atrial fibrillation, hyperlipidemia                  Neuro/Psych:   (+) psychiatric history (dementia):            GI/Hepatic/Renal:   (+) renal disease: CRI,           Endo/Other: Negative Endo/Other ROS                    Abdominal:             Vascular: Other Findings:           Anesthesia Plan      MAC     ASA 3     (Risks, benefits and alternatives of MAC anesthesia / GA as needed discussed with pt. Questions answered. Willing to proceed.)  Induction: intravenous. Anesthetic plan and risks discussed with patient.                         Bruce Donaldson MD   2/17/2023

## 2023-02-17 NOTE — CONSULTS
Consult placed    [phone] [place] [address] Cardiology  Date:2/17/2023,  Time:10:54 AM        Electronically signed by Katheryn Lindsay RN on 2/17/2023 at 10:54 AM

## 2023-02-17 NOTE — PROCEDURES
Electrophysiology Procedure Note    Attending MD Vickie Adams MD    Procedures Pocket revision    Indications   Painful hematoma  Complication None  EBL  <237KI (including clot). Conclusions Successful pocket revision. Description of Procedure  The patient was brought to the electrophysiology laboratory in the fasting state after informed consent was obtained. The patient was placed in the supine position and the left pectoral area was prepared and draped in a sterile fashion. The electrocardiogram, blood pressure, and oxygenation were monitored intra- and post-procedure. Intravenous antibiotic was given prior to the procedure for general antibiotic prophylaxis. Sedation was provided by anesthesia department. After using buffered lidocaine 1% with epinephrine (1/100,000) for local anesthesia to the left pectoral area, a 4-5 cm incision was made along the patient's existing scar line. A large hematoma was removed. There was some dark blood and seroserous fluid that was also evacuated. Bacterial cultures were obtained x 2. TyRx pouch was removed from pocket. No active bleeding from within pocket. The wound was flushed with antibacterial solution and hemostasis confirmed. The fascia was closed using interrupted 3-0 Vicryl. Skin layer was left to heal by secondary intention to allow draining (no CELINA drain placed). The patient tolerated the procedure well and there were no complications. At the conclusion of the procedure, all sponges and sharps were accounted for by two counts. The attending physician, Vickie Aadms MD was present for the entire procedure and for interpretation of data. Proceduralist Notes:  - Pocket didn't seem grossly infected. - Wound cultures sent off. - Wound left to heal by secondary intention to allow drainage.  - No active bleeding noted. - TyRx pouch removed. - MICRA interrogated and reprogrammed with  ([facility]).   - Vancomycin x 1 during procedure. - Doxycline x 5 days post procedure.

## 2023-02-17 NOTE — CARE COORDINATION
CASE MANAGEMENT INITIAL ASSESSMENT      Reviewed chart and completed assessment with patient's daughter (patient confused)      Health Care Decision Maker :   Primary Decision Maker: Lenin Cox - Child - [phone]    Secondary Decision Maker: Ciarra Patient - Spouse - [phone]          Can this person be reached and be able to respond quickly, such as within a few minutes or hours? Yes      Admit date/status:2/17/23 Inpatient   Diagnosis:pacemaker hematoma    Is this a Readmission?:  Yes    Readmission Screening completed?: Yes     Insurance:Medicare, [address] required for SNF: No       3 night stay required: Yes    Living arrangements, Adls, care needs, prior to admission:normally lives at home with wife but admitted from [address] at home:  Walker_X_Cane_X_RTS__ BSC__Shower Chair__  02__ HHN__ CPAP__  BiPap__  Hospital Bed__ W/C___ Other_____    Services in the home and/or outpatient, prior to admission:none prior to skilled stay at [facility]    Current PCP:[facility] needs: will need ambulance transport back to [facility] (if applicable)   Name:  Address:  Dialysis Schedule:  Phone:  Fax:    PT/OT recs:none    Hospital Exemption Notification (HEN):not needed to return     Barriers to discharge:none    Plan/comments:Patient admitted from [facility] and daughter states plan is for him to return to finish his skilled stay. Placed call to [facility] with [facility] and she states there are no barriers to patient returning. Do not need a rapid covid as they will do one there on admission. CM continuing to follow.       [facility] on chart for MD signature

## 2023-02-17 NOTE — PROGRESS NOTES
Pt admitted from [facility] ER with pacemaker hematoma to left upper chest.    4 Eyes Skin Assessment     The patient is being assess for  Admission    I agree that 2 RN's have performed a thorough Head to Toe Skin Assessment on the patient. ALL assessment sites listed below have been assessed. Areas assessed by both nurses: Volanda Ord  [x]   Head, Face, and Ears   [x]   Shoulders, Back, and Chest  [x]   Arms, Elbows, and Hands   [x]   Coccyx, Sacrum, and Ischum  [x]   Legs, Feet, and Heels        Does the Patient have Skin Breakdown?   Yes a wound was noted on the Admission Assessment and an WOUND LDA was Initiated documentation include the Juanita-wound, Wound Assessment, Measurements, Dressing Treatment, Drainage, and Color\",        Pacemaker site hematoma, stage 1 sacrum     Dionisio Prevention initiated:  Yes   Wound Care Orders initiated:  No      [facility] nurse consulted for Pressure Injury (Stage 3,4, Unstageable, DTI, NWPT, and Complex wounds):  No      Nurse 1 eSignature: Electronically signed by Ramonita Smallwood RN on 2/17/23 at 10:51 AM EST    **SHARE this note so that the co-signing nurse is able to place an eSignature**    Nurse 2 eSignature: Electronically signed by Marisol Camara RN on 2/17/23 at 12:41 PM EST

## 2023-02-17 NOTE — DISCHARGE INSTRUCTIONS
FOLLOW-UP APPOINTMENTS    [facility] OFFICE - Appointment on March 7th at 2pm with for roseanne for a device check and office visit with Marisel Kim CNP, [address] [facility],  [facility], [address] [place] [address] [address], [address] [facility], [address] [place] [address]. Office #: [phone]. If you are unable to make this appointment, please call to reschedule. Directions to [place] [address] towards [place]. [address] [facility] exit. Right off exit. Cross over [employer]. Right on [address] Left into hospital. Follow the signs to the emergency room ( turn left toward the Emergency room). Go right at the first stop sign. Just past the Emergency room at the second stop sign turn right and go up the ramp and park on the top level if possible. Go in the glass doors of the [facility] on the top level of the garage [address]. As soon as you get in the door turn left and our office is the one with the glass doors.

## 2023-02-17 NOTE — PROGRESS NOTES
Patient admitted to room 216 from [facility]. Patient oriented to room, call light, bed rails, phone, lights and bathroom. Patient instructed about the schedule of the day including: vital sign frequency, lab draws, possible tests, frequency of MD and staff rounds, including RN/MD rounding together at bedside, daily weights, and I &O's. Patient instructed about prescribed diet, how to call and order meals, and television. bed alarm in place, patient aware of placement and reason. Telemetry box  in place, patient aware of placement and reason. Bed locked, in lowest position, side rails up 2/4, call light within reach. Will continue to monitor.

## 2023-02-17 NOTE — ANESTHESIA POSTPROCEDURE EVALUATION
Department of Anesthesiology  Postprocedure Note    Patient: Violette Shukla  MRN: [id number]  YOB: 1935  Date of evaluation: 2/17/2023      Procedure Summary     Date: 02/17/23 Room / Location: [facility] Cardiac Cath Lab    Anesthesia Start: 6591 Anesthesia Stop: 1504    Procedure: PACEMAKER Diagnosis:     Scheduled Providers:  Responsible Provider: Dolly Smith MD    Anesthesia Type: MAC ASA Status: 3          Anesthesia Type: No value filed. Greg Phase I:      Greg Phase II:        Anesthesia Post Evaluation    Patient location during evaluation: PACU  Patient participation: complete - patient participated  Level of consciousness: awake and alert  Airway patency: patent  Nausea & Vomiting: no nausea and no vomiting  Complications: no  Cardiovascular status: blood pressure returned to baseline  Respiratory status: acceptable  Hydration status: euvolemic  Comments: VSS on transfer to phase 2 recovery. No anesthetic complications.

## 2023-02-17 NOTE — CONSULTS
Electrophysiology Consultation   Date: 2/17/2023  Admit Date:  2/17/2023  Reason for Consultation: Old pocket hematoma and recurrent falls  Consult Requesting Physician: Claude Bitters, MD     No chief complaint on file. HPI:   Mr. Keisha Lopez is a pleasant 82year old male with a medical history significant for permanent atrial fibrillation status post AVN ablation, BiV pacemaker and now micra due to pocket infection, recurrent falls, orthostatic hypotension, non-obstructive coronary artery disease and chronic renal insufficiency stage III who presents rehabilitation center with recurrent falls with some head trauma and now hematoma over his old device insertion site. Patient unable to give history. I was able to discuss with his daughter. Apparently patient continues to have recurrent falls that are thought to be related to his orthostatic hypotension. His daughter explains that approximately 2 weeks ago he began to have pain around his old pocket site that was followed by an enlarging hematoma. Unfortunately patient suffered another fall during which he hit his head. He was hypoxic and so was taking to [facility] and eventually brought to [facility].     Patient denies fevers, chest pain (typical), orthopnea, PND, lower extremity edema, abdominal swelling, shortness of breath, dyspnea on exertion, chills, visual changes, headaches, sore throat, cough, abdominal pain, nausea, vomiting, bleeding, bruising, dysuria, muscle/joint pain, confusion, depression, anxiety, skin lesions, etc.    Past Medical History:   Diagnosis Date    Atrial fibrillation ([address]     Blind right eye     Chronic kidney disease     Dementia ([address]     Hyperlipidemia     Hypertension     Pneumonia         Past Surgical History:   Procedure Laterality Date    ABLATION OF DYSRHYTHMIC FOCUS      APPENDECTOMY      COLONOSCOPY      EYE SURGERY      HERNIA REPAIR      JOINT REPLACEMENT  2013    left shoulder    PACEMAKER PLACEMENT 10/13/2017    Dr Abigail Carpenter at [facility] [address] single chamber PPM    PACEMAKER PLACEMENT  2022    biventricular    TONSILLECTOMY      UPPER GASTROINTESTINAL ENDOSCOPY         Allergies   Allergen Reactions    Sulfa Antibiotics        Social History:  Reviewed. reports that he quit smoking about 48 years ago. His smoking use included cigarettes. He has a 10.00 pack-year smoking history. He has never used smokeless tobacco. He reports that he does not drink alcohol and does not use drugs. Family History:  Reviewed. family history includes Asthma in an other family member; Heart Disease in his father and mother; Other in his mother. No premature CAD. Review of System:  Patient unable to give history. Physical Examination:  Vitals:    23 1140   BP: 100/79   Pulse: (!) 107   Resp: 20   Temp: 98.2 °F (36.8 °C)   SpO2: (!) 89%      No intake or output data in the 24 hours ending 23 1146  No intake/output data recorded. Wt Readings from Last 3 Encounters:   23 155 lb (70.3 kg)   23 157 lb 9.6 oz (71.5 kg)   23 150 lb 14.4 oz (68.4 kg)     Temp  Av.1 °F (36.7 °C)  Min: 97.9 °F (36.6 °C)  Max: 98.2 °F (36.8 °C)  Pulse  Av.5  Min: 72  Max: 107  BP  Min: 100/79  Max: 145/86  SpO2  Av %  Min: 89 %  Max: 99 %    Telemetry: Atrial fibrillation with chronic pacing. Constitutional: Alert. Oriented to person, place, and time. No distress. Head: Normocephalic and atraumatic. Mouth/Throat: Lips appear moist. Oropharynx is clear and moist.  Eyes: Conjunctivae normal. EOM are normal.   Neck: Neck supple. No lymphadenopathy. No rigidity. No JVD present. Cardiovascular: Normal rate, regular rhythm. Pulmonary/Chest: Bilateral respiratory sounds present. No respiratory accessory muscle use. Abdominal: Soft. Normal bowel sounds present. No distension, No tenderness. Musculoskeletal: No tenderness. No edema    Neurological: Alert and oriented. Cranial nerve II-XII grossly intact.  Skin: Skin is warm and dry. No rash, lesions, ulcerations noted.  Psychiatric: No anxiety nor agitation.    Labs:  Reviewed.   No results for input(s): NA, K, CL, CO2, PHOS, BUN, CREATININE, CA in the last 72 hours.  No results for input(s): WBC, HGB, HCT, MCV, PLT in the last 72 hours.  Lab Results   Component Value Date/Time    TROPONINI 0.023 01/31/2023 08:54 PM    TROPONINI <0.01 01/19/2023 10:17 AM     Lab Results   Component Value Date/Time    BNP 12.8 01/09/2013 10:00 AM     Lab Results   Component Value Date/Time    PROTIME 16.2 01/24/2023 05:32 AM    PROTIME 14.4 01/18/2022 06:05 AM    PROTIME 13.7 05/22/2020 10:19 AM    INR 1.31 01/24/2023 05:32 AM    INR 1.26 01/18/2022 06:05 AM    INR 1.18 05/22/2020 10:19 AM     Lab Results   Component Value Date/Time    CHOL 137 09/09/2022 05:45 AM    HDL 30 09/09/2022 05:45 AM    TRIG 73 09/09/2022 05:45 AM       Diagnostic and imaging results reviewed.     ECG: None on file for this visit.    Echo (DORITA): 01/24/2023   Summary   Left ventricular size is normal.   Global left ventricular function is borderline with ejection fraction   estimated 50%.      Left atrial size appears dilated.      Moderate tricuspid regurgitation.   Estimated right ventricular systolic function is 38 mmHg assuming right   atrial pressure of 15 mmhg.      The right atrium is dilated.    Nuclear Stress Test: 05/12/2020   Summary    ABNORMAL LOW RISK STRESS TEST    Normal LV size and systolic function. Left ventricular ejection fraction of    64%. Normal wall motion. There is a small, mild defect in the apex at stress    that reverses at rest consistent with ischemia.     I independently reviewed the ECG and telemetry.    Scheduled Meds:   amLODIPine  10 mg Oral Daily    atorvastatin  40 mg Oral Nightly    finasteride  5 mg Oral Daily    isosorbide mononitrate  30 mg Oral Daily    lactobacillus  1 capsule Oral BID WC    memantine  5 mg Oral BID     tamsulosin  0.4 mg Oral Daily    risperiDONE  0.25 mg Oral BID    QUEtiapine  50 mg Oral Nightly    [START ON 2/18/2023] QUEtiapine  25 mg Oral QAM AC    pantoprazole  40 mg Oral Daily    trospium  20 mg Oral BID AC    sodium chloride flush  5-40 mL IntraVENous 2 times per day     Continuous Infusions:   sodium chloride       PRN Meds:.sodium chloride flush, sodium chloride, ondansetron **OR** ondansetron, polyethylene glycol, acetaminophen **OR** acetaminophen     Assessment:   Patient Active Problem List    Diagnosis Date Noted    Pneumonia 02/27/2015    Pacemaker pocket hematoma, initial encounter 02/17/2023    Pacemaker-dependent due to native cardiac rhythm insufficient to support life 01/23/2023    Pacemaker infection ([facility] 01/19/2023    Orthostatic hypotension 09/09/2022    Atrial fibrillation, rapid ([facility] 09/08/2022    Syncope, unspecified syncope type 08/10/2022    Ischemic cardiomyopathy 08/10/2022    Dementia with behavioral disturbance     Hallucinations     Syncope and collapse     COVID     Other chest pain 04/21/2021    Coronary artery disease involving native heart without angina pectoris 06/10/2020    Abnormal cardiovascular stress test 05/22/2020    SOB (shortness of breath) 05/04/2020    Abnormal echocardiogram 04/02/2019    Dizziness 04/02/2019    A-fib ([facility] 03/03/2016    Hyperlipidemia     Hypertension     Chronic kidney disease, stage III (moderate) ([facility])     Tracheobronchomalacia     Abnormal chest CT     Cough syncope       Active Hospital Problems    Diagnosis Date Noted    Pacemaker pocket hematoma, initial encounter [facility] 02/17/2023     Priority: Medium     Mr. Lynette Craig is a pleasant 82year old male with a medical history significant for permanent atrial fibrillation status post AVN ablation, BiV pacemaker and now micra due to pocket infection, recurrent falls, orthostatic hypotension, non-obstructive coronary artery disease and chronic renal insufficiency stage III who presents rehabilitation center with recurrent falls with some head trauma and now hematoma over his old device insertion site. Problem List:  1. Pocket hematoma. 2. Permanent atrial fibrillation status post AVN ablation and MICRA pacemaker. 3. Dementia. 4. Orthostatic hypotension. 5. Recurrent falls. 6. Possible ischemic cardiomyopathy. Assessment and Plan:  1. Pocket hematoma. Patient is a pleasant 82year old male with a medical history significant for permanent atrial fibrillation status post AVN ablation and BiV pacemaker that was explanted due to pocket infection now status post micra pacemaker, orthostatic hypotension, recurrent falls, non-obstructive coronary artery disease (positive stress test without LHC), and chronic renal insufficiency who presents from OSH with painful pocket hematoma. Patient underwent BiV pacemaker explantation on 01/24/2023. No hematoma post.  He now has painful hematoma without systemic sings of infection. There is some heme around hematoma so I suspect resolving however it is extremely painful to patient. After a thorough discussion, his daughter would like his hematoma evacuated understanding risks for pain management. I will arrange. - Pocket revision today. - We will continue to follow along with you. 2. Permanent atrial fibrillation status post AVN ablation and MICRA pacemaker. Patient status post AVN ablation. He is not a candidate for [employer] [address] and no longer a candidate for Watchman unless he clinically improves. - No OAC.  - No dina agents given orthostatic hypotension. 3. Dementia. - Per primary team.    4. Orthostatic hypotension. Patient with long history of orthostatic hypotension. He previous was on florinef and midodrine. I will ask for   - Avoid diuretics. - Decrease amlodipine.  - Start low dose florinef. - Thigh high compression stockings.  - PT/OT. - Abdominal binder. 5. Recurrent falls. - As per above.     6. Possible ischemic cardiomyopathy. No signs or symptoms consistent with ACS. No typical chest pain though he does have some chest wall pain around his hematoma. Patient not a candidate for LHC despite positive stress test (see Dr. Nunes Leisure note). - Continue antianginals with long acting nitrates. Thank you for allowing me to participate in the care of Dominique Berry . If you have any questions/comments, please do not hesitate to contact us.     Lizeth Bryant MD  Cardiac Electrophysiology  [facility]  [phone] [facility]

## 2023-02-17 NOTE — PLAN OF CARE
Problem: Pain  Goal: Verbalizes/displays adequate comfort level or baseline comfort level  Outcome: Progressing  Note: Pt will be satisfied with pain control. Pt uses numeric pain rating scale with reassessments after pain med administration. Will continue to monitor progression throughout shift. Problem: Skin/Tissue Integrity  Goal: Absence of new skin breakdown  Description: 1. Monitor for areas of redness and/or skin breakdown  2. Assess vascular access sites hourly  3. Every 4-6 hours minimum:  Change oxygen saturation probe site  4. Every 4-6 hours:  If on nasal continuous positive airway pressure, respiratory therapy assess nares and determine need for appliance change or resting period. Outcome: Progressing  Note: Pt is at risk for skin breakdown. Pt will have skin assessments every shift, Q2 turns, heels elevated off of the bed, and friction and shear prevented when possible. Will continue to monitor for signs of skin breakdown and enforce prevention measures. Problem: Safety - Adult  Goal: Free from fall injury  Outcome: Progressing  Note: Pt will remain free from falls throughout hospital stay. Fall precautions in place, bed alarm on, bed in lowest position with wheels locked and side rails 2/4 up. Room door open and hourly rounding completed. Will continue to monitor throughout shift.

## 2023-02-17 NOTE — H&P
Hospital Medicine History & Physical      PCP: Allan Ortiz MD    Date of Admission: 2/17/2023    Date of Service: Pt seen/examined on 2/17/2023 and Admitted to Inpatient with expected LOS greater than two midnights due to medical therapy. Chief Complaint:    Pocket hematoma      History Of Present Illness:    82 y.o. male who was transferred to [facility] from [place] for upper chest swelling and pain. Patient recent treated for infected pacemaker site. He underwent device extraction with placement of micra pacemaker. He is currently residing at [facility] for skilled stay. Patient is a poor historian due to underlying dementia. Pocket site tender to palpation, edema and bruising present as well. No erythema. EP consulted. Past Medical History:          Diagnosis Date    Atrial fibrillation ([address]     Blind right eye     Chronic kidney disease     Dementia [address] [address]     Hyperlipidemia     Hypertension     Pneumonia        Past Surgical History:          Procedure Laterality Date    ABLATION OF DYSRHYTHMIC FOCUS      APPENDECTOMY      COLONOSCOPY      EYE SURGERY      HERNIA REPAIR      JOINT REPLACEMENT  2013    left shoulder    PACEMAKER PLACEMENT  10/13/2017    Dr Fide August at [place] [id number]. single chamber PPM    PACEMAKER PLACEMENT  09/16/2022    biventricular    TONSILLECTOMY      UPPER GASTROINTESTINAL ENDOSCOPY         Medications Prior to Admission:      Prior to Admission medications    Medication Sig Start Date End Date Taking?  Authorizing Provider   bisacodyl (DULCOLAX) 10 MG suppository Place 10 mg rectally 2 times daily as needed for Constipation   Yes Historical Provider, MD   melatonin 3 MG TABS tablet Take 5 mg by mouth at bedtime   Yes Historical Provider, MD   promethazine (PHENERGAN) 12.5 MG suppository Place 12.5 mg rectally every 6 hours as needed for Nausea   Yes Historical Provider, MD   guaiFENesin-codeine (GUAIFENESIN AC) 100-10 MG/5ML liquid Take 5 mLs by mouth every 4 hours as needed for Cough.    Yes Historical Provider, MD   isosorbide mononitrate (IMDUR) 30 MG extended release tablet Take 1 tablet by mouth daily 1/21/23   Too Montanez MD   enoxaparin (LOVENOX) 40 MG/0.4ML Inject 0.4 mLs into the skin daily  Patient not taking: Reported on 2/17/2023 1/21/23   Too Montanez MD   lactobacillus (CULTURELLE) capsule Take 1 capsule by mouth 2 times daily (with meals)  Patient taking differently: Take 1 capsule by mouth in the morning, at noon, and at bedtime 1/21/23   Too Montanez MD   QUEtiapine (SEROQUEL) 25 MG tablet Take 1 tablet by mouth every morning (before breakfast)  Patient not taking: Reported on 2/17/2023 1/22/23   Too Montanez MD   QUEtiapine (SEROQUEL) 50 MG tablet Take 1 tablet by mouth nightly 1/21/23   Too Montanez MD   amLODIPine (NORVASC) 10 MG tablet Take 1 tablet by mouth daily  Patient taking differently: Take 5 mg by mouth daily 1/21/23   Too Montanez MD   polyethylene glycol (GLYCOLAX) 17 g packet Take 17 g by mouth daily as needed for Constipation 1/21/23 2/20/23  Too Montanez MD   tamsulosin [facility]) 0.4 MG capsule Take 1 capsule by mouth daily 1/22/23   Too Montanez MD   memantine (NAMENDA) 5 MG tablet TAKE 1 TABLET BY MOUTH 2 TIMES DAILY  Patient taking differently: Take 10 mg by mouth 2 times daily 12/5/22   Fiona Mejia APRN - CNP   atorvastatin (LIPITOR) 40 MG tablet TAKE 1 TABLET BY MOUTH DAILY  Patient taking differently: Take 40 mg by mouth at bedtime 11/21/22   Geoff Deras MD   risperiDONE (RISPERDAL) 0.25 MG tablet Take 1 tablet by mouth 2 times daily 10/24/22   Geoff Deras MD   pantoprazole (PROTONIX) 40 MG tablet Take 1 tablet by mouth daily 10/17/22   Geoff Deras MD   mirabegron [facility]) 25 MG TB24 Take 1 tablet by mouth daily 10/17/22   Launie Slipper, MD   trospium (SANCTURA) 20 MG tablet Take 20 mg by mouth in the morning and 20 mg before bedtime. 8/26/22  Historical Provider, MD   apixaban (ELIQUIS) 5 MG TABS tablet TAKE 1/2 TABLET BY MOUTH 2 TIMES DAILY 8/8/22 8/26/22  Alane Lanes, MD   metoprolol succinate (TOPROL XL) 25 MG extended release tablet TAKE 3 TABLETS BY MOUTH TWICE DAILY  Patient taking differently: Take 25 mg by mouth in the morning and 25 mg in the evening. 7/25/22 8/26/22  Khanh YanezGUS - CNP   famotidine (PEPCID) 20 MG tablet Take 1 tablet by mouth at bedtime 7/8/22 8/26/22  Alane Lanes, MD   donepezil (ARICEPT) 10 MG tablet Take 1 tablet by mouth daily 3/24/22 8/26/22  Historical Provider, MD   Ferrous Sulfate (IRON) 325 (65 Fe) MG TABS Take by mouth daily     Historical Provider, MD   finasteride (PROSCAR) 5 MG tablet Take 5 mg by mouth daily. 3/4/15   Historical Provider, MD   aspirin 81 MG tablet Take 81 mg by mouth daily. Historical Provider, MD       Allergies:  Sulfa antibiotics    Social History:      The patient currently lives [facility]     TOBACCO:   reports that he quit smoking about 48 years ago. His smoking use included cigarettes. He has a 10.00 pack-year smoking history. He has never used smokeless tobacco.  ETOH:   reports no history of alcohol use. E-cigarette/Vaping       Questions Responses    E-cigarette/Vaping Use Never User    Start Date     Passive Exposure     Quit Date     Counseling Given     Comments               Family History:      Reviewed and negative in regards to presenting illness/complaint. Problem Relation Age of Onset    Heart Disease Mother     Other Mother     Heart Disease Father     Asthma Other        REVIEW OF SYSTEMS COMPLETED:   Pertinent positives as noted in the HPI. All other systems reviewed and negative.     PHYSICAL EXAM PERFORMED:    /86   Pulse 70   Temp 97.5 °F (36.4 °C) (Axillary)   Resp 20   Ht 5' 6\" (1.676 m)   Wt 155 lb (70.3 kg)   SpO2 96%   BMI 25.02 kg/m²     General appearance:  No apparent distress, appears stated age and cooperative. HEENT:  Normal cephalic, atraumatic without obvious deformity. Pupils equal, round, and reactive to light. Extra ocular muscles intact. Conjunctivae/corneas clear. Neck: Supple, with full range of motion. No jugular venous distention. Trachea midline. Respiratory:  Normal respiratory effort. Clear to auscultation, bilaterally without Rales/Wheezes/Rhonchi. Cardiovascular:  Regular rate and rhythm with normal S1/S2 without murmurs, rubs or gallops. Abdomen: Soft, non-tender, non-distended with normal bowel sounds. Musculoskeletal:  No clubbing, cyanosis or edema bilaterally. Full range of motion without deformity. Skin: Fluid collection under left upper chest wall with ecchymosis and edema. Area tender to palpation. otherwise Skin color, texture, turgor normal.  No rashes or lesions. Neurologic:  Neurovascularly intact without any focal sensory/motor deficits. Cranial nerves: II-XII intact, grossly non-focal.  Psychiatric:  Alert, oriented to self and place, poor insight   Capillary Refill: Brisk,3 seconds, normal  Peripheral Pulses: +2 palpable, equal bilaterally       Labs:     Recent Labs     02/17/23  1149   WBC 5.6   HGB 10.4*   HCT 32.2*   PLT 74*     Recent Labs     02/17/23  1149      K 4.3      CO2 23   BUN 29*   CREATININE 1.3   CALCIUM 9.5     Recent Labs     02/17/23  1149   AST 23   ALT 19   BILITOT 1.0   ALKPHOS 86     No results for input(s): INR in the last 72 hours. No results for input(s): Randene Holes in the last 72 hours. Urinalysis:      Lab Results   Component Value Date/Time    NITRU Negative 01/19/2023 12:25 PM    [address] 04/21/2022 02:20 PM    BACTERIA MODERATE 01/31/2023 11:44 PM    RBCUA 3-4 04/21/2022 02:20 PM    BLOODU Negative 01/19/2023 12:25 PM    SPECGRAV 1.020 01/31/2023 11:44 PM    GLUCOSEU NEGATIVE 01/31/2023 11:44 PM       Radiology:     CXR: I have reviewed the CXR with the following interpretation: See chart   EKG:   I have reviewed the EKG with the following interpretation: See chart    No orders to display       Consults:    IP CONSULT TO CARDIOLOGY    ASSESSMENT:    Active Hospital Problems    Diagnosis Date Noted    Pacemaker pocket hematoma, initial encounter Keyonna Rey 02/17/2023     Priority: Medium         PLAN:    PPM Pocket hematoma  -Underwent pacemaker extraction last month, replaced with leadless micra, EP consulted, underwent pocket revision this pm. Abx    Orthostatic hypotension  -Florinef started per EP  -Compression stockings & binder   -Encourage fluids   -Amlodipine decreased per EP     CKD  -Kidney function at baseline  -Trend labs     PAC, rate-controlled   -No currently on AC due to hypotension    HLD  -Continue statin    Dementia  -Continue home medications  -Supportive care     DVT Prophylaxis: SCDs   Diet: ADULT DIET; Regular  Code Status: Full Code    PT/OT Eval Status: Pending     Dispo - 1-2 days, likely discharge back to Snf over weekend        Mica Myrick    Thank you April Moreno MD for the opportunity to be involved in this patient's care. If you have any questions or concerns please feel free to contact me at [phone].

## 2023-02-18 PROBLEM — Z98.890 HX OF ATRIOVENTRICULAR NODE ABLATION: Status: ACTIVE | Noted: 2023-02-18

## 2023-02-18 LAB
ANION GAP SERPL CALCULATED.3IONS-SCNC: 11 MMOL/L (ref 3–16)
BASOPHILS ABSOLUTE: 0 K/UL (ref 0–0.2)
BASOPHILS RELATIVE PERCENT: 0.6 %
BUN BLDV-MCNC: 25 MG/DL (ref 7–20)
C DIFF TOXIN/ANTIGEN: NORMAL
CALCIUM SERPL-MCNC: 9 MG/DL (ref 8.3–10.6)
CHLORIDE BLD-SCNC: 108 MMOL/L (ref 99–110)
CO2: 26 MMOL/L (ref 21–32)
CREAT SERPL-MCNC: 1.3 MG/DL (ref 0.8–1.3)
EOSINOPHILS ABSOLUTE: 0.3 K/UL (ref 0–0.6)
EOSINOPHILS RELATIVE PERCENT: 6.4 %
GFR SERPL CREATININE-BSD FRML MDRD: 54 ML/MIN/{1.73_M2}
GLUCOSE BLD-MCNC: 66 MG/DL (ref 70–99)
HCT VFR BLD CALC: 31.7 % (ref 40.5–52.5)
HEMOGLOBIN: 11 G/DL (ref 13.5–17.5)
IRON SATURATION: NORMAL % (ref 20–50)
IRON: 153 UG/DL (ref 59–158)
LYMPHOCYTES ABSOLUTE: 1.4 K/UL (ref 1–5.1)
LYMPHOCYTES RELATIVE PERCENT: 27.9 %
MCH RBC QN AUTO: 29.4 PG (ref 26–34)
MCHC RBC AUTO-ENTMCNC: 34.7 G/DL (ref 31–36)
MCV RBC AUTO: 84.7 FL (ref 80–100)
MONOCYTES ABSOLUTE: 0.6 K/UL (ref 0–1.3)
MONOCYTES RELATIVE PERCENT: 11.2 %
NEUTROPHILS ABSOLUTE: 2.7 K/UL (ref 1.7–7.7)
NEUTROPHILS RELATIVE PERCENT: 53.9 %
PDW BLD-RTO: 15.1 % (ref 12.4–15.4)
PLATELET # BLD: 68 K/UL (ref 135–450)
PMV BLD AUTO: 8.4 FL (ref 5–10.5)
POTASSIUM REFLEX MAGNESIUM: 3.9 MMOL/L (ref 3.5–5.1)
RBC # BLD: 3.74 M/UL (ref 4.2–5.9)
SODIUM BLD-SCNC: 145 MMOL/L (ref 136–145)
TOTAL IRON BINDING CAPACITY: NORMAL UG/DL (ref 260–445)
WBC # BLD: 5 K/UL (ref 4–11)

## 2023-02-18 PROCEDURE — 97530 THERAPEUTIC ACTIVITIES: CPT

## 2023-02-18 PROCEDURE — 6370000000 HC RX 637 (ALT 250 FOR IP): Performed by: INTERNAL MEDICINE

## 2023-02-18 PROCEDURE — 85025 COMPLETE CBC W/AUTO DIFF WBC: CPT

## 2023-02-18 PROCEDURE — 2060000000 HC ICU INTERMEDIATE R&B

## 2023-02-18 PROCEDURE — 83540 ASSAY OF IRON: CPT

## 2023-02-18 PROCEDURE — 97166 OT EVAL MOD COMPLEX 45 MIN: CPT

## 2023-02-18 PROCEDURE — 2580000003 HC RX 258: Performed by: PHYSICIAN ASSISTANT

## 2023-02-18 PROCEDURE — 6370000000 HC RX 637 (ALT 250 FOR IP): Performed by: PHYSICIAN ASSISTANT

## 2023-02-18 PROCEDURE — 83550 IRON BINDING TEST: CPT

## 2023-02-18 PROCEDURE — 97162 PT EVAL MOD COMPLEX 30 MIN: CPT

## 2023-02-18 PROCEDURE — 99232 SBSQ HOSP IP/OBS MODERATE 35: CPT | Performed by: NURSE PRACTITIONER

## 2023-02-18 PROCEDURE — 36415 COLL VENOUS BLD VENIPUNCTURE: CPT

## 2023-02-18 PROCEDURE — 80048 BASIC METABOLIC PNL TOTAL CA: CPT

## 2023-02-18 RX ORDER — DIPHENHYDRAMINE HYDROCHLORIDE 50 MG/ML
12.5 INJECTION INTRAMUSCULAR; INTRAVENOUS
Status: DISCONTINUED | OUTPATIENT
Start: 2023-02-18 | End: 2023-02-18

## 2023-02-18 RX ORDER — SODIUM CHLORIDE 0.9 % (FLUSH) 0.9 %
5-40 SYRINGE (ML) INJECTION EVERY 12 HOURS SCHEDULED
Status: DISCONTINUED | OUTPATIENT
Start: 2023-02-18 | End: 2023-02-18

## 2023-02-18 RX ORDER — ONDANSETRON 2 MG/ML
4 INJECTION INTRAMUSCULAR; INTRAVENOUS
Status: DISCONTINUED | OUTPATIENT
Start: 2023-02-18 | End: 2023-02-18

## 2023-02-18 RX ORDER — OXYCODONE HYDROCHLORIDE 5 MG/1
10 TABLET ORAL PRN
Status: DISCONTINUED | OUTPATIENT
Start: 2023-02-18 | End: 2023-02-18

## 2023-02-18 RX ORDER — LABETALOL HYDROCHLORIDE 5 MG/ML
10 INJECTION, SOLUTION INTRAVENOUS
Status: DISCONTINUED | OUTPATIENT
Start: 2023-02-18 | End: 2023-02-18

## 2023-02-18 RX ORDER — SODIUM CHLORIDE 9 MG/ML
25 INJECTION, SOLUTION INTRAVENOUS PRN
Status: DISCONTINUED | OUTPATIENT
Start: 2023-02-18 | End: 2023-02-22 | Stop reason: HOSPADM

## 2023-02-18 RX ORDER — OXYCODONE HYDROCHLORIDE 5 MG/1
5 TABLET ORAL PRN
Status: DISCONTINUED | OUTPATIENT
Start: 2023-02-18 | End: 2023-02-18

## 2023-02-18 RX ORDER — SODIUM CHLORIDE 0.9 % (FLUSH) 0.9 %
5-40 SYRINGE (ML) INJECTION PRN
Status: DISCONTINUED | OUTPATIENT
Start: 2023-02-18 | End: 2023-02-22 | Stop reason: HOSPADM

## 2023-02-18 RX ADMIN — DOXYCYCLINE HYCLATE 100 MG: 100 TABLET, COATED ORAL at 15:21

## 2023-02-18 RX ADMIN — FINASTERIDE 5 MG: 5 TABLET, FILM COATED ORAL at 15:29

## 2023-02-18 RX ADMIN — PANTOPRAZOLE SODIUM 40 MG: 40 TABLET, DELAYED RELEASE ORAL at 15:33

## 2023-02-18 RX ADMIN — ISOSORBIDE MONONITRATE 30 MG: 30 TABLET, EXTENDED RELEASE ORAL at 15:29

## 2023-02-18 RX ADMIN — QUETIAPINE FUMARATE 25 MG: 25 TABLET ORAL at 05:29

## 2023-02-18 RX ADMIN — TAMSULOSIN HYDROCHLORIDE 0.4 MG: 0.4 CAPSULE ORAL at 15:29

## 2023-02-18 RX ADMIN — ATORVASTATIN CALCIUM 40 MG: 40 TABLET, FILM COATED ORAL at 20:40

## 2023-02-18 RX ADMIN — Medication 10 ML: at 20:42

## 2023-02-18 RX ADMIN — Medication 1 CAPSULE: at 15:31

## 2023-02-18 RX ADMIN — RISPERIDONE 0.25 MG: 0.25 TABLET, FILM COATED ORAL at 15:31

## 2023-02-18 RX ADMIN — TROSPIUM CHLORIDE 20 MG: 20 TABLET, FILM COATED ORAL at 15:29

## 2023-02-18 RX ADMIN — MEMANTINE 5 MG: 5 TABLET ORAL at 15:31

## 2023-02-18 RX ADMIN — TROSPIUM CHLORIDE 20 MG: 20 TABLET, FILM COATED ORAL at 05:29

## 2023-02-18 RX ADMIN — AMLODIPINE BESYLATE 5 MG: 5 TABLET ORAL at 15:33

## 2023-02-18 ASSESSMENT — PAIN SCALES - WONG BAKER
WONGBAKER_NUMERICALRESPONSE: 0
WONGBAKER_NUMERICALRESPONSE: 0

## 2023-02-18 ASSESSMENT — PAIN SCALES - GENERAL
PAINLEVEL_OUTOF10: 0

## 2023-02-18 NOTE — PROGRESS NOTES
Hospitalist Progress Note      PCP: Vaishnavi Ray MD    Date of Admission: 2/17/2023    Chief Complaint:   Pocket hematoma    Hospital Course:   82 y.o. male who was transferred to [facility] from [facility] for upper chest swelling and pain. Patient recent treated for infected pacemaker site. He underwent device extraction with placement of micra pacemaker. He is currently residing at [facility] for skilled stay. Patient is a poor historian due to underlying dementia. Pocket site tender to palpation, edema and bruising present as well. No erythema. EP consulted. Subjective:   Seen rest in bed, denies any pain, updated on plan        Medications:  Reviewed    Infusion Medications    sodium chloride      sodium chloride       Scheduled Medications    atorvastatin  40 mg Oral Nightly    finasteride  5 mg Oral Daily    isosorbide mononitrate  30 mg Oral Daily    lactobacillus  1 capsule Oral BID WC    memantine  5 mg Oral BID    tamsulosin  0.4 mg Oral Daily    risperiDONE  0.25 mg Oral BID    QUEtiapine  50 mg Oral Nightly    QUEtiapine  25 mg Oral QAM AC    pantoprazole  40 mg Oral Daily    trospium  20 mg Oral BID AC    sodium chloride flush  5-40 mL IntraVENous 2 times per day    amLODIPine  5 mg Oral Daily    doxycycline hyclate  100 mg Oral 2 times per day     PRN Meds: sodium chloride flush, sodium chloride, sodium chloride flush, sodium chloride, ondansetron **OR** ondansetron, polyethylene glycol, acetaminophen **OR** acetaminophen      Intake/Output Summary (Last 24 hours) at 2/18/2023 0851  Last data filed at 2/18/2023 0045  Gross per 24 hour   Intake 750 ml   Output 0 ml   Net 750 ml       Physical Exam Performed:    /79   Pulse 72   Temp 97.5 °F (36.4 °C) (Axillary)   Resp 16   Ht 5' 6\" (1.676 m)   Wt 155 lb (70.3 kg)   SpO2 96%   BMI 25.02 kg/m²     General appearance: No apparent distress, appears stated age and cooperative.   HEENT: Pupils equal, round, and reactive to light. Conjunctivae/corneas clear. Neck: Supple, with full range of motion. No jugular venous distention. Trachea midline. Respiratory:  Normal respiratory effort. Clear to auscultation, bilaterally without Rales/Wheezes/Rhonchi. Cardiovascular: Regular rate and rhythm with normal S1/S2 without murmurs, rubs or gallops. Abdomen: Soft, non-tender, non-distended with normal bowel sounds. Musculoskeletal: No clubbing, cyanosis or edema bilaterally. Full range of motion without deformity. Skin: Surgical dressing in place over left chest wall. Skin color, texture, turgor normal.  No rashes or lesions. Neurologic:  Neurovascularly intact without any focal sensory/motor deficits. Cranial nerves: II-XII intact, grossly non-focal.  Psychiatric: Drowsy, alert and oriented to self, poor insight  Capillary Refill: Brisk, 3 seconds, normal   Peripheral Pulses: +2 palpable, equal bilaterally       Labs:   Recent Labs     02/17/23  1149 02/18/23  0529   WBC 5.6 5.0   HGB 10.4* 11.0*   HCT 32.2* 31.7*   PLT 74* 68*     Recent Labs     02/17/23  1149 02/18/23  0529    145   K 4.3 3.9    108   CO2 23 26   BUN 29* 25*   CREATININE 1.3 1.3   CALCIUM 9.5 9.0     Recent Labs     02/17/23  1149   AST 23   ALT 19   BILITOT 1.0   ALKPHOS 86     No results for input(s): INR in the last 72 hours. No results for input(s): Tomas Gubler in the last 72 hours.     Urinalysis:      Lab Results   Component Value Date/Time    NITRU Negative 01/19/2023 12:25 PM    [address] 04/21/2022 02:20 PM    BACTERIA MODERATE 01/31/2023 11:44 PM    RBCUA 3-4 04/21/2022 02:20 PM    BLOODU Negative 01/19/2023 12:25 PM    SPECGRAV 1.020 01/31/2023 11:44 PM    GLUCOSEU NEGATIVE 01/31/2023 11:44 PM       Radiology:  No orders to display       IP CONSULT TO CARDIOLOGY    Assessment/Plan:    Active Hospital Problems    Diagnosis     Pacemaker pocket hematoma, initial encounter [T82.008C]      Priority: Medium     PPM Pocket hematoma  -Underwent pacemaker extraction last month, replaced with leadless micra, EP consulted, underwent pocket revision this pm. Abx     Orthostatic hypotension  -Florinef started per EP  -Compression stockings & binder   -Encourage fluids   -Amlodipine decreased per EP      CKD  -Kidney function at baseline  -Trend labs      PAC, rate-controlled   -No currently on AC due to hypotension     HLD  -Continue statin     Dementia  -Continue home medications  -Supportive care     DVT Prophylaxis: SCDs  Diet: ADULT DIET;  Regular  Code Status: Full Code  PT/OT Eval Status: Pending     Dispo - Likely tomorrow back to snf     Appropriate for A1 Discharge Unit: BHAVNA Pereyra

## 2023-02-18 NOTE — PROGRESS NOTES
Physical Therapy  Facility/Department: [facility] [place]  Physical Therapy Initial Assessment    Name: Americo Hester  : 1935  MRN: [id number]  Date of Service: 2023    Discharge Recommendations:  Subacute/Skilled Nursing Facility   PT Equipment Recommendations  Equipment Needed: No      Patient Diagnosis(es): There were no encounter diagnoses. Past Medical History:  has a past medical history of Atrial fibrillation ([address] [address] Blind right eye, Chronic kidney disease, Dementia ([address] Hyperlipidemia, Hypertension, and Pneumonia. Past Surgical History:  has a past surgical history that includes Tonsillectomy; Appendectomy; Colonoscopy; eye surgery; Upper gastrointestinal endoscopy; joint replacement (); hernia repair; pacemaker placement (10/13/2017); ablation of dysrhythmic focus; and pacemaker placement (2022). Assessment   Body Structures, Functions, Activity Limitations Requiring Skilled Therapeutic Intervention: Decreased functional mobility ; Decreased cognition;Decreased coordination;Decreased endurance;Decreased ADL status; Decreased body mechanics; Decreased balance;Decreased posture;Decreased safe awareness  Assessment: Pt is 83 yo M presenting with pacemaker pocket hematoma, initial encounter and s/p pocket revision 2023. Pt is poor historian due to dementia and seldomly answer Y/N questions this date (social functional history and previous level of function below are gathered from previous admissions). pt with imparied command following, initially max A for rolling progressing to SBA. pt unwilling to sit EOB despite cues/encouragement. pt functioning below baseline, can benefit from conitnued skilled therapy via SNF to decrease burden of care prior to d/c home.   Treatment Diagnosis: impaired mobility  Therapy Prognosis: Fair  Decision Making: Medium Complexity  Barriers to Learning: cog  Requires PT Follow-Up: Yes  Activity Tolerance  Activity Tolerance: Treatment limited secondary to agitation;Treatment limited secondary to decreased cognition     Plan   Physcial Therapy Plan  General Plan: 2-3 times per week  Current Treatment Recommendations: Strengthening, Wheelchair mobility training, Home exercise program, Therapeutic activities, Balance training, Gait training, Safety education & training, Functional mobility training, Stair training, Patient/Caregiver education & training, Neuromuscular re-education, Transfer training, ADL/Self-care training, Manual lymphatic drainage, Equipment evaluation, education, & procurement, IADL training, Cognitive reorientation, Cognitive/Perceptual training, Endurance training, Positioning, Pain management, Modalities  Safety Devices  Type of Devices:  All fall risk precautions in place, Bed alarm in place, Call light within reach, Heels elevated for pressure relief, Patient at risk for falls, Left in bed, Nurse notified  Restraints  Restraints Initially in Place: No     Restrictions  Restrictions/Precautions  Restrictions/Precautions: Fall Risk, Isolation, Contact Precautions, General Precautions  Position Activity Restriction  Other position/activity restrictions: Cdiff rule out, IV, tele     Subjective   General  Chart Reviewed: Yes  Patient assessed for rehabilitation services?: Yes  Response To Previous Treatment: Not applicable  Family / Caregiver Present: No  Referring Practitioner: BHAVNA Francois  Referral Date : 02/17/23  Diagnosis: pacemaker pocket hematoma  Follows Commands: Within Functional Limits  General Comment  Comments: RN cleared pt to participate  Subjective  Subjective: pt minimally agreeable, impaired command following         Social/Functional History  Social/Functional History  Lives With: Spouse  Type of Home: House  Home Layout: One level, Able to Live on Main level with bedroom/bathroom  Home Access: Stairs to enter with rails  Entrance Stairs - Number of Steps: 2  Entrance Stairs - Rails: Both  Bathroom Shower/Tub: Walk-in shower  Bathroom Toilet: Standard  Bathroom Equipment: Grab bars in shower  Home Equipment: [employer], 4 wheeled  Has the patient had two or more falls in the past year or any fall with injury in the past year?: Yes  ADL Assistance: [facility]: Needs assistance  Homemaking Responsibilities: Yes  Ambulation Assistance: Independent  Transfer Assistance: Independent  Active : Yes  Occupation: Retired  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 SNF, current plan is to return to SNF prior to returning home; Patient's past medical history includes dementia. Patient was confused a poor historian during the therapy evaluation. Patient was last evaluated by occupational therapy on 9/09/22 and all of the patients social history/prior level of function was obtained from that eval (which was taken from PT eval on 8/15/22). Per his eval on 8/15/22 \"pt reports he feels like he normally falls to the right. Pt reports he has been getting lightheaded last 8-9 mos and having multiple falls. \"  Vision/Hearing  Vision  Vision: Impaired  Vision Exceptions: Wears glasses at all times  Hearing  Hearing: Exceptions to [facility]  Hearing Exceptions: Hard of hearing/hearing concerns    Cognition   Orientation  Overall Orientation Status: Impaired  Orientation Level: Disoriented X4  Cognition  Overall Cognitive Status: Exceptions  Arousal/Alertness: Delayed responses to stimuli  Following Commands: Does not follow commands  Attention Span: Difficulty attending to directions  Safety Judgement: Decreased awareness of need for assistance;Decreased awareness of need for safety  Problem Solving: Assistance required to identify errors made;Decreased awareness of errors;Assistance required to implement solutions;Assistance required to generate solutions;Assistance required to correct errors made  Insights: Not aware of deficits  Initiation: Requires cues for all  Sequencing: Requires cues for all  Cognition Comment: once pt able to understand command of side rolling in bed pt able to side roll SBA, pt limited due to inability to follow commands and demo inc desire for holding staff hands with strong grasp     Objective   Heart Rate: 72  Heart Rate Source: Monitor  BP: 120/79  BP Location: Right upper arm  BP Method: Automatic  Patient Position: Left side  MAP (Calculated): 93  Resp: 16  SpO2: 96 %  O2 Device: None (Room air)     Observation/Palpation  Posture: Poor (kyphotic in bed, unable to formally assess)           Strength RLE  Strength RLE: WFL  Comment: unable to formally assess 2* impaired cog/command following, observe 3/5  Strength LLE  Strength LLE: WFL  Comment: unable to formally assess 2* impaired cog/command following, observe 3/5        Bed Mobility Training  Overall Level of Assistance: Stand-by assistance;Assist X2;Maximum assistance; Adaptive equipment; Additional time (pt initially maxAx2 with use of bed rails to rolling L and R however once pt able to understand task pt able to rolL L/R with SBA and use of bed rails)  Interventions: Verbal cues;Manual cues; Tactile cues  Balance  Sitting:  (HILARIO pt unable to command follow to sit EOB)  Standing:  (HILARIO pt unable to command follow to sit EOB)  Transfer Training  Transfer Training: No (HILARIO pt unable to command follow to sit EOB)  Gait  Overall Level of Assistance:  (HILARIO pt unable to command follow to sit EOB)  Bed mobility  Rolling to Left: Dependent/Total  Rolling to Right: Dependent/Total  Supine to Sit: Unable to assess  Sit to Supine: Unable to assess  Bed Mobility Comments: initially Td to roll in bed L and R x 3- reps each, progressing to SBA once pt understands instructions.  despite time/encouragement, pt unwilling to sit EOB  Transfers  Sit to Stand: Unable to assess  Stand to Sit: Unable to assess  Comment: unable to assess        Balance  Comments: unable to assess sitting balance, pt unwilling                               AM-PAC Score     AM-PAC Inpatient Mobility without Stair Climbing Raw Score : 6 (02/18/23 1129)  AM-PAC Inpatient without Stair Climbing T-Scale Score : 26.48 (02/18/23 1129)  Mobility Inpatient CMS 0-100% Score: 92.18 (02/18/23 1129)  Mobility Inpatient without Stair CMS G-Code Modifier : CM (02/18/23 1129)       Goals  Short Term Goals  Time Frame for Short Term Goals: 7 days 2/25  Short Term Goal 1: pt will complete bed mobility wiht CGA  Short Term Goal 2: pt will complete functional transfer with mod A fo 1  Short Term Goal 3: pt will ambulate about room with LRAD and mod A  Short Term Goal 4: pt will tolerate 10-15 reps of AAROM/AORM BLE supine/seated ther ex by 2/23  Patient Goals   Patient Goals : pt unable to state 2* cognition       Education  Patient Education  Education Given To: Patient  Education Provided: Role of Therapy;Plan of Care;Orientation  Education Method: Demonstration;Verbal  Barriers to Learning: Cognition; Hearing  Education Outcome: Continued education needed      Therapy Time   Individual Concurrent Group Co-treatment   Time In 0816         Time Out 0835         Minutes 19         Timed Code Treatment Minutes: 9 Minutes       Kulwinder Pardo, PT

## 2023-02-18 NOTE — PROGRESS NOTES
[address]   Daily Progress Note    Admit Date:  2/17/2023  HPI:  No chief complaint on file. Donaldo Verdugo presents from SNF due to painful pocket hematoma. PMH: permanent atrial fibrillation status post AVN ablation and BiV pacemaker that was explanted due to pocket infection now status post micra pacemaker, orthostatic hypotension, recurrent falls, non-obstructive coronary artery disease (positive stress test without LHC), and chronic renal insufficiency     Subjective:  Mr. Alexa Higgins sleeping but awakens to voice and touch, denies pain or shortness of breath.      Objective:   Patient Vitals for the past 24 hrs:   BP Temp Temp src Pulse Resp SpO2 Height   02/18/23 1528 110/60 98.4 °F (36.9 °C) Axillary 70 16 97 % --   02/18/23 1228 -- -- -- -- -- -- 5' 6\" (1.676 m)   02/18/23 1144 120/79 -- -- 72 -- 96 % --   02/18/23 0819 -- 97.5 °F (36.4 °C) Axillary 72 16 96 % --   02/18/23 0345 120/79 97.8 °F (36.6 °C) Axillary 77 18 96 % --   02/18/23 0045 114/74 97.5 °F (36.4 °C) Axillary 83 18 98 % --   02/17/23 2245 132/78 -- -- 70 18 97 % --   02/17/23 2115 119/83 97.8 °F (36.6 °C) Axillary 75 18 100 % --   02/17/23 2015 117/83 97.9 °F (36.6 °C) Axillary 73 18 97 % --   02/17/23 1907 132/84 -- -- 71 -- -- --   02/17/23 1816 129/86 -- -- 70 20 -- --   02/17/23 1745 117/86 -- -- 70 20 -- --   02/17/23 1717 129/80 -- -- 70 20 -- --   02/17/23 1703 123/80 -- -- 73 20 96 % --   02/17/23 1647 133/87 -- -- 79 20 92 % --   02/17/23 1631 135/81 97.5 °F (36.4 °C) Axillary 75 20 92 % --       Intake/Output Summary (Last 24 hours) at 2/18/2023 1558  Last data filed at 2/18/2023 1553  Gross per 24 hour   Intake 770 ml   Output 0 ml   Net 770 ml     Wt Readings from Last 3 Encounters:   02/17/23 155 lb (70.3 kg)   01/26/23 157 lb 9.6 oz (71.5 kg)   01/22/23 150 lb 14.4 oz (68.4 kg)         ASSESSMENT:   POCKET HEMATOMA: s/p revision  AFib, permanent  S/p AV node ablaion and permanent pacemaker (Micra leadless 1/24/23)  OH  Falls  Dementia  CAD, mild non-obstructive  CKD      PLAN:  Doxycycline 100 mg X 5 days  Agree with decrease in amlodipine given relation hypotension and falls/ OH  Unclear if he needs Imdur - monitor  Poor candidate for anticoagulation given dementia and recurrent falls  Avoid reducing preload and dina agents given OH  Compression socks/ abdominal binder and hydration  Will follow peripherally. Call if needs arise    Og Mauricio, GUS - CNP, 2/18/2023, 3:58 PM  [facility], [place]   [phone]       Telemetry: Afib 70's, intermittent V pacing  NYHA: III    Physical Exam:  General:  Awake, alert, NAD  Skin:  Warm and dry  Neck:  JVP unable to assess  Chest:  Clear to auscultation anteriorly  Cardiovascular: Irreg, normal S1S2, + murmur, no g/r  Abdomen:  Soft, nontender, +bowel sounds  Extremities:  No BLE edema  Left upper chest with old bruising, no hematoma, no ooze, warmth, bogginess      Medications:    atorvastatin  40 mg Oral Nightly    finasteride  5 mg Oral Daily    isosorbide mononitrate  30 mg Oral Daily    lactobacillus  1 capsule Oral BID WC    memantine  5 mg Oral BID    tamsulosin  0.4 mg Oral Daily    risperiDONE  0.25 mg Oral BID    QUEtiapine  50 mg Oral Nightly    QUEtiapine  25 mg Oral QAM AC    pantoprazole  40 mg Oral Daily    trospium  20 mg Oral BID AC    sodium chloride flush  5-40 mL IntraVENous 2 times per day    amLODIPine  5 mg Oral Daily    doxycycline hyclate  100 mg Oral 2 times per day      sodium chloride      sodium chloride         Lab Data: Lab results independently reviewed and analyzed by myself 2/18/2023    CBC:   Recent Labs     02/17/23  1149 02/18/23  0529   WBC 5.6 5.0   HGB 10.4* 11.0*   PLT 74* 68*     BMP:    Recent Labs     02/17/23  1149 02/18/23  0529    145   K 4.3 3.9   CO2 23 26   BUN 29* 25*   CREATININE 1.3 1.3     INR:  No results for input(s): INR in the last 72 hours.   BNP:  No results for input(s): PROBNP in the last 72 hours. Cardiac Enzymes: No results for input(s): TROPONINI in the last 72 hours. Lipids:   Lab Results   Component Value Date/Time    TRIG 73 09/09/2022 05:45 AM    TRIG 89 01/10/2022 08:05 AM    HDL 30 09/09/2022 05:45 AM    HDL 42 01/10/2022 08:05 AM    LDLCALC 92 09/09/2022 05:45 AM    LDLCALC 77 01/10/2022 08:05 AM    LDLDIRECT 73.3 07/19/2021 04:30 AM    LDLDIRECT 59.4 01/06/2021 05:09 AM       Cardiac Imaging:       Attending MD Truong Day MD   Procedures    Pocket revision   Indications     Painful hematoma  Complication None  EBL                 <150cc (including clot). Conclusions  Successful pocket revision. Proceduralist Notes:  - Pocket didn't seem grossly infected. - Wound cultures sent off. - Wound left to heal by secondary intention to allow drainage.  - No active bleeding noted. - TyRx pouch removed. - MICRA interrogated and reprogrammed with  ([employer]). - Vancomycin x 1 during procedure. - Doxycline x 5 days post procedure. DORITA: 1/24/23   Low-normal left ventricular systolic function with ejection fraction visually estimated at 50%. No evidence of mass, vegetation or thrombus noted on native valves nor pacemaker leads. Mild mitral regurgitation. Moderate tricuspid regurgitation. Systolic pulmonary artery pressure (SPAP) estimated at 26 mmHg (RA pressure 8 mmHg). Cath: 9/22  RA       5          54%  RV       20/4        PA       21/13   48%  PW      11           AORTA            110/68 85%  TASHA CARDIAC OUTPUT      3.8 L/min  SVR     1630   PVR     190        LVEDP            11  GRADIENT ACROSS AORTIC VALVE         None     LM       Less than 10% viszaxlu-icv-dxtmhl stenosis. LAD     Calcified, proximal 40 to 50% stenosis, mid-distal 60 to 75% stenosis. LCX     Calcified, there is proximal-mid 75% eccentric stenosis at a bifurcation with OM1 and OM 2 as well as the AV groove.    RCA     Dominant, calcified, large vessel, [id number]-[id number] 40% stenoses.

## 2023-02-18 NOTE — PROGRESS NOTES
Comprehensive Nutrition Assessment    Type and Reason for Visit:  Initial, Positive Nutrition Screen (MST=2: weight loss, decreased appetite)    Nutrition Recommendations/Plan:   Continue general diet   Add Ensure BID - monitor acceptance   Encourage PO intakes as tolerated   Monitor nutrition adequacy, pertinent labs, bowel habits, wt changes, and clinical progress     Malnutrition Assessment:  Malnutrition Status: At risk for malnutrition (Comment) (02/18/23 1232)      Nutrition Assessment:    Pt admitted with pacemaker pocket hematoma. S/p pocket revision on 2/17. Hx of dementia. Pt nutritionally compromised KETAN weight loss and poor appetite per IP positive screen. Pt with PO intakes of 1-25% this admission. Pt requested RD leave and let him sleep this AM. RD to add ONS to promote PO intakes this admission. Will monitor. Nutrition Related Findings:    BM x2 on 2/17. Wound Type: Pressure Injury, Stage I, Surgical Incision       Current Nutrition Intake & Therapies:    Average Meal Intake: 1-25%  Average Supplements Intake: None Ordered  ADULT DIET; Regular    Anthropometric Measures:  Height: 5' 6\" (167.6 cm)  Ideal Body Weight (IBW): 142 lbs (65 kg)       Current Body Weight: 155 lb (70.3 kg), 109.2 % IBW. Weight Source: Not Specified  Current BMI (kg/m2): 25  Usual Body Weight: 157 lb (71.2 kg) (bed scale 1/26)  % Weight Change (Calculated): -1.3                    BMI Categories: Overweight (BMI 25.0-29. 9)    Estimated Daily Nutrient Needs:  Energy Requirements Based On: Kcal/kg (25-30)  Weight Used for Energy Requirements: Ideal  Energy (kcal/day): 0539-7576 kcal  Weight Used for Protein Requirements: Ideal (1.0-1.2 g/kg)  Protein (g/day): 65-78 g  Method Used for Fluid Requirements: 1 ml/kcal  Fluid (ml/day): 2722-6218 mL    Nutrition Diagnosis:   Inadequate oral intake related to inadequate protein-energy intake as evidenced by intake 0-25%    Nutrition Interventions:   Food and/or Nutrient Delivery: Continue Current Diet, Start Oral Nutrition Supplement  Nutrition Education/Counseling: No recommendation at this time  Coordination of Nutrition Care: Continue to monitor while inpatient       Goals:     Goals: PO intake 50% or greater, prior to discharge       Nutrition Monitoring and Evaluation:   Behavioral-Environmental Outcomes: None Identified  Food/Nutrient Intake Outcomes: Food and Nutrient Intake, Supplement Intake  Physical Signs/Symptoms Outcomes: Biochemical Data, Nutrition Focused Physical Findings, Weight    Discharge Planning:    Continue current diet, Continue Oral Nutrition Supplement     Raulito Abreu, MS, RD, LD  Contact: [phone]

## 2023-02-18 NOTE — PROGRESS NOTES
Shift report 2/18 07-19:  Initially very confused and belligerent, wanted to be left alone to sleep, with daughter's permission staff did so. Checked pt every hour. At 1500 he woke up calm, cooperative, and pleasant, took all scheduled medications, ate some applesauce and had lemonade and water. Still not interested in eating. Incontinent of urine. No diarrhea, c diff neg. Family here at 1600, updated.

## 2023-02-18 NOTE — PROGRESS NOTES
Patient is refusing all cares this morning. Was able to check SPO2, pulse, temperature, and incontinence check (pt dry) with PT/OT assistance this am, not able to check BP. Pt refusing to eat or take medications. Spoke to pts daughter Marylee Banister, she is okay with respecting patient's refusal at this time, says she will be up later this afternoon and will see if pt more cooperative at that time. Instructs RN to leave pt alone to rest for now. RN will do this, will continue hourly rounding and will offer comfort measures and ask pt if we can check his vitals and give medications each time but will not force pt to comply.

## 2023-02-18 NOTE — PROGRESS NOTES
Occupational Therapy  Facility/Department: [facility] A2 CARD TELEMETRY  Occupational Therapy Initial Assessment/Treatment    Name: Darian Helm  : 1935  MRN: [id number]  Date of Service: 2023    Discharge Recommendations:  Subacute/Skilled Nursing Facility  OT Equipment Recommendations  Equipment Needed: No  Other: defer to next level of care     Patient Diagnosis(es): There were no encounter diagnoses. Past Medical History:  has a past medical history of Atrial fibrillation ([address] Blind right eye, Chronic kidney disease, Dementia ([address] Hyperlipidemia, Hypertension, and Pneumonia. Past Surgical History:  has a past surgical history that includes Tonsillectomy; Appendectomy; Colonoscopy; eye surgery; Upper gastrointestinal endoscopy; joint replacement (); hernia repair; pacemaker placement (10/13/2017); ablation of dysrhythmic focus; and pacemaker placement (2022). Assessment  Pt is 81 yo M presenting with pacemaker pocket hematoma, initial encounter and s/p pocket revision 2023. Pt is poor historian due to dementia and seldomly answer Y/N questions this date (social functional history and previous level of function below are gathered from previous admissions). At this time pt is maxA progressing to SBA for bed rolling once pt understood command and was dep for brief cleanliness check. Pt refused any further mobility or ADLs this date and demo inc desire to hold staff hands with strong grasp. It is also of note that pt has R prosthetic eye and appears to have some [place] concerns. Pt is currently functioning below baseline, will continue to benefit from skilled OT services in the acute care setting, and SNF is recommended at discharge to increase pt safety and ind with ADLs/transfers/ambulation. Pt seen co-tx collaboration this date to safely meet goals and pt will have better occupational performance outcomes with a co-treatment than 1:1 session.   Performance deficits / Impairments: Decreased functional mobility ; Decreased ROM; Decreased safe awareness;Decreased endurance;Decreased ADL status; Decreased cognition  Prognosis: Fair  Decision Making: Medium Complexity  REQUIRES OT FOLLOW-UP: YES  Activity Tolerance: Treatment limited secondary to decreased cognition     Plan  Occupational Therapy Plan  Times Per Week: 1-2x/wk  Current Treatment Recommendations: Strengthening, ROM, Balance training, Functional mobility training, Endurance training, Gait training, Patient/Caregiver education & training, Equipment evaluation, education, & procurement, Positioning, Self-Care / ADL     Restrictions  Restrictions/Precautions: Fall Risk, Isolation, Contact Precautions, General Precautions  Other position/activity restrictions: Cdiff rule out, IV, abdominal binder    Subjective  Chart Reviewed: Yes, Orders, Progress Notes, History and Physical, Labs  Patient assessed for rehabilitation services?: Yes  Referring Practitioner: Ru Garcia MD  Diagnosis: Pacemaker pocket hematoma, initial encounter  Subjective  Subjective: pt semi fowlers agreeable to OT services upon arrival. RN approved therapy. Pain: Pt denies pain at this time.     Social/Functional History  Lives With: Spouse  Type of Home: House  Home Layout: One level, Able to Live on Main level with bedroom/bathroom  Home Access: Stairs to enter with rails  Entrance Stairs - Number of Steps: 2  Entrance Stairs - Rails: Both  Bathroom Shower/Tub: Walk-in shower  Bathroom Toilet: Standard  Bathroom Equipment: Grab bars in shower  Home Equipment: Pete Chong, 4 wheeled  Has the patient had two or more falls in the past year or any fall with injury in the past year?: Yes (Taken from most recent eval on 9/09/2022 which was taken from eval on 8/15/2022 - Patient has fallen 15-20 times in the past year.)  ADL Assistance: 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 Patient was confused a poor historian during the therapy evaluation. Patient was last evaluated by occupational therapy on 9/09/22 and all of the patients social history/prior level of function was obtained from that eval (which was taken from PT eval on 8/15/22). Per his eval on 8/15/22 \"pt reports he feels like he normally falls to the right. Pt reports he has been getting lightheaded last 8-9 mos and having multiple falls. \"    Objective  Vitals  Heart Rate: 72  Heart Rate Source: Monitor  BP: 120/79  BP Location: Right upper arm  BP Method: Automatic  Patient Position: Left side  MAP (Calculated): 93  Resp: 16  SpO2: 96 %  O2 Device: None (Room air)    Observation/Palpation  Posture:  (HILARIO pt only completed bed mobility/side rolling this date)    Safety Devices  Type of Devices: All fall risk precautions in place; Bed alarm in place;Call light within reach; Heels elevated for pressure relief;Patient at risk for falls; Left in bed;Nurse notified  Restraints Initially in Place: No    Bed Mobility Training  Overall Level of Assistance: Stand-by assistance;Assist X2;Maximum assistance; Adaptive equipment; Additional time (pt initially maxAx2 with use of bed rails to rolling L and R however once pt able to understand task pt able to rolL L/R with SBA and use of bed rails)  Interventions: Verbal cues;Manual cues; Tactile cues    Balance  Sitting:  (HILARIO pt unable to command follow to sit EOB)  Standing:  (HILARIO pt unable to command follow to sit EOB)    Transfer Training: No (HILARIO pt unable to command follow to sit EOB)    Gait  Overall Level of Assistance:  (HILARIO pt unable to command follow to sit EOB)    Strength/ROM  AROM: Generally decreased, functional  PROM: Generally decreased, functional  Strength: Within functional limits  Coordination: Generally decreased, functional  Tone: Normal  Sensation: Intact    ADL  LE Dressing: Dependent/Total  LE Dressing Skilled Clinical Factors: checking cleanliness of brief    Vision - information from eval on 9/09/2022  Vision: Impaired (R prosthetic eye)  Vision Exceptions: Wears glasses at all times (wears glasses as needed, sees out of L eye fairly well, has R eye prosthesis (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)    Hearing - information from [employer] on 9/09/2022  Hearing: Exceptions to WFL  Hearing Exceptions: Hard of hearing/hearing concerns  Cognition  Overall Cognitive Status: Exceptions  Arousal/Alertness: Delayed responses to stimuli  Attention Span: Difficulty attending to directions  Safety Judgement: Decreased awareness of need for assistance;Decreased awareness of need for safety  Problem Solving: Assistance required to identify errors made;Decreased awareness of errors;Assistance required to implement solutions;Assistance required to generate solutions;Assistance required to correct errors made  Insights: Not aware of deficits  Initiation: Requires cues for all  Sequencing: Requires cues for all  Cognition Comment: once pt able to understand command of side rolling in bed pt able to side roll SBA, pt limited due to inability to follow commands and demo inc desire for holding staff hands with strong grasp    Orientation  Overall Orientation Status: Impaired (HILARIO pt only responded with Y/N)    Education  Education Given To: Patient  Education Provided: Role of Therapy;Plan of Care  Education Method: Verbal  Barriers to Learning: Cognition  Education Outcome: Continued education needed  Disease specific: Pt educated on use of call light.    AM-PAC Score  AM-PAC Inpatient Daily Activity Raw  Score: 7 (02/18/23 1114)  AM-PAC Inpatient ADL T-Scale Score : 20.13 (02/18/23 1114)  ADL Inpatient CMS 0-100% Score: 92.44 (02/18/23 1114)  ADL Inpatient CMS G-Code Modifier : CM (02/18/23 1114)    Goals  Short Term Goals  Time Frame for Short Term Goals: 1 wk 2/25/2023 unless otherwise noted  Short Term Goal 1: pt will complete bed mobility in prep for ADLs SPV 2/22/2023  Short Term Goal 2: pt will complete seated ADLs Shawn  Short Term Goal 3: pt will complete LB dressing Shawn    Therapy Time   Individual Concurrent Group Co-treatment   Time In 0816         Time Out 0835         Minutes 19         Timed Code Treatment Minutes: 9 Minutes (10 min eval)    SAMANTA Roman/L  If pt is unable to be seen after this session, please let this note serve as discharge summary. Please see case management note for discharge disposition. Thank you.

## 2023-02-19 LAB
ANION GAP SERPL CALCULATED.3IONS-SCNC: 10 MMOL/L (ref 3–16)
BASOPHILS ABSOLUTE: 0 K/UL (ref 0–0.2)
BASOPHILS RELATIVE PERCENT: 0.5 %
BUN BLDV-MCNC: 25 MG/DL (ref 7–20)
CALCIUM SERPL-MCNC: 8.8 MG/DL (ref 8.3–10.6)
CHLORIDE BLD-SCNC: 101 MMOL/L (ref 99–110)
CO2: 26 MMOL/L (ref 21–32)
CREAT SERPL-MCNC: 1.4 MG/DL (ref 0.8–1.3)
EOSINOPHILS ABSOLUTE: 0.2 K/UL (ref 0–0.6)
EOSINOPHILS RELATIVE PERCENT: 5.4 %
GFR SERPL CREATININE-BSD FRML MDRD: 49 ML/MIN/{1.73_M2}
GLUCOSE BLD-MCNC: 96 MG/DL (ref 70–99)
HCT VFR BLD CALC: 27.5 % (ref 40.5–52.5)
HEMOGLOBIN: 9.3 G/DL (ref 13.5–17.5)
LYMPHOCYTES ABSOLUTE: 1.6 K/UL (ref 1–5.1)
LYMPHOCYTES RELATIVE PERCENT: 36.4 %
MCH RBC QN AUTO: 28.5 PG (ref 26–34)
MCHC RBC AUTO-ENTMCNC: 33.7 G/DL (ref 31–36)
MCV RBC AUTO: 84.6 FL (ref 80–100)
MONOCYTES ABSOLUTE: 0.5 K/UL (ref 0–1.3)
MONOCYTES RELATIVE PERCENT: 12.7 %
NEUTROPHILS ABSOLUTE: 1.9 K/UL (ref 1.7–7.7)
NEUTROPHILS RELATIVE PERCENT: 45 %
PDW BLD-RTO: 14.6 % (ref 12.4–15.4)
PLATELET # BLD: 60 K/UL (ref 135–450)
PMV BLD AUTO: 8.2 FL (ref 5–10.5)
POTASSIUM REFLEX MAGNESIUM: 3.8 MMOL/L (ref 3.5–5.1)
RBC # BLD: 3.25 M/UL (ref 4.2–5.9)
SODIUM BLD-SCNC: 137 MMOL/L (ref 136–145)
WBC # BLD: 4.3 K/UL (ref 4–11)

## 2023-02-19 PROCEDURE — 6370000000 HC RX 637 (ALT 250 FOR IP): Performed by: INTERNAL MEDICINE

## 2023-02-19 PROCEDURE — 99231 SBSQ HOSP IP/OBS SF/LOW 25: CPT | Performed by: NURSE PRACTITIONER

## 2023-02-19 PROCEDURE — 6370000000 HC RX 637 (ALT 250 FOR IP): Performed by: PHYSICIAN ASSISTANT

## 2023-02-19 PROCEDURE — 85025 COMPLETE CBC W/AUTO DIFF WBC: CPT

## 2023-02-19 PROCEDURE — 80048 BASIC METABOLIC PNL TOTAL CA: CPT

## 2023-02-19 PROCEDURE — 36415 COLL VENOUS BLD VENIPUNCTURE: CPT

## 2023-02-19 PROCEDURE — 2060000000 HC ICU INTERMEDIATE R&B

## 2023-02-19 PROCEDURE — 2580000003 HC RX 258: Performed by: PHYSICIAN ASSISTANT

## 2023-02-19 RX ADMIN — RISPERIDONE 0.25 MG: 0.25 TABLET, FILM COATED ORAL at 21:01

## 2023-02-19 RX ADMIN — QUETIAPINE FUMARATE 50 MG: 25 TABLET ORAL at 03:51

## 2023-02-19 RX ADMIN — MEMANTINE 5 MG: 5 TABLET ORAL at 11:29

## 2023-02-19 RX ADMIN — TROSPIUM CHLORIDE 20 MG: 20 TABLET, FILM COATED ORAL at 05:27

## 2023-02-19 RX ADMIN — Medication 1 CAPSULE: at 18:02

## 2023-02-19 RX ADMIN — ATORVASTATIN CALCIUM 40 MG: 40 TABLET, FILM COATED ORAL at 21:01

## 2023-02-19 RX ADMIN — DOXYCYCLINE HYCLATE 100 MG: 100 TABLET, COATED ORAL at 11:29

## 2023-02-19 RX ADMIN — MEMANTINE 5 MG: 5 TABLET ORAL at 03:52

## 2023-02-19 RX ADMIN — FINASTERIDE 5 MG: 5 TABLET, FILM COATED ORAL at 11:31

## 2023-02-19 RX ADMIN — Medication 10 ML: at 11:34

## 2023-02-19 RX ADMIN — Medication 10 ML: at 21:02

## 2023-02-19 RX ADMIN — TROSPIUM CHLORIDE 20 MG: 20 TABLET, FILM COATED ORAL at 18:02

## 2023-02-19 RX ADMIN — PANTOPRAZOLE SODIUM 40 MG: 40 TABLET, DELAYED RELEASE ORAL at 11:31

## 2023-02-19 RX ADMIN — RISPERIDONE 0.25 MG: 0.25 TABLET, FILM COATED ORAL at 03:52

## 2023-02-19 RX ADMIN — MEMANTINE 5 MG: 5 TABLET ORAL at 21:01

## 2023-02-19 RX ADMIN — AMLODIPINE BESYLATE 5 MG: 5 TABLET ORAL at 11:31

## 2023-02-19 RX ADMIN — Medication 1 CAPSULE: at 11:31

## 2023-02-19 RX ADMIN — QUETIAPINE FUMARATE 25 MG: 25 TABLET ORAL at 11:29

## 2023-02-19 RX ADMIN — QUETIAPINE FUMARATE 50 MG: 25 TABLET ORAL at 21:04

## 2023-02-19 RX ADMIN — DOXYCYCLINE HYCLATE 100 MG: 100 TABLET, COATED ORAL at 21:01

## 2023-02-19 RX ADMIN — TAMSULOSIN HYDROCHLORIDE 0.4 MG: 0.4 CAPSULE ORAL at 11:31

## 2023-02-19 RX ADMIN — DOXYCYCLINE HYCLATE 100 MG: 100 TABLET, COATED ORAL at 03:51

## 2023-02-19 RX ADMIN — ISOSORBIDE MONONITRATE 30 MG: 30 TABLET, EXTENDED RELEASE ORAL at 11:31

## 2023-02-19 RX ADMIN — RISPERIDONE 0.25 MG: 0.25 TABLET, FILM COATED ORAL at 11:29

## 2023-02-19 ASSESSMENT — PAIN SCALES - GENERAL
PAINLEVEL_OUTOF10: 0

## 2023-02-19 ASSESSMENT — PAIN SCALES - WONG BAKER
WONGBAKER_NUMERICALRESPONSE: 0
WONGBAKER_NUMERICALRESPONSE: 0

## 2023-02-19 NOTE — PROGRESS NOTES
[facility]   Daily Progress Note    Admit Date:  2/17/2023  HPI:  No chief complaint on file. Chhaya Carvajal presents from SNF due to painful pocket hematoma. PMH: permanent atrial fibrillation status post AVN ablation and BiV pacemaker that was explanted due to pocket infection now status post micra pacemaker, orthostatic hypotension, recurrent falls, non-obstructive coronary artery disease (positive stress test without LHC), and chronic renal insufficiency     Subjective:  Mr. Antione Hale attempted to see patient and assess left upper chest site though patient became angry, yelling and swinging arms.   Site appears stable though not palpated    Objective:   Patient Vitals for the past 24 hrs:   BP Temp Temp src Pulse Resp SpO2 Height   02/19/23 0402 109/68 98.2 °F (36.8 °C) Oral 69 14 99 % --   02/19/23 0101 93/61 97.3 °F (36.3 °C) Axillary 73 16 95 % --   02/18/23 2038 (!) 141/113 98.6 °F (37 °C) Oral 70 15 98 % --   02/18/23 1528 110/60 98.4 °F (36.9 °C) Axillary 70 16 97 % --   02/18/23 1228 -- -- -- -- -- -- 5' 6\" (1.676 m)   02/18/23 1144 120/79 -- -- 72 -- 96 % --         Intake/Output Summary (Last 24 hours) at 2/19/2023 1024  Last data filed at 2/19/2023 0600  Gross per 24 hour   Intake 720 ml   Output 500 ml   Net 220 ml       Wt Readings from Last 3 Encounters:   02/17/23 155 lb (70.3 kg)   01/26/23 157 lb 9.6 oz (71.5 kg)   01/22/23 150 lb 14.4 oz (68.4 kg)         ASSESSMENT:   POCKET HEMATOMA: s/p revision  AFib, permanent  S/p AV node ablaion and permanent pacemaker (Micra leadless 1/24/23)  OH  Falls  Dementia  CAD, mild non-obstructive  CKD      PLAN:  Doxycycline 100 mg X 5 days  Agree with decrease in amlodipine given relation hypotension and falls/ OH  Unclear if he needs Imdur - monitor  Poor candidate for anticoagulation given dementia and recurrent falls  Avoid reducing preload and dina agents given OH  Compression socks/ abdominal binder and hydration  Will follow peripherally. Call if needs arise    Robertgabriel Pateljoanne, APRN - CNP, 2/19/2023, 10:24 AM  [employer]   [phone]       Telemetry: Afib,  70's  NYHA: III    Physical Exam:  General:  Awake, alert, NAD, confused and abusive  Skin:  Warm and dry  Neck:  JVP unable to assess  Left upper chest with old bruising, no hematoma, no ooze, dressing dry and intact      Medications:    atorvastatin  40 mg Oral Nightly    finasteride  5 mg Oral Daily    isosorbide mononitrate  30 mg Oral Daily    lactobacillus  1 capsule Oral BID WC    memantine  5 mg Oral BID    tamsulosin  0.4 mg Oral Daily    risperiDONE  0.25 mg Oral BID    QUEtiapine  50 mg Oral Nightly    QUEtiapine  25 mg Oral QAM AC    pantoprazole  40 mg Oral Daily    trospium  20 mg Oral BID AC    sodium chloride flush  5-40 mL IntraVENous 2 times per day    amLODIPine  5 mg Oral Daily    doxycycline hyclate  100 mg Oral 2 times per day      sodium chloride      sodium chloride         Lab Data: Lab results independently reviewed and analyzed by myself 2/19/2023    CBC:   Recent Labs     02/17/23  1149 02/18/23  0529 02/19/23  0658   WBC 5.6 5.0 4.3   HGB 10.4* 11.0* 9.3*   PLT 74* 68* 60*       BMP:    Recent Labs     02/17/23  1149 02/18/23  0529 02/19/23  0658    145 137   K 4.3 3.9 3.8   CO2 23 26 26   BUN 29* 25* 25*   CREATININE 1.3 1.3 1.4*       INR:  No results for input(s): INR in the last 72 hours. BNP:  No results for input(s): PROBNP in the last 72 hours. Cardiac Enzymes: No results for input(s): TROPONINI in the last 72 hours.   Lipids:   Lab Results   Component Value Date/Time    TRIG 73 09/09/2022 05:45 AM    TRIG 89 01/10/2022 08:05 AM    HDL 30 09/09/2022 05:45 AM    HDL 42 01/10/2022 08:05 AM    LDLCALC 92 09/09/2022 05:45 AM    LDLCALC 77 01/10/2022 08:05 AM    LDLDIRECT 73.3 07/19/2021 04:30 AM    LDLDIRECT 59.4 01/06/2021 05:09 AM       Cardiac Imaging:       Attending MD Jac Bass MD   Procedures    Pocket revision Indications     Painful hematoma  Complication None  EBL                 <150cc (including clot). Conclusions  Successful pocket revision. Proceduralist Notes:  - Pocket didn't seem grossly infected. - Wound cultures sent off. - Wound left to heal by secondary intention to allow drainage.  - No active bleeding noted. - TyRx pouch removed. - MICRA interrogated and reprogrammed with  ([employer]). - Vancomycin x 1 during procedure. - Doxycline x 5 days post procedure. DORITA: 1/24/23   Low-normal left ventricular systolic function with ejection fraction visually estimated at 50%. No evidence of mass, vegetation or thrombus noted on native valves nor pacemaker leads. Mild mitral regurgitation. Moderate tricuspid regurgitation. Systolic pulmonary artery pressure (SPAP) estimated at 26 mmHg (RA pressure 8 mmHg). Cath: 9/22  RA       5          54%  RV       20/4        PA       21/13   48%  PW      11           AORTA            110/68 85%  TASHA CARDIAC OUTPUT      3.8 L/min  SVR     1630   PVR     190        LVEDP            11  GRADIENT ACROSS AORTIC VALVE         None     LM       Less than 10% ijaoopoh-vaf-ndqcps stenosis. LAD     Calcified, proximal 40 to 50% stenosis, mid-distal 60 to 75% stenosis. LCX     Calcified, there is proximal-mid 75% eccentric stenosis at a bifurcation with OM1 and OM 2 as well as the AV groove. RCA     Dominant, calcified, large vessel, [id number]-mbjovn 40% stenoses.

## 2023-02-19 NOTE — PLAN OF CARE
Problem: Discharge Planning  Goal: Discharge to home or other facility with appropriate resources  Outcome: Progressing     Problem: Pain  Goal: Verbalizes/displays adequate comfort level or baseline comfort level  Outcome: Progressing  Flowsheets (Taken 2/18/2023 2038)  Verbalizes/displays adequate comfort level or baseline comfort level:   Encourage patient to monitor pain and request assistance   Assess pain using appropriate pain scale     Problem: Skin/Tissue Integrity  Goal: Absence of new skin breakdown  Description: 1. Monitor for areas of redness and/or skin breakdown  2. Assess vascular access sites hourly  3. Every 4-6 hours minimum:  Change oxygen saturation probe site  4. Every 4-6 hours:  If on nasal continuous positive airway pressure, respiratory therapy assess nares and determine need for appliance change or resting period.   Outcome: Progressing     Problem: ABCDS Injury Assessment  Goal: Absence of physical injury  Outcome: Progressing  Flowsheets (Taken 2/18/2023 2200)  Absence of Physical Injury: Implement safety measures based on patient assessment     Problem: Safety - Adult  Goal: Free from fall injury  Outcome: Progressing  Flowsheets (Taken 2/18/2023 2200)  Free From Fall Injury: Instruct family/caregiver on patient safety     Problem: Nutrition Deficit:  Goal: Optimize nutritional status  Outcome: Progressing

## 2023-02-19 NOTE — PROGRESS NOTES
Hospitalist Progress Note      PCP: Lili Carballo MD    Date of Admission: 2/17/2023    Chief Complaint:   Pocket hematoma    Hospital Course:   82 y.o. male who was transferred to [facility] from [facility] for upper chest swelling and pain. Patient recent treated for infected pacemaker site. He underwent device extraction with placement of micra pacemaker. He is currently residing at [facility] for skilled stay. Patient is a poor historian due to underlying dementia. Pocket site tender to palpation, edema and bruising present as well. No erythema. EP consulted. Subjective:   Seen resting in bed, alert, denies any pain at this time. No other complaints at this time. VSS. Updated on plan of care.        Medications:  Reviewed    Infusion Medications    sodium chloride      sodium chloride       Scheduled Medications    atorvastatin  40 mg Oral Nightly    finasteride  5 mg Oral Daily    isosorbide mononitrate  30 mg Oral Daily    lactobacillus  1 capsule Oral BID WC    memantine  5 mg Oral BID    tamsulosin  0.4 mg Oral Daily    risperiDONE  0.25 mg Oral BID    QUEtiapine  50 mg Oral Nightly    QUEtiapine  25 mg Oral QAM AC    pantoprazole  40 mg Oral Daily    trospium  20 mg Oral BID AC    sodium chloride flush  5-40 mL IntraVENous 2 times per day    amLODIPine  5 mg Oral Daily    doxycycline hyclate  100 mg Oral 2 times per day     PRN Meds: sodium chloride flush, sodium chloride, sodium chloride flush, sodium chloride, ondansetron **OR** ondansetron, polyethylene glycol, acetaminophen **OR** acetaminophen      Intake/Output Summary (Last 24 hours) at 2/19/2023 1020  Last data filed at 2/19/2023 0600  Gross per 24 hour   Intake 720 ml   Output 500 ml   Net 220 ml       Physical Exam Performed:    /68   Pulse 69   Temp 98.2 °F (36.8 °C) (Oral)   Resp 14   Ht 5' 6\" (1.676 m)   Wt 155 lb (70.3 kg)   SpO2 99%   BMI 25.02 kg/m²     General appearance: No apparent distress, appears stated age and cooperative. HEENT: Pupils equal, round, and reactive to light. Conjunctivae/corneas clear. Neck: Supple, with full range of motion. No jugular venous distention. Trachea midline. Respiratory:  Normal respiratory effort. Clear to auscultation, bilaterally without Rales/Wheezes/Rhonchi. Cardiovascular: Regular rate and rhythm with normal S1/S2 without murmurs, rubs or gallops. Abdomen: Soft, non-tender, non-distended with normal bowel sounds. Musculoskeletal: No clubbing, cyanosis or edema bilaterally. Full range of motion without deformity. Skin: Surgical dressing in place over left chest wall. Skin color, texture, turgor normal.  No rashes or lesions. Neurologic:  Neurovascularly intact without any focal sensory/motor deficits. Cranial nerves: II-XII intact, grossly non-focal.  Psychiatric: Drowsy, alert and oriented to self, poor insight  Capillary Refill: Brisk, 3 seconds, normal   Peripheral Pulses: +2 palpable, equal bilaterally       Labs:   Recent Labs     02/17/23  1149 02/18/23  0529 02/19/23  0658   WBC 5.6 5.0 4.3   HGB 10.4* 11.0* 9.3*   HCT 32.2* 31.7* 27.5*   PLT 74* 68* 60*     Recent Labs     02/17/23  1149 02/18/23  0529 02/19/23  0658    145 137   K 4.3 3.9 3.8    108 101   CO2 23 26 26   BUN 29* 25* 25*   CREATININE 1.3 1.3 1.4*   CALCIUM 9.5 9.0 8.8     Recent Labs     02/17/23  1149   AST 23   ALT 19   BILITOT 1.0   ALKPHOS 86     No results for input(s): INR in the last 72 hours. No results for input(s): Roni Yosvany in the last 72 hours.     Urinalysis:      Lab Results   Component Value Date/Time    NITRU Negative 01/19/2023 12:25 PM    [address] 04/21/2022 02:20 PM    BACTERIA MODERATE 01/31/2023 11:44 PM    RBCUA 3-4 04/21/2022 02:20 PM    BLOODU Negative 01/19/2023 12:25 PM    SPECGRAV 1.020 01/31/2023 11:44 PM    GLUCOSEU NEGATIVE 01/31/2023 11:44 PM       Radiology:  No orders to display       IP CONSULT TO CARDIOLOGY    Assessment/Plan:    Active Hospital Problems    Diagnosis     Hx of atrioventricular node ablation [Z98.890]      Priority: Medium    Pacemaker pocket hematoma, initial encounter [T84.245T]      Priority: Medium     PPM Pocket hematoma  -Underwent pacemaker extraction last month, replaced with leadless micra, EP consulted, underwent pocket revision and Micra interrogation on 2/17/2023  -Doxycycline for 5 days      Orthostatic hypotension  -Florinef started per EP  -Compression stockings & binder   -Encourage fluids   -Amlodipine decreased per EP     Acute on chronic anemia  Thrombocytopenia  -Hgb 9.3 from 11.0  -Platelets downtrending   -Trend CBC    CKD  -Kidney function at baseline  -Trend labs      PAC, rate-controlled   -No currently on AC due to hypotension     HLD  -Continue statin     Dementia  -Continue home medications  -Supportive care     DVT Prophylaxis: SCDs  Diet: ADULT DIET; Regular  ADULT ORAL NUTRITION SUPPLEMENT; Breakfast, Dinner; Standard High Calorie/High Protein Oral Supplement  Code Status: Full Code  PT/OT Eval Status: SNF     Dispo - 1-2 days pending clinical course, trend hgb     Appropriate for A1 Discharge Unit: No      BHAVNA Bennett

## 2023-02-20 LAB
ANION GAP SERPL CALCULATED.3IONS-SCNC: 8 MMOL/L (ref 3–16)
BASOPHILS ABSOLUTE: 0 K/UL (ref 0–0.2)
BASOPHILS RELATIVE PERCENT: 0.4 %
BUN BLDV-MCNC: 21 MG/DL (ref 7–20)
CALCIUM SERPL-MCNC: 9 MG/DL (ref 8.3–10.6)
CHLORIDE BLD-SCNC: 105 MMOL/L (ref 99–110)
CO2: 28 MMOL/L (ref 21–32)
CREAT SERPL-MCNC: 1.4 MG/DL (ref 0.8–1.3)
EOSINOPHILS ABSOLUTE: 0.2 K/UL (ref 0–0.6)
EOSINOPHILS RELATIVE PERCENT: 3.6 %
GFR SERPL CREATININE-BSD FRML MDRD: 49 ML/MIN/{1.73_M2}
GLUCOSE BLD-MCNC: 85 MG/DL (ref 70–99)
GRAM STAIN RESULT: NORMAL
GRAM STAIN RESULT: NORMAL
HCT VFR BLD CALC: 27 % (ref 40.5–52.5)
HEMOGLOBIN: 9.4 G/DL (ref 13.5–17.5)
LYMPHOCYTES ABSOLUTE: 1.2 K/UL (ref 1–5.1)
LYMPHOCYTES RELATIVE PERCENT: 25 %
MCH RBC QN AUTO: 29.3 PG (ref 26–34)
MCHC RBC AUTO-ENTMCNC: 34.9 G/DL (ref 31–36)
MCV RBC AUTO: 84.1 FL (ref 80–100)
MONOCYTES ABSOLUTE: 1 K/UL (ref 0–1.3)
MONOCYTES RELATIVE PERCENT: 19.5 %
NEUTROPHILS ABSOLUTE: 2.5 K/UL (ref 1.7–7.7)
NEUTROPHILS RELATIVE PERCENT: 51.5 %
PDW BLD-RTO: 14.9 % (ref 12.4–15.4)
PLATELET # BLD: 78 K/UL (ref 135–450)
PMV BLD AUTO: 8.5 FL (ref 5–10.5)
POTASSIUM REFLEX MAGNESIUM: 3.9 MMOL/L (ref 3.5–5.1)
RBC # BLD: 3.21 M/UL (ref 4.2–5.9)
SODIUM BLD-SCNC: 141 MMOL/L (ref 136–145)
WBC # BLD: 4.9 K/UL (ref 4–11)
WOUND/ABSCESS: NORMAL
WOUND/ABSCESS: NORMAL

## 2023-02-20 PROCEDURE — 80048 BASIC METABOLIC PNL TOTAL CA: CPT

## 2023-02-20 PROCEDURE — 85025 COMPLETE CBC W/AUTO DIFF WBC: CPT

## 2023-02-20 PROCEDURE — 6370000000 HC RX 637 (ALT 250 FOR IP): Performed by: INTERNAL MEDICINE

## 2023-02-20 PROCEDURE — 6370000000 HC RX 637 (ALT 250 FOR IP): Performed by: PHYSICIAN ASSISTANT

## 2023-02-20 PROCEDURE — 1200000000 HC SEMI PRIVATE

## 2023-02-20 PROCEDURE — 36415 COLL VENOUS BLD VENIPUNCTURE: CPT

## 2023-02-20 PROCEDURE — 2580000003 HC RX 258: Performed by: PHYSICIAN ASSISTANT

## 2023-02-20 PROCEDURE — 2580000003 HC RX 258: Performed by: INTERNAL MEDICINE

## 2023-02-20 RX ORDER — 0.9 % SODIUM CHLORIDE 0.9 %
250 INTRAVENOUS SOLUTION INTRAVENOUS ONCE
Status: COMPLETED | OUTPATIENT
Start: 2023-02-20 | End: 2023-02-20

## 2023-02-20 RX ADMIN — Medication 10 ML: at 10:24

## 2023-02-20 RX ADMIN — QUETIAPINE FUMARATE 25 MG: 25 TABLET ORAL at 05:42

## 2023-02-20 RX ADMIN — ATORVASTATIN CALCIUM 40 MG: 40 TABLET, FILM COATED ORAL at 20:56

## 2023-02-20 RX ADMIN — AMLODIPINE BESYLATE 5 MG: 5 TABLET ORAL at 10:23

## 2023-02-20 RX ADMIN — DOXYCYCLINE HYCLATE 100 MG: 100 TABLET, COATED ORAL at 10:23

## 2023-02-20 RX ADMIN — TAMSULOSIN HYDROCHLORIDE 0.4 MG: 0.4 CAPSULE ORAL at 10:23

## 2023-02-20 RX ADMIN — QUETIAPINE FUMARATE 50 MG: 25 TABLET ORAL at 20:56

## 2023-02-20 RX ADMIN — TROSPIUM CHLORIDE 20 MG: 20 TABLET, FILM COATED ORAL at 17:56

## 2023-02-20 RX ADMIN — PANTOPRAZOLE SODIUM 40 MG: 40 TABLET, DELAYED RELEASE ORAL at 10:23

## 2023-02-20 RX ADMIN — Medication 10 ML: at 20:59

## 2023-02-20 RX ADMIN — MEMANTINE 5 MG: 5 TABLET ORAL at 10:23

## 2023-02-20 RX ADMIN — SODIUM CHLORIDE 250 ML: 9 INJECTION, SOLUTION INTRAVENOUS at 13:52

## 2023-02-20 RX ADMIN — Medication 1 CAPSULE: at 17:56

## 2023-02-20 RX ADMIN — TROSPIUM CHLORIDE 20 MG: 20 TABLET, FILM COATED ORAL at 03:50

## 2023-02-20 RX ADMIN — FINASTERIDE 5 MG: 5 TABLET, FILM COATED ORAL at 10:23

## 2023-02-20 RX ADMIN — ISOSORBIDE MONONITRATE 30 MG: 30 TABLET, EXTENDED RELEASE ORAL at 10:23

## 2023-02-20 RX ADMIN — RISPERIDONE 0.25 MG: 0.25 TABLET, FILM COATED ORAL at 20:56

## 2023-02-20 RX ADMIN — MEMANTINE 5 MG: 5 TABLET ORAL at 20:56

## 2023-02-20 RX ADMIN — Medication 1 CAPSULE: at 10:23

## 2023-02-20 RX ADMIN — DOXYCYCLINE HYCLATE 100 MG: 100 TABLET, COATED ORAL at 20:56

## 2023-02-20 RX ADMIN — RISPERIDONE 0.25 MG: 0.25 TABLET, FILM COATED ORAL at 10:23

## 2023-02-20 ASSESSMENT — PAIN SCALES - WONG BAKER
WONGBAKER_NUMERICALRESPONSE: 0

## 2023-02-20 ASSESSMENT — PAIN SCALES - GENERAL
PAINLEVEL_OUTOF10: 0

## 2023-02-20 NOTE — PROGRESS NOTES
Hospitalist Progress Note      PCP: Vinicio Reis MD    Date of Admission: 2/17/2023    Chief Complaint:   Hypotension    Hospital Course:      82 y.o. male who was transferred to [facility] from [facility] for upper chest swelling and pain. Patient recent treated for infected pacemaker site. He underwent device extraction with placement of micra pacemaker. He is currently residing at [facility] for skilled stay. Patient is a poor historian due to underlying dementia. Pocket site tender to palpation, edema and bruising present as well. No erythema. EP consulted. Subjective:   Patient denies any new complaints. No chest pain. No fevers or chills. No nausea or vomiting.       Medications:  Reviewed    Infusion Medications    sodium chloride      sodium chloride       Scheduled Medications    sodium chloride  250 mL IntraVENous Once    atorvastatin  40 mg Oral Nightly    finasteride  5 mg Oral Daily    isosorbide mononitrate  30 mg Oral Daily    lactobacillus  1 capsule Oral BID WC    memantine  5 mg Oral BID    tamsulosin  0.4 mg Oral Daily    risperiDONE  0.25 mg Oral BID    QUEtiapine  50 mg Oral Nightly    QUEtiapine  25 mg Oral QAM AC    pantoprazole  40 mg Oral Daily    trospium  20 mg Oral BID AC    sodium chloride flush  5-40 mL IntraVENous 2 times per day    doxycycline hyclate  100 mg Oral 2 times per day     PRN Meds: sodium chloride flush, sodium chloride, sodium chloride flush, sodium chloride, ondansetron **OR** ondansetron, polyethylene glycol, acetaminophen **OR** acetaminophen      Intake/Output Summary (Last 24 hours) at 2/20/2023 1406  Last data filed at 2/20/2023 1060  Gross per 24 hour   Intake 480 ml   Output 250 ml   Net 230 ml       Physical Exam Performed:    BP (!) 99/56   Pulse 71   Temp 98.4 °F (36.9 °C) (Oral)   Resp 18   Ht 5' 6\" (1.676 m)   Wt 155 lb (70.3 kg)   SpO2 99%   BMI 25.02 kg/m²     General appearance: No apparent distress, appears stated age and cooperative. HEENT: Pupils equal, round, and reactive to light. Conjunctivae/corneas clear. Neck: Supple, with full range of motion. No jugular venous distention. Trachea midline. Respiratory:  Normal respiratory effort. Clear to auscultation, bilaterally without Rales/Wheezes/Rhonchi. Cardiovascular: Regular rate and rhythm with normal S1/S2 without murmurs, rubs or gallops. Left upper chest dressing in place. Minimal edema. Abdomen: Soft, non-tender, non-distended with normal bowel sounds. Musculoskeletal: No clubbing, cyanosis or edema bilaterally. Full range of motion without deformity. Skin: Skin color, texture, turgor normal.  No rashes or lesions. Neurologic:  Neurovascularly intact without any focal sensory/motor deficits. Cranial nerves: II-XII intact, grossly non-focal.  Psychiatric: Alert and oriented, thought content appropriate, normal insight  Capillary Refill: Brisk, 3 seconds, normal   Peripheral Pulses: +2 palpable, equal bilaterally       Labs:   Recent Labs     02/18/23  0529 02/19/23  0658 02/20/23  0654   WBC 5.0 4.3 4.9   HGB 11.0* 9.3* 9.4*   HCT 31.7* 27.5* 27.0*   PLT 68* 60* 78*     Recent Labs     02/18/23  0529 02/19/23  0658 02/20/23  0654    137 141   K 3.9 3.8 3.9    101 105   CO2 26 26 28   BUN 25* 25* 21*   CREATININE 1.3 1.4* 1.4*   CALCIUM 9.0 8.8 9.0     No results for input(s): AST, ALT, BILIDIR, BILITOT, ALKPHOS in the last 72 hours. No results for input(s): INR in the last 72 hours. No results for input(s): Donzella Rands in the last 72 hours.     Urinalysis:      Lab Results   Component Value Date/Time    NITRU Negative 01/19/2023 12:25 PM    [address] 04/21/2022 02:20 PM    BACTERIA MODERATE 01/31/2023 11:44 PM    RBCUA 3-4 04/21/2022 02:20 PM    BLOODU Negative 01/19/2023 12:25 PM    SPECGRAV 1.020 01/31/2023 11:44 PM    GLUCOSEU NEGATIVE 01/31/2023 11:44 PM       Radiology:  No orders to display       IP CONSULT TO CARDIOLOGY    Assessment/Plan:    Active Hospital Problems    Diagnosis     Hx of atrioventricular node ablation [Z98.890]      Priority: Medium    Pacemaker pocket hematoma, initial encounter [K07.072F]      Priority: Medium     1. Pacemaker pocket hematoma. Patient with explantation last month. It was replaced with a leadless Micra. EP following. Patient underwent hematoma/seroma drainage. Patient on doxycycline for 5 days. 2.  Orthostatic hypotension. Patient was trialed on Florinef. Would recommend stopping amlodipine. We will monitor closely. Patient may need reduction and isosorbide. 3.  Thrombocytopenia. Platelets have improved from 60-78. No evidence of bleeding. 4.  CKD. Creatinine is currently 1.4. Unsure of baseline. We will continue monitor. 5.  Atrial fibrillation. AC on hold due to hematoma and recurrent falls. 6.  Dementia present on admission. We will continue supportive care. DVT Prophylaxis: AC on hold, SCDs  Diet: ADULT DIET; Regular  ADULT ORAL NUTRITION SUPPLEMENT; Breakfast, Dinner; Standard High Calorie/High Protein Oral Supplement  Code Status: Full Code  PT/OT Eval Status:  Following    Dispo -ECF when stable possibly in a.m. if blood pressure okay    Appropriate for A1 Discharge Unit: Yes      Donna Villafuerte MD

## 2023-02-20 NOTE — PROGRESS NOTES
Pt transferred from A2 216 to A1 106 with all belongings. [facility] and [facility] aware of transfer. Notified pt's daughter, Suze Santana, of transfer. Report to [facility].

## 2023-02-20 NOTE — ACP (ADVANCE CARE PLANNING)
Advance Care Planning     Advance Care Planning Activator (Inpatient)  Conversation Note      Date of ACP Conversation: 2/20/2023     Conversation Conducted with: patient's daughter     ACP Activator: Janes Marcelo RN        Health Care Decision Maker:     Current Designated Health Care Decision Maker:     Primary Decision Maker: Gia Bautista - Child - [phone]    Secondary Decision Maker: Dave Muir - Spouse - [phone]        Care Preferences    Ventilation: \"If you were in your present state of health and suddenly became very ill and were unable to breathe on your own, what would your preference be about the use of a ventilator (breathing machine) if it were available to you? \"      Would the patient desire the use of ventilator (breathing machine)?: yes    \"If your health worsens and it becomes clear that your chance of recovery is unlikely, what would your preference be about the use of a ventilator (breathing machine) if it were available to you? \"     Would the patient desire the use of ventilator (breathing machine)?: No      Resuscitation  \"CPR works best to restart the heart when there is a sudden event, like a heart attack, in someone who is otherwise healthy. Unfortunately, CPR does not typically restart the heart for people who have serious health conditions or who are very sick. \"    \"In the event your heart stopped as a result of an underlying serious health condition, would you want attempts to be made to restart your heart (answer \"yes\" for attempt to resuscitate) or would you prefer a natural death (answer \"no\" for do not attempt to resuscitate)? \" yes       [x] Yes   [] No   Educated Patient / Vern Naranjo regarding differences between Advance Directives and portable DNR orders.     Length of ACP Conversation in minutes:  5    Conversation Outcomes:  [x] ACP discussion completed  [] Existing advance directive reviewed with patient; no changes to patient's previously recorded wishes  [] New Advance Directive completed  [] Portable Do Not Rescitate prepared for Provider review and signature  [] POLST/POST/MOLST/MOST prepared for Provider review and signature      Follow-up plan:    [] Schedule follow-up conversation to continue planning  [x] Referred individual to Provider for additional questions/concerns   [] Advised patient/agent/surrogate to review completed ACP document and update if needed with changes in condition, patient preferences or care setting    [] This note routed to one or more involved healthcare providers

## 2023-02-20 NOTE — PROGRESS NOTES
Orthostatic Vitals:  Orthostatic Vitals 2/20/2023   Orthostatic B/P and Pulse? Yes   Blood Pressure Lying 99/56   Pulse Lying 71   Blood Pressure Sitting 83/54   Pulse Sitting 74   Blood Pressure Standing -   Pulse Standing -     Unable to obtain standing BP/pulse d/t pt's weakness and inability to stand long enough. Dr. Jon Place aware.

## 2023-02-20 NOTE — PLAN OF CARE
Problem: Discharge Planning  Goal: Discharge to home or other facility with appropriate resources  2/19/2023 2154 by Steph Griffiths RN  Outcome: Progressing     Problem: Pain  Goal: Verbalizes/displays adequate comfort level or baseline comfort level  2/19/2023 2154 by Steph Griffiths RN  Outcome: Progressing  Flowsheets (Taken 2/19/2023 2056)  Verbalizes/displays adequate comfort level or baseline comfort level: Encourage patient to monitor pain and request assistance     Problem: Skin/Tissue Integrity  Goal: Absence of new skin breakdown  Description: 1. Monitor for areas of redness and/or skin breakdown  2. Assess vascular access sites hourly  3. Every 4-6 hours minimum:  Change oxygen saturation probe site  4. Every 4-6 hours:  If on nasal continuous positive airway pressure, respiratory therapy assess nares and determine need for appliance change or resting period.   2/19/2023 2154 by Steph Griffiths RN  Outcome: Progressing     Problem: ABCDS Injury Assessment  Goal: Absence of physical injury  2/19/2023 2154 by Steph Griffiths RN  Outcome: Progressing  Flowsheets (Taken 2/19/2023 2000)  Absence of Physical Injury: Implement safety measures based on patient assessment     Problem: Safety - Adult  Goal: Free from fall injury  2/19/2023 2154 by Steph Griffiths RN  Outcome: Progressing  Flowsheets (Taken 2/19/2023 2000)  Free From Fall Injury: Instruct family/caregiver on patient safety     Problem: Nutrition Deficit:  Goal: Optimize nutritional status  2/19/2023 2154 by Steph Griffiths RN  Outcome: Progressing

## 2023-02-21 PROCEDURE — 97530 THERAPEUTIC ACTIVITIES: CPT

## 2023-02-21 PROCEDURE — 97116 GAIT TRAINING THERAPY: CPT

## 2023-02-21 PROCEDURE — 6370000000 HC RX 637 (ALT 250 FOR IP): Performed by: INTERNAL MEDICINE

## 2023-02-21 PROCEDURE — 1200000000 HC SEMI PRIVATE

## 2023-02-21 PROCEDURE — 97110 THERAPEUTIC EXERCISES: CPT

## 2023-02-21 PROCEDURE — 6370000000 HC RX 637 (ALT 250 FOR IP): Performed by: PHYSICIAN ASSISTANT

## 2023-02-21 PROCEDURE — 2580000003 HC RX 258: Performed by: PHYSICIAN ASSISTANT

## 2023-02-21 RX ORDER — POLYETHYLENE GLYCOL 3350 17 G/17G
17 POWDER, FOR SOLUTION ORAL DAILY PRN
Qty: 527 G | Refills: 1 | DISCHARGE
Start: 2023-02-21 | End: 2023-03-23

## 2023-02-21 RX ORDER — DOXYCYCLINE HYCLATE 100 MG
100 TABLET ORAL EVERY 12 HOURS SCHEDULED
Qty: 20 TABLET | Refills: 0 | DISCHARGE
Start: 2023-02-21 | End: 2023-03-03

## 2023-02-21 RX ORDER — MEMANTINE HYDROCHLORIDE 5 MG/1
10 TABLET ORAL 2 TIMES DAILY
DISCHARGE
Start: 2023-02-21

## 2023-02-21 RX ADMIN — ACETAMINOPHEN 325MG 650 MG: 325 TABLET ORAL at 22:47

## 2023-02-21 RX ADMIN — ATORVASTATIN CALCIUM 40 MG: 40 TABLET, FILM COATED ORAL at 21:50

## 2023-02-21 RX ADMIN — QUETIAPINE FUMARATE 50 MG: 25 TABLET ORAL at 21:50

## 2023-02-21 RX ADMIN — DOXYCYCLINE HYCLATE 100 MG: 100 TABLET, COATED ORAL at 21:50

## 2023-02-21 RX ADMIN — RISPERIDONE 0.25 MG: 0.25 TABLET, FILM COATED ORAL at 08:20

## 2023-02-21 RX ADMIN — Medication 10 ML: at 22:01

## 2023-02-21 RX ADMIN — QUETIAPINE FUMARATE 25 MG: 25 TABLET ORAL at 08:20

## 2023-02-21 RX ADMIN — RISPERIDONE 0.25 MG: 0.25 TABLET, FILM COATED ORAL at 21:50

## 2023-02-21 RX ADMIN — FINASTERIDE 5 MG: 5 TABLET, FILM COATED ORAL at 08:21

## 2023-02-21 RX ADMIN — TROSPIUM CHLORIDE 20 MG: 20 TABLET, FILM COATED ORAL at 17:55

## 2023-02-21 RX ADMIN — PANTOPRAZOLE SODIUM 40 MG: 40 TABLET, DELAYED RELEASE ORAL at 08:20

## 2023-02-21 RX ADMIN — MEMANTINE 5 MG: 5 TABLET ORAL at 08:20

## 2023-02-21 RX ADMIN — MEMANTINE 5 MG: 5 TABLET ORAL at 21:50

## 2023-02-21 RX ADMIN — Medication 10 ML: at 08:20

## 2023-02-21 RX ADMIN — TAMSULOSIN HYDROCHLORIDE 0.4 MG: 0.4 CAPSULE ORAL at 08:21

## 2023-02-21 RX ADMIN — ISOSORBIDE MONONITRATE 30 MG: 30 TABLET, EXTENDED RELEASE ORAL at 08:20

## 2023-02-21 RX ADMIN — DOXYCYCLINE HYCLATE 100 MG: 100 TABLET, COATED ORAL at 08:21

## 2023-02-21 RX ADMIN — Medication 1 CAPSULE: at 17:55

## 2023-02-21 RX ADMIN — Medication 1 CAPSULE: at 08:20

## 2023-02-21 ASSESSMENT — PAIN SCALES - WONG BAKER
WONGBAKER_NUMERICALRESPONSE: 0

## 2023-02-21 ASSESSMENT — PAIN SCALES - GENERAL
PAINLEVEL_OUTOF10: 8
PAINLEVEL_OUTOF10: 0

## 2023-02-21 ASSESSMENT — PAIN DESCRIPTION - LOCATION: LOCATION: BACK;HEAD

## 2023-02-21 ASSESSMENT — PAIN DESCRIPTION - DESCRIPTORS: DESCRIPTORS: ACHING

## 2023-02-21 NOTE — PROGRESS NOTES
Occupational Therapy  Attempted OT tx. Pt is working with PT. He has orthostatic BP per PT report. Cont OT.   Alicia Wong OT

## 2023-02-21 NOTE — PROGRESS NOTES
Physical Therapy  Facility/Department: [place] [address] [facility]  Daily Treatment Note  NAME: Amanda Pastrana  : 1935  MRN: [id number]    Date of Service: 2023    Discharge Recommendations:  Subacute/Skilled Nursing Facility   PT Equipment Recommendations  Equipment Needed: No    Patient Diagnosis(es): There were no encounter diagnoses. Assessment   Assessment: Pt was limited by symptomatic orthostatic hypotension; see below for readings. Presented with fair trunk and LE control during bed mobility; with the bed flat and no handrails pt required min sequencing cues to sit EOB. Transfers and gait training were mildly unsteady requiring sequencing cues throughout; no LOB noted. Gait distance was limited by hypotension. Pt was left stable in the chair with needs in reach; RN was made aware. Equipment Needed: No     Plan    Physcial Therapy Plan  General Plan: 2-3 times per week     Restrictions  Restrictions/Precautions  Restrictions/Precautions: Fall Risk, Isolation, Contact Precautions, General Precautions  Position Activity Restriction  Other position/activity restrictions: Cdiff rule out, IV, tele     Subjective    Subjective  Subjective: Pt was in bed willing to participate confused and disoriented but pleasant  Pain: denies  Orientation  Overall Orientation Status: Impaired     Objective   Vitals  BP: (!) 116/72 - Supine  BP: (!) 91/53 - Sitting EOB  BP: (!) 82/43 - (After transferring to the chair) (unable to obtain a standing measurement). BP: (!) 100/55 - Sitting for approx 5 mins.    Heart Rate: 72  SpO2: 99 %  O2 Device: None (Room air)    Bed Mobility Training  Supine to Sit: Supervision  Sit to supine:Independent    Transfer Training  Sit to Stand: CGA  Stand to Sit: Contact-guard assistance    Gait  Overall Level of Assistance: Contact-guard assistance  Gait Abnormalities: shuffle pattern with a narrow ROBIN  Distance (ft): 3 Feet  Assistive Device: Walker, rolling  Comments: Navigation and safety cues throughout. PT Exercises  APs x20 BLE  QS 15x5\" BLE  SLR x10 BLE  Hip Abd/Add x15 BLE  LAQ x15 BLE   Static sitting EOB for approx 5 mins   Static standing for approx 2 mins    Safety Devices  Type of Devices: All fall risk precautions in place;Call light within reach; Patient at risk for falls;Nurse notified; Chair alarm in place; Left in chair     AM-PAC Score  AM-PAC Inpatient Mobility without Stair Climbing Raw Score : 15 AM-PAC     Goals  Short Term Goals  Time Frame for Short Term Goals: 7 days 2/25  Short Term Goal 1: pt will complete bed mobility wiht CGA  Short Term Goal 2: pt will complete functional transfer with mod A fo 1  Short Term Goal 3: pt will ambulate about room with LRAD and mod A  Short Term Goal 4: pt will tolerate 10-15 reps of AAROM/AORM BLE supine/seated ther ex by 2/23  Patient Goals   Patient Goals : pt unable to state 2* cognition      Education  Patient Education  Education Given To: Patient  Education Provided: Role of Therapy;Plan of Care;Orientation  Education Method: Demonstration;Verbal  Barriers to Learning: Cognition; Hearing  Education Outcome: Continued education needed    Therapy Time   Individual Concurrent Group Co-treatment   Time In 0276         Time Out 0933         Minutes [facility]

## 2023-02-21 NOTE — PROGRESS NOTES
Report received from day shift RN. Patient resting comfortably in bed. No signs of discomfort or distress. Bed is in lowest position, wheels locked, 2/2 side rails up. Bedside table and call light within reach. White board updated. Will continue to monitor patient. Christiano Barnhart RN    4:54 AM  No new events overnight. VSS. Patient resting quietly in bed. No needs at this time.  Christiano Barnhart RN

## 2023-02-21 NOTE — DISCHARGE INSTR - COC
Continuity of Care Form    Patient Name: Margaret Dominguez   :  1935  MRN:  [id number]    Admit date:  2023  Discharge date:  23    Code Status Order: Full Code   Advance Directives:     Admitting Physician:  William Burton MD  PCP: Janet Melgar MD    Discharging Nurse: [facility] Unit/Room#: 0106/0106-01  Discharging Unit Phone Number: [phone]    Emergency Contact:   Extended Emergency Contact Information  Primary Emergency Contact: Jono El  Address: [address], [address] [place], [place] [address]brian Salazar[address] Phone: [phone]  Mobile Phone: [phone]  Relation: Spouse   needed? No  Secondary Emergency Contact: Margie Colon of [address] [place] [address] Phone: [phone]  Mobile Phone: [phone]  Relation: Child   needed?  No    Past Surgical History:  Past Surgical History:   Procedure Laterality Date    ABLATION OF DYSRHYTHMIC FOCUS      APPENDECTOMY      COLONOSCOPY      EYE SURGERY      HERNIA REPAIR      JOINT REPLACEMENT      left shoulder    PACEMAKER PLACEMENT  10/13/2017    Dr Maryanne Cruz at [facility] 19. 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    Hyperlipidemia E78.5    Hypertension I10    Chronic kidney disease, stage III (moderate) (HCC) N18.30    A-fib (HCC) I48.91    Abnormal echocardiogram R93.1    Dizziness R42    SOB (shortness of breath) R06.02    Abnormal cardiovascular stress test R94.39    Coronary artery disease involving native heart without angina pectoris I25.10    Other chest pain R07.89    Syncope and collapse R55    COVID U07.1    Dementia with behavioral disturbance F03.918    Hallucinations R44.3    Syncope, unspecified syncope type R55    Ischemic cardiomyopathy I25.5    Atrial fibrillation, rapid ([facility]) I48.91    Orthostatic hypotension I95.1    Pacemaker infection ([address] T82. 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    Pacemaker pocket hematoma, initial encounter T82.837A    Hx of atrioventricular node ablation Z98.890       Isolation/Infection:   Isolation            No Isolation          Patient Infection Status       Infection Onset Added Last Indicated Last Indicated By Review Planned Expiration Resolved Resolved By    None active    Resolved    C-diff Rule Out 23 Clostridium difficile toxin/antigen (Ordered)   23 Rule-Out Test Resulted    COVID-19 (Rule Out) 22 COVID-19 & Influenza Combo (Ordered)   22 Rule-Out Test Resulted    COVID-19 22 COVID-19, Rapid   22     COVID-19 (Rule Out) 22 COVID-19, Rapid (Ordered)   22 Rule-Out Test Resulted            Nurse Assessment:  Last Vital Signs: BP (!) 82/43 Comment: After transferring to the chair  Pulse 72   Temp 98.1 °F (36.7 °C) (Oral)   Resp 16   Ht 5' 6\" (1.676 m)   Wt 155 lb (70.3 kg)   SpO2 99%   BMI 25.02 kg/m²     Last documented pain score (0-10 scale): Pain Level: 0  Last Weight:   Wt Readings from Last 1 Encounters:   23 155 lb (70.3 kg)     Mental Status:  disoriented    IV Access:  - None    Nursing Mobility/ADLs:  Walking   Assisted  Transfer  Assisted  Bathing  Assisted  Dressing  Assisted  Toileting  Assisted  Feeding Assisted  Med Admin  Assisted  Med Delivery   none    Wound Care Documentation and Therapy:  Puncture 01/24/23 Femoral (Active)   Number of days: 27       Puncture 01/24/23 Femoral (Active)   Number of days: 27       Wound 02/17/23 Coccyx (Active)   Wound Etiology Pressure Stage 1 02/20/23 2030   Dressing Status Reinforced dressing 02/19/23 2000   Wound Cleansed Cleansed with saline 02/19/23 2000   Dressing/Treatment Foam 02/20/23 2030   Wound Assessment Erythema;Non-blanchable erythema 02/20/23 2030   Drainage Amount None 02/19/23 2000   Odor None 02/19/23 2000   Juanita-wound Assessment Blanchable erythema 02/19/23 2000   Number of days: 3       Incision 01/24/23 Chest Lateral;Left;Upper (Active)   Wound Image     02/17/23 1047   Dressing Status Old drainage noted 02/19/23 2000   Incision Cleansed Not Cleansed 02/19/23 2000   Dressing/Treatment Gauze dressing/dressing sponge;Tegaderm/transparent film dressing 02/19/23 2000   Closure Steri-Strips 02/19/23 2000   Margins Approximated 02/19/23 2000   Incision Assessment Dry 02/19/23 2000   Drainage Amount None 02/19/23 2000   Odor None 02/19/23 2000   Juanita-incision Assessment Ecchymosis;Edematous 02/19/23 2000   Number of days: 27        Elimination:  Continence: Bowel: No  Bladder: No  Urinary Catheter: None   Colostomy/Ileostomy/Ileal Conduit: No       Date of Last BM: 2/21/23    Intake/Output Summary (Last 24 hours) at 2/21/2023 1050  Last data filed at 2/20/2023 1804  Gross per 24 hour   Intake 360 ml   Output 275 ml   Net 85 ml     I/O last 3 completed shifts:   In: 840 [P.O.:840]  Out: 26 [Urine:525]    Safety Concerns:     Sundowners Sundrome, History of Falls (last 30 days), and At Risk for Falls    Impairments/Disabilities:      None    Nutrition Therapy:  Current Nutrition Therapy:   - Oral Diet:  General    Routes of Feeding: Oral  Liquids: No Restrictions  Daily Fluid Restriction: no  Last Modified Barium Swallow with Video (Video Swallowing Test): not done    Treatments at the Time of Hospital Discharge:   Respiratory Treatments: no  Oxygen Therapy:  is not on home oxygen therapy. Ventilator:    - No ventilator support    Rehab Therapies: Physical Therapy  Weight Bearing Status/Restrictions: No weight bearing restrictions  Other Medical Equipment (for information only, NOT a DME order):  walker  Other Treatments: no    Patient's personal belongings (please select all that are sent with patient):  None    RN SIGNATURE:  Electronically signed by Tiffany Bone RN on 2/22/23 at 2:27 PM EST    CASE MANAGEMENT/SOCIAL WORK SECTION    Inpatient Status Date: ***    Readmission Risk Assessment Score:  Readmission Risk              Risk of Unplanned Readmission:  35           Discharging to Facility/ Agency   Name: Iris Turner  Address:  [facility]:[phone]  Fax:    Dialysis Facility (if applicable)   Name:  Address:  Dialysis Schedule:  Phone:  Fax:    / signature: Electronically signed by Sherry Lennox, MSW on 2/21/23 at 10:50 AM EST    PHYSICIAN SECTION    Prognosis: Fair    Condition at Discharge: Stable    Rehab Potential (if transferring to Rehab): Fair    Recommended Labs or Other Treatments After Discharge:     BMP and CBC weekly    Feb28 Remote Device Check  Tuesday Feb 28, 2023 7:15 AM  This is a home transmission. Do not come in to the office.  [address], [address] [place], [place] [id number] [place] [address] 71 CHECK  Tuesday Mar 7, 2023 2:00 PM [address], [address] [facility]  [phone]   HDI69 Office Visit with Dr. Keren Orozco MD  Wednesday Mar 15, 2023 11:15 AM Cruz James  [phone]   [address]  Tuesday Apr 25, 2023 1:00 PM [address], [address] [place]  [phone] Hospital Follow Up with Dr. Gini Kasper MD  Tuesday Apr 25, 2023 1:15 PM  Please arrive 15 minutes prior to appointment, bring insurance card and photo ID. [address] [facility]  [phone]   May30 Remote Device Check  Tuesday May 30, 2023 7:30 AM  This is a home transmission. Do not come in to the office. [address], [address] [facility]   [address]   [address] [place] [phone]  [facility] 44. on March 7th at 2pm with for a for a device check and office visit with Leonie Bautista CNP, [facility]. [facility],  [facility], [address] [place] [address] [address], [address] [place], [place], 1214 [facility]. Office #: [phone]. If you are unable to make this appointment, please call to reschedule. Directions to [place]  [facility] towards [place]. [address] [facility] exit. Right off exit. Cross over [employer]. Right on [address]. Left into hospital. Follow the signs to the emergency room ( turn left toward the Emergency room). Go right at the first stop sign. Just past the Emergency room at the second stop sign turn right and go up the ramp and park on the top level if possible. Go in the glass doors of the [facility] we on the top level of the garage [address]. As soon as you get in the door turn left and our office is the one with the glass doors. Physician Certification: I certify the above information and transfer of Marcela Mack  is necessary for the continuing treatment of the diagnosis listed and that he requires [facility] for greater 30 days.      Update Admission H&P: No change in H&P    PHYSICIAN SIGNATURE:  Electronically signed by Dontrell Hammond MD on 2/22/23 at 10:12 AM EST

## 2023-02-21 NOTE — CARE COORDINATION
Patient's d/c cancelled today due to orthostatic hypotension.  Possible d/c tomorrow.  Facility and family updated.  Plan to return to [facility].  Electronically signed by HARSH Siddiqui on 2/21/2023 at 11:12 AM

## 2023-02-21 NOTE — PROGRESS NOTES
Hospitalist Progress Note      PCP: Angela Onofre MD    Date of Admission: 2/17/2023    Chief Complaint:   Hypotension    Hospital Course:      82 y.o. male who was transferred to [facility] from [facility] for upper chest swelling and pain. Patient recent treated for infected pacemaker site. He underwent device extraction with placement of micra pacemaker. He is currently residing at [facility] for skilled stay. Patient is a poor historian due to underlying dementia. Pocket site tender to palpation, edema and bruising present as well. No erythema. EP consulted. Subjective:   Patient states he is feeling better. Nurse reports patient still orthostatic. Blood pressure low this AM.  Patient was scheduled to leave today but will not be able to leave due to low blood pressure.     Medications:  Reviewed    Infusion Medications    sodium chloride      sodium chloride       Scheduled Medications    atorvastatin  40 mg Oral Nightly    finasteride  5 mg Oral Daily    lactobacillus  1 capsule Oral BID WC    memantine  5 mg Oral BID    risperiDONE  0.25 mg Oral BID    QUEtiapine  50 mg Oral Nightly    QUEtiapine  25 mg Oral QAM AC    pantoprazole  40 mg Oral Daily    trospium  20 mg Oral BID AC    sodium chloride flush  5-40 mL IntraVENous 2 times per day    doxycycline hyclate  100 mg Oral 2 times per day     PRN Meds: sodium chloride flush, sodium chloride, sodium chloride flush, sodium chloride, ondansetron **OR** ondansetron, polyethylene glycol, acetaminophen **OR** acetaminophen      Intake/Output Summary (Last 24 hours) at 2/21/2023 1202  Last data filed at 2/20/2023 1804  Gross per 24 hour   Intake 360 ml   Output 275 ml   Net 85 ml         Physical Exam Performed:    BP (!) 82/43 Comment: After transferring to the chair  Pulse 72   Temp 98.1 °F (36.7 °C) (Oral)   Resp 16   Ht 5' 6\" (1.676 m)   Wt 155 lb (70.3 kg)   SpO2 99%   BMI 25.02 kg/m²     General appearance: No apparent distress, appears stated age and cooperative. HEENT: Pupils equal, round, and reactive to light. Conjunctivae/corneas clear. Neck: Supple, with full range of motion. No jugular venous distention. Trachea midline. Respiratory:  Normal respiratory effort. Clear to auscultation, bilaterally without Rales/Wheezes/Rhonchi. Cardiovascular: Regular rate and rhythm with normal S1/S2 without murmurs, rubs or gallops. Left upper chest dressing in place. Minimal edema. Abdomen: Soft, non-tender, non-distended with normal bowel sounds. Musculoskeletal: No clubbing, cyanosis or edema bilaterally. Full range of motion without deformity. Skin: Skin color, texture, turgor normal.  No rashes or lesions. Neurologic:  Neurovascularly intact without any focal sensory/motor deficits. Cranial nerves: II-XII intact, grossly non-focal.  Psychiatric: Alert and oriented to person and place  Capillary Refill: Brisk, 3 seconds, normal   Peripheral Pulses: +2 palpable, equal bilaterally       Labs:   Recent Labs     02/19/23  0658 02/20/23  0654   WBC 4.3 4.9   HGB 9.3* 9.4*   HCT 27.5* 27.0*   PLT 60* 78*       Recent Labs     02/19/23  0658 02/20/23  0654    141   K 3.8 3.9    105   CO2 26 28   BUN 25* 21*   CREATININE 1.4* 1.4*   CALCIUM 8.8 9.0       No results for input(s): AST, ALT, BILIDIR, BILITOT, ALKPHOS in the last 72 hours. No results for input(s): INR in the last 72 hours. No results for input(s): [place] Bounds in the last 72 hours.     Urinalysis:      Lab Results   Component Value Date/Time    NITRU Negative 01/19/2023 12:25 PM    [address] 04/21/2022 02:20 PM    BACTERIA MODERATE 01/31/2023 11:44 PM    RBCUA 3-4 04/21/2022 02:20 PM    BLOODU Negative 01/19/2023 12:25 PM    SPECGRAV 1.020 01/31/2023 11:44 PM    GLUCOSEU NEGATIVE 01/31/2023 11:44 PM       Radiology:  No orders to display       IP CONSULT TO CARDIOLOGY    Assessment/Plan:    Active Hospital Problems    Diagnosis     Hx of atrioventricular node ablation [Z98.890]      Priority: Medium    Pacemaker pocket hematoma, initial encounter [C23.679E]      Priority: Medium     1. Pacemaker pocket hematoma. Patient with explantation last month. It was replaced with a leadless Micra. EP following. Patient underwent hematoma/seroma drainage. Patient on doxycycline for 5 days. 2.  Orthostatic hypotension. Patient was trialed on Florinef. Patient with episodes of hypotension and orthostasis. We will discontinue isosorbide and amlodipine. Flomax will also be discontinued due to orthostatic side effects. 3.  Thrombocytopenia. Platelets have improved from 60-78. No evidence of bleeding. 4.  CKD. Creatinine is currently 1.4. Unsure of baseline. We will continue monitor. Recheck in AM.  5.  Atrial fibrillation. AC on hold due to hematoma and recurrent falls. Poor AC candidate. 6.  Dementia present on admission. We will continue supportive care. DVT Prophylaxis: AC on hold, SCDs  Diet: ADULT DIET; Regular  ADULT ORAL NUTRITION SUPPLEMENT; Breakfast, Dinner; Standard High Calorie/High Protein Oral Supplement  Code Status: Full Code  PT/OT Eval Status:  Following    Dispo -ECF when stable possibly in a.m. if blood pressure okay    Appropriate for A1 Discharge Unit: Yes      Cecelia Chester MD

## 2023-02-22 VITALS
HEART RATE: 57 BPM | SYSTOLIC BLOOD PRESSURE: 145 MMHG | OXYGEN SATURATION: 99 % | RESPIRATION RATE: 14 BRPM | TEMPERATURE: 97.3 F | DIASTOLIC BLOOD PRESSURE: 65 MMHG | WEIGHT: 155 LBS | BODY MASS INDEX: 24.91 KG/M2 | HEIGHT: 66 IN

## 2023-02-22 LAB
ANION GAP SERPL CALCULATED.3IONS-SCNC: 10 MMOL/L (ref 3–16)
BASOPHILS ABSOLUTE: 0 K/UL (ref 0–0.2)
BASOPHILS RELATIVE PERCENT: 0 %
BUN BLDV-MCNC: 24 MG/DL (ref 7–20)
CALCIUM SERPL-MCNC: 9.7 MG/DL (ref 8.3–10.6)
CHLORIDE BLD-SCNC: 106 MMOL/L (ref 99–110)
CO2: 26 MMOL/L (ref 21–32)
CREAT SERPL-MCNC: 1.1 MG/DL
CREAT SERPL-MCNC: 1.3 MG/DL (ref 0.8–1.3)
EOSINOPHILS ABSOLUTE: 0.5 K/UL (ref 0–0.6)
EOSINOPHILS RELATIVE PERCENT: 6 %
GFR SERPL CREATININE-BSD FRML MDRD: 54 ML/MIN/{1.73_M2}
GLUCOSE BLD-MCNC: 87 MG/DL (ref 70–99)
HCT VFR BLD CALC: 29.5 % (ref 40.5–52.5)
HEMOGLOBIN: 10.3 G/DL (ref 13.5–17.5)
LYMPHOCYTES ABSOLUTE: 2.2 K/UL (ref 1–5.1)
LYMPHOCYTES RELATIVE PERCENT: 25 %
MCH RBC QN AUTO: 29.2 PG (ref 26–34)
MCHC RBC AUTO-ENTMCNC: 34.9 G/DL (ref 31–36)
MCV RBC AUTO: 83.8 FL (ref 80–100)
METAMYELOCYTES RELATIVE PERCENT: 2 %
MONOCYTES ABSOLUTE: 0.4 K/UL (ref 0–1.3)
MONOCYTES RELATIVE PERCENT: 4 %
NEUTROPHILS ABSOLUTE: 5.7 K/UL (ref 1.7–7.7)
NEUTROPHILS RELATIVE PERCENT: 63 %
PDW BLD-RTO: 14.7 % (ref 12.4–15.4)
PLATELET # BLD: 171 K/UL (ref 135–450)
PMV BLD AUTO: 8 FL (ref 5–10.5)
POTASSIUM (K+): 4
POTASSIUM REFLEX MAGNESIUM: 4 MMOL/L (ref 3.5–5.1)
RBC # BLD: 3.52 M/UL (ref 4.2–5.9)
SLIDE REVIEW: ABNORMAL
SODIUM BLD-SCNC: 142 MMOL/L (ref 136–145)
WBC # BLD: 8.8 K/UL (ref 4–11)

## 2023-02-22 PROCEDURE — 6370000000 HC RX 637 (ALT 250 FOR IP): Performed by: INTERNAL MEDICINE

## 2023-02-22 PROCEDURE — 80048 BASIC METABOLIC PNL TOTAL CA: CPT

## 2023-02-22 PROCEDURE — 85025 COMPLETE CBC W/AUTO DIFF WBC: CPT

## 2023-02-22 PROCEDURE — 36415 COLL VENOUS BLD VENIPUNCTURE: CPT

## 2023-02-22 PROCEDURE — 6370000000 HC RX 637 (ALT 250 FOR IP): Performed by: NURSE PRACTITIONER

## 2023-02-22 PROCEDURE — 6370000000 HC RX 637 (ALT 250 FOR IP): Performed by: PHYSICIAN ASSISTANT

## 2023-02-22 PROCEDURE — 2580000003 HC RX 258: Performed by: PHYSICIAN ASSISTANT

## 2023-02-22 RX ORDER — LORAZEPAM 1 MG/1
1 TABLET ORAL ONCE
Status: COMPLETED | OUTPATIENT
Start: 2023-02-22 | End: 2023-02-22

## 2023-02-22 RX ADMIN — RISPERIDONE 0.25 MG: 0.25 TABLET, FILM COATED ORAL at 08:12

## 2023-02-22 RX ADMIN — MEMANTINE 5 MG: 5 TABLET ORAL at 08:12

## 2023-02-22 RX ADMIN — QUETIAPINE FUMARATE 25 MG: 25 TABLET ORAL at 06:53

## 2023-02-22 RX ADMIN — Medication 10 ML: at 08:13

## 2023-02-22 RX ADMIN — DOXYCYCLINE HYCLATE 100 MG: 100 TABLET, COATED ORAL at 08:12

## 2023-02-22 RX ADMIN — FINASTERIDE 5 MG: 5 TABLET, FILM COATED ORAL at 08:17

## 2023-02-22 RX ADMIN — TROSPIUM CHLORIDE 20 MG: 20 TABLET, FILM COATED ORAL at 06:53

## 2023-02-22 RX ADMIN — PANTOPRAZOLE SODIUM 40 MG: 40 TABLET, DELAYED RELEASE ORAL at 08:12

## 2023-02-22 RX ADMIN — LORAZEPAM 1 MG: 1 TABLET ORAL at 04:08

## 2023-02-22 RX ADMIN — Medication 1 CAPSULE: at 08:12

## 2023-02-22 ASSESSMENT — PAIN SCALES - WONG BAKER: WONGBAKER_NUMERICALRESPONSE: 0

## 2023-02-22 NOTE — DISCHARGE SUMMARY
Hospital Medicine Discharge Summary    Patient ID: Madie Correa      Patient's PCP: Prateek Santos MD    Admit Date: 2/17/2023     Discharge Date:   2/22/2023    Admitting Provider: Noman Alberto MD     Discharge Provider: Marjorie Toussaint MD     Discharge Diagnoses: Active Hospital Problems    Diagnosis     Hx of atrioventricular node ablation [Z98.890]      Priority: Medium    Pacemaker pocket hematoma, initial encounter [E67.084S]      Priority: Medium       The patient was seen and examined on day of discharge and this discharge summary is in conjunction with any daily progress note from day of discharge. Hospital Course:   82 y.o. male who was transferred to [facility] for upper chest swelling and pain. Patient recent treated for infected pacemaker site. He underwent device extraction with placement of micra pacemaker. He is currently residing at [facility] for skilled stay. Patient is a poor historian due to underlying dementia. Pocket site tender to palpation, edema and bruising present as well. No erythema. EP consulted. Patient treated for:    1. Pacemaker pocket hematoma. Patient with explantation last month. It was replaced with a leadless Micra. EP following. Patient underwent hematoma/seroma drainage. Patient on doxycycline for 5 days. 2.  Orthostatic hypotension. Patient was trialed on Florinef. Patient with episodes of hypotension and orthostasis. We will discontinue isosorbide and amlodipine. Flomax will also be discontinued due to orthostatic side effects. After discontinuing some medications blood pressure improved significantly. Patient stable for discharge. 3.  Thrombocytopenia. Platelets were low initially but these have increased to 171. No evidence of bleeding. 4.  CKD. Creatinine is currently 1. 3.  Creatinine continue to improve and remained stable. Follow-up as outpatient. 5.  Atrial fibrillation.   AC on hold due to hematoma and recurrent falls. Poor AC candidate. 6.  Dementia present on admission. We will continue supportive care. Physical Exam Performed:     BP (!) 145/65   Pulse 57   Temp 97.3 °F (36.3 °C) (Oral)   Resp 14   Ht 5' 6\" (1.676 m)   Wt 155 lb (70.3 kg)   SpO2 99%   BMI 25.02 kg/m²        General appearance: No apparent distress, appears stated age and cooperative. HEENT: Pupils equal, round, and reactive to light. Conjunctivae/corneas clear. Neck: Supple, with full range of motion. No jugular venous distention. Trachea midline. Respiratory:  Normal respiratory effort. Clear to auscultation, bilaterally without Rales/Wheezes/Rhonchi. Cardiovascular: Regular rate and rhythm with normal S1/S2 without murmurs, rubs or gallops. Left upper chest dressing in place. Minimal edema. Abdomen: Soft, non-tender, non-distended with normal bowel sounds. Musculoskeletal: No clubbing, cyanosis or edema bilaterally. Full range of motion without deformity. Skin: Skin color, texture, turgor normal.  No rashes or lesions. Neurologic:  Neurovascularly intact without any focal sensory/motor deficits. Cranial nerves: II-XII intact, grossly non-focal.  Psychiatric: Alert and oriented, thought content appropriate, normal insight  Capillary Refill: Brisk, 3 seconds, normal   Peripheral Pulses: +2 palpable, equal bilaterally       Labs:  For convenience and continuity at follow-up the following most recent labs are provided:      CBC:    Lab Results   Component Value Date/Time    WBC 8.8 02/22/2023 06:58 AM    HGB 10.3 02/22/2023 06:58 AM    HCT 29.5 02/22/2023 06:58 AM     02/22/2023 06:58 AM       Renal:    Lab Results   Component Value Date/Time     02/22/2023 06:58 AM    K 4.0 02/22/2023 06:58 AM     02/22/2023 06:58 AM    CO2 26 02/22/2023 06:58 AM    BUN 24 02/22/2023 06:58 AM    CREATININE 1.3 02/22/2023 06:58 AM    CALCIUM 9.7 02/22/2023 06:58 AM    PHOS 3.3 12/14/2022 05:16 PM Significant Diagnostic Studies    Radiology:   No orders to display          Consults:     IP CONSULT TO CARDIOLOGY    Disposition:  SNF     Condition at Discharge: Stable    Discharge Instructions/Follow-up:      BMP and CBC weekly    Feb28 Remote Device Check  Tuesday Feb 28, 2023 7:15 AM  This is a home transmission. Do not come in to the office. [address], [address] [facility]   Suite [facility] [address] [phone]  [address] [facility]  Tuesday Mar 7, 2023 2:00 PM [address] [facility] [address]   [facility] 55  [phone]   UWX81 Office Visit with Dr. Sami Thomas MD  Wednesday Mar 15, 2023 11:15 AM [address] [place] [address]  [phone]   [facility]  Tuesday Apr 25, 2023 1:00 PM [address], [address] [facility] [address]/ [address]   [facility] 55  [phone]          Hospital Follow Up with Dr. Eleonora Steiner MD  Tuesday Apr 25, 2023 1:15 PM  Please arrive 15 minutes prior to appointment, bring insurance card and photo ID. [address] [facility]  [phone]   May30 Remote Device Check  Tuesday May 30, 2023 7:30 AM  This is a home transmission. Do not come in to the office. [address], [address] [facility]   [address]   [address] [place] [phone]  [facility] 44. on March 7th at 2pm with for a for a device check and office visit with Lavinia Whitman CNP, [address] [facility],  [facility], [address] [place] [address] [address], [address] [facility], [address] [place] [address]. Office #: [phone]. If you are unable to make this appointment, please call to reschedule. Directions to [place] [address] towards [place]. [address] [facility] exit. Right off exit. Cross over [employer]. Right on [address]  Left into hospital. Follow the signs to the emergency room ( turn left toward the Emergency room). Go right at the first stop sign. Just past the Emergency room at the second stop sign turn right and go up the ramp and park on the top level if possible. Go in the glass doors of the [facility] we on the top level of the garage [address]. As soon as you get in the door turn left and our office is the one with the glass doors.       Code Status:  Full Code     Activity: activity as tolerated    Diet: cardiac diet      Discharge Medications:     Current Discharge Medication List             Details   doxycycline hyclate (VIBRA-TABS) 100 MG tablet Take 1 tablet by mouth every 12 hours for 10 days  Qty: 20 tablet, Refills: 0                Details   polyethylene glycol (GLYCOLAX) 17 g packet Take 17 g by mouth daily as needed for Constipation  Qty: 527 g, Refills: 1      memantine (NAMENDA) 5 MG tablet Take 2 tablets by mouth 2 times daily                Details   bisacodyl (DULCOLAX) 10 MG suppository Place 10 mg rectally 2 times daily as needed for Constipation      melatonin 3 MG TABS tablet Take 5 mg by mouth at bedtime      promethazine (PHENERGAN) 12.5 MG suppository Place 12.5 mg rectally every 6 hours as needed for Nausea      lactobacillus (CULTURELLE) capsule Take 1 capsule by mouth 2 times daily (with meals)      QUEtiapine (SEROQUEL) 50 MG tablet Take 1 tablet by mouth nightly  Qty: 60 tablet, Refills: 3      atorvastatin (LIPITOR) 40 MG tablet TAKE 1 TABLET BY MOUTH DAILY  Qty: 90 tablet, Refills: 3    Associated Diagnoses: Hyperlipidemia, unspecified hyperlipidemia type      risperiDONE (RISPERDAL) 0.25 MG tablet Take 1 tablet by mouth 2 times daily  Qty: 60 tablet, Refills: 3    Associated Diagnoses: Dementia with behavioral disturbance      pantoprazole (PROTONIX) 40 MG tablet Take 1 tablet by mouth daily  Qty: 30 tablet, Refills: 5    Associated Diagnoses: Gastroesophageal reflux disease, unspecified whether esophagitis present mirabegron (MYRBETRIQ) 25 MG TB24 Take 1 tablet by mouth daily  Qty: 30 tablet, Refills: 5    Associated Diagnoses: Urge incontinence      finasteride (PROSCAR) 5 MG tablet Take 5 mg by mouth daily. aspirin 81 MG tablet Take 81 mg by mouth daily. Time Spent on discharge: 35 minutes in the examination, evaluation, counseling and review of medications and discharge plan. Signed:    Marc Grossman MD   2/22/2023      Thank you Clarita Hay MD for the opportunity to be involved in this patient's care. If you have any questions or concerns, please feel free to contact me at [phone].

## 2023-02-22 NOTE — CARE COORDINATION
CASE MANAGEMENT DISCHARGE SUMMARY      Discharge to: [facility], return    Precertification completed: [facility] Exemption Notification (HENS) completed: NA    IMM given: 2/21/23    New Durable Medical Equipment ordered/agency: NA    Transportation:       Medical Transport explained to pt/family. Pt/family voice no agency preference. Agency used: [address] [place] [address]   Ambulance form completed: Yes    Confirmed discharge plan with:     Patient: yes     Family:  yes     Facility/Agency, name:  BINU/AVS to obtain from [facility] number for report to facility: [phone]     RN, name: Adry Segura    Note: Discharging nurse to complete BINU, reconcile AVS, and place final copy with patient's discharge packet. RN to ensure that written prescriptions for  Level II medications are sent with patient to the facility as per protocol.

## 2023-02-23 LAB
CREAT SERPL-MCNC: 1.1 MG/DL
POTASSIUM (K+): 4.2

## 2023-02-24 DIAGNOSIS — N18.30 STAGE 3 CHRONIC KIDNEY DISEASE, UNSPECIFIED WHETHER STAGE 3A OR 3B CKD (HCC): Primary | ICD-10-CM

## 2023-02-28 ENCOUNTER — NURSE ONLY (OUTPATIENT)
Dept: CARDIOLOGY CLINIC | Age: 88
End: 2023-02-28
Payer: MEDICARE

## 2023-02-28 DIAGNOSIS — Z95.0 PACEMAKER: ICD-10-CM

## 2023-02-28 DIAGNOSIS — Z98.890 HX OF ATRIOVENTRICULAR NODE ABLATION: Primary | ICD-10-CM

## 2023-02-28 PROCEDURE — 93296 REM INTERROG EVL PM/IDS: CPT | Performed by: INTERNAL MEDICINE

## 2023-02-28 PROCEDURE — 93294 REM INTERROG EVL PM/LDLS PM: CPT | Performed by: INTERNAL MEDICINE

## 2023-02-28 NOTE — PROGRESS NOTES
We received remote transmission from patient's monitor at home. Transmission shows normal sensing and pacing function. EP physician will review. See interrogation under cardiology tab in the [address] [place] [address] [address] field for more details. VS 0.1%   99.9%    End of 91-day monitoring period 2-28-23.

## 2023-03-01 ENCOUNTER — TELEPHONE (OUTPATIENT)
Dept: CARDIOLOGY CLINIC | Age: 88
End: 2023-03-01

## 2023-03-07 ENCOUNTER — NURSE ONLY (OUTPATIENT)
Dept: CARDIOLOGY CLINIC | Age: 88
End: 2023-03-07

## 2023-03-07 NOTE — PROGRESS NOTES
Patient presents to the Device Clinic for a site check s/p pocket revision.  Incision is closed, clean, and dry with all dressings/steri strips removed. Site left open to the air. Incision well approximated. No s/s of infection.    Patient education was provided about site care, device functionality, in home monitoring, and any other patient questions and/or concerns were addressed. Aftercare and remote monitoring literature was provided. Patient voices understanding.

## 2023-04-04 DIAGNOSIS — R44.3 HALLUCINATIONS: ICD-10-CM

## 2023-04-04 RX ORDER — CITALOPRAM 10 MG/1
10 TABLET ORAL DAILY
Qty: 90 TABLET | Refills: 2 | OUTPATIENT
Start: 2023-04-04

## 2023-04-07 ENCOUNTER — CARE COORDINATION (OUTPATIENT)
Dept: CASE MANAGEMENT | Age: 88
End: 2023-04-07

## 2023-04-07 NOTE — CARE COORDINATION
symptoms and risk factors.   Plan for next call:  none    Elicia Tran, JEROMEN, RN   [address] [place] [address] Transition Nurse  [phone]

## 2023-04-10 PROBLEM — I42.9 CARDIOMYOPATHY, UNSPECIFIED TYPE (HCC): Status: ACTIVE | Noted: 2023-04-10

## 2023-04-17 RX ORDER — BUSPIRONE HYDROCHLORIDE 5 MG/1
5 TABLET ORAL 2 TIMES DAILY
Qty: 60 TABLET | Refills: 2 | Status: SHIPPED | OUTPATIENT
Start: 2023-04-17

## 2023-04-20 DIAGNOSIS — F03.918 DEMENTIA WITH BEHAVIORAL DISTURBANCE (HCC): Primary | ICD-10-CM

## 2023-04-20 RX ORDER — MEMANTINE HYDROCHLORIDE 5 MG/1
10 TABLET ORAL 2 TIMES DAILY
Qty: 120 TABLET | Refills: 2 | Status: SHIPPED | OUTPATIENT
Start: 2023-04-20

## 2023-04-24 NOTE — PROGRESS NOTES
[address]   Electrophysiology Consult Note              Date:  April 25, 2023  Patient name: Madie Correa  YOB: 1935    Chief Complaint:  Follow-up and Atrial Fibrillation      Primary Care Physician:Jose De Jesus Bermudez MD    HISTORY OF PRESENT ILLNESS: Madie Correa is a 82 y.o. male who presents for evaluation to establish care for atrial Fibrillation/pacemaker management and transmission. He is followed by my colleague Dr. Dalton Melendez for interventional cardiology. On 5/4/20 he was seen by Dr Dalton Melendez for his shortness of breath. A Lexiscan Stress Test was ordered, which he underwent on 5/12/20 which demonstrated an EF of 64% and a small mild defect at the apex at stress that reverses at rest which is consistent with ischemia. He had a Left Heart Cath on 5/22/20 which demonstrated mild/mod diffuse epicardial CAD, 50% stenosis of mid LAD and normal LVEF of 55-60%. His EKG on 5/22/20 demonstrated Atrial Fibrillation, HR of 96 BPM. He has a PMH of [place] single chamber PPM placed on 10/13/2017 by Dr Peg Zendejas ([facility]), CAD, HTN, HLD and chronic AF. His device check 7/9/20 demonstrated predominantly AF with 1 event of AFIB RVR on 7/6/20 that lasted 2 seconds,  40%. His EKG 7/9/20 demonstrated AF HR 91 BPM. During this office visit his metoprolol was increased to 50 mg BID and his eliquis was decreased to 2.5mg BID due to age and kidney function. On 3/8/2022 patient's device interrogation demonstrates  46%, no new events, 10 years until RRT. ECG demonstrates AF 94 BPM. He reported that he has been feeling fatigued since having COVID January 2022. He reported he gets lightheaded/dizy all the time and has fell several times. Patient underwent RFCA of AV node on 9/16/2022. He underwent upgrade of PPM to BiV. On 1/3/2023, Device interrogation demonstrated  99%, 2 NSVT events, TIMBO 3 years. ECG demonstrates V paced 81 BPM. He reports that he is doing well.  He reports that he uses a

## 2023-04-24 NOTE — PROGRESS NOTES
Patient presents to the device clinic today for a programming evaluation for his pacemaker. Patient has a history of AF, ICM, and rapid AF. Last device interrogation was on 2/28. Since then, no arrhythmias recorded. R wave: N/A     100%    All sensing and pacing parameters are within normal range. No changes need to be made at this time. Patient will see Dr. Karla Burgess today in office. Patient education was provided about device functionality, in home monitoring, and any other patient questions and/or concerns were addressed. Patient voices understanding. Patient will follow up in 3 months in office or remotely. Please see interrogation for more detail - Paceart report located under the Cardiology tab.

## 2023-04-25 ENCOUNTER — OFFICE VISIT (OUTPATIENT)
Dept: CARDIOLOGY CLINIC | Age: 88
End: 2023-04-25

## 2023-04-25 ENCOUNTER — NURSE ONLY (OUTPATIENT)
Dept: CARDIOLOGY CLINIC | Age: 88
End: 2023-04-25
Payer: MEDICARE

## 2023-04-25 VITALS
SYSTOLIC BLOOD PRESSURE: 120 MMHG | HEART RATE: 103 BPM | DIASTOLIC BLOOD PRESSURE: 68 MMHG | WEIGHT: 153.2 LBS | OXYGEN SATURATION: 97 % | HEIGHT: 66 IN | BODY MASS INDEX: 24.62 KG/M2

## 2023-04-25 DIAGNOSIS — R55 SYNCOPE, UNSPECIFIED SYNCOPE TYPE: ICD-10-CM

## 2023-04-25 DIAGNOSIS — Z95.0 PACEMAKER-DEPENDENT DUE TO NATIVE CARDIAC RHYTHM INSUFFICIENT TO SUPPORT LIFE: ICD-10-CM

## 2023-04-25 DIAGNOSIS — Z98.890 HX OF ATRIOVENTRICULAR NODE ABLATION: ICD-10-CM

## 2023-04-25 DIAGNOSIS — I25.5 ISCHEMIC CARDIOMYOPATHY: ICD-10-CM

## 2023-04-25 DIAGNOSIS — I48.19 PERSISTENT ATRIAL FIBRILLATION (HCC): Primary | ICD-10-CM

## 2023-04-25 DIAGNOSIS — I49.8 PACEMAKER-DEPENDENT DUE TO NATIVE CARDIAC RHYTHM INSUFFICIENT TO SUPPORT LIFE: ICD-10-CM

## 2023-04-25 DIAGNOSIS — I48.91 ATRIAL FIBRILLATION, RAPID (HCC): Primary | ICD-10-CM

## 2023-04-25 DIAGNOSIS — R55 SYNCOPE AND COLLAPSE: ICD-10-CM

## 2023-04-25 DIAGNOSIS — Z95.0 CARDIAC RESYNCHRONIZATION THERAPY PACEMAKER (CRT-P) IN PLACE: ICD-10-CM

## 2023-04-25 PROCEDURE — 93279 PRGRMG DEV EVAL PM/LDLS PM: CPT | Performed by: INTERNAL MEDICINE

## 2023-04-25 NOTE — PATIENT INSTRUCTIONS
RECOMMENDATIONS:  Remote device checks every 3 months. Continue current medications. Discussed Watchman.  -patient defers at this time. Follow up in 6 months with EP NP.

## 2023-06-27 ENCOUNTER — NURSE ONLY (OUTPATIENT)
Dept: CARDIOLOGY CLINIC | Age: 88
End: 2023-06-27
Payer: MEDICARE

## 2023-06-27 DIAGNOSIS — Z95.0 PACEMAKER: ICD-10-CM

## 2023-06-27 DIAGNOSIS — Z95.0 PACEMAKER-DEPENDENT DUE TO NATIVE CARDIAC RHYTHM INSUFFICIENT TO SUPPORT LIFE: Primary | ICD-10-CM

## 2023-06-27 DIAGNOSIS — Z98.890 HX OF ATRIOVENTRICULAR NODE ABLATION: ICD-10-CM

## 2023-06-27 DIAGNOSIS — I49.8 PACEMAKER-DEPENDENT DUE TO NATIVE CARDIAC RHYTHM INSUFFICIENT TO SUPPORT LIFE: Primary | ICD-10-CM

## 2023-06-27 PROCEDURE — 93296 REM INTERROG EVL PM/IDS: CPT | Performed by: INTERNAL MEDICINE

## 2023-06-27 PROCEDURE — 93294 REM INTERROG EVL PM/LDLS PM: CPT | Performed by: INTERNAL MEDICINE

## 2023-07-03 NOTE — PROGRESS NOTES
End of 91-day monitoring period 6/27  Pacing (% of Time Since 25-Apr-2023)  VS < 0.1%   99.9%    Patient has a history of ICM, Paroxysmal Atrial Fibrillation-- monitor HR off of BB.  - Eliquis discontinued per Medicine due to high fall risk  Hx AVN ablation. Remote transmission received for patient's [employer] VR PACEMAKER. Transmission shows normal sensing and pacing function. EP physician will review. See interrogation under the cardiology tab in the [address] field for more details. Will continue to monitor remotely.

## 2023-07-08 NOTE — DISCHARGE SUMMARY
Hospitalist Discharge Summary    Patient ID:  Joi Kendall  [id number]  75 y.o.  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    Abnormal echocardiogram    Dizziness    SOB (shortness of breath)    Abnormal cardiovascular stress test    Coronary artery disease involving native heart without angina pectoris    Other chest pain    Atrial fibrillation with rapid ventricular response (HCC)    Syncope and collapse    COVID    NATANAEL (acute kidney injury) ([address]    Atrial fibrillation with RVR ([address]    Dementia with behavioral disturbance    Hallucinations    Syncope, unspecified syncope type    Ischemic cardiomyopathy    Atrial fibrillation, rapid (HCC)    Orthostatic hypotension    Pacemaker infection ([address]       Discharge Diagnoses: Principal Problem:    Pacemaker infection ([address]  Active Problems:    Dementia with behavioral disturbance    Hallucinations    Ischemic cardiomyopathy    Hyperlipidemia    Hypertension    Chronic kidney disease, stage III (moderate) ([address]    A-fib ([address]  Resolved Problems:    * No resolved hospital problems. *      Code Status:  Full Code    Condition:  Stable    Discharge Diet: Diet:  ADULT DIET;  Regular; Low Fat/Low Chol/High Fiber/2 gm Na    PCP to do list:  follow at [facility] - pt may experience significant sundowning, but have started seroquel and this has improved    Hospital Course: 82 y.o. male, with past medical history of hypertension, hyperlipidemia, dementia and atrial fibrillation, who was a direct admit from [facility] to [facility] with an infected pacemaker site. History obtained from the patient and review of EMR. The patient stated he was taken to [facility] emergency room by his daughter because he was lost and got himself turned around. He also stated he was taking because he has an infection in his pacemaker. The patient does have a history of dementia and has episodes of confusion. Per EMR, the patient's daughter reported that for the last 2 to 3 days she has noticed the patient's left chest wall pacemaker site has been swollen, red and painful. The patient stated he is unsure how long ago his pacemaker was placed, but EMR reveals that it was placed 5 months ago. The patient's daughter reported that the patient has had some fevers and chills at home. However, the patient has been afebrile and has a negative lactic and leukocytosis. In the emergency room a chest CT was obtained that revealed congestive heart failure. Blood cultures were obtained and the patient was started on vancomycin and cefepime. He was also given morphine for discomfort. Throughout the patient's work-up he was also found to have an NATANAEL with a creatinine of 1.7. His baseline appears to be 1.1. His UA was negative. The patient was ultimately transferred to [facility] for further evaluation and treatment. Pt confused intermittently along with combativeness. Plan is to transfer to [facility] on Sunday or Monday - currently on wait list for telemetry bed -  procedure for pacemaker removal is Tuesday with Dr. Juliana Dominguez. Confusion slightly less with seroquel and pt does need sitter camera, but not restraints. 1/22/2023:  No issues today - pt aware of where he is, still c/o slight pain at pacemaker site. Incision erythema slightly less.     Discharge Medications:   Current Discharge Medication List        START taking these medications    Details   LORazepam (ATIVAN) 2 MG/ML injection Infuse 0.5 mLs intravenously every 4 hours as needed (anxiety) for up to 7 days. Max Daily Amount: 6 mg    Associated Diagnoses: Dementia with behavioral disturbance      enoxaparin (LOVENOX) 40 MG/0.4ML Inject 0.4 mLs into the skin daily      lactobacillus (CULTURELLE) capsule Take 1 capsule by mouth 2 times daily (with meals)      hydrALAZINE (APRESOLINE) 20 MG/ML injection Infuse 0.5 mLs intravenously every 4 hours as needed (SBP greater than 180 mmHg)      amLODIPine (NORVASC) 10 MG tablet Take 1 tablet by mouth daily  Qty: 30 tablet, Refills: 3      polyethylene glycol (GLYCOLAX) 17 g packet Take 17 g by mouth daily as needed for Constipation  Qty: 527 g, Refills: 1      labetalol (NORMODYNE;TRANDATE) 5 MG/ML injection Infuse 4 mLs intravenously every 4 hours as needed (Give for systolic blood pressure greater than 688) Give for systolic blood pressure greater than 160      cefepime (MAXIPIME) infusion Infuse 2,000 mg intravenously every 24 hours for 10 days Compound per protocol      vancomycin (VANCOCIN) infusion Infuse 1,000 mg intravenously every 24 hours for 10 days Compound per protocol. Current Discharge Medication List        CONTINUE these medications which have CHANGED    Details   isosorbide mononitrate (IMDUR) 30 MG extended release tablet Take 1 tablet by mouth daily  Qty: 30 tablet, Refills: 3      !! QUEtiapine (SEROQUEL) 25 MG tablet Take 1 tablet by mouth every morning (before breakfast)  Qty: 60 tablet, Refills: 3      !! QUEtiapine (SEROQUEL) 50 MG tablet Take 1 tablet by mouth nightly  Qty: 60 tablet, Refills: 3      tamsulosin (FLOMAX) 0.4 MG capsule Take 1 capsule by mouth daily  Qty: 30 capsule, Refills: 3       !! - Potential duplicate medications found. Please discuss with provider.         Current Discharge Medication List        CONTINUE these medications which have NOT CHANGED    Details   memantine (NAMENDA) 5 MG tablet TAKE 1 TABLET BY MOUTH 2 TIMES DAILY  Qty: 60 tablet, Refills: 2      atorvastatin (LIPITOR) 40 MG tablet TAKE 1 TABLET BY MOUTH DAILY  Qty: 90 tablet, Refills: 3    Associated Diagnoses: Hyperlipidemia, unspecified hyperlipidemia type      risperiDONE (RISPERDAL) 0.25 MG tablet Take 1 tablet by mouth 2 times daily  Qty: 60 tablet, Refills: 3    Associated Diagnoses: Dementia with behavioral disturbance      pantoprazole (PROTONIX) 40 MG tablet Take 1 tablet by mouth daily  Qty: 30 tablet, Refills: 5    Associated Diagnoses: Gastroesophageal reflux disease, unspecified whether esophagitis present      mirabegron (MYRBETRIQ) 25 MG TB24 Take 1 tablet by mouth daily  Qty: 30 tablet, Refills: 5    Associated Diagnoses: Urge incontinence      citalopram (CELEXA) 10 MG tablet TAKE 1 TABLET BY MOUTH DAILY  Qty: 90 tablet, Refills: 3    Associated Diagnoses: Hallucinations      Ferrous Sulfate (IRON) 325 (65 Fe) MG TABS Take by mouth daily       finasteride (PROSCAR) 5 MG tablet Take 5 mg by mouth daily. aspirin 81 MG tablet Take 81 mg by mouth daily.              Current Discharge Medication List        STOP taking these medications       divalproex (DEPAKOTE) 125 MG DR tablet Comments:   Reason for Stopping:         fludrocortisone (FLORINEF) 0.1 MG tablet Comments:   Reason for Stopping:         midodrine (PROAMATINE) 5 MG tablet Comments:   Reason for Stopping:         Acetaminophen (TYLENOL) 325 MG CAPS Comments:   Reason for Stopping:         trospium (SANCTURA) 20 MG tablet Comments:   Reason for Stopping:         apixaban (ELIQUIS) 5 MG TABS tablet Comments:   Reason for Stopping:         metoprolol succinate (TOPROL XL) 25 MG extended release tablet Comments:   Reason for Stopping:         famotidine (PEPCID) 20 MG tablet Comments:   Reason for Stopping:         donepezil (ARICEPT) 10 MG tablet Comments:   Reason for Stopping:         fluticasone (FLONASE) 50 MCG/ACT nasal spray Comments:   Reason for Stopping:                   Procedures: DORITA:  Conclusions        Summary    Low-normal left ventricular systolic function with ejection fraction    visually estimated at 50%. No evidence of mass, vegetation or thrombus noted on native valves nor    pacemaker leads. Mild mitral regurgitation. Moderate tricuspid regurgitation. Systolic pulmonary artery pressure (SPAP) estimated at 26 mmHg (RA pressure    8 mmHg). Assessment on Discharge: Stable, improved     Discharge ROS:  A complete review of systems was asked and negative except for soreness at pacemaker site    Discharge Exam:  /89   Pulse 81   Temp 97.9 °F (36.6 °C) (Oral)   Resp 18   Wt 150 lb 14.4 oz (68.4 kg)   SpO2 97%   BMI 23.63 kg/m²     Gen: NAD, baseline intermittent confusion in hospital setting  HEENT: NC/AT, moist mucous membranes, no oropharyngeal erythema or exudate  Neck: supple, trachea midline, no anterior cervical or SC LAD  Heart:  Normal s1/s2, RRR, no murmurs, gallops, or rubs. no leg edema  Lungs:  diminished bilaterally, no wheeze,no rales or rhonchi, no use of accessory muscles  Abd: bowel sounds present, soft, nontender, nondistended, no masses  Extrem:  No clubbing, cyanosis,  no edema  Skin: no lesion or masses, erythema around pacemaker site is slightly less  Psych:  Not oriented to date  Neuro: grossly intact, moves all four extremities    Pertinent Studies During Hospital Stay:  Radiology:  XR CHEST (2 VW)    Result Date: 1/19/2023  EXAMINATION: TWO XRAY VIEWS OF THE CHEST 1/19/2023 10:32 am COMPARISON: 09/17/2022 HISTORY: ORDERING SYSTEM PROVIDED HISTORY: pacemaker complication TECHNOLOGIST PROVIDED HISTORY: Reason for exam:->pacemaker complication Reason for Exam: pacemaker complication FINDINGS: There is bibasilar scarring with mild pulmonary vascular congestion. Cardiomegaly is stable status post dual lead pacemaker. The leads are intact overlying the right ventricle and great cardiac vein. There is no pneumothorax or pleural effusion. Status post left shoulder arthroplasty.      1. Cardiomegaly with mild pulmonary vascular congestion. CT CHEST WO CONTRAST    Result Date: 1/19/2023  EXAMINATION: CT OF THE CHEST WITHOUT CONTRAST 1/19/2023 12:30 pm TECHNIQUE: CT of the chest was performed without the administration of intravenous contrast. Multiplanar reformatted images are provided for review. Automated exposure control, iterative reconstruction, and/or weight based adjustment of the mA/kV was utilized to reduce the radiation dose to as low as reasonably achievable. COMPARISON: 01/19/2023 and 01/24/2016 HISTORY: ORDERING SYSTEM PROVIDED HISTORY: pacemaker complication TECHNOLOGIST PROVIDED HISTORY: Reason for exam:->pacemaker complication Reason for Exam: Pacemaker complications FINDINGS: Mediastinum: Motion artifact degrades image quality. The heart is enlarged status post dual lead pacemaker. Coronary artery calcifications are a marker of atherosclerosis. There are no enlarged thoracic lymph nodes. Calcified granulomatous disease is noted. Lungs/pleura: The tracheobronchial tree is patent. There is no pneumothorax. There is a trace right pleural effusion with bibasilar atelectasis. Mild bilateral interstitial thickening is due to interstitial edema. Upper Abdomen: There is an enlarging moderate to large hiatal hernia. A punctate nonobstructing right nephrolithiasis is noted. Soft Tissues/Bones: Degenerative changes involve the thoracic spine and right glenohumeral joint. Status post left shoulder arthroplasty. There is mild-to-moderate bilateral gynecomastia. 1. Congestive heart failure.        Blood cx neg x 2    Last Labs on Discharge:     Recent Results (from the past 24 hour(s))   Vancomycin Level, Trough    Collection Time: 01/22/23  5:37 AM   Result Value Ref Range    Vancomycin Tr 11.8 10.0 - 20.0 ug/mL         Follow up: with Bijan Olivo MD    Note that 35 minutes was spent in preparing discharge papers, discussing discharge with patient, medication review, etc.    Thank you Bijan Olivo MD for the opportunity to be involved in this patient's care. If you have any questions or concerns please feel free to contact me at [phone].       Electronically signed by Jim Vaca MD on 1/22/2023 at 6:39 AM 0 (no pain/absence of nonverbal indicators of pain)

## 2023-08-16 DIAGNOSIS — F03.918 DEMENTIA WITH BEHAVIORAL DISTURBANCE (HCC): ICD-10-CM

## 2023-08-16 RX ORDER — RISPERIDONE 0.25 MG/1
0.25 TABLET ORAL 2 TIMES DAILY
Qty: 60 TABLET | Refills: 3 | Status: SHIPPED | OUTPATIENT
Start: 2023-08-16

## 2023-08-30 DIAGNOSIS — I10 ESSENTIAL HYPERTENSION: Primary | ICD-10-CM

## 2023-08-30 LAB
CREAT SERPL-MCNC: 1.4 MG/DL
POTASSIUM (K+): 4.8

## 2023-09-04 ENCOUNTER — HOSPITAL ENCOUNTER (INPATIENT)
Age: 88
LOS: 4 days | Discharge: NURSING, REHAB OR LONG-TERM CARE | End: 2023-09-08
Attending: SURGERY | Admitting: SURGERY
Payer: MEDICARE

## 2023-09-04 PROBLEM — K44.9 HIATAL HERNIA: Status: ACTIVE | Noted: 2023-09-04

## 2023-09-04 PROCEDURE — 99222 1ST HOSP IP/OBS MODERATE 55: CPT | Performed by: SURGERY

## 2023-09-04 PROCEDURE — 6360000002 HC RX W HCPCS: Performed by: SURGERY

## 2023-09-04 PROCEDURE — 1200000000 HC SEMI PRIVATE

## 2023-09-04 PROCEDURE — 6370000000 HC RX 637 (ALT 250 FOR IP): Performed by: SURGERY

## 2023-09-04 PROCEDURE — 2580000003 HC RX 258: Performed by: SURGERY

## 2023-09-04 PROCEDURE — 6360000002 HC RX W HCPCS

## 2023-09-04 PROCEDURE — 92610 EVALUATE SWALLOWING FUNCTION: CPT

## 2023-09-04 PROCEDURE — 6370000000 HC RX 637 (ALT 250 FOR IP): Performed by: REGISTERED NURSE

## 2023-09-04 RX ORDER — POTASSIUM CHLORIDE 7.45 MG/ML
10 INJECTION INTRAVENOUS PRN
Status: DISCONTINUED | OUTPATIENT
Start: 2023-09-04 | End: 2023-09-05

## 2023-09-04 RX ORDER — ACETAMINOPHEN 325 MG/1
650 TABLET ORAL EVERY 6 HOURS PRN
Status: DISCONTINUED | OUTPATIENT
Start: 2023-09-04 | End: 2023-09-08 | Stop reason: HOSPADM

## 2023-09-04 RX ORDER — MEMANTINE HYDROCHLORIDE 5 MG/1
10 TABLET ORAL 2 TIMES DAILY
Status: DISCONTINUED | OUTPATIENT
Start: 2023-09-04 | End: 2023-09-08 | Stop reason: HOSPADM

## 2023-09-04 RX ORDER — METOPROLOL SUCCINATE 25 MG/1
25 TABLET, EXTENDED RELEASE ORAL 2 TIMES DAILY
Status: DISCONTINUED | OUTPATIENT
Start: 2023-09-04 | End: 2023-09-08 | Stop reason: HOSPADM

## 2023-09-04 RX ORDER — LORAZEPAM 2 MG/ML
0.5 INJECTION INTRAMUSCULAR ONCE
Status: COMPLETED | OUTPATIENT
Start: 2023-09-04 | End: 2023-09-04

## 2023-09-04 RX ORDER — ASPIRIN 81 MG/1
81 TABLET ORAL DAILY
Status: DISCONTINUED | OUTPATIENT
Start: 2023-09-04 | End: 2023-09-08 | Stop reason: HOSPADM

## 2023-09-04 RX ORDER — ONDANSETRON 2 MG/ML
4 INJECTION INTRAMUSCULAR; INTRAVENOUS EVERY 6 HOURS PRN
Status: DISCONTINUED | OUTPATIENT
Start: 2023-09-04 | End: 2023-09-08 | Stop reason: HOSPADM

## 2023-09-04 RX ORDER — POLYETHYLENE GLYCOL 3350 17 G/17G
17 POWDER, FOR SOLUTION ORAL DAILY PRN
Status: DISCONTINUED | OUTPATIENT
Start: 2023-09-04 | End: 2023-09-08 | Stop reason: HOSPADM

## 2023-09-04 RX ORDER — ENOXAPARIN SODIUM 100 MG/ML
40 INJECTION SUBCUTANEOUS DAILY
Status: DISCONTINUED | OUTPATIENT
Start: 2023-09-04 | End: 2023-09-08 | Stop reason: HOSPADM

## 2023-09-04 RX ORDER — POTASSIUM CHLORIDE 7.45 MG/ML
10 INJECTION INTRAVENOUS PRN
Status: DISCONTINUED | OUTPATIENT
Start: 2023-09-04 | End: 2023-09-04

## 2023-09-04 RX ORDER — ATORVASTATIN CALCIUM 40 MG/1
40 TABLET, FILM COATED ORAL NIGHTLY
Status: DISCONTINUED | OUTPATIENT
Start: 2023-09-04 | End: 2023-09-08 | Stop reason: HOSPADM

## 2023-09-04 RX ORDER — POTASSIUM CHLORIDE 20 MEQ/1
40 TABLET, EXTENDED RELEASE ORAL PRN
Status: DISCONTINUED | OUTPATIENT
Start: 2023-09-04 | End: 2023-09-04

## 2023-09-04 RX ORDER — SODIUM CHLORIDE 0.9 % (FLUSH) 0.9 %
5-40 SYRINGE (ML) INJECTION PRN
Status: DISCONTINUED | OUTPATIENT
Start: 2023-09-04 | End: 2023-09-08 | Stop reason: HOSPADM

## 2023-09-04 RX ORDER — SODIUM CHLORIDE 9 MG/ML
INJECTION, SOLUTION INTRAVENOUS CONTINUOUS
Status: ACTIVE | OUTPATIENT
Start: 2023-09-04 | End: 2023-09-04

## 2023-09-04 RX ORDER — FINASTERIDE 5 MG/1
5 TABLET, FILM COATED ORAL DAILY
Status: DISCONTINUED | OUTPATIENT
Start: 2023-09-04 | End: 2023-09-08 | Stop reason: HOSPADM

## 2023-09-04 RX ORDER — SODIUM CHLORIDE 0.9 % (FLUSH) 0.9 %
5-40 SYRINGE (ML) INJECTION EVERY 12 HOURS SCHEDULED
Status: DISCONTINUED | OUTPATIENT
Start: 2023-09-04 | End: 2023-09-08 | Stop reason: HOSPADM

## 2023-09-04 RX ORDER — MECOBALAMIN 5000 MCG
5 TABLET,DISINTEGRATING ORAL NIGHTLY
Status: DISCONTINUED | OUTPATIENT
Start: 2023-09-04 | End: 2023-09-08 | Stop reason: HOSPADM

## 2023-09-04 RX ORDER — ACETAMINOPHEN 650 MG/1
650 SUPPOSITORY RECTAL EVERY 6 HOURS PRN
Status: DISCONTINUED | OUTPATIENT
Start: 2023-09-04 | End: 2023-09-08 | Stop reason: HOSPADM

## 2023-09-04 RX ORDER — MAGNESIUM SULFATE IN WATER 40 MG/ML
2000 INJECTION, SOLUTION INTRAVENOUS PRN
Status: DISCONTINUED | OUTPATIENT
Start: 2023-09-04 | End: 2023-09-04

## 2023-09-04 RX ORDER — PANTOPRAZOLE SODIUM 40 MG/1
40 TABLET, DELAYED RELEASE ORAL 2 TIMES DAILY
Status: DISCONTINUED | OUTPATIENT
Start: 2023-09-04 | End: 2023-09-08 | Stop reason: HOSPADM

## 2023-09-04 RX ORDER — RISPERIDONE 0.25 MG/1
0.25 TABLET ORAL 2 TIMES DAILY
Status: DISCONTINUED | OUTPATIENT
Start: 2023-09-04 | End: 2023-09-08 | Stop reason: HOSPADM

## 2023-09-04 RX ORDER — HYDROXYZINE HYDROCHLORIDE 10 MG/1
25 TABLET, FILM COATED ORAL NIGHTLY PRN
Status: DISCONTINUED | OUTPATIENT
Start: 2023-09-04 | End: 2023-09-08 | Stop reason: HOSPADM

## 2023-09-04 RX ORDER — SODIUM CHLORIDE 9 MG/ML
INJECTION, SOLUTION INTRAVENOUS PRN
Status: DISCONTINUED | OUTPATIENT
Start: 2023-09-04 | End: 2023-09-08 | Stop reason: HOSPADM

## 2023-09-04 RX ORDER — ONDANSETRON 4 MG/1
4 TABLET, ORALLY DISINTEGRATING ORAL EVERY 8 HOURS PRN
Status: DISCONTINUED | OUTPATIENT
Start: 2023-09-04 | End: 2023-09-08 | Stop reason: HOSPADM

## 2023-09-04 RX ADMIN — SODIUM CHLORIDE: 9 INJECTION, SOLUTION INTRAVENOUS at 06:46

## 2023-09-04 RX ADMIN — METOPROLOL SUCCINATE 25 MG: 25 TABLET, EXTENDED RELEASE ORAL at 16:26

## 2023-09-04 RX ADMIN — MEMANTINE 10 MG: 5 TABLET ORAL at 20:56

## 2023-09-04 RX ADMIN — PANTOPRAZOLE SODIUM 40 MG: 40 TABLET, DELAYED RELEASE ORAL at 12:53

## 2023-09-04 RX ADMIN — MEMANTINE 10 MG: 5 TABLET ORAL at 12:52

## 2023-09-04 RX ADMIN — ENOXAPARIN SODIUM 40 MG: 100 INJECTION SUBCUTANEOUS at 09:38

## 2023-09-04 RX ADMIN — PANTOPRAZOLE SODIUM 40 MG: 40 TABLET, DELAYED RELEASE ORAL at 20:56

## 2023-09-04 RX ADMIN — Medication 5 MG: at 20:56

## 2023-09-04 RX ADMIN — ATORVASTATIN CALCIUM 40 MG: 40 TABLET, FILM COATED ORAL at 20:56

## 2023-09-04 RX ADMIN — HYDROXYZINE HYDROCHLORIDE 25 MG: 10 TABLET ORAL at 23:41

## 2023-09-04 RX ADMIN — RISPERIDONE 0.25 MG: 0.25 TABLET, FILM COATED ORAL at 20:56

## 2023-09-04 RX ADMIN — LORAZEPAM 0.5 MG: 2 INJECTION INTRAMUSCULAR; INTRAVENOUS at 10:43

## 2023-09-04 RX ADMIN — FINASTERIDE 5 MG: 5 TABLET, FILM COATED ORAL at 12:51

## 2023-09-04 RX ADMIN — SODIUM CHLORIDE, PRESERVATIVE FREE 10 ML: 5 INJECTION INTRAVENOUS at 20:54

## 2023-09-04 RX ADMIN — METOPROLOL SUCCINATE 25 MG: 25 TABLET, EXTENDED RELEASE ORAL at 12:52

## 2023-09-04 RX ADMIN — ACETAMINOPHEN 650 MG: 325 TABLET ORAL at 22:51

## 2023-09-04 RX ADMIN — RISPERIDONE 0.25 MG: 0.25 TABLET, FILM COATED ORAL at 12:53

## 2023-09-04 ASSESSMENT — PAIN SCALES - GENERAL
PAINLEVEL_OUTOF10: 5
PAINLEVEL_OUTOF10: 0

## 2023-09-04 ASSESSMENT — PAIN DESCRIPTION - LOCATION: LOCATION: HEAD;NECK;BACK

## 2023-09-04 NOTE — PROGRESS NOTES
Pt arrived to B3 in stable condition. VS reported to MD. Call light given, bed alarm on. Pt oriented to room.   Pt confused at baseline knows self and place currently

## 2023-09-04 NOTE — PLAN OF CARE
Bedside swallow evaluation completed. Lena Toledo M.A.  [address] [place] [address]  Speech Language Pathologist

## 2023-09-04 NOTE — PROGRESS NOTES
Speech Language Pathology  Clinical Bedside Swallow Assessment  Facility/Department: [facility] B3 - MED SURG        Recommendations:  Diet recommendation: NPO; Meds w/ sips of water okay  Instrumentation: Not clinically indicated at this time. Will continue to monitor  Risk management: oral care q4 hrs to reduce adverse affects in the event of aspiration      NAME:Aroldo Anne  : 1935 (82 y.o.)   MRN: [id number]  ROOM: [address] [place] [address]  ADMISSION DATE: 2023  PATIENT DIAGNOSIS(ES): Hiatal hernia [K44.9]  No chief complaint on file.     Patient Active Problem List    Diagnosis Date Noted    Pneumonia 2015    Hx of atrioventricular node ablation 2023    Pacemaker pocket hematoma, initial encounter 2023    Pacemaker-dependent due to native cardiac rhythm insufficient to support life 2023    Pacemaker infection ([address]) 2023    Orthostatic hypotension 2022    Atrial fibrillation, rapid ([address]) 2022    Syncope, unspecified syncope type 08/10/2022    Ischemic cardiomyopathy 08/10/2022    Dementia with behavioral disturbance     Hallucinations     Hiatal hernia 2023    Cardiomyopathy, unspecified type ([address]) 04/10/2023    Syncope and collapse     COVID     Other chest pain 2021    Coronary artery disease involving native heart without angina pectoris 06/10/2020    Abnormal cardiovascular stress test 2020    SOB (shortness of breath) 2020    Abnormal echocardiogram 2019    Dizziness 2019    A-fib ([address]) 2016    Hyperlipidemia     Hypertension     Chronic kidney disease, stage III (moderate) (HCC)     Tracheobronchomalacia     Abnormal chest CT     Cough syncope      Past Medical History:   Diagnosis Date    Atrial fibrillation ([address])     Blind right eye     Chronic kidney disease     Dementia ([address])     Hyperlipidemia     Hypertension     Pneumonia      Past Surgical History:   Procedure Laterality Date    ABLATION OF DYSRHYTHMIC FOCUS

## 2023-09-04 NOTE — CONSULTS
[facility] GENERAL SURGERY      Department of General Surgery Consult    PATIENT NAME: Paula Mclain   YOB: 1935    ADMISSION DATE: 9/4/2023  5:45 AM      TODAY'S DATE: 9/4/2023    Reason for Consult:  Hiatal hernia    Requesting Physician:  Hospitalist    HISTORY OF PRESENT ILLNESS:              The patient is a 82 y.o. male who presents from [facility] with diagnosis of hiatal hernia. Patient has some dementia. He lives at [facility]. I am asked to evaluate this. The patient states that he has been losing weight and having difficulty eating.       Past Medical History:        Diagnosis Date    Atrial fibrillation ([address])     Blind right eye     Chronic kidney disease     Dementia ([address])     Hyperlipidemia     Hypertension     Pneumonia        Past Surgical History:        Procedure Laterality Date    ABLATION OF DYSRHYTHMIC FOCUS      APPENDECTOMY      COLONOSCOPY      EYE SURGERY      HERNIA REPAIR      JOINT REPLACEMENT  2013    left shoulder    PACEMAKER PLACEMENT  10/13/2017    Dr Shanna Lozano at [address] [place] [address] single chamber PPM    PACEMAKER PLACEMENT  09/16/2022    biventricular    TONSILLECTOMY      UPPER GASTROINTESTINAL ENDOSCOPY         Current Medications:   Current Facility-Administered Medications: [Held by provider] aspirin EC tablet 81 mg, 81 mg, Oral, Daily  [Held by provider] atorvastatin (LIPITOR) tablet 40 mg, 40 mg, Oral, Nightly  [Held by provider] finasteride (PROSCAR) tablet 5 mg, 5 mg, Oral, Daily  [Held by provider] melatonin disintegrating tablet 5 mg, 5 mg, Oral, Nightly  [Held by provider] memantine (NAMENDA) tablet 10 mg, 10 mg, Oral, BID  [Held by provider] metoprolol succinate (TOPROL XL) extended release tablet 25 mg, 25 mg, Oral, BID  [Held by provider] pantoprazole (PROTONIX) tablet 40 mg, 40 mg, Oral, BID  [Held by provider] risperiDONE (RisperDAL) tablet 0.25 mg, 0.25 mg, Oral, BID  sodium chloride flush 0.9 % injection 5-40 mL, 5-40 mL, IntraVENous, 2 times per Historical Provider, MD   lactobacillus (CULTURELLE) capsule Take 1 capsule by mouth 2 times daily (with meals) 1/21/23   Benito Honeycutt MD   mirabegron [facility]) 25 MG TB24 Take 1 tablet by mouth daily 10/17/22   Falguni Salazar, MD   trospium (SANCTURA) 20 MG tablet Take 20 mg by mouth in the morning and 20 mg before bedtime. 8/26/22  Historical Provider, MD   apixaban (ELIQUIS) 5 MG TABS tablet TAKE 1/2 TABLET BY MOUTH 2 TIMES DAILY 8/8/22 8/26/22  Falguni Salazar, MD   metoprolol succinate (TOPROL XL) 25 MG extended release tablet TAKE 3 TABLETS BY MOUTH TWICE DAILY  Patient taking differently: Take 25 mg by mouth in the morning and 25 mg in the evening. 7/25/22 8/26/22  GUS Tabor - CNP   famotidine (PEPCID) 20 MG tablet Take 1 tablet by mouth at bedtime 7/8/22 8/26/22  Falguni Salazar, MD   donepezil (ARICEPT) 10 MG tablet Take 1 tablet by mouth daily 3/24/22 8/26/22  Historical Provider, MD   finasteride (PROSCAR) 5 MG tablet Take 1 tablet by mouth daily 3/4/15   Historical Provider, MD   aspirin 81 MG tablet Take 1 tablet by mouth daily    Historical Provider, MD        Allergies:  Sulfa antibiotics    Social History:   TOBACCO:   reports that he quit smoking about 48 years ago. His smoking use included cigarettes. He has a 10.00 pack-year smoking history. He has never used smokeless tobacco.  ETOH:   reports no history of alcohol use. DRUGS:   reports no history of drug use. Family History:        Problem Relation Age of Onset    Heart Disease Mother     Other Mother     Heart Disease Father     Asthma Other        REVIEW OF SYSTEMS:  He reports no complaints related to the eyes, ears , nose throat or mouth. He admits to weight loss. No chest pain. No SOB. No urinary complaints. No musculoskeletal complaints. No skin rashes. No neurologic deficits. No bleeding tendencies. GI complaints include inability to eat.     PHYSICAL EXAM:  VITALS:  BP (!) 151/89   Pulse 76   Temp

## 2023-09-04 NOTE — PLAN OF CARE
Problem: Discharge Planning  Goal: Discharge to home or other facility with appropriate resources  9/4/2023 1533 by Trina Carpio RN  Outcome: Progressing  Flowsheets (Taken 9/4/2023 1533)  Discharge to home or other facility with appropriate resources:   Identify barriers to discharge with patient and caregiver   Arrange for needed discharge resources and transportation as appropriate   Identify discharge learning needs (meds, wound care, etc)     Problem: Pain  Goal: Verbalizes/displays adequate comfort level or baseline comfort level  Outcome: Progressing  Flowsheets (Taken 9/4/2023 1533)  Verbalizes/displays adequate comfort level or baseline comfort level:   Encourage patient to monitor pain and request assistance   Assess pain using appropriate pain scale   Administer analgesics based on type and severity of pain and evaluate response   Implement non-pharmacological measures as appropriate and evaluate response     Problem: Safety - Adult  Goal: Free from fall injury  Outcome: Progressing  Flowsheets (Taken 9/4/2023 1533)  Free From Fall Injury: Instruct family/caregiver on patient safety     Problem: Risk for Elopement  Goal: Patient will not exit the unit/facility without proper excort  Outcome: Progressing  Flowsheets  Taken 9/4/2023 1533 by Trina Carpio RN  Nursing Interventions for Elopement Risk:   Assist with personal care needs such as toileting, eating, dressing, as needed to reduce the risk of wandering   Communicate/escalate to charge nurse the risk of elopement   Make sure patient has all necessary personal care items  Taken 9/4/2023 0600 by Kary Doss RN  Nursing Interventions for Elopement Risk:   Assist with personal care needs such as toileting, eating, dressing, as needed to reduce the risk of wandering   Collaborate with family members/caregivers to mitigate the elopement risk   Communicate/escalate to charge nurse the risk of elopement   Make sure patient has all necessary personal

## 2023-09-04 NOTE — PROGRESS NOTES
MD notified; Pt arrived to floor in stable condition, pt placed in room 365 due to confusion. Awaiting orders for direct admit. Pt does have pacemaker. Pt Bp 156/95 hr 87, pt without complaint. Per [employer] pt increasingly confused overnight has demenitia at base line and was last given 1mg ativan at 2am via IV. Pt NPO per [facility] and completed 1 liter of NS.

## 2023-09-04 NOTE — PROGRESS NOTES
4 Eyes Skin Assessment     NAME:  Jeromy Avendano  YOB: 1935  MEDICAL RECORD NUMBER:  [id number]    The patient is being assessed for  Admission    I agree that at least one RN has performed a thorough Head to Toe Skin Assessment on the patient. ALL assessment sites listed below have been assessed. Areas assessed by both nurses:    Head, Face, Ears, Shoulders, Back, Chest, Arms, Elbows, Hands, Sacrum. Buttock, Coccyx, Ischium, Legs. Feet and Heels, and Under Medical Devices         Does the Patient have a Wound?  No noted wound(s)       Dionisio Prevention initiated by RN: No  Wound Care Orders initiated by RN: No    Pressure Injury (Stage 3,4, Unstageable, DTI, NWPT, and Complex wounds) if present, place Wound referral order by RN under : No    New Ostomies, if present place, Ostomy referral order under : No     Nurse 1 eSignature: Electronically signed by Ange Ferguson RN on 9/4/23 at 5:55 AM EDT    **SHARE this note so that the co-signing nurse can place an eSignature**    Nurse 2 eSignature: Electronically signed by Thang Bonilla RN on 9/4/23 at 2:03 PM EDT

## 2023-09-04 NOTE — PROGRESS NOTES
Hospitalist Progress Note    Patient was admitted overnight by addie. Patient seen and examined by me, agree with assessment and plan as outlined in H&P.     Code status updated to reflect DNR-CC after review of documents that were sent with patient    SLP consulted     Electronically signed by GUS Manzano CNP on 9/4/23 at 1:15 PM EDT

## 2023-09-05 ENCOUNTER — APPOINTMENT (OUTPATIENT)
Dept: GENERAL RADIOLOGY | Age: 88
End: 2023-09-05
Attending: SURGERY
Payer: MEDICARE

## 2023-09-05 LAB
ANION GAP SERPL CALCULATED.3IONS-SCNC: 11 MMOL/L (ref 3–16)
BASOPHILS # BLD: 0.1 K/UL (ref 0–0.2)
BASOPHILS NFR BLD: 0.8 %
BUN SERPL-MCNC: 23 MG/DL (ref 7–20)
CALCIUM SERPL-MCNC: 9 MG/DL (ref 8.3–10.6)
CHLORIDE SERPL-SCNC: 101 MMOL/L (ref 99–110)
CO2 SERPL-SCNC: 25 MMOL/L (ref 21–32)
CREAT SERPL-MCNC: 1.2 MG/DL (ref 0.8–1.3)
DEPRECATED RDW RBC AUTO: 16.2 % (ref 12.4–15.4)
EOSINOPHIL # BLD: 0.3 K/UL (ref 0–0.6)
EOSINOPHIL NFR BLD: 3.5 %
GFR SERPLBLD CREATININE-BSD FMLA CKD-EPI: 59 ML/MIN/{1.73_M2}
GLUCOSE SERPL-MCNC: 74 MG/DL (ref 70–99)
HCT VFR BLD AUTO: 41 % (ref 40.5–52.5)
HGB BLD-MCNC: 14.1 G/DL (ref 13.5–17.5)
LYMPHOCYTES # BLD: 1.6 K/UL (ref 1–5.1)
LYMPHOCYTES NFR BLD: 21 %
MCH RBC QN AUTO: 30.6 PG (ref 26–34)
MCHC RBC AUTO-ENTMCNC: 34.3 G/DL (ref 31–36)
MCV RBC AUTO: 89.1 FL (ref 80–100)
MONOCYTES # BLD: 0.6 K/UL (ref 0–1.3)
MONOCYTES NFR BLD: 8.2 %
NEUTROPHILS # BLD: 5.1 K/UL (ref 1.7–7.7)
NEUTROPHILS NFR BLD: 66.5 %
PLATELET # BLD AUTO: 243 K/UL (ref 135–450)
PMV BLD AUTO: 7.5 FL (ref 5–10.5)
POTASSIUM SERPL-SCNC: 4.1 MMOL/L (ref 3.5–5.1)
RBC # BLD AUTO: 4.6 M/UL (ref 4.2–5.9)
SODIUM SERPL-SCNC: 137 MMOL/L (ref 136–145)
WBC # BLD AUTO: 7.7 K/UL (ref 4–11)

## 2023-09-05 PROCEDURE — 97116 GAIT TRAINING THERAPY: CPT

## 2023-09-05 PROCEDURE — 36415 COLL VENOUS BLD VENIPUNCTURE: CPT

## 2023-09-05 PROCEDURE — 85025 COMPLETE CBC W/AUTO DIFF WBC: CPT

## 2023-09-05 PROCEDURE — 74240 X-RAY XM UPR GI TRC 1CNTRST: CPT

## 2023-09-05 PROCEDURE — 92526 ORAL FUNCTION THERAPY: CPT

## 2023-09-05 PROCEDURE — 97530 THERAPEUTIC ACTIVITIES: CPT

## 2023-09-05 PROCEDURE — 1200000000 HC SEMI PRIVATE

## 2023-09-05 PROCEDURE — 6360000002 HC RX W HCPCS: Performed by: SURGERY

## 2023-09-05 PROCEDURE — 6370000000 HC RX 637 (ALT 250 FOR IP): Performed by: NURSE PRACTITIONER

## 2023-09-05 PROCEDURE — 80048 BASIC METABOLIC PNL TOTAL CA: CPT

## 2023-09-05 PROCEDURE — 97162 PT EVAL MOD COMPLEX 30 MIN: CPT

## 2023-09-05 PROCEDURE — APPSS30 APP SPLIT SHARED TIME 16-30 MINUTES: Performed by: CLINICAL NURSE SPECIALIST

## 2023-09-05 PROCEDURE — 6370000000 HC RX 637 (ALT 250 FOR IP): Performed by: SURGERY

## 2023-09-05 PROCEDURE — 99232 SBSQ HOSP IP/OBS MODERATE 35: CPT | Performed by: SURGERY

## 2023-09-05 RX ORDER — IBUPROFEN 400 MG/1
400 TABLET ORAL EVERY 6 HOURS PRN
Status: DISCONTINUED | OUTPATIENT
Start: 2023-09-05 | End: 2023-09-08 | Stop reason: HOSPADM

## 2023-09-05 RX ADMIN — FINASTERIDE 5 MG: 5 TABLET, FILM COATED ORAL at 08:58

## 2023-09-05 RX ADMIN — RISPERIDONE 0.25 MG: 0.25 TABLET, FILM COATED ORAL at 08:58

## 2023-09-05 RX ADMIN — ENOXAPARIN SODIUM 40 MG: 100 INJECTION SUBCUTANEOUS at 10:37

## 2023-09-05 RX ADMIN — PANTOPRAZOLE SODIUM 40 MG: 40 TABLET, DELAYED RELEASE ORAL at 08:58

## 2023-09-05 RX ADMIN — RISPERIDONE 0.25 MG: 0.25 TABLET, FILM COATED ORAL at 20:48

## 2023-09-05 RX ADMIN — MEMANTINE 10 MG: 5 TABLET ORAL at 08:58

## 2023-09-05 RX ADMIN — IBUPROFEN 400 MG: 400 TABLET, FILM COATED ORAL at 03:04

## 2023-09-05 RX ADMIN — METOPROLOL SUCCINATE 25 MG: 25 TABLET, EXTENDED RELEASE ORAL at 08:58

## 2023-09-05 RX ADMIN — MEMANTINE 10 MG: 5 TABLET ORAL at 20:47

## 2023-09-05 RX ADMIN — METOPROLOL SUCCINATE 25 MG: 25 TABLET, EXTENDED RELEASE ORAL at 14:50

## 2023-09-05 ASSESSMENT — PAIN DESCRIPTION - LOCATION
LOCATION: HEAD
LOCATION: HEAD

## 2023-09-05 ASSESSMENT — PAIN SCALES - GENERAL
PAINLEVEL_OUTOF10: 5
PAINLEVEL_OUTOF10: 5

## 2023-09-05 NOTE — PROGRESS NOTES
Speech Language Pathology  Dysphagia Treatment/Follow-Up Note  Facility/Department: [facility] B3 - MED SURG    Recommendations:  Solid Consistency: IDDSI 7 Regular Solids  Liquid Consistency: IDDSI 0 Thin Liquids  Medication: Meds PO as tolerated    Risk Management: upright for all intake, stay upright for at least 30 mins after intake, small bites/sips, distant supervision with intake, oral care q4 hrs to reduce adverse affects in the event of aspiration, increase physical mobility as able, alternate bites/sips, slow rate of intake, general GERD precautions, STRICT aspiration precautions, and hold PO and contact SLP if s/s of aspiration or worsening respiratory status develop. NAME:Aroldo Anne  : 1935 (82 y.o.)   MRN: [id number]  ROOM: [address] [place] [address]  ADMISSION DATE: 2023  PATIENT DIAGNOSIS(ES): Hiatal hernia [K44.9]  Allergies   Allergen Reactions    Sulfa Antibiotics        DATE ONSET: 2023    Pain: The patient does not complain of pain       Current Diet: Diet NPO Exceptions are: Ice Chips, Sips of Water with Meds, Sips of Clear Liquids      Diet Tolerance:  Pt currently NPO. Dysphagia Treatment and Impressions:  Subjective: Pt seen in room at bedside with RN permission   Behavior / Cognition: pt alert, pleasant and cooperative for PO trials   RN Report/Chart Review: RN reports pt tolerating medications whole with thin liquids 1 at a time.    Patient tolerance: Pt NPO     Baseline Respiratory Status Measures: Pt with SPO2% of 98 on RA with RR of 18    Liquid PO Trials:    IDDSI 0 Thin:  Assessed via cup and straw: no anterior bolus loss , suspect functional A-P bolus transit, swallow timing subjectively appears timely, no clinical s/s of aspiration, and vitals stable    Solid PO Trials  IDDSI 4 Puree:   no anterior bolus loss , suspect functional A-P bolus transit, swallow timing subjectively appears timely, oral clearance grossly WFL, no clinical s/s of aspiration, and vitals hold PO and contact SLP if s/s of aspiration or worsening respiratory status develop. Education: SLP edu pt re: Role of SLP, Rationale for dysphagia tx, Recommended compensatory strategies, Aspiration precautions, BSE results, POC, techniques to improve respiratory-swallow coordination, and Importance of oral care to reduce adverse affects in the event of aspiration. Pt verbalized understanding, would benefit from ongoing education, and RN aware of recommendations           Plan:    Continued Dysphagia treatment with goals per plan of care. Discharge Recommendations: TBD    If pt discharges from hospital prior to Speech/Swallowing discharge, this note serves as tx and discharge summary.      Total Treatment Time / Charges     Time in Time out Total Time / units   Cognitive Tx         Speech Tx      Dysphagia Tx 0840 0858 18 min/ 1 unit      Signature:  [facility] [address] [facility] #[id number]  Speech Language Pathologist   [id number]

## 2023-09-05 NOTE — ACP (ADVANCE CARE PLANNING)
Advance Care Planning     General Advance Care Planning (ACP) Conversation    Date of Conversation: 9/4/2023  Conducted with:  Healthcare Decision Maker: Named in Advance Directive or Healthcare Power of  (name) DAUGHTER, [address] Decision Maker:    Primary Decision Maker (Active): Janine Orozco Child - [phone]    Secondary Decision Maker: Garrett Byrd - Spouse - [phone]  Click here to complete Healthcare Decision Makers including selection of the Healthcare Decision Maker Relationship (ie \"Primary\"). Today we documented Decision Maker(s). The patient will provide ACP documents. Content/Action Overview:  Has NO ACP documents/care preferences - information provided, considering goals and options  Reviewed DNR/DNI and patient elects Full Code (Attempt Resuscitation)    WIFE IS NOT THE DECISION MAKER IN THIS CASE. Wife turned over health care POA to her daughter Ria Grimes) on advice from an  because Ria Grimes is more easily reachable and readily available. Daughter Ria Grimes is the decision maker. Wife feels like patient should be a DNR given his dementia and lack of quality of life. She does not think he should have a feeding tube or surgery. She is more in line of wanting comfort care. She states Ria Grimes feels differently and wants to be aggressive. Trina's daughter is a pharmacist and has also told Ria Grimes patient should not be resuscitated, but Ria Grimes is insistent that the medical team remain aggressive. I called Ria Grimes while sitting with patient and wife. Ria Grimes confirmed that she is the [facility] and wants patient to be fully resuscitated and she wants to be aggressive with medical treatment. Wife states that is exactly what she expected her daughter to say. Ria Grimes has a work meeting Construct and will be unable to talk further.   Patient's wife states Ria Grimes will be here tomorrow and perhaps we can have a better discussion with her  face to face about code status

## 2023-09-05 NOTE — CARE COORDINATION
Case Management Assessment  Initial Evaluation    Date/Time of Evaluation: 9/5/2023 11:19 AM  Assessment Completed by: HARSH Cortes    If patient is discharged prior to next notation, then this note serves as note for discharge by case management. Patient Name: Deborah Lyons                   YOB: 1935  Diagnosis: Hiatal hernia [K44.9]                   Date / Time: 9/4/2023  5:45 AM    Patient Admission Status: Inpatient   Readmission Risk (Low < 19, Mod (19-27), High > 27): Readmission Risk Score: 16.2    Current PCP: David Gates MD  PCP verified by CM? (P) Yes    Chart Reviewed: Yes      History Provided by: (P) Patient  Patient Orientation: (P) Alert and Oriented    Patient Cognition: (P) Alert    Hospitalization in the last 30 days (Readmission):  No    If yes, Readmission Assessment in  Navigator will be completed.     Advance Directives:      Code Status: DNR-CC   Patient's Primary Decision Maker is: (P) Legal Next of Kin    Primary Decision MakerRargelia Villalobos - Child - [phone]    Secondary Decision Maker: Brianne Gilmroe - Spouse - [phone]    Discharge Planning:    Patient lives with: Alone Type of Home: Skilled Nursing Facility  Primary Care Giver: (P) Self  Patient Support Systems include: (P) Spouse/Significant Other, Children   Current Financial resources: (P) None  Current community resources: (P) ECF/Home Care  Current services prior to admission: [address]            Current DME:              Type of Home Care services:  None    ADLS  Prior functional level: (P) Assistance with the following:, Bathing, Dressing, Toileting, Housework, Shopping, Mobility, Cooking  Current functional level: (P) Assistance with the following:, Bathing, Dressing, Toileting, Cooking, Housework, Shopping, Mobility    PT AM-PAC: 16 /24  OT AM-PAC:   /24    Family can provide assistance at DC: (P) Yes  Would you like Case Management to discuss the discharge plan with any other family members/significant others, and if so, who? (P) Yes  Plans to Return to Present Housing: (P) Yes  Other Identified Issues/Barriers to RETURNING to current housing: none noted  Potential Assistance needed at discharge: [address]            Potential DME:    Patient expects to discharge to: [address] for transportation at discharge: (P) Family    Financial    Payor: MEDICARE / Plan: MEDICARE PART A AND B / Product Type: *No Product type* /     Does insurance require precert for SNF: No    Potential assistance Purchasing Medications: (P) No  Meds-to-Beds request: Yes      [address] , [address] [facility] [phone] - F [phone]  [address] [place] [address]  Phone: [phone] Fax: [phone]      Notes:    Factors facilitating achievement of predicted outcomes: Family support, Cooperative, and Pleasant    Barriers to discharge: Cognitive deficit and Medical complications    Additional Case Management Notes: Referred to patient for d/c planning. Spoke to patient. Attempted to call wife, no answer, unable to leave message. Patient is an 82year old male admitted for hiatel hernia. Patient currently at [facility]. Plans to return on d/c per patient. Facility updated. No barriers to return. The Plan for Transition of Care is related to the following treatment goals of Hiatal hernia [A37.1]    IF APPLICABLE: The Patient and/or patient representative Atanacio Goldmann and his family were provided with a choice of provider and agrees with the discharge plan. Freedom of choice list with basic dialogue that supports the patient's individualized plan of care/goals and shares the quality data associated with the providers was provided to:     Patient Representative Name:       The Patient and/or Patient Representative Agree with the Discharge Plan?       HARSH Chopra, FELICITY  Case Management Department  Ph:

## 2023-09-05 NOTE — CONSULTS
constipation. Recommendation per MIPS/PQRS :  Followup Imaging: Followup recommendation for any incidentally noted thyroid Nodule:  1. Thryoid nodule > then 1 cm : 6 month follow-up US Imaging of the  thyroid  2. Thyroid nodule < 1.0 cm : No follow-up Imaging is recommended    Followup CT imaging for incidentally noted lesions:  No follow-up CT imaging . Guidelines for management of lung nodules detected on Lung Cancer  Screening CT scans as provided by the NCCN Guidelines Version 1.2014:    Solid or part solid nodule:    <6 mm: Annual follow-up low dose CT scan for 2 years. 6-8 mm: Follow-up low dose CT scan in 3 mos. >8 mm: If low suspicion for cancer, a follow-up low dose CT scan in  3 mos. If suspicion of cancer, consider a biopsy and PET/CT. Solid endobronchial nodule: Follow-up low dose CT scan in 1 month  immediately after vigorous coughing. Ground glass or Nonsolid nodule:    < or equal to 5 mm: Follow-up low dose CT scan in 12 mos. >5-10 mm: Follow-up low dose CT scan in 6 mos. >10 mm: Follow-up low dose CT scan in 3-6 mos.    _____________________________________    Electronically signed by: Deandra Galo MD (Sep  3 2023 21:52:59)  XR CHEST PORTABLE  Patient Full Name - Lieutenant English    : 1935    Referring Physician: Donnell Peterson: 2023    Patient MRN: [id number]  Patient : 1935  Patient Age: 82-year-old  Patient Gender: Male  Order Date:9/3/2023 7:43 PM  EXAM: CHEST XRAY PORTABLE  NUMBER OF IMAGES: AP chest 1 view  INDICATION: SOB/SOA  COMPARISON: 2023  TECHNOLOGIST NOTES:  Note Created: 9/3/2023 8:00 PM / [facility] By: [email or web address]  ASPIRATING A LOT, SOB, DYSPNEA  ONSET: ACUTE  HX: ANEMIA, AFIB, BENIGN PROSTATIC HYPERPLASIA, DEMENTIA, DYSPHAGIA,  HTN, PACEMAKER, KIDNEY DISEASE,  MUSCLE ATROPHY, FORMER SMOKER    IMAGES: 1  SD: 1  Findings:  Technique:  Standard technique and postioning . Cardiac:   The heart is within normal limits. Pulmonary:  There are no acute changes identified . Cherene Pummel There is no convincing  acute infiltrate . Aorta: The aorta demonstrates no acute findings . Osseous:  No acute abnormality identified  Abdomen:  No acute findings. There is evidence to suggest a hiatal hernia. Impression:  1. No acute findings identified. _____________________________________    Electronically signed by: Stanislav Muller M.D. (Sep  3 2023 20:04:56)       Labs:   Recent Labs     09/03/23  2151 09/05/23  0639   WBC 7.5 7.7   HGB 13.7 14.1    243   ALKPHOS 84  --    ALT 48  --    AST 75*  --    BILITOT 0.8  --    BUN 32* 23*   CREATININE 1.3* 1.2    137   K 4.9 4.1   INR 1.05*  --    PROT 6.7  --    LABALBU 3.7  --         Assessment:    82year old M with PMH significant for A Fib, CKD, dementia, hyperlipidemia, and hypertension. Presents with cough and dysphagia. CT chest at outside hospital shows extremely large hiatal hernia with retained gastric contents and dilated esophagus. 9/5 UGI: Large hiatal hernia with moderate delayed passage of contrast to duodenum. Moderate esophageal dysmotility and retention of contrast within the esophagus throughout the examination. Tapers is seen of the distal esophagus, for which achalasia or stricture are considerations. Plan:  Continue PPI  Plan for EGD today  Keep NPO  Supportive care       GUS Su - SOLO    Nonda Bud    [phone]. Also available via [employer]    Please note that some or all of this record was generated using voice recognition software. If there are any questions about the content of this document, please contact the author as some errors in translation may have occurred.

## 2023-09-05 NOTE — CONSULTS
PALLIATIVE MEDICINE CONSULTATION     Patient name:Aroldo Anne   CHC:[id number]    :1935  Room/Bed:0365/0365-01   LOS: 1 day         Date of consult:2023    Consult Information  Palliative Medicine Consult performed by: GUS Nunez CNP, CNP    Inpatient consult to Palliative Care  Consult performed by: GUS De Guzman CNP  Consult ordered by: GUS Gudino CNP             ASSESSMENT/RECOMMENDATIONS     82 y.o. male with dementia admitted with large hiatal hernia and trouble swallowing      Symptom Management:  Goals of Care-  Complicated situation. DAUGHTER, [address], IS THE LEGAL DECISION MAKER. Code status reverted to full code based on POA's direction to do so. Daughter states she wants to be aggressive;however, I question her understanding of the full implications of these goals and hope to have face-to-face [facility] meeting with daughter on Wednesday evening when she is able to come to the hospital (patient's wife, who is not the decision maker in this case, favors DNR and more of a comfort care/pleasure feed approach). Family does want EGD for more information. They want patient to [facility] at discharge, but only if under SNF level of care as they can not pay for LTC. Will continue to follow    Hiatal Hernia  - CT chest shows extremely large hiatal hernia in retrocardiac region, distended with large amount of retained gastric content; distended esophagus w/ large amount of fluid and food residue. GI and general surgery following. SLP with recs for regular solids and thin liquids (although wife disagrees and states patient chokes on everything). General surgery feels that unless there is an issue with swallowing, surgical intervention may not need be pressing at this time. UGI today with results pending. Family wants to proceed with EGD if recommended. Altered mental status - baseline dementia.   FAST score around 5-6 which Interventions:    Patient/family support  Basic disease process education and treatment options education  Medical status updates and questions answered   Goals of Care discussions with patient/surrogate  Spiritual Interventions:  offered - declined  Counseling and educating the patient/family/caregiver  Preparing to see the patient (e.g., review of tests)  Referring / communicating with other healthcare professionals including care coordination (not separately reported):  Hospitalist, Case management  Documenting clinical information in the electronic health record              DATA:  Current labs in the epic chart reviewed as of 9/5/2023   Review of previous notes, admits, labs, radiology and testing relevant to this consult done in this chart today 9/5/2023    Data Reviewed related to this consultation:    Review of prior external note(s) from each unique source relevant to today's visit: Hospitalist, Case management, Nursing, GI, general surgery,    Discussion of management or test with external physician/qualified health care professional: Hospitalist, Case management, Nursing,    Unique test results reviewed: CBC and BMP, GI-Liver profile, Cardiac labs, CXR, CT chest, CT H (8/25/23), echo 1/24/23               Signed By: Electronically signed by GUS Bridges CNP on 9/5/2023 at 5:36 PM  Palliative Medicine   [phone]    September 5, 2023

## 2023-09-05 NOTE — PROGRESS NOTES
Patient's IV leaking. Removed Iv and stopped bleeding.  Charge attempted to get new IV multiple different times and was unsuccessful

## 2023-09-05 NOTE — CONSULTS
Consult Placed     Who: Glendy Mayer  Date:9/5/2023  PALMAJA:[id number]     Electronically signed by Suzie Cline on 9/5/2023 at 9:41 AM

## 2023-09-05 NOTE — PLAN OF CARE
Problem: Fluid Volume - Imbalance:  Goal: Absence of imbalanced fluid volume signs and symptoms  Description: Absence of imbalanced fluid volume signs and symptoms  3/10/2020 0042 by Rome Smith RN  Outcome: Ongoing  3/9/2020 1232 by Alban Bello RN  Outcome: Ongoing     Problem: Skin Integrity/Risk  Goal: No skin breakdown during hospitalization  3/10/2020 0042 by Rome Smith RN  Outcome: Ongoing  3/9/2020 1232 by Alban Bello RN  Outcome: Ongoing  Goal: Wound healing  3/10/2020 0042 by Rome Smith RN  Outcome: Ongoing  3/9/2020 [address] by Alban Bello RN  Outcome: Ongoing     Problem: OXYGENATION/RESPIRATORY FUNCTION  Goal: Patient will maintain patent airway  3/10/2020 0042 by Rome Smith RN  Outcome: Ongoing  3/9/2020 2011 by Asim Becker RCP  Outcome: Ongoing  3/9/2020 1454 by Kely Aguirre RCP  Outcome: Ongoing  3/9/2020 1232 by Alban Bello RN  Outcome: Ongoing  Goal: Patient will achieve/maintain normal respiratory rate/effort  Description: Respiratory rate and effort will be within normal limits for the patient  3/10/2020 0042 by Rome Smith RN  Outcome: Ongoing  3/9/2020 2011 by Asim Becker RCP  Outcome: Ongoing  3/9/2020 1454 by Kely Aguirre RCP  Outcome: Ongoing  3/9/2020 1232 by Alban Bello RN  Outcome: Ongoing     Problem: MECHANICAL VENTILATION  Goal: Patient will maintain patent airway  3/10/2020 0042 by Rome Smith RN  Outcome: Ongoing  3/9/2020 2011 by Asim Becker RCP  Outcome: Ongoing  3/9/2020 1454 by Kely Aguirre RCP  Outcome: Ongoing  3/9/2020 1232 by Alban Bello RN  Outcome: Ongoing  Goal: Oral health is maintained or improved  3/10/2020 0042 by Rome Smith RN  Outcome: Ongoing  3/9/2020 2011 by Asim Becker RCP  Outcome: Ongoing  3/9/2020 1454 by Kely Aguirre RCP  Outcome: Ongoing  3/9/2020 1232 by Alban Bello RN  Outcome: Ongoing  Goal: ET tube will be managed Problem: Pain  Goal: Verbalizes/displays adequate comfort level or baseline comfort level  9/5/2023 0929 by Maverick Negrete RN  Outcome: Progressing     Problem: Safety - Adult  Goal: Free from fall injury  9/5/2023 0929 by Maverick Negrete RN  Outcome: Progressing     Problem: Skin/Tissue Integrity  Goal: Absence of new skin breakdown  Description: 1. Monitor for areas of redness and/or skin breakdown  2. Assess vascular access sites hourly  3. Every 4-6 hours minimum:  Change oxygen saturation probe site  4. Every 4-6 hours:  If on nasal continuous positive airway pressure, respiratory therapy assess nares and determine need for appliance change or resting period.   9/5/2023 0929 by Maverick Negrete RN  Outcome: Progressing safely  3/10/2020 0042 by Kojo Cobian RN  Outcome: Ongoing  3/9/2020 2011 by Tila Simental RCP  Outcome: Ongoing  3/9/2020 1454 by Reta Ly RCP  Outcome: Ongoing  3/9/2020 1232 by Phillip Santana RN  Outcome: Ongoing  Goal: Ability to express needs and understand communication  3/10/2020 0042 by Kojo Cobian RN  Outcome: Ongoing  3/9/2020 2011 by Tila Simental RCP  Outcome: Ongoing  3/9/2020 1454 by Reta Ly RCP  Outcome: Ongoing  3/9/2020 1232 by Phillip Santana RN  Outcome: Ongoing  Goal: Mobility/activity is maintained at optimum level for patient  3/10/2020 0042 by Kojo Cobian RN  Outcome: Ongoing  3/9/2020 2011 by Tila Simental RCP  Outcome: Ongoing  3/9/2020 1454 by Reta Ly RCP  Outcome: Ongoing  3/9/2020 1232 by Phillip Santana RN  Outcome: Ongoing     Problem: SKIN INTEGRITY  Goal: Skin integrity is maintained or improved  3/10/2020 0042 by Kojo Cobian RN  Outcome: Ongoing  3/9/2020 2011 by Tila Simental RCP  Outcome: Ongoing  3/9/2020 1454 by Reta Ly RCP  Outcome: Ongoing  3/9/2020 1232 by Phillip Santana RN  Outcome: Ongoing     Problem: Falls - Risk of:  Goal: Will remain free from falls  Description: Will remain free from falls  3/10/2020 0042 by Kojo Cobian RN  Outcome: Ongoing  3/9/2020 1232 by Phillip Santana RN  Outcome: Ongoing  Goal: Absence of physical injury  Description: Absence of physical injury  3/10/2020 0042 by Kojo Cobian RN  Outcome: Ongoing  3/9/2020 1232 by Phillip Santana RN  Outcome: Ongoing     Problem: Risk for Impaired Skin Integrity  Goal: Tissue integrity - skin and mucous membranes  Description: Structural intactness and normal physiological function of skin and  mucous membranes.   3/10/2020 0042 by Kojo Cobian RN  Outcome: Ongoing  3/9/2020 1232 by Phillip Santana RN  Outcome: Ongoing     Problem: Nutrition  Goal: Optimal nutrition therapy  3/10/2020 0042 by Kojo Cobian

## 2023-09-05 NOTE — PROGRESS NOTES
GI NOTE:   Consult received for large hiatal hernia. Patient off unit for upper GI series. Will follow up findings. Will see patient later today or tomorrow. Please call with questions or concerns. [phone]. Also available via perfect serve.

## 2023-09-05 NOTE — CONSULTS
Consult Placed     Who: Kiersten Cardenas  Date:9/5/2023  ZNFX:[id number]     Electronically signed by Josafat Brasher on 9/5/2023 at 9:44 AM

## 2023-09-05 NOTE — PROGRESS NOTES
Physical Therapy  Facility/Department: [address] [place] [address]  Physical Therapy Initial Assessment    Name: Ramy Schultz  : 1935  MRN: [id number]  Date of Service: 2023    Discharge Recommendations:  Subacute/Skilled Nursing Facility   PT Equipment Recommendations  Equipment Needed: No  Other: defer to next level of care      Patient Diagnosis(es): There were no encounter diagnoses. Past Medical History:  has a past medical history of Atrial fibrillation ([address]), Blind right eye, Chronic kidney disease, Dementia ([address]), Hyperlipidemia, Hypertension, and Pneumonia. Past Surgical History:  has a past surgical history that includes Tonsillectomy; Appendectomy; Colonoscopy; eye surgery; Upper gastrointestinal endoscopy; joint replacement (); hernia repair; pacemaker placement (10/13/2017); ablation of dysrhythmic focus; and pacemaker placement (2022). Assessment   Body Structures, Functions, Activity Limitations Requiring Skilled Therapeutic Intervention: Decreased functional mobility ; Decreased strength;Decreased cognition  Assessment: pt is 81 yo male admit to [facility] 2/2 hiatal hernia; pt pleasantly confused, answers to name, states  for birthdate, did not state birth year, states [facility] for place, unable to answer home set up and prior level of function questions, info garnered from chart review of previous admissions, pt admitted from SNF, pt tolerated all assessments well with mild c/o lightheadedness when asked, vitals stable, pt required sba to cga for bed mobility, min A to Mod A for transfers, and min A for ambulating 15ft in room with RW, pt will benefit from Acute Inpatient Skilled PT to address functional mobility deficits, PT recommends Short-term Skilled PT at DC  Treatment Diagnosis: decreased functional mobility  Therapy Prognosis: Good  Decision Making: Medium Complexity  Barriers to Learning: cognition  Requires PT Follow-Up: Yes  Activity Tolerance  Activity Tolerance: Walker  Assistance: Minimal assistance  Quality of Gait: unsteady  Gait Deviations: Slow Elodia;Decreased step length;Decreased step height;Staggers (lacks toe off due to B toe extension)  Distance: 5 + 15 ft with seated rest break in between distances  Comments: after pt able to ambulate to chair without issue, pt agreeable to second gait distance, pt would tolerate further distance with w/c follow  Stairs/Curb  Stairs?: No     Balance  Posture: Fair  Sitting - Static: Fair  Sitting - Dynamic: Fair  Standing - Static: Fair  Standing - Dynamic: Poor             AM-PAC Score  AM-PAC Inpatient Mobility Raw Score : 16 (09/05/23 1045)  AM-PAC Inpatient T-Scale Score : 40.78 (09/05/23 1045)  Mobility Inpatient CMS 0-100% Score: 54.16 (09/05/23 1045)  Mobility Inpatient CMS G-Code Modifier : CK (09/05/23 1045)          Goals  Short Term Goals  Time Frame for Short Term Goals: one week 9/12 unless otherwise indicated  Short Term Goal 1: pt will transfer supine to and from sit with mod ind by 9/9  Short Term Goal 2: pt will transfer sit to and from stand with sup  Short Term Goal 3: pt will ambulate 75ft with RW with sba  Short Term Goal 4: pt will demonstrate and tolerate 10 reps of 3 B LE seated therex  Patient Goals   Patient Goals : not formally verbalized       Education  Patient Education  Education Given To: Patient  Education Provided: Role of Therapy;Plan of Care;Precautions;Transfer Training;Equipment; Fall Prevention Strategies  Education Method: Demonstration;Verbal  Barriers to Learning: Cognition  Education Outcome: Continued education needed      Therapy Time   Individual Concurrent Group Co-treatment   Time In 0950         Time Out 1038         Minutes 48         Timed Code Treatment Minutes: 33 Minutes     If pt is unable to be seen after this session, please let this note serve as discharge summary. Please see case management note for discharge disposition. Thank you.    Cayden Ugalde, PT

## 2023-09-05 NOTE — PLAN OF CARE
Problem: Discharge Planning  Goal: Discharge to home or other facility with appropriate resources  9/5/2023 0049 by Pal Fields RN  Outcome: Progressing  Flowsheets (Taken 9/4/2023 2100)  Discharge to home or other facility with appropriate resources:   Identify barriers to discharge with patient and caregiver   Arrange for needed discharge resources and transportation as appropriate   Identify discharge learning needs (meds, wound care, etc)     Problem: Pain  Goal: Verbalizes/displays adequate comfort level or baseline comfort level  9/5/2023 0049 by Pal Fields RN  Outcome: Progressing     Problem: Safety - Adult  Goal: Free from fall injury  9/5/2023 0049 by Pal Fields RN  Outcome: Progressing     Problem: Skin/Tissue Integrity  Goal: Absence of new skin breakdown  Description: 1. Monitor for areas of redness and/or skin breakdown  2. Assess vascular access sites hourly  3. Every 4-6 hours minimum:  Change oxygen saturation probe site  4. Every 4-6 hours:  If on nasal continuous positive airway pressure, respiratory therapy assess nares and determine need for appliance change or resting period.   Outcome: Progressing     Problem: Risk for Elopement  Goal: Patient will not exit the unit/facility without proper excort  9/5/2023 0049 by Pal Fields RN  Outcome: Progressing  Flowsheets (Taken 9/4/2023 2100)  Nursing Interventions for Elopement Risk: Assist with personal care needs such as toileting, eating, dressing, as needed to reduce the risk of wandering     Problem: ABCDS Injury Assessment  Goal: Absence of physical injury  9/5/2023 0049 by Pal Fields RN  Outcome: Progressing

## 2023-09-05 NOTE — PROGRESS NOTES
Shift assessment completed and charted. VSS. A&OX2. Check and change. Bed alarm on. Avasys in place. Tolerating PO pills today with water. Bed locked and in lowest position. Call light within reach. Pt denies any other needs at this time. Will continue to monitor.

## 2023-09-06 ENCOUNTER — ANESTHESIA EVENT (OUTPATIENT)
Dept: ENDOSCOPY | Age: 88
End: 2023-09-06
Payer: MEDICARE

## 2023-09-06 ENCOUNTER — ANESTHESIA (OUTPATIENT)
Dept: ENDOSCOPY | Age: 88
End: 2023-09-06
Payer: MEDICARE

## 2023-09-06 PROCEDURE — 2580000003 HC RX 258

## 2023-09-06 PROCEDURE — 0DC58ZZ EXTIRPATION OF MATTER FROM ESOPHAGUS, VIA NATURAL OR ARTIFICIAL OPENING ENDOSCOPIC: ICD-10-PCS | Performed by: INTERNAL MEDICINE

## 2023-09-06 PROCEDURE — 6360000002 HC RX W HCPCS: Performed by: NURSE ANESTHETIST, CERTIFIED REGISTERED

## 2023-09-06 PROCEDURE — 1200000000 HC SEMI PRIVATE

## 2023-09-06 PROCEDURE — 3700000000 HC ANESTHESIA ATTENDED CARE: Performed by: INTERNAL MEDICINE

## 2023-09-06 PROCEDURE — 3700000001 HC ADD 15 MINUTES (ANESTHESIA): Performed by: INTERNAL MEDICINE

## 2023-09-06 PROCEDURE — 2500000003 HC RX 250 WO HCPCS: Performed by: NURSE ANESTHETIST, CERTIFIED REGISTERED

## 2023-09-06 PROCEDURE — 3609012900 HC EGD FOREIGN BODY REMOVAL: Performed by: INTERNAL MEDICINE

## 2023-09-06 PROCEDURE — 2580000003 HC RX 258: Performed by: SURGERY

## 2023-09-06 PROCEDURE — 7100000011 HC PHASE II RECOVERY - ADDTL 15 MIN: Performed by: INTERNAL MEDICINE

## 2023-09-06 PROCEDURE — 7100000010 HC PHASE II RECOVERY - FIRST 15 MIN: Performed by: INTERNAL MEDICINE

## 2023-09-06 PROCEDURE — 6370000000 HC RX 637 (ALT 250 FOR IP): Performed by: SURGERY

## 2023-09-06 PROCEDURE — 2709999900 HC NON-CHARGEABLE SUPPLY: Performed by: INTERNAL MEDICINE

## 2023-09-06 RX ORDER — SODIUM CHLORIDE 9 MG/ML
INJECTION, SOLUTION INTRAVENOUS PRN
Status: DISCONTINUED | OUTPATIENT
Start: 2023-09-06 | End: 2023-09-06 | Stop reason: HOSPADM

## 2023-09-06 RX ORDER — SODIUM CHLORIDE 0.9 % (FLUSH) 0.9 %
5-40 SYRINGE (ML) INJECTION PRN
Status: DISCONTINUED | OUTPATIENT
Start: 2023-09-06 | End: 2023-09-06 | Stop reason: HOSPADM

## 2023-09-06 RX ORDER — IPRATROPIUM BROMIDE AND ALBUTEROL SULFATE 2.5; .5 MG/3ML; MG/3ML
1 SOLUTION RESPIRATORY (INHALATION)
Status: DISCONTINUED | OUTPATIENT
Start: 2023-09-06 | End: 2023-09-06 | Stop reason: HOSPADM

## 2023-09-06 RX ORDER — LIDOCAINE HYDROCHLORIDE 20 MG/ML
INJECTION, SOLUTION INFILTRATION; PERINEURAL PRN
Status: DISCONTINUED | OUTPATIENT
Start: 2023-09-06 | End: 2023-09-06 | Stop reason: SDUPTHER

## 2023-09-06 RX ORDER — PHENYLEPHRINE HCL IN 0.9% NACL 1 MG/10 ML
SYRINGE (ML) INTRAVENOUS PRN
Status: DISCONTINUED | OUTPATIENT
Start: 2023-09-06 | End: 2023-09-06 | Stop reason: SDUPTHER

## 2023-09-06 RX ORDER — SODIUM CHLORIDE 9 MG/ML
INJECTION, SOLUTION INTRAVENOUS CONTINUOUS
Status: DISCONTINUED | OUTPATIENT
Start: 2023-09-06 | End: 2023-09-07

## 2023-09-06 RX ORDER — SODIUM CHLORIDE 0.9 % (FLUSH) 0.9 %
5-40 SYRINGE (ML) INJECTION EVERY 12 HOURS SCHEDULED
Status: DISCONTINUED | OUTPATIENT
Start: 2023-09-06 | End: 2023-09-06 | Stop reason: HOSPADM

## 2023-09-06 RX ORDER — ONDANSETRON 2 MG/ML
4 INJECTION INTRAMUSCULAR; INTRAVENOUS
Status: DISCONTINUED | OUTPATIENT
Start: 2023-09-06 | End: 2023-09-06 | Stop reason: HOSPADM

## 2023-09-06 RX ORDER — PROPOFOL 10 MG/ML
INJECTION, EMULSION INTRAVENOUS PRN
Status: DISCONTINUED | OUTPATIENT
Start: 2023-09-06 | End: 2023-09-06 | Stop reason: SDUPTHER

## 2023-09-06 RX ADMIN — PROPOFOL 50 MG: 10 INJECTION, EMULSION INTRAVENOUS at 14:48

## 2023-09-06 RX ADMIN — SODIUM CHLORIDE: 9 INJECTION, SOLUTION INTRAVENOUS at 12:17

## 2023-09-06 RX ADMIN — Medication 2 MCG: at 15:00

## 2023-09-06 RX ADMIN — PANTOPRAZOLE SODIUM 40 MG: 40 TABLET, DELAYED RELEASE ORAL at 20:29

## 2023-09-06 RX ADMIN — ATORVASTATIN CALCIUM 40 MG: 40 TABLET, FILM COATED ORAL at 20:29

## 2023-09-06 RX ADMIN — PROPOFOL 50 MG: 10 INJECTION, EMULSION INTRAVENOUS at 14:45

## 2023-09-06 RX ADMIN — PROPOFOL 50 MG: 10 INJECTION, EMULSION INTRAVENOUS at 14:42

## 2023-09-06 RX ADMIN — PROPOFOL 50 MG: 10 INJECTION, EMULSION INTRAVENOUS at 14:39

## 2023-09-06 RX ADMIN — Medication 200 MCG: at 15:01

## 2023-09-06 RX ADMIN — LIDOCAINE HYDROCHLORIDE 60 MG: 20 INJECTION, SOLUTION INFILTRATION; PERINEURAL at 14:39

## 2023-09-06 RX ADMIN — SODIUM CHLORIDE, PRESERVATIVE FREE 10 ML: 5 INJECTION INTRAVENOUS at 16:54

## 2023-09-06 RX ADMIN — RISPERIDONE 0.25 MG: 0.25 TABLET, FILM COATED ORAL at 20:29

## 2023-09-06 RX ADMIN — MEMANTINE 10 MG: 5 TABLET ORAL at 20:29

## 2023-09-06 RX ADMIN — PROPOFOL 20 MG: 10 INJECTION, EMULSION INTRAVENOUS at 14:51

## 2023-09-06 RX ADMIN — Medication 5 MG: at 20:29

## 2023-09-06 ASSESSMENT — PAIN SCALES - GENERAL
PAINLEVEL_OUTOF10: 0

## 2023-09-06 NOTE — PLAN OF CARE
Problem: Discharge Planning  Goal: Discharge to home or other facility with appropriate resources  9/6/2023 1549 by Rafiq De La Cruz RN  Outcome: Progressing  Flowsheets (Taken 9/6/2023 1549)  Discharge to home or other facility with appropriate resources:   Identify barriers to discharge with patient and caregiver   Arrange for needed discharge resources and transportation as appropriate     Problem: Safety - Adult  Goal: Free from fall injury  9/6/2023 1549 by Rafiq De La Cruz RN  Outcome: Progressing  Flowsheets (Taken 9/6/2023 1549)  Free From Fall Injury:   Instruct family/caregiver on patient safety   Based on caregiver fall risk screen, instruct family/caregiver to ask for assistance with transferring infant if caregiver noted to have fall risk factors     Problem: Risk for Elopement  Goal: Patient will not exit the unit/facility without proper excort  Outcome: Progressing  Flowsheets (Taken 9/6/2023 1549)  Nursing Interventions for Elopement Risk:   Assist with personal care needs such as toileting, eating, dressing, as needed to reduce the risk of wandering   Collaborate with family members/caregivers to mitigate the elopement risk     Problem: ABCDS Injury Assessment  Goal: Absence of physical injury  Outcome: Progressing  Flowsheets (Taken 9/6/2023 1549)  Absence of Physical Injury: Implement safety measures based on patient assessment

## 2023-09-06 NOTE — PROGRESS NOTES
Tried calling [facility] at [phone] for phone consent for EGD at around 0623am, no answer and was not able to leave a voice mail.

## 2023-09-06 NOTE — ACP (ADVANCE CARE PLANNING)
Advance Care Planning     General Advance Care Planning (ACP) Conversation    Date of Conversation: 9/4/2023  Conducted with:  Healthcare Decision Maker: Named in Advance Directive or Healthcare Power of  (name) Semaj Taylor How Decision Maker:    Primary Decision Maker (Active): Nery Farris Child - [phone]    Secondary Decision Maker: Katy Garcia - Spouse - [phone]  Click here to complete Healthcare Decision Makers including selection of the Healthcare Decision Maker Relationship (ie \"Primary\"). Today we documented Decision Maker(s). The patient will provide ACP documents. Content/Action Overview:  Has NO ACP documents/care preferences - information provided, considering goals and options  Reviewed DNR/DNI and patient elects DNR order - completed portable DNR form & placed order  treatment goals, benefit/burden of treatment options, artificial nutrition, ventilation preferences, hospitalization preferences, resuscitation preferences, end of life care preferences (vegetative state/imminent death), and hospice care      Had bedside meeting with daughter/POA Nery Farris and patient's wife, Rema Pendleton. Robb De La Paz states she prefers patient to be a full code because she does not want to lose him, but she has decided to honor her mother's wish for a DNR. She is agreeable to code status change to DNR/DNI. Code status changed to Limited x 4 Nos to refect POA's wishes for DNR/DNI. POA has not yet decided about comfort care.       Signed [place] DNR form placed in hard chart        GUS Galvez - CNP

## 2023-09-06 NOTE — PROGRESS NOTES
1942 : received patient without IV. Day shift nurse said that the IV he removed was placed by clinical and that they tried to insert last night for 6x. Perfect served NP if a Midline can be placed on him. 1944: NP replied that a midline can be placed. 2154: NP placed order for Midline placement  Informed charge nurse.

## 2023-09-06 NOTE — OP NOTE
[facility],  [address] [place] [address], [address] [facility],[address]  Phone: [phone]  [address] [facility], [address] [facility]  Phone: [phone]   WCZ:[phone]    EGD Procedure Note    Patient: Tania Pearson  : 1935    Procedure: Esophagogastroduodenoscopy with foreign body removal    Date:  2023     Endoscopist:  Marty Miller MD    Referring Physician:  Wilton Martell MD    Preoperative Diagnosis:  Hiatal hernia [K44.9]; dysphagia    Postoperative Diagnosis:  esophageal food impaction, large hiatal hernia, gastritis    Anesthesia: Anesthesia: MAC  Sedation: Propofol per anesthesia  Start Time: 14:41  Stop Time: 14:55  ASA Class: 2  Mallampati: II (soft palate, uvula, fauces visible)    Indications: This is a 82y.o. year old male with medical history of atrial atrial fibrillation, hypertension, chronic kidney disease chronic kidney disease, dementia, admitted for dysphagia and cough. Found to have CT evidence of a large hiatal hernia with food debris and fluid filled esophagus. Upper GI series 23: large hiatal hernia with moderate delayed passage of contrast. Moderate esophageal dysmotility and retention of contrast within the esophagus throughout the examination. Tapers is seen of the distal esophagus    Procedure Details  Informed consent was obtained for the procedure, including conscious sedation. Risks of pancreatitis, infection, perforation, hemorrhage, adverse drug reaction and aspiration were discussed. The patient was placed in the left lateral decubitus position. Based on the pre-procedure assessment, including review of the patient's medical history, medications, allergies, and review of systems, he had been deemed to be an appropriate candidate for the above IV sedation; he was therefore sedated with the medications listed above. He was monitored continuously with ECG tracing, pulse oximetry, blood pressure monitoring, and direct observation.  A

## 2023-09-06 NOTE — PROGRESS NOTES
Speech Language Pathology  Attempt Note     Name: Lori Ta  : 1935  Medical Diagnosis: Hiatal hernia [K44.9]      SLP attempted to see pt for dysphagia tx. Treatment unable to be completed due to pt NPO for EGD this date. SLP to re-attempt as pt's condition and schedule allows. RN aware. No charges.     Thank you,    Terrell Lino MA. [address] [facility] Pathologist  [phone]

## 2023-09-06 NOTE — PROGRESS NOTES
Pt arrived with bite block, nonrebreather at 8L O2, pt snoring, sedated/unarousable at this time. Anesthesia at bedside.

## 2023-09-06 NOTE — PROGRESS NOTES
[facility] called and transport set up. O2 tank to be changed for transport. Pt on room air currently.  VSS

## 2023-09-06 NOTE — PROGRESS NOTES
Cardiology    Patient and family seen this afternoon. Cardiac status is stable. Will be moderate to high risk for non-cardiac surgery.

## 2023-09-06 NOTE — ANESTHESIA PRE PROCEDURE
Department of Anesthesiology  Preprocedure Note       Name:  Estela George   Age:  82 y.o.  :  1935                                          MRN:  [id number]         Date:  2023      Surgeon: Mike Ram):  Sendy Martin MD    Procedure: Procedure(s):  EGD DIAGNOSTIC ONLY    Medications prior to admission:   Prior to Admission medications    Medication Sig Start Date End Date Taking? Authorizing Provider   risperiDONE (RISPERDAL) 0.25 MG tablet Take 1 tablet by mouth 2 times daily 23   Maame Aranda MD   ferrous sulfate (IRON 325) 325 (65 Fe) MG tablet Take 1 tablet by mouth daily (with breakfast)    Historical Provider, MD   memantine (NAMENDA) 5 MG tablet Take 2 tablets by mouth 2 times daily 23   Maame Aranda MD   atorvastatin (LIPITOR) 40 MG tablet Take 1 tablet by mouth at bedtime 4/10/23   GUS Melton CNP   pantoprazole (PROTONIX) 40 MG tablet Take 1 tablet by mouth in the morning and at bedtime 4/10/23   GUS Melton CNP   bisacodyl (DULCOLAX) 10 MG suppository Place 1 suppository rectally 2 times daily as needed for Constipation  Patient not taking: Reported on 2023    Historical Provider, MD   melatonin 3 MG TABS tablet Take 5 mg by mouth at bedtime    Historical Provider, MD   lactobacillus (CULTURELLE) capsule Take 1 capsule by mouth 2 times daily (with meals) 23   Omi Stark MD   mirabegron [facility]) 25 MG TB24 Take 1 tablet by mouth daily 10/17/22   Maame Aranda MD   trospium (SANCTURA) 20 MG tablet Take 20 mg by mouth in the morning and 20 mg before bedtime.   22  Historical Provider, MD   apixaban (ELIQUIS) 5 MG TABS tablet TAKE 1/2 TABLET BY MOUTH 2 TIMES DAILY 22  Maame Aranda MD   metoprolol succinate (TOPROL XL) 25 MG extended release tablet TAKE 3 TABLETS BY MOUTH TWICE DAILY  Patient taking differently: Take 25 mg by mouth in the morning and 25 mg in the evening. 22  Carole Rodriguez

## 2023-09-06 NOTE — PROGRESS NOTES
Hospital Medicine Progress Note      Date of Admission: 9/4/2023  Hospital Day: 3    Chief Admission Complaint:  coughing fit     Subjective:  no needs/complaints expressed    Presenting Admission History: 82 y.o. male who presents with coughing fit and dysphagia to OSH. CT chest found extremely large hiatal hernia with retained gastric contents. Patient with baseline dementia. Transferred to [facility] for surgery evaluation. Patient not showing signs of aspiration or pneumonia at this time, however certainly an aspiration risk given the findings. Holding all home meds including Eliquis in setting of afib. Patient has no complaints. Dementia    Assessment/Plan:      Current Principal Problem:  Hiatal hernia    Large hiatal hernia, symptomatic. CT chest this admission: extremely large hiatal hernia in retrocardiac region, distended with large amount of retained gastric content; distended esophagus w/ large amount of fluid and food residue. Gen surgery consulted, appreciate recommendations. GI consulted, appreciate recommendations. Upper GI 9/5: moderate delayed passage of contrast to duodenum; mod esophageal dysmotility and retention of contrast throughout exam; consider achalasia or stricture. EGD 9/6    Risk for aspiration. 2/2 above. SLP consulted, appreciate assistance. FEES initially recommended, but later discontinued. Aspiration precautions in place    Atrial fibrillation, paroxsymal.  S/P AVN ablation with PPM insertion. Rate controlled. Holding home dose eliquis in anticipation of GI vs general surgery procedures. Monitor on telemetry    Essential hypertension. Continue toprol-xl. Monitor    Chronic kidney disease, stage 3a. No evidence of NATANAEL present. Baseline creat likely 1.5-1.8, on arrival it was 1.3. Monitor. Avoid hypotension, nephrotoxics as able    History of CAD. Continue home dose toprol-xl, statin    Dementia. Continue home dose namenda, risperidone. Supportive care. Contrast Induced Nephropathy  [] IV Narcotic analgesia for ADR   [] Aggressive IV diuresis requiring serial renal monitoring for electrolyte derangements  [] Hypertonic Saline requiring serial renal monitoring for appropriate electrolyte correction rate   [] Other -    [] Change in code status:    [] Decision to escalate care:    [] Major surgery/procedure with associated risk factors:    ----------------------------------------------------------------------  C. Data (any 2)  [] Discussed management of the case with: GI 9/5  [x] Imaging personally reviewed and interpreted, includes:   [x] Telemetry monitoring     [x] Data Review (any 3)  [] Collateral history obtained from:    [x] All available Consultant notes from yesterday/today were reviewed  [x] All current labs were reviewed and interpreted for clinical significance   [x] Appropriate follow-up labs were ordered    Medications:  Personally reviewed in detail in conjunction w/ labs as documented for evidence of drug toxicity. Infusion Medications    sodium chloride 50 mL/hr at 09/06/23 1217    sodium chloride       Scheduled Medications    [Held by provider] aspirin  81 mg Oral Daily    atorvastatin  40 mg Oral Nightly    finasteride  5 mg Oral Daily    melatonin  5 mg Oral Nightly    memantine  10 mg Oral BID    metoprolol succinate  25 mg Oral BID    pantoprazole  40 mg Oral BID    risperiDONE  0.25 mg Oral BID    sodium chloride flush  5-40 mL IntraVENous 2 times per day    [Held by provider] enoxaparin  40 mg SubCUTAneous Daily     PRN Meds: ibuprofen, sodium chloride flush, sodium chloride, ondansetron **OR** ondansetron, polyethylene glycol, acetaminophen **OR** acetaminophen, hydrOXYzine HCl     Labs:  Personally reviewed and interpreted for clinical significance.      Recent Labs     09/03/23 2151 09/05/23 0639   WBC 7.5 7.7   HGB 13.7 14.1   HCT 43.1 41.0    243     Recent Labs     09/03/23 2151 09/05/23 0639    137   K 4.9 4.1   CL

## 2023-09-06 NOTE — PROGRESS NOTES
MD came to bedside to go over postop findings, pt here alone and drifted back off to sleep. MD will call family. O2 tank changed and full. Pt drowsy, waiting for transport. Dentures in box on top of bed labeled.

## 2023-09-06 NOTE — PROGRESS NOTES
PALLIATIVE MEDICINE PROGRESS NOTE     Patient name:Aroldo Anne    IRN:[id number] :1935  Room/Bed:[facility]5/0365-01    LOS: 2 days        ASSESSMENT/RECOMMENDATIONS     82 y.o. male with  dementia admitted with large hiatal hernia and trouble swallowing    Life-threatening acute illness or unstable chronic illness addressed by Palliative Medicine:    1) Goals of Care  - complicated and discussions ongoing. POA (Stephenie Rasheed) wants to have informational discussion with GI and surgery about findings/options/recommendations before deciding GOC in addition to updated assessments from PT and ST. Comfort care discussed given patient's dementia and family's thought that patient might not tolerate (or want) a surgery or a feeding tube; however, they also are very interested in SNF. Placement will also be an issue as family state he is too much to care for at home but they also can not pay for a nursing home. Daughter agreeable to follow up [address] [place] discussions once she has spoken with GI and surgery and has updated recs from PT/ST. She is agreeable to honor mom's wish for DNR at this time so code status has been changed to reflect DNR/DNI (Limited x 4 nos). Will continue to follow    2) Hiatal Hernia - CT chest shows extremely large hiatal hernia in retrocardiac region, distended with large amount of retained gastric content; distended esophagus w/ large amount of fluid and food residue. GI and general surgery following. SLP with recs for regular solids and thin liquids (although wife disagrees and states patient chokes on everything). General surgery feels that unless there is an issue with swallowing, surgical intervention may not need be pressing at this time. UGI  23 showed moderate esophageal dysmotility with retention of contrast within the esophagus. Tapering seen on the distal esophagus for which achalasia or stricture are considerations.   EGD today with recommendations to trial full liquid diet and

## 2023-09-06 NOTE — PROGRESS NOTES
Physical Therapy    Attempted to see pt for PT follow up, although pt currently off floor for procedure. Will re-attempt at a later date. Thank you.     Juan Castañeda  PT, DPT #[id number]

## 2023-09-06 NOTE — PROGRESS NOTES
Charge nurse (writer) placed call to DIT for midline placement but with patient's CKD stage 3 and no nephrology consult DIT unable to place midline. Instructed primary nurse to notify NP. Writer notified clinical and they will attempt PIV placement.

## 2023-09-07 PROCEDURE — 2580000003 HC RX 258

## 2023-09-07 PROCEDURE — 6370000000 HC RX 637 (ALT 250 FOR IP): Performed by: SURGERY

## 2023-09-07 PROCEDURE — 99232 SBSQ HOSP IP/OBS MODERATE 35: CPT | Performed by: SURGERY

## 2023-09-07 PROCEDURE — 97530 THERAPEUTIC ACTIVITIES: CPT

## 2023-09-07 PROCEDURE — 2580000003 HC RX 258: Performed by: SURGERY

## 2023-09-07 PROCEDURE — 97110 THERAPEUTIC EXERCISES: CPT

## 2023-09-07 PROCEDURE — 1200000000 HC SEMI PRIVATE

## 2023-09-07 RX ADMIN — METOPROLOL SUCCINATE 25 MG: 25 TABLET, EXTENDED RELEASE ORAL at 15:37

## 2023-09-07 RX ADMIN — MEMANTINE 10 MG: 5 TABLET ORAL at 20:26

## 2023-09-07 RX ADMIN — Medication 5 MG: at 20:26

## 2023-09-07 RX ADMIN — PANTOPRAZOLE SODIUM 40 MG: 40 TABLET, DELAYED RELEASE ORAL at 20:26

## 2023-09-07 RX ADMIN — METOPROLOL SUCCINATE 25 MG: 25 TABLET, EXTENDED RELEASE ORAL at 09:48

## 2023-09-07 RX ADMIN — RISPERIDONE 0.25 MG: 0.25 TABLET, FILM COATED ORAL at 09:48

## 2023-09-07 RX ADMIN — RISPERIDONE 0.25 MG: 0.25 TABLET, FILM COATED ORAL at 20:26

## 2023-09-07 RX ADMIN — MEMANTINE 10 MG: 5 TABLET ORAL at 09:49

## 2023-09-07 RX ADMIN — ATORVASTATIN CALCIUM 40 MG: 40 TABLET, FILM COATED ORAL at 20:26

## 2023-09-07 RX ADMIN — SODIUM CHLORIDE: 9 INJECTION, SOLUTION INTRAVENOUS at 01:44

## 2023-09-07 RX ADMIN — FINASTERIDE 5 MG: 5 TABLET, FILM COATED ORAL at 09:49

## 2023-09-07 RX ADMIN — SODIUM CHLORIDE, PRESERVATIVE FREE 10 ML: 5 INJECTION INTRAVENOUS at 20:29

## 2023-09-07 RX ADMIN — PANTOPRAZOLE SODIUM 40 MG: 40 TABLET, DELAYED RELEASE ORAL at 09:49

## 2023-09-07 ASSESSMENT — PAIN SCALES - GENERAL: PAINLEVEL_OUTOF10: 0

## 2023-09-07 NOTE — ANESTHESIA POSTPROCEDURE EVALUATION
Department of Anesthesiology  Postprocedure Note    Patient: Estela George  MRN: [id number]  YOB: 1935  Date of evaluation: 9/7/2023      Procedure Summary     Date: 09/06/23 Room / Location: [facility]    Anesthesia Start: 5898 Anesthesia Stop: 6002    Procedure: EGD FOREIGN BODY REMOVAL Diagnosis:       Dysphagia, unspecified type      (Dysphagia, unspecified type [R13.10])    Surgeons: Sendy Martin MD Responsible Provider: Venkatesh Gonzalez MD    Anesthesia Type: MAC ASA Status: 4          Anesthesia Type: No value filed.     Greg Phase I: Greg Score: 9    Greg Phase II: Greg Score: 10      Anesthesia Post Evaluation    Patient location during evaluation: PACU  Patient participation: complete - patient participated  Level of consciousness: awake and alert  Airway patency: patent  Nausea & Vomiting: no nausea and no vomiting  Complications: no  Cardiovascular status: hemodynamically stable  Respiratory status: acceptable  Hydration status: euvolemic  Pain management: adequate

## 2023-09-07 NOTE — CARE COORDINATION
Chart reviewed day 3. Care provided by surgery, GI, cards, and IM. Pt is from SNF at [facility] and plans to return. Spoke with [facility] and they are agreeable to accept the pt back on SNF level and still be able to honor pleasure feeding. They will have their speech also work with the pt. Will continue to follow course for needs.  Bailee Garcia RN

## 2023-09-07 NOTE — PROGRESS NOTES
Hospital Medicine Progress Note      Date of Admission: 9/4/2023  Hospital Day: 4    Chief Admission Complaint:  coughing fit     Subjective:  no needs/complaints expressed    Presenting Admission History: 82 y.o. male who presents with coughing fit and dysphagia to OSH. CT chest found extremely large hiatal hernia with retained gastric contents. Patient with baseline dementia. Transferred to [facility] for surgery evaluation. Patient not showing signs of aspiration or pneumonia at this time, however certainly an aspiration risk given the findings. Holding all home meds including Eliquis in setting of afib. Patient has no complaints. Dementia    Assessment/Plan:      Current Principal Problem:  Hiatal hernia    Large hiatal hernia, symptomatic. CT chest this admission: extremely large hiatal hernia in retrocardiac region, distended with large amount of retained gastric content; distended esophagus w/ large amount of fluid and food residue. Gen surgery consulted, appreciate recommendations. GI consulted, appreciate recommendations. Upper GI 9/5: moderate delayed passage of contrast to duodenum; mod esophageal dysmotility and retention of contrast throughout exam; consider achalasia or stricture. EGD 9/6: 8cm sliding hiatal hernia; tortuous and mildly dilated esophagus w/ solid food in distal esophagus. Recommend nutritional supplements, and minced/moist diet. Would not advance further than this    Risk for aspiration. 2/2 above. SLP consulted, appreciate assistance. FEES initially recommended, but later discontinued. Aspiration precautions in place    Atrial fibrillation, paroxsymal.  S/P AVN ablation with PPM insertion. Rate controlled. Holding home dose eliquis in anticipation of GI vs general surgery procedures. Monitor on telemetry    Essential hypertension. Continue toprol-xl. Monitor    Chronic kidney disease, stage 3a. No evidence of NATANAEL present.   Baseline creat likely 1.5-1.8, on arrival it

## 2023-09-07 NOTE — PLAN OF CARE
Problem: Discharge Planning  Goal: Discharge to home or other facility with appropriate resources  Outcome: Progressing  Flowsheets (Taken 9/7/2023 0945)  Discharge to home or other facility with appropriate resources: Identify barriers to discharge with patient and caregiver     Problem: Pain  Goal: Verbalizes/displays adequate comfort level or baseline comfort level  Outcome: Progressing  Flowsheets (Taken 9/7/2023 0933)  Verbalizes/displays adequate comfort level or baseline comfort level: Encourage patient to monitor pain and request assistance     Problem: Safety - Adult  Goal: Free from fall injury  Outcome: Progressing     Problem: Skin/Tissue Integrity  Goal: Absence of new skin breakdown  Description: 1. Monitor for areas of redness and/or skin breakdown  2. Assess vascular access sites hourly  3. Every 4-6 hours minimum:  Change oxygen saturation probe site  4. Every 4-6 hours:  If on nasal continuous positive airway pressure, respiratory therapy assess nares and determine need for appliance change or resting period.   Outcome: Progressing     Problem: Risk for Elopement  Goal: Patient will not exit the unit/facility without proper excort  Outcome: Progressing  Flowsheets (Taken 9/7/2023 0945)  Nursing Interventions for Elopement Risk:   Assist with personal care needs such as toileting, eating, dressing, as needed to reduce the risk of wandering   Collaborate with family members/caregivers to mitigate the elopement risk     Problem: ABCDS Injury Assessment  Goal: Absence of physical injury  Outcome: Progressing     Problem: Neurosensory - Adult  Goal: Achieves stable or improved neurological status  Outcome: Progressing  Flowsheets (Taken 9/7/2023 0945)  Achieves stable or improved neurological status: Initiate measures to prevent increased intracranial pressure     Problem: Cardiovascular - Adult  Goal: Maintains optimal cardiac output and hemodynamic stability  Outcome: Progressing  Flowsheets

## 2023-09-07 NOTE — PROGRESS NOTES
Physical Therapy  Facility/Department: [facility] B[facility] - MED SURG  Daily Treatment Note  NAME: Emil Goldmann  : 1935  MRN: [id number]    Date of Service: 2023    Discharge Recommendations:  Subacute/Skilled Nursing Facility   PT Equipment Recommendations  Equipment Needed: No  Other: defer to next level of care    Patient Diagnosis(es): There were no encounter diagnoses. Assessment   Assessment: Pt with fair participation in therapy this date, disoriented x4 but able to follow most commands. Pt requires grossly min A for bed mobility, mod A for sit<>stand and mod Ax2 for bed>chair pivot transfer with RW. Pt completed multiple transfers throughout session. Pt with reports of dizziness throughout mobility, BP stable (see vitals section). Pt with good participated in seated exercises this date with verbal and tactile cues for form. Pt will continue to benefit from skilled PT services to address current deficits. Continue to rec SNF at DC when deemed medically appropriate. Activity Tolerance: Treatment limited secondary to decreased cognition;Patient tolerated treatment well  Equipment Needed: No  Other: defer to next level of care     Plan    Physcial Therapy Plan  General Plan: 2-3 times per week  Current Treatment Recommendations: Strengthening;ROM;Balance training;Functional mobility training;Transfer training; Endurance training;Gait training;Stair training;Neuromuscular re-education;Home exercise program;Safety education & training;Patient/Caregiver education & training;Equipment evaluation, education, & procurement; Modalities; Positioning; Therapeutic activities     Restrictions  Restrictions/Precautions  Restrictions/Precautions: NPO  Position Activity Restriction  Other position/activity restrictions: up with assist, avasys, DNR, tele     Subjective    Subjective  Subjective: Pt pleasantly confused, willing to participate  Pain: Denies pain at rest  Orientation  Overall Orientation Status:

## 2023-09-08 VITALS
OXYGEN SATURATION: 98 % | TEMPERATURE: 97.2 F | DIASTOLIC BLOOD PRESSURE: 73 MMHG | RESPIRATION RATE: 18 BRPM | SYSTOLIC BLOOD PRESSURE: 111 MMHG | HEIGHT: 64 IN | BODY MASS INDEX: 27.86 KG/M2 | HEART RATE: 71 BPM | WEIGHT: 163.2 LBS

## 2023-09-08 PROCEDURE — 99231 SBSQ HOSP IP/OBS SF/LOW 25: CPT | Performed by: SURGERY

## 2023-09-08 PROCEDURE — 2580000003 HC RX 258: Performed by: SURGERY

## 2023-09-08 PROCEDURE — 6370000000 HC RX 637 (ALT 250 FOR IP): Performed by: SURGERY

## 2023-09-08 PROCEDURE — 6360000002 HC RX W HCPCS: Performed by: SURGERY

## 2023-09-08 RX ORDER — METOPROLOL SUCCINATE 25 MG/1
25 TABLET, EXTENDED RELEASE ORAL 2 TIMES DAILY
Qty: 30 TABLET | Refills: 3 | Status: SHIPPED | OUTPATIENT
Start: 2023-09-08

## 2023-09-08 RX ORDER — HYDROXYZINE HYDROCHLORIDE 25 MG/1
25 TABLET, FILM COATED ORAL NIGHTLY PRN
Qty: 10 TABLET | Refills: 0 | Status: SHIPPED | OUTPATIENT
Start: 2023-09-08 | End: 2023-09-18

## 2023-09-08 RX ADMIN — FINASTERIDE 5 MG: 5 TABLET, FILM COATED ORAL at 09:12

## 2023-09-08 RX ADMIN — MEMANTINE 10 MG: 5 TABLET ORAL at 09:12

## 2023-09-08 RX ADMIN — SODIUM CHLORIDE, PRESERVATIVE FREE 10 ML: 5 INJECTION INTRAVENOUS at 09:36

## 2023-09-08 RX ADMIN — PANTOPRAZOLE SODIUM 40 MG: 40 TABLET, DELAYED RELEASE ORAL at 09:12

## 2023-09-08 RX ADMIN — METOPROLOL SUCCINATE 25 MG: 25 TABLET, EXTENDED RELEASE ORAL at 15:43

## 2023-09-08 RX ADMIN — ENOXAPARIN SODIUM 40 MG: 100 INJECTION SUBCUTANEOUS at 09:33

## 2023-09-08 RX ADMIN — ASPIRIN 81 MG: 81 TABLET, COATED ORAL at 09:34

## 2023-09-08 RX ADMIN — METOPROLOL SUCCINATE 25 MG: 25 TABLET, EXTENDED RELEASE ORAL at 09:13

## 2023-09-08 RX ADMIN — RISPERIDONE 0.25 MG: 0.25 TABLET, FILM COATED ORAL at 09:12

## 2023-09-08 NOTE — CARE COORDINATION
CASE MANAGEMENT DISCHARGE SUMMARY      Discharge to: [facility] SNF    IMM given: 9/8/2023      Transportation:       Medical Transport explained to pt/family. Pt/family voice no agency preference.     Agency used: [facility] up time: 1630   Ambulance form completed: Yes    Confirmed discharge plan with:     Patient: yes     Family:  yes, I called wife and daughter     Facility/Agency, name:  [facility] faxed   Phone number for report to facility: [phone]     RN, name: Estelita Ybarra RN

## 2023-09-08 NOTE — PLAN OF CARE
Problem: Discharge Planning  Goal: Discharge to home or other facility with appropriate resources  9/8/2023 1606 by Douglas Grider RN  Outcome: Completed  9/8/2023 0958 by Douglas Grider RN  Outcome: Truong Brown (Taken 9/8/2023 5291)  Discharge to home or other facility with appropriate resources: Identify barriers to discharge with patient and caregiver     Problem: Pain  Goal: Verbalizes/displays adequate comfort level or baseline comfort level  9/8/2023 1606 by Douglas Grider RN  Outcome: Completed  9/8/2023 0958 by Douglas Grider RN  Outcome: Progressing  Flowsheets (Taken 9/8/2023 0859)  Verbalizes/displays adequate comfort level or baseline comfort level: Encourage patient to monitor pain and request assistance     Problem: Safety - Adult  Goal: Free from fall injury  9/8/2023 1606 by Douglas Grider RN  Outcome: Completed  9/8/2023 0958 by Douglas Grider RN  Outcome: Progressing     Problem: Skin/Tissue Integrity  Goal: Absence of new skin breakdown  Description: 1. Monitor for areas of redness and/or skin breakdown  2. Assess vascular access sites hourly  3. Every 4-6 hours minimum:  Change oxygen saturation probe site  4. Every 4-6 hours:  If on nasal continuous positive airway pressure, respiratory therapy assess nares and determine need for appliance change or resting period.   9/8/2023 1606 by Douglas Grider RN  Outcome: Completed  9/8/2023 0958 by Douglas Grider RN  Outcome: Progressing     Problem: Risk for Elopement  Goal: Patient will not exit the unit/facility without proper excort  9/8/2023 1606 by Douglas Grider RN  Outcome: Completed  9/8/2023 0958 by Douglas Grider RN  Outcome: Progressing  Flowsheets (Taken 9/8/2023 0859)  Nursing Interventions for Elopement Risk: Assist with personal care needs such as toileting, eating, dressing, as needed to reduce the risk of wandering     Problem: ABCDS Injury Assessment  Goal: Absence of physical injury  9/8/2023 1606 by Douglas Grider

## 2023-09-08 NOTE — PROGRESS NOTES
PALLIATIVE MEDICINE PROGRESS NOTE     Patient name:Aroldo Anne    PJK:[id number] :1935  Room/Bed:[facility]5/0365-01    LOS: 4 days        ASSESSMENT/RECOMMENDATIONS     82 y.o. male with  dementia admitted with large hiatal hernia and trouble swallowing    Life-threatening acute illness or unstable chronic illness addressed by Palliative Medicine:    1) Goals of Care  - Patient will return to SNF level of care with comfort feeds. No surgery, no PEG. Code status is limited x 4 nos. 2) Hiatal Hernia - CT chest shows extremely large hiatal hernia in retrocardiac region, distended with large amount of retained gastric content; distended esophagus w/ large amount of fluid and food residue. GI and general surgery following. SLP with recs for regular solids and thin liquids (although wife disagrees and states patient chokes on everything). General surgery feels that unless there is an issue with swallowing, surgical intervention may not need be pressing at this time. UGI  23 showed moderate esophageal dysmotility with retention of contrast within the esophagus. Tapering seen on the distal esophagus for which achalasia or stricture are considerations. EGD with recommendations to trial full liquid diet (tolerating) and consider hiatal hernia surgery vs PEG tube. 3) Altered mental status - baseline dementia. FAST score around 5-6. Currently in a SNF [facility]) with plans to briefly return there after discharge, but NOT for LTC. Continue memantine and risperidone. Wife states patient has hallucinations, can not follow instructions. Patient/Family Goals of Care :    23    No family at bedside. Called daughter, no answer. 23    Family meeting at the bedside with RAJ(daughter) Wilfrid Matta and wife Christy Fry. Patient sleeping deeply and did not participate. Discussed issues with the hiatal hernia in addition to the dementia. Discussed trajectory with dementia.   Discussed

## 2023-09-08 NOTE — PROGRESS NOTES
Speech Language Pathology  Attempt Note     Name: Maria Ines Talley  : 1935  Medical Diagnosis: Hiatal hernia [K44.9]      SLP attempted to see pt for dysphagia tx. Spoke to Jessie/ENIO re: pt status and plan. Pt has d/c orders to SNF with plan for pleasure feeds. RN states no need for SLP for f/u for further dysphagia tx at this time. D/c pt from  [address] at this time. Please re-consult ST if new needs arise. No charges filed. Thank you,  Loc Montesinos. [place] M.A.  [facility]  Speech-Language Pathologist

## 2023-09-08 NOTE — DISCHARGE SUMMARY
Hospital Medicine Discharge Summary    Patient: Deborah Lyons   : 1935     Admit Date: 2023   Discharge Date: 23  Disposition:  []Home   []HHC  [x]SNF  []Acute Rehab  []LTAC  []Hospice  Code status:  []Full  []DNR/CCA  [x]Limited (DNR/CCA with Do Not Intubate)  []DNRCC  Condition at Discharge: Stable  Primary Care Provider: David Gates MD    Admitting Provider: Lan Alvarez MD  Discharge Provider: Bonita Anderson, APRN - CNP     Discharge Diagnoses: Active Hospital Problems    Diagnosis     Hiatal hernia [K44.9]        Presenting Admission History:  82 y.o. male who presents with coughing fit and dysphagia to OSH. CT chest found extremely large hiatal hernia with retained gastric contents. Patient with baseline dementia. Transferred to [facility] for surgery evaluation. Patient not showing signs of aspiration or pneumonia at this time, however certainly an aspiration risk given the findings. Holding all home meds including Eliquis in setting of afib. Patient has no complaints. Dementia     Assessment/Plan:       Current Principal Problem:  Hiatal hernia     Large hiatal hernia, stable. CT chest this admission: extremely large hiatal hernia in retrocardiac region, distended with large amount of retained gastric content; distended esophagus w/ large amount of fluid and food residue. Gen surgery consulted, appreciate recommendations. GI consulted, appreciate recommendations. Upper GI : moderate delayed passage of contrast to duodenum; mod esophageal dysmotility and retention of contrast throughout exam; consider achalasia or stricture. EGD : 8cm sliding hiatal hernia; tortuous and mildly dilated esophagus w/ solid food in distal esophagus. Recommend nutritional supplements, and minced/moist diet. Would not advance further than this     Risk for aspiration. 2/2 above. SLP consulted, appreciate assistance. FEES initially recommended, but later discontinued.   Aspiration preservation of alignment, there is narrowing of the medial joint compartment and hypertrophic flaring of the medial and lateral compartmental articular margins is consistent with degenerative osteoarthropathy. Similar findings are noted in the patellofemoral joint. Soft tissues show Chondrocalcinosis is noted in the medial and lateral compartments, and there is fullness in the suprapatellar bursal region consistent with joint effusion or synovial thickening. There appears to be a loose body or dystrophic calcification along the anterior suprapatellar bursal region which could be in the quadriceps tendon. Atherosclerotic calcifications are present. Left knee skeletal structures show a comminuted and very minimally displaced fracture of the patella without distracted upper pole or lower pole margins. There is advanced degenerative change with varus alignment of the knee and narrowing is greater in the medial compartment. Flaring of articular margins and chondrocalcinosis in the medial and lateral compartments is documented. Soft tissues show joint fluid/hemarthrosis is suspected. Atherosclerotic calcification is noted. IMPRESSION: A minimally displaced patellar fracture on the left with probable hemarthrosis and advanced degenerative osteoarthropathy. The right knee shows advanced degenerative osteoarthropathy and a suboptimally visualized upper pole of the patella could also be fractured, but this is of uncertain chronicity.  There is some associated fullness of the suprapatellar bursal region suspicious for fracture of the patella. _____________________________________ Electronically signed by: Crystal Navarrete M.D. (Aug 28 2023 07:40:04)     [place] UGI    Result Date: 9/5/2023  EXAMINATION: DOUBLE CONTRAST UPPER GI SERIES 9/5/2023 TECHNIQUE: Double contrast upper GI series was performed with barium and air contrast. FLUOROSCOPY DOSE AND TYPE: Radiation Exposure Index: Air kerma 88.6 mGy COMPARISON: CT chest dated

## 2023-09-08 NOTE — PLAN OF CARE
Problem: Discharge Planning  Goal: Discharge to home or other facility with appropriate resources  9/8/2023 0136 by Kathleen Yousif RN  Outcome: Progressing  Flowsheets (Taken 9/7/2023 2030)  Discharge to home or other facility with appropriate resources:   Identify barriers to discharge with patient and caregiver   Arrange for needed discharge resources and transportation as appropriate   Identify discharge learning needs (meds, wound care, etc)     Problem: Pain  Goal: Verbalizes/displays adequate comfort level or baseline comfort level  9/8/2023 0136 by Kathleen Yousif RN  Outcome: Progressing     Problem: Safety - Adult  Goal: Free from fall injury  9/8/2023 0136 by Kathleen Yousif RN  Outcome: Progressing     Problem: Skin/Tissue Integrity  Goal: Absence of new skin breakdown  Description: 1. Monitor for areas of redness and/or skin breakdown  2. Assess vascular access sites hourly  3. Every 4-6 hours minimum:  Change oxygen saturation probe site  4. Every 4-6 hours:  If on nasal continuous positive airway pressure, respiratory therapy assess nares and determine need for appliance change or resting period.   9/8/2023 0136 by Kathleen Yousif RN  Outcome: Progressing     Problem: Risk for Elopement  Goal: Patient will not exit the unit/facility without proper excort  9/8/2023 0136 by Kathleen Yousif RN  Outcome: Progressing  Flowsheets (Taken 9/7/2023 2030)  Nursing Interventions for Elopement Risk:   Assist with personal care needs such as toileting, eating, dressing, as needed to reduce the risk of wandering   Collaborate with family members/caregivers to mitigate the elopement risk     Problem: ABCDS Injury Assessment  Goal: Absence of physical injury  9/8/2023 0136 by Kathleen Yousif RN  Outcome: Progressing     Problem: Neurosensory - Adult  Goal: Achieves stable or improved neurological status  9/8/2023 0136 by Kathleen Yousif RN  Outcome: Progressing  Flowsheets (Taken 9/7/2023 2030)  Achieves stable or improved neurological status: Assess for and report changes in neurological status     Problem: Cardiovascular - Adult  Goal: Maintains optimal cardiac output and hemodynamic stability  9/8/2023 0136 by Sushant Mckeon RN  Outcome: Progressing  Flowsheets (Taken 9/7/2023 2030)  Maintains optimal cardiac output and hemodynamic stability: Monitor blood pressure and heart rate     Problem: Cardiovascular - Adult  Goal: Absence of cardiac dysrhythmias or at baseline  9/8/2023 0136 by Sushant Mckeon RN  Outcome: Progressing  Flowsheets (Taken 9/7/2023 2030)  Absence of cardiac dysrhythmias or at baseline: Monitor cardiac rate and rhythm     Problem: Musculoskeletal - Adult  Goal: Return mobility to safest level of function  9/8/2023 0136 by Sushant Mckeon RN  Outcome: Progressing  Flowsheets (Taken 9/7/2023 2030)  Return Mobility to Safest Level of Function: Assess patient stability and activity tolerance for standing, transferring and ambulating with or without assistive devices

## 2023-10-26 DIAGNOSIS — N18.30 STAGE 3 CHRONIC KIDNEY DISEASE, UNSPECIFIED WHETHER STAGE 3A OR 3B CKD (HCC): Primary | ICD-10-CM

## 2023-10-26 LAB
CREAT SERPL-MCNC: 1.5 MG/DL
POTASSIUM (K+): 4.6

## 2023-10-28 ENCOUNTER — HOSPITAL ENCOUNTER (INPATIENT)
Age: 88
LOS: 7 days | Discharge: NURSING, REHAB OR LONG-TERM CARE | End: 2023-11-04
Attending: INTERNAL MEDICINE | Admitting: INTERNAL MEDICINE
Payer: MEDICARE

## 2023-10-28 PROBLEM — J44.9 COPD (CHRONIC OBSTRUCTIVE PULMONARY DISEASE) (HCC): Status: ACTIVE | Noted: 2023-10-28

## 2023-10-28 LAB
LACTATE BLDV-SCNC: 3.1 MMOL/L (ref 0.4–2)
PROCALCITONIN SERPL IA-MCNC: 0.2 NG/ML (ref 0–0.15)

## 2023-10-28 PROCEDURE — 2580000003 HC RX 258: Performed by: HOSPITALIST

## 2023-10-28 PROCEDURE — 6360000002 HC RX W HCPCS: Performed by: INTERNAL MEDICINE

## 2023-10-28 PROCEDURE — 6360000002 HC RX W HCPCS: Performed by: NURSE PRACTITIONER

## 2023-10-28 PROCEDURE — 2700000000 HC OXYGEN THERAPY PER DAY

## 2023-10-28 PROCEDURE — 83605 ASSAY OF LACTIC ACID: CPT

## 2023-10-28 PROCEDURE — 1200000000 HC SEMI PRIVATE

## 2023-10-28 PROCEDURE — 6370000000 HC RX 637 (ALT 250 FOR IP): Performed by: NURSE PRACTITIONER

## 2023-10-28 PROCEDURE — 94761 N-INVAS EAR/PLS OXIMETRY MLT: CPT

## 2023-10-28 PROCEDURE — 36415 COLL VENOUS BLD VENIPUNCTURE: CPT

## 2023-10-28 PROCEDURE — 84145 PROCALCITONIN (PCT): CPT

## 2023-10-28 RX ORDER — HYDROCODONE BITARTRATE AND ACETAMINOPHEN 5; 325 MG/1; MG/1
1 TABLET ORAL EVERY 6 HOURS PRN
Status: DISCONTINUED | OUTPATIENT
Start: 2023-10-28 | End: 2023-11-04 | Stop reason: HOSPADM

## 2023-10-28 RX ORDER — METOPROLOL SUCCINATE 25 MG/1
25 TABLET, EXTENDED RELEASE ORAL 2 TIMES DAILY
Status: DISCONTINUED | OUTPATIENT
Start: 2023-10-28 | End: 2023-11-04 | Stop reason: HOSPADM

## 2023-10-28 RX ORDER — ONDANSETRON 4 MG/1
4 TABLET, ORALLY DISINTEGRATING ORAL EVERY 8 HOURS PRN
Status: DISCONTINUED | OUTPATIENT
Start: 2023-10-28 | End: 2023-11-04 | Stop reason: HOSPADM

## 2023-10-28 RX ORDER — ASPIRIN 81 MG/1
81 TABLET ORAL DAILY
Status: DISCONTINUED | OUTPATIENT
Start: 2023-10-28 | End: 2023-11-04 | Stop reason: HOSPADM

## 2023-10-28 RX ORDER — LACTOBACILLUS RHAMNOSUS GG 10B CELL
1 CAPSULE ORAL 2 TIMES DAILY WITH MEALS
Status: DISCONTINUED | OUTPATIENT
Start: 2023-10-28 | End: 2023-11-04 | Stop reason: HOSPADM

## 2023-10-28 RX ORDER — MORPHINE SULFATE 2 MG/ML
2 INJECTION, SOLUTION INTRAMUSCULAR; INTRAVENOUS ONCE
Status: DISCONTINUED | OUTPATIENT
Start: 2023-10-28 | End: 2023-11-04 | Stop reason: HOSPADM

## 2023-10-28 RX ORDER — ACETAMINOPHEN 650 MG/1
650 SUPPOSITORY RECTAL EVERY 6 HOURS PRN
Status: DISCONTINUED | OUTPATIENT
Start: 2023-10-28 | End: 2023-11-04 | Stop reason: HOSPADM

## 2023-10-28 RX ORDER — ONDANSETRON 2 MG/ML
4 INJECTION INTRAMUSCULAR; INTRAVENOUS EVERY 6 HOURS PRN
Status: DISCONTINUED | OUTPATIENT
Start: 2023-10-28 | End: 2023-11-04 | Stop reason: HOSPADM

## 2023-10-28 RX ORDER — SODIUM CHLORIDE 9 MG/ML
INJECTION, SOLUTION INTRAVENOUS PRN
Status: DISCONTINUED | OUTPATIENT
Start: 2023-10-28 | End: 2023-11-04 | Stop reason: HOSPADM

## 2023-10-28 RX ORDER — FINASTERIDE 5 MG/1
5 TABLET, FILM COATED ORAL DAILY
Status: DISCONTINUED | OUTPATIENT
Start: 2023-10-28 | End: 2023-11-04 | Stop reason: HOSPADM

## 2023-10-28 RX ORDER — OLANZAPINE 10 MG/2ML
5 INJECTION, POWDER, FOR SOLUTION INTRAMUSCULAR ONCE
Status: COMPLETED | OUTPATIENT
Start: 2023-10-28 | End: 2023-10-28

## 2023-10-28 RX ORDER — FUROSEMIDE 20 MG/1
20 TABLET ORAL DAILY
Status: DISCONTINUED | OUTPATIENT
Start: 2023-10-28 | End: 2023-10-30

## 2023-10-28 RX ORDER — POLYETHYLENE GLYCOL 3350 17 G/17G
17 POWDER, FOR SOLUTION ORAL DAILY PRN
Status: DISCONTINUED | OUTPATIENT
Start: 2023-10-28 | End: 2023-11-04 | Stop reason: HOSPADM

## 2023-10-28 RX ORDER — ATORVASTATIN CALCIUM 40 MG/1
40 TABLET, FILM COATED ORAL NIGHTLY
Status: DISCONTINUED | OUTPATIENT
Start: 2023-10-28 | End: 2023-11-04 | Stop reason: HOSPADM

## 2023-10-28 RX ORDER — TROSPIUM CHLORIDE 20 MG/1
20 TABLET, FILM COATED ORAL NIGHTLY
Status: DISCONTINUED | OUTPATIENT
Start: 2023-10-28 | End: 2023-10-28

## 2023-10-28 RX ORDER — HYDROXYZINE HYDROCHLORIDE 10 MG/1
25 TABLET, FILM COATED ORAL DAILY
Status: DISCONTINUED | OUTPATIENT
Start: 2023-10-28 | End: 2023-10-30

## 2023-10-28 RX ORDER — ENOXAPARIN SODIUM 100 MG/ML
40 INJECTION SUBCUTANEOUS DAILY
Status: DISCONTINUED | OUTPATIENT
Start: 2023-10-29 | End: 2023-10-31

## 2023-10-28 RX ORDER — ACETAMINOPHEN 325 MG/1
650 TABLET ORAL EVERY 6 HOURS PRN
Status: DISCONTINUED | OUTPATIENT
Start: 2023-10-28 | End: 2023-11-04 | Stop reason: HOSPADM

## 2023-10-28 RX ORDER — SODIUM CHLORIDE 0.9 % (FLUSH) 0.9 %
5-40 SYRINGE (ML) INJECTION EVERY 12 HOURS SCHEDULED
Status: DISCONTINUED | OUTPATIENT
Start: 2023-10-28 | End: 2023-11-04 | Stop reason: HOSPADM

## 2023-10-28 RX ORDER — SODIUM CHLORIDE 0.9 % (FLUSH) 0.9 %
5-40 SYRINGE (ML) INJECTION PRN
Status: DISCONTINUED | OUTPATIENT
Start: 2023-10-28 | End: 2023-11-04 | Stop reason: HOSPADM

## 2023-10-28 RX ORDER — ZIPRASIDONE MESYLATE 20 MG/ML
20 INJECTION, POWDER, LYOPHILIZED, FOR SOLUTION INTRAMUSCULAR ONCE
Status: DISCONTINUED | OUTPATIENT
Start: 2023-10-28 | End: 2023-10-29

## 2023-10-28 RX ORDER — LEVOFLOXACIN 5 MG/ML
500 INJECTION, SOLUTION INTRAVENOUS EVERY 24 HOURS
Status: DISCONTINUED | OUTPATIENT
Start: 2023-10-28 | End: 2023-10-28

## 2023-10-28 RX ORDER — MEMANTINE HYDROCHLORIDE 5 MG/1
10 TABLET ORAL 2 TIMES DAILY
Status: DISCONTINUED | OUTPATIENT
Start: 2023-10-28 | End: 2023-10-30

## 2023-10-28 RX ORDER — PANTOPRAZOLE SODIUM 40 MG/1
40 TABLET, DELAYED RELEASE ORAL 2 TIMES DAILY
Status: DISCONTINUED | OUTPATIENT
Start: 2023-10-28 | End: 2023-11-04 | Stop reason: HOSPADM

## 2023-10-28 RX ORDER — FERROUS SULFATE 325(65) MG
325 TABLET ORAL
Status: DISCONTINUED | OUTPATIENT
Start: 2023-10-29 | End: 2023-11-04 | Stop reason: HOSPADM

## 2023-10-28 RX ORDER — LEVOFLOXACIN 5 MG/ML
500 INJECTION, SOLUTION INTRAVENOUS ONCE
Status: COMPLETED | OUTPATIENT
Start: 2023-10-28 | End: 2023-10-28

## 2023-10-28 RX ORDER — RISPERIDONE 0.25 MG/1
0.25 TABLET ORAL 2 TIMES DAILY
Status: DISCONTINUED | OUTPATIENT
Start: 2023-10-28 | End: 2023-10-30

## 2023-10-28 RX ORDER — LANOLIN ALCOHOL/MO/W.PET/CERES
5 CREAM (GRAM) TOPICAL NIGHTLY
Status: DISCONTINUED | OUTPATIENT
Start: 2023-10-28 | End: 2023-10-30

## 2023-10-28 RX ORDER — LEVOFLOXACIN 5 MG/ML
250 INJECTION, SOLUTION INTRAVENOUS EVERY 24 HOURS
Status: DISCONTINUED | OUTPATIENT
Start: 2023-10-29 | End: 2023-10-29

## 2023-10-28 RX ADMIN — RISPERIDONE 0.25 MG: 0.25 TABLET, FILM COATED ORAL at 22:07

## 2023-10-28 RX ADMIN — PANTOPRAZOLE SODIUM 40 MG: 40 TABLET, DELAYED RELEASE ORAL at 22:06

## 2023-10-28 RX ADMIN — LEVOFLOXACIN 500 MG: 5 INJECTION, SOLUTION INTRAVENOUS at 22:37

## 2023-10-28 RX ADMIN — Medication 4.5 MG: at 22:06

## 2023-10-28 RX ADMIN — SODIUM CHLORIDE, PRESERVATIVE FREE 10 ML: 5 INJECTION INTRAVENOUS at 21:45

## 2023-10-28 RX ADMIN — MEMANTINE 10 MG: 5 TABLET ORAL at 22:06

## 2023-10-28 RX ADMIN — OLANZAPINE 5 MG: 10 INJECTION, POWDER, FOR SOLUTION INTRAMUSCULAR at 16:51

## 2023-10-28 RX ADMIN — ATORVASTATIN CALCIUM 40 MG: 40 TABLET, FILM COATED ORAL at 22:06

## 2023-10-28 ASSESSMENT — PAIN SCALES - WONG BAKER: WONGBAKER_NUMERICALRESPONSE: 0

## 2023-10-28 NOTE — PROGRESS NOTES
Patient direct admit from [address] [place] [address] ER to room 356. Patient was asleep all afternoon, VSS. Around 4pm patient woke up and become very agressive verbally and physically,  hitting staff, trying to leave the room, yelling, and kicking. Code violet called. Bilateral soft wrist restrains placed, Zyprexa 5 mg IM administered. Patient's son notified. Patient is sleeping now, family at bed side. Plan of care discussed with patient's son. Verbalized understating.

## 2023-10-28 NOTE — CONSULTS
Consult Placed     Who: Chin Sinha  Date:10/28/2023  GMIN:[id number]     Electronically signed by Moses Campos on 10/28/2023 at 6:25 PM

## 2023-10-28 NOTE — PROGRESS NOTES
Received page from pt's nurse regarding pt arrival from outside hospital. Forwarded to Dr. Savi Biswas NP, admitting provider.

## 2023-10-28 NOTE — CONSULTS
Consult Placed     Who: CARDIOLOGY, [facility]  Date:10/28/2023  [facility]:[id number]     Electronically signed by Tyrone Alberto on 10/28/2023 at 6:33 PM

## 2023-10-28 NOTE — H&P
Hospital Medicine History & Physical      Date of Admission: 10/28/2023    Date of Service:  Pt seen/examined on 10/28/2023    [x]Admitted to Inpatient with expected LOS greater than two midnights due to medical therapy. []Placed in Observation status. Chief Admission Complaint:    Chest pain with hypoxia sent from [facility] by squad to ED    Presenting Admission History: This is a 82 y.o. male who presented to OSH, [facility] via squad from [facility] for reported left sided chest pain and SOB. The patient has severe dementia and can not give me information on reason for admission or for going to [facility]. All the following information is from ED note and notes from [facility]. PMHx significant for Anemia, AFIB, BPH, Dementia, Dysphagia, GERD, Depression, Hitial hernia, Hypercholesterolemia, HTN, pacemaker, CAD, CHF, and CKD IIIa. Assessment/Plan:      Current Principal Problem:  COPD (chronic obstructive pulmonary disease) (HCC)    Chest pain  Possible CHF exacerbation  - Patient was recently seen at [address] [place] [address] ED for same complaint on 10/23/23. He was evaluated and discharged home the same evening.  - Troponin flat <0.012 since admission  - EKG from OSH revealed possible elevation in leads I and aVL but when compared to previous this was seen before. - CXR at OSH revealed borderline cardiomegaly with possible cardiogenic overload. However patient does not require O2 supplementation. Brought in on 2L per NC sating at 98%. Weaning off now. - Consult Cardiology in setting of possible CHF exacerbation. BNP 3180.  - Continue home dose of Lasix    Possible Pneumonia without sepsis, hemodynamically stable, and no leukocytosis  - CXR at OSH revealed streaky opacities with possible cardiogenic overload and borderline cardiomegaly  - Received Levaquin IVPB at OSH. Will continue for 7 days of therapy.   - Lactic acid ordered  - PCT ordered    Dementia  - Patient is verbally 10/30/2023    Anticipated Discharge Location: []Home  []HHC  [x]SNF  []Acute Rehab  []ECF  []LTAC  []Hospice    Consults:      IP CONSULT TO PALLIATIVE CARE      This patient has a high likelihood of being discharged tomorrow and is appropriate for A1 Discharge Unit in AM pending clinical course overnight: []Yes  [x]No    --------------------------------------------------------------------------------------------------------------------------------------------------------------------    Imaging:     XR CHEST PORTABLE    Result Date: 10/28/2023  Patient Full Name - Estela George : 1935 Referring Physician: Severiano Bernheim DOS: 10/28/2023 COMPARISON: None. TECHNIQUE: X-ray of the chest showing 1 view: AP view. FINDINGS: Rotation towards right side. Expiratory radiograph. Bilateral inhomogenous streaky opacities are seen. Bilateral hilar and central vascular prominence is seen. The costophrenic and cardiophrenic angles are mildly blunted bilaterally. Borderline cardiomegaly seen. Prominent aortic knucle with calcification. The bony thorax is unremarkable. Small cardiac device related to lead less cardiac pacemaker likely noted, appears in situ and intact. 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 IMPRESSION: Borderline cardiomegaly with bilateral streaky opacities , prominent central vascularity and blunted CP angles. Clinical correlation is recommended to rule out any cardiogenic volume overload pathology.  _____________________________________ Electronically Signed by: Wayne Haynes MD. (10/28/2023 04:21:17 CST) _____________________________________     XR CHEST PORTABLE    Result Date: 10/26/2023  Patient Full Name - Estela George : 1935 Referring Physician: Marky Cheng DOS: 10/26/2023 Patient MRN: [id number] : 1935 Age: 31-year-old Gender: Male Order Date: 10/26/2023 EXAM: CHEST XRAY PORTABLE NUMBER OF

## 2023-10-29 LAB
ANION GAP SERPL CALCULATED.3IONS-SCNC: 15 MMOL/L (ref 3–16)
BASOPHILS # BLD: 0 K/UL (ref 0–0.2)
BASOPHILS NFR BLD: 0.3 %
BUN SERPL-MCNC: 71 MG/DL (ref 7–20)
CALCIUM SERPL-MCNC: 9.2 MG/DL (ref 8.3–10.6)
CHLORIDE SERPL-SCNC: 96 MMOL/L (ref 99–110)
CO2 SERPL-SCNC: 24 MMOL/L (ref 21–32)
CREAT SERPL-MCNC: 1.8 MG/DL (ref 0.8–1.3)
DEPRECATED RDW RBC AUTO: 15.6 % (ref 12.4–15.4)
EOSINOPHIL # BLD: 0 K/UL (ref 0–0.6)
EOSINOPHIL NFR BLD: 0 %
GFR SERPLBLD CREATININE-BSD FMLA CKD-EPI: 36 ML/MIN/{1.73_M2}
GLUCOSE SERPL-MCNC: 143 MG/DL (ref 70–99)
HCT VFR BLD AUTO: 47.4 % (ref 40.5–52.5)
HGB BLD-MCNC: 16 G/DL (ref 13.5–17.5)
LACTATE BLDV-SCNC: 2.8 MMOL/L (ref 0.4–2)
LYMPHOCYTES # BLD: 1.1 K/UL (ref 1–5.1)
LYMPHOCYTES NFR BLD: 8.7 %
MAGNESIUM SERPL-MCNC: 2.5 MG/DL (ref 1.8–2.4)
MCH RBC QN AUTO: 30.1 PG (ref 26–34)
MCHC RBC AUTO-ENTMCNC: 33.8 G/DL (ref 31–36)
MCV RBC AUTO: 89.1 FL (ref 80–100)
MONOCYTES # BLD: 0.9 K/UL (ref 0–1.3)
MONOCYTES NFR BLD: 7 %
NEUTROPHILS # BLD: 10.7 K/UL (ref 1.7–7.7)
NEUTROPHILS NFR BLD: 84 %
NT-PROBNP SERPL-MCNC: 3536 PG/ML (ref 0–449)
PHOSPHATE SERPL-MCNC: 4.8 MG/DL (ref 2.5–4.9)
PLATELET # BLD AUTO: 237 K/UL (ref 135–450)
PMV BLD AUTO: 7.9 FL (ref 5–10.5)
POTASSIUM SERPL-SCNC: 4.2 MMOL/L (ref 3.5–5.1)
RBC # BLD AUTO: 5.32 M/UL (ref 4.2–5.9)
SODIUM SERPL-SCNC: 135 MMOL/L (ref 136–145)
TROPONIN, HIGH SENSITIVITY: 25 NG/L (ref 0–22)
WBC # BLD AUTO: 12.7 K/UL (ref 4–11)

## 2023-10-29 PROCEDURE — 99223 1ST HOSP IP/OBS HIGH 75: CPT | Performed by: INTERNAL MEDICINE

## 2023-10-29 PROCEDURE — 6370000000 HC RX 637 (ALT 250 FOR IP): Performed by: NURSE PRACTITIONER

## 2023-10-29 PROCEDURE — 6360000002 HC RX W HCPCS: Performed by: HOSPITALIST

## 2023-10-29 PROCEDURE — 85025 COMPLETE CBC W/AUTO DIFF WBC: CPT

## 2023-10-29 PROCEDURE — 6360000002 HC RX W HCPCS: Performed by: NURSE PRACTITIONER

## 2023-10-29 PROCEDURE — 83605 ASSAY OF LACTIC ACID: CPT

## 2023-10-29 PROCEDURE — 36415 COLL VENOUS BLD VENIPUNCTURE: CPT

## 2023-10-29 PROCEDURE — 6360000002 HC RX W HCPCS: Performed by: INTERNAL MEDICINE

## 2023-10-29 PROCEDURE — 80048 BASIC METABOLIC PNL TOTAL CA: CPT

## 2023-10-29 PROCEDURE — 84484 ASSAY OF TROPONIN QUANT: CPT

## 2023-10-29 PROCEDURE — 1200000000 HC SEMI PRIVATE

## 2023-10-29 PROCEDURE — 84100 ASSAY OF PHOSPHORUS: CPT

## 2023-10-29 PROCEDURE — 83880 ASSAY OF NATRIURETIC PEPTIDE: CPT

## 2023-10-29 PROCEDURE — 83735 ASSAY OF MAGNESIUM: CPT

## 2023-10-29 PROCEDURE — 2580000003 HC RX 258: Performed by: HOSPITALIST

## 2023-10-29 RX ORDER — OLANZAPINE 10 MG/2ML
5 INJECTION, POWDER, FOR SOLUTION INTRAMUSCULAR ONCE
Status: COMPLETED | OUTPATIENT
Start: 2023-10-29 | End: 2023-10-29

## 2023-10-29 RX ORDER — ZIPRASIDONE MESYLATE 20 MG/ML
20 INJECTION, POWDER, LYOPHILIZED, FOR SOLUTION INTRAMUSCULAR ONCE
Status: COMPLETED | OUTPATIENT
Start: 2023-10-29 | End: 2023-10-29

## 2023-10-29 RX ORDER — FUROSEMIDE 10 MG/ML
40 INJECTION INTRAMUSCULAR; INTRAVENOUS 2 TIMES DAILY
Status: DISCONTINUED | OUTPATIENT
Start: 2023-10-29 | End: 2023-10-30

## 2023-10-29 RX ORDER — WATER 10 ML/10ML
INJECTION INTRAMUSCULAR; INTRAVENOUS; SUBCUTANEOUS
Status: DISPENSED
Start: 2023-10-29 | End: 2023-10-30

## 2023-10-29 RX ADMIN — Medication 1 CAPSULE: at 09:53

## 2023-10-29 RX ADMIN — SODIUM CHLORIDE, PRESERVATIVE FREE 10 ML: 5 INJECTION INTRAVENOUS at 09:53

## 2023-10-29 RX ADMIN — PANTOPRAZOLE SODIUM 40 MG: 40 TABLET, DELAYED RELEASE ORAL at 19:42

## 2023-10-29 RX ADMIN — MEMANTINE 10 MG: 5 TABLET ORAL at 19:37

## 2023-10-29 RX ADMIN — CEFTRIAXONE SODIUM 1000 MG: 1 INJECTION, POWDER, FOR SOLUTION INTRAMUSCULAR; INTRAVENOUS at 13:16

## 2023-10-29 RX ADMIN — FINASTERIDE 5 MG: 5 TABLET, FILM COATED ORAL at 09:53

## 2023-10-29 RX ADMIN — METOPROLOL SUCCINATE 25 MG: 25 TABLET, EXTENDED RELEASE ORAL at 09:53

## 2023-10-29 RX ADMIN — AZITHROMYCIN MONOHYDRATE 500 MG: 500 INJECTION, POWDER, LYOPHILIZED, FOR SOLUTION INTRAVENOUS at 14:29

## 2023-10-29 RX ADMIN — ENOXAPARIN SODIUM 40 MG: 100 INJECTION SUBCUTANEOUS at 09:53

## 2023-10-29 RX ADMIN — RISPERIDONE 0.25 MG: 0.25 TABLET, FILM COATED ORAL at 19:38

## 2023-10-29 RX ADMIN — Medication 4.5 MG: at 19:37

## 2023-10-29 RX ADMIN — OLANZAPINE 5 MG: 10 INJECTION, POWDER, FOR SOLUTION INTRAMUSCULAR at 22:37

## 2023-10-29 RX ADMIN — OLANZAPINE 5 MG: 10 INJECTION, POWDER, FOR SOLUTION INTRAMUSCULAR at 12:27

## 2023-10-29 RX ADMIN — HYDROXYZINE HYDROCHLORIDE 25 MG: 10 TABLET ORAL at 09:53

## 2023-10-29 RX ADMIN — RISPERIDONE 0.25 MG: 0.25 TABLET, FILM COATED ORAL at 09:53

## 2023-10-29 RX ADMIN — PANTOPRAZOLE SODIUM 40 MG: 40 TABLET, DELAYED RELEASE ORAL at 09:53

## 2023-10-29 RX ADMIN — FUROSEMIDE 40 MG: 10 INJECTION, SOLUTION INTRAMUSCULAR; INTRAVENOUS at 18:33

## 2023-10-29 RX ADMIN — ATORVASTATIN CALCIUM 40 MG: 40 TABLET, FILM COATED ORAL at 19:41

## 2023-10-29 RX ADMIN — ZIPRASIDONE MESYLATE 20 MG: 20 INJECTION, POWDER, LYOPHILIZED, FOR SOLUTION INTRAMUSCULAR at 17:05

## 2023-10-29 RX ADMIN — MEMANTINE 10 MG: 5 TABLET ORAL at 09:53

## 2023-10-29 RX ADMIN — ASPIRIN 81 MG: 81 TABLET, COATED ORAL at 09:53

## 2023-10-29 RX ADMIN — FERROUS SULFATE TAB 325 MG (65 MG ELEMENTAL FE) 325 MG: 325 (65 FE) TAB at 09:53

## 2023-10-29 RX ADMIN — SODIUM CHLORIDE, PRESERVATIVE FREE 10 ML: 5 INJECTION INTRAVENOUS at 19:43

## 2023-10-29 RX ADMIN — FUROSEMIDE 20 MG: 20 TABLET ORAL at 09:53

## 2023-10-29 ASSESSMENT — PAIN SCALES - GENERAL
PAINLEVEL_OUTOF10: 0

## 2023-10-29 NOTE — PROGRESS NOTES
Hospital Medicine Progress Note      Date of Admission: 10/28/2023  Hospital Day: 2    Chief Admission Complaint:    Chest pain with hypoxia sent from [facility] by squad to ED       Subjective:      Patient lying in bed. He is calm this morning. He denies any c/p, sob, cough, fevers, or chills. Still confused as to why he is in the hospital and how he got here. Presenting Admission History: This is a 82 y.o. male who presented to OSH, [facility] via squad from [facility] for reported left sided chest pain and SOB. The patient has severe dementia and can not give me information on reason for admission or for going to [facility]. All the following information is from ED note and notes from [facility]. PMHx significant for Anemia, AFIB, BPH, Dementia, Dysphagia, GERD, Depression, Hitial hernia, Hypercholesterolemia, HTN, pacemaker, CAD, CHF, and CKD IIIa. Assessment/Plan:      Current Principal Problem:  COPD (chronic obstructive pulmonary disease) (HCC)    Chest pain  Possible CHF exacerbation  - Patient was recently seen at [address] [place] [address] ED for same complaint on 10/23/23. He was evaluated and discharged home the same evening.  - Troponin flat <0.012 since admission  - EKG from OSH revealed possible elevation in leads I and aVL but when compared to previous EKGs this was seen before. - CXR at OSH revealed borderline cardiomegaly with possible cardiogenic overload. Brought in on 2L per NC sating at 98%. Weaned off and sating at 94% on RA.  - Consult Cardiology in setting of possible CHF exacerbation. BNP 3180. Repeat BNP this morning ordered  - Continue home dose of Lasix     Possible Pneumonia without sepsis, hemodynamically stable, and no leukocytosis  - CXR at OSH revealed streaky opacities with possible cardiogenic overload and borderline cardiomegaly  - Received Levaquin IVPB at OSH.   - Now on Rocephin and Azithromycin IVPB  - Lactic acid 3.1.  Will repeat this

## 2023-10-29 NOTE — CONSULTS
[address]  [phone]      Reason for consult:  CHF    ASSESSMENT AND PLAN:  Acute on chronic systolic heart failure - LVEF down from ~55% (normal) in 1/2023 to ~35% earlier this week at OSH; high BNP, pulmonary edema on CXR. CAD in native artery, medically managed  Permanent atrial fibrillation s/p AV node ablatiton  S/p pacemaker  CKD stage IIIb  Severe dementia with behavioral disturbance    I disagree with radiology reading of CXR - he definitely has increased interstitial markings. Additionally, his BNP is very high, and his LVEF has plummeted over the past few months. This is consistent with acute decompensated heart failure, although he isn't presently requiring oxygen. History is very limited by his dementia. He denies chest pain. He states he wants to go home. He is here with his son Karen Jacobson and his wife, I also spoke with his daughter Kirti Alexander over the phone. His creatinine is worse from baseline, although that might be due to renal venous congestion and may actually improve, rather than worsen, with diuresis. Plan  Start lasix 40 mg IV q12 - low threshold for Brenner catheter  Closely monitor renal function, 'lytes, telemetry while aggressively IV diuresing. Echo was done at OSH 4 days ago thus doesn't need to be repeated  On anticoagulation to prevent stroke given AF, monitor for signs of bleeding  He is not an appropriate candidate for cath lab given severity of dementia, and family also stated that they wouldn't want him going through one. Although he is not a candidate for going to the cath lab, would still check a baseline troponin in order to help determine etiology of his abrupt decline in LVEF  Will avoid ACE/ARB/ARNI/MRA at the moment due to tenuous renal function.     History of Present Illness:  Shanthi Swift is a 82 y.o. patient with dementia that has become much more severe over the last year, now residing in a nursing home, with a lot of issues of Diagnosis       Wide QRS rhythmNon-specific intra-ventricular conduction blockCannot rule out Anteroseptal infarct , age undeterminedT wave abnormality, consider inferolateral ischemiaConfirmed by Vy Mistry ([id number]) on 1/25/2023 1:35:32 PM   Results for orders placed or performed during the hospital encounter of 01/19/23   EKG 12 Lead   Result Value Ref Range    Ventricular Rate 80 BPM    Atrial Rate 68 BPM    QRS Duration 134 ms    Q-T Interval 460 ms    QTc Calculation (Bazett) 530 ms    R Axis 142 degrees    T Axis 76 degrees    Diagnosis       Ventricular-paced rhythmBiventricular pacemaker detectedWhen compared with ECG of 19-JAN-2023 10:01,No significant change was foundConfirmed by [id number] [facility] ([id number]) on 1/23/2023 5:32:25 PM     Echo 10/25/2023 @ OSH  Report Reviewed in Care Everywhere    Left ventricle:ߦ Normal left ventricular size. ߦ Left ventricular systolic   function is severely reduced. ߦ The estimated ejection fraction is 30-35%. ߦ   There is significant apical foreshortening making measurements difficult. ߦ   There is mild left ventricular hypertrophy. ߦ There is diffuse hypokinesis. ߦ   There is stage II diastolic dysfunction. Left atrium:ߦ The left atrium is severely dilated. Mitral valve:ߦ There is bileaflet mitral valve prolapse. ߦ There is mild   regurgitation. Aortic root:ߦ The aortic root measures 3.1 cm and is normal in size. Aortic valve:ߦ The aortic valve is structurally and functionally normal.   Right ventricle:ߦ The right ventricle is dilated. ߦ The right ventricular   systolic function is mildly reduced. ߦ TAPSE is 1.5. Right atrium:ߦ The right atrium is dilated. Tricuspid valve:ߦ Structurally normal.ߦ There is mild tricuspid regurgitation.    Pulmonic valve:ߦ Structurally and functionally normal.   Right ventricular systolic pressure:ߦ Right ventricular systolic pressure   could not be accurately estimated due to insufficient tricuspid regurgitation   jet;

## 2023-10-29 NOTE — PLAN OF CARE
Problem: Discharge Planning  Goal: Discharge to home or other facility with appropriate resources  Outcome: Progressing  Flowsheets (Taken 10/29/2023 0926)  Discharge to home or other facility with appropriate resources:   Identify barriers to discharge with patient and caregiver   Arrange for needed discharge resources and transportation as appropriate   Identify discharge learning needs (meds, wound care, etc)     Problem: Pain  Goal: Verbalizes/displays adequate comfort level or baseline comfort level  10/29/2023 1005 by Gigi Vargas RN  Outcome: Progressing  Flowsheets (Taken 10/29/2023 9300)  Verbalizes/displays adequate comfort level or baseline comfort level:   Encourage patient to monitor pain and request assistance   Administer analgesics based on type and severity of pain and evaluate response   Assess pain using appropriate pain scale   Implement non-pharmacological measures as appropriate and evaluate response  10/29/2023 0544 by Akin Sofia RN  Outcome: Progressing     Problem: Safety - Adult  Goal: Free from fall injury  10/29/2023 1005 by Gigi Vargas RN  Outcome: Progressing  10/29/2023 0544 by Akin Sofia RN  Outcome: Progressing     Problem: Safety - Medical Restraint  Goal: Remains free of injury from restraints (Restraint for Interference with Medical Device)  Description: INTERVENTIONS:  1. Determine that other, less restrictive measures have been tried or would not be effective before applying the restraint  2. Evaluate the patient's condition at the time of restraint application  3. Inform patient/family regarding the reason for restraint  4. Q2H: Monitor safety, psychosocial status, comfort, nutrition and hydration  10/29/2023 1005 by Gigi Vargas RN  Outcome: Progressing  10/29/2023 0544 by Akin Sofia RN  Outcome: Progressing     Problem: Skin/Tissue Integrity  Goal: Absence of new skin breakdown  Description: 1.   Monitor for areas of redness and/or skin

## 2023-10-30 PROBLEM — I50.23 ACUTE ON CHRONIC HFREF (HEART FAILURE WITH REDUCED EJECTION FRACTION) (HCC): Status: ACTIVE | Noted: 2023-10-30

## 2023-10-30 LAB
ANION GAP SERPL CALCULATED.3IONS-SCNC: 15 MMOL/L (ref 3–16)
BASOPHILS # BLD: 0 K/UL (ref 0–0.2)
BASOPHILS NFR BLD: 0.4 %
BUN SERPL-MCNC: 70 MG/DL (ref 7–20)
CALCIUM SERPL-MCNC: 9 MG/DL (ref 8.3–10.6)
CHLORIDE SERPL-SCNC: 99 MMOL/L (ref 99–110)
CO2 SERPL-SCNC: 26 MMOL/L (ref 21–32)
CREAT SERPL-MCNC: 1.8 MG/DL (ref 0.8–1.3)
DEPRECATED RDW RBC AUTO: 15.6 % (ref 12.4–15.4)
EKG ATRIAL RATE: 65 BPM
EKG DIAGNOSIS: NORMAL
EKG Q-T INTERVAL: 458 MS
EKG QRS DURATION: 172 MS
EKG QTC CALCULATION (BAZETT): 494 MS
EKG R AXIS: 65 DEGREES
EKG T AXIS: -70 DEGREES
EKG VENTRICULAR RATE: 70 BPM
EOSINOPHIL # BLD: 0 K/UL (ref 0–0.6)
EOSINOPHIL NFR BLD: 0.2 %
GFR SERPLBLD CREATININE-BSD FMLA CKD-EPI: 36 ML/MIN/{1.73_M2}
GLUCOSE SERPL-MCNC: 82 MG/DL (ref 70–99)
HCT VFR BLD AUTO: 50 % (ref 40.5–52.5)
HGB BLD-MCNC: 16.7 G/DL (ref 13.5–17.5)
LYMPHOCYTES # BLD: 1.5 K/UL (ref 1–5.1)
LYMPHOCYTES NFR BLD: 13.8 %
MAGNESIUM SERPL-MCNC: 2.5 MG/DL (ref 1.8–2.4)
MCH RBC QN AUTO: 29.8 PG (ref 26–34)
MCHC RBC AUTO-ENTMCNC: 33.4 G/DL (ref 31–36)
MCV RBC AUTO: 89.3 FL (ref 80–100)
MONOCYTES # BLD: 1 K/UL (ref 0–1.3)
MONOCYTES NFR BLD: 9 %
NEUTROPHILS # BLD: 8.4 K/UL (ref 1.7–7.7)
NEUTROPHILS NFR BLD: 76.6 %
NT-PROBNP SERPL-MCNC: 4806 PG/ML (ref 0–449)
PLATELET # BLD AUTO: 259 K/UL (ref 135–450)
PMV BLD AUTO: 7.4 FL (ref 5–10.5)
POTASSIUM SERPL-SCNC: 3.5 MMOL/L (ref 3.5–5.1)
RBC # BLD AUTO: 5.6 M/UL (ref 4.2–5.9)
SODIUM SERPL-SCNC: 140 MMOL/L (ref 136–145)
WBC # BLD AUTO: 11 K/UL (ref 4–11)

## 2023-10-30 PROCEDURE — 6370000000 HC RX 637 (ALT 250 FOR IP): Performed by: NURSE PRACTITIONER

## 2023-10-30 PROCEDURE — 6360000002 HC RX W HCPCS: Performed by: STUDENT IN AN ORGANIZED HEALTH CARE EDUCATION/TRAINING PROGRAM

## 2023-10-30 PROCEDURE — 93010 ELECTROCARDIOGRAM REPORT: CPT | Performed by: INTERNAL MEDICINE

## 2023-10-30 PROCEDURE — 6360000002 HC RX W HCPCS: Performed by: INTERNAL MEDICINE

## 2023-10-30 PROCEDURE — 6360000002 HC RX W HCPCS: Performed by: HOSPITALIST

## 2023-10-30 PROCEDURE — 83880 ASSAY OF NATRIURETIC PEPTIDE: CPT

## 2023-10-30 PROCEDURE — 6370000000 HC RX 637 (ALT 250 FOR IP): Performed by: PSYCHIATRY & NEUROLOGY

## 2023-10-30 PROCEDURE — 99223 1ST HOSP IP/OBS HIGH 75: CPT | Performed by: PSYCHIATRY & NEUROLOGY

## 2023-10-30 PROCEDURE — 2580000003 HC RX 258: Performed by: HOSPITALIST

## 2023-10-30 PROCEDURE — 80048 BASIC METABOLIC PNL TOTAL CA: CPT

## 2023-10-30 PROCEDURE — 93005 ELECTROCARDIOGRAM TRACING: CPT | Performed by: NURSE PRACTITIONER

## 2023-10-30 PROCEDURE — 85025 COMPLETE CBC W/AUTO DIFF WBC: CPT

## 2023-10-30 PROCEDURE — 6360000002 HC RX W HCPCS: Performed by: NURSE PRACTITIONER

## 2023-10-30 PROCEDURE — 1200000000 HC SEMI PRIVATE

## 2023-10-30 PROCEDURE — 83735 ASSAY OF MAGNESIUM: CPT

## 2023-10-30 PROCEDURE — 36415 COLL VENOUS BLD VENIPUNCTURE: CPT

## 2023-10-30 RX ORDER — OLANZAPINE 10 MG/2ML
5 INJECTION, POWDER, FOR SOLUTION INTRAMUSCULAR ONCE
Status: DISCONTINUED | OUTPATIENT
Start: 2023-10-30 | End: 2023-11-01

## 2023-10-30 RX ORDER — ZIPRASIDONE MESYLATE 20 MG/ML
10 INJECTION, POWDER, LYOPHILIZED, FOR SOLUTION INTRAMUSCULAR ONCE
Status: COMPLETED | OUTPATIENT
Start: 2023-10-30 | End: 2023-10-30

## 2023-10-30 RX ORDER — WATER 10 ML/10ML
INJECTION INTRAMUSCULAR; INTRAVENOUS; SUBCUTANEOUS
Status: DISPENSED
Start: 2023-10-30 | End: 2023-10-30

## 2023-10-30 RX ORDER — FUROSEMIDE 10 MG/ML
80 INJECTION INTRAMUSCULAR; INTRAVENOUS 2 TIMES DAILY
Status: DISCONTINUED | OUTPATIENT
Start: 2023-10-30 | End: 2023-10-30

## 2023-10-30 RX ORDER — QUETIAPINE FUMARATE 25 MG/1
25 TABLET, FILM COATED ORAL EVERY 4 HOURS PRN
Status: DISCONTINUED | OUTPATIENT
Start: 2023-10-30 | End: 2023-11-04 | Stop reason: HOSPADM

## 2023-10-30 RX ORDER — FUROSEMIDE 10 MG/ML
80 INJECTION INTRAMUSCULAR; INTRAVENOUS ONCE
Status: COMPLETED | OUTPATIENT
Start: 2023-10-30 | End: 2023-10-30

## 2023-10-30 RX ORDER — QUETIAPINE FUMARATE 25 MG/1
50 TABLET, FILM COATED ORAL 3 TIMES DAILY
Status: DISCONTINUED | OUTPATIENT
Start: 2023-10-30 | End: 2023-10-31

## 2023-10-30 RX ADMIN — SODIUM CHLORIDE 25 ML: 9 INJECTION, SOLUTION INTRAVENOUS at 14:30

## 2023-10-30 RX ADMIN — AZITHROMYCIN MONOHYDRATE 500 MG: 500 INJECTION, POWDER, LYOPHILIZED, FOR SOLUTION INTRAVENOUS at 14:30

## 2023-10-30 RX ADMIN — FUROSEMIDE 80 MG: 10 INJECTION, SOLUTION INTRAMUSCULAR; INTRAVENOUS at 13:28

## 2023-10-30 RX ADMIN — SODIUM CHLORIDE 25 ML: 9 INJECTION, SOLUTION INTRAVENOUS at 13:27

## 2023-10-30 RX ADMIN — SODIUM CHLORIDE, PRESERVATIVE FREE 10 ML: 5 INJECTION INTRAVENOUS at 10:34

## 2023-10-30 RX ADMIN — PANTOPRAZOLE SODIUM 40 MG: 40 TABLET, DELAYED RELEASE ORAL at 20:40

## 2023-10-30 RX ADMIN — PANTOPRAZOLE SODIUM 40 MG: 40 TABLET, DELAYED RELEASE ORAL at 10:24

## 2023-10-30 RX ADMIN — ASPIRIN 81 MG: 81 TABLET, COATED ORAL at 10:24

## 2023-10-30 RX ADMIN — FINASTERIDE 5 MG: 5 TABLET, FILM COATED ORAL at 10:24

## 2023-10-30 RX ADMIN — MEMANTINE 10 MG: 5 TABLET ORAL at 10:24

## 2023-10-30 RX ADMIN — ENOXAPARIN SODIUM 40 MG: 100 INJECTION SUBCUTANEOUS at 10:32

## 2023-10-30 RX ADMIN — RISPERIDONE 0.25 MG: 0.25 TABLET, FILM COATED ORAL at 10:24

## 2023-10-30 RX ADMIN — FUROSEMIDE 40 MG: 10 INJECTION, SOLUTION INTRAMUSCULAR; INTRAVENOUS at 10:32

## 2023-10-30 RX ADMIN — Medication 1 CAPSULE: at 16:30

## 2023-10-30 RX ADMIN — HYDROXYZINE HYDROCHLORIDE 25 MG: 10 TABLET ORAL at 10:24

## 2023-10-30 RX ADMIN — SODIUM CHLORIDE, PRESERVATIVE FREE 10 ML: 5 INJECTION INTRAVENOUS at 20:40

## 2023-10-30 RX ADMIN — METOPROLOL SUCCINATE 25 MG: 25 TABLET, EXTENDED RELEASE ORAL at 16:30

## 2023-10-30 RX ADMIN — CEFTRIAXONE SODIUM 1000 MG: 1 INJECTION, POWDER, FOR SOLUTION INTRAMUSCULAR; INTRAVENOUS at 13:31

## 2023-10-30 RX ADMIN — ATORVASTATIN CALCIUM 40 MG: 40 TABLET, FILM COATED ORAL at 20:40

## 2023-10-30 RX ADMIN — QUETIAPINE FUMARATE 50 MG: 25 TABLET ORAL at 13:31

## 2023-10-30 RX ADMIN — FERROUS SULFATE TAB 325 MG (65 MG ELEMENTAL FE) 325 MG: 325 (65 FE) TAB at 10:24

## 2023-10-30 RX ADMIN — METOPROLOL SUCCINATE 25 MG: 25 TABLET, EXTENDED RELEASE ORAL at 10:24

## 2023-10-30 RX ADMIN — QUETIAPINE FUMARATE 50 MG: 25 TABLET ORAL at 20:40

## 2023-10-30 RX ADMIN — Medication 1 CAPSULE: at 10:24

## 2023-10-30 RX ADMIN — ZIPRASIDONE MESYLATE 10 MG: 20 INJECTION, POWDER, LYOPHILIZED, FOR SOLUTION INTRAMUSCULAR at 06:25

## 2023-10-30 ASSESSMENT — PAIN SCALES - WONG BAKER
WONGBAKER_NUMERICALRESPONSE: 0

## 2023-10-30 ASSESSMENT — PAIN SCALES - GENERAL
PAINLEVEL_OUTOF10: 0
PAINLEVEL_OUTOF10: 0

## 2023-10-30 NOTE — CARE COORDINATION
Case Management Assessment  Initial Evaluation    Date/Time of Evaluation: 10/30/2023 12:30 PM  Assessment Completed by: Bailee Garcia RN    If patient is discharged prior to next notation, then this note serves as note for discharge by case management. Patient Name: Adonis Garcia                   YOB: 1935  Diagnosis: COPD (chronic obstructive pulmonary disease) ([address]) [J44.9]  SOB (shortness of breath) [R06.02]                   Date / Time: 10/28/2023 11:51 AM    Patient Admission Status: Inpatient   Readmission Risk (Low < 19, Mod (19-27), High > 27): Readmission Risk Score: 20.5    Current PCP: Moy Montesinos MD  PCP verified by CM? Chart Reviewed: Yes      History Provided by: Child/Family, Medical Record  Patient Orientation: Other (see comment) (pt very confused)    Patient Cognition: Dementia / Early Alzheimer's    Hospitalization in the last 30 days (Readmission):  No    If yes, Readmission Assessment in  Navigator will be completed. Advance Directives:      Code Status: DNR-CCA   Patient's Primary Decision Maker is: Legal Next of Kin Ruthie Isiah [facility] - Helper) [phone])    Primary Decision Maker (Active): Minh Squires Child - [phone]    Secondary Decision Maker: Miladis Parks - Spouse - [phone]    Discharge Planning:    Patient lives with:   Type of Home:    Primary Care Giver: Other (Comment) (staff at SNF)  Patient Support Systems include: Children, Spouse/Significant Other   Current Financial resources:    Current community resources:    Current services prior to admission:              Current DME:              Type of Home Care services:       ADLS  Prior functional level:    Current functional level:      PT AM-PAC:   /24  OT AM-PAC:   /24    Family can provide assistance at DC: Would you like Case Management to discuss the discharge plan with any other family members/significant others, and if so, who?     Plans to Return to Present Housing:

## 2023-10-30 NOTE — CONSULTS
Full note dictated. Spoke to daughter Ana Kelly. Reviewed recommendations    Dx:  delirium         Dementia    Rec:  1). I will simplify his meds. Given that he is at an ECF I don't think the Rigo Hull has a role and will discontinue that. He is aspirating so the fewer meds the better. I will d/c the risperdal which at his age is more likely to cause EPS in favor of seroquel and add a prn as well. The seroquel should help sleep and will d/c the melatonin. I will follow.      Augusto Lyles MD

## 2023-10-30 NOTE — CONSULTS
Palliative Care consult called at [facility] left at 1109 on 10/30/2023   ENIO Herrera

## 2023-10-30 NOTE — PROGRESS NOTES
Patient started getting verbally aggressive towards staff, agitated in bed. Joshua called and said he got one restraint off. Had to ask 2 more nurses to help hold patient down while the restraint was put back on. Patient attempted to kick out at nurses and grabbed a nurses wrist.  Patient verbally aggressive saying we were chang he was restrained and he was going to call the . Notified APRN and got an order of 5 mg zyprexa. Around 04:30 joshua called and said that patient had gotten his leg restraint untied. Went in to retie to bed, needed another nurses help, patient attempted to punch the second nurse that was holding down his legs. Patient also threatened that nurse saying he would try and punch her again. Notified APRN and got geodon ordered one time. Around 06:30 patient became very agitated and attempted grab myself and the PCA when we were fixing restraints. Patient also repeatedly attempted to kick at nurses and PCA with his unrestrained leg. Patient also attempted to punch a nurse in the side. Gave geodon to help calm patient down.

## 2023-10-30 NOTE — PROGRESS NOTES
Hospital Medicine Progress Note      Date of Admission: 10/28/2023  Hospital Day: 3    Chief Admission Complaint:    Chest pain with hypoxia sent from [facility] by squad to ED       Subjective:      Patient is again with worsening outbreaks and physically and verbally abusive to staff. Not able to follow commands or answr any questions. Presenting Admission History: This is a 82 y.o. male who presented to OSH, [facility] via squad from [facility] for reported left sided chest pain and SOB. The patient has severe dementia and can not give me information on reason for admission or for going to [facility]. All the following information is from ED note and notes from [facility]. PMHx significant for Anemia, AFIB, BPH, Dementia, Dysphagia, GERD, Depression, Hitial hernia, Hypercholesterolemia, HTN, pacemaker, CAD, CHF, and CKD IIIa. Assessment/Plan:      Current Principal Problem:  COPD (chronic obstructive pulmonary disease) (HCC)    Chest pain  Acute on chronic systolic heart failure - LVEF down from ~55% (normal) in 1/2023  now LVEF 35% earlier this week per ECHO at OSH; high BNP, pulmonary edema on CXR. - Patient was recently seen at [address] [place] [address] ED for same complaint on 10/23/23. He was evaluated and discharged home the same evening.  - Troponin flat <0.012 since admission  - EKG from OSH revealed possible elevation in leads I and aVL but when compared to previous EKGs this was seen before. - CXR at OSH revealed borderline cardiomegaly with possible cardiogenic overload. Brought in on 2L per NC sating at 98%. Weaned off and sating at 96% on RA.  - Consult Cardiology in setting of possible CHF exacerbation.   Started on Lasix 40 mg IV Q12 by Cardiology  - Cardiology feels he is not an appropriate candidate for cath lab given severity of dementia, and family also stated that they wouldn't want him going through one.  - Continue to  avoid ACE/ARB/ARNI/MRA at the moment due 1.14*  --   --    LACTA  --  3.1* 2.8*         Urine Cultures:   Lab Results   Component Value Date/Time    LABURIN Final report 01/31/2023 11:44 PM     Blood Cultures:   Lab Results   Component Value Date/Time    BC No Growth after 4 days of incubation. 01/19/2023 10:17 AM     Lab Results   Component Value Date/Time    BLOODCULT2 No Growth after 4 days of incubation.  01/19/2023 10:45 AM     Organism:   Lab Results   Component Value Date/Time    ORG Escherichia coli 04/21/2022 02:19 PM         Sawyer Dougherty, GUS - CNP

## 2023-10-30 NOTE — PLAN OF CARE
Problem: Safety - Medical Restraint  Goal: Remains free of injury from restraints (Restraint for Interference with Medical Device)  Description: INTERVENTIONS:  1. Determine that other, less restrictive measures have been tried or would not be effective before applying the restraint  2. Evaluate the patient's condition at the time of restraint application  3. Inform patient/family regarding the reason for restraint  4. Q2H: Monitor safety, psychosocial status, comfort, nutrition and hydration  Outcome: Progressing  Flowsheets  Taken 10/30/2023 1048 by Devorah Lew RN  Remains free of injury from restraints (restraint for interference with medical device): Every 2 hours: Monitor safety, psychosocial status, comfort, nutrition and hydration  Taken 10/30/2023 0600 by Xi Salomon RN  Remains free of injury from restraints (restraint for interference with medical device):   Every 2 hours: Monitor safety, psychosocial status, comfort, nutrition and hydration   Evaluate the patient's condition at the time of restraint application     Problem: Safety - Medical Restraint  Goal: Remains free of injury from restraints (Restraint for Interference with Medical Device)  Description: INTERVENTIONS:  1. Determine that other, less restrictive measures have been tried or would not be effective before applying the restraint  2. Evaluate the patient's condition at the time of restraint application  3. Inform patient/family regarding the reason for restraint  4.  Q2H: Monitor safety, psychosocial status, comfort, nutrition and hydration  Outcome: Progressing  Flowsheets  Taken 10/30/2023 1048 by Devorah Lew RN  Remains free of injury from restraints (restraint for interference with medical device): Every 2 hours: Monitor safety, psychosocial status, comfort, nutrition and hydration  Taken 10/30/2023 0600 by Xi Salomon RN  Remains free of injury from restraints (restraint for interference with medical device):

## 2023-10-30 NOTE — PROGRESS NOTES
CARDIOLOGY Progress Note         Patient Name: Missy Ya  Date of admission: 10/28/2023 11:51 AM  Admission Dx: COPD (chronic obstructive pulmonary disease) ([address]) [J44.9]  SOB (shortness of breath) [R06.02]  Requesting Physician: Perla Lozano MD  Primary Care physician: Anthony Wood MD    Reason for Consultation/Chief Complaint: Acute on chronic HFrEF    Subjective:   Pt seen and examined. Oriented to himself and year. Agitation noted by staff, in restraints. Denies any chest pain or shortness of breath at this time. Laying flat in bed in no distress. Past Medical History:   has a past medical history of Atrial fibrillation ([address]), Blind right eye, Chronic kidney disease, Dementia ([address]), Hyperlipidemia, Hypertension, and Pneumonia. Surgical History:   has a past surgical history that includes Tonsillectomy; Appendectomy; Colonoscopy; eye surgery; Upper gastrointestinal endoscopy; joint replacement (2013); hernia repair; pacemaker placement (10/13/2017); ablation of dysrhythmic focus; pacemaker placement (09/16/2022); and Upper gastrointestinal endoscopy (N/A, 9/6/2023). Social History:   reports that he quit smoking about 48 years ago. His smoking use included cigarettes. He has a 10.00 pack-year smoking history. He has never used smokeless tobacco. He reports that he does not drink alcohol and does not use drugs. Family History:  family history includes Asthma in an other family member; Heart Disease in his father and mother; Other in his mother. Home Medications:  Were reviewed and are listed in nursing record and/or below  Prior to Admission medications    Medication Sig Start Date End Date Taking? Authorizing Provider   metoprolol succinate (TOPROL XL) 25 MG extended release tablet Take 1 tablet by mouth in the morning and 1 tablet in the evening.  9/8/23   Sheffield Gosselin, APRN - CNP   risperiDONE (RISPERDAL) 0.25 MG tablet Take 1 tablet by mouth 2 times daily 8/16/23 worse today and his renal function is still above his baseline at 1.8 (normal around 1.4). -Increase to 80mg IV lasix x1. Re-assess this afternoon. Repeat BMP this evening.    -Not on telemetry 2/2 agitation?   -Continue beta blocker. Unable to Acei/arb/arni/MRA/SGLT2 2/2 NATANAEL. -Continue aspirin and high intensity statin  -ultimately given his severe dementia he is not a great candidate for [facility]. Patient's family reportedly do not want this for him either.   -echo would not   -Agree with palliative consultation. I will address the patient's cardiac risk factors and adjusted pharmacologic treatment as needed. In addition, I have reinforced the need for patient directed risk factor modification. All questions and concerns were addressed to the patient/family. Alternatives to my treatment were discussed. Thank you for allowing us to participate in the care of Sandra Knox. Please call me with any questions [phone].     Kristie Omayra, DO   Cardiovascular Disease  [address] [facility]  [phone] [facility]  [phone] [address] [facility]  10/30/2023 12:25 PM

## 2023-10-30 NOTE — ACP (ADVANCE CARE PLANNING)
Advance Care Planning     CODE STATUS (ACP) Conversation    Date of Conversation: 10/28/2023  Conducted with:  Healthcare Decision Maker: Named in Advance Directive or Healthcare Power of  (name) DaughterJuan Decision Maker:    Primary Decision Maker (Active): Glenny Morse Child - [phone]    Secondary Decision Maker: Clarissa Wills - Spouse - [phone]  Click here to complete Healthcare Decision Makers including selection of the Healthcare Decision Maker Relationship (ie \"Primary\"). Today we documented Decision Maker(s) consistent with ACP documents on file. Content/Action Overview: Has ACP document(s) NOT on file - requested patient to provide  Reviewed DNR/DNI and patient elects DNR order - completed portable DNR form & placed order  treatment goals, benefit/burden of treatment options, artificial nutrition, ventilation preferences, hospitalization preferences, resuscitation preferences, end of life care preferences (vegetative state/imminent death), and hospice care       Per [facility] - patient is a DNR/DNI. Not sure if they are ready for comfort care, still considering GOC. Do not want aggressive treatments such as heart cath.       Code status amended to Limited x 4 nos      Sherin Urbano, APRN - CNP

## 2023-10-30 NOTE — PLAN OF CARE
CHF Care Plan      Patient's EF (Ejection Fraction) is less than 40%    Heart Failure Medications:  Diuretics[de-identified] Furosemide    (One of the following REQUIRED for EF </= 40%/SYSTOLIC FAILURE but MAY be used in EF% >40%/DIASTOLIC FAILURE)        ACE[de-identified] None        ARB[de-identified] None         ARNI[de-identified] None    (Beta Blockers)  NON- Evidenced Based Beta Blocker (for EF% >40%/DIASTOLIC FAILURE): None    Evidenced Based Beta Blocker::(REQUIRED for EF% <40%/SYSTOLIC FAILURE) Metoprolol SUCCinate- Toprol XL  . .................................................................................................................................................. Healthy Weight Tracking - BMI + Meds 2/17/2023 2/18/2023 4/10/2023 4/25/2023 6/16/2023 9/4/2023 9/5/2023   Weight 155 lb - 151 lb 153 lb 3.2 oz 142 lb 163 lb 3.2 oz -   Height 5' 6\" 5' 6\" - 5' 6\" 5' 6\" - 5' 4\"   Body Mass Index 25.01 kg/m2 - - 24.73 kg/m2 22.92 kg/m2 - -   Some recent data might be hidden         Patient's weights and intake/output reviewed: No    Daily Weight log at bedside, patient/family participation in use of log: no    Patient's Last Weight:None done      Intake/Output Summary (Last 24 hours) at 10/30/2023 1902  Last data filed at 10/30/2023 1845  Gross per 24 hour   Intake 120 ml   Output 1350 ml   Net -1230 ml       Education Booklet Provided: no    Comorbidities Reviewed Yes    Patient has a past medical history of Atrial fibrillation ([address]), Blind right eye, Chronic kidney disease, Dementia ([address]), Hyperlipidemia, Hypertension, and Pneumonia. >>For CHF and Comorbidity documentation on Education Time and Topics, please see Education Tab    Progressive Mobility Assessment:  What is this patient's Current Level of Mobility?: Requires Bed Rest  How was this patient Mobilized today?: Unable to Mobilize, ambulated 0 ft                 With Whom? Nurse and PCA                 Level of Difficulty/Assistance: 2x Assist     Pt resting in bed at this time on room air.

## 2023-10-30 NOTE — CONSULTS
PALLIATIVE MEDICINE CONSULTATION     Patient name:Aroldo Anne   LJX:[id number]    :1935  Room/Bed:0356/0356-01   LOS: 2 days         Date of consult:10/30/2023    Inpatient consult to Palliative Care  Consult performed by: GUS Limon CNP  Consult ordered by: GUS Morgan CNP              ASSESSMENT/RECOMMENDATIONS     82 y.o. male with dementia and acute CHF exacerbation      Symptom Management:  Goals of Care- Discussion started with Ladonna Huddleston. She is considering options. Does not want aggressive interventions for patient. She is hoping for more PT/OT at SNF with the goal for patient to be able to walk well enough to be able to return home with wife. Dementia trajectory explained, expectations set. Hospice discussed, but barrier at this time is disposition as he can't return home and they can't afford LTC. Code status is Limited x 4 Nos. Will follow. Acute on Chronic heart failure - EF 35% (decreased from 50% in 2023). Cardiology following. BNP 4806 with JVD. GDMT limited by [place]. Family does not want aggressive interventions such a heart cath and wants to treat medically    Altered mental status - acute delirium with underlying dementia. Patient with behaviors this admission. Psych consulted and Namenda and Risperdal stopped. Patient has history of aspiration. Based on conversation with daughter, it appears his dementia symptoms have progressed since his admission in September. FAST score may be closer to 6/7. Reviewed trajectory of dementia with daughter      Patient/Family Goals of Care :    10/30/23    Patient is not decisional.  No family at  bedside. Called and spoke with Precious Chester (daughter) Rudy Tineo. Explained role of palliative care. She is agreeable to discussion with  me. Gin Zee first question was how much time do we think patient has left to live? She says if its 6-7 years, that might change GOC.   Explained

## 2023-10-30 NOTE — PLAN OF CARE
Problem: Discharge Planning  Goal: Discharge to home or other facility with appropriate resources  10/29/2023 2343 by Faye Bishop RN  Outcome: Progressing  Flowsheets  Taken 10/29/2023 1929 by Faye Bishop RN  Discharge to home or other facility with appropriate resources:   Identify barriers to discharge with patient and caregiver   Arrange for needed discharge resources and transportation as appropriate   Identify discharge learning needs (meds, wound care, etc)    Problem: Pain  Goal: Verbalizes/displays adequate comfort level or baseline comfort level  10/29/2023 2343 by Faye Bishop RN  Outcome: Progressing    Problem: Safety - Medical Restraint  Goal: Remains free of injury from restraints (Restraint for Interference with Medical Device)  Description: INTERVENTIONS:  1. Determine that other, less restrictive measures have been tried or would not be effective before applying the restraint  2. Evaluate the patient's condition at the time of restraint application  3. Inform patient/family regarding the reason for restraint  4. Q2H: Monitor safety, psychosocial status, comfort, nutrition and hydration  10/29/2023 2343 by Faye Bishop RN  Outcome: Progressing    Problem: Skin/Tissue Integrity  Goal: Absence of new skin breakdown  Description: 1. Monitor for areas of redness and/or skin breakdown  2. Assess vascular access sites hourly  3. Every 4-6 hours minimum:  Change oxygen saturation probe site  4. Every 4-6 hours:  If on nasal continuous positive airway pressure, respiratory therapy assess nares and determine need for appliance change or resting period.   10/29/2023 2343 by Faye Bishop RN  Outcome: Progressing

## 2023-10-30 NOTE — PROGRESS NOTES
Occupational/Physical Therapy    OT/PT orders received, chart reviewed. Spoke with RN who stated pt continues to be agitated and swinging at staff despite restraints. Requested to hold pt this AM. Will follow up as pt condition and therapy schedule permit.        Kroey Logan, OTR/L    Jd Bennett, PT

## 2023-10-31 LAB
ANION GAP SERPL CALCULATED.3IONS-SCNC: 15 MMOL/L (ref 3–16)
BASOPHILS # BLD: 0.1 K/UL (ref 0–0.2)
BASOPHILS NFR BLD: 0.7 %
BUN SERPL-MCNC: 69 MG/DL (ref 7–20)
CALCIUM SERPL-MCNC: 9.6 MG/DL (ref 8.3–10.6)
CHLORIDE SERPL-SCNC: 101 MMOL/L (ref 99–110)
CO2 SERPL-SCNC: 28 MMOL/L (ref 21–32)
CREAT SERPL-MCNC: 1.7 MG/DL (ref 0.8–1.3)
DEPRECATED RDW RBC AUTO: 15.7 % (ref 12.4–15.4)
EOSINOPHIL # BLD: 0.1 K/UL (ref 0–0.6)
EOSINOPHIL NFR BLD: 0.5 %
GFR SERPLBLD CREATININE-BSD FMLA CKD-EPI: 39 ML/MIN/{1.73_M2}
GLUCOSE SERPL-MCNC: 108 MG/DL (ref 70–99)
HCT VFR BLD AUTO: 53.4 % (ref 40.5–52.5)
HGB BLD-MCNC: 17.7 G/DL (ref 13.5–17.5)
LYMPHOCYTES # BLD: 1.4 K/UL (ref 1–5.1)
LYMPHOCYTES NFR BLD: 12.2 %
MCH RBC QN AUTO: 29.5 PG (ref 26–34)
MCHC RBC AUTO-ENTMCNC: 33.1 G/DL (ref 31–36)
MCV RBC AUTO: 89 FL (ref 80–100)
MONOCYTES # BLD: 1.1 K/UL (ref 0–1.3)
MONOCYTES NFR BLD: 9.8 %
NEUTROPHILS # BLD: 8.9 K/UL (ref 1.7–7.7)
NEUTROPHILS NFR BLD: 76.8 %
PLATELET # BLD AUTO: 279 K/UL (ref 135–450)
PMV BLD AUTO: 7.4 FL (ref 5–10.5)
POTASSIUM SERPL-SCNC: 3.7 MMOL/L (ref 3.5–5.1)
RBC # BLD AUTO: 6 M/UL (ref 4.2–5.9)
SODIUM SERPL-SCNC: 144 MMOL/L (ref 136–145)
WBC # BLD AUTO: 11.5 K/UL (ref 4–11)

## 2023-10-31 PROCEDURE — 97530 THERAPEUTIC ACTIVITIES: CPT

## 2023-10-31 PROCEDURE — 97162 PT EVAL MOD COMPLEX 30 MIN: CPT

## 2023-10-31 PROCEDURE — 6370000000 HC RX 637 (ALT 250 FOR IP): Performed by: NURSE PRACTITIONER

## 2023-10-31 PROCEDURE — 6370000000 HC RX 637 (ALT 250 FOR IP): Performed by: STUDENT IN AN ORGANIZED HEALTH CARE EDUCATION/TRAINING PROGRAM

## 2023-10-31 PROCEDURE — 2580000003 HC RX 258: Performed by: HOSPITALIST

## 2023-10-31 PROCEDURE — 97166 OT EVAL MOD COMPLEX 45 MIN: CPT

## 2023-10-31 PROCEDURE — 6370000000 HC RX 637 (ALT 250 FOR IP): Performed by: PSYCHIATRY & NEUROLOGY

## 2023-10-31 PROCEDURE — 80048 BASIC METABOLIC PNL TOTAL CA: CPT

## 2023-10-31 PROCEDURE — 6360000002 HC RX W HCPCS: Performed by: HOSPITALIST

## 2023-10-31 PROCEDURE — 85025 COMPLETE CBC W/AUTO DIFF WBC: CPT

## 2023-10-31 PROCEDURE — 1200000000 HC SEMI PRIVATE

## 2023-10-31 RX ORDER — QUETIAPINE FUMARATE 25 MG/1
75 TABLET, FILM COATED ORAL 3 TIMES DAILY
Status: DISCONTINUED | OUTPATIENT
Start: 2023-10-31 | End: 2023-11-01

## 2023-10-31 RX ORDER — ENOXAPARIN SODIUM 100 MG/ML
30 INJECTION SUBCUTANEOUS DAILY
Status: DISCONTINUED | OUTPATIENT
Start: 2023-11-01 | End: 2023-11-04 | Stop reason: HOSPADM

## 2023-10-31 RX ORDER — HYDRALAZINE HYDROCHLORIDE 10 MG/1
10 TABLET, FILM COATED ORAL EVERY 8 HOURS SCHEDULED
Status: DISCONTINUED | OUTPATIENT
Start: 2023-10-31 | End: 2023-11-04 | Stop reason: HOSPADM

## 2023-10-31 RX ADMIN — HYDRALAZINE HYDROCHLORIDE 10 MG: 10 TABLET, FILM COATED ORAL at 16:46

## 2023-10-31 RX ADMIN — METOPROLOL SUCCINATE 25 MG: 25 TABLET, EXTENDED RELEASE ORAL at 16:46

## 2023-10-31 RX ADMIN — QUETIAPINE FUMARATE 50 MG: 25 TABLET ORAL at 09:22

## 2023-10-31 RX ADMIN — QUETIAPINE FUMARATE 50 MG: 25 TABLET ORAL at 13:47

## 2023-10-31 RX ADMIN — FERROUS SULFATE TAB 325 MG (65 MG ELEMENTAL FE) 325 MG: 325 (65 FE) TAB at 09:22

## 2023-10-31 RX ADMIN — SODIUM CHLORIDE 25 ML: 9 INJECTION, SOLUTION INTRAVENOUS at 13:44

## 2023-10-31 RX ADMIN — METOPROLOL SUCCINATE 25 MG: 25 TABLET, EXTENDED RELEASE ORAL at 09:22

## 2023-10-31 RX ADMIN — SODIUM CHLORIDE 25 ML: 9 INJECTION, SOLUTION INTRAVENOUS at 14:34

## 2023-10-31 RX ADMIN — AZITHROMYCIN MONOHYDRATE 500 MG: 500 INJECTION, POWDER, LYOPHILIZED, FOR SOLUTION INTRAVENOUS at 14:35

## 2023-10-31 RX ADMIN — HYDRALAZINE HYDROCHLORIDE 10 MG: 10 TABLET, FILM COATED ORAL at 20:44

## 2023-10-31 RX ADMIN — ATORVASTATIN CALCIUM 40 MG: 40 TABLET, FILM COATED ORAL at 20:44

## 2023-10-31 RX ADMIN — CEFTRIAXONE SODIUM 1000 MG: 1 INJECTION, POWDER, FOR SOLUTION INTRAMUSCULAR; INTRAVENOUS at 13:46

## 2023-10-31 RX ADMIN — QUETIAPINE FUMARATE 75 MG: 25 TABLET ORAL at 20:44

## 2023-10-31 RX ADMIN — PANTOPRAZOLE SODIUM 40 MG: 40 TABLET, DELAYED RELEASE ORAL at 09:22

## 2023-10-31 RX ADMIN — Medication 1 CAPSULE: at 16:46

## 2023-10-31 RX ADMIN — FINASTERIDE 5 MG: 5 TABLET, FILM COATED ORAL at 09:22

## 2023-10-31 RX ADMIN — ASPIRIN 81 MG: 81 TABLET, COATED ORAL at 09:22

## 2023-10-31 RX ADMIN — PANTOPRAZOLE SODIUM 40 MG: 40 TABLET, DELAYED RELEASE ORAL at 20:44

## 2023-10-31 RX ADMIN — Medication 1 CAPSULE: at 09:22

## 2023-10-31 RX ADMIN — ENOXAPARIN SODIUM 40 MG: 100 INJECTION SUBCUTANEOUS at 09:25

## 2023-10-31 ASSESSMENT — PAIN SCALES - WONG BAKER
WONGBAKER_NUMERICALRESPONSE: 0

## 2023-10-31 NOTE — PLAN OF CARE
CHF Care Plan      Patient's EF (Ejection Fraction) is less than 40%    Heart Failure Medications:  Diuretics[de-identified] Furosemide    (One of the following REQUIRED for EF </= 40%/SYSTOLIC FAILURE but MAY be used in EF% >40%/DIASTOLIC FAILURE)        ACE[de-identified] None        ARB[de-identified] None         ARNI[de-identified] None    (Beta Blockers)  NON- Evidenced Based Beta Blocker (for EF% >40%/DIASTOLIC FAILURE): None    Evidenced Based Beta Blocker::(REQUIRED for EF% <40%/SYSTOLIC FAILURE) Metoprolol SUCCinate- Toprol XL  . .................................................................................................................................................. Healthy Weight Tracking - BMI + Meds 2/18/2023 4/10/2023 4/25/2023 6/16/2023 9/4/2023 9/5/2023 10/31/2023   Weight - 151 lb 153 lb 3.2 oz 142 lb 163 lb 3.2 oz - 132 lb 11.5 oz   Height 5' 6\" - 5' 6\" 5' 6\" - 5' 4\" -   Body Mass Index - - 24.73 kg/m2 22.92 kg/m2 - - -   Some recent data might be hidden         Patient's weights and intake/output reviewed: Yes    Daily Weight log at bedside, patient/family participation in use of log: pt unable to participate\" (patient confused/with dementia)    Patient's Last Weight: 60.2 kg obtained by bed scale. Intake/Output Summary (Last 24 hours) at 10/31/2023 0630  Last data filed at 10/31/2023 0330  Gross per 24 hour   Intake 100 ml   Output 1100 ml   Net -1000 ml       Education Booklet Provided: yes    Comorbidities Reviewed Yes    Patient has a past medical history of Atrial fibrillation ([address]), Blind right eye, Chronic kidney disease, Dementia ([address]), Hyperlipidemia, Hypertension, and Pneumonia. >>For CHF and Comorbidity documentation on Education Time and Topics, please see Education Tab    Progressive Mobility Assessment:  What is this patient's Current Level of Mobility?: Requires Bed Rest  How was this patient Mobilized today?: Unable to Mobilize, ambulated 0 ft                 With Whom?  Nurse and PCA                 Level of

## 2023-10-31 NOTE — PLAN OF CARE
Problem: Discharge Planning  Goal: Discharge to home or other facility with appropriate resources  10/31/2023 0412 by Angelica Dangelo RN  Outcome: Progressing  10/30/2023 1833 by Heber Smith RN  Outcome: Progressing     Problem: Pain  Goal: Verbalizes/displays adequate comfort level or baseline comfort level  10/30/2023 1833 by Heber Smith RN  Outcome: Progressing     Problem: Safety - Adult  Goal: Free from fall injury  10/31/2023 0412 by Angelica Dangelo RN  Outcome: Progressing  10/30/2023 1833 by Heber Smith RN  Outcome: Progressing     Problem: Safety - Medical Restraint  Goal: Remains free of injury from restraints (Restraint for Interference with Medical Device)  Description: INTERVENTIONS:  1. Determine that other, less restrictive measures have been tried or would not be effective before applying the restraint  2. Evaluate the patient's condition at the time of restraint application  3. Inform patient/family regarding the reason for restraint  4. Q2H: Monitor safety, psychosocial status, comfort, nutrition and hydration  10/31/2023 0412 by Angelica Dangelo RN  Outcome: Progressing  10/30/2023 1833 by Heber Smith RN  Outcome: Progressing  Flowsheets  Taken 10/30/2023 1048 by Heber Smith RN  Remains free of injury from restraints (restraint for interference with medical device): Every 2 hours: Monitor safety, psychosocial status, comfort, nutrition and hydration  Taken 10/30/2023 0600 by Raz Evans RN  Remains free of injury from restraints (restraint for interference with medical device):   Every 2 hours: Monitor safety, psychosocial status, comfort, nutrition and hydration   Evaluate the patient's condition at the time of restraint application     Problem: Skin/Tissue Integrity  Goal: Absence of new skin breakdown  Description: 1. Monitor for areas of redness and/or skin breakdown  2. Assess vascular access sites hourly  3.   Every 4-6 hours minimum:  Change

## 2023-10-31 NOTE — CONSULTS
[address] [place] [address]                                  CONSULTATION    PATIENT NAME: Garry Mohs                   :        1935  MED REC NO:   [id number]                          ROOM:       [id number]  ACCOUNT NO:   [de-identified]                           ADMIT DATE: 10/28/2023  PROVIDER:     Thania Han MD    CONSULT DATE:  10/30/2023    PSYCHIATRY CONSULTATION    REQUESTING PROVIDER:  _____ Dr. Argueta Poster. HISTORY OF PRESENT ILLNESS:  The patient is an 68-year-old male who has  been living I believe in either an extended care facility or perhaps  assisted living, but comes in referred from [facility] for left-sided chest pain and shortness of breath. The  patient reportedly had severe dementia and is not a valid historian. During his admission here and even in the emergency department, he was  very agitated, required restraints and he has been on restraints now  since his admission and Psychiatry was asked to reevaluate. Also  carries a diagnoses of benign prostatic hypertrophy, AFib, history of  depression, hypercholesterolemia, coronary artery disease, congestive  heart failure and chronic kidney disease stage 3a. The patient was not really any kind of valid historian. He was unable  to state where he was, what year it was, where he had been living before  coming in [facility]. When asked whether he remembers  hitting the nurses and whether that is something he would normally do,  he stated \"probably if they got close enough. \"  I did speak to his  daughter who is the family member who is his power of  and she  describes her father is having depression for quite some time. She  states normally, he is not as agitated as he currently is, but also  acknowledges that he has struggled with his memory and has poor impulse  control.   He has been taking Namenda 10 mg twice a day. It is unclear  how long he has been on that. It also was reported that he has been  aspirating and taking to the  here, it was reported that the  last admission they have talked about whether it make sense to actively  be treating some of these things given that they are allowing the  patient to eat anything he wants acknowledging that he has \"aspiration. \"    PAST PSYCHIATRIC HISTORY:  Other than the Namenda, he has gotten Zyprexa  as an emergency injection twice. He also got Geodon 10 mg injection  once and then he was started on Risperdal 0.5 mg twice a day today. He  does not have any history of inpatient psychiatric treatment. He does  not have any history of suicide attempts. FAMILY PSYCHIATRIC HISTORY:  Unknown. 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 \"  There does not appear  to be anything in the way of industrial exposure to heavy metal or  toxins and there is nothing in the chart to document alcohol or drug  dependency issue. REVIEW OF SYSTEMS:  A 10-system review was attempted, but the patient  really is not an appropriate historian. PHYSICAL EXAMINATION:  VITAL SIGNS:  His temperature is 97.4, pulse 68, respirations 16, blood  pressure is 129/78. GENERAL APPEARANCE:  The patient appears to be a man who looks a little  younger than his started age. He is in three-point restraints and awake  and alert. NEUROMUSCULAR EXAM:  He appears to have normal muscle, bulk, and tone  for his age. His gait, the patient was in restraints and was not able to  be ambulated. MENTAL STATUS EXAM:  The patient presents as noted above and there is no  abnormal movements, tics or mannerisms. His thought processes are at  times goal directive, but at other times not.

## 2023-10-31 NOTE — CONSULTS
Nutrition Note    Patient asleep at visit, CHF and heart healthy diet handouts left at beside. RD contact information provided as well. RD will continue to follow and provide nutritional intervention as appropriate. Instructed the Patient on:   [x] Low Sodium foods  [x] Sodium free  [x] Fluids     Educational Materials Provided:   [x] [facility] Heart Failure Education folder- Nutrition Therapy   [x] Nutrition Care Manual Heart Failure Nutrition Therapy      The patient will still be monitored per nutrition standards of care. Consult dietitian if nutrition interventions essential to patient care is needed.      China Duke, [id number] [facility]  Office:  [phone]

## 2023-10-31 NOTE — PROGRESS NOTES
Status: Impaired  Orientation Level: Oriented to person;Disoriented to time;Disoriented to place;Oriented to situation                  Education Given To: Patient  Education Provided: Role of Therapy;Plan of Care;Precautions; Energy Conservation;Transfer Training  Education Provided Comments: Disease Specific Education: Pt educated on importance of OOB mobility, prevention of complications of bedrest, and general safety during hospitalization including use of call light/RED button for nurse. Pt verbalized understanding   Education Method: Demonstration;Verbal  Barriers to Learning: Cognition  Education Outcome: Continued education needed  LUE AROM (degrees)  LUE AROM : WFL  Left Hand AROM (degrees)  Left Hand AROM: WFL  RUE AROM (degrees)  RUE AROM : WFL  Right Hand AROM (degrees)  Right Hand AROM: WFL     Attempted Therapy Heart Failure Education this date. Pt inappropriate for education at this time due to :  [x]Cognition   []Medical Status   []Readiness to learn  []Refusal   []Language barrier  []Decreased vision/hearing    []No family present     Should this change during treatment, education will be initiated. If family/ pt's support system is present at subsequent therapy session, will attempt to initiate education.      AM-PAC Score        -[facility] Inpatient Daily Activity Raw Score: 10 (10/31/23 1234)  AM-PAC Inpatient ADL T-Scale Score : 27.31 (10/31/23 1234)  ADL Inpatient CMS 0-100% Score: 74.7 (10/31/23 1234)  ADL Inpatient CMS G-Code Modifier : CL (10/31/23 1234)      Goals  Short Term Goals  Time Frame for Short Term Goals: 1 week- 11/07  Short Term Goal 1: Pt luigi sit EoB and perform 1-2 ADLs with SBA  Short Term Goal 2: Pt will complete toilet transfer with CGA  Short Term Goal 3: Pt will perform 5 x 15 BUE ex to inc strength and fxl transfers by 11/04  Short Term Goal 4: Pt will complete LB dressing with CGA  Patient Goals   Patient goals : not stated       Therapy Time   Individual Concurrent Group Co-treatment   Time In       0802   Time Out       0836   Minutes       34   Timed Code Treatment Minutes: 24 Minutes (10 min eval)     If pt is unable to be seen after this session, please let this note serve as discharge summary. Please see case management note for discharge disposition. Thank you. Co-tx collaboration this date to safely meet goals and will have better occupational performance outcomes with in a co-treatment than 1:1 session.      Carmita Regalado, OT

## 2023-10-31 NOTE — CONSULTS
[address] 1935    History:  Past Medical History:   Diagnosis Date    Atrial fibrillation ([address])     Blind right eye     Chronic kidney disease     Dementia ([address])     Hyperlipidemia     Hypertension     Pneumonia        ECHO:  10/25/23    EF  30-35%  HgA1C: 8/11/22   5.9  Iron Saturation:  2/18/23  unable to calculate  Ferritin: 12/28/15   56    ACE/ARB/ARNI: hold due to renal function  BB: Toprol XL 25 mg BID   Spironolactone:  Ferrous Sulfate 325 mg daily   SGLT2:    Last Hospital Admission:  9/4/23  hiatal hernia  Discharge plans: [facility]    Advanced Directives: patient  limited code    Chart review completed. Patient a 82year old male, admitted for COPD exacerbation. Hospital day 3, currently on B3 level of care. Pt was transferred to us from [facility]. Pt EF was 55% in January 2023 now 35% this week. Pt had recent admission at [facility] for CHF. Discharged to [facility] who sent her to [facility] for left sided chest pain and SOB. Cardiology was consulted and IV lasix in place. Palliative care saw pt and spoke with Opal Rao. Discussed hospice and limited code status. Pt with altered mental status and dementia. Psychiatry saw pt and recommendations are in place. Plans at this time are for patient to return to [facility]. Pt not appropriate for CHF education at this time. Patient recent weights and intake/output reviewed:    Patient Vitals for the past 96 hrs (Last 3 readings):   Weight   10/31/23 0419 60.2 kg (132 lb 11.5 oz)         Intake/Output Summary (Last 24 hours) at 10/31/2023 1111  Last data filed at 10/31/2023 0814  Gross per 24 hour   Intake 100 ml   Output 1100 ml   Net -1000 ml       Education Time: chart review completed.      Tulio Villarreal RN     10/31/2023 11:11 AM

## 2023-10-31 NOTE — CARE COORDINATION
Chart reviewed day 3. Care provided by serafin, matt and IM. Pt is from SNF at [facility]. He can return when medically ready but he needs to be restraint free for 24 hours. Will continue to follow course.  Arsalan Knutson RN

## 2023-10-31 NOTE — PROGRESS NOTES
Physical Therapy  Facility/Department: [place] [address] - MED SURG  Physical Therapy Initial Assessment/Treatment    Name: Nhung Campos  : 1935  MRN: [id number]  Date of Service: 10/31/2023    Discharge Recommendations:  [address] [place] [address] with PT   PT Equipment Recommendations  Equipment Needed: No  Other: defer to facility      Therapy discharge recommendations take into account each patient's current medical complexities and are subject to input/oversight from the patient's healthcare team.   Barriers to Home Discharge:   [] Steps to access home entry or bed/bath:   [x] Unable to transfer, ambulate, or propel wheelchair household distances without assist   [x] Limited available assist at home upon discharge    [] Patient or family requests d/c to post-acute facility    [x] Poor cognition/safety awareness for d/c to home alone    []Unable to maintain ordered weight bearing status    [] Patient with salient signs of long-standing immobility   [x] Patient is at risk for falls    [x] Other: pt resides at LTC facility    If pt is unable to be seen after this session, please let this note serve as discharge summary. Please see case management note for discharge disposition. Thank you. Patient Diagnosis(es): There were no encounter diagnoses. Past Medical History:  has a past medical history of Atrial fibrillation ([address]), Blind right eye, Chronic kidney disease, Dementia ([address]), Hyperlipidemia, Hypertension, and Pneumonia. Past Surgical History:  has a past surgical history that includes Tonsillectomy; Appendectomy; Colonoscopy; eye surgery; Upper gastrointestinal endoscopy; joint replacement (); hernia repair; pacemaker placement (10/13/2017); ablation of dysrhythmic focus; pacemaker placement (2022); and Upper gastrointestinal endoscopy (N/A, 2023). Assessment   Body Structures, Functions, Activity Limitations Requiring Skilled Therapeutic Intervention: Decreased functional mobility ; Decreased training/pressure relief  Supine to Sit: Maximum assistance;Assist X2;Adaptive equipment; Additional time  Sit to Supine: Maximum assistance;Assist X2;Additional time; Adaptive equipment  Scooting: Total assistance (towards HoB)  Balance  Sitting: Impaired  Sitting - Static: Fair (occasional); Occasional (posterior lean initially sitting, improves with time, sat EOB ~2 min)  Transfer Training  Transfer Training: No (unable to attempt due to cognition and agitation)  Gait Training  Gait Training: No (unsafe to attempt)                           AM-PAC Score  AM-PAC Inpatient Mobility Raw Score : 6 (10/31/23 1053)  AM-PAC Inpatient T-Scale Score : 23.55 (10/31/23 1053)  Mobility Inpatient CMS 0-100% Score: 100 (10/31/23 1053)  Mobility Inpatient CMS G-Code Modifier : CN (10/31/23 1053)          Goals  Short Term Goals  Time Frame for Short Term Goals: 11/7/23  Short Term Goal 1: pt will perform bed mobility with min A  Short Term Goal 2: pt will perform sit <> stand with mod Ax2  Short Term Goal 3: pt will perform functional transfers with LRAD and mod Ax2  Short Term Goal 4: pt will participate in gait assessment to set appropriate goal  Short Term Goal 5: pt will sit EOB while performing dynamic activities x5 min with no more than min A for balance  Patient Goals   Patient Goals : none stated by patient       Education  Patient Education  Education Given To: Patient  Education Provided: Role of Therapy;Plan of Care;Transfer Training  Education Provided Comments: role of PT, importance of mobility  Education Method: Verbal  Barriers to Learning: Cognition  Education Outcome: Unable to verbalize; Unable to demonstrate understanding    Attempted Therapy Heart Failure Education this date.    Pt inappropriate for education at this time due to :  [x]Cognition   []Medical Status   [x]Readiness to learn  []Refusal   []Language barrier  []Decreased vision/hearing    []No family present     Should this change during treatment,

## 2023-10-31 NOTE — PLAN OF CARE
Problem: Discharge Planning  Goal: Discharge to home or other facility with appropriate resources  10/31/2023 1431 by Beka Leyva RN  Outcome: Progressing  10/31/2023 0412 by Matty Flower RN  Outcome: Progressing     Problem: Pain  Goal: Verbalizes/displays adequate comfort level or baseline comfort level  Outcome: Progressing     Problem: Safety - Adult  Goal: Free from fall injury  10/31/2023 1431 by Beka Leyva RN  Outcome: Progressing  10/31/2023 0412 by Matty Flower RN  Outcome: Progressing     Problem: Safety - Medical Restraint  Goal: Remains free of injury from restraints (Restraint for Interference with Medical Device)  Description: INTERVENTIONS:  1. Determine that other, less restrictive measures have been tried or would not be effective before applying the restraint  2. Evaluate the patient's condition at the time of restraint application  3. Inform patient/family regarding the reason for restraint  4. Q2H: Monitor safety, psychosocial status, comfort, nutrition and hydration  10/31/2023 1431 by Beka Leyva RN  Outcome: Progressing  Flowsheets (Taken 10/31/2023 8010)  Remains free of injury from restraints (restraint for interference with medical device): Every 2 hours: Monitor safety, psychosocial status, comfort, nutrition and hydration  10/31/2023 0412 by Matty Flower RN  Outcome: Progressing     Problem: Skin/Tissue Integrity  Goal: Absence of new skin breakdown  Description: 1. Monitor for areas of redness and/or skin breakdown  2. Assess vascular access sites hourly  3. Every 4-6 hours minimum:  Change oxygen saturation probe site  4. Every 4-6 hours:  If on nasal continuous positive airway pressure, respiratory therapy assess nares and determine need for appliance change or resting period.   10/31/2023 1431 by Beka Leyva RN  Outcome: Progressing  10/31/2023 0412 by Matty Flower RN  Outcome: Progressing

## 2023-10-31 NOTE — PROGRESS NOTES
CARDIOLOGY Progress Note         Patient Name: Estela George  Date of admission: 10/28/2023 11:51 AM  Admission Dx: COPD (chronic obstructive pulmonary disease) ([address]) [J44.9]  SOB (shortness of breath) [R06.02]  Requesting Physician: Migue Mejia MD  Primary Care physician: Maame Aranda MD    Reason for Consultation/Chief Complaint: Acute on chronic HFrEF    Subjective:   Pt seen and examined. Confused, mumbling to self. Unable to answer simple questions. Did follow command for deep breaths on exam (partially). Past Medical History:   has a past medical history of Atrial fibrillation ([address]), Blind right eye, Chronic kidney disease, Dementia ([address]), Hyperlipidemia, Hypertension, and Pneumonia. Surgical History:   has a past surgical history that includes Tonsillectomy; Appendectomy; Colonoscopy; eye surgery; Upper gastrointestinal endoscopy; joint replacement (2013); hernia repair; pacemaker placement (10/13/2017); ablation of dysrhythmic focus; pacemaker placement (09/16/2022); and Upper gastrointestinal endoscopy (N/A, 9/6/2023). Social History:   reports that he quit smoking about 48 years ago. His smoking use included cigarettes. He has a 10.00 pack-year smoking history. He has never used smokeless tobacco. He reports that he does not drink alcohol and does not use drugs. Family History:  family history includes Asthma in an other family member; Heart Disease in his father and mother; Other in his mother. Home Medications:  Were reviewed and are listed in nursing record and/or below  Prior to Admission medications    Medication Sig Start Date End Date Taking? Authorizing Provider   metoprolol succinate (TOPROL XL) 25 MG extended release tablet Take 1 tablet by mouth in the morning and 1 tablet in the evening.  9/8/23   GUS Barger - CNP   risperiDONE (RISPERDAL) 0.25 MG tablet Take 1 tablet by mouth 2 times daily 8/16/23   Maame Aranda MD   ferrous sulfate (IRON Acei/arb/arni/MRA/SGLT2 2/2 NATANAEL. -Need to start some afterload reduction; very low dose hydralazine 10mg TID. -Continue aspirin and high intensity statin  -ultimately given his severe dementia he is not a great candidate for [facility]. Patient's family reportedly do not want this for him either.   -echo would not   -Agree with palliative consultation; patient is hospice appropriate given his severe dementia and cardiomyopathy. He is a poor candidate (and his family doesn't want procedures/interventions for him) for any interventions or advanced heart failure therapies. I will address the patient's cardiac risk factors and adjusted pharmacologic treatment as needed. In addition, I have reinforced the need for patient directed risk factor modification. All questions and concerns were addressed to the patient/family. Alternatives to my treatment were discussed. Thank you for allowing us to participate in the care of Raulito Dickerson. Please call me with any questions [phone].     Adonis Giraldo, DO   Cardiovascular Disease  [facility]  [phone] [facility]  [phone] [address] [facility]  10/31/2023 12:51 PM

## 2023-11-01 LAB
ANION GAP SERPL CALCULATED.3IONS-SCNC: 15 MMOL/L (ref 3–16)
BASOPHILS # BLD: 0 K/UL (ref 0–0.2)
BASOPHILS NFR BLD: 0.2 %
BUN SERPL-MCNC: 70 MG/DL (ref 7–20)
CALCIUM SERPL-MCNC: 9.2 MG/DL (ref 8.3–10.6)
CHLORIDE SERPL-SCNC: 104 MMOL/L (ref 99–110)
CO2 SERPL-SCNC: 27 MMOL/L (ref 21–32)
CREAT SERPL-MCNC: 1.7 MG/DL (ref 0.8–1.3)
DEPRECATED RDW RBC AUTO: 15.8 % (ref 12.4–15.4)
EOSINOPHIL # BLD: 0.1 K/UL (ref 0–0.6)
EOSINOPHIL NFR BLD: 0.8 %
GFR SERPLBLD CREATININE-BSD FMLA CKD-EPI: 39 ML/MIN/{1.73_M2}
GLUCOSE SERPL-MCNC: 113 MG/DL (ref 70–99)
HCT VFR BLD AUTO: 54.2 % (ref 40.5–52.5)
HGB BLD-MCNC: 17.7 G/DL (ref 13.5–17.5)
LYMPHOCYTES # BLD: 1.3 K/UL (ref 1–5.1)
LYMPHOCYTES NFR BLD: 11.4 %
MCH RBC QN AUTO: 29.7 PG (ref 26–34)
MCHC RBC AUTO-ENTMCNC: 32.7 G/DL (ref 31–36)
MCV RBC AUTO: 90.9 FL (ref 80–100)
MONOCYTES # BLD: 0.9 K/UL (ref 0–1.3)
MONOCYTES NFR BLD: 7.9 %
NEUTROPHILS # BLD: 8.8 K/UL (ref 1.7–7.7)
NEUTROPHILS NFR BLD: 79.7 %
NT-PROBNP SERPL-MCNC: 4150 PG/ML (ref 0–449)
PLATELET # BLD AUTO: 241 K/UL (ref 135–450)
PMV BLD AUTO: 7.6 FL (ref 5–10.5)
POTASSIUM SERPL-SCNC: 3.7 MMOL/L (ref 3.5–5.1)
RBC # BLD AUTO: 5.96 M/UL (ref 4.2–5.9)
SODIUM SERPL-SCNC: 146 MMOL/L (ref 136–145)
WBC # BLD AUTO: 11.1 K/UL (ref 4–11)

## 2023-11-01 PROCEDURE — 6370000000 HC RX 637 (ALT 250 FOR IP): Performed by: HOSPITALIST

## 2023-11-01 PROCEDURE — 85025 COMPLETE CBC W/AUTO DIFF WBC: CPT

## 2023-11-01 PROCEDURE — 80048 BASIC METABOLIC PNL TOTAL CA: CPT

## 2023-11-01 PROCEDURE — 6370000000 HC RX 637 (ALT 250 FOR IP): Performed by: STUDENT IN AN ORGANIZED HEALTH CARE EDUCATION/TRAINING PROGRAM

## 2023-11-01 PROCEDURE — 6370000000 HC RX 637 (ALT 250 FOR IP): Performed by: INTERNAL MEDICINE

## 2023-11-01 PROCEDURE — 1200000000 HC SEMI PRIVATE

## 2023-11-01 PROCEDURE — 6370000000 HC RX 637 (ALT 250 FOR IP): Performed by: NURSE PRACTITIONER

## 2023-11-01 PROCEDURE — 2580000003 HC RX 258: Performed by: HOSPITALIST

## 2023-11-01 PROCEDURE — 6370000000 HC RX 637 (ALT 250 FOR IP): Performed by: PSYCHIATRY & NEUROLOGY

## 2023-11-01 PROCEDURE — 36415 COLL VENOUS BLD VENIPUNCTURE: CPT

## 2023-11-01 PROCEDURE — 6360000002 HC RX W HCPCS: Performed by: HOSPITALIST

## 2023-11-01 PROCEDURE — 6360000002 HC RX W HCPCS: Performed by: NURSE PRACTITIONER

## 2023-11-01 PROCEDURE — 83880 ASSAY OF NATRIURETIC PEPTIDE: CPT

## 2023-11-01 RX ORDER — QUETIAPINE FUMARATE 100 MG/1
100 TABLET, FILM COATED ORAL NIGHTLY
Status: DISCONTINUED | OUTPATIENT
Start: 2023-11-01 | End: 2023-11-03

## 2023-11-01 RX ORDER — QUETIAPINE FUMARATE 25 MG/1
75 TABLET, FILM COATED ORAL ONCE
Status: COMPLETED | OUTPATIENT
Start: 2023-11-01 | End: 2023-11-01

## 2023-11-01 RX ORDER — QUETIAPINE FUMARATE 25 MG/1
75 TABLET, FILM COATED ORAL 2 TIMES DAILY
Status: DISCONTINUED | OUTPATIENT
Start: 2023-11-02 | End: 2023-11-04 | Stop reason: HOSPADM

## 2023-11-01 RX ADMIN — ENOXAPARIN SODIUM 30 MG: 100 INJECTION SUBCUTANEOUS at 11:31

## 2023-11-01 RX ADMIN — FINASTERIDE 5 MG: 5 TABLET, FILM COATED ORAL at 11:31

## 2023-11-01 RX ADMIN — HYDRALAZINE HYDROCHLORIDE 10 MG: 10 TABLET, FILM COATED ORAL at 14:58

## 2023-11-01 RX ADMIN — QUETIAPINE FUMARATE 100 MG: 100 TABLET ORAL at 20:53

## 2023-11-01 RX ADMIN — ASPIRIN 81 MG: 81 TABLET, COATED ORAL at 11:31

## 2023-11-01 RX ADMIN — CEFTRIAXONE SODIUM 1000 MG: 1 INJECTION, POWDER, FOR SOLUTION INTRAMUSCULAR; INTRAVENOUS at 11:33

## 2023-11-01 RX ADMIN — Medication 1 CAPSULE: at 11:41

## 2023-11-01 RX ADMIN — PANTOPRAZOLE SODIUM 40 MG: 40 TABLET, DELAYED RELEASE ORAL at 11:31

## 2023-11-01 RX ADMIN — HYDRALAZINE HYDROCHLORIDE 10 MG: 10 TABLET, FILM COATED ORAL at 21:00

## 2023-11-01 RX ADMIN — ACETAMINOPHEN 650 MG: 325 TABLET ORAL at 20:35

## 2023-11-01 RX ADMIN — AZITHROMYCIN MONOHYDRATE 500 MG: 500 INJECTION, POWDER, LYOPHILIZED, FOR SOLUTION INTRAVENOUS at 12:09

## 2023-11-01 RX ADMIN — QUETIAPINE FUMARATE 75 MG: 25 TABLET ORAL at 11:32

## 2023-11-01 RX ADMIN — FERROUS SULFATE TAB 325 MG (65 MG ELEMENTAL FE) 325 MG: 325 (65 FE) TAB at 11:41

## 2023-11-01 RX ADMIN — SODIUM CHLORIDE, PRESERVATIVE FREE 10 ML: 5 INJECTION INTRAVENOUS at 21:02

## 2023-11-01 RX ADMIN — ATORVASTATIN CALCIUM 40 MG: 40 TABLET, FILM COATED ORAL at 20:56

## 2023-11-01 RX ADMIN — SODIUM CHLORIDE: 9 INJECTION, SOLUTION INTRAVENOUS at 11:32

## 2023-11-01 RX ADMIN — QUETIAPINE FUMARATE 75 MG: 25 TABLET ORAL at 14:58

## 2023-11-01 RX ADMIN — HYDRALAZINE HYDROCHLORIDE 10 MG: 10 TABLET, FILM COATED ORAL at 06:26

## 2023-11-01 RX ADMIN — QUETIAPINE FUMARATE 25 MG: 25 TABLET ORAL at 02:06

## 2023-11-01 RX ADMIN — QUETIAPINE FUMARATE 25 MG: 25 TABLET ORAL at 06:26

## 2023-11-01 RX ADMIN — PANTOPRAZOLE SODIUM 40 MG: 40 TABLET, DELAYED RELEASE ORAL at 21:01

## 2023-11-01 RX ADMIN — POLYETHYLENE GLYCOL 3350 17 G: 17 POWDER, FOR SOLUTION ORAL at 11:41

## 2023-11-01 RX ADMIN — METOPROLOL SUCCINATE 25 MG: 25 TABLET, EXTENDED RELEASE ORAL at 11:31

## 2023-11-01 RX ADMIN — SODIUM CHLORIDE, PRESERVATIVE FREE 10 ML: 5 INJECTION INTRAVENOUS at 11:35

## 2023-11-01 ASSESSMENT — PAIN SCALES - GENERAL
PAINLEVEL_OUTOF10: 3
PAINLEVEL_OUTOF10: 4
PAINLEVEL_OUTOF10: 0
PAINLEVEL_OUTOF10: 5
PAINLEVEL_OUTOF10: 5

## 2023-11-01 ASSESSMENT — PAIN DESCRIPTION - ORIENTATION: ORIENTATION: MID

## 2023-11-01 ASSESSMENT — PAIN SCALES - WONG BAKER
WONGBAKER_NUMERICALRESPONSE: 0
WONGBAKER_NUMERICALRESPONSE: 4
WONGBAKER_NUMERICALRESPONSE: 0
WONGBAKER_NUMERICALRESPONSE: 4
WONGBAKER_NUMERICALRESPONSE: 0
WONGBAKER_NUMERICALRESPONSE: 0
WONGBAKER_NUMERICALRESPONSE: 4
WONGBAKER_NUMERICALRESPONSE: 4
WONGBAKER_NUMERICALRESPONSE: 0

## 2023-11-01 ASSESSMENT — PAIN DESCRIPTION - LOCATION
LOCATION: BACK
LOCATION: RECTUM
LOCATION: RECTUM
LOCATION: BACK

## 2023-11-01 NOTE — CARE COORDINATION
Chart reviewed day 4. Care is provided by yasmeen, matt and IM. Pt is from [facility] where he was getting skilled care. I spoke with the liaison from [facility] and she tells me the pt was not combative at their facility and that he was doing so well they were working towards D/C to home. The liaison also tells me she thinks the wife may not be in great health herself. Yasmeen is working on his meds to help get him out of restraints. Palliative car eis planning to speak with family again today as the MDs notes say the pt is hospice appropriate. Will continue to follow course for needs.  Sharona Fuentes RN

## 2023-11-01 NOTE — PROGRESS NOTES
CARDIOLOGY Progress Note         Patient Name: Milagros Loco  Date of admission: 10/28/2023 11:51 AM  Admission Dx: COPD (chronic obstructive pulmonary disease) ([address]) [J44.9]  SOB (shortness of breath) [R06.02]  Requesting Physician: Whitney Siu MD  Primary Care physician: Narcisa Murillo MD    Reason for Consultation/Chief Complaint: Acute on chronic HFrEF    Subjective:   Pt seen and examined. Somnolent. Periods of witnessed apnea which resolved with arousing the patient with sternal rub and verbal commands. Notified bedside RN. Pt has had decreased PO intake. Overall, appears to be declining. Past Medical History:   has a past medical history of Atrial fibrillation ([address]), Blind right eye, Chronic kidney disease, Dementia ([address]), Hyperlipidemia, Hypertension, and Pneumonia. Surgical History:   has a past surgical history that includes Tonsillectomy; Appendectomy; Colonoscopy; eye surgery; Upper gastrointestinal endoscopy; joint replacement (2013); hernia repair; pacemaker placement (10/13/2017); ablation of dysrhythmic focus; pacemaker placement (09/16/2022); and Upper gastrointestinal endoscopy (N/A, 9/6/2023). Social History:   reports that he quit smoking about 48 years ago. His smoking use included cigarettes. He has a 10.00 pack-year smoking history. He has never used smokeless tobacco. He reports that he does not drink alcohol and does not use drugs. Family History:  family history includes Asthma in an other family member; Heart Disease in his father and mother; Other in his mother. Home Medications:  Were reviewed and are listed in nursing record and/or below  Prior to Admission medications    Medication Sig Start Date End Date Taking? Authorizing Provider   metoprolol succinate (TOPROL XL) 25 MG extended release tablet Take 1 tablet by mouth in the morning and 1 tablet in the evening.  9/8/23   Brooks Del Real APRN - CNP   risperiDONE (RISPERDAL) 0.25 MG tablet Take 1 appears warm/dry on exam.  Somnolent and confused once aroused with sternal rub and voice commands. Periods of witnessed apnea. Bedside RN notified. He is easily able to participate in his examination. No obvious JVD/HJR today.  -There has been no change in renal function with diuresis and with diuretic holiday.  -Continue beta blocker. Unable to Acei/arb/arni/MRA/SGLT2 2/2 NATANAEL. -Can continue hydralazine 10mg TID for afterload reduction  -Continue aspirin and high intensity statin  -ultimately given his severe dementia he is not a great candidate for [facility]. Patient's family reportedly do not want this for him either.   -echo would not   - patient is hospice appropriate given his severe dementia and cardiomyopathy. He is a poor candidate (and his family doesn't want procedures/interventions for him) for any interventions or advanced heart failure therapies.   -Per chart review and discussion with patient's RN, hospice to be pursued.   -Nothing further from a cardiology perspective. Will sign off. Re-consult if questions. I will address the patient's cardiac risk factors and adjusted pharmacologic treatment as needed. In addition, I have reinforced the need for patient directed risk factor modification. All questions and concerns were addressed to the patient/family. Alternatives to my treatment were discussed. Thank you for allowing us to participate in the care of Marlin Portillo. Please call me with any questions [phone].     Lori Yang, DO   Cardiovascular Disease  [facility]  [phone] [facility]  [phone] [address] [facility]  11/1/2023 2:54 PM

## 2023-11-01 NOTE — PROGRESS NOTES
Patient family has decided to go with [facility]. Family concern of medication Seroquel causing patient to sleep and have no intake. Concern expressing to Attending Provider. Ok per Attending provider Ladonna BANDA to d/c telemetry monitoring.

## 2023-11-01 NOTE — PROGRESS NOTES
CHF Care Plan      Patient's EF (Ejection Fraction) is less than 40%    Heart Failure Medications:  Diuretics[de-identified] None    (One of the following REQUIRED for EF </= 40%/SYSTOLIC FAILURE but MAY be used in EF% >40%/DIASTOLIC FAILURE)        ACE[de-identified] None        ARB[de-identified] None         ARNI[de-identified] None    (Beta Blockers)  NON- Evidenced Based Beta Blocker (for EF% >40%/DIASTOLIC FAILURE): None    Evidenced Based Beta Blocker::(REQUIRED for EF% <40%/SYSTOLIC FAILURE) Metoprolol SUCCinate- Toprol XL  . .................................................................................................................................................. Healthy Weight Tracking - BMI + Meds 4/10/2023 4/25/2023 6/16/2023 9/4/2023 9/5/2023 10/31/2023 11/1/2023   Weight 151 lb 153 lb 3.2 oz 142 lb 163 lb 3.2 oz - 132 lb 11.5 oz 130 lb 10.9 oz   Height - 5' 6\" 5' 6\" - 5' 4\" - -   Body Mass Index - 24.73 kg/m2 22.92 kg/m2 - - - -   Some recent data might be hidden         Patient's weights and intake/output reviewed: Yes    Daily Weight log at bedside, patient/family participation in use of log: \"yes    Patient's Last Weight: 130 lbs obtained by bed scale. Difference of 2 lbs less than last documented weight. Intake/Output Summary (Last 24 hours) at 11/1/2023 1942  Last data filed at 11/1/2023 1209  Gross per 24 hour   Intake 300 ml   Output 150 ml   Net 150 ml       Education Booklet Provided: yes    Comorbidities Reviewed Yes    Patient has a past medical history of Atrial fibrillation ([address]), Blind right eye, Chronic kidney disease, Dementia ([address]), Hyperlipidemia, Hypertension, and Pneumonia.      >>For CHF and Comorbidity documentation on Education Time and Topics, please see Education Tab    Progressive Mobility Assessment:  What is this patient's Current Level of Mobility?: Requires Bed Rest  How was this patient Mobilized today?: Edge of Bed, Bedside Commode, Up in Room, Up in [place], Unable to Mobilize, and Patient Refuses to

## 2023-11-01 NOTE — PLAN OF CARE
Problem: Safety - Medical Restraint  Goal: Remains free of injury from restraints (Restraint for Interference with Medical Device)  Description: INTERVENTIONS:  1. Determine that other, less restrictive measures have been tried or would not be effective before applying the restraint  2. Evaluate the patient's condition at the time of restraint application  3. Inform patient/family regarding the reason for restraint  4.  Q2H: Monitor safety, psychosocial status, comfort, nutrition and hydration  Outcome: Progressing  Flowsheets (Taken 11/1/2023 1500)  Remains free of injury from restraints (restraint for interference with medical device):   Every 2 hours: Monitor safety, psychosocial status, comfort, nutrition and hydration   Determine that other, less restrictive measures have been tried or would not be effective before applying the restraint

## 2023-11-01 NOTE — PROGRESS NOTES
Patient signed today with [facility] [address] Patient having respirations of 12 per min with apnea of 45-50 seconds in between. Attending physician notified. Patient is able to be aroused when calling patient name but falls back asleep while talking with him. Patient is denying food at this time but will take a drink of water off and on.

## 2023-11-01 NOTE — PROGRESS NOTES
PALLIATIVE MEDICINE PROGRESS NOTE     Patient name:Aroldo Anne    FMZ:[id number] :1935  Room/Bed:0356/0356-01    LOS: 4 days        ASSESSMENT/RECOMMENDATIONS     82 y.o. male with  dementia and acute CHF exacerbation    Life-threatening acute illness or unstable chronic illness addressed by Palliative Medicine:    1) Goals of Care - Family is strongly considering comfort care with hospice due to on- gong decline with poor PO intake. Family agreeable to hospice consult (placed - [facility]) for more information. Family anticipates patient will have to return to [facility] for LTC at [address] [place] [address] as wife can't care for him at home. [facility] meeting day/time TBD. Will follow up following hospice meeting. Code status is Limited x 4 Nos    2) Acute on Chronic Heart Failure -  EF 35% (decreased from 50% in 2023). Cardiology following. BNP 4806 with JVD. GDMT limited by NATANAEL. Family does not want aggressive interventions such a heart cath and wants to treat medically    3) Altered Mental status- acute delirium with underlying dementia. Patient with behaviors this admission. Psych consulted and Namenda and Risperdal stopped. Patient has history of aspiration. Based on conversation with daughter, it appears his dementia symptoms have progressed since his admission in September. FAST score may be closer to 6/7. Reviewed trajectory of dementia with daughter         Patient/Family Goals of Care :    23    Called to speak with Robb De La Paz following discussion with hospitalist today about and concerns for patient's continued decline. Wife also on the line during the discussion Explained issues concerns with poor po status. Wife states she is aware of this issue and he has been refusing to eat and drink for her as well. Discussed issues with ongoing PO issues and likelihood that he will struggle/may not be able to tolerate SNF.  Also discussed issues with agitation, behaviors and may not admits, labs, radiology and testing relevant to this consult done in this chart today 11/1/2023    Data Reviewed related to this consultation:    Review of prior external note(s) from each unique source relevant to today's visit: Hospitalist, Case management, PT/OT/ST, cardiology, psych  Discussion of management or test with external physician/qualified health care professional: Hospitalist, Case management,    Unique test results reviewed: CBC and BMP, cardiac labs, LFTs, nutrition labs, cxr, echo        I have spent a total of 55 minutes on: Performing a medical appropriate examination and/or evaluation    Counseling and educating the patient/family/caregiver  Preparing to see the patient (e.g., review of tests)  Referring / communicating with other healthcare professionals including care coordination (not separately reported):  Hospitalist, Case management  Documenting clinical information in the electronic health record   Does NOT include ACP discussion time    Signed By: Electronically signed by GUS Cates CNP on 11/1/2023 at 10:26 AM   Palliative Medicine     November 1, 2023

## 2023-11-01 NOTE — PROGRESS NOTES
Restraint type: Siderail:  3 and Soft restraint:  right wrist, left ankle, and left wrist  Reason for restraints: Pulling out devices:  Removal of equipment  Duration: 24 hours    Restraints must be removed when an alternative is available and effective and/or patient no longer meets criteria.

## 2023-11-01 NOTE — PROGRESS NOTES
Hospital Medicine Progress Note      Date of Admission: 10/28/2023  Hospital Day: 5    Chief Admission Complaint:    Chest pain with hypoxia sent from [facility] by squad to ED       Subjective:      Patient is unresponsive when I arrive. I have to wake him to get response and then he is yelling and striking out. Three point restraints in place. Called wife and LM as to discuss patient's care and POC moving forward. Patient has not [place] eating or drinking since admission and has increased behavioral disturbance and worsening mentation. Spoke with PC NP Courtney Millan about discussing [address] [place] and code status with family and perhaps hospice care. Patient's family to meet with [facility] and patient is to return to [facility]. However, patient is requiring three point restraints and needs to be out of restraints for 24 hours prior to d/c to facility. Presenting Admission History: This is a 82 y.o. male who presented to OSH, [facility] via squad from [facility], [facility] for reported left sided chest pain and SOB. The patient has severe dementia and can not give me information on reason for admission or for going to [facility]. All the following information is from ED note and notes from [facility]. PMHx significant for Anemia, AFIB, BPH, Dementia, Dysphagia, GERD, Depression, Hitial hernia, Hypercholesterolemia, HTN, pacemaker, CAD, CHF, and CKD IIIa. Assessment/Plan:      Current Principal Problem:  COPD (chronic obstructive pulmonary disease) (HCC)    Chest pain  Acute on chronic systolic heart failure - LVEF down from ~55% (normal) in 1/2023  now LVEF 35% earlier this week per ECHO at OSH; high BNP, pulmonary edema on CXR. - Patient was recently seen at [address] [place] [address] Drive ED for same complaint on 10/23/23.  He was evaluated and discharged home the same evening.  - Troponin flat <0.012 since admission  - EKG from OSH revealed possible elevation in leads I and aVL but when compared to 279 241       Recent Labs     10/30/23  0751 10/31/23  0719 11/01/23  0718    144 146*   K 3.5 3.7 3.7   CL 99 101 104   CO2 26 28 27   BUN 70* 69* 70*   CREATININE 1.8* 1.7* 1.7*   CALCIUM 9.0 9.6 9.2   MG 2.50*  --   --        Recent Labs     10/29/23  1755 10/30/23  0751 11/01/23  0718   PROBNP  --  4,806* 4,150*   TROPHS 25*  --   --        No results for input(s): \"LABA1C\" in the last 72 hours. No results for input(s): \"AST\", \"ALT\", \"BILIDIR\", \"BILITOT\", \"ALKPHOS\" in the last 72 hours. Recent Labs     10/29/23  1755   LACTA 2.8*         Urine Cultures:   Lab Results   Component Value Date/Time    LABURIN Final report 01/31/2023 11:44 PM     Blood Cultures:   Lab Results   Component Value Date/Time    BC No Growth after 4 days of incubation. 01/19/2023 10:17 AM     Lab Results   Component Value Date/Time    BLOODCULT2 No Growth after 4 days of incubation.  01/19/2023 10:45 AM     Organism:   Lab Results   Component Value Date/Time    ORG Escherichia coli 04/21/2022 02:19 PM         Mony Dougherty, APRN - CNP

## 2023-11-02 LAB
ANION GAP SERPL CALCULATED.3IONS-SCNC: 15 MMOL/L (ref 3–16)
BASOPHILS # BLD: 0.1 K/UL (ref 0–0.2)
BASOPHILS NFR BLD: 0.6 %
BUN SERPL-MCNC: 68 MG/DL (ref 7–20)
CALCIUM SERPL-MCNC: 9.5 MG/DL (ref 8.3–10.6)
CHLORIDE SERPL-SCNC: 107 MMOL/L (ref 99–110)
CO2 SERPL-SCNC: 24 MMOL/L (ref 21–32)
CREAT SERPL-MCNC: 1.6 MG/DL (ref 0.8–1.3)
DEPRECATED RDW RBC AUTO: 15.9 % (ref 12.4–15.4)
EOSINOPHIL # BLD: 0.2 K/UL (ref 0–0.6)
EOSINOPHIL NFR BLD: 1.5 %
GFR SERPLBLD CREATININE-BSD FMLA CKD-EPI: 42 ML/MIN/{1.73_M2}
GLUCOSE SERPL-MCNC: 91 MG/DL (ref 70–99)
HCT VFR BLD AUTO: 55 % (ref 40.5–52.5)
HGB BLD-MCNC: 18.3 G/DL (ref 13.5–17.5)
LYMPHOCYTES # BLD: 1.8 K/UL (ref 1–5.1)
LYMPHOCYTES NFR BLD: 12.6 %
MCH RBC QN AUTO: 29.7 PG (ref 26–34)
MCHC RBC AUTO-ENTMCNC: 33.3 G/DL (ref 31–36)
MCV RBC AUTO: 89.2 FL (ref 80–100)
MONOCYTES # BLD: 1.2 K/UL (ref 0–1.3)
MONOCYTES NFR BLD: 8.3 %
NEUTROPHILS # BLD: 10.8 K/UL (ref 1.7–7.7)
NEUTROPHILS NFR BLD: 77 %
PLATELET # BLD AUTO: 259 K/UL (ref 135–450)
PMV BLD AUTO: 7.9 FL (ref 5–10.5)
POTASSIUM SERPL-SCNC: 5.3 MMOL/L (ref 3.5–5.1)
RBC # BLD AUTO: 6.16 M/UL (ref 4.2–5.9)
SODIUM SERPL-SCNC: 146 MMOL/L (ref 136–145)
WBC # BLD AUTO: 14 K/UL (ref 4–11)

## 2023-11-02 PROCEDURE — 6370000000 HC RX 637 (ALT 250 FOR IP): Performed by: PSYCHIATRY & NEUROLOGY

## 2023-11-02 PROCEDURE — 85025 COMPLETE CBC W/AUTO DIFF WBC: CPT

## 2023-11-02 PROCEDURE — 2580000003 HC RX 258: Performed by: INTERNAL MEDICINE

## 2023-11-02 PROCEDURE — 80048 BASIC METABOLIC PNL TOTAL CA: CPT

## 2023-11-02 PROCEDURE — 97535 SELF CARE MNGMENT TRAINING: CPT

## 2023-11-02 PROCEDURE — 2580000003 HC RX 258: Performed by: HOSPITALIST

## 2023-11-02 PROCEDURE — 6370000000 HC RX 637 (ALT 250 FOR IP): Performed by: STUDENT IN AN ORGANIZED HEALTH CARE EDUCATION/TRAINING PROGRAM

## 2023-11-02 PROCEDURE — 1200000000 HC SEMI PRIVATE

## 2023-11-02 PROCEDURE — 6360000002 HC RX W HCPCS: Performed by: HOSPITALIST

## 2023-11-02 PROCEDURE — 6360000002 HC RX W HCPCS: Performed by: NURSE PRACTITIONER

## 2023-11-02 PROCEDURE — 6370000000 HC RX 637 (ALT 250 FOR IP): Performed by: NURSE PRACTITIONER

## 2023-11-02 PROCEDURE — 6370000000 HC RX 637 (ALT 250 FOR IP): Performed by: INTERNAL MEDICINE

## 2023-11-02 PROCEDURE — 97530 THERAPEUTIC ACTIVITIES: CPT

## 2023-11-02 RX ORDER — DEXTROSE AND SODIUM CHLORIDE 5; .45 G/100ML; G/100ML
INJECTION, SOLUTION INTRAVENOUS CONTINUOUS
Status: DISCONTINUED | OUTPATIENT
Start: 2023-11-02 | End: 2023-11-03

## 2023-11-02 RX ADMIN — Medication 1 CAPSULE: at 17:33

## 2023-11-02 RX ADMIN — SODIUM CHLORIDE, PRESERVATIVE FREE 10 ML: 5 INJECTION INTRAVENOUS at 20:51

## 2023-11-02 RX ADMIN — HYDRALAZINE HYDROCHLORIDE 10 MG: 10 TABLET, FILM COATED ORAL at 13:44

## 2023-11-02 RX ADMIN — AZITHROMYCIN MONOHYDRATE 500 MG: 500 INJECTION, POWDER, LYOPHILIZED, FOR SOLUTION INTRAVENOUS at 12:30

## 2023-11-02 RX ADMIN — FERROUS SULFATE TAB 325 MG (65 MG ELEMENTAL FE) 325 MG: 325 (65 FE) TAB at 09:10

## 2023-11-02 RX ADMIN — METOPROLOL SUCCINATE 25 MG: 25 TABLET, EXTENDED RELEASE ORAL at 17:33

## 2023-11-02 RX ADMIN — QUETIAPINE FUMARATE 75 MG: 25 TABLET ORAL at 13:44

## 2023-11-02 RX ADMIN — SODIUM ZIRCONIUM CYCLOSILICATE 10 G: 10 POWDER, FOR SUSPENSION ORAL at 11:44

## 2023-11-02 RX ADMIN — ASPIRIN 81 MG: 81 TABLET, COATED ORAL at 09:10

## 2023-11-02 RX ADMIN — CEFTRIAXONE SODIUM 1000 MG: 1 INJECTION, POWDER, FOR SOLUTION INTRAMUSCULAR; INTRAVENOUS at 11:51

## 2023-11-02 RX ADMIN — METOPROLOL SUCCINATE 25 MG: 25 TABLET, EXTENDED RELEASE ORAL at 09:10

## 2023-11-02 RX ADMIN — PANTOPRAZOLE SODIUM 40 MG: 40 TABLET, DELAYED RELEASE ORAL at 09:10

## 2023-11-02 RX ADMIN — FINASTERIDE 5 MG: 5 TABLET, FILM COATED ORAL at 09:09

## 2023-11-02 RX ADMIN — Medication 1 CAPSULE: at 09:09

## 2023-11-02 RX ADMIN — SODIUM CHLORIDE, PRESERVATIVE FREE 5 ML: 5 INJECTION INTRAVENOUS at 09:00

## 2023-11-02 RX ADMIN — ENOXAPARIN SODIUM 30 MG: 100 INJECTION SUBCUTANEOUS at 09:14

## 2023-11-02 RX ADMIN — QUETIAPINE FUMARATE 25 MG: 25 TABLET ORAL at 01:11

## 2023-11-02 RX ADMIN — DEXTROSE AND SODIUM CHLORIDE 500 ML: 5; 450 INJECTION, SOLUTION INTRAVENOUS at 14:30

## 2023-11-02 RX ADMIN — QUETIAPINE FUMARATE 75 MG: 25 TABLET ORAL at 09:09

## 2023-11-02 RX ADMIN — HYDRALAZINE HYDROCHLORIDE 10 MG: 10 TABLET, FILM COATED ORAL at 06:36

## 2023-11-02 NOTE — PROGRESS NOTES
Occupational Therapy/Physical Therapy  OT/PT will sign off. Pt is to transition to hospice.    India Hodge OT  Ester Ochoa PTA

## 2023-11-02 NOTE — CARE COORDINATION
Chart reviewed day 5. Care provided by serafin, matt and IM. Pt is from SNF at [facility]. Pt was evaluated by hospice of [facility] yesterday Inocente Amaya [phone]. Pt was not very responsive today and was in restraints. The restraints have been off since 1500 yesterday. Pt was able to eat his breakfast today. Pt worked with PT/OT with recs for SNF. I spoke with Jazmine about the pt returning to  knowing that [address] [place] [address] is next door if he needs to transition to hospice. I left a message for his daughter Kirk Jenkins to get her thoughts. Awaiting a call back. Spoke with Raffi Yoo at [facility] and they are in agreement with him returning. Will continue to follow course for needs.  Emily Flowers RN

## 2023-11-02 NOTE — PLAN OF CARE
Problem: Discharge Planning  Goal: Discharge to home or other facility with appropriate resources  Outcome: Progressing  Flowsheets (Taken 11/1/2023 2039)  Discharge to home or other facility with appropriate resources:   Identify barriers to discharge with patient and caregiver   Arrange for needed discharge resources and transportation as appropriate     Problem: Pain  Goal: Verbalizes/displays adequate comfort level or baseline comfort level  Outcome: Progressing     Problem: Safety - Adult  Goal: Free from fall injury  Outcome: Progressing     Problem: Safety - Medical Restraint  Goal: Remains free of injury from restraints (Restraint for Interference with Medical Device)  Description: INTERVENTIONS:  1. Determine that other, less restrictive measures have been tried or would not be effective before applying the restraint  2. Evaluate the patient's condition at the time of restraint application  3. Inform patient/family regarding the reason for restraint  4. Q2H: Monitor safety, psychosocial status, comfort, nutrition and hydration  11/2/2023 0120 by Aicha Newman RN  Outcome: Progressing     Problem: Skin/Tissue Integrity  Goal: Absence of new skin breakdown  Description: 1. Monitor for areas of redness and/or skin breakdown  2. Assess vascular access sites hourly  3. Every 4-6 hours minimum:  Change oxygen saturation probe site  4. Every 4-6 hours:  If on nasal continuous positive airway pressure, respiratory therapy assess nares and determine need for appliance change or resting period.   Outcome: Progressing    CHF Care Plan      Patient's EF (Ejection Fraction) is greater than 40%    Heart Failure Medications:  Diuretics[de-identified] None    (One of the following REQUIRED for EF </= 40%/SYSTOLIC FAILURE but MAY be used in EF% >40%/DIASTOLIC FAILURE)        ACE[de-identified] None        ARB[de-identified] None         ARNI[de-identified] None    (Beta Blockers)  NON- Evidenced Based Beta Blocker (for EF% >40%/DIASTOLIC FAILURE): None    Evidenced

## 2023-11-02 NOTE — PROGRESS NOTES
Occupational Therapy  Facility/Department: [facility] B3 - MED SURG  Daily Treatment Note  NAME: Maria Ines Talley  : 1935  MRN: [id number]    Date of Service: 2023    Discharge Recommendations:  Subacute/Skilled Nursing Facility   Therapy discharge recommendations take into account each patient's current medical complexities and are subject to input/oversight from the patient's healthcare team.   Barriers to Home Discharge:   [] Steps to access home entry or bed/bath:   [x] Unable to transfer, ambulate, or propel wheelchair household distances without assist   [x] Limited available assist at home upon discharge    [x] Patient or family requests d/c to post-acute facility    [x] Poor cognition/safety awareness for d/c to home    [] Unable to maintain ordered weight bearing status    [x] Patient with salient signs of long-standing immobility   [x] Decreased independence with ADLs   [x] Increased risk for falls   [] Other:  If pt is unable to be seen after this session, please let this note serve as discharge summary. Please see case management note for discharge disposition. Thank you. AM-[facility] Daily Activity - Inpatient   How much help is needed for putting on and taking off regular lower body clothing?: Total  How much help is needed for bathing (which includes washing, rinsing, drying)?: Total  How much help is needed for toileting (which includes using toilet, bedpan, or urinal)?: Total  How much help is needed for putting on and taking off regular upper body clothing?: A Lot  How much help is needed for taking care of personal grooming?: A Lot  How much help for eating meals?: A Lot  AM-[facility] Inpatient Daily Activity Raw Score: 9  AM-PAC Inpatient ADL T-Scale Score : 25.33  ADL Inpatient CMS 0-100% Score: 79.59  ADL Inpatient CMS G-Code Modifier : CL    Patient Diagnosis(es): There were no encounter diagnoses. Assessment    Assessment: Pt seen for OT tx.  He was able to tolerate therapy activity for sitting EOB and UE ADLs. Pt sat EOB x10 min with min A for balance d/t posterior lean. He did participate in grooming tasks with manual and verbal cues. He responded appropriately to questions though he was disoriented to place. Cont OT in acute care. Recommend SNF with OT. Co-tx collaboration this date to safely meet goals and will have better occupational performance outcomes with in a co-treatment than 1:1 session. Activity Tolerance: Treatment limited secondary to decreased cognition  Discharge Recommendations: Subacute/Skilled Nursing Facility      Plan   Occupational Therapy Plan  Times Per Week: 2-3x/week  Current Treatment Recommendations: Strengthening; Functional mobility training; Endurance training; Safety education & training;Pain management;Cognitive reorientation;Patient/Caregiver education & training;Equipment evaluation, education, & procurement;Self-Care / ADL     Restrictions  Restrictions/Precautions  Restrictions/Precautions: General Precautions; Up as Tolerated; Fall Risk  Position Activity Restriction  Other position/activity restrictions: Avasys, restraints, tele, purewick    Subjective   Subjective  Subjective: Pt resting in bed and would respond to questions with vc's. Pain: No signs of pain. Orientation  Overall Orientation Status: Impaired  Orientation Level: Oriented to person;Disoriented to situation;Disoriented to time;Disoriented to place  Cognition  Overall Cognitive Status: Exceptions  Arousal/Alertness: Delayed responses to stimuli  Following Commands: Inconsistently follows commands  Attention Span: Difficulty attending to directions; Difficulty dividing attention  Memory: Decreased long term memory;Decreased short term memory;Decreased recall of recent events  Safety Judgement: Decreased awareness of need for safety;Decreased awareness of need for assistance  Problem Solving: Decreased awareness of errors  Insights: Decreased awareness of deficits  Initiation: Requires cues for

## 2023-11-02 NOTE — PROGRESS NOTES
Physical Therapy  Facility/Department: [facility] B3 - MED SURG  Daily Treatment Note  NAME: Val Spencer  : 1935  MRN: [id number]    Date of Service: 2023    Discharge Recommendations:  [address] with PT   PT Equipment Recommendations  Equipment Needed: No  Other: defer to facility    [address] - Inpatient   How much help is needed turning from your back to your side while in a flat bed without using bedrails?: Total  How much help is needed moving from lying on your back to sitting on the side of a flat bed without using bedrails?: Total  How much help is needed moving to and from a bed to a chair?: Total  How much help is needed standing up from a chair using your arms?: Total  How much help is needed walking in hospital room?: Total  How much help is needed climbing 3-5 steps with a railing?: Total  AM-PAC Inpatient Mobility Raw Score : 6  AM-PAC Inpatient T-Scale Score : 23.55  Mobility Inpatient CMS 0-100% Score: 100  Mobility Inpatient CMS G-Code Modifier : CN     Patient Diagnosis(es): There were no encounter diagnoses. Assessment   Assessment: Pt willing to particpate in PT/OT co treat but showed poor tolerance to acitivty only sitting 10 minute sthen being too tire to con't with exs. Pt is recommended for con't skilled PT and SNF at D/C. Activity Tolerance: Patient tolerated evaluation without incident;Treatment limited secondary to decreased cognition;Treatment limited secondary to agitation  Equipment Needed: No  Other: defer to facility     Plan    Physcial Therapy Plan  General Plan: 2-3 times per week  Specific Instructions for Next Treatment: progress mobility as tolerated  Current Treatment Recommendations: Strengthening;ROM; Functional mobility training;Balance training;Transfer training;Gait training; Endurance training;Home exercise program;Equipment evaluation, education, & procurement; Therapeutic activities; Safety education & training;Cognitive perform sit <> stand with mod Ax2; 11/2 not met  Short Term Goal 3: pt will perform functional transfers with LRAD and mod Ax2; 11/2 not met  Short Term Goal 4: pt will participate in gait assessment to set appropriate goal; 11/2 not met  Short Term Goal 5: pt will sit EOB while performing dynamic activities x5 min with no more than min A for balance; 11/2 progressing  Patient Goals   Patient Goals : none stated by patient    Education  Patient Education  Education Given To: Patient  Education Provided: Role of Therapy;Plan of Care;Transfer Training  Education Provided Comments: role of PT, importance of mobility  Education Method: Verbal  Barriers to Learning: Cognition  Education Outcome: Unable to verbalize; Unable to demonstrate understanding    Therapy Time   Individual Concurrent Group Co-treatment   Time In 1115         Time Out 1129         Minutes [address], [id number]

## 2023-11-03 PROBLEM — E43 SEVERE MALNUTRITION (HCC): Status: ACTIVE | Noted: 2023-11-03

## 2023-11-03 LAB
ANION GAP SERPL CALCULATED.3IONS-SCNC: 14 MMOL/L (ref 3–16)
BASOPHILS # BLD: 0 K/UL (ref 0–0.2)
BASOPHILS NFR BLD: 0.4 %
BUN SERPL-MCNC: 60 MG/DL (ref 7–20)
CALCIUM SERPL-MCNC: 9.3 MG/DL (ref 8.3–10.6)
CHLORIDE SERPL-SCNC: 107 MMOL/L (ref 99–110)
CO2 SERPL-SCNC: 22 MMOL/L (ref 21–32)
CREAT SERPL-MCNC: 1.7 MG/DL (ref 0.8–1.3)
DEPRECATED RDW RBC AUTO: 16 % (ref 12.4–15.4)
EOSINOPHIL # BLD: 0.3 K/UL (ref 0–0.6)
EOSINOPHIL NFR BLD: 2.6 %
GFR SERPLBLD CREATININE-BSD FMLA CKD-EPI: 39 ML/MIN/{1.73_M2}
GLUCOSE SERPL-MCNC: 94 MG/DL (ref 70–99)
HCT VFR BLD AUTO: 55.7 % (ref 40.5–52.5)
HGB BLD-MCNC: 18.2 G/DL (ref 13.5–17.5)
LYMPHOCYTES # BLD: 2.3 K/UL (ref 1–5.1)
LYMPHOCYTES NFR BLD: 18.6 %
MCH RBC QN AUTO: 29.8 PG (ref 26–34)
MCHC RBC AUTO-ENTMCNC: 32.7 G/DL (ref 31–36)
MCV RBC AUTO: 91.3 FL (ref 80–100)
MONOCYTES # BLD: 0.9 K/UL (ref 0–1.3)
MONOCYTES NFR BLD: 7.3 %
NEUTROPHILS # BLD: 8.6 K/UL (ref 1.7–7.7)
NEUTROPHILS NFR BLD: 71.1 %
PLATELET # BLD AUTO: 249 K/UL (ref 135–450)
PMV BLD AUTO: 7.8 FL (ref 5–10.5)
POTASSIUM SERPL-SCNC: 4 MMOL/L (ref 3.5–5.1)
RBC # BLD AUTO: 6.1 M/UL (ref 4.2–5.9)
SODIUM SERPL-SCNC: 143 MMOL/L (ref 136–145)
WBC # BLD AUTO: 12.2 K/UL (ref 4–11)

## 2023-11-03 PROCEDURE — 6370000000 HC RX 637 (ALT 250 FOR IP): Performed by: NURSE PRACTITIONER

## 2023-11-03 PROCEDURE — 97530 THERAPEUTIC ACTIVITIES: CPT

## 2023-11-03 PROCEDURE — 36415 COLL VENOUS BLD VENIPUNCTURE: CPT

## 2023-11-03 PROCEDURE — 85025 COMPLETE CBC W/AUTO DIFF WBC: CPT

## 2023-11-03 PROCEDURE — 97535 SELF CARE MNGMENT TRAINING: CPT

## 2023-11-03 PROCEDURE — 6360000002 HC RX W HCPCS: Performed by: NURSE PRACTITIONER

## 2023-11-03 PROCEDURE — 6370000000 HC RX 637 (ALT 250 FOR IP): Performed by: STUDENT IN AN ORGANIZED HEALTH CARE EDUCATION/TRAINING PROGRAM

## 2023-11-03 PROCEDURE — 6370000000 HC RX 637 (ALT 250 FOR IP): Performed by: INTERNAL MEDICINE

## 2023-11-03 PROCEDURE — 2580000003 HC RX 258: Performed by: HOSPITALIST

## 2023-11-03 PROCEDURE — 1200000000 HC SEMI PRIVATE

## 2023-11-03 PROCEDURE — 51798 US URINE CAPACITY MEASURE: CPT

## 2023-11-03 PROCEDURE — 80048 BASIC METABOLIC PNL TOTAL CA: CPT

## 2023-11-03 PROCEDURE — 6370000000 HC RX 637 (ALT 250 FOR IP): Performed by: PSYCHIATRY & NEUROLOGY

## 2023-11-03 PROCEDURE — 97116 GAIT TRAINING THERAPY: CPT

## 2023-11-03 PROCEDURE — 2580000003 HC RX 258: Performed by: INTERNAL MEDICINE

## 2023-11-03 PROCEDURE — 97110 THERAPEUTIC EXERCISES: CPT

## 2023-11-03 PROCEDURE — 51701 INSERT BLADDER CATHETER: CPT

## 2023-11-03 RX ORDER — DEXTROSE AND SODIUM CHLORIDE 5; .45 G/100ML; G/100ML
INJECTION, SOLUTION INTRAVENOUS CONTINUOUS
Status: DISCONTINUED | OUTPATIENT
Start: 2023-11-03 | End: 2023-11-04

## 2023-11-03 RX ORDER — TAMSULOSIN HYDROCHLORIDE 0.4 MG/1
0.4 CAPSULE ORAL DAILY
Status: DISCONTINUED | OUTPATIENT
Start: 2023-11-03 | End: 2023-11-04 | Stop reason: HOSPADM

## 2023-11-03 RX ADMIN — Medication 1 CAPSULE: at 10:03

## 2023-11-03 RX ADMIN — PANTOPRAZOLE SODIUM 40 MG: 40 TABLET, DELAYED RELEASE ORAL at 20:26

## 2023-11-03 RX ADMIN — ENOXAPARIN SODIUM 30 MG: 100 INJECTION SUBCUTANEOUS at 10:02

## 2023-11-03 RX ADMIN — HYDRALAZINE HYDROCHLORIDE 10 MG: 10 TABLET, FILM COATED ORAL at 05:20

## 2023-11-03 RX ADMIN — SODIUM CHLORIDE, PRESERVATIVE FREE 10 ML: 5 INJECTION INTRAVENOUS at 10:04

## 2023-11-03 RX ADMIN — METOPROLOL SUCCINATE 25 MG: 25 TABLET, EXTENDED RELEASE ORAL at 17:29

## 2023-11-03 RX ADMIN — SODIUM CHLORIDE, PRESERVATIVE FREE 10 ML: 5 INJECTION INTRAVENOUS at 20:27

## 2023-11-03 RX ADMIN — TAMSULOSIN HYDROCHLORIDE 0.4 MG: 0.4 CAPSULE ORAL at 15:22

## 2023-11-03 RX ADMIN — QUETIAPINE FUMARATE 25 MG: 25 TABLET ORAL at 20:26

## 2023-11-03 RX ADMIN — QUETIAPINE FUMARATE 25 MG: 25 TABLET ORAL at 05:20

## 2023-11-03 RX ADMIN — DEXTROSE AND SODIUM CHLORIDE: 5; 450 INJECTION, SOLUTION INTRAVENOUS at 17:07

## 2023-11-03 RX ADMIN — FINASTERIDE 5 MG: 5 TABLET, FILM COATED ORAL at 10:03

## 2023-11-03 RX ADMIN — METOPROLOL SUCCINATE 25 MG: 25 TABLET, EXTENDED RELEASE ORAL at 10:03

## 2023-11-03 RX ADMIN — HYDRALAZINE HYDROCHLORIDE 10 MG: 10 TABLET, FILM COATED ORAL at 15:23

## 2023-11-03 RX ADMIN — QUETIAPINE FUMARATE 75 MG: 25 TABLET ORAL at 15:22

## 2023-11-03 RX ADMIN — Medication 1 CAPSULE: at 17:29

## 2023-11-03 RX ADMIN — ATORVASTATIN CALCIUM 40 MG: 40 TABLET, FILM COATED ORAL at 20:27

## 2023-11-03 RX ADMIN — QUETIAPINE FUMARATE 75 MG: 25 TABLET ORAL at 10:02

## 2023-11-03 RX ADMIN — FERROUS SULFATE TAB 325 MG (65 MG ELEMENTAL FE) 325 MG: 325 (65 FE) TAB at 10:03

## 2023-11-03 RX ADMIN — ASPIRIN 81 MG: 81 TABLET, COATED ORAL at 10:02

## 2023-11-03 RX ADMIN — PANTOPRAZOLE SODIUM 40 MG: 40 TABLET, DELAYED RELEASE ORAL at 10:03

## 2023-11-03 ASSESSMENT — PAIN SCALES - GENERAL: PAINLEVEL_OUTOF10: 0

## 2023-11-03 NOTE — PROGRESS NOTES
Pt was lethargic at 1900 and was unable to remain awake to take medication at 2100. MD notified. Pt now resting well in bed but awakens and responds appropriately. Pt appears calm and cooperative but orientated only to self. Pt without complaints at this time.

## 2023-11-03 NOTE — CARE COORDINATION
Chart reviewed day 6. Care provided by serafin, matt and IM. Pt ate all his breakfast today and was up in a chair. Pt is from SNF at [facility] and can return as soon as medically ready. Will continue to follow course for needs.  Inocencio Baltazar RN

## 2023-11-03 NOTE — PROGRESS NOTES
Physical Therapy  Facility/Department: [facility] B3 - MED SURG  Daily Treatment Note  NAME: Pamela Stephenson  : 1935  MRN: [id number]    Date of Service: 11/3/2023    Discharge Recommendations:  Subacute/Skilled Nursing Facility   PT Equipment Recommendations  Equipment Needed: No  Other: defer to facility    [address] - Inpatient   How much help is needed turning from your back to your side while in a flat bed without using bedrails?: A Little  How much help is needed moving from lying on your back to sitting on the side of a flat bed without using bedrails?: A Little  How much help is needed moving to and from a bed to a chair?: A Lot  How much help is needed standing up from a chair using your arms?: A Lot  How much help is needed walking in hospital room?: A Lot  How much help is needed climbing 3-5 steps with a railing?: Total  AM-PAC Inpatient Mobility Raw Score : 13  AM-PAC Inpatient T-Scale Score : 36.74  Mobility Inpatient CMS 0-100% Score: 64.91  Mobility Inpatient CMS G-Code Modifier : CL     Patient Diagnosis(es): There were no encounter diagnoses. Assessment   Assessment: Pt willing to particpate in PT/OT and demos great improvement from yesterday. Pt able to come to EOB SBA, stand and amb with RW min/mod A of 2. Pt followed commands for exs with visual and verbal cues. Pt is recommended for con't skilled PT and SNF at D/C. Activity Tolerance: Patient tolerated treatment well  Equipment Needed: No  Other: defer to facility     Plan    Physcial Therapy Plan  General Plan: 2-3 times per week  Specific Instructions for Next Treatment: progress mobility as tolerated  Current Treatment Recommendations: Strengthening;ROM; Functional mobility training;Balance training;Transfer training;Gait training; Endurance training;Home exercise program;Equipment evaluation, education, & procurement; Therapeutic activities; Safety education & training;Cognitive reorientation; Neuromuscular re-education Restrictions  Restrictions/Precautions  Restrictions/Precautions: General Precautions, Up as Tolerated, Fall Risk  Required Braces or Orthoses?: No  Position Activity Restriction  Other position/activity restrictions: Avasys, restraints, tele, purewick     Subjective    Subjective  Subjective: Pt pleasantly confused, willing to participate  Pain: No c/o pain  Orientation  Overall Orientation Status: Impaired  Orientation Level: Oriented to person;Disoriented to time;Disoriented to situation;Disoriented to place  Cognition  Overall Cognitive Status: Exceptions  Arousal/Alertness: Delayed responses to stimuli  Following Commands: Follows one step commands with increased time  Attention Span: Attends with cues to redirect  Memory: Decreased recall of recent events;Decreased recall of biographical Information;Decreased short term memory;Decreased long term memory  Safety Judgement: Decreased awareness of need for assistance;Decreased awareness of need for safety  Problem Solving: Decreased awareness of errors  Insights: Decreased awareness of deficits  Initiation: Requires cues for some  Sequencing: Requires cues for some  Cognition Comment: Pt was alert and able to follow simple directions with verbal and manual cues. Pt able to verbally respond to questions. Objective   Vitals  Vitals:    11/03/23    BP: (!) 137/90   Pulse: 70   Resp:    Temp:    SpO2: Unable to obtain          Bed Mobility Training  Bed Mobility Training: Yes  Interventions: Verbal cues; Safety awareness training  Supine to Sit: Stand-by assistance; Additional time (HOB elevated)  Balance  Sitting: Impaired  Sitting - Static: Occasional  Sitting - Dynamic: Occasional  Standing: Impaired  Standing - Static: Constant support  Standing - Dynamic: Constant support  Transfer Training  Transfer Training: Yes  Interventions: Manual cues; Safety awareness training;Verbal cues  Sit to Stand: Moderate assistance;Assist X2;Adaptive equipment; Additional

## 2023-11-03 NOTE — PROGRESS NOTES
Physician Progress Note      Juana Randall  CSN #:                  [id number]  :                       1935  ADMIT DATE:       10/28/2023 11:51 AM  [facility] DATE:  RESPONDING  PROVIDER #:        Steffany Ha MD          QUERY TEXT:    Patient admitted with \"Chest pain/Acute on chronic systolic heart   failure/Possible Pneumonia without sepsis\"    Noted to have severe   malnutrition per dietary consult. If possible, please document in progress   notes and discharge summary if you are evaluating and /or treating any of the   following: The medical record reflects the following:  Risk Factors: severe dementia, acute illness  Clinical Indicators:  Dietary consult-\"Severe malnutrition (23 1133)    -Findings of the 6 clinical characteristics of malnutrition:Energy Intake:    50% or less of estimated energy requirements for 5 or more days  Weight Loss:  Greater than 7.5% over 3 months\"  Treatment: Currently ordered pureed, heart healthy, 2 gm sodium diet. Recommend liberalizing to allow increased meal options while on pureed diet. Pt out of restraints. PO intakes of mainly 0-50% since admission. Hospice meet   a few days ago. Will add ONS to promote PO intakes this admission. Will   monitor further nutrition needs. Thank-You, Geovanna Dye RN, BSN, CCDS    ASPEN Criteria:    https://aspenjournals. onlinelibrary. burroughs. com/doi/full/10.1177/668424843358552  5  Options provided:  -- Protein calorie malnutrition severe  -- Other - I will add my own diagnosis  -- Disagree - Not applicable / Not valid  -- Disagree - Clinically unable to determine / Unknown  -- Refer to Clinical Documentation Reviewer    PROVIDER RESPONSE TEXT:    This patient has severe protein calorie malnutrition.     Query created by: Ce Waite on 11/3/2023 12:37 PM      Electronically signed by:  Steffany Ha MD 11/3/2023 6:26 PM

## 2023-11-03 NOTE — PROGRESS NOTES
PALLIATIVE MEDICINE PROGRESS NOTE     Patient name:Aroldo Anne    FLN:[id number] :1935  Room/Bed:0356/0356-01    LOS: 6 days        ASSESSMENT/RECOMMENDATIONS     82 y.o. male with  dementia and acute CHF exacerbation    Life-threatening acute illness or unstable chronic illness addressed by Palliative Medicine:    1) Goals of Care - SNF to maximize function and then enrollment with Hospice ([address]) at [address] [place] [address]). Hospice of [address] will follow while in SNF to avoid any further hospitalizations. Code status is Limited x 4 Nos    2) Acute on Chronic Heart Failure -  EF 35% (decreased from 50% in 2023). Cardiology following. BNP 4806 with JVD. GDMT limited by NATANAEL. Family does not want aggressive interventions such a heart cath and wants to treat medically    3) Altered Mental status- acute delirium with underlying dementia. Patient with behaviors this admission. Psych consulted and Namenda and Risperdal stopped. Patient has history of aspiration. Based on conversation with daughter, it appears his dementia symptoms have progressed since his admission in September. FAST score may be closer to 6/7. Reviewed trajectory of dementia with daughter         Patient/Family Goals of Care :    11/3/223    Spoke with daughter, Maribeth Truman. She is happy patient is doing better. Still wants to pursue hospice, but wants patient to have the benefit of rehab first to maximize function. Once he has been discharged from rehab they will enroll with hospice. They are hoping for DC to SNF as soon as possible. He will receive hospice services in LTC. Called Hospice of [address] Confirmed they are following patient and will follow him in SNF for hospice enrollment following DC from SNF. They will do weekly check-ins with patient and family until enrollment, avoid hospitalizations.      Total ACP discussion time - 25 minutes    23    Called to speak with Maribeth Laughlin following discussion with in the care of this patient. SUBJECTIVE     Chief Complaint: chest pain    Last 24 hours:   Alert, calm, up in chair                     Palliative Medicine Interventions:    patient/family support  Goals of Care discussions with patient/surrogate  Spiritual Interventions:     Counseling and educating the patient/family/caregiver  Preparing to see the patient (e.g., review of tests)  Referring / communicating with other healthcare professionals including care coordination (not separately reported):  Hospitalist, Case management  Documenting clinical information in the electronic health record   Does NOT include ACP discussion time         DATA:  Current labs in the epic chart reviewed as of 11/3/2023   Review of previous notes, admits, labs, radiology and testing relevant to this consult done in this chart today 11/3/2023    Data Reviewed related to this consultation:    Review of prior external note(s) from each unique source relevant to today's visit: Hospitalist, Case management, PT/OT/ST, cardiology, psych  Discussion of management or test with external physician/qualified health care professional: Hospitalist, Case management,    Unique test results reviewed: CBC and BMP, cardiac labs, LFTs, nutrition labs, cxr, echo        Signed By: Electronically signed by GUS Cabrera CNP on 11/3/2023 at 1:59 PM   Palliative Medicine     November 3, 2023

## 2023-11-03 NOTE — PROGRESS NOTES
term memory;Decreased long term memory  Safety Judgement: Decreased awareness of need for assistance;Decreased awareness of need for safety  Problem Solving: Decreased awareness of errors  Insights: Decreased awareness of deficits  Initiation: Requires cues for some  Sequencing: Requires cues for some  Cognition Comment: Pt was alert and able to follow simple directions with verbal and manual cues. Pt able to verbally respond to questions. Objective    Vitals  Vitals  Pulse: 70  Heart Rate Source: Monitor  BP: (!) 137/90  BP Location: Left upper arm  BP Method: Automatic  Patient Position: Semi fowlers  MAP (Calculated): 106  O2 Device: None (Room air)  Bed Mobility Training  Bed Mobility Training: Yes  Interventions: Verbal cues; Safety awareness training  Supine to Sit: Stand-by assistance; Additional time (HOB elevated)  Balance  Sitting: Impaired  Sitting - Static: Occasional  Sitting - Dynamic: Occasional  Standing: Impaired  Standing - Static: Constant support  Standing - Dynamic: Constant support  Transfer Training  Transfer Training: Yes  Interventions: Manual cues; Safety awareness training;Verbal cues  Sit to Stand: Moderate assistance;Assist X2;Adaptive equipment; Additional time (Posterior lean upon standing from chair. Performed sit to stand from chair with RW)  Stand to Sit: Minimum assistance; Moderate assistance;Assist X2;Adaptive equipment; Additional time (RW)  Stand Pivot Transfers: Minimum assistance; Moderate assistance;Assist X2;Adaptive equipment; Additional time (RW)  Bed to Chair: Contact-guard assistance (Bed to chair transfer in [place].)     ADL  Feeding: Minimal assistance; Beverage management  Feeding Skilled Clinical Factors: Assist to hold cup and straw  UE Dressing: Maximum assistance  UE Dressing Skilled Clinical Factors: gown  LE Dressing: Dependent/Total  LE Dressing Skilled Clinical Factors: brief change  Toileting: Dependent/Total  Toileting Skilled Clinical Factors:  For pericare and brief in standing with assist x2. Safety Devices  Type of Devices: Telesitter in use;Nurse notified;Call light within reach; Heels elevated for pressure relief; Chair alarm in place;Gait belt;Left in chair; All fall risk precautions in place     Patient Education  Education Given To: Patient  Education Provided: Role of Therapy;Transfer Training;Equipment;Plan of Care; Fall Prevention Strategies; ADL Adaptive Strategies;Orientation  Education Method: Verbal;Demonstration  Barriers to Learning: Cognition  Education Outcome: Verbalized understanding;Continued education neededAttempted Therapy Heart Failure Education this date. Pt inappropriate for education at this time due to :  [x]Cognition   []Medical Status   [x]Readiness to learn  []Refusal   []Language barrier  []Decreased vision/hearing    []No family present   Disease Specific Education: Pt educated on importance of OOB mobility, prevention of complications of bedrest, and general safety during hospitalization. Pt verbalized understanding    Should this change during treatment, education will be initiated. If family/ pt's support system is present at subsequent therapy session, will attempt to initiate education.     Goals  Short Term Goals  Time Frame for Short Term Goals: 1 week- 11/07 --GOALS ONGOING 11/3/23  Short Term Goal 1: Pt luigi sit EOB and perform 1-2 ADLs with SBA  Short Term Goal 2: Pt will complete toilet transfer with CGA  Short Term Goal 3: Pt will perform 5 x 15 BUE ex to inc strength and fxl transfers by 11/04  Short Term Goal 4: Pt will complete LB dressing with CGA  Patient Goals   Patient goals : not stated     Therapy Time   Individual Concurrent Group Co-treatment   Time In 0800         Time Out 0825         Minutes 25         Timed Code Treatment Minutes: 25 Minutes     Rudolph Koch OT

## 2023-11-03 NOTE — PROGRESS NOTES
Comprehensive Nutrition Assessment    Type and Reason for Visit:  Initial, RD Nutrition Re-Screen/LOS    Nutrition Recommendations/Plan:   Liberalize diet to general   Add Ensure BID - monitor acceptance   Encourage PO intakes as tolerated  Assist with meals as needed   Monitor nutrition adequacy, pertinent labs, bowel habits, wt changes, and clinical progress     Malnutrition Assessment:  Malnutrition Status:  Severe malnutrition (11/03/23 1133)    Context:  Acute Illness     Findings of the 6 clinical characteristics of malnutrition:  Energy Intake:  50% or less of estimated energy requirements for 5 or more days  Weight Loss:  Greater than 7.5% over 3 months       Nutrition Assessment:    Pt seen for LOS. Pt sleeping at time of visit. Currently ordered pureed, heart healthy, 2 gm sodium diet. Recommend liberalizing to allow increased meal options while on pureed diet. Pt out of restraints. PO intakes of mainly 0-50% since admission. Hospice meet a few days ago. Will add ONS to promote PO intakes this admission. Will monitor further nutrition needs. Nutrition Related Findings:    BM on 11/2. Poor dentition. Labs reviewed. Wound Type: None       Current Nutrition Intake & Therapies:    Average Meal Intake: 0%, 1-25%, 26-50%, %  Average Supplements Intake: None Ordered  ADULT DIET; Dysphagia - Pureed; Low Fat/Low Chol/High Fiber/2 gm Na    Anthropometric Measures:  Height: 162.6 cm (5' 4\")  Ideal Body Weight (IBW): 130 lbs (59 kg)       Current Body Weight: 59.4 kg (131 lb), 100.8 % IBW.  Weight Source: Bed Scale  Current BMI (kg/m2): 22.5  Usual Body Weight: 73.9 kg (163 lb) (bed scale 9/4/23)  % Weight Change (Calculated): -19.6                    BMI Categories: Underweight (BMI less than 22) age over 72    Estimated Daily Nutrient Needs:  Energy Requirements Based On: Kcal/kg (25-30)  Weight Used for Energy Requirements: Ideal  Energy (kcal/day): 6350-9427 kcal  Weight Used for Protein Requirements: Ideal (1.0-1.2 g/kg)  Protein (g/day): 59-71 g  Method Used for Fluid Requirements: 1 ml/kcal  Fluid (ml/day): 6791-7614 mL    Nutrition Diagnosis:   Inadequate oral intake related to inadequate protein-energy intake, cognitive or neurological impairment as evidenced by intake 0-25%, intake 26-50%, weight loss    Nutrition Interventions:   Food and/or Nutrient Delivery: Modify Current Diet, Start Oral Nutrition Supplement  Nutrition Education/Counseling: No recommendation at this time  Coordination of Nutrition Care: Continue to monitor while inpatient       Goals:     Goals: PO intake 50% or greater, prior to discharge       Nutrition Monitoring and Evaluation:   Behavioral-Environmental Outcomes: None Identified  Food/Nutrient Intake Outcomes: Food and Nutrient Intake, Supplement Intake, Diet Advancement/Tolerance  Physical Signs/Symptoms Outcomes: Biochemical Data, Nutrition Focused Physical Findings, Weight, Chewing or Swallowing, Meal Time Behavior    Discharge Planning:     Too soon to determine     Aram Cooks, [address] [facility] JONNATHAN Rangel, LD  Contact: [phone]

## 2023-11-03 NOTE — PROGRESS NOTES
Hospital Medicine Progress Note      Date of Admission: 10/28/2023  Hospital Day: 7    Chief Admission Complaint:    Chest pain with hypoxia sent from [facility] by squad to ED       Subjective:      Awake , conversational , forgetful , comfortable . No CP/ sob  Presenting Admission History: This is a 82 y.o. male who presented to OSH, [facility] via squad from [facility] for reported left sided chest pain and SOB. The patient has severe dementia and can not give me information on reason for admission or for going to [facility]. All the following information is from ED note and notes from [place]. PMHx significant for Anemia, AFIB, BPH, Dementia, Dysphagia, GERD, Depression, Hitial hernia, Hypercholesterolemia, HTN, pacemaker, CAD, CHF, and CKD IIIa. Assessment/Plan:      Current Principal Problem:  COPD (chronic obstructive pulmonary disease) (HCC)    Chest pain  Acute on chronic systolic heart failure - LVEF down from ~55% (normal) in 1/2023  now LVEF 35% earlier this week per ECHO at OSH; high BNP, pulmonary edema on CXR. - Patient was recently seen at [address] [place] [address] ED for same complaint on 10/23/23. He was evaluated and discharged home the same evening.  - Troponin flat <0.012 since admission  - EKG from OSH revealed possible elevation in leads I and aVL but when compared to previous EKGs this was seen before. - CXR at OSH revealed borderline cardiomegaly with possible cardiogenic overload. Brought in on 2L per NC sating at 98%. Weaned off and sating at 96% on RA.  - Consulted Cardiology in setting of possible CHF exacerbation. Started on Lasix 40 mg IV Q12 by Cardiology with Lasix holiday  currently . - Continue beta blocker. Unable to Acei/arb/arni/MRA/SGLT2 2/2 NATANAEL.    - Began  low dose Hydralazine for afterload reduction per Cardiology  - Cardiology feels he is not an appropriate candidate for cath lab given severity of dementia, and family also stated that they

## 2023-11-04 VITALS
TEMPERATURE: 97.3 F | DIASTOLIC BLOOD PRESSURE: 66 MMHG | SYSTOLIC BLOOD PRESSURE: 92 MMHG | HEIGHT: 64 IN | WEIGHT: 134.1 LBS | HEART RATE: 70 BPM | BODY MASS INDEX: 22.9 KG/M2 | OXYGEN SATURATION: 97 % | RESPIRATION RATE: 16 BRPM

## 2023-11-04 PROCEDURE — 6370000000 HC RX 637 (ALT 250 FOR IP): Performed by: INTERNAL MEDICINE

## 2023-11-04 PROCEDURE — 2580000003 HC RX 258: Performed by: HOSPITALIST

## 2023-11-04 PROCEDURE — 6370000000 HC RX 637 (ALT 250 FOR IP): Performed by: STUDENT IN AN ORGANIZED HEALTH CARE EDUCATION/TRAINING PROGRAM

## 2023-11-04 PROCEDURE — 6370000000 HC RX 637 (ALT 250 FOR IP): Performed by: NURSE PRACTITIONER

## 2023-11-04 PROCEDURE — 6360000002 HC RX W HCPCS: Performed by: NURSE PRACTITIONER

## 2023-11-04 RX ORDER — QUETIAPINE FUMARATE 25 MG/1
75 TABLET, FILM COATED ORAL 2 TIMES DAILY
Qty: 60 TABLET | Refills: 0 | Status: SHIPPED | OUTPATIENT
Start: 2023-11-04

## 2023-11-04 RX ORDER — TAMSULOSIN HYDROCHLORIDE 0.4 MG/1
0.4 CAPSULE ORAL DAILY
Qty: 30 CAPSULE | Refills: 3
Start: 2023-11-05

## 2023-11-04 RX ADMIN — HYDRALAZINE HYDROCHLORIDE 10 MG: 10 TABLET, FILM COATED ORAL at 05:38

## 2023-11-04 RX ADMIN — PANTOPRAZOLE SODIUM 40 MG: 40 TABLET, DELAYED RELEASE ORAL at 08:34

## 2023-11-04 RX ADMIN — TAMSULOSIN HYDROCHLORIDE 0.4 MG: 0.4 CAPSULE ORAL at 08:35

## 2023-11-04 RX ADMIN — QUETIAPINE FUMARATE 75 MG: 25 TABLET ORAL at 14:48

## 2023-11-04 RX ADMIN — FERROUS SULFATE TAB 325 MG (65 MG ELEMENTAL FE) 325 MG: 325 (65 FE) TAB at 08:34

## 2023-11-04 RX ADMIN — QUETIAPINE FUMARATE 75 MG: 25 TABLET ORAL at 08:38

## 2023-11-04 RX ADMIN — Medication 1 CAPSULE: at 08:34

## 2023-11-04 RX ADMIN — SODIUM CHLORIDE, PRESERVATIVE FREE 10 ML: 5 INJECTION INTRAVENOUS at 08:36

## 2023-11-04 RX ADMIN — ASPIRIN 81 MG: 81 TABLET, COATED ORAL at 08:35

## 2023-11-04 RX ADMIN — ENOXAPARIN SODIUM 30 MG: 100 INJECTION SUBCUTANEOUS at 08:34

## 2023-11-04 RX ADMIN — FINASTERIDE 5 MG: 5 TABLET, FILM COATED ORAL at 08:35

## 2023-11-04 NOTE — CARE COORDINATION
CASE MANAGEMENT DISCHARGE SUMMARY      Discharge to: LT at [address] completed: [facility] Exemption Notification (HENS) completed: na    IMM given: (date)     New Durable Medical Equipment ordered/agency: na    Transportation:   Medical Transport explained to [facility]. Pt/family voice no agency preference. Agency used: [facility] up [address] form completed: Yes    Confirmed discharge plan with:RN, [facility], RN speaking with spouse. Patient: yes/no     Family:  yes/no    Name: Contact number:     Facility/Agency, name:  [facility] faxed [phone]   Phone number for report to facility: [phone]     RN, name: Johnson Bishop    Note: Discharging nurse to complete [facility], reconcile [facility], and place final copy with patient's discharge packet. RN to ensure that written prescriptions for  Level II medications are sent with patient to the facility as per protocol.

## 2023-11-04 NOTE — PROGRESS NOTES
CHF Care Plan      Patient's EF (Ejection Fraction) is less than 40%    Heart Failure Medications:  Diuretics[de-identified] None    (One of the following REQUIRED for EF </= 40%/SYSTOLIC FAILURE but MAY be used in EF% >40%/DIASTOLIC FAILURE)        ACE[de-identified] None        ARB[de-identified] None         ARNI[de-identified] None    (Beta Blockers)  NON- Evidenced Based Beta Blocker (for EF% >40%/DIASTOLIC FAILURE): None    Evidenced Based Beta Blocker::(REQUIRED for EF% <40%/SYSTOLIC FAILURE) Metoprolol SUCCinate- Toprol XL  . .................................................................................................................................................. Healthy Weight Tracking - BMI + Meds 9/4/2023 9/5/2023 10/31/2023 11/1/2023 11/2/2023 11/2/2023 11/3/2023   Weight 163 lb 3.2 oz - 132 lb 11.5 oz 130 lb 10.9 oz 131 lb 2.8 oz 131 lb 2.8 oz 117 lb 3.2 oz   Height - 5' 4\" - - - 5' 4\" 5' 4\"   Body Mass Index - - - - - 22.52 kg/m2 20.12 kg/m2   Some recent data might be hidden         Patient's weights and intake/output reviewed: Yes    Daily Weight log at bedside, patient/family participation in use of log: \"yes    Patient's Last Weight: 117 lbs obtained by Lift Scale. Difference of 26 lbs less than last documented weight due to bed weight taken. Intake/Output Summary (Last 24 hours) at 11/3/2023 2016  Last data filed at 11/3/2023 1847  Gross per 24 hour   Intake 360 ml   Output 675 ml   Net -315 ml       Education Booklet Provided: yes    Comorbidities Reviewed Yes    Patient has a past medical history of Atrial fibrillation ([address]), Blind right eye, Chronic kidney disease, Dementia ([address]), Hyperlipidemia, Hypertension, and Pneumonia.      >>For CHF and Comorbidity documentation on Education Time and Topics, please see Education Tab    Progressive Mobility Assessment:  What is this patient's Current Level of Mobility?: Ambulatory- with Assistance  How was this patient Mobilized today?: Edge of Bed, Up to Chair, Up in Room, Up in [place], Unable to Mobilize, and Patient Refuses to Mobilize, ambulated 10 ft                 With Whom? PT and OT                 Level of Difficulty/Assistance: 2x Assist     Pt resting in bed at this time on room air. Pt denies shortness of breath. Pt without lower extremity edema.      Patient and/or Family's stated Goal of Care this Admission: reduce shortness of breath, increase activity tolerance, better understand heart failure and disease management, and be more comfortable prior to discharge        :

## 2023-11-04 NOTE — PLAN OF CARE
Problem: Discharge Planning  Goal: Discharge to home or other facility with appropriate resources  Outcome: Adequate for Discharge     Problem: Pain  Goal: Verbalizes/displays adequate comfort level or baseline comfort level  Outcome: Adequate for Discharge     Problem: Safety - Adult  Goal: Free from fall injury  Outcome: Adequate for Discharge     Problem: Safety - Medical Restraint  Goal: Remains free of injury from restraints (Restraint for Interference with Medical Device)  Description: INTERVENTIONS:  1. Determine that other, less restrictive measures have been tried or would not be effective before applying the restraint  2. Evaluate the patient's condition at the time of restraint application  3. Inform patient/family regarding the reason for restraint  4. Q2H: Monitor safety, psychosocial status, comfort, nutrition and hydration  Outcome: Adequate for Discharge     Problem: Skin/Tissue Integrity  Goal: Absence of new skin breakdown  Description: 1. Monitor for areas of redness and/or skin breakdown  2. Assess vascular access sites hourly  3. Every 4-6 hours minimum:  Change oxygen saturation probe site  4. Every 4-6 hours:  If on nasal continuous positive airway pressure, respiratory therapy assess nares and determine need for appliance change or resting period.   Outcome: Adequate for Discharge     Problem: ABCDS Injury Assessment  Goal: Absence of physical injury  Outcome: Adequate for Discharge     Problem: Nutrition Deficit:  Goal: Optimize nutritional status  Outcome: Adequate for Discharge

## 2023-11-04 NOTE — PROGRESS NOTES
Pt arousable this evening and awakening spontaneously. Pt ate during all meals today per AM RN. Pt took oral meds this evening. Pt continues to not void. RN will continue to monitor. Pt has been strait cathed 2x in last 24 hours for retention greater than 300mls.

## 2023-11-04 NOTE — PLAN OF CARE
Problem: Discharge Planning  Goal: Discharge to home or other facility with appropriate resources  Outcome: Progressing     Problem: Pain  Goal: Verbalizes/displays adequate comfort level or baseline comfort level  Outcome: Progressing     Problem: Safety - Adult  Goal: Free from fall injury  Outcome: Progressing     Problem: Safety - Medical Restraint  Goal: Remains free of injury from restraints (Restraint for Interference with Medical Device)  Description: INTERVENTIONS:  1. Determine that other, less restrictive measures have been tried or would not be effective before applying the restraint  2. Evaluate the patient's condition at the time of restraint application  3. Inform patient/family regarding the reason for restraint  4. Q2H: Monitor safety, psychosocial status, comfort, nutrition and hydration  Outcome: Progressing     Problem: Skin/Tissue Integrity  Goal: Absence of new skin breakdown  Description: 1. Monitor for areas of redness and/or skin breakdown  2. Assess vascular access sites hourly  3. Every 4-6 hours minimum:  Change oxygen saturation probe site  4. Every 4-6 hours:  If on nasal continuous positive airway pressure, respiratory therapy assess nares and determine need for appliance change or resting period.   Outcome: Progressing     Problem: ABCDS Injury Assessment  Goal: Absence of physical injury  Outcome: Progressing     Problem: Nutrition Deficit:  Goal: Optimize nutritional status  Outcome: Progressing

## 2023-11-04 NOTE — PROGRESS NOTES
Patient d/c with [facility] to return to [employer]. No c/o pain or discomfort.  order discontinued. Report called to [facility] [place].

## 2023-11-04 NOTE — PLAN OF CARE
CHF Care Plan      Patient's EF (Ejection Fraction) is less than 40%    Heart Failure Medications:  Diuretics[de-identified] Furosemide    (One of the following REQUIRED for EF </= 40%/SYSTOLIC FAILURE but MAY be used in EF% >40%/DIASTOLIC FAILURE)        ACE[de-identified] None        ARB[de-identified] None         ARNI[de-identified] None    (Beta Blockers)  NON- Evidenced Based Beta Blocker (for EF% >40%/DIASTOLIC FAILURE): None    Evidenced Based Beta Blocker::(REQUIRED for EF% <40%/SYSTOLIC FAILURE) None  . .................................................................................................................................................. Healthy Weight Tracking - BMI + Meds 9/5/2023 10/31/2023 11/1/2023 11/2/2023 11/2/2023 11/3/2023 11/4/2023   Weight - 132 lb 11.5 oz 130 lb 10.9 oz 131 lb 2.8 oz 131 lb 2.8 oz 117 lb 3.2 oz 134 lb 1.6 oz   Height 5' 4\" - - - 5' 4\" 5' 4\" -   Body Mass Index - - - - 22.52 kg/m2 20.12 kg/m2 23.02 kg/m2   Some recent data might be hidden         Patient's weights and intake/output reviewed: Yes    Daily Weight log at bedside, patient/family participation in use of log: \"yes    Patient's Last Weight: 134 lbs obtained by Lift Scale. Difference of 17 lbs more than last documented weight. Intake/Output Summary (Last 24 hours) at 11/4/2023 3539  Last data filed at 11/4/2023 0540  Gross per 24 hour   Intake 840 ml   Output 575 ml   Net 265 ml       Education Booklet Provided: yes    Comorbidities Reviewed Yes    Patient has a past medical history of Atrial fibrillation ([address]), Blind right eye, Chronic kidney disease, Dementia ([address]), Hyperlipidemia, Hypertension, and Pneumonia. >>For CHF and Comorbidity documentation on Education Time and Topics, please see Education Tab    Progressive Mobility Assessment:  What is this patient's Current Level of Mobility?: Ambulatory- with Assistance  How was this patient Mobilized today?: Up to Chair, ambulated 10 ft                 With Whom?  Nurse, PCA, PT, and OT Level of Difficulty/Assistance: 2x Assist     Pt resting in bed at this time on room air. Pt denies shortness of breath. Pt without lower extremity edema.      Patient and/or Family's stated Goal of Care this Admission: be more comfortable prior to discharge        :

## 2023-11-04 NOTE — DISCHARGE INSTR - COC
Continuity of Care Form    Patient Name: Adonis Garcia   :  1935  MRN:  [id number]    Admit date:  10/28/2023  Discharge date:  23    Code Status Order: Limited   Advance Directives:     Admitting Physician:  Fernie De La Torre MD  PCP: Moy Montesinos MD    Discharging Nurse: [address] [place] [address] Unit/Room#: 3335/6336-28  Discharging Unit Phone Number: [phone]    Emergency Contact:   Extended Emergency Contact Information  Primary Emergency Contact: Miladis Parks  Address: [address] [place] [address], [address] [place] [address] of [address] [address] Phone: [phone]  Mobile Phone: [phone]  Relation: Spouse   needed? No  Secondary Emergency Contact: [facility] of [address] [address] Phone: [phone]  Mobile Phone: [phone]  Relation: Child   needed?  No    Past Surgical History:  Past Surgical History:   Procedure Laterality Date    ABLATION OF DYSRHYTHMIC FOCUS      APPENDECTOMY      COLONOSCOPY      EYE SURGERY      HERNIA REPAIR      JOINT REPLACEMENT  2013    left shoulder    PACEMAKER PLACEMENT  10/13/2017    Dr Kerline Mejia at [id number] [place] [address] Blvd single chamber PPM    PACEMAKER PLACEMENT  2022    biventricular    TONSILLECTOMY      UPPER GASTROINTESTINAL ENDOSCOPY      UPPER GASTROINTESTINAL ENDOSCOPY N/A 2023    EGD FOREIGN BODY REMOVAL performed by Lucas Little MD at [address] [place] [address]       Immunization History:   Immunization History   Administered Date(s) Administered    Influenza A ([id number]) Vaccine PF IM 2009    Pneumococcal Conjugate 7-valent (Arletta Celestina) 2013    Pneumococcal, PCV-13, PREVNAR 13, (age 6w+), IM, 0.5mL 2015    Pneumococcal, PPSV23, PNEUMOVAX 23, (age 2y+), SC/IM, 0.5mL 2010, 2013, 2019       Active Problems:  Patient Active Problem List   Diagnosis Code    Pneumonia J18.9    Abnormal chest CT R93.89    Cough syncope R55, R05.4

## 2023-11-04 NOTE — PROGRESS NOTES
Patient straight cath completed. Sterile technique used. Patient tolerated well. 325mL output with straight cath. No c/o pain or discomfort.

## 2024-02-01 ENCOUNTER — TELEPHONE (OUTPATIENT)
Dept: CARDIOLOGY CLINIC | Age: 89
End: 2024-02-01

## 2024-02-01 NOTE — TELEPHONE ENCOUNTER
Pts daughter Trina stated that pt is in hospice and they are expecting that he only has a day or two left. Trina wanted to know what they needed to do with his device. Per device clinic they do not need to do anything. They can bring back the transmitter at there convenience or call the device company. Trina stated that they were told they need to turn off the device. Per device clinic the defibrillator was explanted. She v/u. She will call back if needs anything else.

## 2024-02-02 NOTE — TELEPHONE ENCOUNTER
Nothing needs to be done.  Device is a pacemaker.  I'm sorry to hear.  I'm praying for him and family.

## (undated) DEVICE — CANNULA NSL AD TBNG L7FT PVC STR NONFLARED PRNG O2 DEL W STD

## (undated) DEVICE — ENDOSCOPIC KIT 2 12 FT OP4 DE2 GWN SYR

## (undated) DEVICE — CONMED SCOPE SAVER BITE BLOCK, 20X27 MM: Brand: SCOPE SAVER

## (undated) DEVICE — RETRIEVAL DEVICE: Brand: RESCUENET™

## (undated) DEVICE — ELECTRODE,ECG,STRESS,FOAM,3PK: Brand: MEDLINE